# Patient Record
Sex: FEMALE | HISPANIC OR LATINO | Employment: PART TIME
[De-identification: names, ages, dates, MRNs, and addresses within clinical notes are randomized per-mention and may not be internally consistent; named-entity substitution may affect disease eponyms.]

---
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
## 2020-01-13 DIAGNOSIS — F41.0 PANIC DISORDER WITHOUT AGORAPHOBIA: ICD-10-CM

## 2020-01-15 RX ORDER — FLUOXETINE 10 MG/1
CAPSULE ORAL
Qty: 50 CAPSULE | Refills: 0 | Status: SHIPPED | OUTPATIENT
Start: 2020-01-15 | End: 2020-02-13

## 2020-01-15 NOTE — TELEPHONE ENCOUNTER
Medication is being filled for 1 time refill only due to:  Patient needs to be seen because due for f/u appt. See last OV notes from 12/12/2019.

## 2020-01-20 ENCOUNTER — OFFICE VISIT (OUTPATIENT)
Dept: BEHAVIORAL HEALTH | Facility: CLINIC | Age: 20
End: 2020-01-20
Payer: COMMERCIAL

## 2020-01-20 DIAGNOSIS — F32.1 MAJOR DEPRESSIVE DISORDER, SINGLE EPISODE, MODERATE (H): Primary | ICD-10-CM

## 2020-01-20 PROCEDURE — 90834 PSYTX W PT 45 MINUTES: CPT | Performed by: SOCIAL WORKER

## 2020-01-20 NOTE — PROGRESS NOTES
"                                              Progress Note    Patient Name: Wandy Ann  Date: 1/20/2020          Service Type: Individual  Video Visit: No     Session Start Time: 230  Session End Time: 315     Session Length: 38-52    Session #: 4    Attendees: Client attended alone     Treatment Plan Last Reviewed: 12/13/2019   PHQ-9 / YESSI-7 :     DATA  Interactive Complexity: No  Crisis: No       Progress Since Last Session (Related to Symptoms / Goals / Homework):   Symptoms: No change -    Homework: Partially completed      Episode of Care Goals: Satisfactory progress - PREPARATION (Decided to change - considering how); Intervened by negotiating a change plan and determining options / strategies for behavior change, identifying triggers, exploring social supports, and working towards setting a date to begin behavior change     Current / Ongoing Stressors and Concerns:     Client notes that things have been \"goodish.\"  Sleep has been good.  Spent a week in [place] with friends and had a nice time there.  Nice to get away.  Starts school tomorrow, feeling anxious about starting back but also excited.  Anxiety has been \"better.\"                  Treatment Objective(s) Addressed in This Session:       Goal- Anxiety: Client will decrease anxiety    I will know I've met my goal when I am less anxious.      Objective #A (Client Action)    Client will use cognitive strategies identified in therapy to challenge anxious thoughts.    Status: New - Date: 12/31/2019    Intervention(s)  Therapist will provide psychoeducation, behavioral activation, and cognitive restructuring.    Objective #B  Client will use at least 3 coping skills for anxiety management in the next 12 weeks.    Status: New - Date: 12/31/2019    Intervention(s)  Therapist will provide psychoeducation, behavioral activation, and cognitive restructuring.      Objective #C  Client will identify three distraction and diversion activities and use " "those activities to decrease level of anxiety.  Status: New - Date: 12/31/2019    Intervention(s)  Therapist will provide psychoeducation, behavioral activation, and cognitive restructuring.       Intervention:   CBT    Discussed adaptive information processing theory and reviewed rationale for using EMDR therapy to treat trauma, anxiety, phobias, pain, and somatic disorders.  Conducted state shifting exercise using bilateral stimulation and integrating target images, cognitions, emotions, and physiological sensations.      Emailed info on trauma and emdr to client in session.      Discussed adaptive information processing theory and reviewed rationale for using EMDR therapy to treat trauma, anxiety, phobias, pain, and somatic disorders.   Conducted calm, safe place exercise using bilateral stimulation and integrating target images, cognitions, emotions, and physiological sensations.  Client identified calm, safe phrase as \"innocent.\"       EMDR assessment at next session.          ASSESSMENT: Current Emotional / Mental Status (status of significant symptoms):   Risk status (Self / Other harm or suicidal ideation)   Patient denies current fears or concerns for personal safety.   Patient denies current or recent suicidal ideation or behaviors.   Patientdenies current or recent homicidal ideation or behaviors.   Patient denies current or recent self injurious behavior or ideation.   Patient denies other safety concerns.   Patient Patient reports there has been no change in risk factors since their last session.     PatientPatient reports there has been no change in protective factors since their last session.     Recommended that patient call 911 or go to the local ED should there be a change in any of these risk factors.     Appearance:   Appropriate    Eye Contact:   Good    Psychomotor Behavior: Normal    Attitude:   Cooperative    Orientation:   All   Speech    Rate / Production: Normal     Volume:  Normal "    Mood:    Anxious    Affect:    Appropriate    Thought Content:  Clear    Thought Form:  Coherent  Logical    Insight:    Good      Medication Review:   No changes to current psychiatric medication(s)     Medication Compliance:   Yes     Changes in Health Issues:   None reported     Chemical Use Review:   Substance Use: Chemical use reviewed, no active concerns identified      Tobacco Use: No current tobacco use.      Diagnosis:  1. Major depressive disorder, single episode, moderate (H)        Collateral Reports Completed:   Not Applicable    PLAN: (Patient Tasks / Therapist Tasks / Other)  1.  Client will use one boundary management skill (i.e., direct communication, saying no to a request) by next session.  2.  Client will practice diaphragmatic breathing once per day by next session.  Client will also rate mood and intensity of mood on a SUDS scale (1-10) before and after breathing exercise at least once by next session.   3.  Discuss trauma treatment at next session          MAICO Hollins                                                         ______________________________________________________________________    Treatment Plan    Patient's Name: Wandy Ann  YOB: 2000    Date: 12/13/2019     DSM5 Diagnoses: DSM5 Diagnoses: (Sustained by DSM5 Criteria Listed Above)  Diagnoses: 296.22 (F32.1)  Major Depressive Disorder, Single Episode, Moderate _  300.01 (F41.0) Panic Disorder  300.02 (F41.1) Generalized Anxiety Disorder  Psychosocial & Contextual Factors: some stress with family of origin, recent breakup  WHODAS 2.0 (12 item)            This questionnaire asks about difficulties due to health conditions. Health conditions  include  disease or illnesses, other health problems that may be short or long lasting,  injuries, mental health or emotional problems, and problems with alcohol or drugs.                     Think back over the past 30 days and answer these questions,  thinking about how much  difficulty you had doing the following activities. For each question, please [place] only  one response.    Declines WHODAS tojose      Referral / Collaboration:  Referral to another professional/service is not indicated at this time..    Anticipated number of session or this episode of care: 18-24      MeasurableTreatment Goal(s) related to diagnosis / functional impairment(s)      Goal- Anxiety: Client will decrease anxiety    I will know I've met my goal when I am less anxious.      Objective #A (Client Action)    Client will use cognitive strategies identified in therapy to challenge anxious thoughts.    Status: New - Date: 12/13/2019    Intervention(s)  Therapist will provide psychoeducation, behavioral activation, and cognitive restructuring.    Objective #B  Client will use at least 3 coping skills for anxiety management in the next 12 weeks.    Status: New - Date: 12/13/2019    Intervention(s)  Therapist will provide psychoeducation, behavioral activation, and cognitive restructuring.      Objective #C  Client will identify three distraction and diversion activities and use those activities to decrease level of anxiety.  Status: New - Date: 12/13/2019    Intervention(s)  Therapist will provide psychoeducation, behavioral activation, and cognitive restructuring.                  Patient has reviewed and agreed to the above plan.      Betty Barrientos, [facility]  December 13, 2019

## 2020-02-13 ENCOUNTER — OFFICE VISIT (OUTPATIENT)
Dept: FAMILY MEDICINE | Facility: CLINIC | Age: 20
End: 2020-02-13
Payer: COMMERCIAL

## 2020-02-13 VITALS
TEMPERATURE: 98.7 F | BODY MASS INDEX: 32.43 KG/M2 | DIASTOLIC BLOOD PRESSURE: 62 MMHG | HEART RATE: 68 BPM | SYSTOLIC BLOOD PRESSURE: 100 MMHG | WEIGHT: 163 LBS

## 2020-02-13 DIAGNOSIS — F32.1 CURRENT MODERATE EPISODE OF MAJOR DEPRESSIVE DISORDER, UNSPECIFIED WHETHER RECURRENT (H): ICD-10-CM

## 2020-02-13 DIAGNOSIS — F41.1 GAD (GENERALIZED ANXIETY DISORDER): Primary | ICD-10-CM

## 2020-02-13 DIAGNOSIS — F41.0 PANIC ATTACK: ICD-10-CM

## 2020-02-13 PROCEDURE — 99214 OFFICE O/P EST MOD 30 MIN: CPT | Performed by: FAMILY MEDICINE

## 2020-02-13 RX ORDER — PROPRANOLOL HYDROCHLORIDE 20 MG/1
20 TABLET ORAL 3 TIMES DAILY PRN
Qty: 90 TABLET | Refills: 0 | Status: SHIPPED | OUTPATIENT
Start: 2020-02-13 | End: 2020-03-18

## 2020-02-13 RX ORDER — FLUOXETINE 40 MG/1
40 CAPSULE ORAL DAILY
Qty: 90 CAPSULE | Refills: 0 | Status: SHIPPED | OUTPATIENT
Start: 2020-02-13 | End: 2020-05-05

## 2020-02-13 NOTE — PROGRESS NOTES
Subjective     Wandy Ann is a 19 year old female who presents to clinic today for the following health issues:    HPI   Medication Followup of Prozac    Taking Medication as prescribed: yes    Side Effects:  None    Medication Helping Symptoms:  NO     Wandy presents to discuss YESSI.  She has excessive worry, fear of social environments, physiological symptoms such as palpitations, sweaty palms, mild shortness of breath, rumination.  Symptoms have been affecting work/daily life.  Patient is currently taking prozac without reported side effects.  Associated panic attacks have improved however still present    Depression - improved control, taking prozac, no side effects reported, going to therapy which has been helping    Wt Readings from Last 4 Encounters:   02/13/20 73.9 kg (163 lb) (89 %)*   12/12/19 75.5 kg (166 lb 8 oz) (91 %)*   06/13/19 78.5 kg (173 lb) (93 %)*   12/13/18 74.9 kg (165 lb 3.2 oz) (91 %)*     * Growth percentiles are based on CDC (Girls, 2-20 Years) data.         BP Readings from Last 3 Encounters:   02/13/20 100/62   12/12/19 126/74   06/13/19 120/72    Wt Readings from Last 3 Encounters:   02/13/20 73.9 kg (163 lb) (89 %)*   12/12/19 75.5 kg (166 lb 8 oz) (91 %)*   06/13/19 78.5 kg (173 lb) (93 %)*     * Growth percentiles are based on CDC (Girls, 2-20 Years) data.                    Reviewed and updated as needed this visit by Provider  Tobacco  Allergies  Meds  Problems  Med Hx  Surg Hx  Fam Hx         Review of Systems   ROS COMP: Constitutional, HEENT, cardiovascular, pulmonary, gi and gu systems are negative, except as otherwise noted.      Objective    /62   Pulse 68   Temp 98.7  F (37.1  C) (Oral)   Wt 73.9 kg (163 lb)   BMI 32.43 kg/m     Physical Exam   GENERAL: healthy, alert and no distress  EYES: Eyes grossly normal to inspection, PERRL and conjunctivae and sclerae normal  NECK: no adenopathy, no asymmetry, masses, or scars and thyroid normal to  palpation  RESP: lungs clear to auscultation - no rales, rhonchi or wheezes  CV: regular rate and rhythm, normal S1 S2, no S3 or S4, no murmur, click or rub, no peripheral edema and peripheral pulses strong  SKIN: no suspicious lesions or rashes  PSYCH: mentation appears normal, affect normal/bright          Assessment & Plan       ICD-10-CM    1. YESSI (generalized anxiety disorder)  F41.1 FLUoxetine (PROZAC) 40 MG capsule     propranolol (INDERAL) 20 MG tablet   2. Current moderate episode of major depressive disorder, unspecified whether recurrent (H)  F32.1 FLUoxetine (PROZAC) 40 MG capsule   3. Panic attack  F41.0 propranolol (INDERAL) 20 MG tablet          Patient Instructions   Leticia Wandy    Always a pleasure seeing you in clinic.  Here's the plan:    Let's give prozac 40mg a try for about 6 weeks.  Let me know how this goes.  Try taking propranolol in the morning to prevent panic attacks.  I'm thinking that the dose will be low enough that you'll be able to work out in the afternoons, but we'll see.  Let me know if you need anything or have any questions.    Hudson Mobley MD       Return in about 6 weeks (around 3/26/2020) for Depression, YESSI.    Hudson Mobley MD  [facility]

## 2020-02-13 NOTE — PATIENT INSTRUCTIONS
Leticia Saba    Always a pleasure seeing you in clinic.  Here's the plan:    Let's give prozac 40mg a try for about 6 weeks.  Let me know how this goes.  Try taking propranolol in the morning to prevent panic attacks.  I'm thinking that the dose will be low enough that you'll be able to work out in the afternoons, but we'll see.  Let me know if you need anything or have any questions.    Hudson Mobley MD

## 2020-02-26 ENCOUNTER — OFFICE VISIT (OUTPATIENT)
Dept: BEHAVIORAL HEALTH | Facility: CLINIC | Age: 20
End: 2020-02-26
Payer: COMMERCIAL

## 2020-02-26 DIAGNOSIS — F41.1 GAD (GENERALIZED ANXIETY DISORDER): Primary | ICD-10-CM

## 2020-02-26 PROCEDURE — 90834 PSYTX W PT 45 MINUTES: CPT | Performed by: SOCIAL WORKER
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
## 2020-03-04 ENCOUNTER — OFFICE VISIT (OUTPATIENT)
Dept: BEHAVIORAL HEALTH | Facility: CLINIC | Age: 20
End: 2020-03-04
Payer: COMMERCIAL

## 2020-03-04 DIAGNOSIS — F41.1 GAD (GENERALIZED ANXIETY DISORDER): ICD-10-CM

## 2020-03-04 DIAGNOSIS — F43.10 PTSD (POST-TRAUMATIC STRESS DISORDER): Primary | ICD-10-CM

## 2020-03-04 PROCEDURE — 90785 PSYTX COMPLEX INTERACTIVE: CPT | Performed by: SOCIAL WORKER

## 2020-03-04 PROCEDURE — 90834 PSYTX W PT 45 MINUTES: CPT | Performed by: SOCIAL WORKER
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
## 2020-03-16 DIAGNOSIS — F41.1 GAD (GENERALIZED ANXIETY DISORDER): ICD-10-CM

## 2020-03-16 DIAGNOSIS — F41.0 PANIC ATTACK: ICD-10-CM

## 2020-03-18 ENCOUNTER — OFFICE VISIT (OUTPATIENT)
Dept: BEHAVIORAL HEALTH | Facility: CLINIC | Age: 20
End: 2020-03-18
Payer: COMMERCIAL

## 2020-03-18 DIAGNOSIS — F41.1 GAD (GENERALIZED ANXIETY DISORDER): Primary | ICD-10-CM

## 2020-03-18 PROCEDURE — 98968 PH1 ASSMT&MGMT NQHP 21-30: CPT | Performed by: SOCIAL WORKER

## 2020-03-18 RX ORDER — PROPRANOLOL HYDROCHLORIDE 20 MG/1
20 TABLET ORAL 3 TIMES DAILY PRN
Qty: 90 TABLET | Refills: 1 | Status: SHIPPED | OUTPATIENT
Start: 2020-03-18 | End: 2020-05-01

## 2020-03-18 NOTE — PROGRESS NOTES
"Wandy Ann is a 19 year old female who is being evaluated via a billable telephone visit.      The patient has been notified of following:     \"This telephone visit will be conducted via a call between you and your physician/provider. We have found that certain health care needs can be provided without the need for a physical exam.  This service lets us provide the care you need with a short phone conversation.  If a prescription is necessary we can send it directly to your pharmacy.  If lab work is needed we can place an order for that and you can then stop by our lab to have the test done at a later time.    If during the course of the call the physician/provider feels a telephone visit is not appropriate, you will not be charged for this service.\"       Wandy Ann complains of    Chief Complaint   Patient presents with     Outpatient Therapy     Phone session        I have reviewed and updated the patient's Past Medical History, Social History, Family History and Medication List.    ALLERGIES  Patient has no known allergies.        Additional provider notes: Client notes that \"things are jayashree scary, lots of unknowns and lots changing.\"  Feels like she has been watching the news too much nad we set a goal for her to change this.  Has been trying to stay focused on her coursework, taking 15 credits and they are all online.  Discussed automatic thoughts today in session.  Explored the role of automatic thoughts in increasing unhelpful thinking in depression, anxiety, and stress.  Explored techniques and strategies to increase awareness of unhelpful automatic thinking such as mindfulness.  Introduced concept of challenging negative thinking.                  Assessment/Plan:  (F41.1) YESSI (generalized anxiety disorder)  (primary encounter diagnosis)  Comment: stable  Plan: Client will practice recognizing automatic thoughts once per day by next session.  Client will document this and bring to " next session.  Phone session scheduled for next week.      I have reviewed the note as documented above.  This accurately captures the substance of my conversation with the patient.        Phone call contact time  Call Started at 830  Call Ended at 900    MAICO Hollins

## 2020-03-25 ENCOUNTER — VIRTUAL VISIT (OUTPATIENT)
Dept: BEHAVIORAL HEALTH | Facility: CLINIC | Age: 20
End: 2020-03-25
Payer: COMMERCIAL

## 2020-03-25 DIAGNOSIS — F43.10 PTSD (POST-TRAUMATIC STRESS DISORDER): Primary | ICD-10-CM

## 2020-03-25 PROCEDURE — 98968 PH1 ASSMT&MGMT NQHP 21-30: CPT | Performed by: SOCIAL WORKER
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
## 2020-04-01 ENCOUNTER — VIRTUAL VISIT (OUTPATIENT)
Dept: BEHAVIORAL HEALTH | Facility: CLINIC | Age: 20
End: 2020-04-01
Payer: COMMERCIAL

## 2020-04-01 DIAGNOSIS — F43.10 PTSD (POST-TRAUMATIC STRESS DISORDER): Primary | ICD-10-CM

## 2020-04-01 PROCEDURE — 98968 PH1 ASSMT&MGMT NQHP 21-30: CPT | Performed by: SOCIAL WORKER

## 2020-04-01 NOTE — PROGRESS NOTES
"                                              Progress Note    Patient Name: Wandy Ann  Date: 3/25/2020          Service Type: Individual  Video Visit: No     Session Start Time: 830  Session End Time: 915     Session Length: 38-52    Session #: 7    Attendees: Client attended alone     Treatment Plan Last Reviewed: 12/13/2019   PHQ-9 / YESSI-7 :     DATA  Interactive Complexity: No   Crisis: No        Today's meeting was a telephone session    The patient has been notified of the following:      \"We have found that certain health care needs can be provided without the need for a face to face visit.  This service lets us provide the care you need with a phone conversation.       I will have full access to your [place] medical record during this entire phone call.   I will be taking notes for your medical record.      Since this is like an office visit, we will bill your insurance company for this service.       There are potential benefits and risks of telephone visits (e.g. limits to patient confidentiality) that differ from in-person visits.?  Confidentiality still applies for telephone services, and nobody will record the visit.  It is important to be in a quiet, private space that is free of distractions (including cell phone or other devices) during the visit.??      If during the course of the call I believe a telephone visit is not appropriate, you will not be charged for this service\"     Consent has been obtained for this service by care team member: Yes          Progress Since Last Session (Related to Symptoms / Goals / Homework):   Symptoms: No change -    Homework: Partially completed      Episode of Care Goals: Satisfactory progress - PREPARATION (Decided to change - considering how); Intervened by negotiating a change plan and determining options / strategies for behavior change, identifying triggers, exploring social supports, and working towards setting a date to begin behavior " "change     Current / Ongoing Stressors and Concerns:     Client notes that things have been \"pretty good.\"  Has set a schedule up and is following it, getting her homework done in the morning and this has been good.  Taking 15 credits and does feel like she is staying on top of everything.  Client is staying with a friend now so does feel safe.  Some unhelpful thoughts lately.  Discussed mindfulness as being aware of what we are experiencing while we are experiencing it.  Contrasted this with avoidance and rumination.  Explored the role of mindfulness as an overall coping strategy for managing depression, anxiety, and strong emotions.  Explained concept of state of mind using SIFT (sensations, images, feelings, thoughts) pneumonic.  Led client in a guided mindfulness exercise focusing on sensations, images, feelings, and thoughts.  Discussed mindfulness as a tool to intentionally shift our awareness and focus as needed.                            Treatment Objective(s) Addressed in This Session:       Goal- Anxiety: Client will decrease anxiety    I will know I've met my goal when I am less anxious.      Objective #A (Client Action)    Client will use cognitive strategies identified in therapy to challenge anxious thoughts.        Intervention(s)  Therapist will provide psychoeducation, behavioral activation, and cognitive restructuring.    Objective #B  Client will use at least 3 coping skills for anxiety management in the next 12 weeks.        Intervention(s)  Therapist will provide psychoeducation, behavioral activation, and cognitive restructuring.      Objective #C  Client will identify three distraction and diversion activities and use those activities to decrease level of anxiety.      Intervention(s)  Therapist will provide psychoeducation, behavioral activation, and cognitive restructuring.       Intervention:   CBT    Discussed cognitive restructuring and behavioral activation.  Explored the connection " between thoughts, feelings, and actions by using examples relative to client's presenting concerns.  Explained major domains of symptoms (cognitive, emotional, somatic, behavioral, interpersonal) and importance of targeted and specific interventions to reduce symptoms of anxiety and depression.  Discussed role of symptom monitoring in cognitive behavioral treatment.                ASSESSMENT: Current Emotional / Mental Status (status of significant symptoms):   Risk status (Self / Other harm or suicidal ideation)   Patient denies current fears or concerns for personal safety.   Patient denies current or recent suicidal ideation or behaviors.   Patientdenies current or recent homicidal ideation or behaviors.   Patient denies current or recent self injurious behavior or ideation.   Patient denies other safety concerns.   Patient Patient reports there has been no change in risk factors since their last session.     PatientPatient reports there has been no change in protective factors since their last session.     Recommended that patient call 911 or go to the local ED should there be a change in any of these risk factors.     Appearance:   Appropriate    Eye Contact:   Good    Psychomotor Behavior: Normal    Attitude:   Cooperative    Orientation:   All   Speech    Rate / Production: Normal     Volume:  Normal    Mood:    Anxious    Affect:    Appropriate    Thought Content:  Clear    Thought Form:  Coherent  Logical    Insight:    Good      Medication Review:   No changes to current psychiatric medication(s)     Medication Compliance:   Yes     Changes in Health Issues:   None reported     Chemical Use Review:   Substance Use: Chemical use reviewed, no active concerns identified      Tobacco Use: No current tobacco use.      Diagnosis:  1. PTSD (post-traumatic stress disorder)        Collateral Reports Completed:   Not Applicable    PLAN: (Patient Tasks / Therapist Tasks / Other)  1.  Client will use one boundary  management skill (i.e., direct communication, saying no to a request) by next session.  2.  Client will practice diaphragmatic breathing once per day by next session.  Client will also rate mood and intensity of mood on a SUDS scale (1-10) before and after breathing exercise at least once by next session.   3.  Defer EMDR for now         Betty Barrientos, LICSW                                                         ______________________________________________________________________    Treatment Plan    Patient's Name: Wandy Ann  YOB: 2000    Date: 12/13/2019     DSM5 Diagnoses: DSM5 Diagnoses: (Sustained by DSM5 Criteria Listed Above)  Diagnoses: 296.22 (F32.1)  Major Depressive Disorder, Single Episode, Moderate _  300.01 (F41.0) Panic Disorder  300.02 (F41.1) Generalized Anxiety Disorder  Psychosocial & Contextual Factors: some stress with family of origin, recent breakup  WHODAS 2.0 (12 item)            This questionnaire asks about difficulties due to health conditions. Health conditions  include  disease or illnesses, other health problems that may be short or long lasting,  injuries, mental health or emotional problems, and problems with alcohol or drugs.                     Think back over the past 30 days and answer these questions, thinking about how much  difficulty you had doing the following activities. For each question, please [place] only  one response.    Declines WHODAS toduy      Referral / Collaboration:  Referral to another professional/service is not indicated at this time..    Anticipated number of session or this episode of care: 18-24      MeasurableTreatment Goal(s) related to diagnosis / functional impairment(s)      Goal- Anxiety: Client will decrease anxiety    I will know I've met my goal when I am less anxious.      Objective #A (Client Action)    Client will use cognitive strategies identified in therapy to challenge anxious thoughts.    Status: cont-  Date: 4/1/2020     Intervention(s)  Therapist will provide psychoeducation, behavioral activation, and cognitive restructuring.    Objective #B  Client will use at least 3 coping skills for anxiety management in the next 12 weeks.    Status: cont- Date: 4/1/2020     Intervention(s)  Therapist will provide psychoeducation, behavioral activation, and cognitive restructuring.      Objective #C  Client will identify three distraction and diversion activities and use those activities to decrease level of anxiety.  Status: cont- Date: 4/1/2020     Intervention(s)  Therapist will provide psychoeducation, behavioral activation, and cognitive restructuring.                  Patient has reviewed and agreed to the above plan.      Betty Barrientos, LICSW  December 13, 2019

## 2020-04-09 ENCOUNTER — MYC MEDICAL ADVICE (OUTPATIENT)
Dept: FAMILY MEDICINE | Facility: CLINIC | Age: 20
End: 2020-04-09

## 2020-04-09 NOTE — TELEPHONE ENCOUNTER
Patient taking Fluoxetine 40mg dialy and Propanolol 20mg TID as needed.  Reviewed 2/13/20 visit notes, unsure of what supplement provider recommended.   Gabby Oakes RN

## 2020-04-10 ENCOUNTER — TELEPHONE (OUTPATIENT)
Dept: BEHAVIORAL HEALTH | Facility: CLINIC | Age: 20
End: 2020-04-10

## 2020-04-15 ENCOUNTER — VIRTUAL VISIT (OUTPATIENT)
Dept: BEHAVIORAL HEALTH | Facility: CLINIC | Age: 20
End: 2020-04-15
Payer: COMMERCIAL

## 2020-04-15 DIAGNOSIS — F43.10 PTSD (POST-TRAUMATIC STRESS DISORDER): Primary | ICD-10-CM

## 2020-04-15 PROCEDURE — 98968 PH1 ASSMT&MGMT NQHP 21-30: CPT | Performed by: SOCIAL WORKER
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
## 2020-04-19 ENCOUNTER — E-VISIT (OUTPATIENT)
Dept: FAMILY MEDICINE | Facility: CLINIC | Age: 20
End: 2020-04-19
Payer: COMMERCIAL

## 2020-04-19 DIAGNOSIS — Z53.9 ERRONEOUS ENCOUNTER--DISREGARD: Primary | ICD-10-CM

## 2020-04-19 ASSESSMENT — PATIENT HEALTH QUESTIONNAIRE - PHQ9
10. IF YOU CHECKED OFF ANY PROBLEMS, HOW DIFFICULT HAVE THESE PROBLEMS MADE IT FOR YOU TO DO YOUR WORK, TAKE CARE OF THINGS AT HOME, OR GET ALONG WITH OTHER PEOPLE: VERY DIFFICULT
SUM OF ALL RESPONSES TO PHQ QUESTIONS 1-9: 19
SUM OF ALL RESPONSES TO PHQ QUESTIONS 1-9: 19

## 2020-04-19 ASSESSMENT — ANXIETY QUESTIONNAIRES
GAD7 TOTAL SCORE: 12
1. FEELING NERVOUS, ANXIOUS, OR ON EDGE: SEVERAL DAYS
GAD7 TOTAL SCORE: 12
7. FEELING AFRAID AS IF SOMETHING AWFUL MIGHT HAPPEN: SEVERAL DAYS
2. NOT BEING ABLE TO STOP OR CONTROL WORRYING: MORE THAN HALF THE DAYS
4. TROUBLE RELAXING: MORE THAN HALF THE DAYS
5. BEING SO RESTLESS THAT IT IS HARD TO SIT STILL: SEVERAL DAYS
7. FEELING AFRAID AS IF SOMETHING AWFUL MIGHT HAPPEN: SEVERAL DAYS
3. WORRYING TOO MUCH ABOUT DIFFERENT THINGS: MORE THAN HALF THE DAYS
6. BECOMING EASILY ANNOYED OR IRRITABLE: NEARLY EVERY DAY
GAD7 TOTAL SCORE: 12

## 2020-04-20 ASSESSMENT — ANXIETY QUESTIONNAIRES: GAD7 TOTAL SCORE: 12

## 2020-04-20 ASSESSMENT — PATIENT HEALTH QUESTIONNAIRE - PHQ9: SUM OF ALL RESPONSES TO PHQ QUESTIONS 1-9: 19

## 2020-04-27 ENCOUNTER — E-VISIT (OUTPATIENT)
Dept: FAMILY MEDICINE | Facility: CLINIC | Age: 20
End: 2020-04-27
Payer: COMMERCIAL

## 2020-04-27 DIAGNOSIS — F41.1 GAD (GENERALIZED ANXIETY DISORDER): Primary | ICD-10-CM

## 2020-04-27 DIAGNOSIS — F32.1 CURRENT MODERATE EPISODE OF MAJOR DEPRESSIVE DISORDER, UNSPECIFIED WHETHER RECURRENT (H): ICD-10-CM

## 2020-04-27 PROCEDURE — 99421 OL DIG E/M SVC 5-10 MIN: CPT | Performed by: FAMILY MEDICINE

## 2020-04-27 ASSESSMENT — PATIENT HEALTH QUESTIONNAIRE - PHQ9
SUM OF ALL RESPONSES TO PHQ QUESTIONS 1-9: 24
10. IF YOU CHECKED OFF ANY PROBLEMS, HOW DIFFICULT HAVE THESE PROBLEMS MADE IT FOR YOU TO DO YOUR WORK, TAKE CARE OF THINGS AT HOME, OR GET ALONG WITH OTHER PEOPLE: SOMEWHAT DIFFICULT
SUM OF ALL RESPONSES TO PHQ QUESTIONS 1-9: 24

## 2020-04-28 ASSESSMENT — PATIENT HEALTH QUESTIONNAIRE - PHQ9: SUM OF ALL RESPONSES TO PHQ QUESTIONS 1-9: 24

## 2020-04-29 ENCOUNTER — VIRTUAL VISIT (OUTPATIENT)
Dept: BEHAVIORAL HEALTH | Facility: CLINIC | Age: 20
End: 2020-04-29
Payer: COMMERCIAL

## 2020-04-29 DIAGNOSIS — F43.10 PTSD (POST-TRAUMATIC STRESS DISORDER): Primary | ICD-10-CM

## 2020-04-29 PROCEDURE — 90834 PSYTX W PT 45 MINUTES: CPT | Mod: 95 | Performed by: SOCIAL WORKER

## 2020-04-29 NOTE — PROGRESS NOTES
"                                              Progress Note    Patient Name: Wandy Ann  Date: 4/29/2020          Service Type: Individual  Video Visit: No     Session Start Time: 1230  Session End Time: 115     Session Length: 45    Session #: 7    Attendees: Client attended alone   The patient has been notified of the following:       Treatment Plan Last Reviewed: 4/1/20  PHQ-9 / YESSI-7 :     DATA  Interactive Complexity: No   Crisis: No        Today's meeting was a telephone session    The patient has been notified of the following:      \"We have found that certain health care needs can be provided without the need for a face to face visit.  This service lets us provide the care you need with a phone conversation.       I will have full access to your [place] medical record during this entire phone call.   I will be taking notes for your medical record.      Since this is like an office visit, we will bill your insurance company for this service.       There are potential benefits and risks of telephone visits (e.g. limits to patient confidentiality) that differ from in-person visits.?  Confidentiality still applies for telephone services, and nobody will record the visit.  It is important to be in a quiet, private space that is free of distractions (including cell phone or other devices) during the visit.??      If during the course of the call I believe a telephone visit is not appropriate, you will not be charged for this service\"     Consent has been obtained for this service by care team member: Yes          Progress Since Last Session (Related to Symptoms / Goals / Homework):   Symptoms: No change -    Homework: Partially completed      Episode of Care Goals: Satisfactory progress - PREPARATION (Decided to change - considering how); Intervened by negotiating a change plan and determining options / strategies for behavior change, identifying triggers, exploring social supports, and working " "towards setting a date to begin behavior change     Current / Ongoing Stressors and Concerns:     Client notes that things have been \"some highs and some lows.\"  Stressed with the uncertainty and covid 19.  Has been trying to get out more these days, [place] to the [place] and got some sun.  School semester is wrapping up, her last major assignment is due next week and she is on track for this.  Reports that things are going \"much better this semester.\"  She can concentrate and feels like her performance is back to back to what is was in the past.                                Treatment Objective(s) Addressed in This Session:       Goal- Anxiety: Client will decrease anxiety    I will know I've met my goal when I am less anxious.      Objective #A (Client Action)    Client will use cognitive strategies identified in therapy to challenge anxious thoughts.        Intervention(s)  Therapist will provide psychoeducation, behavioral activation, and cognitive restructuring.    Objective #B  Client will use at least 3 coping skills for anxiety management in the next 12 weeks.        Intervention(s)  Therapist will provide psychoeducation, behavioral activation, and cognitive restructuring.      Objective #C  Client will identify three distraction and diversion activities and use those activities to decrease level of anxiety.      Intervention(s)  Therapist will provide psychoeducation, behavioral activation, and cognitive restructuring.       Intervention:   CBT    Discussed cognitive restructuring and behavioral activation.  Explored the connection between thoughts, feelings, and actions by using examples relative to client's presenting concerns.  Explained major domains of symptoms (cognitive, emotional, somatic, behavioral, interpersonal) and importance of targeted and specific interventions to reduce symptoms of anxiety and depression.  Discussed role of symptom monitoring in cognitive behavioral treatment.        "         ASSESSMENT: Current Emotional / Mental Status (status of significant symptoms):   Risk status (Self / Other harm or suicidal ideation)   Patient denies current fears or concerns for personal safety.   Patient denies current or recent suicidal ideation or behaviors.   Patientdenies current or recent homicidal ideation or behaviors.   Patient denies current or recent self injurious behavior or ideation.   Patient denies other safety concerns.   Patient Patient reports there has been no change in risk factors since their last session.     PatientPatient reports there has been no change in protective factors since their last session.     Recommended that patient call 911 or go to the local ED should there be a change in any of these risk factors.     Appearance:   Appropriate    Eye Contact:   Good    Psychomotor Behavior: Normal    Attitude:   Cooperative    Orientation:   All   Speech    Rate / Production: Normal     Volume:  Normal    Mood:    Anxious    Affect:    Appropriate    Thought Content:  Clear    Thought Form:  Coherent  Logical    Insight:    Good      Medication Review:   No changes to current psychiatric medication(s)     Medication Compliance:   Yes     Changes in Health Issues:   None reported     Chemical Use Review:   Substance Use: Chemical use reviewed, no active concerns identified      Tobacco Use: No current tobacco use.      Diagnosis:  1. PTSD (post-traumatic stress disorder)        Collateral Reports Completed:   Not Applicable    PLAN: (Patient Tasks / Therapist Tasks / Other)  1.  Client will use one boundary management skill (i.e., direct communication, saying no to a request) by next session.  2.  Client will practice diaphragmatic breathing once per day by next session.  Client will also rate mood and intensity of mood on a SUDS scale (1-10) before and after breathing exercise at least once by next session.   3.  Defer EMDR for now         Betty Barrientos, LICSW 4/29/2020                                                           ______________________________________________________________________    Treatment Plan    Patient's Name: Wandy Ann  YOB: 2000    Date: 12/13/2019     DSM5 Diagnoses: DSM5 Diagnoses: (Sustained by DSM5 Criteria Listed Above)  Diagnoses: 296.22 (F32.1)  Major Depressive Disorder, Single Episode, Moderate _  300.01 (F41.0) Panic Disorder  300.02 (F41.1) Generalized Anxiety Disorder  Psychosocial & Contextual Factors: some stress with family of origin, recent breakup  WHODAS 2.0 (12 item)            This questionnaire asks about difficulties due to health conditions. Health conditions  include  disease or illnesses, other health problems that may be short or long lasting,  injuries, mental health or emotional problems, and problems with alcohol or drugs.                     Think back over the past 30 days and answer these questions, thinking about how much  difficulty you had doing the following activities. For each question, please [place] only  one response.    Declines WHODAS toduy      Referral / Collaboration:  Referral to another professional/service is not indicated at this time..    Anticipated number of session or this episode of care: 18-24      MeasurableTreatment Goal(s) related to diagnosis / functional impairment(s)      Goal- Anxiety: Client will decrease anxiety    I will know I've met my goal when I am less anxious.      Objective #A (Client Action)    Client will use cognitive strategies identified in therapy to challenge anxious thoughts.    Status: cont- Date: 4/1/2020     Intervention(s)  Therapist will provide psychoeducation, behavioral activation, and cognitive restructuring.    Objective #B  Client will use at least 3 coping skills for anxiety management in the next 12 weeks.    Status: cont- Date: 4/1/2020     Intervention(s)  Therapist will provide psychoeducation, behavioral activation, and cognitive  restructuring.      Objective #C  Client will identify three distraction and diversion activities and use those activities to decrease level of anxiety.  Status: cont- Date: 4/1/2020     Intervention(s)  Therapist will provide psychoeducation, behavioral activation, and cognitive restructuring.                  Patient has reviewed and agreed to the above plan.      Betty Barrientos, [facility]  December 13, 2019

## 2020-05-01 RX ORDER — PROPRANOLOL HYDROCHLORIDE 10 MG/1
10 TABLET ORAL
Qty: 30 TABLET | Refills: 1 | Status: SHIPPED | OUTPATIENT
Start: 2020-05-01 | End: 2020-07-13

## 2020-05-01 RX ORDER — BUPROPION HYDROCHLORIDE 150 MG/1
150 TABLET ORAL EVERY MORNING
Qty: 30 TABLET | Refills: 1 | Status: SHIPPED | OUTPATIENT
Start: 2020-05-01 | End: 2020-07-13

## 2020-05-01 NOTE — PATIENT INSTRUCTIONS
Using Antidepressants  Depression is a mood disorder that affects the way you think and feel. The most common symptom is a feeling of deep sadness. This feeling does not go away or get better on its own. But most types of depression can be helped with therapy and antidepressant medicines. (Note: This covers antidepressant use in adults only.)    What do antidepressants do?  Antidepressants restore the balance of certain chemicals in your brain to help ease your depression. You will likely feel better in 4 to 6 weeks. But you may continue taking antidepressants for a year or more to keep your symptoms from coming back. Some people with depression need to take antidepressants for life. There are several types of antidepressants. The main types are described below.  Selective serotonin reuptake inhibitors (SSRIs)  SSRIs are effective medicines for the treatment of depression. They tend to have fewer side effects than other antidepressants. Possible side effects include anxiety, trouble sleeping, nausea, diarrhea, sexual dysfunction, and headaches. In rare cases, they may make you more depressed. SSRIs shouldn t be mixed with certain other medicines. Talk with your healthcare provider about all the medicines, herbs, and supplements you are taking.  Tricyclic antidepressants  Tricyclics help severe or long-term depression. They have been used for many years with good results. Possible side effects include blurred vision, dry mouth, and constipation.  Monoamine oxidase inhibitors (MAOIs)  If you don t respond to tricyclics or SSRIs, your healthcare provider may prescribe MAOIs. These medicines can be very effective. But people taking MAOIs must stay away from certain foods and medicines. Your healthcare provider can tell you more.  Lithium  If you have bipolar disorder, you may take a medicine called lithium. This medicine helps even out your mood. Possible side effects are weight gain, trembling, loose stool, and  nausea. Lithium is also used:    For people who have unipolar depression and have not responded to other antidepressants    For people who have a sudden (acute) episode of unipolar depression    As a maintenance medicine to prevent unipolar depression from happening again  Things to avoid if you are taking MAOIs  If you are taking MAOIs, don t take any of the following:    Beans    Aged cheese    Chocolate    Red wine    Most cold medicines    Certain medicines (ask your healthcare provider)  To reduce the risk of lithium poisoning  You can reduce the risk of lithium poisoning by following this advice:    Take only the prescribed amount of lithium. If your depressive symptoms get worse, contact your healthcare provider. Never increase or decrease your medicine on your own.    Drink plenty of fluids other than coffee, tea, and soda.    Limit salt in your diet.    Before using other prescribed medicines or over-the-counter medicines, check with your pharmacist. This is to be sure the medicines won t interact with the lithium.    Never share your medicines or use another person's medicines, even if it is the same medicine and dose.    Keep follow-up appointments    Have your Lithium blood level checked as advised. Blood work will need to occur more often when symptoms are not under control  If you have side effects  The side effects of antidepressants are usually mild. But if you have troubling side effects, call your healthcare provider. Changing the dosage or type of medicine may help. Never stop taking medicines on your own.  Date Last Reviewed: 5/1/2017 2000-2019 [employer]. [address] [place] [address], [place] [address]. All rights reserved. This information is not intended as a substitute for professional medical care. Always follow your healthcare professional's instructions.          Depression: Tips to Help Yourself    As your healthcare providers help treat your depression, you can also help  yourself. Keep in mind that your illness affects you emotionally, physically, mentally, and socially. So full recovery will take time. Take care of your body and your soul, and be patient with yourself as you get better.  Self-care    Educate yourself. Read about treatment and medicine options. If you have the energy, attend local conferences or support groups. Keep a list of useful websites and helpful books and use them as needed. This illness is not your fault. Don t blame yourself for your depression.    Manage early symptoms. If you notice symptoms returning, experience triggers, or identify other factors that may lead to a depressive episode, get help as soon as possible. Ask trusted friends and family to monitor your behavior and let you know if they see anything of concern.    Work with your provider. Find a provider you can trust. Communicate honestly with that person and share information on your treatment for depression and your reaction to medicines.    Be prepared for a crisis. Know what to do if you experience a crisis. Keep the phone number of a crisis hotline and know the location of your community's urgent care centers and the closest emergency department.    Hold off on big decisions. Depression can cloud your judgment. So wait until you feel better before making major life decisions, such as changing jobs, moving, or getting  or .    Be patient. Recovering from depression is a process. Don t be discouraged if it takes some time to feel better.    Keep it simple. Depression saps your energy and concentration. So you won t be able to do all the things you used to do. Set small goals and do what you can.    Be with others. Don t isolate yourself--you ll only feel worse. Try to be with other people. And take part in fun activities when you can. Go to a movie, ballgame, [employer] service, or social event. Talk openly with people you can trust. And accept help when it s offered.  Take  care of your body  People with depression often lose the desire to take care of themselves. That only makes their problems worse. During treatment and afterward, make a point to:    Exercise. It s a great way to take care of your body. And studies have shown that exercise helps fight depression.    Avoid drugs and alcohol. These may ease the pain in the short term. But they ll only make your problems worse in the long run.    Get relief from stress. Ask your healthcare provider for relaxation exercises and techniques to help relieve stress.    Eat right. A balanced and healthy diet helps keep your body healthy.  Date Last Reviewed: 1/1/2017 2000-2019 [employer]. [address] [place] [address], [place] [address]. All rights reserved. This information is not intended as a substitute for professional medical care. Always follow your healthcare professional's instructions.

## 2020-05-05 ENCOUNTER — MYC REFILL (OUTPATIENT)
Dept: FAMILY MEDICINE | Facility: CLINIC | Age: 20
End: 2020-05-05

## 2020-05-05 DIAGNOSIS — F41.1 GAD (GENERALIZED ANXIETY DISORDER): ICD-10-CM

## 2020-05-05 DIAGNOSIS — F32.1 CURRENT MODERATE EPISODE OF MAJOR DEPRESSIVE DISORDER, UNSPECIFIED WHETHER RECURRENT (H): ICD-10-CM

## 2020-05-07 RX ORDER — FLUOXETINE 40 MG/1
40 CAPSULE ORAL DAILY
Qty: 90 CAPSULE | Refills: 0 | Status: SHIPPED | OUTPATIENT
Start: 2020-05-07 | End: 2020-07-19

## 2020-05-07 NOTE — TELEPHONE ENCOUNTER
"Routing refill request to provider for review/approval because:  PHQ9 > 5      Requested Prescriptions   Pending Prescriptions Disp Refills     FLUoxetine (PROZAC) 40 MG capsule 90 capsule 0     Sig: Take 1 capsule (40 mg) by mouth daily       SSRIs Protocol Failed - 5/5/2020  1:30 PM        Failed - PHQ-9 score less than 5 in past 6 months     Please review last PHQ-9 score.           Passed - Medication is active on med list        Passed - Patient is age 18 or older        Passed - No active pregnancy on record        Passed - No positive pregnancy test in last 12 months        Passed - Recent (6 mo) or future (30 days) visit within the authorizing provider's specialty     Patient had office visit in the last 6 months or has a visit in the next 30 days with authorizing provider or within the authorizing provider's specialty.  See \"Patient Info\" tab in inbasket, or \"Choose Columns\" in Meds & Orders section of the refill encounter.               Sugar Perez RN  [facility] [place]    "

## 2020-05-14 ENCOUNTER — VIRTUAL VISIT (OUTPATIENT)
Dept: BEHAVIORAL HEALTH | Facility: CLINIC | Age: 20
End: 2020-05-14
Payer: COMMERCIAL

## 2020-05-14 DIAGNOSIS — F43.10 PTSD (POST-TRAUMATIC STRESS DISORDER): Primary | ICD-10-CM

## 2020-05-14 PROCEDURE — 90834 PSYTX W PT 45 MINUTES: CPT | Mod: 95 | Performed by: SOCIAL WORKER

## 2020-05-14 NOTE — PROGRESS NOTES
"                                              Progress Note    Patient Name: Wandy Ann  Date: 5/14/2020          Service Type: Individual  Video Visit: No     Session Start Time: 1130  Session End Time: 1215     Session Length: 45    Session #: 8    Attendees: Client attended alone   The patient has been notified of the following:       Treatment Plan Last Reviewed: 4/1/20  PHQ-9 / YESSI-7 :     DATA  Interactive Complexity: No   Crisis: No        Today's meeting was a telephone session    The patient has been notified of the following:      \"We have found that certain health care needs can be provided without the need for a face to face visit.  This service lets us provide the care you need with a phone conversation.       I will have full access to your [place] medical record during this entire phone call.   I will be taking notes for your medical record.      Since this is like an office visit, we will bill your insurance company for this service.       There are potential benefits and risks of telephone visits (e.g. limits to patient confidentiality) that differ from in-person visits.?  Confidentiality still applies for telephone services, and nobody will record the visit.  It is important to be in a quiet, private space that is free of distractions (including cell phone or other devices) during the visit.??      If during the course of the call I believe a telephone visit is not appropriate, you will not be charged for this service\"     Consent has been obtained for this service by care team member: Yes          Progress Since Last Session (Related to Symptoms / Goals / Homework):   Symptoms: No change -    Homework: Partially completed      Episode of Care Goals: Satisfactory progress - PREPARATION (Decided to change - considering how); Intervened by negotiating a change plan and determining options / strategies for behavior change, identifying triggers, exploring social supports, and working " "towards setting a date to begin behavior change     Current / Ongoing Stressors and Concerns:     Client notes that things have been \"quarentine is getting to everyone.\"  Some stress in the home.  Client reports \"my sister and I got into it\" and mom \"just acted as a .\"  Started off with a small issue and then \"turned into a big thing.\"  Sounds like the issue was around moving.     Treatment Objective(s) Addressed in This Session:       Goal- Anxiety: Client will decrease anxiety    I will know I've met my goal when I am less anxious.      Objective #A (Client Action)    Client will use cognitive strategies identified in therapy to challenge anxious thoughts.        Intervention(s)  Therapist will provide psychoeducation, behavioral activation, and cognitive restructuring.    Objective #B  Client will use at least 3 coping skills for anxiety management in the next 12 weeks.        Intervention(s)  Therapist will provide psychoeducation, behavioral activation, and cognitive restructuring.      Objective #C  Client will identify three distraction and diversion activities and use those activities to decrease level of anxiety.      Intervention(s)  Therapist will provide psychoeducation, behavioral activation, and cognitive restructuring.       Intervention:   CBT    Discussed cognitive restructuring and behavioral activation.  Explored the connection between thoughts, feelings, and actions by using examples relative to client's presenting concerns.  Explained major domains of symptoms (cognitive, emotional, somatic, behavioral, interpersonal) and importance of targeted and specific interventions to reduce symptoms of anxiety and depression.  Discussed role of symptom monitoring in cognitive behavioral treatment.                ASSESSMENT: Current Emotional / Mental Status (status of significant symptoms):   Risk status (Self / Other harm or suicidal ideation)   Patient denies current fears or concerns for " personal safety.   Patient denies current or recent suicidal ideation or behaviors.   Patientdenies current or recent homicidal ideation or behaviors.   Patient denies current or recent self injurious behavior or ideation.   Patient denies other safety concerns.   Patient Patient reports there has been no change in risk factors since their last session.     PatientPatient reports there has been no change in protective factors since their last session.     Recommended that patient call 911 or go to the local ED should there be a change in any of these risk factors.     Appearance:   Appropriate    Eye Contact:   Good    Psychomotor Behavior: Normal    Attitude:   Cooperative    Orientation:   All   Speech    Rate / Production: Normal     Volume:  Normal    Mood:    Anxious    Affect:    Appropriate    Thought Content:  Clear    Thought Form:  Coherent  Logical    Insight:    Good      Medication Review:   No changes to current psychiatric medication(s)     Medication Compliance:   Yes     Changes in Health Issues:   None reported     Chemical Use Review:   Substance Use: Chemical use reviewed, no active concerns identified      Tobacco Use: No current tobacco use.      Diagnosis:  1. PTSD (post-traumatic stress disorder)        Collateral Reports Completed:   Not Applicable    PLAN: (Patient Tasks / Therapist Tasks / Other)  1.  Client will use one boundary management skill (i.e., direct communication, saying no to a request) by next session.  2.  Client will practice diaphragmatic breathing once per day by next session.  Client will also rate mood and intensity of mood on a SUDS scale (1-10) before and after breathing exercise at least once by next session.   3.  Defer EMDR for now         Betty Barrientos, LICSW 4/29/2020                                                          ______________________________________________________________________    Treatment Plan    Patient's Name: Wandy Jones Ann  Date Of  Birth: 2000    Date: 12/13/2019     DSM5 Diagnoses: DSM5 Diagnoses: (Sustained by DSM5 Criteria Listed Above)  Diagnoses: 296.22 (F32.1)  Major Depressive Disorder, Single Episode, Moderate _  300.01 (F41.0) Panic Disorder  300.02 (F41.1) Generalized Anxiety Disorder  Psychosocial & Contextual Factors: some stress with family of origin, recent breakup  WHODAS 2.0 (12 item)            This questionnaire asks about difficulties due to health conditions. Health conditions  include  disease or illnesses, other health problems that may be short or long lasting,  injuries, mental health or emotional problems, and problems with alcohol or drugs.                     Think back over the past 30 days and answer these questions, thinking about how much  difficulty you had doing the following activities. For each question, please [place] only  one response.    Declines WHODAS toduy      Referral / Collaboration:  Referral to another professional/service is not indicated at this time..    Anticipated number of session or this episode of care: 18-24      MeasurableTreatment Goal(s) related to diagnosis / functional impairment(s)      Goal- Anxiety: Client will decrease anxiety    I will know I've met my goal when I am less anxious.      Objective #A (Client Action)    Client will use cognitive strategies identified in therapy to challenge anxious thoughts.    Status: cont- Date: 4/1/2020     Intervention(s)  Therapist will provide psychoeducation, behavioral activation, and cognitive restructuring.    Objective #B  Client will use at least 3 coping skills for anxiety management in the next 12 weeks.    Status: cont- Date: 4/1/2020     Intervention(s)  Therapist will provide psychoeducation, behavioral activation, and cognitive restructuring.      Objective #C  Client will identify three distraction and diversion activities and use those activities to decrease level of anxiety.  Status: cont- Date: 4/1/2020      Intervention(s)  Therapist will provide psychoeducation, behavioral activation, and cognitive restructuring.                  Patient has reviewed and agreed to the above plan.      Betty Barrientos, LICSW  December 13, 2019

## 2020-05-28 ENCOUNTER — VIRTUAL VISIT (OUTPATIENT)
Dept: BEHAVIORAL HEALTH | Facility: CLINIC | Age: 20
End: 2020-05-28
Payer: COMMERCIAL

## 2020-05-28 DIAGNOSIS — F43.10 PTSD (POST-TRAUMATIC STRESS DISORDER): Primary | ICD-10-CM

## 2020-05-28 PROCEDURE — 90834 PSYTX W PT 45 MINUTES: CPT | Mod: 95 | Performed by: SOCIAL WORKER

## 2020-05-28 NOTE — PROGRESS NOTES
"                                              Progress Note    Patient Name: Wandy Ann  Date: 5/28/2020          Service Type: Individual  Video Visit: No     Session Start Time: 1130  Session End Time: 1215     Session Length: 45    Session #: 9    Attendees: Client attended alone   The patient has been notified of the following:       Treatment Plan Last Reviewed: 4/1/20  PHQ-9 / YESSI-7 :     DATA  Interactive Complexity: No   Crisis: No        Today's meeting was a telephone session    The patient has been notified of the following:      \"We have found that certain health care needs can be provided without the need for a face to face visit.  This service lets us provide the care you need with a phone conversation.       I will have full access to your [place] medical record during this entire phone call.   I will be taking notes for your medical record.      Since this is like an office visit, we will bill your insurance company for this service.       There are potential benefits and risks of telephone visits (e.g. limits to patient confidentiality) that differ from in-person visits.?  Confidentiality still applies for telephone services, and nobody will record the visit.  It is important to be in a quiet, private space that is free of distractions (including cell phone or other devices) during the visit.??      If during the course of the call I believe a telephone visit is not appropriate, you will not be charged for this service\"     Consent has been obtained for this service by care team member: Yes          Progress Since Last Session (Related to Symptoms / Goals / Homework):   Symptoms: No change -    Homework: Partially completed      Episode of Care Goals: Satisfactory progress - PREPARATION (Decided to change - considering how); Intervened by negotiating a change plan and determining options / strategies for behavior change, identifying triggers, exploring social supports, and working " "towards setting a date to begin behavior change     Current / Ongoing Stressors and Concerns:    Client notes that things have been \"doing ok.\"  Setting better boundaries with friends, one friend \"lashed out at me\" last week and client disengaged from the conversation, and set a limit on the other person's behavior.  Feels like she is making \"skillful progress\" in her relationships with other people.  Got a few books on getting into grad school/law school and feels good about doing this.             Treatment Objective(s) Addressed in This Session:       Goal- Anxiety: Client will decrease anxiety    I will know I've met my goal when I am less anxious.      Objective #A (Client Action)    Client will use cognitive strategies identified in therapy to challenge anxious thoughts.        Intervention(s)  Therapist will provide psychoeducation, behavioral activation, and cognitive restructuring.    Objective #B  Client will use at least 3 coping skills for anxiety management in the next 12 weeks.        Intervention(s)  Therapist will provide psychoeducation, behavioral activation, and cognitive restructuring.      Objective #C  Client will identify three distraction and diversion activities and use those activities to decrease level of anxiety.      Intervention(s)  Therapist will provide psychoeducation, behavioral activation, and cognitive restructuring.       Intervention:   CBT    Discussed physical, mental, and emotional boundaries and limit-setting with others. Explored management of boundaries through direct communication and limiting contact and communication with others.  Discussed barriers to using boundary management skills including strong emotions and physical proximity.  Client given handout on boundaries today in session.                ASSESSMENT: Current Emotional / Mental Status (status of significant symptoms):   Risk status (Self / Other harm or suicidal ideation)   Patient denies current fears or " concerns for personal safety.   Patient denies current or recent suicidal ideation or behaviors.   Patientdenies current or recent homicidal ideation or behaviors.   Patient denies current or recent self injurious behavior or ideation.   Patient denies other safety concerns.   Patient Patient reports there has been no change in risk factors since their last session.     PatientPatient reports there has been no change in protective factors since their last session.     Recommended that patient call 911 or go to the local ED should there be a change in any of these risk factors.     Appearance:   Appropriate    Eye Contact:   Good    Psychomotor Behavior: Normal    Attitude:   Cooperative    Orientation:   All   Speech    Rate / Production: Normal     Volume:  Normal    Mood:    Anxious    Affect:    Appropriate    Thought Content:  Clear    Thought Form:  Coherent  Logical    Insight:    Good      Medication Review:   No changes to current psychiatric medication(s)     Medication Compliance:   Yes     Changes in Health Issues:   None reported     Chemical Use Review:   Substance Use: Chemical use reviewed, no active concerns identified      Tobacco Use: No current tobacco use.      Diagnosis:  1. PTSD (post-traumatic stress disorder)        Collateral Reports Completed:   Not Applicable    PLAN: (Patient Tasks / Therapist Tasks / Other)  1.  Client will use one boundary management skill (i.e., direct communication, saying no to a request) by next session.  2.  Client will practice diaphragmatic breathing once per day by next session.  Client will also rate mood and intensity of mood on a SUDS scale (1-10) before and after breathing exercise at least once by next session.   3.  Defer EMDR for now         Betty Barrientos, LICSW 4/29/2020                                                          ______________________________________________________________________    Treatment Plan    Patient's Name: Wandy Jones  Seth  YOB: 2000    Date: 12/13/2019     DSM5 Diagnoses: DSM5 Diagnoses: (Sustained by DSM5 Criteria Listed Above)  Diagnoses: 296.22 (F32.1)  Major Depressive Disorder, Single Episode, Moderate _  300.01 (F41.0) Panic Disorder  300.02 (F41.1) Generalized Anxiety Disorder  Psychosocial & Contextual Factors: some stress with family of origin, recent breakup  WHODAS 2.0 (12 item)            This questionnaire asks about difficulties due to health conditions. Health conditions  include  disease or illnesses, other health problems that may be short or long lasting,  injuries, mental health or emotional problems, and problems with alcohol or drugs.                     Think back over the past 30 days and answer these questions, thinking about how much  difficulty you had doing the following activities. For each question, please [place] only  one response.    Declines WHODAS toduy      Referral / Collaboration:  Referral to another professional/service is not indicated at this time..    Anticipated number of session or this episode of care: 18-24      MeasurableTreatment Goal(s) related to diagnosis / functional impairment(s)      Goal- Anxiety: Client will decrease anxiety    I will know I've met my goal when I am less anxious.      Objective #A (Client Action)    Client will use cognitive strategies identified in therapy to challenge anxious thoughts.    Status: cont- Date: 4/1/2020     Intervention(s)  Therapist will provide psychoeducation, behavioral activation, and cognitive restructuring.    Objective #B  Client will use at least 3 coping skills for anxiety management in the next 12 weeks.    Status: cont- Date: 4/1/2020     Intervention(s)  Therapist will provide psychoeducation, behavioral activation, and cognitive restructuring.      Objective #C  Client will identify three distraction and diversion activities and use those activities to decrease level of anxiety.  Status: cont- Date: 4/1/2020      Intervention(s)  Therapist will provide psychoeducation, behavioral activation, and cognitive restructuring.                  Patient has reviewed and agreed to the above plan.      Betty Barrientos, LICSW  December 13, 2019

## 2020-06-12 ENCOUNTER — VIRTUAL VISIT (OUTPATIENT)
Dept: BEHAVIORAL HEALTH | Facility: CLINIC | Age: 20
End: 2020-06-12
Payer: COMMERCIAL

## 2020-06-12 DIAGNOSIS — F41.1 GAD (GENERALIZED ANXIETY DISORDER): ICD-10-CM

## 2020-06-12 DIAGNOSIS — F43.10 PTSD (POST-TRAUMATIC STRESS DISORDER): Primary | ICD-10-CM

## 2020-06-12 PROCEDURE — 90834 PSYTX W PT 45 MINUTES: CPT | Mod: 95 | Performed by: SOCIAL WORKER
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
## 2020-07-13 DIAGNOSIS — F41.1 GAD (GENERALIZED ANXIETY DISORDER): ICD-10-CM

## 2020-07-13 DIAGNOSIS — F32.1 CURRENT MODERATE EPISODE OF MAJOR DEPRESSIVE DISORDER, UNSPECIFIED WHETHER RECURRENT (H): ICD-10-CM

## 2020-07-13 RX ORDER — BUPROPION HYDROCHLORIDE 150 MG/1
150 TABLET ORAL EVERY MORNING
Qty: 30 TABLET | Refills: 1 | Status: SHIPPED | OUTPATIENT
Start: 2020-07-13 | End: 2020-09-09

## 2020-07-13 RX ORDER — PROPRANOLOL HYDROCHLORIDE 10 MG/1
10 TABLET ORAL
Qty: 30 TABLET | Refills: 1 | Status: SHIPPED | OUTPATIENT
Start: 2020-07-13 | End: 2020-11-02

## 2020-07-15 ENCOUNTER — E-VISIT (OUTPATIENT)
Dept: FAMILY MEDICINE | Facility: CLINIC | Age: 20
End: 2020-07-15
Payer: COMMERCIAL

## 2020-07-15 DIAGNOSIS — F32.A DEPRESSION, UNSPECIFIED DEPRESSION TYPE: Primary | ICD-10-CM

## 2020-07-15 DIAGNOSIS — F32.1 MODERATE MAJOR DEPRESSION (H): ICD-10-CM

## 2020-07-15 PROCEDURE — 99207 ZZC NON-BILLABLE SERV PER CHARTING: CPT | Performed by: FAMILY MEDICINE

## 2020-07-15 ASSESSMENT — ANXIETY QUESTIONNAIRES
3. WORRYING TOO MUCH ABOUT DIFFERENT THINGS: NEARLY EVERY DAY
7. FEELING AFRAID AS IF SOMETHING AWFUL MIGHT HAPPEN: SEVERAL DAYS
GAD7 TOTAL SCORE: 16
7. FEELING AFRAID AS IF SOMETHING AWFUL MIGHT HAPPEN: SEVERAL DAYS
5. BEING SO RESTLESS THAT IT IS HARD TO SIT STILL: NEARLY EVERY DAY
6. BECOMING EASILY ANNOYED OR IRRITABLE: NEARLY EVERY DAY
2. NOT BEING ABLE TO STOP OR CONTROL WORRYING: MORE THAN HALF THE DAYS
GAD7 TOTAL SCORE: 16
1. FEELING NERVOUS, ANXIOUS, OR ON EDGE: SEVERAL DAYS
GAD7 TOTAL SCORE: 16
4. TROUBLE RELAXING: NEARLY EVERY DAY

## 2020-07-15 ASSESSMENT — PATIENT HEALTH QUESTIONNAIRE - PHQ9
10. IF YOU CHECKED OFF ANY PROBLEMS, HOW DIFFICULT HAVE THESE PROBLEMS MADE IT FOR YOU TO DO YOUR WORK, TAKE CARE OF THINGS AT HOME, OR GET ALONG WITH OTHER PEOPLE: SOMEWHAT DIFFICULT
SUM OF ALL RESPONSES TO PHQ QUESTIONS 1-9: 22
SUM OF ALL RESPONSES TO PHQ QUESTIONS 1-9: 22

## 2020-07-16 ASSESSMENT — ANXIETY QUESTIONNAIRES: GAD7 TOTAL SCORE: 16

## 2020-07-16 ASSESSMENT — PATIENT HEALTH QUESTIONNAIRE - PHQ9: SUM OF ALL RESPONSES TO PHQ QUESTIONS 1-9: 22

## 2020-07-19 PROBLEM — F32.1 MODERATE MAJOR DEPRESSION (H): Status: ACTIVE | Noted: 2020-07-19

## 2020-07-24 ENCOUNTER — VIRTUAL VISIT (OUTPATIENT)
Dept: BEHAVIORAL HEALTH | Facility: CLINIC | Age: 20
End: 2020-07-24
Payer: COMMERCIAL

## 2020-07-24 DIAGNOSIS — F43.10 PTSD (POST-TRAUMATIC STRESS DISORDER): Primary | ICD-10-CM

## 2020-07-24 DIAGNOSIS — F41.1 GAD (GENERALIZED ANXIETY DISORDER): ICD-10-CM

## 2020-07-24 PROCEDURE — 90834 PSYTX W PT 45 MINUTES: CPT | Mod: 95 | Performed by: SOCIAL WORKER
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
## 2020-08-28 ENCOUNTER — E-VISIT (OUTPATIENT)
Dept: FAMILY MEDICINE | Facility: CLINIC | Age: 20
End: 2020-08-28
Payer: COMMERCIAL

## 2020-08-28 DIAGNOSIS — J30.81 CAT ALLERGIES: Primary | ICD-10-CM

## 2020-08-28 PROCEDURE — 99421 OL DIG E/M SVC 5-10 MIN: CPT | Performed by: FAMILY MEDICINE

## 2020-08-28 RX ORDER — CETIRIZINE HYDROCHLORIDE 10 MG/1
10 TABLET ORAL DAILY
Qty: 90 TABLET | Refills: 1 | Status: SHIPPED | OUTPATIENT
Start: 2020-08-28 | End: 2020-11-02

## 2020-10-27 ENCOUNTER — VIRTUAL VISIT (OUTPATIENT)
Dept: BEHAVIORAL HEALTH | Facility: CLINIC | Age: 20
End: 2020-10-27
Payer: COMMERCIAL

## 2020-10-27 DIAGNOSIS — Z53.9 ERRONEOUS ENCOUNTER--DISREGARD: Primary | ICD-10-CM

## 2020-11-02 ENCOUNTER — VIRTUAL VISIT (OUTPATIENT)
Dept: FAMILY MEDICINE | Facility: CLINIC | Age: 20
End: 2020-11-02
Payer: COMMERCIAL

## 2020-11-02 DIAGNOSIS — F41.1 GAD (GENERALIZED ANXIETY DISORDER): ICD-10-CM

## 2020-11-02 DIAGNOSIS — F41.0 PANIC DISORDER: ICD-10-CM

## 2020-11-02 DIAGNOSIS — J30.81 CAT ALLERGIES: ICD-10-CM

## 2020-11-02 DIAGNOSIS — F32.1 MODERATE MAJOR DEPRESSION (H): Primary | ICD-10-CM

## 2020-11-02 PROCEDURE — 99214 OFFICE O/P EST MOD 30 MIN: CPT | Mod: 95 | Performed by: FAMILY MEDICINE

## 2020-11-02 RX ORDER — BUPROPION HYDROCHLORIDE 150 MG/1
150 TABLET ORAL EVERY MORNING
Qty: 90 TABLET | Refills: 1 | Status: SHIPPED | OUTPATIENT
Start: 2020-11-02 | End: 2021-04-07

## 2020-11-02 RX ORDER — CETIRIZINE HYDROCHLORIDE 10 MG/1
10 TABLET ORAL DAILY
Qty: 90 TABLET | Refills: 1 | Status: SHIPPED | OUTPATIENT
Start: 2020-11-02 | End: 2022-04-29

## 2020-11-02 RX ORDER — SERTRALINE HYDROCHLORIDE 100 MG/1
100 TABLET, FILM COATED ORAL DAILY
Qty: 90 TABLET | Refills: 1 | Status: SHIPPED | OUTPATIENT
Start: 2020-11-02 | End: 2021-04-07

## 2020-11-02 NOTE — PROGRESS NOTES
"Wandy Ann is a 20 year old female who is being evaluated via a billable telephone visit.      The patient has been notified of following:     \"This telephone visit will be conducted via a call between you and your physician/provider. We have found that certain health care needs can be provided without the need for a physical exam.  This service lets us provide the care you need with a short phone conversation.  If a prescription is necessary we can send it directly to your pharmacy.  If lab work is needed we can place an order for that and you can then stop by our lab to have the test done at a later time.    Telephone visits are billed at different rates depending on your insurance coverage. During this emergency period, for some insurers they may be billed the same as an in-person visit.  Please reach out to your insurance provider with any questions.    If during the course of the call the physician/provider feels a telephone visit is not appropriate, you will not be charged for this service.\"    Patient has given verbal consent for Telephone visit?  Yes    What phone number would you like to be contacted at? [phone]    How would you like to obtain your AVS? Gabriela Landrum     Wandy Ann is a 20 year old female who presents via phone visit today for the following health issues:    HPI     Chief Complaint   Patient presents with     Recheck Medication     sertraline (ZOLOFT) 50 MG tablet, buPROPion (WELLBUTRIN XL) 150 MG 24 hr tablet     Patient presents for YESSI and depression follow-up visit.  Taking zoloft 50mg and wellbutrin 150mg  sometimes feels a little light headed upon standing quickly  She has noticed decrease in panic attack frequency and severity  Less excessive worry in general  Feels like the medication is working but could be better.   Therapy has been helpful        Review of Systems   Constitutional, HEENT, cardiovascular, pulmonary, gi and gu systems are " negative, except as otherwise noted.       Objective          Vitals:  No vitals were obtained today due to virtual visit.    healthy, alert and no distress  PSYCH: Alert and oriented times 3; coherent speech, normal   rate and volume, able to articulate logical thoughts, able   to abstract reason, no tangential thoughts, no hallucinations   or delusions  Her affect is normal  RESP: No cough, no audible wheezing, able to talk in full sentences  Remainder of exam unable to be completed due to telephone visits            Assessment/Plan:    Assessment & Plan       ICD-10-CM    1. Moderate major depression (H)  F32.1 sertraline (ZOLOFT) 100 MG tablet     buPROPion (WELLBUTRIN XL) 150 MG 24 hr tablet   2. Cat allergies  J30.81 cetirizine (ZYRTEC) 10 MG tablet   3. YESSI (generalized anxiety disorder)  F41.1    4. Panic disorder  F41.0      Increase zoloft  Refill wellbutrin  Continue therapy             Return in about 1 month (around 12/2/2020) for Anxiety, Depression.    Hudson Mobley MD  [facility]    Phone call duration:  8 minutes

## 2021-01-03 ENCOUNTER — HEALTH MAINTENANCE LETTER (OUTPATIENT)
Age: 21
End: 2021-01-03

## 2021-01-15 ENCOUNTER — HEALTH MAINTENANCE LETTER (OUTPATIENT)
Age: 21
End: 2021-01-15

## 2021-02-15 ENCOUNTER — E-VISIT (OUTPATIENT)
Dept: FAMILY MEDICINE | Facility: CLINIC | Age: 21
End: 2021-02-15
Payer: COMMERCIAL

## 2021-02-15 DIAGNOSIS — F41.1 GAD (GENERALIZED ANXIETY DISORDER): Primary | ICD-10-CM

## 2021-02-15 DIAGNOSIS — F32.1 MODERATE MAJOR DEPRESSION (H): ICD-10-CM

## 2021-02-15 DIAGNOSIS — F41.1 GENERALIZED ANXIETY DISORDER: ICD-10-CM

## 2021-02-15 DIAGNOSIS — F41.0 PANIC ATTACK: ICD-10-CM

## 2021-02-15 PROCEDURE — 99422 OL DIG E/M SVC 11-20 MIN: CPT | Performed by: FAMILY MEDICINE

## 2021-02-15 ASSESSMENT — ANXIETY QUESTIONNAIRES
1. FEELING NERVOUS, ANXIOUS, OR ON EDGE: SEVERAL DAYS
GAD7 TOTAL SCORE: 12
7. FEELING AFRAID AS IF SOMETHING AWFUL MIGHT HAPPEN: SEVERAL DAYS
GAD7 TOTAL SCORE: 12
7. FEELING AFRAID AS IF SOMETHING AWFUL MIGHT HAPPEN: SEVERAL DAYS
6. BECOMING EASILY ANNOYED OR IRRITABLE: MORE THAN HALF THE DAYS
4. TROUBLE RELAXING: MORE THAN HALF THE DAYS
5. BEING SO RESTLESS THAT IT IS HARD TO SIT STILL: MORE THAN HALF THE DAYS
3. WORRYING TOO MUCH ABOUT DIFFERENT THINGS: MORE THAN HALF THE DAYS
2. NOT BEING ABLE TO STOP OR CONTROL WORRYING: MORE THAN HALF THE DAYS
GAD7 TOTAL SCORE: 12

## 2021-02-16 ASSESSMENT — PATIENT HEALTH QUESTIONNAIRE - PHQ9: SUM OF ALL RESPONSES TO PHQ QUESTIONS 1-9: 24

## 2021-02-16 ASSESSMENT — ANXIETY QUESTIONNAIRES: GAD7 TOTAL SCORE: 12

## 2021-02-22 ENCOUNTER — VIRTUAL VISIT (OUTPATIENT)
Dept: PSYCHOLOGY | Facility: CLINIC | Age: 21
End: 2021-02-22
Payer: COMMERCIAL

## 2021-02-22 DIAGNOSIS — F41.1 GENERALIZED ANXIETY DISORDER: ICD-10-CM

## 2021-02-22 DIAGNOSIS — F33.1 MDD (MAJOR DEPRESSIVE DISORDER), RECURRENT EPISODE, MODERATE (H): Primary | ICD-10-CM

## 2021-02-22 PROCEDURE — 90791 PSYCH DIAGNOSTIC EVALUATION: CPT | Mod: 95 | Performed by: SOCIAL WORKER

## 2021-02-22 ASSESSMENT — COLUMBIA-SUICIDE SEVERITY RATING SCALE - C-SSRS
ATTEMPT LIFETIME: NO
5. HAVE YOU STARTED TO WORK OUT OR WORKED OUT THE DETAILS OF HOW TO KILL YOURSELF? DO YOU INTEND TO CARRY OUT THIS PLAN?: NO
1. IN THE PAST MONTH, HAVE YOU WISHED YOU WERE DEAD OR WISHED YOU COULD GO TO SLEEP AND NOT WAKE UP?: YES
TOTAL  NUMBER OF ABORTED OR SELF INTERRUPTED ATTEMPTS PAST 3 MONTHS: NO
2. HAVE YOU ACTUALLY HAD ANY THOUGHTS OF KILLING YOURSELF LIFETIME?: YES
TOTAL  NUMBER OF INTERRUPTED ATTEMPTS LIFETIME: NO
4. HAVE YOU HAD THESE THOUGHTS AND HAD SOME INTENTION OF ACTING ON THEM?: NO
3. HAVE YOU BEEN THINKING ABOUT HOW YOU MIGHT KILL YOURSELF?: NO
TOTAL  NUMBER OF INTERRUPTED ATTEMPTS PAST 3 MONTHS: NO
1. IN THE PAST MONTH, HAVE YOU WISHED YOU WERE DEAD OR WISHED YOU COULD GO TO SLEEP AND NOT WAKE UP?: YES
3. HAVE YOU BEEN THINKING ABOUT HOW YOU MIGHT KILL YOURSELF?: N
5. HAVE YOU STARTED TO WORK OUT OR WORKED OUT THE DETAILS OF HOW TO KILL YOURSELF? DO YOU INTEND TO CARRY OUT THIS PLAN?: NO
ATTEMPT PAST THREE MONTHS: NO
6. HAVE YOU EVER DONE ANYTHING, STARTED TO DO ANYTHING, OR PREPARED TO DO ANYTHING TO END YOUR LIFE?: NO
2. HAVE YOU ACTUALLY HAD ANY THOUGHTS OF KILLING YOURSELF?: YES
6. HAVE YOU EVER DONE ANYTHING, STARTED TO DO ANYTHING, OR PREPARED TO DO ANYTHING TO END YOUR LIFE?: NO
4. HAVE YOU HAD THESE THOUGHTS AND HAD SOME INTENTION OF ACTING ON THEM?: NO
TOTAL  NUMBER OF ABORTED OR SELF INTERRUPTED ATTEMPTS PAST LIFETIME: NO
5. HAVE YOU STARTED TO WORK OUT OR WORKED OUT THE DETAILS OF HOW TO KILL YOURSELF? DO YOU INTEND TO CARRY OUT THIS PLAN?: N

## 2021-02-22 ASSESSMENT — ANXIETY QUESTIONNAIRES
7. FEELING AFRAID AS IF SOMETHING AWFUL MIGHT HAPPEN: SEVERAL DAYS
6. BECOMING EASILY ANNOYED OR IRRITABLE: MORE THAN HALF THE DAYS
5. BEING SO RESTLESS THAT IT IS HARD TO SIT STILL: NEARLY EVERY DAY
2. NOT BEING ABLE TO STOP OR CONTROL WORRYING: NEARLY EVERY DAY
1. FEELING NERVOUS, ANXIOUS, OR ON EDGE: NEARLY EVERY DAY
GAD7 TOTAL SCORE: 18
3. WORRYING TOO MUCH ABOUT DIFFERENT THINGS: NEARLY EVERY DAY
4. TROUBLE RELAXING: NEARLY EVERY DAY

## 2021-02-22 ASSESSMENT — PATIENT HEALTH QUESTIONNAIRE - PHQ9: SUM OF ALL RESPONSES TO PHQ QUESTIONS 1-9: 24
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
## 2021-02-23 ASSESSMENT — ANXIETY QUESTIONNAIRES: GAD7 TOTAL SCORE: 18

## 2021-03-05 ENCOUNTER — VIRTUAL VISIT (OUTPATIENT)
Dept: PSYCHIATRY | Facility: CLINIC | Age: 21
End: 2021-03-05
Attending: FAMILY MEDICINE
Payer: COMMERCIAL

## 2021-03-05 DIAGNOSIS — F43.10 PTSD (POST-TRAUMATIC STRESS DISORDER): ICD-10-CM

## 2021-03-05 DIAGNOSIS — F41.1 GAD (GENERALIZED ANXIETY DISORDER): Primary | ICD-10-CM

## 2021-03-05 DIAGNOSIS — F33.1 MODERATE RECURRENT MAJOR DEPRESSION (H): ICD-10-CM

## 2021-03-05 PROCEDURE — 99204 OFFICE O/P NEW MOD 45 MIN: CPT | Mod: 95 | Performed by: NURSE PRACTITIONER

## 2021-03-05 RX ORDER — HYDROXYZINE HYDROCHLORIDE 10 MG/1
10 TABLET, FILM COATED ORAL 3 TIMES DAILY PRN
Qty: 90 TABLET | Refills: 0 | Status: SHIPPED | OUTPATIENT
Start: 2021-03-05 | End: 2022-05-17

## 2021-03-05 RX ORDER — VENLAFAXINE HYDROCHLORIDE 37.5 MG/1
CAPSULE, EXTENDED RELEASE ORAL
Qty: 53 CAPSULE | Refills: 1 | Status: SHIPPED | OUTPATIENT
Start: 2021-03-05 | End: 2021-04-07 | Stop reason: DRUGHIGH

## 2021-03-05 ASSESSMENT — ANXIETY QUESTIONNAIRES
IF YOU CHECKED OFF ANY PROBLEMS ON THIS QUESTIONNAIRE, HOW DIFFICULT HAVE THESE PROBLEMS MADE IT FOR YOU TO DO YOUR WORK, TAKE CARE OF THINGS AT HOME, OR GET ALONG WITH OTHER PEOPLE: VERY DIFFICULT
5. BEING SO RESTLESS THAT IT IS HARD TO SIT STILL: NEARLY EVERY DAY
7. FEELING AFRAID AS IF SOMETHING AWFUL MIGHT HAPPEN: NOT AT ALL
2. NOT BEING ABLE TO STOP OR CONTROL WORRYING: NEARLY EVERY DAY
1. FEELING NERVOUS, ANXIOUS, OR ON EDGE: NEARLY EVERY DAY
3. WORRYING TOO MUCH ABOUT DIFFERENT THINGS: NEARLY EVERY DAY
6. BECOMING EASILY ANNOYED OR IRRITABLE: NEARLY EVERY DAY
GAD7 TOTAL SCORE: 18

## 2021-03-05 ASSESSMENT — PATIENT HEALTH QUESTIONNAIRE - PHQ9
SUM OF ALL RESPONSES TO PHQ QUESTIONS 1-9: 19
5. POOR APPETITE OR OVEREATING: NEARLY EVERY DAY

## 2021-03-05 NOTE — PATIENT INSTRUCTIONS
1.  Add venlafaxine ER 37.5 mg daily for 1 week and 75 mg daily  2.  Add hydroxyzine 10 mg 3 times daily for anxiety  3.  Continue appointments with Katie for talk therapy       Continue all other medical directions per primary care provider.     Continue all other medications as reviewed per electronic medical record today.     Safety plan reviewed. To the Emergency Department as needed or call after hours crisis line at [phone] or [phone]. [place] Crisis Text Line: Text [place] to 186819  or  Suicide LifeLine Chat: suicidePractice Ignition.org/chat/    To schedule individual or family therapy, call [facility] at [phone].     Schedule an appointment with me in April or sooner as needed.  Call [facility] at [phone] to schedule.    Follow up with primary care provider as planned or for acute medical concerns.    Call the psychiatric nurse line with medication questions or concerns at [phone].    Access UKhart may be used to communicate with your provider, but this is not intended to be used for emergencies.    Crisis Resources:    National Suicide Prevention Lifeline: 699.922.2688 (TTY: 306.547.3654). Call anytime for help.  (www.suicidepreventionlifeline.org)  National [place] on [place]ness (www.pricilla.org): 827.332.7663 or 873-312-3289.   Mental Health Association (www.mentalhealth.org): 355.773.7950 or 980-085-5228.  [place] Crisis Text Line: Text [place] to 375928  Suicide LifeLine Chat: suicidePractice Ignition.org/chat
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
## 2021-03-06 ASSESSMENT — ANXIETY QUESTIONNAIRES: GAD7 TOTAL SCORE: 18

## 2021-04-07 ENCOUNTER — VIRTUAL VISIT (OUTPATIENT)
Dept: PSYCHIATRY | Facility: CLINIC | Age: 21
End: 2021-04-07
Payer: COMMERCIAL

## 2021-04-07 DIAGNOSIS — F43.10 PTSD (POST-TRAUMATIC STRESS DISORDER): ICD-10-CM

## 2021-04-07 DIAGNOSIS — F41.1 GAD (GENERALIZED ANXIETY DISORDER): Primary | ICD-10-CM

## 2021-04-07 DIAGNOSIS — F33.1 MODERATE RECURRENT MAJOR DEPRESSION (H): ICD-10-CM

## 2021-04-07 PROCEDURE — 99214 OFFICE O/P EST MOD 30 MIN: CPT | Mod: 95 | Performed by: NURSE PRACTITIONER

## 2021-04-07 RX ORDER — VENLAFAXINE HYDROCHLORIDE 150 MG/1
150 CAPSULE, EXTENDED RELEASE ORAL DAILY
Qty: 30 CAPSULE | Refills: 1 | Status: SHIPPED | OUTPATIENT
Start: 2021-04-07 | End: 2021-04-19 | Stop reason: DRUGHIGH

## 2021-04-07 ASSESSMENT — PATIENT HEALTH QUESTIONNAIRE - PHQ9
5. POOR APPETITE OR OVEREATING: MORE THAN HALF THE DAYS
SUM OF ALL RESPONSES TO PHQ QUESTIONS 1-9: 21

## 2021-04-07 ASSESSMENT — ANXIETY QUESTIONNAIRES
7. FEELING AFRAID AS IF SOMETHING AWFUL MIGHT HAPPEN: MORE THAN HALF THE DAYS
3. WORRYING TOO MUCH ABOUT DIFFERENT THINGS: MORE THAN HALF THE DAYS
5. BEING SO RESTLESS THAT IT IS HARD TO SIT STILL: MORE THAN HALF THE DAYS
6. BECOMING EASILY ANNOYED OR IRRITABLE: MORE THAN HALF THE DAYS
IF YOU CHECKED OFF ANY PROBLEMS ON THIS QUESTIONNAIRE, HOW DIFFICULT HAVE THESE PROBLEMS MADE IT FOR YOU TO DO YOUR WORK, TAKE CARE OF THINGS AT HOME, OR GET ALONG WITH OTHER PEOPLE: VERY DIFFICULT
GAD7 TOTAL SCORE: 15
1. FEELING NERVOUS, ANXIOUS, OR ON EDGE: NEARLY EVERY DAY
2. NOT BEING ABLE TO STOP OR CONTROL WORRYING: MORE THAN HALF THE DAYS
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
## 2021-04-08 ASSESSMENT — ANXIETY QUESTIONNAIRES: GAD7 TOTAL SCORE: 15

## 2021-04-16 ENCOUNTER — VIRTUAL VISIT (OUTPATIENT)
Dept: PSYCHOLOGY | Facility: CLINIC | Age: 21
End: 2021-04-16
Payer: COMMERCIAL

## 2021-04-16 DIAGNOSIS — F41.1 GENERALIZED ANXIETY DISORDER: ICD-10-CM

## 2021-04-16 DIAGNOSIS — F43.10 PTSD (POST-TRAUMATIC STRESS DISORDER): ICD-10-CM

## 2021-04-16 DIAGNOSIS — F33.1 MDD (MAJOR DEPRESSIVE DISORDER), RECURRENT EPISODE, MODERATE (H): Primary | ICD-10-CM

## 2021-04-16 PROCEDURE — 90834 PSYTX W PT 45 MINUTES: CPT | Mod: 95 | Performed by: SOCIAL WORKER

## 2021-04-16 ASSESSMENT — ANXIETY QUESTIONNAIRES
4. TROUBLE RELAXING: MORE THAN HALF THE DAYS
5. BEING SO RESTLESS THAT IT IS HARD TO SIT STILL: MORE THAN HALF THE DAYS
7. FEELING AFRAID AS IF SOMETHING AWFUL MIGHT HAPPEN: SEVERAL DAYS
2. NOT BEING ABLE TO STOP OR CONTROL WORRYING: NEARLY EVERY DAY
1. FEELING NERVOUS, ANXIOUS, OR ON EDGE: MORE THAN HALF THE DAYS
6. BECOMING EASILY ANNOYED OR IRRITABLE: NEARLY EVERY DAY
3. WORRYING TOO MUCH ABOUT DIFFERENT THINGS: NEARLY EVERY DAY
GAD7 TOTAL SCORE: 16

## 2021-04-16 ASSESSMENT — PATIENT HEALTH QUESTIONNAIRE - PHQ9: SUM OF ALL RESPONSES TO PHQ QUESTIONS 1-9: 22

## 2021-04-16 NOTE — PROGRESS NOTES
Progress Note    Patient Name: Wandy Ann  Date: 2021         Service Type: Individual      Session Start Time: 1003  Session End Time: 1051     Session Length: 38-52    Session #: 1    Attendees: Client    Service Modality:  Video Visit:      Provider verified identity through the following two step process.  Patient provided:  Patient     Telemedicine Visit: The patient's condition can be safely assessed and treated via synchronous audio and visual telemedicine encounter.      Reason for Telemedicine Visit: Services only offered telehealth    Originating Site (Patient Location): Patient's home    Distant Site (Provider Location): Provider Remote Setting    Consent:  The patient/guardian has verbally consented to: the potential risks and benefits of telemedicine (video visit) versus in person care; bill my insurance or make self-payment for services provided; and responsibility for payment of non-covered services.     Patient would like the video invitation sent by:  Send to e-mail at: [email or web address]    Mode of Communication:  Video Conference via [employer]    As the provider I attest to compliance with applicable laws and regulations related to telemedicine.     Treatment Plan Last Reviewed: 2021 due 2021  PHQ-9 / YESSI-7 :     DATA  Interactive Complexity: No  Crisis: No       Progress Since Last Session (Related to Symptoms / Goals / Homework):   Symptoms: Worsening PHQ-9 YESSI-7    Homework: Completed in session      Episode of Care Goals: No improvement - PREPARATION (Decided to change - considering how); Intervened by negotiating a change plan and determining options / strategies for behavior change, identifying triggers, exploring social supports, and working towards setting a date to begin behavior change     Current / Ongoing Stressors and Concerns:   past trauma, developmental hx and current stressors      Treatment  Objective(s) Addressed in This Session:   Patient will demonstrate control of impulses and ability to use positive coping tools to manage strong emotions that can be safely addressed at a lower level of care. Absence of persistent SI and report of reduced frequency and intensity of SI and absence of SI to acceptable levels, report subjective improved mood for a period of 90 days, within 6 months as clinically observed and by patient self-report.  Patient will demonstrate and report a level of depression that can be managed at a lower level of care.  Absence of persistent depression mood and report of reduced frequency and intensity of low mood and absence of persistent low energy and motivation to acceptable levels, report subjective improved motivation and increased energy for a period of 90 days, within 6 months as clinically observed and by patient self-report.  Patient will demonstrate and report a level of anxiety that can be managed at a lower level of care.  Absence of persistent anxious mood and report of reduced frequency and intensity of worry and absence of persistent anxious mood to acceptable levels, no panic attacks, report subjective comfort with rumination for a period of 90 days, within 6 months as clinically observed and by patient self-report.     Intervention:   Motivational Interviewing    MI Intervention: Co-Developed Goal: SI, depression and anxiety, Expressed Empathy/Understanding, Supported Autonomy, Collaboration, Evocation, Permission to raise concern or advise, Open-ended questions and Reflections: simple and complex     Change Talk Expressed by the Patient: Desire to change Ability to change Reasons to change Need to change Committment to change    Provider Response to Change Talk: E - Evoked more info from patient about behavior change, A - Affirmed patient's thoughts, decisions, or attempts at behavior change, R - Reflected patient's change talk and S - Summarized patient's change talk  statements          ASSESSMENT: Current Emotional / Mental Status (status of significant symptoms):   Risk status (Self / Other harm or suicidal ideation)   Patient denies current fears or concerns for personal safety.   Patient reports the following current or recent suicidal ideation or behaviors: 3/5, 5 being high, no plan, safety plan and contracted for safety.   Patient denies current or recent homicidal ideation or behaviors.   Patient denies current or recent self injurious behavior or ideation.   Patient denies other safety concerns.   Patient reports there has been no change in risk factors since their last session.     Patient reports there has been no change in protective factors since their last session.     A safety and risk management plan has been developed including: Patient consented to co-developed safety plan.  Safety and risk management plan was completed.  Patient agreed to use safety plan should any safety concerns arise.  A copy was given to the patient.     Appearance:   Appropriate    Eye Contact:   Fair    Psychomotor Behavior: Restless    Attitude:   Cooperative  Friendly   Orientation:   All   Speech    Rate / Production: Emotional Talkative Normal     Volume:  Normal    Mood:    Anxious  Depressed  Sad    Affect:    Worrisome    Thought Content:  Clear    Thought Form:  Coherent    Insight:    Fair      Medication Review:   Changes to psychiatric medications, see updated Medication List in EPIC.      Medication Compliance:   Yes     Changes in Health Issues:   None reported     Chemical Use Review:   Substance Use: Chemical use reviewed, no active concerns identified      Tobacco Use: No current tobacco use.      Diagnosis:  1. MDD (major depressive disorder), recurrent episode, moderate (H)    2. Generalized anxiety disorder    3. PTSD (post-traumatic stress disorder)        Collateral Reports Completed:   Not Applicable    PLAN: (Patient Tasks / Therapist Tasks / Other)  Call  psychiatry about possible medication side effects (SI) therapist provided education to patient   Follow safety plan  Go to ED if feeling unsafe        Katie Faulkner, LICSW  4/16/2021                                                         ______________________________________________________________________    Treatment Plan    Patient's Name: Wandy Ann  YOB: 2000    Date: 4/16/2021    DSM5 Diagnoses: 296.32 (F33.1) Major Depressive Disorder, Recurrent Episode, Moderate _ and With mixed features, 300.02 (F41.1) Generalized Anxiety Disorder or 309.81 (F43.10) Posttraumatic Stress Disorder (includes Posttraumatic Stress Disorder for Children 6 Years and Younger)  With dissociative symptoms  Psychosocial / Contextual Factors: past trauma, developmental hx and current stressors   WHODAS: 39    Referral / Collaboration:  Referral to another professional/service is not indicated at this time..    Anticipated number of session or this episode of care:       MeasurableTreatment Goal(s) related to diagnosis / functional impairment(s)  Goal 1: Patient will report absence of persistent SI and report of reduced frequency and intensity of SI and absence of SI to acceptable levels, report subjective improved mood for a period of 90 days, within 6 months as clinically observed and by patient self-report    I will know I've met my goal when I can see the difference between a bad moment and what I want in th future.      Objective #A (Patient Action)    Patient will demonstrate control of impulses and ability to use positive coping tools to manage strong emotions that can be safely addressed at a lower level of care. Absence of persistent SI and report of reduced frequency and intensity of SI and absence of SI to acceptable levels, report subjective improved mood for a period of 90 days, within 6 months as clinically observed and by patient self-report.  Status: New - Date: 4/16/2021      Intervention(s)  Therapist will provide individual therapy to identify triggers to SI urges, gain feedback on helpful coping strategies, and identify ways that family can offer support. Tx to discuss current stressors and interpersonal conflicts and how to cope with these, coaching, diagnostic testing, referral for medication as indicated, use prescribed medication, cognitive restructuring, interpersonal family therapy, supportive therapy services.        Goal 2: Patient will report absence of persistent depression mood and report of reduced frequency and intensity of low mood and absence of persistent low energy and motivation to acceptable levels, report subjective improved motivation and increased energy for a period of 90 days, within 6 months as clinically observed and by patient self-report    I will know I've met my goal when I no longer feel sad.      Objective #A (Patient Action)    Status: New - Date: 4/16/2021     Patient will demonstrate and report a level of depression that can be managed at a lower level of care.  Absence of persistent depression mood and report of reduced frequency and intensity of low mood and absence of persistent low energy and motivation to acceptable levels, report subjective improved motivation and increased energy for a period of 90 days, within 6 months as clinically observed and by patient self-report.    Intervention(s)  Therapist will provide individual therapy to identify triggers to depression, gain feedback on helpful coping tools and thought-reframing techniques, and identify preferred way of being.  Tx to include discussion of current stressors and interpersonal conflicts and how to cope with these, coaching, diagnostic testing, referral for medication as indicated, use prescribed medication, cognitive restructuring, interpersonal, family therapy, supportive therapy services.        Goal 3: Patiient will report absence of persistent anxiety mood and report of  reduced frequency and intensity of worry and absence of persistent anxious mood to acceptable levels, no panic attacks, report subjective comfort with rumination for a period of 90 days. Within 6 months as clinically observed and by patient self-report    I will know I've met my goal when I can go days without worrying about little things or shaking.      Objective #A (Patient Action)    Status: New - Date: 4/16/2021     Patient will demonstrate and report a level of anxiety that can be managed at a lower level of care.  Absence of persistent anxious mood and report of reduced frequency and intensity of worry and absence of persistent anxious mood to acceptable levels, no panic attacks, report subjective comfort with rumination for a period of 90 days, within 6 months as clinically observed and by patient self-report.    Intervention(s)  Therapist will provide individual therapy to identify triggers to anxiety, gain feedback on helpful coping tools and thought-reframing techniques, and identify preferred way of being. Tx to include discuss current stressors and interpersonal conflicts and how to cope with these, coaching, diagnostic testing , referral for medication as indicated, use prescribed medication, cognitive restructuring, interpersonal,   family therapy, supportive therapy services.        Patient has reviewed and agreed to the above plan.      Katie Faulkner, [facility]  April 16, 2021                                                   Wandy Ann            SAFETY PLAN:  Step 1: Warning signs / cues (Thoughts, images, mood, situation, behavior) that a crisis may be developing:  ? Thoughts: thoughts of not wanting to be here  ? Images: no images  ? Thinking Processes: intrusive thoughts (bothersome, unwanted thoughts that come out of nowhere): get irritated  ? Mood: intense anger and agitation  ? Behaviors: isolating/withdrawing   ? Situations: trauma    Step 2: Coping strategies - Things I can do  "to take my mind off of my problems without contacting another person (relaxation technique, physical activity):  ? Distress Tolerance Strategies:  arts and crafts: drawing and painting  ? Physical Activities: exercise: working out  ? Focus on helpful thoughts:  \"This is temporary\", \"It always passes\" and \"Ride the wave\"  Step 3: People and social settings that provide distraction:                 Name: Jennifer     Phone: [phone]                 Name: Antonina         Phone: [phone]                 Name: panchito       Phone: [phone]  ? friends house or car   Step 4: Remind myself of people and things that are important to me and worth living for:  Best friend and cat        Step 5: When I am in crisis, I can ask these people to help me use my safety plan:                 Name: Jennifer     Phone: [phone]                 Name: Antonina         Phone: [phone]                 Name: panchito       Phone: [phone]  Step 6: Making the environment safe:   ? be around others  Step 7: Professionals or agencies I can contact during a crisis:  ? [facility] Daytime Number: [phone]  ? Suicide Prevention Lifeline: 5-563-341-TALK (6103)  ? Crisis Text Line Service (available 24 hours a day, 7 days a week): Text [place] to 916683    Local Crisis Services: 988     Call 911 or go to my nearest emergency department.       I helped develop this safety plan and agree to use it when needed.  I have been given a copy of this plan.       Client signature _________________________________________________________________  Today s date:  2/22/2021  Adapted from Safety Plan Template 2008 Marisol Cazares and Mario Flores is reprinted with the express permission of the authors.  No portion of the Safety Plan Template may be reproduced without the express, written permission.  You can contact the authors at [email or web address] or [email or web address].       "

## 2021-04-17 ASSESSMENT — ANXIETY QUESTIONNAIRES: GAD7 TOTAL SCORE: 16

## 2021-05-04 ENCOUNTER — VIRTUAL VISIT (OUTPATIENT)
Dept: PSYCHOLOGY | Facility: CLINIC | Age: 21
End: 2021-05-04
Payer: COMMERCIAL

## 2021-05-04 DIAGNOSIS — F41.1 GENERALIZED ANXIETY DISORDER: ICD-10-CM

## 2021-05-04 DIAGNOSIS — F43.10 PTSD (POST-TRAUMATIC STRESS DISORDER): ICD-10-CM

## 2021-05-04 DIAGNOSIS — F33.1 MDD (MAJOR DEPRESSIVE DISORDER), RECURRENT EPISODE, MODERATE (H): Primary | ICD-10-CM

## 2021-05-04 PROCEDURE — 90834 PSYTX W PT 45 MINUTES: CPT | Mod: 95 | Performed by: SOCIAL WORKER

## 2021-05-04 ASSESSMENT — ANXIETY QUESTIONNAIRES
GAD7 TOTAL SCORE: 12
5. BEING SO RESTLESS THAT IT IS HARD TO SIT STILL: MORE THAN HALF THE DAYS
1. FEELING NERVOUS, ANXIOUS, OR ON EDGE: MORE THAN HALF THE DAYS
6. BECOMING EASILY ANNOYED OR IRRITABLE: SEVERAL DAYS
3. WORRYING TOO MUCH ABOUT DIFFERENT THINGS: MORE THAN HALF THE DAYS
2. NOT BEING ABLE TO STOP OR CONTROL WORRYING: MORE THAN HALF THE DAYS
4. TROUBLE RELAXING: MORE THAN HALF THE DAYS
7. FEELING AFRAID AS IF SOMETHING AWFUL MIGHT HAPPEN: SEVERAL DAYS

## 2021-05-04 ASSESSMENT — PATIENT HEALTH QUESTIONNAIRE - PHQ9: SUM OF ALL RESPONSES TO PHQ QUESTIONS 1-9: 12

## 2021-05-04 NOTE — PROGRESS NOTES
Progress Note    Patient Name: Wandy Ann  Date: 2021         Service Type: Individual      Session Start Time: 1333  Session End Time: 1421     Session Length: 38-52    Session #: 2    Attendees: Client    Service Modality:  Video Visit:      Provider verified identity through the following two step process.  Patient provided:  Patient     Telemedicine Visit: The patient's condition can be safely assessed and treated via synchronous audio and visual telemedicine encounter.      Reason for Telemedicine Visit: Services only offered telehealth    Originating Site (Patient Location): Patient's home    Distant Site (Provider Location): Provider Remote Setting    Consent:  The patient/guardian has verbally consented to: the potential risks and benefits of telemedicine (video visit) versus in person care; bill my insurance or make self-payment for services provided; and responsibility for payment of non-covered services.     Patient would like the video invitation sent by:  Send to e-mail at: [email or web address]    Mode of Communication:  Video Conference via [employer]    As the provider I attest to compliance with applicable laws and regulations related to telemedicine.     Treatment Plan Last Reviewed: 2021 due 2021  PHQ-9 / HENRIQUE-7 :     DATA  Interactive Complexity: No  Crisis: No       Progress Since Last Session (Related to Symptoms / Goals / Homework):   Symptoms: Improving phq-9 henrique-7    Homework: Achieved / completed to satisfaction      Episode of Care Goals: Minimal progress - PREPARATION (Decided to change - considering how); Intervened by negotiating a change plan and determining options / strategies for behavior change, identifying triggers, exploring social supports, and working towards setting a date to begin behavior change     Current / Ongoing Stressors and Concerns:   past trauma, developmental hx and current stressors       Treatment Objective(s) Addressed in This Session:   Patient will demonstrate control of impulses and ability to use positive coping tools to manage strong emotions that can be safely addressed at a lower level of care. Absence of persistent SI and report of reduced frequency and intensity of SI and absence of SI to acceptable levels, report subjective improved mood for a period of 90 days, within 6 months as clinically observed and by patient self-report.  Patient will demonstrate and report a level of depression that can be managed at a lower level of care.  Absence of persistent depression mood and report of reduced frequency and intensity of low mood and absence of persistent low energy and motivation to acceptable levels, report subjective improved motivation and increased energy for a period of 90 days, within 6 months as clinically observed and by patient self-report.  Patient will demonstrate and report a level of anxiety that can be managed at a lower level of care.  Absence of persistent anxious mood and report of reduced frequency and intensity of worry and absence of persistent anxious mood to acceptable levels, no panic attacks, report subjective comfort with rumination for a period of 90 days, within 6 months as clinically observed and by patient self-report.     Intervention:   Therapist met with patient to review goals and interventions. Therapist utilized reflected listening as patient gave brief reflection of week. Patient processed having a much better week and processed an incident at work that related to her past trauma. Therapist supported patient as she processed and validated patient. Patient processed her past trauma and emotions. Therapist encouraged patient to take her control back and follow her wants. Therapist walked and coached patient through making it her decision and step by step.   Patient presented with an anxious affect. Patient was engaged in session and open to feedback.  Patient reported no safety concerns.         ASSESSMENT: Current Emotional / Mental Status (status of significant symptoms):   Risk status (Self / Other harm or suicidal ideation)   Patient denies current fears or concerns for personal safety.   Patient reports the following current or recent suicidal ideation or behaviors: 1/5, 5 being high, no plan, safety plan and contracted for safety.   Patient denies current or recent homicidal ideation or behaviors.   Patient denies current or recent self injurious behavior or ideation.   Patient denies other safety concerns.   Patient reports there has been no change in risk factors since their last session.     Patient reports there has been no change in protective factors since their last session.     A safety and risk management plan has been developed including: Patient consented to co-developed safety plan.  Safety and risk management plan was completed.  Patient agreed to use safety plan should any safety concerns arise.  A copy was given to the patient.     Appearance:   Appropriate    Eye Contact:   Fair    Psychomotor Behavior: Restless    Attitude:   Cooperative  Friendly   Orientation:   All   Speech    Rate / Production: Emotional Talkative    Volume:  Normal    Mood:    Anxious  Depressed    Affect:    Bright  Worrisome    Thought Content:  Clear    Thought Form:  Coherent    Insight:    Fair      Medication Review:   Changes to psychiatric medications, see updated Medication List in EPIC.      Medication Compliance:   Yes     Changes in Health Issues:   None reported     Chemical Use Review:   Substance Use: Chemical use reviewed, no active concerns identified      Tobacco Use: No current tobacco use.      Diagnosis:  1. MDD (major depressive disorder), recurrent episode, moderate (H)    2. Generalized anxiety disorder    3. PTSD (post-traumatic stress disorder)        Collateral Reports Completed:   Not Applicable    PLAN: (Patient Tasks / Therapist Tasks /  Other)    Follow safety plan  Go to ED if feeling unsafe  patient through making it her decision and step by step.     Katie Faulkner, [facility] 5/4/2021                                                         ______________________________________________________________________    Treatment Plan    Patient's Name: Wandy Ann  YOB: 2000    Date: 4/16/2021    DSM5 Diagnoses: 296.32 (F33.1) Major Depressive Disorder, Recurrent Episode, Moderate _ and With mixed features, 300.02 (F41.1) Generalized Anxiety Disorder or 309.81 (F43.10) Posttraumatic Stress Disorder (includes Posttraumatic Stress Disorder for Children 6 Years and Younger)  With dissociative symptoms  Psychosocial / Contextual Factors: past trauma, developmental hx and current stressors   WHODAS: 39    Referral / Collaboration:  Referral to another professional/service is not indicated at this time..    Anticipated number of session or this episode of care:       MeasurableTreatment Goal(s) related to diagnosis / functional impairment(s)  Goal 1: Patient will report absence of persistent SI and report of reduced frequency and intensity of SI and absence of SI to acceptable levels, report subjective improved mood for a period of 90 days, within 6 months as clinically observed and by patient self-report    I will know I've met my goal when I can see the difference between a bad moment and what I want in th future.      Objective #A (Patient Action)    Patient will demonstrate control of impulses and ability to use positive coping tools to manage strong emotions that can be safely addressed at a lower level of care. Absence of persistent SI and report of reduced frequency and intensity of SI and absence of SI to acceptable levels, report subjective improved mood for a period of 90 days, within 6 months as clinically observed and by patient self-report.  Status: New - Date: 4/16/2021     Intervention(s)  Therapist will provide  individual therapy to identify triggers to SI urges, gain feedback on helpful coping strategies, and identify ways that family can offer support. Tx to discuss current stressors and interpersonal conflicts and how to cope with these, coaching, diagnostic testing, referral for medication as indicated, use prescribed medication, cognitive restructuring, interpersonal family therapy, supportive therapy services.        Goal 2: Patient will report absence of persistent depression mood and report of reduced frequency and intensity of low mood and absence of persistent low energy and motivation to acceptable levels, report subjective improved motivation and increased energy for a period of 90 days, within 6 months as clinically observed and by patient self-report    I will know I've met my goal when I no longer feel sad.      Objective #A (Patient Action)    Status: New - Date: 4/16/2021     Patient will demonstrate and report a level of depression that can be managed at a lower level of care.  Absence of persistent depression mood and report of reduced frequency and intensity of low mood and absence of persistent low energy and motivation to acceptable levels, report subjective improved motivation and increased energy for a period of 90 days, within 6 months as clinically observed and by patient self-report.    Intervention(s)  Therapist will provide individual therapy to identify triggers to depression, gain feedback on helpful coping tools and thought-reframing techniques, and identify preferred way of being.  Tx to include discussion of current stressors and interpersonal conflicts and how to cope with these, coaching, diagnostic testing, referral for medication as indicated, use prescribed medication, cognitive restructuring, interpersonal, family therapy, supportive therapy services.        Goal 3: Patiient will report absence of persistent anxiety mood and report of reduced frequency and intensity of worry and  absence of persistent anxious mood to acceptable levels, no panic attacks, report subjective comfort with rumination for a period of 90 days. Within 6 months as clinically observed and by patient self-report    I will know I've met my goal when I can go days without worrying about little things or shaking.      Objective #A (Patient Action)    Status: New - Date: 4/16/2021     Patient will demonstrate and report a level of anxiety that can be managed at a lower level of care.  Absence of persistent anxious mood and report of reduced frequency and intensity of worry and absence of persistent anxious mood to acceptable levels, no panic attacks, report subjective comfort with rumination for a period of 90 days, within 6 months as clinically observed and by patient self-report.    Intervention(s)  Therapist will provide individual therapy to identify triggers to anxiety, gain feedback on helpful coping tools and thought-reframing techniques, and identify preferred way of being. Tx to include discuss current stressors and interpersonal conflicts and how to cope with these, coaching, diagnostic testing , referral for medication as indicated, use prescribed medication, cognitive restructuring, interpersonal,   family therapy, supportive therapy services.        Patient has reviewed and agreed to the above plan.      Katie Faulkner, [facility]  April 16, 2021                                                   Wandy Ann            SAFETY PLAN:  Step 1: Warning signs / cues (Thoughts, images, mood, situation, behavior) that a crisis may be developing:  ? Thoughts: thoughts of not wanting to be here  ? Images: no images  ? Thinking Processes: intrusive thoughts (bothersome, unwanted thoughts that come out of nowhere): get irritated  ? Mood: intense anger and agitation  ? Behaviors: isolating/withdrawing   ? Situations: trauma    Step 2: Coping strategies - Things I can do to take my mind off of my problems without  "contacting another person (relaxation technique, physical activity):  ? Distress Tolerance Strategies:  arts and crafts: drawing and painting  ? Physical Activities: exercise: working out  ? Focus on helpful thoughts:  \"This is temporary\", \"It always passes\" and \"Ride the wave\"  Step 3: People and social settings that provide distraction:                 Name: Jennifer     Phone: [phone]                 Name: Antonina         Phone: [phone]                 Name: panchito       Phone: [phone]  ? friends house or car   Step 4: Remind myself of people and things that are important to me and worth living for:  Best friend and cat        Step 5: When I am in crisis, I can ask these people to help me use my safety plan:                 Name: Jennifer     Phone: [phone]                 Name: Antonina         Phone: [phone]                 Name: panchito       Phone: [phone]  Step 6: Making the environment safe:   ? be around others  Step 7: Professionals or agencies I can contact during a crisis:  ? [facility] Daytime Number: 864-281-8732  ? Suicide Prevention Lifeline: 5-206-553-TALK 8294)  ? Crisis Text Line Service (available 24 hours a day, 7 days a week): Text [place] to 709522    Local Crisis Services: 988     Call 911 or go to my nearest emergency department.       I helped develop this safety plan and agree to use it when needed.  I have been given a copy of this plan.       Client signature _________________________________________________________________  Today s date:  2/22/2021  Adapted from Safety Plan Template 2008 Marisol Cazares and Mario Flores is reprinted with the express permission of the authors.  No portion of the Safety Plan Template may be reproduced without the express, written permission.  You can contact the authors at [email or web address] or [email or web address].     "

## 2021-05-05 ASSESSMENT — ANXIETY QUESTIONNAIRES: GAD7 TOTAL SCORE: 12

## 2021-06-14 ENCOUNTER — VIRTUAL VISIT (OUTPATIENT)
Dept: PSYCHOLOGY | Facility: CLINIC | Age: 21
End: 2021-06-14
Payer: COMMERCIAL

## 2021-06-14 DIAGNOSIS — F41.1 GENERALIZED ANXIETY DISORDER: Primary | ICD-10-CM

## 2021-06-14 DIAGNOSIS — F43.10 PTSD (POST-TRAUMATIC STRESS DISORDER): ICD-10-CM

## 2021-06-14 DIAGNOSIS — F33.1 MDD (MAJOR DEPRESSIVE DISORDER), RECURRENT EPISODE, MODERATE (H): ICD-10-CM

## 2021-06-14 PROCEDURE — 90834 PSYTX W PT 45 MINUTES: CPT | Mod: 95 | Performed by: SOCIAL WORKER

## 2021-06-14 ASSESSMENT — ANXIETY QUESTIONNAIRES
5. BEING SO RESTLESS THAT IT IS HARD TO SIT STILL: MORE THAN HALF THE DAYS
4. TROUBLE RELAXING: NEARLY EVERY DAY
GAD7 TOTAL SCORE: 18
2. NOT BEING ABLE TO STOP OR CONTROL WORRYING: NEARLY EVERY DAY
3. WORRYING TOO MUCH ABOUT DIFFERENT THINGS: NEARLY EVERY DAY
6. BECOMING EASILY ANNOYED OR IRRITABLE: MORE THAN HALF THE DAYS
1. FEELING NERVOUS, ANXIOUS, OR ON EDGE: NEARLY EVERY DAY
7. FEELING AFRAID AS IF SOMETHING AWFUL MIGHT HAPPEN: MORE THAN HALF THE DAYS

## 2021-06-14 ASSESSMENT — PATIENT HEALTH QUESTIONNAIRE - PHQ9: SUM OF ALL RESPONSES TO PHQ QUESTIONS 1-9: 16

## 2021-06-14 NOTE — PROGRESS NOTES
Progress Note    Patient Name: Wandy Ann  Date: 2021         Service Type: Individual      Session Start Time:   Session End Time:      Session Length: 38-52    Session #: 3    Attendees: Client    Service Modality:  Video Visit:      Provider verified identity through the following two step process.  Patient provided:  Patient     Telemedicine Visit: The patient's condition can be safely assessed and treated via synchronous audio and visual telemedicine encounter.      Reason for Telemedicine Visit: Services only offered telehealth    Originating Site (Patient Location): Patient's home    Distant Site (Provider Location): Provider Remote Setting    Consent:  The patient/guardian has verbally consented to: the potential risks and benefits of telemedicine (video visit) versus in person care; bill my insurance or make self-payment for services provided; and responsibility for payment of non-covered services.     Patient would like the video invitation sent by:  Send to e-mail at: [email or web address]    Mode of Communication:  Video Conference via [employer]    As the provider I attest to compliance with applicable laws and regulations related to telemedicine.     Treatment Plan Last Reviewed: 2021 due 2021  PHQ-9 / HENRIQUE-7 :     DATA  Interactive Complexity: No  Crisis: No       Progress Since Last Session (Related to Symptoms / Goals / Homework):   Symptoms: Worsening phq-9 henrique-7    Homework: Achieved / completed to satisfaction      Episode of Care Goals: Minimal progress - PREPARATION (Decided to change - considering how); Intervened by negotiating a change plan and determining options / strategies for behavior change, identifying triggers, exploring social supports, and working towards setting a date to begin behavior change     Current / Ongoing Stressors and Concerns:   past trauma, developmental hx and current  stressors      Treatment Objective(s) Addressed in This Session:   Patient will demonstrate control of impulses and ability to use positive coping tools to manage strong emotions that can be safely addressed at a lower level of care. Absence of persistent SI and report of reduced frequency and intensity of SI and absence of SI to acceptable levels, report subjective improved mood for a period of 90 days, within 6 months as clinically observed and by patient self-report.  Patient will demonstrate and report a level of depression that can be managed at a lower level of care.  Absence of persistent depression mood and report of reduced frequency and intensity of low mood and absence of persistent low energy and motivation to acceptable levels, report subjective improved motivation and increased energy for a period of 90 days, within 6 months as clinically observed and by patient self-report.  Patient will demonstrate and report a level of anxiety that can be managed at a lower level of care.  Absence of persistent anxious mood and report of reduced frequency and intensity of worry and absence of persistent anxious mood to acceptable levels, no panic attacks, report subjective comfort with rumination for a period of 90 days, within 6 months as clinically observed and by patient self-report.     Intervention:   Therapist met with patient to review goals and interventions. Therapist utilized reflected listening as patient gave brief reflection of week. Patient processed some stress and decisions. Therapist educated patient on trauma and how it can effect decisions and behaviors. Therapist shared concerns with patients behavior and it re-traumatizing patient. Therapist reviewed healthy behaviors with patient and IRT.   Patient presented with an anxious affect. Patient was engaged in session and open to feedback. Patient contracted for safety and to follow safety plan.         ASSESSMENT: Current Emotional / Mental Status  (status of significant symptoms):   Risk status (Self / Other harm or suicidal ideation)   Patient denies current fears or concerns for personal safety.   Patient reports the following current or recent suicidal ideation or behaviors: 2/5, no plan and contracted for safety.   Patient denies current or recent homicidal ideation or behaviors.   Patient denies current or recent self injurious behavior or ideation.   Patient denies other safety concerns.   Patient reports there has been no change in risk factors since their last session.     Patient reports there has been no change in protective factors since their last session.     A safety and risk management plan has been developed including: Patient consented to co-developed safety plan.  Safety and risk management plan was completed.  Patient agreed to use safety plan should any safety concerns arise.  A copy was given to the patient.     Appearance:   Appropriate    Eye Contact:   Fair    Psychomotor Behavior: Restless    Attitude:   Cooperative  Friendly   Orientation:   All   Speech    Rate / Production: Emotional Talkative    Volume:  Normal    Mood:    Anxious  Depressed    Affect:    Bright  Worrisome    Thought Content:  Clear    Thought Form:  Coherent    Insight:    Fair      Medication Review:   No changes to current psychiatric medication(s)     Medication Compliance:   Yes     Changes in Health Issues:   None reported     Chemical Use Review:   Substance Use: Chemical use reviewed, no active concerns identified      Tobacco Use: No current tobacco use.      Diagnosis:  1. Generalized anxiety disorder    2. MDD (major depressive disorder), recurrent episode, moderate (H)    3. PTSD (post-traumatic stress disorder)        Collateral Reports Completed:   Not Applicable    PLAN: (Patient Tasks / Therapist Tasks / Other)    Follow safety plan  Go to ED if feeling unsafe  patient through making it her decision and step by step.   Therapist educated patient on  trauma and how it can effect decisions and behaviors. Therapist shared concerns with patients behavior and it re-traumatizing patient. Therapist reviewed healthy behaviors with patient and IRT.     Katie Faulkner, [facility] 6/14/2021                                                         ______________________________________________________________________    Treatment Plan    Patient's Name: Wandy Ann  YOB: 2000    Date: 4/16/2021    DSM5 Diagnoses: 296.32 (F33.1) Major Depressive Disorder, Recurrent Episode, Moderate _ and With mixed features, 300.02 (F41.1) Generalized Anxiety Disorder or 309.81 (F43.10) Posttraumatic Stress Disorder (includes Posttraumatic Stress Disorder for Children 6 Years and Younger)  With dissociative symptoms  Psychosocial / Contextual Factors: past trauma, developmental hx and current stressors   WHODAS: 39    Referral / Collaboration:  Referral to another professional/service is not indicated at this time..    Anticipated number of session or this episode of care:       MeasurableTreatment Goal(s) related to diagnosis / functional impairment(s)  Goal 1: Patient will report absence of persistent SI and report of reduced frequency and intensity of SI and absence of SI to acceptable levels, report subjective improved mood for a period of 90 days, within 6 months as clinically observed and by patient self-report    I will know I've met my goal when I can see the difference between a bad moment and what I want in th future.      Objective #A (Patient Action)    Patient will demonstrate control of impulses and ability to use positive coping tools to manage strong emotions that can be safely addressed at a lower level of care. Absence of persistent SI and report of reduced frequency and intensity of SI and absence of SI to acceptable levels, report subjective improved mood for a period of 90 days, within 6 months as clinically observed and by patient  self-report.  Status: New - Date: 4/16/2021     Intervention(s)  Therapist will provide individual therapy to identify triggers to SI urges, gain feedback on helpful coping strategies, and identify ways that family can offer support. Tx to discuss current stressors and interpersonal conflicts and how to cope with these, coaching, diagnostic testing, referral for medication as indicated, use prescribed medication, cognitive restructuring, interpersonal family therapy, supportive therapy services.        Goal 2: Patient will report absence of persistent depression mood and report of reduced frequency and intensity of low mood and absence of persistent low energy and motivation to acceptable levels, report subjective improved motivation and increased energy for a period of 90 days, within 6 months as clinically observed and by patient self-report    I will know I've met my goal when I no longer feel sad.      Objective #A (Patient Action)    Status: New - Date: 4/16/2021     Patient will demonstrate and report a level of depression that can be managed at a lower level of care.  Absence of persistent depression mood and report of reduced frequency and intensity of low mood and absence of persistent low energy and motivation to acceptable levels, report subjective improved motivation and increased energy for a period of 90 days, within 6 months as clinically observed and by patient self-report.    Intervention(s)  Therapist will provide individual therapy to identify triggers to depression, gain feedback on helpful coping tools and thought-reframing techniques, and identify preferred way of being.  Tx to include discussion of current stressors and interpersonal conflicts and how to cope with these, coaching, diagnostic testing, referral for medication as indicated, use prescribed medication, cognitive restructuring, interpersonal, family therapy, supportive therapy services.        Goal 3: Patiient will report absence of  persistent anxiety mood and report of reduced frequency and intensity of worry and absence of persistent anxious mood to acceptable levels, no panic attacks, report subjective comfort with rumination for a period of 90 days. Within 6 months as clinically observed and by patient self-report    I will know I've met my goal when I can go days without worrying about little things or shaking.      Objective #A (Patient Action)    Status: New - Date: 4/16/2021     Patient will demonstrate and report a level of anxiety that can be managed at a lower level of care.  Absence of persistent anxious mood and report of reduced frequency and intensity of worry and absence of persistent anxious mood to acceptable levels, no panic attacks, report subjective comfort with rumination for a period of 90 days, within 6 months as clinically observed and by patient self-report.    Intervention(s)  Therapist will provide individual therapy to identify triggers to anxiety, gain feedback on helpful coping tools and thought-reframing techniques, and identify preferred way of being. Tx to include discuss current stressors and interpersonal conflicts and how to cope with these, coaching, diagnostic testing , referral for medication as indicated, use prescribed medication, cognitive restructuring, interpersonal,   family therapy, supportive therapy services.        Patient has reviewed and agreed to the above plan.      Katie Faulkner, [facility]  April 16, 2021                                                   Wandy Ann            SAFETY PLAN:  Step 1: Warning signs / cues (Thoughts, images, mood, situation, behavior) that a crisis may be developing:  ? Thoughts: thoughts of not wanting to be here  ? Images: no images  ? Thinking Processes: intrusive thoughts (bothersome, unwanted thoughts that come out of nowhere): get irritated  ? Mood: intense anger and agitation  ? Behaviors: isolating/withdrawing   ? Situations: trauma    Step  "2: Coping strategies - Things I can do to take my mind off of my problems without contacting another person (relaxation technique, physical activity):  ? Distress Tolerance Strategies:  arts and crafts: drawing and painting  ? Physical Activities: exercise: working out  ? Focus on helpful thoughts:  \"This is temporary\", \"It always passes\" and \"Ride the wave\"  Step 3: People and social settings that provide distraction:                 Name: Jennifer     Phone: [phone]                 Name: Antonina         Phone: [phone]                 Name: panchito       Phone: [phone]  ? friends house or car   Step 4: Remind myself of people and things that are important to me and worth living for:  Best friend and cat        Step 5: When I am in crisis, I can ask these people to help me use my safety plan:                 Name: Jennifer     Phone: [phone]                 Name: Antonina         Phone: [phone]                 Name: panchito       Phone: [phone]  Step 6: Making the environment safe:   ? be around others  Step 7: Professionals or agencies I can contact during a crisis:  ? [facility] Daytime Number: [phone]  ? Suicide Prevention Lifeline: 5-453-920-OAZN (1912)  ? Crisis Text Line Service (available 24 hours a day, 7 days a week): Text [place] to 319365    Local Crisis Services: 988     Call 911 or go to my nearest emergency department.       I helped develop this safety plan and agree to use it when needed.  I have been given a copy of this plan.       Client signature _________________________________________________________________  Today s date:  2/22/2021  Adapted from Safety Plan Template 2008 Marisol Cazares and Mario Flores is reprinted with the express permission of the authors.  No portion of the Safety Plan Template may be reproduced without the express, written permission.  You can contact the authors at [email or web address] or [email or web address].     "

## 2021-06-15 ASSESSMENT — ANXIETY QUESTIONNAIRES: GAD7 TOTAL SCORE: 18

## 2021-06-22 ENCOUNTER — VIRTUAL VISIT (OUTPATIENT)
Dept: PSYCHOLOGY | Facility: CLINIC | Age: 21
End: 2021-06-22
Payer: COMMERCIAL

## 2021-06-22 DIAGNOSIS — F43.10 PTSD (POST-TRAUMATIC STRESS DISORDER): ICD-10-CM

## 2021-06-22 DIAGNOSIS — F33.1 MDD (MAJOR DEPRESSIVE DISORDER), RECURRENT EPISODE, MODERATE (H): ICD-10-CM

## 2021-06-22 DIAGNOSIS — F41.1 GENERALIZED ANXIETY DISORDER: Primary | ICD-10-CM

## 2021-06-22 PROCEDURE — 90834 PSYTX W PT 45 MINUTES: CPT | Mod: 95 | Performed by: SOCIAL WORKER

## 2021-06-22 NOTE — PROGRESS NOTES
Progress Note    Patient Name: Wandy Ann  Date: 2021         Service Type: Individual      Session Start Time: 910 tech issues  Session End Time: 954     Session Length: 38-52    Session #: 4    Attendees: Client    Service Modality:  Video Visit:      Provider verified identity through the following two step process.  Patient provided:  Patient     Telemedicine Visit: The patient's condition can be safely assessed and treated via synchronous audio and visual telemedicine encounter.      Reason for Telemedicine Visit: Services only offered telehealth    Originating Site (Patient Location): Patient's home    Distant Site (Provider Location): Provider Remote Setting    Consent:  The patient/guardian has verbally consented to: the potential risks and benefits of telemedicine (video visit) versus in person care; bill my insurance or make self-payment for services provided; and responsibility for payment of non-covered services.     Patient would like the video invitation sent by:  Send to e-mail at: [email or web address]    Mode of Communication:  Video Conference via [employer]    As the provider I attest to compliance with applicable laws and regulations related to telemedicine.     Treatment Plan Last Reviewed: 2021 due 2021  PHQ-9 / YESSI-7 :  last session    DATA  Interactive Complexity: No  Crisis: No       Progress Since Last Session (Related to Symptoms / Goals / Homework):   Symptoms: Improving per patient    Homework: Achieved / completed to satisfaction      Episode of Care Goals: Minimal progress - PREPARATION (Decided to change - considering how); Intervened by negotiating a change plan and determining options / strategies for behavior change, identifying triggers, exploring social supports, and working towards setting a date to begin behavior change     Current / Ongoing Stressors and Concerns:   past trauma,  developmental hx and current stressors      Treatment Objective(s) Addressed in This Session:   Patient will demonstrate control of impulses and ability to use positive coping tools to manage strong emotions that can be safely addressed at a lower level of care. Absence of persistent SI and report of reduced frequency and intensity of SI and absence of SI to acceptable levels, report subjective improved mood for a period of 90 days, within 6 months as clinically observed and by patient self-report.  Patient will demonstrate and report a level of depression that can be managed at a lower level of care.  Absence of persistent depression mood and report of reduced frequency and intensity of low mood and absence of persistent low energy and motivation to acceptable levels, report subjective improved motivation and increased energy for a period of 90 days, within 6 months as clinically observed and by patient self-report.  Patient will demonstrate and report a level of anxiety that can be managed at a lower level of care.  Absence of persistent anxious mood and report of reduced frequency and intensity of worry and absence of persistent anxious mood to acceptable levels, no panic attacks, report subjective comfort with rumination for a period of 90 days, within 6 months as clinically observed and by patient self-report.     Intervention:   Therapist met with patient to review goals and interventions. Therapist utilized reflected listening as patient gave brief reflection of week. Patient processed upcoming birthday and past trauma. Therapist supported, validated and educated patient. Therapist shared concerns with patient on difficulties saying no. Therapist facilitated discussion on healthy and firm boundaries and different ways for patient to start implementing them.   Patient presented with an anxious affect. Patient was engaged in session and open to feedback. Patient reported no safety issues         ASSESSMENT:  Current Emotional / Mental Status (status of significant symptoms):   Risk status (Self / Other harm or suicidal ideation)   Patient denies current fears or concerns for personal safety.   Patient denies current or recent suicidal ideation or behaviors.   Patient denies current or recent homicidal ideation or behaviors.   Patient denies current or recent self injurious behavior or ideation.   Patient denies other safety concerns.   Patient reports there has been no change in risk factors since their last session.     Patient reports there has been no change in protective factors since their last session.     A safety and risk management plan has been developed including: Patient consented to co-developed safety plan.  Safety and risk management plan was completed.  Patient agreed to use safety plan should any safety concerns arise.  A copy was given to the patient.     Appearance:   Appropriate    Eye Contact:   Fair    Psychomotor Behavior: Restless    Attitude:   Cooperative  Friendly   Orientation:   All   Speech    Rate / Production: Emotional Talkative    Volume:  Normal    Mood:    Anxious  Depressed    Affect:    Bright  Worrisome    Thought Content:  Clear    Thought Form:  Coherent    Insight:    Fair      Medication Review:   No changes to current psychiatric medication(s)     Medication Compliance:   Yes     Changes in Health Issues:   None reported     Chemical Use Review:   Substance Use: Chemical use reviewed, no active concerns identified      Tobacco Use: No current tobacco use.      Diagnosis:  1. Generalized anxiety disorder    2. MDD (major depressive disorder), recurrent episode, moderate (H)    3. PTSD (post-traumatic stress disorder)        Collateral Reports Completed:   Not Applicable    PLAN: (Patient Tasks / Therapist Tasks / Other)    Follow safety plan  Go to ED if feeling unsafe  patient through making it her decision and step by step.   Therapist educated patient on trauma and how it  can effect decisions and behaviors. Therapist shared concerns with patients behavior and it re-traumatizing patient. Therapist reviewed healthy behaviors with patient and IRT.   Therapist shared concerns with patient on difficulties saying no. Therapist facilitated discussion on healthy and firm boundaries and different ways for patient to start implementing them.       Katie Faulkner, [facility] 6/22/2021                                                         ______________________________________________________________________    Treatment Plan    Patient's Name: Wandy Ann  YOB: 2000    Date: 4/16/2021    DSM5 Diagnoses: 296.32 (F33.1) Major Depressive Disorder, Recurrent Episode, Moderate _ and With mixed features, 300.02 (F41.1) Generalized Anxiety Disorder or 309.81 (F43.10) Posttraumatic Stress Disorder (includes Posttraumatic Stress Disorder for Children 6 Years and Younger)  With dissociative symptoms  Psychosocial / Contextual Factors: past trauma, developmental hx and current stressors   WHODAS: 39    Referral / Collaboration:  Referral to another professional/service is not indicated at this time..    Anticipated number of session or this episode of care:       MeasurableTreatment Goal(s) related to diagnosis / functional impairment(s)  Goal 1: Patient will report absence of persistent SI and report of reduced frequency and intensity of SI and absence of SI to acceptable levels, report subjective improved mood for a period of 90 days, within 6 months as clinically observed and by patient self-report    I will know I've met my goal when I can see the difference between a bad moment and what I want in th future.      Objective #A (Patient Action)    Patient will demonstrate control of impulses and ability to use positive coping tools to manage strong emotions that can be safely addressed at a lower level of care. Absence of persistent SI and report of reduced frequency and intensity  of SI and absence of SI to acceptable levels, report subjective improved mood for a period of 90 days, within 6 months as clinically observed and by patient self-report.  Status: New - Date: 4/16/2021     Intervention(s)  Therapist will provide individual therapy to identify triggers to SI urges, gain feedback on helpful coping strategies, and identify ways that family can offer support. Tx to discuss current stressors and interpersonal conflicts and how to cope with these, coaching, diagnostic testing, referral for medication as indicated, use prescribed medication, cognitive restructuring, interpersonal family therapy, supportive therapy services.        Goal 2: Patient will report absence of persistent depression mood and report of reduced frequency and intensity of low mood and absence of persistent low energy and motivation to acceptable levels, report subjective improved motivation and increased energy for a period of 90 days, within 6 months as clinically observed and by patient self-report    I will know I've met my goal when I no longer feel sad.      Objective #A (Patient Action)    Status: New - Date: 4/16/2021     Patient will demonstrate and report a level of depression that can be managed at a lower level of care.  Absence of persistent depression mood and report of reduced frequency and intensity of low mood and absence of persistent low energy and motivation to acceptable levels, report subjective improved motivation and increased energy for a period of 90 days, within 6 months as clinically observed and by patient self-report.    Intervention(s)  Therapist will provide individual therapy to identify triggers to depression, gain feedback on helpful coping tools and thought-reframing techniques, and identify preferred way of being.  Tx to include discussion of current stressors and interpersonal conflicts and how to cope with these, coaching, diagnostic testing, referral for medication as indicated,  use prescribed medication, cognitive restructuring, interpersonal, family therapy, supportive therapy services.        Goal 3: Patiient will report absence of persistent anxiety mood and report of reduced frequency and intensity of worry and absence of persistent anxious mood to acceptable levels, no panic attacks, report subjective comfort with rumination for a period of 90 days. Within 6 months as clinically observed and by patient self-report    I will know I've met my goal when I can go days without worrying about little things or shaking.      Objective #A (Patient Action)    Status: New - Date: 4/16/2021     Patient will demonstrate and report a level of anxiety that can be managed at a lower level of care.  Absence of persistent anxious mood and report of reduced frequency and intensity of worry and absence of persistent anxious mood to acceptable levels, no panic attacks, report subjective comfort with rumination for a period of 90 days, within 6 months as clinically observed and by patient self-report.    Intervention(s)  Therapist will provide individual therapy to identify triggers to anxiety, gain feedback on helpful coping tools and thought-reframing techniques, and identify preferred way of being. Tx to include discuss current stressors and interpersonal conflicts and how to cope with these, coaching, diagnostic testing , referral for medication as indicated, use prescribed medication, cognitive restructuring, interpersonal,   family therapy, supportive therapy services.        Patient has reviewed and agreed to the above plan.      Katie Faulkner, [facility]  April 16, 2021                                                   Wandy Ann            SAFETY PLAN:  Step 1: Warning signs / cues (Thoughts, images, mood, situation, behavior) that a crisis may be developing:  ? Thoughts: thoughts of not wanting to be here  ? Images: no images  ? Thinking Processes: intrusive thoughts (bothersome,  "unwanted thoughts that come out of nowhere): get irritated  ? Mood: intense anger and agitation  ? Behaviors: isolating/withdrawing   ? Situations: trauma    Step 2: Coping strategies - Things I can do to take my mind off of my problems without contacting another person (relaxation technique, physical activity):  ? Distress Tolerance Strategies:  arts and crafts: drawing and painting  ? Physical Activities: exercise: working out  ? Focus on helpful thoughts:  \"This is temporary\", \"It always passes\" and \"Ride the wave\"  Step 3: People and social settings that provide distraction:                 Name: Jennifer     Phone: [phone]                 Name: Antonina         Phone: [phone]                 Name: mom       Phone: [phone]  ? friends house or car   Step 4: Remind myself of people and things that are important to me and worth living for:  Best friend and cat        Step 5: When I am in crisis, I can ask these people to help me use my safety plan:                 Name: Jennifer     Phone: [phone]                 Name: Antonina         Phone: [phone]                 Name: panchito       Phone: [phone]  Step 6: Making the environment safe:   ? be around others  Step 7: Professionals or agencies I can contact during a crisis:  ? [facility] Daytime Number: [phone]  ? Suicide Prevention Lifeline: 5-116-278-YMGF (4747)  ? Crisis Text Line Service (available 24 hours a day, 7 days a week): Text [place] to 881436    Local Crisis Services: 988     Call 911 or go to my nearest emergency department.       I helped develop this safety plan and agree to use it when needed.  I have been given a copy of this plan.       Client signature _________________________________________________________________  Today s date:  2/22/2021  Adapted from Safety Plan Template 2008 Marisol Cazares and Mario Flores is reprinted with the express permission of the authors.  No portion of the Safety Plan Template " may be reproduced without the express, written permission.  You can contact the authors at [email or web address] or [email or web address].

## 2021-06-24 DIAGNOSIS — F33.1 MODERATE RECURRENT MAJOR DEPRESSION (H): ICD-10-CM

## 2021-06-24 RX ORDER — VENLAFAXINE HYDROCHLORIDE 75 MG/1
75 CAPSULE, EXTENDED RELEASE ORAL DAILY
Qty: 30 CAPSULE | Refills: 0 | Status: SHIPPED | OUTPATIENT
Start: 2021-06-24 | End: 2021-07-22

## 2021-06-24 NOTE — TELEPHONE ENCOUNTER
Date of Last Office Visit: 4/7/21  Date of Next Office Visit: 7/20/21  No shows since last visit: 0  Cancellations since last visit: 5/19/21 (Provider initiated)    Medication requested: venlafaxine 75 mg  Date last ordered: 4/19/21 Qty: 30 Refills: 1     Review of [place] ?: N/A    Lapse in medication adherence greater than 5 days?: No  If yes, call patient and gather details: N/A  Medication refill request verified as identical to current order?: Yes  Result of Last DAM, VPA, Li+ Level, CBC, or Carbamazepine Level (at or since last visit): N/A    []Medication refilled per  Medication Refill in Ambulatory Care  policy.  [x]Medication unable to be refilled by RN due to criteria not met as indicated below:    []Eligibility - not seen in the last year   []Supervision - no future appointment   []Compliance - no shows, cancellations or lapse in therapy   []Verification - order discrepancy   []Controlled medication   []Medication not included in policy   []90-day supply request   [x]Other

## 2021-06-30 ENCOUNTER — VIRTUAL VISIT (OUTPATIENT)
Dept: PSYCHOLOGY | Facility: CLINIC | Age: 21
End: 2021-06-30
Payer: COMMERCIAL

## 2021-06-30 DIAGNOSIS — F33.1 MDD (MAJOR DEPRESSIVE DISORDER), RECURRENT EPISODE, MODERATE (H): ICD-10-CM

## 2021-06-30 DIAGNOSIS — F43.10 PTSD (POST-TRAUMATIC STRESS DISORDER): ICD-10-CM

## 2021-06-30 DIAGNOSIS — F41.1 GENERALIZED ANXIETY DISORDER: Primary | ICD-10-CM

## 2021-06-30 PROCEDURE — 90834 PSYTX W PT 45 MINUTES: CPT | Mod: 95 | Performed by: SOCIAL WORKER

## 2021-06-30 NOTE — PROGRESS NOTES
Progress Note    Patient Name: Wandy Ann  Date: 2021         Service Type: Individual      Session Start Time: 906  Session End Time: 956     Session Length: 38-52    Session #: 5    Attendees: Client    Service Modality:  Video Visit:      Provider verified identity through the following two step process.  Patient provided:  Patient     Telemedicine Visit: The patient's condition can be safely assessed and treated via synchronous audio and visual telemedicine encounter.      Reason for Telemedicine Visit: Services only offered telehealth    Originating Site (Patient Location): Patient's home    Distant Site (Provider Location): Provider Remote Setting    Consent:  The patient/guardian has verbally consented to: the potential risks and benefits of telemedicine (video visit) versus in person care; bill my insurance or make self-payment for services provided; and responsibility for payment of non-covered services.     Patient would like the video invitation sent by:  Send to e-mail at: [email or web address]    Mode of Communication:  Video Conference via [employer]    As the provider I attest to compliance with applicable laws and regulations related to telemedicine.     Treatment Plan Last Reviewed: 2021 due 2021  PHQ-9 / YESSI-7 :  last session    DATA  Interactive Complexity: No  Crisis: No       Progress Since Last Session (Related to Symptoms / Goals / Homework):   Symptoms: Improving per patient    Homework: Achieved / completed to satisfaction      Episode of Care Goals: Minimal progress - PREPARATION (Decided to change - considering how); Intervened by negotiating a change plan and determining options / strategies for behavior change, identifying triggers, exploring social supports, and working towards setting a date to begin behavior change     Current / Ongoing Stressors and Concerns:   past trauma, developmental hx and  current stressors      Treatment Objective(s) Addressed in This Session:   Patient will demonstrate control of impulses and ability to use positive coping tools to manage strong emotions that can be safely addressed at a lower level of care. Absence of persistent SI and report of reduced frequency and intensity of SI and absence of SI to acceptable levels, report subjective improved mood for a period of 90 days, within 6 months as clinically observed and by patient self-report.  Patient will demonstrate and report a level of depression that can be managed at a lower level of care.  Absence of persistent depression mood and report of reduced frequency and intensity of low mood and absence of persistent low energy and motivation to acceptable levels, report subjective improved motivation and increased energy for a period of 90 days, within 6 months as clinically observed and by patient self-report.  Patient will demonstrate and report a level of anxiety that can be managed at a lower level of care.  Absence of persistent anxious mood and report of reduced frequency and intensity of worry and absence of persistent anxious mood to acceptable levels, no panic attacks, report subjective comfort with rumination for a period of 90 days, within 6 months as clinically observed and by patient self-report.     Intervention:   Therapist met with patient to review goals and interventions. Therapist utilized reflected listening as patient gave brief reflection of week. Patient processed reverting back to behaviors. Therapist supported patient as she processed and challenged patient. Therapist educated patient on trauma, effects on the brain and ways to retrain the brain and work on managing her mental health and healing through her trauma.    Patient presented with an anxious affect. Patient was engaged in session and open to feedback. Patient reported no safety issues         ASSESSMENT: Current Emotional / Mental Status (status  of significant symptoms):   Risk status (Self / Other harm or suicidal ideation)   Patient denies current fears or concerns for personal safety.   Patient denies current or recent suicidal ideation or behaviors.   Patient denies current or recent homicidal ideation or behaviors.   Patient denies current or recent self injurious behavior or ideation.   Patient denies other safety concerns.   Patient reports there has been no change in risk factors since their last session.     Patient reports there has been no change in protective factors since their last session.     A safety and risk management plan has been developed including: Patient consented to co-developed safety plan.  Safety and risk management plan was completed.  Patient agreed to use safety plan should any safety concerns arise.  A copy was given to the patient.     Appearance:   Appropriate    Eye Contact:   Fair    Psychomotor Behavior: Restless    Attitude:   Cooperative  Friendly   Orientation:   All   Speech    Rate / Production: Emotional Talkative    Volume:  Normal    Mood:    Anxious  Depressed    Affect:    Bright  Worrisome    Thought Content:  Clear    Thought Form:  Coherent    Insight:    Fair      Medication Review:   No changes to current psychiatric medication(s)     Medication Compliance:   Yes     Changes in Health Issues:   None reported     Chemical Use Review:   Substance Use: Chemical use reviewed, no active concerns identified      Tobacco Use: No current tobacco use.      Diagnosis:  1. Generalized anxiety disorder    2. MDD (major depressive disorder), recurrent episode, moderate (H)    3. PTSD (post-traumatic stress disorder)        Collateral Reports Completed:   Not Applicable    PLAN: (Patient Tasks / Therapist Tasks / Other)    Follow safety plan  Go to ED if feeling unsafe  patient through making it her decision and step by step.   Therapist educated patient on trauma and how it can effect decisions and behaviors.  Therapist shared concerns with patients behavior and it re-traumatizing patient. Therapist reviewed healthy behaviors with patient and IRT.   Therapist shared concerns with patient on difficulties saying no. Therapist facilitated discussion on healthy and firm boundaries and different ways for patient to start implementing them.   Therapist educated patient on trauma, effects on the brain and ways to retrain the brain and work on managing her mental health and healing through her trauma.      Katie GRIMMAdri Tateayad, [facility] 6/30/2021                                                         ______________________________________________________________________    Treatment Plan    Patient's Name: Wandy Ann  YOB: 2000    Date: 4/16/2021    DSM5 Diagnoses: 296.32 (F33.1) Major Depressive Disorder, Recurrent Episode, Moderate _ and With mixed features, 300.02 (F41.1) Generalized Anxiety Disorder or 309.81 (F43.10) Posttraumatic Stress Disorder (includes Posttraumatic Stress Disorder for Children 6 Years and Younger)  With dissociative symptoms  Psychosocial / Contextual Factors: past trauma, developmental hx and current stressors   WHODAS: 39    Referral / Collaboration:  Referral to another professional/service is not indicated at this time..    Anticipated number of session or this episode of care:       MeasurableTreatment Goal(s) related to diagnosis / functional impairment(s)  Goal 1: Patient will report absence of persistent SI and report of reduced frequency and intensity of SI and absence of SI to acceptable levels, report subjective improved mood for a period of 90 days, within 6 months as clinically observed and by patient self-report    I will know I've met my goal when I can see the difference between a bad moment and what I want in th future.      Objective #A (Patient Action)    Patient will demonstrate control of impulses and ability to use positive coping tools to manage strong emotions  that can be safely addressed at a lower level of care. Absence of persistent SI and report of reduced frequency and intensity of SI and absence of SI to acceptable levels, report subjective improved mood for a period of 90 days, within 6 months as clinically observed and by patient self-report.  Status: New - Date: 4/16/2021     Intervention(s)  Therapist will provide individual therapy to identify triggers to SI urges, gain feedback on helpful coping strategies, and identify ways that family can offer support. Tx to discuss current stressors and interpersonal conflicts and how to cope with these, coaching, diagnostic testing, referral for medication as indicated, use prescribed medication, cognitive restructuring, interpersonal family therapy, supportive therapy services.        Goal 2: Patient will report absence of persistent depression mood and report of reduced frequency and intensity of low mood and absence of persistent low energy and motivation to acceptable levels, report subjective improved motivation and increased energy for a period of 90 days, within 6 months as clinically observed and by patient self-report    I will know I've met my goal when I no longer feel sad.      Objective #A (Patient Action)    Status: New - Date: 4/16/2021     Patient will demonstrate and report a level of depression that can be managed at a lower level of care.  Absence of persistent depression mood and report of reduced frequency and intensity of low mood and absence of persistent low energy and motivation to acceptable levels, report subjective improved motivation and increased energy for a period of 90 days, within 6 months as clinically observed and by patient self-report.    Intervention(s)  Therapist will provide individual therapy to identify triggers to depression, gain feedback on helpful coping tools and thought-reframing techniques, and identify preferred way of being.  Tx to include discussion of current stressors  and interpersonal conflicts and how to cope with these, coaching, diagnostic testing, referral for medication as indicated, use prescribed medication, cognitive restructuring, interpersonal, family therapy, supportive therapy services.        Goal 3: Patiient will report absence of persistent anxiety mood and report of reduced frequency and intensity of worry and absence of persistent anxious mood to acceptable levels, no panic attacks, report subjective comfort with rumination for a period of 90 days. Within 6 months as clinically observed and by patient self-report    I will know I've met my goal when I can go days without worrying about little things or shaking.      Objective #A (Patient Action)    Status: New - Date: 4/16/2021     Patient will demonstrate and report a level of anxiety that can be managed at a lower level of care.  Absence of persistent anxious mood and report of reduced frequency and intensity of worry and absence of persistent anxious mood to acceptable levels, no panic attacks, report subjective comfort with rumination for a period of 90 days, within 6 months as clinically observed and by patient self-report.    Intervention(s)  Therapist will provide individual therapy to identify triggers to anxiety, gain feedback on helpful coping tools and thought-reframing techniques, and identify preferred way of being. Tx to include discuss current stressors and interpersonal conflicts and how to cope with these, coaching, diagnostic testing , referral for medication as indicated, use prescribed medication, cognitive restructuring, interpersonal,   family therapy, supportive therapy services.        Patient has reviewed and agreed to the above plan.      Katie Faulkner, [facility]  April 16, 2021                                                   Wandy Ann            SAFETY PLAN:  Step 1: Warning signs / cues (Thoughts, images, mood, situation, behavior) that a crisis may be  "developing:  ? Thoughts: thoughts of not wanting to be here  ? Images: no images  ? Thinking Processes: intrusive thoughts (bothersome, unwanted thoughts that come out of nowhere): get irritated  ? Mood: intense anger and agitation  ? Behaviors: isolating/withdrawing   ? Situations: trauma    Step 2: Coping strategies - Things I can do to take my mind off of my problems without contacting another person (relaxation technique, physical activity):  ? Distress Tolerance Strategies:  arts and crafts: drawing and painting  ? Physical Activities: exercise: working out  ? Focus on helpful thoughts:  \"This is temporary\", \"It always passes\" and \"Ride the wave\"  Step 3: People and social settings that provide distraction:                 Name: Jennifer     Phone: [phone]                 Name: Antonina         Phone: [phone]                 Name: mom       Phone: [phone]  ? friends house or car   Step 4: Remind myself of people and things that are important to me and worth living for:  Best friend and cat        Step 5: When I am in crisis, I can ask these people to help me use my safety plan:                 Name: Jennifer     Phone: [phone]                 Name: Antonina         Phone: [phone]                 Name: mom       Phone: [phone]  Step 6: Making the environment safe:   ? be around others  Step 7: Professionals or agencies I can contact during a crisis:  ? [facility] Number: [phone]  ? Suicide Prevention Lifeline: 9-175-653-YOVB (0081)  ? Crisis Text Line Service (available 24 hours a day, 7 days a week): Text [place] to 725128    Local Crisis Services: 988     Call 911 or go to my nearest emergency department.       I helped develop this safety plan and agree to use it when needed.  I have been given a copy of this plan.       Client signature _________________________________________________________________  Today s date:  2/22/2021  Adapted from Safety Plan Template " 2008 Marisol Cazares and Mario Flores is reprinted with the express permission of the authors.  No portion of the Safety Plan Template may be reproduced without the express, written permission.  You can contact the authors at bhs@[place].[facility] or [email or web address].

## 2021-07-11 ENCOUNTER — E-VISIT (OUTPATIENT)
Dept: FAMILY MEDICINE | Facility: CLINIC | Age: 21
End: 2021-07-11
Payer: COMMERCIAL

## 2021-07-11 DIAGNOSIS — Z53.9 ERRONEOUS ENCOUNTER--DISREGARD: Primary | ICD-10-CM

## 2021-07-12 NOTE — PATIENT INSTRUCTIONS
Thank you for choosing us for your care. I think an in-clinic visit would be best next steps based on your symptoms. Please schedule a clinic appointment; you won t be charged for this eVisit.      You can schedule an appointment right here in [facility], or call [phone]

## 2021-07-13 ENCOUNTER — TELEPHONE (OUTPATIENT)
Dept: FAMILY MEDICINE | Facility: CLINIC | Age: 21
End: 2021-07-13
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
## 2021-07-15 ENCOUNTER — OFFICE VISIT (OUTPATIENT)
Dept: FAMILY MEDICINE | Facility: CLINIC | Age: 21
End: 2021-07-15
Payer: COMMERCIAL

## 2021-07-15 VITALS
OXYGEN SATURATION: 100 % | SYSTOLIC BLOOD PRESSURE: 122 MMHG | DIASTOLIC BLOOD PRESSURE: 80 MMHG | HEART RATE: 76 BPM | BODY MASS INDEX: 38.28 KG/M2 | HEIGHT: 60 IN | TEMPERATURE: 97.9 F | WEIGHT: 195 LBS

## 2021-07-15 DIAGNOSIS — S06.0X9D CONCUSSION WITH LOSS OF CONSCIOUSNESS, SUBSEQUENT ENCOUNTER: Primary | ICD-10-CM

## 2021-07-15 DIAGNOSIS — S16.1XXD STRAIN OF NECK MUSCLE, SUBSEQUENT ENCOUNTER: ICD-10-CM

## 2021-07-15 PROCEDURE — 99214 OFFICE O/P EST MOD 30 MIN: CPT | Performed by: FAMILY MEDICINE

## 2021-07-15 RX ORDER — CYCLOBENZAPRINE HCL 5 MG
5 TABLET ORAL 3 TIMES DAILY PRN
Qty: 30 TABLET | Refills: 2 | Status: SHIPPED | OUTPATIENT
Start: 2021-07-15 | End: 2022-04-29

## 2021-07-15 ASSESSMENT — MIFFLIN-ST. JEOR: SCORE: 1571.01

## 2021-07-15 NOTE — PATIENT INSTRUCTIONS
Patient Education     Concussion    A concussion is a type of brain injury. It can be caused by a direct hit or blow to the head, neck, face, or body. The force of the blow makes the head and brain shake quickly back and forth. In some cases you may lose consciousness. Depending on the severity of the blow, it will take from a few hours up to a few days to get better. Sometimes symptoms may last a few months or longer. This is called post-concussion syndrome.  At first, you may have a headache, nausea, vomiting, or dizziness. You may also have problems concentrating or remembering things. This is normal.  Symptoms should get better as the hours and days go by. Symptoms that get worse could be a sign of a more serious brain injury. This might be a bruise or bleeding in the brain. That s why it s important to watch for the warning signs listed below.  School-age children are more at risk for symptoms that don t go away after a concussion. They should be watched very closely.   Home care  If your injury is mild and there are no serious signs or symptoms, your healthcare provider may recommend that you be watched at home. If there is evidence that the injury is more serious, you will be watched in the hospital. Follow these tips to help care for yourself at home:    After a concussion, your healthcare provider may recommend that a family member or friend watched you for 12 to 24 hours. They may be told to wake you every few hours during sleep to check for the signs below.    If your face or scalp swells, apply an ice pack for 20 minutes every 1 to 2 hours. Do this until the swelling starts to go down. To make an ice pack, put ice cubes in a plastic bag that seals at the top. Wrap the bag in a clean, thin towel or cloth. Never put ice or an ice pack directly on the skin.    You may use acetaminophen to control pain, unless another pain medicine was prescribed. Don't use aspirin or ibuprofen after a head injury. If you  have long-lasting (chronic) liver or kidney disease, talk with your healthcare provider before using these medicines. Also talk with your provider if you ever had a stomach ulcer or gastrointestinal bleeding.    For the next 24 hours:  ? Don t drink alcohol or take sedatives or medicines that make you sleepy.  ? Don t drive or operate machinery.  ? Don't do anything strenuous. Don t lift or strain.    Don t return right away to sports or to any activity where you could hit your head. Wait until all symptoms are gone and you have been cleared by your healthcare provider. Having a second head injury before you fully recover from the first one can lead to serious brain injury.    After a few days, it s OK to go back to your normal daily activities. But don t do anything that could cause your head to be hit again.  Follow-up care  Follow up with your healthcare provider in 1 week, or as directed.  A radiologist will review any X-rays or CT scans that were taken. You will be told of any new findings that may affect your care.  When to seek medical advice  Call your healthcare provider right away if any of these occur:    Headache or dizziness that won t go away    Redness, warmth, or pus from the swollen area  Call 911  Call 911 or get medical care right away if any of these occur:    Repeated vomiting (it s common to vomit once after a head injury)    Headache or dizziness that is severe or gets worse    Loss of consciousness    Unusual drowsiness, or unable to wake up as usual    Weakness or decreased ability to walk or move any limb    Confusion, agitation, or change in behavior or speech, or memory loss    Blurred vision    Convulsion (seizure)    Swelling on the scalp or face that gets worse    Changes in pupil size (the black part of the eye)    Fluid draining from or bleeding from the nose or ears  [employer] last reviewed this educational content on 6/1/2018 2000-2021 [employer]. All rights  reserved. This information is not intended as a substitute for professional medical care. Always follow your healthcare professional's instructions.           Patient Education     Coping with Concussion  Concussion is also known as mild traumatic brain injury (MTBI). It is often caused by a blow to the head, or a fall. You may have been unconscious for a few seconds or minutes after the injury. Or maybe you were dazed, confused, or  saw stars.  After this, you thought you were OK. Now, weeks or months later, you re having symptoms that may be caused by a concussion. The good news is that, in most people,  these symptoms will likely go away on their own. Most people with a concussion recover fully, with no need for treatment.     A cold compress can help relieve a headache.    What is a concussion?  A concussion is a mild form of brain injury. In some cases, the effects of a concussion go away within days of the injury. In others, symptoms may continue for a few months. Fortunately, a concussion is temporary. Even when symptoms stay for months, they do go away over time. If they don't, or if your symptoms are worse, contact your healthcare provider.  Symptoms of a concussion  You may have noticed some of these symptoms:    Headaches    Irritability and other changes in behavior    Problems remembering or concentrating    Dizziness or lack of coordination    Fatigue    Problems sleeping    Sensitivity to light and sound    Vision changes  NOTE: If you have severe symptoms or trouble functioning, talk with your healthcare provider right away. If you had a more serious head injury than a concussion, you likely need treatment. Be sure to see your healthcare provider for an evaluation.  What you can do  Since the effects of a concussion go away over time, there isn t a lot you need to do. Be assured that this problem is temporary. You ll likely have a full recovery. In the meantime, talk with your healthcare provider  about ways to relieve any symptoms that are bothering you. These tips may help:    Don't return to sports or any activity that could cause you to hit your head until all symptoms are gone and you have been cleared by your doctor. A second head injury before fully recovering from the first one can lead to serious brain injury.    Return to normal activities of daily living and normal social interaction is encouraged to speed recovery.    Stress can make symptoms worse. Help calm yourself by resting in a quiet place and imagining a peaceful scene. Relax your muscles by soaking in a hot bath or taking a hot shower.    Take over-the-counter  acetaminophen to relieve headache pain. Take them as directed on the package. Don't take ibuprofen or aspirin after a head injury.    If you become dizzy, sit or lie down in a safe place until the sensation passes. Don t drive when you feel dizzy or disoriented.    If you re having trouble sleeping, try to keep a regular sleep schedule. Go to bed and get up at the same time each day. Avoid or limit caffeine and nicotine. Also don't drink alcohol. It may help you sleep at first, but your sleep will not be restful.    Give yourself time to heal. Your recovery will take some time. When you have symptoms, remember that you won t feel this way forever. In time the symptoms will go away and you ll be back to yourself.  If you re not feeling better  The effects of a concussion often go away in 7 to 10 days and the vast majority of people who have had a concussion have recovered after 3 months. If you re not feeling better as time passes, there may be something else going on. If your symptoms don t go away or you notice new ones, talk with your healthcare provider. He or she can help you get the treatment you need.  [employer] last reviewed this educational content on 1/1/2018 2000-2021 [employer]. All rights reserved. This information is not intended as a substitute for  professional medical care. Always follow your healthcare professional's instructions.
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
## 2021-07-19 ENCOUNTER — VIRTUAL VISIT (OUTPATIENT)
Dept: PSYCHOLOGY | Facility: CLINIC | Age: 21
End: 2021-07-19
Payer: COMMERCIAL

## 2021-07-19 DIAGNOSIS — F43.10 PTSD (POST-TRAUMATIC STRESS DISORDER): ICD-10-CM

## 2021-07-19 DIAGNOSIS — F33.1 MDD (MAJOR DEPRESSIVE DISORDER), RECURRENT EPISODE, MODERATE (H): ICD-10-CM

## 2021-07-19 DIAGNOSIS — F41.1 GENERALIZED ANXIETY DISORDER: Primary | ICD-10-CM

## 2021-07-19 PROCEDURE — 90834 PSYTX W PT 45 MINUTES: CPT | Mod: 95 | Performed by: SOCIAL WORKER

## 2021-07-19 ASSESSMENT — PATIENT HEALTH QUESTIONNAIRE - PHQ9: SUM OF ALL RESPONSES TO PHQ QUESTIONS 1-9: 14

## 2021-07-19 ASSESSMENT — ANXIETY QUESTIONNAIRES
4. TROUBLE RELAXING: NEARLY EVERY DAY
5. BEING SO RESTLESS THAT IT IS HARD TO SIT STILL: SEVERAL DAYS
7. FEELING AFRAID AS IF SOMETHING AWFUL MIGHT HAPPEN: MORE THAN HALF THE DAYS
2. NOT BEING ABLE TO STOP OR CONTROL WORRYING: MORE THAN HALF THE DAYS
6. BECOMING EASILY ANNOYED OR IRRITABLE: SEVERAL DAYS
1. FEELING NERVOUS, ANXIOUS, OR ON EDGE: NEARLY EVERY DAY
3. WORRYING TOO MUCH ABOUT DIFFERENT THINGS: NEARLY EVERY DAY
GAD7 TOTAL SCORE: 15
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
## 2021-07-20 ENCOUNTER — VIRTUAL VISIT (OUTPATIENT)
Dept: PSYCHIATRY | Facility: CLINIC | Age: 21
End: 2021-07-20
Payer: COMMERCIAL

## 2021-07-20 DIAGNOSIS — Z53.9 NO SHOW: Primary | ICD-10-CM

## 2021-07-20 ASSESSMENT — ANXIETY QUESTIONNAIRES: GAD7 TOTAL SCORE: 15

## 2021-07-22 DIAGNOSIS — F33.1 MODERATE RECURRENT MAJOR DEPRESSION (H): ICD-10-CM

## 2021-07-22 RX ORDER — VENLAFAXINE HYDROCHLORIDE 75 MG/1
75 CAPSULE, EXTENDED RELEASE ORAL DAILY
Qty: 30 CAPSULE | Refills: 0 | Status: SHIPPED | OUTPATIENT
Start: 2021-07-22 | End: 2021-08-30

## 2021-07-22 NOTE — TELEPHONE ENCOUNTER
Date of Last Office Visit: 4/7/2021  Date of Next Office Visit: none (scheduling please connect with patient and schedule follow up appointment with provider)  No shows since last visit: 1  Cancellations since last visit: 1    Medication requested: Venlafaxine 75 mg capsule Date last ordered: 6/24/2021 Qty: 30 Refills: 0     Review of [place] ?: n/a    Lapse in medication adherence greater than 5 days?: no  If yes, call patient and gather details: no  Medication refill request verified as identical to current order?: yes  Result of Last DAM, VPA, Li+ Level, CBC, or Carbamazepine Level (at or since last visit): N/A    []Medication refilled per  Medication Refill in Ambulatory Care  policy.  [x]Medication unable to be refilled by RN due to criteria not met as indicated below:    []Eligibility - not seen in the last year   []Supervision - no future appointment   []Compliance - no shows, cancellations or lapse in therapy   []Verification - order discrepancy   []Controlled medication   [x]Medication not included in policy   []90-day supply request   []Other

## 2021-08-30 DIAGNOSIS — F33.1 MODERATE RECURRENT MAJOR DEPRESSION (H): ICD-10-CM

## 2021-08-30 RX ORDER — VENLAFAXINE HYDROCHLORIDE 75 MG/1
75 CAPSULE, EXTENDED RELEASE ORAL DAILY
Qty: 30 CAPSULE | Refills: 0 | Status: SHIPPED | OUTPATIENT
Start: 2021-08-30 | End: 2021-10-20

## 2021-08-30 NOTE — TELEPHONE ENCOUNTER
Date of Last Office Visit: 07/20/2021  Date of Next Office Visit: 09/28/2021  No shows since last visit: 0  Cancellations since last visit: 0    Medication requested: Venlafaxine 75mg Date last ordered: 07/22/2021 Qty: 30 Refills: 0     Review of [place] ?: n/a    Lapse in medication adherence greater than 5 days?: yes, by dates  If yes, call patient and gather details: unable to reach pt  Medication refill request verified as identical to current order?: yes  Result of Last DAM, VPA, Li+ Level, CBC, or Carbamazepine Level (at or since last visit): N/A    []Medication refilled per  Medication Refill in Ambulatory Care  policy.  [x]Medication unable to be refilled by RN due to criteria not met as indicated below:    []Eligibility - not seen in the last year   []Supervision - no future appointment   [x]Compliance - no shows, cancellations or lapse in therapy   []Verification - order discrepancy   []Controlled medication   [x]Medication not included in policy   []90-day supply request   []Other

## 2021-09-08 ENCOUNTER — VIRTUAL VISIT (OUTPATIENT)
Dept: PSYCHOLOGY | Facility: CLINIC | Age: 21
End: 2021-09-08
Payer: COMMERCIAL

## 2021-09-08 DIAGNOSIS — F41.1 GENERALIZED ANXIETY DISORDER: Primary | ICD-10-CM

## 2021-09-08 DIAGNOSIS — F43.10 PTSD (POST-TRAUMATIC STRESS DISORDER): ICD-10-CM

## 2021-09-08 DIAGNOSIS — F33.1 MDD (MAJOR DEPRESSIVE DISORDER), RECURRENT EPISODE, MODERATE (H): ICD-10-CM

## 2021-09-08 PROCEDURE — 90834 PSYTX W PT 45 MINUTES: CPT | Mod: 95 | Performed by: SOCIAL WORKER

## 2021-09-08 ASSESSMENT — ANXIETY QUESTIONNAIRES
6. BECOMING EASILY ANNOYED OR IRRITABLE: SEVERAL DAYS
7. FEELING AFRAID AS IF SOMETHING AWFUL MIGHT HAPPEN: SEVERAL DAYS
GAD7 TOTAL SCORE: 12
4. TROUBLE RELAXING: MORE THAN HALF THE DAYS
5. BEING SO RESTLESS THAT IT IS HARD TO SIT STILL: SEVERAL DAYS
2. NOT BEING ABLE TO STOP OR CONTROL WORRYING: NEARLY EVERY DAY
1. FEELING NERVOUS, ANXIOUS, OR ON EDGE: MORE THAN HALF THE DAYS
3. WORRYING TOO MUCH ABOUT DIFFERENT THINGS: MORE THAN HALF THE DAYS

## 2021-09-08 ASSESSMENT — PATIENT HEALTH QUESTIONNAIRE - PHQ9: SUM OF ALL RESPONSES TO PHQ QUESTIONS 1-9: 9
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
## 2021-09-09 ASSESSMENT — ANXIETY QUESTIONNAIRES: GAD7 TOTAL SCORE: 12

## 2021-09-20 ENCOUNTER — VIRTUAL VISIT (OUTPATIENT)
Dept: PSYCHOLOGY | Facility: CLINIC | Age: 21
End: 2021-09-20
Payer: COMMERCIAL

## 2021-09-20 DIAGNOSIS — F41.1 GENERALIZED ANXIETY DISORDER: Primary | ICD-10-CM

## 2021-09-20 DIAGNOSIS — F43.10 PTSD (POST-TRAUMATIC STRESS DISORDER): ICD-10-CM

## 2021-09-20 DIAGNOSIS — F33.1 MDD (MAJOR DEPRESSIVE DISORDER), RECURRENT EPISODE, MODERATE (H): ICD-10-CM

## 2021-09-20 PROCEDURE — 90834 PSYTX W PT 45 MINUTES: CPT | Mod: 95 | Performed by: SOCIAL WORKER

## 2021-09-20 ASSESSMENT — ANXIETY QUESTIONNAIRES
GAD7 TOTAL SCORE: 10
5. BEING SO RESTLESS THAT IT IS HARD TO SIT STILL: SEVERAL DAYS
6. BECOMING EASILY ANNOYED OR IRRITABLE: NOT AT ALL
2. NOT BEING ABLE TO STOP OR CONTROL WORRYING: MORE THAN HALF THE DAYS
7. FEELING AFRAID AS IF SOMETHING AWFUL MIGHT HAPPEN: SEVERAL DAYS
4. TROUBLE RELAXING: MORE THAN HALF THE DAYS
1. FEELING NERVOUS, ANXIOUS, OR ON EDGE: MORE THAN HALF THE DAYS
3. WORRYING TOO MUCH ABOUT DIFFERENT THINGS: MORE THAN HALF THE DAYS

## 2021-09-20 ASSESSMENT — PATIENT HEALTH QUESTIONNAIRE - PHQ9: SUM OF ALL RESPONSES TO PHQ QUESTIONS 1-9: 15

## 2021-09-20 NOTE — PROGRESS NOTES
Progress Note    Patient Name: Wandy Ann  Date: 2021         Service Type: Individual      Session Start Time: 1409 Session End Time: 1450     Session Length: 38-52    Session #: 8    Attendees: Client    Service Modality:  Video Visit:      Provider verified identity through the following two step process.  Patient provided:  Patient  and Patient is known previously to provider    Telemedicine Visit: The patient's condition can be safely assessed and treated via synchronous audio and visual telemedicine encounter.      Reason for Telemedicine Visit: Services only offered telehealth    Originating Site (Patient Location): Patient's home    Distant Site (Provider Location): Provider Remote Setting    Consent:  The patient/guardian has verbally consented to: the potential risks and benefits of telemedicine (video visit) versus in person care; bill my insurance or make self-payment for services provided; and responsibility for payment of non-covered services.     Patient would like the video invitation sent by:  Send to e-mail at: [email or web address]    Mode of Communication:  Video Conference via [employer]    As the provider I attest to compliance with applicable laws and regulations related to telemedicine.     Treatment Plan Last Reviewed: 2021 due 10/19/2021  PHQ-9 / YESSI-7 : 15/10    DATA  Interactive Complexity: No  Crisis: No       Progress Since Last Session (Related to Symptoms / Goals / Homework):   Symptoms: Worsening PHQ-9    Homework: Partially completed      Episode of Care Goals: Minimal progress - PREPARATION (Decided to change - considering how); Intervened by negotiating a change plan and determining options / strategies for behavior change, identifying triggers, exploring social supports, and working towards setting a date to begin behavior change     Current / Ongoing Stressors and Concerns:   past trauma, developmental hx  and current stressors      Treatment Objective(s) Addressed in This Session:   Patient will demonstrate control of impulses and ability to use positive coping tools to manage strong emotions that can be safely addressed at a lower level of care. Absence of persistent SI and report of reduced frequency and intensity of SI and absence of SI to acceptable levels, report subjective improved mood for a period of 90 days, within 6 months as clinically observed and by patient self-report.  Patient will demonstrate and report a level of depression that can be managed at a lower level of care.  Absence of persistent depression mood and report of reduced frequency and intensity of low mood and absence of persistent low energy and motivation to acceptable levels, report subjective improved motivation and increased energy for a period of 90 days, within 6 months as clinically observed and by patient self-report.  Patient will demonstrate and report a level of anxiety that can be managed at a lower level of care.  Absence of persistent anxious mood and report of reduced frequency and intensity of worry and absence of persistent anxious mood to acceptable levels, no panic attacks, report subjective comfort with rumination for a period of 90 days, within 6 months as clinically observed and by patient self-report.     Intervention:   Therapist met with patient to review goals and interventions. Therapist utilized  reflected listening as patient gave brief reflection of week. Patient reported feeling increase in depression and processed. Therapist encouraged patient to speak to her mother about her concerns and for patient to set firm and healthy boundaries. Therapist reiterated patient's continue report of her negative relationship with her mother and educated patient on what patient can control.   Patient presented with an anxious affect. Patient was engaged in session and open to feedback. Patient contracted for safety          ASSESSMENT: Current Emotional / Mental Status (status of significant symptoms):   Risk status (Self / Other harm or suicidal ideation)   Patient denies current fears or concerns for personal safety.   Patient reports the following current or recent suicidal ideation or behaviors: no SI just thoughts of not wanting to wake up some mornings .   Patient denies current or recent homicidal ideation or behaviors.   Patient denies current or recent self injurious behavior or ideation.   Patient denies other safety concerns.   Patient reports there has been no change in risk factors since their last session.     Patient reports there has been no change in protective factors since their last session.     A safety and risk management plan has been developed including: Patient consented to co-developed safety plan.  Safety and risk management plan was completed.  Patient agreed to use safety plan should any safety concerns arise.  A copy was given to the patient.     Appearance:   Appropriate    Eye Contact:   Fair    Psychomotor Behavior: Restless    Attitude:   Cooperative  Friendly   Orientation:   All   Speech    Rate / Production: Emotional Talkative    Volume:  Normal    Mood:    Anxious  Depressed    Affect:    Worrisome    Thought Content:  Clear    Thought Form:  Coherent    Insight:    Fair      Medication Review:   No changes to current psychiatric medication(s)     Medication Compliance:   Yes     Changes in Health Issues:   None reported     Chemical Use Review:   Substance Use: Chemical use reviewed, no active concerns identified      Tobacco Use: No current tobacco use.      Diagnosis:  1. Generalized anxiety disorder    2. MDD (major depressive disorder), recurrent episode, moderate (H)    3. PTSD (post-traumatic stress disorder)        Collateral Reports Completed:   Not Applicable    PLAN: (Patient Tasks / Therapist Tasks / Other)    Follow safety plan  Go to ED if feeling unsafe  Set boundaries with  mom and look at what you can control      Katie Faulkner, LICSW 9/20/2021                                                         ______________________________________________________________________    Treatment Plan    Patient's Name: Wandy Ann  YOB: 2000    Date: 7/19/2021    DSM5 Diagnoses: 296.32 (F33.1) Major Depressive Disorder, Recurrent Episode, Moderate _ and With mixed features, 300.02 (F41.1) Generalized Anxiety Disorder or 309.81 (F43.10) Posttraumatic Stress Disorder (includes Posttraumatic Stress Disorder for Children 6 Years and Younger)  With dissociative symptoms  Psychosocial / Contextual Factors: past trauma, developmental hx and current stressors   WHODAS: 22    Referral / Collaboration:  Referral to another professional/service is not indicated at this time..    Anticipated number of session or this episode of care: 16      MeasurableTreatment Goal(s) related to diagnosis / functional impairment(s)  Goal 1: Patient will report absence of persistent SI and report of reduced frequency and intensity of SI and absence of SI to acceptable levels, report subjective improved mood for a period of 90 days, within 6 months as clinically observed and by patient self-report    I will know I've met my goal when I can see the difference between a bad moment and what I want in th future.      Objective #A (Patient Action)    Patient will demonstrate control of impulses and ability to use positive coping tools to manage strong emotions that can be safely addressed at a lower level of care. Absence of persistent SI and report of reduced frequency and intensity of SI and absence of SI to acceptable levels, report subjective improved mood for a period of 90 days, within 6 months as clinically observed and by patient self-report.  Status: Continued - Date(s): 7/19/2021     Intervention(s)  Therapist will provide individual therapy to identify triggers to SI urges, gain feedback on  helpful coping strategies, and identify ways that family can offer support. Tx to discuss current stressors and interpersonal conflicts and how to cope with these, coaching, diagnostic testing, referral for medication as indicated, use prescribed medication, cognitive restructuring, interpersonal family therapy, supportive therapy services.        Goal 2: Patient will report absence of persistent depression mood and report of reduced frequency and intensity of low mood and absence of persistent low energy and motivation to acceptable levels, report subjective improved motivation and increased energy for a period of 90 days, within 6 months as clinically observed and by patient self-report    I will know I've met my goal when I no longer feel sad.      Objective #A (Patient Action)    Status: Continued - Date(s): 7/19/2021     Patient will demonstrate and report a level of depression that can be managed at a lower level of care.  Absence of persistent depression mood and report of reduced frequency and intensity of low mood and absence of persistent low energy and motivation to acceptable levels, report subjective improved motivation and increased energy for a period of 90 days, within 6 months as clinically observed and by patient self-report.    Intervention(s)  Therapist will provide individual therapy to identify triggers to depression, gain feedback on helpful coping tools and thought-reframing techniques, and identify preferred way of being.  Tx to include discussion of current stressors and interpersonal conflicts and how to cope with these, coaching, diagnostic testing, referral for medication as indicated, use prescribed medication, cognitive restructuring, interpersonal, family therapy, supportive therapy services.        Goal 3: Patiient will report absence of persistent anxiety mood and report of reduced frequency and intensity of worry and absence of persistent anxious mood to acceptable levels, no panic  attacks, report subjective comfort with rumination for a period of 90 days. Within 6 months as clinically observed and by patient self-report    I will know I've met my goal when I can go days without worrying about little things or shaking.      Objective #A (Patient Action)    Status: Continued - Date(s): 7/19/2021     Patient will demonstrate and report a level of anxiety that can be managed at a lower level of care.  Absence of persistent anxious mood and report of reduced frequency and intensity of worry and absence of persistent anxious mood to acceptable levels, no panic attacks, report subjective comfort with rumination for a period of 90 days, within 6 months as clinically observed and by patient self-report.    Intervention(s)  Therapist will provide individual therapy to identify triggers to anxiety, gain feedback on helpful coping tools and thought-reframing techniques, and identify preferred way of being. Tx to include discuss current stressors and interpersonal conflicts and how to cope with these, coaching, diagnostic testing , referral for medication as indicated, use prescribed medication, cognitive restructuring, interpersonal,   family therapy, supportive therapy services.        Patient has reviewed and agreed to the above plan.      Katie Faulkner, [facility]  7/19/2021                                                   Wandy Ann            SAFETY PLAN:  Step 1: Warning signs / cues (Thoughts, images, mood, situation, behavior) that a crisis may be developing:  ? Thoughts: thoughts of not wanting to be here  ? Images: no images  ? Thinking Processes: intrusive thoughts (bothersome, unwanted thoughts that come out of nowhere): get irritated  ? Mood: intense anger and agitation  ? Behaviors: isolating/withdrawing   ? Situations: trauma    Step 2: Coping strategies - Things I can do to take my mind off of my problems without contacting another person (relaxation technique, physical  "activity):  ? Distress Tolerance Strategies:  arts and crafts: drawing and painting  ? Physical Activities: exercise: working out  ? Focus on helpful thoughts:  \"This is temporary\", \"It always passes\" and \"Ride the wave\"  Step 3: People and social settings that provide distraction:                 Name: Jennifer     Phone: [phone]                 Name: Antonina         Phone: [phone]                 Name: panchito       Phone: [phone]  ? friends house or car   Step 4: Remind myself of people and things that are important to me and worth living for:  Best friend and cat        Step 5: When I am in crisis, I can ask these people to help me use my safety plan:                 Name: Jennifer     Phone: [phone]                 Name: Antonina         Phone: [phone]                 Name: panchito       Phone: [phone]  Step 6: Making the environment safe:   ? be around others  Step 7: Professionals or agencies I can contact during a crisis:  ? [facility] Daytime Number: [phone]  ? Suicide Prevention Lifeline: 5-572-968-MUDC (2807)  ? Crisis Text Line Service (available 24 hours a day, 7 days a week): Text [place] to 344014    Local Crisis Services: 988     Call 911 or go to my nearest emergency department.       I helped develop this safety plan and agree to use it when needed.  I have been given a copy of this plan.       Client signature _________________________________________________________________  Today s date:  2/22/2021  Adapted from Safety Plan Template 2008 Marisol Cazares and Mario Flores is reprinted with the express permission of the authors.  No portion of the Safety Plan Template may be reproduced without the express, written permission.  You can contact the authors at [email or web address] or [email or web address].     "

## 2021-09-21 ASSESSMENT — ANXIETY QUESTIONNAIRES: GAD7 TOTAL SCORE: 10

## 2021-09-23 ENCOUNTER — TRANSFERRED RECORDS (OUTPATIENT)
Dept: HEALTH INFORMATION MANAGEMENT | Facility: CLINIC | Age: 21
End: 2021-09-23

## 2021-10-05 ENCOUNTER — VIRTUAL VISIT (OUTPATIENT)
Dept: PSYCHOLOGY | Facility: CLINIC | Age: 21
End: 2021-10-05
Payer: COMMERCIAL

## 2021-10-05 DIAGNOSIS — F41.1 GENERALIZED ANXIETY DISORDER: Primary | ICD-10-CM

## 2021-10-05 DIAGNOSIS — F43.10 PTSD (POST-TRAUMATIC STRESS DISORDER): ICD-10-CM

## 2021-10-05 DIAGNOSIS — F33.1 MDD (MAJOR DEPRESSIVE DISORDER), RECURRENT EPISODE, MODERATE (H): ICD-10-CM

## 2021-10-05 PROCEDURE — 90834 PSYTX W PT 45 MINUTES: CPT | Mod: 95 | Performed by: SOCIAL WORKER

## 2021-10-05 NOTE — PROGRESS NOTES
Progress Note    Patient Name: Wandy Ann  Date: 10/5/2021         Service Type: Individual      Session Start Time: 1002 Session End Time: 1050     Session Length: 38-52    Session #: 9    Attendees: Client    Service Modality:  Video Visit:      Provider verified identity through the following two step process.  Patient provided:  Patient  and Patient is known previously to provider    Telemedicine Visit: The patient's condition can be safely assessed and treated via synchronous audio and visual telemedicine encounter.      Reason for Telemedicine Visit: Services only offered telehealth    Originating Site (Patient Location): Patient's home    Distant Site (Provider Location): Provider Remote Setting    Consent:  The patient/guardian has verbally consented to: the potential risks and benefits of telemedicine (video visit) versus in person care; bill my insurance or make self-payment for services provided; and responsibility for payment of non-covered services.     Patient would like the video invitation sent by:  Send to e-mail at: [email or web address]    Mode of Communication:  Video Conference via [employer]    As the provider I attest to compliance with applicable laws and regulations related to telemedicine.     Treatment Plan Last Reviewed: 2021 due 10/19/2021  PHQ-9 / YESSI-7 : 15/10 last session    DATA  Interactive Complexity: No  Crisis: No       Progress Since Last Session (Related to Symptoms / Goals / Homework):   Symptoms: Worsening per patient'    Homework: Partially completed      Episode of Care Goals: Minimal progress - PREPARATION (Decided to change - considering how); Intervened by negotiating a change plan and determining options / strategies for behavior change, identifying triggers, exploring social supports, and working towards setting a date to begin behavior change     Current / Ongoing Stressors and Concerns:   past  trauma, developmental hx and current stressors      Treatment Objective(s) Addressed in This Session:   Patient will demonstrate control of impulses and ability to use positive coping tools to manage strong emotions that can be safely addressed at a lower level of care. Absence of persistent SI and report of reduced frequency and intensity of SI and absence of SI to acceptable levels, report subjective improved mood for a period of 90 days, within 6 months as clinically observed and by patient self-report.  Patient will demonstrate and report a level of depression that can be managed at a lower level of care.  Absence of persistent depression mood and report of reduced frequency and intensity of low mood and absence of persistent low energy and motivation to acceptable levels, report subjective improved motivation and increased energy for a period of 90 days, within 6 months as clinically observed and by patient self-report.  Patient will demonstrate and report a level of anxiety that can be managed at a lower level of care.  Absence of persistent anxious mood and report of reduced frequency and intensity of worry and absence of persistent anxious mood to acceptable levels, no panic attacks, report subjective comfort with rumination for a period of 90 days, within 6 months as clinically observed and by patient self-report.     Intervention:   Therapist met with patient to review goals and interventions. Therapist utilized  reflected listening as patient gave brief reflection of week. Patient processed increase in depression and anxiety and processed. Therapist supported patient as she processed her mother. Therapist recommended some books that can help patient understand her mother's behavior and educated patient on young mothers raising children.   Patient presented with an anxious affect. Patient was engaged in session and open to feedback. Patient contracted for safety         ASSESSMENT: Current Emotional /  Mental Status (status of significant symptoms):   Risk status (Self / Other harm or suicidal ideation)   Patient denies current fears or concerns for personal safety.   Patient reports the following current or recent suicidal ideation or behaviors: difficult time last week and thoughts of not wanting to be here. No plan or intent and utilized safety plan.   Patient denies current or recent homicidal ideation or behaviors.   Patient denies current or recent self injurious behavior or ideation.   Patient denies other safety concerns.   Patient reports there has been no change in risk factors since their last session.     Patient reports there has been no change in protective factors since their last session.     A safety and risk management plan has been developed including: Patient consented to co-developed safety plan.  Safety and risk management plan was completed.  Patient agreed to use safety plan should any safety concerns arise.  A copy was given to the patient.     Appearance:   Appropriate    Eye Contact:   Fair    Psychomotor Behavior: Restless    Attitude:   Friendly   Orientation:   All   Speech    Rate / Production: Emotional Talkative    Volume:  Normal    Mood:    Anxious  Depressed  Sad    Affect:    Worrisome    Thought Content:  Clear    Thought Form:  Coherent    Insight:    Fair      Medication Review:   No changes to current psychiatric medication(s)     Medication Compliance:   Yes     Changes in Health Issues:   None reported     Chemical Use Review:   Substance Use: Chemical use reviewed, no active concerns identified      Tobacco Use: No current tobacco use.      Diagnosis:  1. Generalized anxiety disorder    2. MDD (major depressive disorder), recurrent episode, moderate (H)    3. PTSD (post-traumatic stress disorder)        Collateral Reports Completed:   Not Applicable    PLAN: (Patient Tasks / Therapist Tasks / Other)    Follow safety plan  Go to ED if feeling unsafe  Set boundaries with  mom and look at what you can control  Look at books to help understand moms behavior      Katie Faulkner, LICSW 10/5/2021                                                         ______________________________________________________________________    Treatment Plan    Patient's Name: Wandy Ann  YOB: 2000    Date: 7/19/2021    DSM5 Diagnoses: 296.32 (F33.1) Major Depressive Disorder, Recurrent Episode, Moderate _ and With mixed features, 300.02 (F41.1) Generalized Anxiety Disorder or 309.81 (F43.10) Posttraumatic Stress Disorder (includes Posttraumatic Stress Disorder for Children 6 Years and Younger)  With dissociative symptoms  Psychosocial / Contextual Factors: past trauma, developmental hx and current stressors   WHODAS: 22    Referral / Collaboration:  Referral to another professional/service is not indicated at this time..    Anticipated number of session or this episode of care: 16      MeasurableTreatment Goal(s) related to diagnosis / functional impairment(s)  Goal 1: Patient will report absence of persistent SI and report of reduced frequency and intensity of SI and absence of SI to acceptable levels, report subjective improved mood for a period of 90 days, within 6 months as clinically observed and by patient self-report    I will know I've met my goal when I can see the difference between a bad moment and what I want in th future.      Objective #A (Patient Action)    Patient will demonstrate control of impulses and ability to use positive coping tools to manage strong emotions that can be safely addressed at a lower level of care. Absence of persistent SI and report of reduced frequency and intensity of SI and absence of SI to acceptable levels, report subjective improved mood for a period of 90 days, within 6 months as clinically observed and by patient self-report.  Status: Continued - Date(s): 7/19/2021     Intervention(s)  Therapist will provide individual therapy to  identify triggers to SI urges, gain feedback on helpful coping strategies, and identify ways that family can offer support. Tx to discuss current stressors and interpersonal conflicts and how to cope with these, coaching, diagnostic testing, referral for medication as indicated, use prescribed medication, cognitive restructuring, interpersonal family therapy, supportive therapy services.        Goal 2: Patient will report absence of persistent depression mood and report of reduced frequency and intensity of low mood and absence of persistent low energy and motivation to acceptable levels, report subjective improved motivation and increased energy for a period of 90 days, within 6 months as clinically observed and by patient self-report    I will know I've met my goal when I no longer feel sad.      Objective #A (Patient Action)    Status: Continued - Date(s): 7/19/2021     Patient will demonstrate and report a level of depression that can be managed at a lower level of care.  Absence of persistent depression mood and report of reduced frequency and intensity of low mood and absence of persistent low energy and motivation to acceptable levels, report subjective improved motivation and increased energy for a period of 90 days, within 6 months as clinically observed and by patient self-report.    Intervention(s)  Therapist will provide individual therapy to identify triggers to depression, gain feedback on helpful coping tools and thought-reframing techniques, and identify preferred way of being.  Tx to include discussion of current stressors and interpersonal conflicts and how to cope with these, coaching, diagnostic testing, referral for medication as indicated, use prescribed medication, cognitive restructuring, interpersonal, family therapy, supportive therapy services.        Goal 3: Patiient will report absence of persistent anxiety mood and report of reduced frequency and intensity of worry and absence of  persistent anxious mood to acceptable levels, no panic attacks, report subjective comfort with rumination for a period of 90 days. Within 6 months as clinically observed and by patient self-report    I will know I've met my goal when I can go days without worrying about little things or shaking.      Objective #A (Patient Action)    Status: Continued - Date(s): 7/19/2021     Patient will demonstrate and report a level of anxiety that can be managed at a lower level of care.  Absence of persistent anxious mood and report of reduced frequency and intensity of worry and absence of persistent anxious mood to acceptable levels, no panic attacks, report subjective comfort with rumination for a period of 90 days, within 6 months as clinically observed and by patient self-report.    Intervention(s)  Therapist will provide individual therapy to identify triggers to anxiety, gain feedback on helpful coping tools and thought-reframing techniques, and identify preferred way of being. Tx to include discuss current stressors and interpersonal conflicts and how to cope with these, coaching, diagnostic testing , referral for medication as indicated, use prescribed medication, cognitive restructuring, interpersonal,   family therapy, supportive therapy services.        Patient has reviewed and agreed to the above plan.      Katie Faulkner, [facility]  7/19/2021                                                   Wandy Ann            SAFETY PLAN:  Step 1: Warning signs / cues (Thoughts, images, mood, situation, behavior) that a crisis may be developing:  ? Thoughts: thoughts of not wanting to be here  ? Images: no images  ? Thinking Processes: intrusive thoughts (bothersome, unwanted thoughts that come out of nowhere): get irritated  ? Mood: intense anger and agitation  ? Behaviors: isolating/withdrawing   ? Situations: trauma    Step 2: Coping strategies - Things I can do to take my mind off of my problems without  "contacting another person (relaxation technique, physical activity):  ? Distress Tolerance Strategies:  arts and crafts: drawing and painting  ? Physical Activities: exercise: working out  ? Focus on helpful thoughts:  \"This is temporary\", \"It always passes\" and \"Ride the wave\"  Step 3: People and social settings that provide distraction:                 Name: Jennifer     Phone: [phone]                 Name: Antonina         Phone: [phone]                 Name: panchito       Phone: [phone]  ? friends house or car   Step 4: Remind myself of people and things that are important to me and worth living for:  Best friend and cat        Step 5: When I am in crisis, I can ask these people to help me use my safety plan:                 Name: Jennifer     Phone: [phone]                 Name: Antonina         Phone: [phone]                 Name: panchito       Phone: [phone]  Step 6: Making the environment safe:   ? be around others  Step 7: Professionals or agencies I can contact during a crisis:  ? [facility] Daytime Number: [phone]  ? Suicide Prevention Lifeline: 6-815-817-TALK 8210)  ? Crisis Text Line Service (available 24 hours a day, 7 days a week): Text [place] to 536969    Local Crisis Services: 988     Call 911 or go to my nearest emergency department.       I helped develop this safety plan and agree to use it when needed.  I have been given a copy of this plan.       Client signature _________________________________________________________________  Today s date:  2/22/2021  Adapted from Safety Plan Template 2008 Marisol Cazares and Mario Flores is reprinted with the express permission of the authors.  No portion of the Safety Plan Template may be reproduced without the express, written permission.  You can contact the authors at [email or web address] or [email or web address].     "

## 2021-10-19 PROBLEM — F32.9 MAJOR DEPRESSION: Status: ACTIVE | Noted: 2020-07-19

## 2021-10-20 ENCOUNTER — MYC REFILL (OUTPATIENT)
Dept: PSYCHIATRY | Facility: CLINIC | Age: 21
End: 2021-10-20

## 2021-10-20 DIAGNOSIS — F33.1 MODERATE RECURRENT MAJOR DEPRESSION (H): ICD-10-CM

## 2021-10-20 NOTE — TELEPHONE ENCOUNTER
Date of Last Office Visit: 9/28/2021  Date of Next Office Visit: 11/2/2021  No shows since last visit: none  Cancellations since last visit: none    Medication requested: Venlafaxine 75 mg capsule Date last ordered: 8/30/2021 Qty: 30 Refills: 0     Review of [place] ?: n/a      Lapse in medication adherence greater than 5 days?: yes  If yes, call patient and gather details: [employer] message sent to patient. Waiting for response  Medication refill request verified as identical to current order?: yes  Result of Last DAM, VPA, Li+ Level, CBC, or Carbamazepine Level (at or since last visit): N/A    Last visit treatment plan:   Treatment Plan:            Continue all other medications as reviewed per electronic medical record today.     Safety plan reviewed. To the Emergency Department as needed or call after hours crisis line at [phone] or [phone]. [place] Crisis Text Line. Text [place] to 869727 or Suicide LifeLine Chat: suicidepreventionlifeline.org/chat/    To schedule individual or family therapy, call [facility] at [phone].    Schedule an appointment with me in  or sooner as needed. Call [facility] at [phone] to schedule.    Follow up with primary care provider as planned or for acute medical concerns.    Call the psychiatric nurse line with medication questions or concerns at [phone].    [facility] may be used to communicate with your provider, but this is not intended to be used for emergencies      []Medication refilled per  Medication Refill in Ambulatory Care  policy.  [x]Medication unable to be refilled by RN due to criteria not met as indicated below:    []Eligibility - not seen in the last year   []Supervision - no future appointment   []Compliance - no shows, cancellations or lapse in therapy   []Verification - order discrepancy   []Controlled medication   [x]Medication not included in policy   []90-day supply request   []Other

## 2021-10-21 RX ORDER — VENLAFAXINE HYDROCHLORIDE 75 MG/1
75 CAPSULE, EXTENDED RELEASE ORAL DAILY
Qty: 30 CAPSULE | Refills: 0 | Status: SHIPPED | OUTPATIENT
Start: 2021-10-21 | End: 2021-11-02 | Stop reason: DRUGHIGH

## 2021-11-02 ENCOUNTER — VIRTUAL VISIT (OUTPATIENT)
Dept: PSYCHOLOGY | Facility: CLINIC | Age: 21
End: 2021-11-02
Payer: COMMERCIAL

## 2021-11-02 ENCOUNTER — VIRTUAL VISIT (OUTPATIENT)
Dept: PSYCHIATRY | Facility: CLINIC | Age: 21
End: 2021-11-02
Payer: COMMERCIAL

## 2021-11-02 DIAGNOSIS — F41.1 GENERALIZED ANXIETY DISORDER: ICD-10-CM

## 2021-11-02 DIAGNOSIS — F43.10 PTSD (POST-TRAUMATIC STRESS DISORDER): ICD-10-CM

## 2021-11-02 DIAGNOSIS — F33.1 MODERATE RECURRENT MAJOR DEPRESSION (H): Primary | ICD-10-CM

## 2021-11-02 DIAGNOSIS — F33.1 MDD (MAJOR DEPRESSIVE DISORDER), RECURRENT EPISODE, MODERATE (H): Primary | ICD-10-CM

## 2021-11-02 DIAGNOSIS — T88.7XXA MEDICATION SIDE EFFECTS: ICD-10-CM

## 2021-11-02 DIAGNOSIS — F41.1 GAD (GENERALIZED ANXIETY DISORDER): ICD-10-CM

## 2021-11-02 DIAGNOSIS — E55.9 VITAMIN D DEFICIENCY: ICD-10-CM

## 2021-11-02 PROCEDURE — 99214 OFFICE O/P EST MOD 30 MIN: CPT | Mod: 95 | Performed by: NURSE PRACTITIONER

## 2021-11-02 PROCEDURE — 90834 PSYTX W PT 45 MINUTES: CPT | Mod: 95 | Performed by: SOCIAL WORKER

## 2021-11-02 RX ORDER — VENLAFAXINE HYDROCHLORIDE 150 MG/1
150 CAPSULE, EXTENDED RELEASE ORAL DAILY
Qty: 30 CAPSULE | Refills: 3 | Status: SHIPPED | OUTPATIENT
Start: 2021-11-02 | End: 2022-03-19

## 2021-11-02 ASSESSMENT — ANXIETY QUESTIONNAIRES
2. NOT BEING ABLE TO STOP OR CONTROL WORRYING: NEARLY EVERY DAY
1. FEELING NERVOUS, ANXIOUS, OR ON EDGE: NEARLY EVERY DAY
3. WORRYING TOO MUCH ABOUT DIFFERENT THINGS: NEARLY EVERY DAY
7. FEELING AFRAID AS IF SOMETHING AWFUL MIGHT HAPPEN: NEARLY EVERY DAY
5. BEING SO RESTLESS THAT IT IS HARD TO SIT STILL: SEVERAL DAYS
6. BECOMING EASILY ANNOYED OR IRRITABLE: NEARLY EVERY DAY
4. TROUBLE RELAXING: MORE THAN HALF THE DAYS
GAD7 TOTAL SCORE: 18

## 2021-11-02 ASSESSMENT — PATIENT HEALTH QUESTIONNAIRE - PHQ9: SUM OF ALL RESPONSES TO PHQ QUESTIONS 1-9: 24

## 2021-11-02 NOTE — PROGRESS NOTES
"Wandy is a 21 year old who is being evaluated via a billable video visit.      How would you like to obtain your AVS? MyChart  If the video visit is dropped, the invitation should be resent by: Text to cell phone: [phone]   Will anyone else be joining your video visit? No      Video Start Time: 2:12 PM  Video-Visit Details    Type of service:  Video Visit    Video End Time:2:39 PM    Originating Location (pt. Location): Other Work    Distant Location (provider location):  [facility]     Platform used for Video Visit: [facility]         Outpatient Psychiatric Progress Note    Name: Wandy Ann   : 2000                    Primary Care Provider: Hudson Mobley MD   Therapist: Katie    PHQ-9 scores:  PHQ-9 SCORE 2021   PHQ-9 Total Score MyChart - - -   PHQ-9 Total Score 15 14 24   Some encounter information is confidential and restricted. Go to Review FlowsJudicata activity to see all data.       YESSI-7 scores:  YESSI-7 SCORE 2021   Total Score - - -   Total Score 10 9 18   Some encounter information is confidential and restricted. Go to Review Life is Tech activity to see all data.       Patient Identification:    Patient is a 21 year old year old, single  Choose not to Answer  /  female  who presents for return visit with me.  Patient is currently employed full time. Patient attended the session alone. Patient prefers to be called: \" Wandy\".    Current medications include: cetirizine (ZYRTEC) 10 MG tablet, Take 1 tablet (10 mg) by mouth daily (Patient not taking: Reported on 3/5/2021)  cyclobenzaprine (FLEXERIL) 5 MG tablet, Take 1 tablet (5 mg) by mouth 3 times daily as needed for muscle spasms  hydrOXYzine (ATARAX) 10 MG tablet, Take 1 tablet (10 mg) by mouth 3 times daily as needed for anxiety  venlafaxine (EFFEXOR-XR) 75 MG 24 hr capsule, Take 1 capsule (75 mg) by mouth daily    No " current facility-administered medications on file prior to visit.       The [place] Prescription Monitoring Program has been reviewed and there are no concerns about diversionary activity for controlled substances at this time.      I was able to review most recent Primary Care Provider, specialty provider, and therapy visit notes that I have access to.     Today, patient reports that when she met with her therapist she mentioned that she was having worsening depression.  It is hard to get out of bed and shower.  It is  Hard to  Clean.  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  She moved in July.  She likes where she lives now.  She has no fiancial stressors.  The holidays are making her sad as she is not close to her parents.  She distances herself - trauma history.    She lives with two room mates and her two cats.  She is having thoughts of not wanting to live.  She feels  Tired and does not want to do this any  Worse over the past week.  However she reports, that she would not act on any of those thoughts.     has no past medical history on file.    Social history updates:    Wandy lives with 2 roommates and her 2 cats.  She is able to work.    Substance use updates:    She reports occasional alcohol.  No cannabis use reported  Tobacco use: No    Vital Signs:   There were no vitals taken for this visit.    Labs:    Most recent laboratory results reviewed and no new labs.     Mental Status Examination:  Appearance:  awake, alert and casually dressed  Attitude:  cooperative   Eye Contact:  adequate  Gait and Station: No assistive Devices used and No dizziness or falls  Psychomotor Behavior:  intact station, gait and muscle tone  Oriented to:  time, person, and place  Attention Span and Concentration:  Normal  Speech:   clear, coherent and Speaks: English  Mood:  anxious and depressed  Affect:  mood congruent  Associations:  no loose associations  Thought Process:  goal  oriented  Thought Content:  passive suicidal ideation present, no auditory hallucinations present and no visual hallucinations present  Recent and Remote Memory:  intact Not formally assessed. No amnesia.  Fund of Knowledge: appropriate  Insight:  good  Judgment:  intact  Impulse Control:  intact    Suicide Risk Assessment:  Today Wandy Ann reports some thoughts of not wanting to live. In addition, there are notable risk factors for self-harm, including anxiety, suicidal ideation, hopelessness and mood change. However, risk is mitigated by history of seeking help when needed, future oriented, denies suicidal intent or plan and denies homicidal ideation, intent, or plan. Therefore, based on all available evidence including the factors cited above, Wandy Ann does not appear to be at imminent risk for self-harm, does not meet criteria for a 72-hr hold, and therefore remains appropriate for ongoing outpatient level of care.  A thorough assessment of risk factors related to suicide and self-harm have been reviewed and are noted above. The patient convincingly denies suicidality on several occasions. Local community safety resources printed and reviewed for patient to use if needed. There was no deceit detected, and the patient presented in a manner that was believable.     DSM5 Diagnosis:  296.32 (F33.1) Major Depressive Disorder, Recurrent Episode, Moderate _ and With mixed features  300.02 (F41.1) Generalized Anxiety Disorder  309.81 (F43.10) Posttraumatic Stress Disorder (includes Posttraumatic Stress Disorder for Children 6 Years and Younger)  Without dissociative symptoms    Medical comorbidities include:   Patient Active Problem List    Diagnosis Date Noted     Moderate major depression (H) 07/19/2020     Priority: Medium       Assessment:    Wandy Ann reports no new ongoing anxiety and depression that worsened over the holidays since she is estranged from her family.  This  triggers trauma memories within her.  I urged her to continue talk therapy with Katie.  With her depression she has low energy and lack of enjoyment.  It can be difficult to get out of bed and go to work.  I asked her to consider adding Abilify at her next visit if depression symptoms continue.  Metabolic panel and vitamin D level are ordered at her earliest convenience to rule out physical reasons for her symptoms.  I increased her venlafaxine ER to 150 mg daily.  She will continue hydroxyzine 10 mg 3 times daily as needed for anxiety and no refills were needed today for this medication.  She has started a new job but still has difficulties getting there..    Medication side effects and alternatives were reviewed. Health promotion activities recommended and reviewed today. All questions addressed. Education and counseling completed regarding risks and benefits of medications and psychotherapy options.    Treatment Plan:        1.  Continue talk therapy with Katie    2.  Continue hydroxyzine 10 mg 3 times daily as needed for anxiety-no refills today    3.  Increase venlafaxine ER to 150 mg daily    4.  CMP and vitamin D level at earliest convenience    5.  Consider adding Abilify at your next visit if depression symptoms continue        Continue all other medications as reviewed per electronic medical record today.     Safety plan reviewed. To the Emergency Department as needed or call after hours crisis line at [phone] or [phone]. [place] Crisis Text [place]. Text [place] to 208586 or Suicide LifeLine Chat: suicidepreventionlifeline.org/chat/    To schedule individual or family therapy, call [facility] at [phone].    Schedule an appointment with me in December or sooner as needed. Call [facility] at [phone] to schedule.    Follow up with primary care provider as planned or for acute medical concerns.    Call the psychiatric nurse line with medication questions or  concerns at [phone].    MyChart may be used to communicate with your provider, but this is not intended to be used for emergencies.    Crisis Resources:    National Suicide Prevention Lifeline: 930.324.3219 (TTY: 633.145.9685). Call anytime for help.  (www.suicidepreventionlifeline.org)  National [place] on Mental Illness (www.pricilla.org): 588.445.4014 or 445-743-8918.   Mental Health Association (www.mentalhealth.org): 101.368.7341 or 171-708-1686.  [place] Crisis Text Line: Text [place] to 031194  Suicide LifeLine Chat: suicidepre"[employer]line.org/chat    Administrative Billing:   Time spent with patient was 30 minutes and greater than 50% of time or 20 minutes was spent in counseling and coordination of care regarding above diagnoses and treatment plan.    Patient Status:  Patient will continue to be seen for ongoing consultation and stabilization.    Signed:   HARJINDER Powell-BC   Psychiatry

## 2021-11-02 NOTE — PATIENT INSTRUCTIONS
1.  Continue talk therapy with Katie    2.  Continue hydroxyzine 10 mg 3 times daily as needed for anxiety-no refills today    3.  Increase venlafaxine ER to 150 mg daily    4.  CMP and vitamin D level at earliest convenience    5.  Consider adding Abilify at your next visit if depression symptoms continue        Continue all other medications as reviewed per electronic medical record today.     Safety plan reviewed. To the Emergency Department as needed or call after hours crisis line at [phone] or [phone]. [place] Crisis Text [place]. Text [place] to 659016 or Suicide LifeLine Chat: Hightail.org/chat/    To schedule individual or family therapy, call [facility] at [phone].    Schedule an appointment with me in December or sooner as needed. Call [facility] at [phone] to schedule.    Follow up with primary care provider as planned or for acute medical concerns.    Call the psychiatric nurse line with medication questions or concerns at [phone].    JackBe may be used to communicate with your provider, but this is not intended to be used for emergencies.    Crisis Resources:    National Suicide Prevention Lifeline: 940.472.4564 (TTY: 193.176.2608). Call anytime for help.  (www.suicidepreventionlifeline.org)  National [place] on Mental Illness (www.pricilla.org): 380.782.1367 or 736-829-2942.   Mental Health Association (www.mentalhealth.org): 523.438.9137 or 292-806-9484.  [place] Crisis Text [place]: Text [place] to 853739  Suicide LifeLine Chat: suicideTalkMarkets.org/chat

## 2021-11-03 ASSESSMENT — ANXIETY QUESTIONNAIRES: GAD7 TOTAL SCORE: 18

## 2021-11-30 ENCOUNTER — VIRTUAL VISIT (OUTPATIENT)
Dept: PSYCHOLOGY | Facility: CLINIC | Age: 21
End: 2021-11-30
Payer: COMMERCIAL

## 2021-11-30 DIAGNOSIS — F43.10 PTSD (POST-TRAUMATIC STRESS DISORDER): ICD-10-CM

## 2021-11-30 DIAGNOSIS — F33.1 MDD (MAJOR DEPRESSIVE DISORDER), RECURRENT EPISODE, MODERATE (H): ICD-10-CM

## 2021-11-30 DIAGNOSIS — F41.1 GENERALIZED ANXIETY DISORDER: Primary | ICD-10-CM

## 2021-11-30 PROCEDURE — 90834 PSYTX W PT 45 MINUTES: CPT | Mod: 95 | Performed by: SOCIAL WORKER

## 2021-11-30 NOTE — PROGRESS NOTES
Progress Note    Patient Name: Wandy Ann  Date: 2021         Service Type: Individual      Session Start Time: 903 Session End Time: 953     Session Length: 38-52    Session #: 11    Attendees: Client    Service Modality:  Video Visit:      Provider verified identity through the following two step process.  Patient provided:  Patient  and Patient is known previously to provider    Telemedicine Visit: The patient's condition can be safely assessed and treated via synchronous audio and visual telemedicine encounter.      Reason for Telemedicine Visit: Services only offered telehealth    Originating Site (Patient Location): Patient's home    Distant Site (Provider Location): Provider Remote Setting    Consent:  The patient/guardian has verbally consented to: the potential risks and benefits of telemedicine (video visit) versus in person care; bill my insurance or make self-payment for services provided; and responsibility for payment of non-covered services.     Patient would like the video invitation sent by:  Send to e-mail at: [email or web address]    Mode of Communication:  Video Conference via Amwell    As the provider I attest to compliance with applicable laws and regulations related to telemedicine.     Treatment Plan Last Reviewed: 2021 due 2022  PHQ-9 / YESSI-7 :   last session     DATA  Interactive Complexity: No  Crisis: No       Progress Since Last Session (Related to Symptoms / Goals / Homework):   Symptoms: No change per patient    Homework: Partially completed      Episode of Care Goals: Minimal progress - PREPARATION (Decided to change - considering how); Intervened by negotiating a change plan and determining options / strategies for behavior change, identifying triggers, exploring social supports, and working towards setting a date to begin behavior change     Current / Ongoing Stressors and Concerns:   past  trauma, developmental hx and current stressors      Treatment Objective(s) Addressed in This Session:   Patient will demonstrate control of impulses and ability to use positive coping tools to manage strong emotions that can be safely addressed at a lower level of care. Absence of persistent SI and report of reduced frequency and intensity of SI and absence of SI to acceptable levels, report subjective improved mood for a period of 90 days, within 6 months as clinically observed and by patient self-report.  Patient will demonstrate and report a level of depression that can be managed at a lower level of care.  Absence of persistent depression mood and report of reduced frequency and intensity of low mood and absence of persistent low energy and motivation to acceptable levels, report subjective improved motivation and increased energy for a period of 90 days, within 6 months as clinically observed and by patient self-report.  Patient will demonstrate and report a level of anxiety that can be managed at a lower level of care.  Absence of persistent anxious mood and report of reduced frequency and intensity of worry and absence of persistent anxious mood to acceptable levels, no panic attacks, report subjective comfort with rumination for a period of 90 days, within 6 months as clinically observed and by patient self-report.     Intervention:   Therapist met with patient to review goals and interventions. Therapist utilized  reflected listening as patient gave brief reflection of week. Patient processed her relationship with her mother and her continued disappointment. Therapist supported patient as she processed and initiated discussion with patient on trauma. Therapist educated patient on trauma, brain development, expectations and decisions she can be flexible with.   Patient presented with an anxious affect. Patient was engaged in session and open to feedback. Patient contracted for safety         ASSESSMENT:  Current Emotional / Mental Status (status of significant symptoms):   Risk status (Self / Other harm or suicidal ideation)   Patient denies current fears or concerns for personal safety.   Patient reports the following current or recent suicidal ideation or behaviors: passive SI with no plan or intent.   Patient denies current or recent homicidal ideation or behaviors.   Patient denies current or recent self injurious behavior or ideation.   Patient denies other safety concerns.   Patient reports there has been no change in risk factors since their last session.     Patient reports there has been no change in protective factors since their last session.     A safety and risk management plan has been developed including: Patient consented to co-developed safety plan.  Safety and risk management plan was completed.  Patient agreed to use safety plan should any safety concerns arise.  A copy was given to the patient.     Appearance:   Appropriate    Eye Contact:   Fair    Psychomotor Behavior: Restless    Attitude:   Friendly   Orientation:   All   Speech    Rate / Production: Emotional Talkative    Volume:  Normal    Mood:    Anxious  Depressed  Sad    Affect:    Worrisome    Thought Content:  Clear    Thought Form:  Coherent    Insight:    Fair      Medication Review:   No changes to current psychiatric medication(s)     Medication Compliance:   Yes     Changes in Health Issues:   None reported     Chemical Use Review:   Substance Use: Chemical use reviewed, no active concerns identified      Tobacco Use: No current tobacco use.      Diagnosis:  1. Generalized anxiety disorder    2. MDD (major depressive disorder), recurrent episode, moderate (H)    3. PTSD (post-traumatic stress disorder)        Collateral Reports Completed:   Not Applicable    PLAN: (Patient Tasks / Therapist Tasks / Other)    Follow safety plan  Go to ED if feeling unsafe  Set boundaries with mom and look at what you can control  Look at books to  help understand moms behavior  Meet with psychiatry today  Implement coping skills  Follow safety plan  educated patient on trauma, brain development, expectations and decisions she can be flexible with.       Katie Faulkner, [facility] 11/30/2021                                                         ______________________________________________________________________    Treatment Plan    Patient's Name: Wandy Ann  YOB: 2000    Date:  11/2/2021    DSM5 Diagnoses: 296.32 (F33.1) Major Depressive Disorder, Recurrent Episode, Moderate _ and With mixed features, 300.02 (F41.1) Generalized Anxiety Disorder or 309.81 (F43.10) Posttraumatic Stress Disorder (includes Posttraumatic Stress Disorder for Children 6 Years and Younger)  With dissociative symptoms  Psychosocial / Contextual Factors: past trauma, developmental hx and current stressors   WHODAS: 42    Referral / Collaboration:  Referral to another professional/service is not indicated at this time..    Anticipated number of session or this episode of care: 16      MeasurableTreatment Goal(s) related to diagnosis / functional impairment(s)  Goal 1: Patient will report absence of persistent SI and report of reduced frequency and intensity of SI and absence of SI to acceptable levels, report subjective improved mood for a period of 90 days, within 6 months as clinically observed and by patient self-report    I will know I've met my goal when I can see the difference between a bad moment and what I want in th future.      Objective #A (Patient Action)    Patient will demonstrate control of impulses and ability to use positive coping tools to manage strong emotions that can be safely addressed at a lower level of care. Absence of persistent SI and report of reduced frequency and intensity of SI and absence of SI to acceptable levels, report subjective improved mood for a period of 90 days, within 6 months as clinically observed and by patient  self-report.  Status: Continued - Date(s): 11/2/2021    Intervention(s)  Therapist will provide individual therapy to identify triggers to SI urges, gain feedback on helpful coping strategies, and identify ways that family can offer support. Tx to discuss current stressors and interpersonal conflicts and how to cope with these, coaching, diagnostic testing, referral for medication as indicated, use prescribed medication, cognitive restructuring, interpersonal family therapy, supportive therapy services.        Goal 2: Patient will report absence of persistent depression mood and report of reduced frequency and intensity of low mood and absence of persistent low energy and motivation to acceptable levels, report subjective improved motivation and increased energy for a period of 90 days, within 6 months as clinically observed and by patient self-report    I will know I've met my goal when I no longer feel sad.      Objective #A (Patient Action)    Status: Continued - Date(s): 11/2/2021    Patient will demonstrate and report a level of depression that can be managed at a lower level of care.  Absence of persistent depression mood and report of reduced frequency and intensity of low mood and absence of persistent low energy and motivation to acceptable levels, report subjective improved motivation and increased energy for a period of 90 days, within 6 months as clinically observed and by patient self-report.    Intervention(s)  Therapist will provide individual therapy to identify triggers to depression, gain feedback on helpful coping tools and thought-reframing techniques, and identify preferred way of being.  Tx to include discussion of current stressors and interpersonal conflicts and how to cope with these, coaching, diagnostic testing, referral for medication as indicated, use prescribed medication, cognitive restructuring, interpersonal, family therapy, supportive therapy services.        Goal 3: Patiient will  report absence of persistent anxiety mood and report of reduced frequency and intensity of worry and absence of persistent anxious mood to acceptable levels, no panic attacks, report subjective comfort with rumination for a period of 90 days. Within 6 months as clinically observed and by patient self-report    I will know I've met my goal when I can go days without worrying about little things or shaking.      Objective #A (Patient Action)    Status: Continued - Date(s): 11/2/2021    Patient will demonstrate and report a level of anxiety that can be managed at a lower level of care.  Absence of persistent anxious mood and report of reduced frequency and intensity of worry and absence of persistent anxious mood to acceptable levels, no panic attacks, report subjective comfort with rumination for a period of 90 days, within 6 months as clinically observed and by patient self-report.    Intervention(s)  Therapist will provide individual therapy to identify triggers to anxiety, gain feedback on helpful coping tools and thought-reframing techniques, and identify preferred way of being. Tx to include discuss current stressors and interpersonal conflicts and how to cope with these, coaching, diagnostic testing , referral for medication as indicated, use prescribed medication, cognitive restructuring, interpersonal,   family therapy, supportive therapy services.        Patient has reviewed and agreed to the above plan.      Katie Faulkner, [facility]  11/2/2021                                                   Wandy Ann            SAFETY PLAN:  Step 1: Warning signs / cues (Thoughts, images, mood, situation, behavior) that a crisis may be developing:  ? Thoughts: thoughts of not wanting to be here  ? Images: no images  ? Thinking Processes: intrusive thoughts (bothersome, unwanted thoughts that come out of nowhere): get irritated  ? Mood: intense anger and agitation  ? Behaviors: isolating/withdrawing  "  ? Situations: trauma    Step 2: Coping strategies - Things I can do to take my mind off of my problems without contacting another person (relaxation technique, physical activity):  ? Distress Tolerance Strategies:  arts and crafts: drawing and painting  ? Physical Activities: exercise: working out  ? Focus on helpful thoughts:  \"This is temporary\", \"It always passes\" and \"Ride the wave\"  Step 3: People and social settings that provide distraction:                 Name: Jennifer     Phone: [phone]                 Name: Antonina         Phone: [phone]                 Name: panchito       Phone: [phone]  ? friends house or car   Step 4: Remind myself of people and things that are important to me and worth living for:  Best friend and cat        Step 5: When I am in crisis, I can ask these people to help me use my safety plan:                 Name: Jennifer     Phone: [phone]                 Name: Antonina         Phone: [phone]                 Name: panchito       Phone: [phone]  Step 6: Making the environment safe:   ? be around others  Step 7: Professionals or agencies I can contact during a crisis:  ? [facility] Daytime Number: [phone]  ? Suicide Prevention Lifeline: 4-718-582-CZBZ (3494)  ? Crisis Text Line Service (available 24 hours a day, 7 days a week): Text [place] to 805001    Local Crisis Services: 988     Call 911 or go to my nearest emergency department.       I helped develop this safety plan and agree to use it when needed.  I have been given a copy of this plan.       Client signature _________________________________________________________________  Today s date:  2/22/2021  Adapted from Safety Plan Template 2008 Marisol Cazares and Mario Flores is reprinted with the express permission of the authors.  No portion of the Safety Plan Template may be reproduced without the express, written permission.  You can contact the authors at [email or web address] or " [email or web address].

## 2021-12-06 ENCOUNTER — VIRTUAL VISIT (OUTPATIENT)
Dept: PSYCHOLOGY | Facility: CLINIC | Age: 21
End: 2021-12-06
Payer: COMMERCIAL

## 2021-12-06 DIAGNOSIS — F43.10 PTSD (POST-TRAUMATIC STRESS DISORDER): ICD-10-CM

## 2021-12-06 DIAGNOSIS — F33.1 MDD (MAJOR DEPRESSIVE DISORDER), RECURRENT EPISODE, MODERATE (H): Primary | ICD-10-CM

## 2021-12-06 DIAGNOSIS — F41.1 GENERALIZED ANXIETY DISORDER: ICD-10-CM

## 2021-12-06 PROCEDURE — 90834 PSYTX W PT 45 MINUTES: CPT | Mod: 95 | Performed by: SOCIAL WORKER

## 2021-12-06 ASSESSMENT — ANXIETY QUESTIONNAIRES
3. WORRYING TOO MUCH ABOUT DIFFERENT THINGS: MORE THAN HALF THE DAYS
4. TROUBLE RELAXING: NEARLY EVERY DAY
1. FEELING NERVOUS, ANXIOUS, OR ON EDGE: NEARLY EVERY DAY
6. BECOMING EASILY ANNOYED OR IRRITABLE: MORE THAN HALF THE DAYS
5. BEING SO RESTLESS THAT IT IS HARD TO SIT STILL: MORE THAN HALF THE DAYS
7. FEELING AFRAID AS IF SOMETHING AWFUL MIGHT HAPPEN: SEVERAL DAYS
GAD7 TOTAL SCORE: 15
2. NOT BEING ABLE TO STOP OR CONTROL WORRYING: MORE THAN HALF THE DAYS

## 2021-12-06 NOTE — PROGRESS NOTES
Progress Note    Patient Name: Wandy Ann  Date: 2021         Service Type: Individual      Session Start Time: 1006 Session End Time: 1056     Session Length: 38-52    Session #: 12    Attendees: Client    Service Modality:  Video Visit:      Provider verified identity through the following two step process.  Patient provided:  Patient  and Patient is known previously to provider    Telemedicine Visit: The patient's condition can be safely assessed and treated via synchronous audio and visual telemedicine encounter.      Reason for Telemedicine Visit: Services only offered telehealth    Originating Site (Patient Location): Patient's home    Distant Site (Provider Location): Provider Remote Setting    Consent:  The patient/guardian has verbally consented to: the potential risks and benefits of telemedicine (video visit) versus in person care; bill my insurance or make self-payment for services provided; and responsibility for payment of non-covered services.     Patient would like the video invitation sent by:  Send to e-mail at: [email or web address]    Mode of Communication:  Video Conference via [employer]    As the provider I attest to compliance with applicable laws and regulations related to telemedicine.     Treatment Plan Last Reviewed: 2021 due 2022  PHQ-9 / YESSI-7 :  16/15    DATA  Interactive Complexity: No  Crisis: No       Progress Since Last Session (Related to Symptoms / Goals / Homework):   Symptoms: Improving phq-9 yessi-7    Homework: Partially completed      Episode of Care Goals: Minimal progress - PREPARATION (Decided to change - considering how); Intervened by negotiating a change plan and determining options / strategies for behavior change, identifying triggers, exploring social supports, and working towards setting a date to begin behavior change     Current / Ongoing Stressors and Concerns:   past trauma,  developmental hx and current stressors      Treatment Objective(s) Addressed in This Session:   Patient will demonstrate control of impulses and ability to use positive coping tools to manage strong emotions that can be safely addressed at a lower level of care. Absence of persistent SI and report of reduced frequency and intensity of SI and absence of SI to acceptable levels, report subjective improved mood for a period of 90 days, within 6 months as clinically observed and by patient self-report.  Patient will demonstrate and report a level of depression that can be managed at a lower level of care.  Absence of persistent depression mood and report of reduced frequency and intensity of low mood and absence of persistent low energy and motivation to acceptable levels, report subjective improved motivation and increased energy for a period of 90 days, within 6 months as clinically observed and by patient self-report.  Patient will demonstrate and report a level of anxiety that can be managed at a lower level of care.  Absence of persistent anxious mood and report of reduced frequency and intensity of worry and absence of persistent anxious mood to acceptable levels, no panic attacks, report subjective comfort with rumination for a period of 90 days, within 6 months as clinically observed and by patient self-report.     Intervention:   Therapist met with patient to review goals and interventions. Therapist utilized  reflected listening as patient gave brief reflection of week. Patient processed her continue battel with behaviors such as phone and staying in bed. Therapist challenged patient and educated patient on how these behaviors can feed depression and the importance of patient investing in behavior change. Therapist coached patient through small changes.    Patient presented with an anxious affect. Patient was engaged in session and open to feedback. Patient contracted for safety         ASSESSMENT: Current  Emotional / Mental Status (status of significant symptoms):   Risk status (Self / Other harm or suicidal ideation)   Patient denies current fears or concerns for personal safety.   Patient reports the following current or recent suicidal ideation or behaviors: passive SI with no plan or intent.   Patient denies current or recent homicidal ideation or behaviors.   Patient denies current or recent self injurious behavior or ideation.   Patient denies other safety concerns.   Patient reports there has been no change in risk factors since their last session.     Patient reports there has been no change in protective factors since their last session.     A safety and risk management plan has been developed including: Patient consented to co-developed safety plan.  Safety and risk management plan was completed.  Patient agreed to use safety plan should any safety concerns arise.  A copy was given to the patient.     Appearance:   Appropriate    Eye Contact:   Fair    Psychomotor Behavior: Restless    Attitude:   Friendly   Orientation:   All   Speech    Rate / Production: Emotional Talkative    Volume:  Normal    Mood:    Anxious  Depressed  Sad    Affect:    Worrisome    Thought Content:  Clear    Thought Form:  Coherent    Insight:    Fair      Medication Review:   Changes to psychiatric medications, see updated Medication List in EPIC.      Medication Compliance:   Yes     Changes in Health Issues:   None reported     Chemical Use Review:   Substance Use: Chemical use reviewed, no active concerns identified      Tobacco Use: No current tobacco use.      Diagnosis:  1. MDD (major depressive disorder), recurrent episode, moderate (H)    2. Generalized anxiety disorder    3. PTSD (post-traumatic stress disorder)        Collateral Reports Completed:   Not Applicable    PLAN: (Patient Tasks / Therapist Tasks / Other)    Follow safety plan  Go to ED if feeling unsafe  Set boundaries with mom and look at what you can  control  Look at books to help understand moms behavior  Meet with psychiatry today  Implement coping skills  Follow safety plan  Put phone downstairs, get up out of bed, make bed and get dressed    Katie Faulkner, [facility] 12/6/2021                                                         ______________________________________________________________________    Treatment Plan    Patient's Name: Wandy Ann  YOB: 2000    Date:  11/2/2021    DSM5 Diagnoses: 296.32 (F33.1) Major Depressive Disorder, Recurrent Episode, Moderate _ and With mixed features, 300.02 (F41.1) Generalized Anxiety Disorder or 309.81 (F43.10) Posttraumatic Stress Disorder (includes Posttraumatic Stress Disorder for Children 6 Years and Younger)  With dissociative symptoms  Psychosocial / Contextual Factors: past trauma, developmental hx and current stressors   WHODAS: 42    Referral / Collaboration:  Referral to another professional/service is not indicated at this time..    Anticipated number of session or this episode of care: 16      MeasurableTreatment Goal(s) related to diagnosis / functional impairment(s)  Goal 1: Patient will report absence of persistent SI and report of reduced frequency and intensity of SI and absence of SI to acceptable levels, report subjective improved mood for a period of 90 days, within 6 months as clinically observed and by patient self-report    I will know I've met my goal when I can see the difference between a bad moment and what I want in th future.      Objective #A (Patient Action)    Patient will demonstrate control of impulses and ability to use positive coping tools to manage strong emotions that can be safely addressed at a lower level of care. Absence of persistent SI and report of reduced frequency and intensity of SI and absence of SI to acceptable levels, report subjective improved mood for a period of 90 days, within 6 months as clinically observed and by patient  self-report.  Status: Continued - Date(s): 11/2/2021    Intervention(s)  Therapist will provide individual therapy to identify triggers to SI urges, gain feedback on helpful coping strategies, and identify ways that family can offer support. Tx to discuss current stressors and interpersonal conflicts and how to cope with these, coaching, diagnostic testing, referral for medication as indicated, use prescribed medication, cognitive restructuring, interpersonal family therapy, supportive therapy services.        Goal 2: Patient will report absence of persistent depression mood and report of reduced frequency and intensity of low mood and absence of persistent low energy and motivation to acceptable levels, report subjective improved motivation and increased energy for a period of 90 days, within 6 months as clinically observed and by patient self-report    I will know I've met my goal when I no longer feel sad.      Objective #A (Patient Action)    Status: Continued - Date(s): 11/2/2021    Patient will demonstrate and report a level of depression that can be managed at a lower level of care.  Absence of persistent depression mood and report of reduced frequency and intensity of low mood and absence of persistent low energy and motivation to acceptable levels, report subjective improved motivation and increased energy for a period of 90 days, within 6 months as clinically observed and by patient self-report.    Intervention(s)  Therapist will provide individual therapy to identify triggers to depression, gain feedback on helpful coping tools and thought-reframing techniques, and identify preferred way of being.  Tx to include discussion of current stressors and interpersonal conflicts and how to cope with these, coaching, diagnostic testing, referral for medication as indicated, use prescribed medication, cognitive restructuring, interpersonal, family therapy, supportive therapy services.        Goal 3: Patiient will  report absence of persistent anxiety mood and report of reduced frequency and intensity of worry and absence of persistent anxious mood to acceptable levels, no panic attacks, report subjective comfort with rumination for a period of 90 days. Within 6 months as clinically observed and by patient self-report    I will know I've met my goal when I can go days without worrying about little things or shaking.      Objective #A (Patient Action)    Status: Continued - Date(s): 11/2/2021    Patient will demonstrate and report a level of anxiety that can be managed at a lower level of care.  Absence of persistent anxious mood and report of reduced frequency and intensity of worry and absence of persistent anxious mood to acceptable levels, no panic attacks, report subjective comfort with rumination for a period of 90 days, within 6 months as clinically observed and by patient self-report.    Intervention(s)  Therapist will provide individual therapy to identify triggers to anxiety, gain feedback on helpful coping tools and thought-reframing techniques, and identify preferred way of being. Tx to include discuss current stressors and interpersonal conflicts and how to cope with these, coaching, diagnostic testing , referral for medication as indicated, use prescribed medication, cognitive restructuring, interpersonal,   family therapy, supportive therapy services.        Patient has reviewed and agreed to the above plan.      Katie Faulkner, [facility]  11/2/2021                                                   Wandy Ann            SAFETY PLAN:  Step 1: Warning signs / cues (Thoughts, images, mood, situation, behavior) that a crisis may be developing:  ? Thoughts: thoughts of not wanting to be here  ? Images: no images  ? Thinking Processes: intrusive thoughts (bothersome, unwanted thoughts that come out of nowhere): get irritated  ? Mood: intense anger and agitation  ? Behaviors: isolating/withdrawing  "  ? Situations: trauma    Step 2: Coping strategies - Things I can do to take my mind off of my problems without contacting another person (relaxation technique, physical activity):  ? Distress Tolerance Strategies:  arts and crafts: drawing and painting  ? Physical Activities: exercise: working out  ? Focus on helpful thoughts:  \"This is temporary\", \"It always passes\" and \"Ride the wave\"  Step 3: People and social settings that provide distraction:                 Name: Jennifer     Phone: [phone]                 Name: Antonina         Phone: [phone]                 Name: panchito       Phone: [phone]  ? friends house or car   Step 4: Remind myself of people and things that are important to me and worth living for:  Best friend and cat        Step 5: When I am in crisis, I can ask these people to help me use my safety plan:                 Name: Jennifer     Phone: [phone]                 Name: Antonina         Phone: [phone]                 Name: panchito       Phone: [phone]  Step 6: Making the environment safe:   ? be around others  Step 7: Professionals or agencies I can contact during a crisis:  ? [facility] Daytime Number: 414-589-7697  ? Suicide Prevention Lifeline: 2-891-270-TLQV (2975)  ? Crisis Text Line Service (available 24 hours a day, 7 days a week): Text [place] to 842375    Local Crisis Services: 988     Call 911 or go to my nearest emergency department.       I helped develop this safety plan and agree to use it when needed.  I have been given a copy of this plan.       Client signature _________________________________________________________________  Today s date:  2/22/2021  Adapted from Safety Plan Template 2008 Marisol Cazares and Mario Flores is reprinted with the express permission of the authors.  No portion of the Safety Plan Template may be reproduced without the express, written permission.  You can contact the authors at [email or web address] or " [email or web address].

## 2021-12-07 ASSESSMENT — PATIENT HEALTH QUESTIONNAIRE - PHQ9: SUM OF ALL RESPONSES TO PHQ QUESTIONS 1-9: 16

## 2021-12-07 ASSESSMENT — ANXIETY QUESTIONNAIRES: GAD7 TOTAL SCORE: 15

## 2021-12-27 ASSESSMENT — ANXIETY QUESTIONNAIRES
3. WORRYING TOO MUCH ABOUT DIFFERENT THINGS: NEARLY EVERY DAY
GAD7 TOTAL SCORE: 19
5. BEING SO RESTLESS THAT IT IS HARD TO SIT STILL: NEARLY EVERY DAY
7. FEELING AFRAID AS IF SOMETHING AWFUL MIGHT HAPPEN: MORE THAN HALF THE DAYS
7. FEELING AFRAID AS IF SOMETHING AWFUL MIGHT HAPPEN: MORE THAN HALF THE DAYS
4. TROUBLE RELAXING: NEARLY EVERY DAY
1. FEELING NERVOUS, ANXIOUS, OR ON EDGE: NEARLY EVERY DAY
6. BECOMING EASILY ANNOYED OR IRRITABLE: NEARLY EVERY DAY
2. NOT BEING ABLE TO STOP OR CONTROL WORRYING: MORE THAN HALF THE DAYS

## 2021-12-28 ASSESSMENT — ANXIETY QUESTIONNAIRES: GAD7 TOTAL SCORE: 19

## 2021-12-29 ENCOUNTER — VIRTUAL VISIT (OUTPATIENT)
Dept: PSYCHOLOGY | Facility: CLINIC | Age: 21
End: 2021-12-29
Payer: COMMERCIAL

## 2021-12-29 DIAGNOSIS — F43.10 PTSD (POST-TRAUMATIC STRESS DISORDER): ICD-10-CM

## 2021-12-29 DIAGNOSIS — F41.1 GENERALIZED ANXIETY DISORDER: ICD-10-CM

## 2021-12-29 DIAGNOSIS — F33.1 MDD (MAJOR DEPRESSIVE DISORDER), RECURRENT EPISODE, MODERATE (H): Primary | ICD-10-CM

## 2021-12-29 PROCEDURE — 90834 PSYTX W PT 45 MINUTES: CPT | Mod: 95 | Performed by: SOCIAL WORKER

## 2021-12-29 ASSESSMENT — PATIENT HEALTH QUESTIONNAIRE - PHQ9: SUM OF ALL RESPONSES TO PHQ QUESTIONS 1-9: 11

## 2021-12-29 NOTE — PROGRESS NOTES
Progress Note    Patient Name: Wandy Ann  Date: 2021         Service Type: Individual      Session Start Time: 809 Session End Time: 855     Session Length: 38-52    Session #: 13    Attendees: Client    Service Modality:  Video Visit:      Provider verified identity through the following two step process.  Patient provided:  Patient  and Patient is known previously to provider    Telemedicine Visit: The patient's condition can be safely assessed and treated via synchronous audio and visual telemedicine encounter.      Reason for Telemedicine Visit: Services only offered telehealth    Originating Site (Patient Location): Patient's home    Distant Site (Provider Location): Provider Remote Setting    Consent:  The patient/guardian has verbally consented to: the potential risks and benefits of telemedicine (video visit) versus in person care; bill my insurance or make self-payment for services provided; and responsibility for payment of non-covered services.     Patient would like the video invitation sent by:  Send to e-mail at: [email or web address]    Mode of Communication:  Video Conference via [employer]    As the provider I attest to compliance with applicable laws and regulations related to telemedicine.     Treatment Plan Last Reviewed: 2021 due 2022  PHQ-9 / YESSI-7 :      DATA  Interactive Complexity: No  Crisis: No       Progress Since Last Session (Related to Symptoms / Goals / Homework):   Symptoms: Improving phq-9   worsening yessi-7    Homework: Partially completed      Episode of Care Goals: Minimal progress - PREPARATION (Decided to change - considering how); Intervened by negotiating a change plan and determining options / strategies for behavior change, identifying triggers, exploring social supports, and working towards setting a date to begin behavior change     Current / Ongoing Stressors and Concerns:   past  trauma, developmental hx and current stressors      Treatment Objective(s) Addressed in This Session:   Patient will demonstrate control of impulses and ability to use positive coping tools to manage strong emotions that can be safely addressed at a lower level of care. Absence of persistent SI and report of reduced frequency and intensity of SI and absence of SI to acceptable levels, report subjective improved mood for a period of 90 days, within 6 months as clinically observed and by patient self-report.  Patient will demonstrate and report a level of depression that can be managed at a lower level of care.  Absence of persistent depression mood and report of reduced frequency and intensity of low mood and absence of persistent low energy and motivation to acceptable levels, report subjective improved motivation and increased energy for a period of 90 days, within 6 months as clinically observed and by patient self-report.  Patient will demonstrate and report a level of anxiety that can be managed at a lower level of care.  Absence of persistent anxious mood and report of reduced frequency and intensity of worry and absence of persistent anxious mood to acceptable levels, no panic attacks, report subjective comfort with rumination for a period of 90 days, within 6 months as clinically observed and by patient self-report.     Intervention:   Therapist met with patient to review goals and interventions. Therapist utilized  reflected listening as patient gave brief reflection of week. Patient reported a difficult week with work and some family. Therapist supported patient as she processed and validated patient. Therapist reiterated patient continuing to internalize others behaviors and educated patient on how that can effect her own mental health. Therapist provided education on attachment theory and encouraged patient to read the book therapist recommended.   Patient presented with an anxious affect. Patient was  engaged in session and open to feedback. Patient contracted for safety         ASSESSMENT: Current Emotional / Mental Status (status of significant symptoms):   Risk status (Self / Other harm or suicidal ideation)   Patient denies current fears or concerns for personal safety.   Patient denies current or recent suicidal ideation or behaviors.   Patient denies current or recent homicidal ideation or behaviors.   Patient denies current or recent self injurious behavior or ideation.   Patient denies other safety concerns.   Patient reports there has been no change in risk factors since their last session.     Patient reports there has been no change in protective factors since their last session.     A safety and risk management plan has been developed including: Patient consented to co-developed safety plan.  Safety and risk management plan was completed.  Patient agreed to use safety plan should any safety concerns arise.  A copy was given to the patient.     Appearance:   Appropriate    Eye Contact:   Fair    Psychomotor Behavior: Restless    Attitude:   Friendly   Orientation:   All   Speech    Rate / Production: Emotional Talkative    Volume:  Normal    Mood:    Anxious  Depressed    Affect:    Worrisome    Thought Content:  Clear    Thought Form:  Coherent    Insight:    Fair      Medication Review:   No changes to current psychiatric medication(s)     Medication Compliance:   Yes     Changes in Health Issues:   None reported     Chemical Use Review:   Substance Use: Chemical use reviewed, no active concerns identified      Tobacco Use: No current tobacco use.      Diagnosis:  1. MDD (major depressive disorder), recurrent episode, moderate (H)    2. Generalized anxiety disorder    3. PTSD (post-traumatic stress disorder)        Collateral Reports Completed:   Not Applicable    PLAN: (Patient Tasks / Therapist Tasks / Other)    Follow safety plan  Go to ED if feeling unsafe  Set boundaries with mom and look at  what you can control  Look at books to help understand moms behavior  Implement coping skills  Put phone downstairs, get up out of bed, make bed and get dressed  Follow through      Katie Faulkner, [facility] 12/29/2021                                                         ______________________________________________________________________    Treatment Plan    Patient's Name: Wandy Ann  YOB: 2000    Date:  11/2/2021    DSM5 Diagnoses: 296.32 (F33.1) Major Depressive Disorder, Recurrent Episode, Moderate _ and With mixed features, 300.02 (F41.1) Generalized Anxiety Disorder or 309.81 (F43.10) Posttraumatic Stress Disorder (includes Posttraumatic Stress Disorder for Children 6 Years and Younger)  With dissociative symptoms  Psychosocial / Contextual Factors: past trauma, developmental hx and current stressors   WHODAS: 42    Referral / Collaboration:  Referral to another professional/service is not indicated at this time..    Anticipated number of session or this episode of care: 16      MeasurableTreatment Goal(s) related to diagnosis / functional impairment(s)  Goal 1: Patient will report absence of persistent SI and report of reduced frequency and intensity of SI and absence of SI to acceptable levels, report subjective improved mood for a period of 90 days, within 6 months as clinically observed and by patient self-report    I will know I've met my goal when I can see the difference between a bad moment and what I want in th future.      Objective #A (Patient Action)    Patient will demonstrate control of impulses and ability to use positive coping tools to manage strong emotions that can be safely addressed at a lower level of care. Absence of persistent SI and report of reduced frequency and intensity of SI and absence of SI to acceptable levels, report subjective improved mood for a period of 90 days, within 6 months as clinically observed and by patient self-report.  Status:  Continued - Date(s): 11/2/2021    Intervention(s)  Therapist will provide individual therapy to identify triggers to SI urges, gain feedback on helpful coping strategies, and identify ways that family can offer support. Tx to discuss current stressors and interpersonal conflicts and how to cope with these, coaching, diagnostic testing, referral for medication as indicated, use prescribed medication, cognitive restructuring, interpersonal family therapy, supportive therapy services.        Goal 2: Patient will report absence of persistent depression mood and report of reduced frequency and intensity of low mood and absence of persistent low energy and motivation to acceptable levels, report subjective improved motivation and increased energy for a period of 90 days, within 6 months as clinically observed and by patient self-report    I will know I've met my goal when I no longer feel sad.      Objective #A (Patient Action)    Status: Continued - Date(s): 11/2/2021    Patient will demonstrate and report a level of depression that can be managed at a lower level of care.  Absence of persistent depression mood and report of reduced frequency and intensity of low mood and absence of persistent low energy and motivation to acceptable levels, report subjective improved motivation and increased energy for a period of 90 days, within 6 months as clinically observed and by patient self-report.    Intervention(s)  Therapist will provide individual therapy to identify triggers to depression, gain feedback on helpful coping tools and thought-reframing techniques, and identify preferred way of being.  Tx to include discussion of current stressors and interpersonal conflicts and how to cope with these, coaching, diagnostic testing, referral for medication as indicated, use prescribed medication, cognitive restructuring, interpersonal, family therapy, supportive therapy services.        Goal 3: Patiient will report absence of  persistent anxiety mood and report of reduced frequency and intensity of worry and absence of persistent anxious mood to acceptable levels, no panic attacks, report subjective comfort with rumination for a period of 90 days. Within 6 months as clinically observed and by patient self-report    I will know I've met my goal when I can go days without worrying about little things or shaking.      Objective #A (Patient Action)    Status: Continued - Date(s): 11/2/2021    Patient will demonstrate and report a level of anxiety that can be managed at a lower level of care.  Absence of persistent anxious mood and report of reduced frequency and intensity of worry and absence of persistent anxious mood to acceptable levels, no panic attacks, report subjective comfort with rumination for a period of 90 days, within 6 months as clinically observed and by patient self-report.    Intervention(s)  Therapist will provide individual therapy to identify triggers to anxiety, gain feedback on helpful coping tools and thought-reframing techniques, and identify preferred way of being. Tx to include discuss current stressors and interpersonal conflicts and how to cope with these, coaching, diagnostic testing , referral for medication as indicated, use prescribed medication, cognitive restructuring, interpersonal,   family therapy, supportive therapy services.        Patient has reviewed and agreed to the above plan.      Katie Faulkner, [facility]  11/2/2021                                                   Wandy Ann            SAFETY PLAN:  Step 1: Warning signs / cues (Thoughts, images, mood, situation, behavior) that a crisis may be developing:  ? Thoughts: thoughts of not wanting to be here  ? Images: no images  ? Thinking Processes: intrusive thoughts (bothersome, unwanted thoughts that come out of nowhere): get irritated  ? Mood: intense anger and agitation  ? Behaviors: isolating/withdrawing   ? Situations: trauma   "  Step 2: Coping strategies - Things I can do to take my mind off of my problems without contacting another person (relaxation technique, physical activity):  ? Distress Tolerance Strategies:  arts and crafts: drawing and painting  ? Physical Activities: exercise: working out  ? Focus on helpful thoughts:  \"This is temporary\", \"It always passes\" and \"Ride the wave\"  Step 3: People and social settings that provide distraction:                 Name: Jennifer     Phone: [phone]                 Name: Antonina         Phone: [phone]                 Name: panchito       Phone: [phone]  ? friends house or car   Step 4: Remind myself of people and things that are important to me and worth living for:  Best friend and cat        Step 5: When I am in crisis, I can ask these people to help me use my safety plan:                 Name: Jennifer     Phone: [phone]                 Name: Antonina         Phone: [phone]                 Name: panchito       Phone: [phone]  Step 6: Making the environment safe:   ? be around others  Step 7: Professionals or agencies I can contact during a crisis:  ? [facility] Daytime Number: [phone]  ? Suicide Prevention Lifeline: 3-449-432-WQGQ (0210)  ? Crisis Text Line Service (available 24 hours a day, 7 days a week): Text [place] to 735216    Local Crisis Services: 988     Call 911 or go to my nearest emergency department.       I helped develop this safety plan and agree to use it when needed.  I have been given a copy of this plan.       Client signature _________________________________________________________________  Today s date:  2/22/2021  Adapted from Safety Plan Template 2008 Marisol Cazares and Mario Flores is reprinted with the express permission of the authors.  No portion of the Safety Plan Template may be reproduced without the express, written permission.  You can contact the authors at [email or web address] or [email or web address].     Answers for " HPI/ROS submitted by the patient on 12/27/2021  YESSI 7 TOTAL SCORE: 19

## 2022-01-29 ENCOUNTER — HEALTH MAINTENANCE LETTER (OUTPATIENT)
Age: 22
End: 2022-01-29

## 2022-02-08 ENCOUNTER — VIRTUAL VISIT (OUTPATIENT)
Dept: PSYCHOLOGY | Facility: CLINIC | Age: 22
End: 2022-02-08
Payer: COMMERCIAL

## 2022-02-08 DIAGNOSIS — F41.1 GENERALIZED ANXIETY DISORDER: ICD-10-CM

## 2022-02-08 DIAGNOSIS — F33.1 MDD (MAJOR DEPRESSIVE DISORDER), RECURRENT EPISODE, MODERATE (H): Primary | ICD-10-CM

## 2022-02-08 DIAGNOSIS — F43.10 PTSD (POST-TRAUMATIC STRESS DISORDER): ICD-10-CM

## 2022-02-08 PROCEDURE — 90834 PSYTX W PT 45 MINUTES: CPT | Mod: 95 | Performed by: SOCIAL WORKER

## 2022-02-08 ASSESSMENT — ANXIETY QUESTIONNAIRES
1. FEELING NERVOUS, ANXIOUS, OR ON EDGE: MORE THAN HALF THE DAYS
3. WORRYING TOO MUCH ABOUT DIFFERENT THINGS: NEARLY EVERY DAY
5. BEING SO RESTLESS THAT IT IS HARD TO SIT STILL: NOT AT ALL
6. BECOMING EASILY ANNOYED OR IRRITABLE: NOT AT ALL
GAD7 TOTAL SCORE: 10
2. NOT BEING ABLE TO STOP OR CONTROL WORRYING: NEARLY EVERY DAY
7. FEELING AFRAID AS IF SOMETHING AWFUL MIGHT HAPPEN: SEVERAL DAYS
4. TROUBLE RELAXING: SEVERAL DAYS

## 2022-02-08 ASSESSMENT — PATIENT HEALTH QUESTIONNAIRE - PHQ9: SUM OF ALL RESPONSES TO PHQ QUESTIONS 1-9: 11

## 2022-02-08 NOTE — PROGRESS NOTES
Progress Note    Patient Name: Wandy Ann  Date: 2022         Service Type: Individual      Session Start Time: 160 Session End Time:      Session Length: 38-52    Session #: 14    Attendees: Client    Service Modality:  Video Visit:      Provider verified identity through the following two step process.  Patient provided:  Patient  and Patient is known previously to provider    Telemedicine Visit: The patient's condition can be safely assessed and treated via synchronous audio and visual telemedicine encounter.      Reason for Telemedicine Visit: Services only offered telehealth    Originating Site (Patient Location): Patient's home    Distant Site (Provider Location): Provider Remote Setting    Consent:  The patient/guardian has verbally consented to: the potential risks and benefits of telemedicine (video visit) versus in person care; bill my insurance or make self-payment for services provided; and responsibility for payment of non-covered services.     Patient would like the video invitation sent by:  Send to e-mail at: [email or web address]    Mode of Communication:  Video Conference via [employer]well    As the provider I attest to compliance with applicable laws and regulations related to telemedicine.     Treatment Plan Last Reviewed:  2022 due 2022  PHQ-9 / YESSI-7 :  11/10    DATA  Interactive Complexity: No  Crisis: No       Progress Since Last Session (Related to Symptoms / Goals / Homework):   Symptoms: Improving YESSI-7 no change PHQ-9    Homework: Partially completed      Episode of Care Goals: Minimal progress - PREPARATION (Decided to change - considering how); Intervened by negotiating a change plan and determining options / strategies for behavior change, identifying triggers, exploring social supports, and working towards setting a date to begin behavior change     Current / Ongoing Stressors and Concerns:   past trauma,  developmental hx and current stressors      Treatment Objective(s) Addressed in This Session:   Patient will demonstrate control of impulses and ability to use positive coping tools to manage strong emotions that can be safely addressed at a lower level of care. Absence of persistent SI and report of reduced frequency and intensity of SI and absence of SI to acceptable levels, report subjective improved mood for a period of 90 days, within 6 months as clinically observed and by patient self-report.  Patient will demonstrate and report a level of depression that can be managed at a lower level of care.  Absence of persistent depression mood and report of reduced frequency and intensity of low mood and absence of persistent low energy and motivation to acceptable levels, report subjective improved motivation and increased energy for a period of 90 days, within 6 months as clinically observed and by patient self-report.  Patient will demonstrate and report a level of anxiety that can be managed at a lower level of care.  Absence of persistent anxious mood and report of reduced frequency and intensity of worry and absence of persistent anxious mood to acceptable levels, no panic attacks, report subjective comfort with rumination for a period of 90 days, within 6 months as clinically observed and by patient self-report.     Intervention:   Therapist met with patient to review goals and interventions. Therapist utilized  reflected listening as patient gave brief reflection of week. Patient reported getting a second interview for a job and approved for a new apartment. Therapist supported patient as she processed and validated patient. Patient reported her SI to be intrusive and not wanting to hurt herself but not feel depressed. Therapist shared concern with patient about living alone and patient needing to challenged patient to make a list of triggers and cognitive distortions and coached patient through.    Patient  presented with an anxious affect. Patient was engaged in session and open to feedback. Patient contracted for safety with future focused         ASSESSMENT: Current Emotional / Mental Status (status of significant symptoms):   Risk status (Self / Other harm or suicidal ideation)   Patient denies current fears or concerns for personal safety.   Patient reports the following current or recent suicidal ideation or behaviors: 2/5 5 being high contracted for safety .   Patient denies current or recent homicidal ideation or behaviors.   Patient denies current or recent self injurious behavior or ideation.   Patient denies other safety concerns.   Patient reports there has been no change in risk factors since their last session.     Patient reports there has been no change in protective factors since their last session.     A safety and risk management plan has been developed including: codeveloped.  Patient consented to co-developed safety plan.  Safety and risk management plan was completed.  Patient agreed to use safety plan should any safety concerns arise.  A copy was given to the patient.     Appearance:   Appropriate    Eye Contact:   Fair    Psychomotor Behavior: Restless    Attitude:   Friendly   Orientation:   All   Speech    Rate / Production: Emotional Talkative    Volume:  Normal    Mood:    Anxious  Depressed    Affect:    Worrisome    Thought Content:  Clear    Thought Form:  Coherent    Insight:    Fair      Medication Review:   No changes to current psychiatric medication(s)     Medication Compliance:   Yes     Changes in Health Issues:   None reported     Chemical Use Review:   Substance Use: Chemical use reviewed, no active concerns identified      Tobacco Use: No current tobacco use.      Diagnosis:  1. MDD (major depressive disorder), recurrent episode, moderate (H)    2. Generalized anxiety disorder    3. PTSD (post-traumatic stress disorder)        Collateral Reports Completed:   Not  Applicable    PLAN: (Patient Tasks / Therapist Tasks / Other)    Follow safety plan  Go to ED if feeling unsafe  Set boundaries with mom and look at what you can control  Look at books to help understand moms behavior  Implement coping skills  Put phone downstairs, get up out of bed, make bed and get dressed  Follow through  Challenge cognitive distortions    Katie Faulkner, [facility]  2/8/2022                                                         ______________________________________________________________________    Treatment Plan    Patient's Name: Wandy Ann  YOB: 2000    Date:   2/8/2022    DSM5 Diagnoses: 296.32 (F33.1) Major Depressive Disorder, Recurrent Episode, Moderate _ and With mixed features, 300.02 (F41.1) Generalized Anxiety Disorder or 309.81 (F43.10) Posttraumatic Stress Disorder (includes Posttraumatic Stress Disorder for Children 6 Years and Younger)  With dissociative symptoms  Psychosocial / Contextual Factors: past trauma, developmental hx and current stressors   WHODAS: 42    Referral / Collaboration:  Referral to another professional/service is not indicated at this time..    Anticipated number of session or this episode of care: 16      MeasurableTreatment Goal(s) related to diagnosis / functional impairment(s)  Goal 1: Patient will report absence of persistent SI and report of reduced frequency and intensity of SI and absence of SI to acceptable levels, report subjective improved mood for a period of 90 days, within 6 months as clinically observed and by patient self-report    I will know I've met my goal when I can see the difference between a bad moment and what I want in th future.      Objective #A (Patient Action)    Patient will demonstrate control of impulses and ability to use positive coping tools to manage strong emotions that can be safely addressed at a lower level of care. Absence of persistent SI and report of reduced frequency and intensity of SI  and absence of SI to acceptable levels, report subjective improved mood for a period of 90 days, within 6 months as clinically observed and by patient self-report.  Status: Continued - Date(s):  2/8/2022    Intervention(s)  Therapist will provide individual therapy to identify triggers to SI urges, gain feedback on helpful coping strategies, and identify ways that family can offer support. Tx to discuss current stressors and interpersonal conflicts and how to cope with these, coaching, diagnostic testing, referral for medication as indicated, use prescribed medication, cognitive restructuring, interpersonal family therapy, supportive therapy services.        Goal 2: Patient will report absence of persistent depression mood and report of reduced frequency and intensity of low mood and absence of persistent low energy and motivation to acceptable levels, report subjective improved motivation and increased energy for a period of 90 days, within 6 months as clinically observed and by patient self-report    I will know I've met my goal when I no longer feel sad.      Objective #A (Patient Action)    Status: Continued - Date(s):  2/8/2022    Patient will demonstrate and report a level of depression that can be managed at a lower level of care.  Absence of persistent depression mood and report of reduced frequency and intensity of low mood and absence of persistent low energy and motivation to acceptable levels, report subjective improved motivation and increased energy for a period of 90 days, within 6 months as clinically observed and by patient self-report.    Intervention(s)  Therapist will provide individual therapy to identify triggers to depression, gain feedback on helpful coping tools and thought-reframing techniques, and identify preferred way of being.  Tx to include discussion of current stressors and interpersonal conflicts and how to cope with these, coaching, diagnostic testing, referral for medication as  indicated, use prescribed medication, cognitive restructuring, interpersonal, family therapy, supportive therapy services.        Goal 3: Patiient will report absence of persistent anxiety mood and report of reduced frequency and intensity of worry and absence of persistent anxious mood to acceptable levels, no panic attacks, report subjective comfort with rumination for a period of 90 days. Within 6 months as clinically observed and by patient self-report    I will know I've met my goal when I can go days without worrying about little things or shaking.      Objective #A (Patient Action)    Status: Continued - Date(s):  2/8/2022    Patient will demonstrate and report a level of anxiety that can be managed at a lower level of care.  Absence of persistent anxious mood and report of reduced frequency and intensity of worry and absence of persistent anxious mood to acceptable levels, no panic attacks, report subjective comfort with rumination for a period of 90 days, within 6 months as clinically observed and by patient self-report.    Intervention(s)  Therapist will provide individual therapy to identify triggers to anxiety, gain feedback on helpful coping tools and thought-reframing techniques, and identify preferred way of being. Tx to include discuss current stressors and interpersonal conflicts and how to cope with these, coaching, diagnostic testing , referral for medication as indicated, use prescribed medication, cognitive restructuring, interpersonal,   family therapy, supportive therapy services.        Patient has reviewed and agreed to the above plan.      Katie Faulkner, [facility]   2/8/2022                                                   Wandy Ann            SAFETY PLAN:  Step 1: Warning signs / cues (Thoughts, images, mood, situation, behavior) that a crisis may be developing:  ? Thoughts: thoughts of not wanting to be here  ? Images: no images  ? Thinking Processes: intrusive thoughts  "(bothersome, unwanted thoughts that come out of nowhere): get irritated  ? Mood: intense anger and agitation  ? Behaviors: isolating/withdrawing   ? Situations: trauma    Step 2: Coping strategies - Things I can do to take my mind off of my problems without contacting another person (relaxation technique, physical activity):  ? Distress Tolerance Strategies:  arts and crafts: drawing and painting  ? Physical Activities: exercise: working out  ? Focus on helpful thoughts:  \"This is temporary\", \"It always passes\" and \"Ride the wave\"  Step 3: People and social settings that provide distraction:                 Name: Jennifer     Phone: [phone]                 Name: Antonina         Phone: [phone]                 Name: mom       Phone: [phone]  ? friends house or car   Step 4: Remind myself of people and things that are important to me and worth living for:  Best friend and cat        Step 5: When I am in crisis, I can ask these people to help me use my safety plan:                 Name: Jennifer     Phone: [phone]                 Name: Antonina         Phone: [phone]                 Name: panchito       Phone: [phone]  Step 6: Making the environment safe:   ? be around others  Step 7: Professionals or agencies I can contact during a crisis:  ? [facility] Daytime Number: [phone]  ? Suicide Prevention Lifeline: 0-744-546-YEZI (5302)  ? Crisis Text Line Service (available 24 hours a day, 7 days a week): Text [place] to 977185    Local Crisis Services: 988     Call 911 or go to my nearest emergency department.       I helped develop this safety plan and agree to use it when needed.  I have been given a copy of this plan.       Client signature _________________________________________________________________  Today s date:  2/22/2021  Adapted from Safety Plan Template 2008 Marisol Cazares and Mario Flores is reprinted with the express permission of the authors.  No portion of the Safety " Plan Template may be reproduced without the express, written permission.  You can contact the authors at [email or web address] or [email or web address].     Answers for HPI/ROS submitted by the patient on 12/27/2021  YESSI 7 TOTAL SCORE: 19

## 2022-02-09 ASSESSMENT — ANXIETY QUESTIONNAIRES: GAD7 TOTAL SCORE: 10

## 2022-03-18 DIAGNOSIS — F33.1 MODERATE RECURRENT MAJOR DEPRESSION (H): ICD-10-CM

## 2022-03-18 NOTE — TELEPHONE ENCOUNTER
Date of Last Office Visit: 11/2/21  Date of Next Office Visit: None -Routing to scheduling   No shows since last visit: 0  Cancellations since last visit: 0    Medication requested: venlafaxine (EFFEXOR-XR) 150 MG 24 hr capsule Date last ordered: 11/2/21 Qty: 30 Refills: 3     Review of [place] ?: NA    Lapse in medication adherence greater than 5 days?: yes  If yes, call patient and gather details: attempted - unsuccessful  Medication refill request verified as identical to current order?: yes  Result of Last DAM, VPA, Li+ Level, CBC, or Carbamazepine Level (at or since last visit): N/A    Last visit treatment plan:     1.  Continue talk therapy with Katie    2.  Continue hydroxyzine 10 mg 3 times daily as needed for anxiety-no refills today    3.  Increase venlafaxine ER to 150 mg daily    4.  CMP and vitamin D level at earliest convenience    5.  Consider adding Abilify at your next visit if depression symptoms continue         Continue all other medications as reviewed per electronic medical record today.     Safety plan reviewed. To the Emergency Department as needed or call after hours crisis line at [phone] or [phone]. [place] Crisis Text Line. Text [place] to 210052 or Suicide LifeLine Chat: suicidepreventionlifeline.org/chat/    To schedule individual or family therapy, call [facility] at [phone].    Schedule an appointment with me in December or sooner as needed. Call [facility] at [phone] to schedule.      []Medication refilled per  Medication Refill in Ambulatory Care  policy.  [x]Medication unable to be refilled by RN due to criteria not met as indicated below:    []Eligibility - not seen in the last year   []Supervision - no future appointment   [x]Compliance - no shows, cancellations or lapse in therapy   []Verification - order discrepancy   []Controlled medication   [x]Medication not included in policy   []90-day supply request   []Other

## 2022-03-19 RX ORDER — VENLAFAXINE HYDROCHLORIDE 150 MG/1
150 CAPSULE, EXTENDED RELEASE ORAL DAILY
Qty: 30 CAPSULE | Refills: 1 | Status: SHIPPED | OUTPATIENT
Start: 2022-03-19 | End: 2022-04-29

## 2022-04-01 ENCOUNTER — VIRTUAL VISIT (OUTPATIENT)
Dept: PSYCHOLOGY | Facility: CLINIC | Age: 22
End: 2022-04-01
Payer: COMMERCIAL

## 2022-04-01 DIAGNOSIS — F41.1 GENERALIZED ANXIETY DISORDER: ICD-10-CM

## 2022-04-01 DIAGNOSIS — F33.1 MDD (MAJOR DEPRESSIVE DISORDER), RECURRENT EPISODE, MODERATE (H): Primary | ICD-10-CM

## 2022-04-01 DIAGNOSIS — F43.10 PTSD (POST-TRAUMATIC STRESS DISORDER): ICD-10-CM

## 2022-04-01 PROCEDURE — 90834 PSYTX W PT 45 MINUTES: CPT | Mod: 95 | Performed by: SOCIAL WORKER

## 2022-04-01 ASSESSMENT — ANXIETY QUESTIONNAIRES
7. FEELING AFRAID AS IF SOMETHING AWFUL MIGHT HAPPEN: MORE THAN HALF THE DAYS
2. NOT BEING ABLE TO STOP OR CONTROL WORRYING: MORE THAN HALF THE DAYS
5. BEING SO RESTLESS THAT IT IS HARD TO SIT STILL: SEVERAL DAYS
1. FEELING NERVOUS, ANXIOUS, OR ON EDGE: MORE THAN HALF THE DAYS
3. WORRYING TOO MUCH ABOUT DIFFERENT THINGS: NEARLY EVERY DAY
4. TROUBLE RELAXING: SEVERAL DAYS
6. BECOMING EASILY ANNOYED OR IRRITABLE: SEVERAL DAYS
GAD7 TOTAL SCORE: 12

## 2022-04-01 ASSESSMENT — PATIENT HEALTH QUESTIONNAIRE - PHQ9: SUM OF ALL RESPONSES TO PHQ QUESTIONS 1-9: 11

## 2022-04-01 NOTE — PROGRESS NOTES
[facility]                                     Progress Note    Patient Name: Wandy Ann  Date: 4.1.2022         Service Type: Individual      Session Start Time: 1208  Session End Time: 1248     Session Length: 38-52    Session #: 15    Attendees: Client    Service Modality:  Video Visit:      Provider verified identity through the following two step process.  Patient provided:  Patient is known previously to provider    Telemedicine Visit: The patient's condition can be safely assessed and treated via synchronous audio and visual telemedicine encounter.      Reason for Telemedicine Visit: Services only offered telehealth    Originating Site (Patient Location): Patient's home    Distant Site (Provider Location): Provider Remote Setting- Home Office    Consent:  The patient/guardian has verbally consented to: the potential risks and benefits of telemedicine (video visit) versus in person care; bill my insurance or make self-payment for services provided; and responsibility for payment of non-covered services.     Patient would like the video invitation sent by:  Send to e-mail at: [email or web address]    Mode of Communication:  Video Conference via [employer]    As the provider I attest to compliance with applicable laws and regulations related to telemedicine.    DATA  Interactive Complexity: No  Crisis: No        Progress Since Last Session (Related to Symptoms / Goals / Homework):   Symptoms: Improving per patient the last two weeks have been better    Homework: Partially completed      Episode of Care Goals: Minimal progress - PREPARATION (Decided to change - considering how); Intervened by negotiating a change plan and determining options / strategies for behavior change, identifying triggers, exploring social supports, and working towards setting a date to begin behavior change     Current / Ongoing Stressors and Concerns:   past trauma, developmental hx and current  stressors      Treatment Objective(s) Addressed in This Session:   Patient will demonstrate control of impulses and ability to use positive coping tools to manage strong emotions that can be safely addressed at a lower level of care. Absence of persistent SI and report of reduced frequency and intensity of SI and absence of SI to acceptable levels, report subjective improved mood for a period of 90 days, within 6 months as clinically observed and by patient self-report.  Patient will demonstrate and report a level of depression that can be managed at a lower level of care.  Absence of persistent depression mood and report of reduced frequency and intensity of low mood and absence of persistent low energy and motivation to acceptable levels, report subjective improved motivation and increased energy for a period of 90 days, within 6 months as clinically observed and by patient self-report.  Patient will demonstrate and report a level of anxiety that can be managed at a lower level of care.  Absence of persistent anxious mood and report of reduced frequency and intensity of worry and absence of persistent anxious mood to acceptable levels, no panic attacks, report subjective comfort with rumination for a period of 90 days, within 6 months as clinically observed and by patient self-report.       Intervention:   Motivational Interviewing    MI Intervention: Co-Developed Goal: SI-managing it and following safety plan and coping skills when depression increases, Expressed Empathy/Understanding, Supported Autonomy, Collaboration, Evocation, Permission to raise concern or advise, Open-ended questions and Reflections: simple and complex     Change Talk Expressed by the Patient: Desire to change Ability to change Reasons to change Need to change Committment to change    Provider Response to Change Talk: E - Evoked more info from patient about behavior change, A - Affirmed patient's thoughts, decisions, or attempts at behavior  change, R - Reflected patient's change talk and S - Summarized patient's change talk statements       There has been demonstrated improvement in functioning while patient has been engaged in psychotherapy/psychological service- if withdrawn the patient would deteriorate and/or relapse.       Assessments completed prior to visit:  The following assessments were completed by patient for this visit:  PHQ9:   PHQ-9 SCORE 9/20/2021 9/28/2021 11/2/2021 12/6/2021 12/29/2021 2/8/2022 4/1/2022   PHQ-9 Total Score MyChart - - - - - - -   PHQ-9 Total Score 15 14 24 16 11 11 11   Some encounter information is confidential and restricted. Go to Review [facility] activity to see all data.     GAD7:   YESSI-7 SCORE 9/20/2021 9/28/2021 11/2/2021 12/6/2021 12/27/2021 2/8/2022 4/1/2022   Total Score - - - - 19 (severe anxiety) - -   Total Score 10 9 18 15 19 10 12   Some encounter information is confidential and restricted. Go to Review [facility] activity to see all data.     PROMIS 10-Global Health (all questions and answers displayed):   PROMIS 10 12/27/2021 4/1/2022   In general, would you say your health is: Fair -   In general, would you say your quality of life is: Poor -   In general, how would you rate your physical health? Fair -   In general, how would you rate your mental health, including your mood and your ability to think? Poor -   In general, how would you rate your satisfaction with your social activities and relationships? Fair -   In general, please rate how well you carry out your usual social activities and roles Fair -   To what extent are you able to carry out your everyday physical activities such as walking, climbing stairs, carrying groceries, or moving a chair? Mostly -   How often have you been bothered by emotional problems such as feeling anxious, depressed or irritable? Always -   How would you rate your fatigue on average? Moderate -   How would you rate your pain on average?   0 = No Pain  to  10 =  Worst Imaginable Pain 7 -   In general, would you say your health is: 2 3   In general, would you say your quality of life is: 1 4   In general, how would you rate your physical health? 2 3   In general, how would you rate your mental health, including your mood and your ability to think? 1 3   In general, how would you rate your satisfaction with your social activities and relationships? 2 4   In general, please rate how well you carry out your usual social activities and roles. (This includes activities at home, at work and in your community, and responsibilities as a parent, child, spouse, employee, friend, etc.) 2 4   To what extent are you able to carry out your everyday physical activities such as walking, climbing stairs, carrying groceries, or moving a chair? 4 4   In the past 7 days, how often have you been bothered by emotional problems such as feeling anxious, depressed, or irritable? 5 3   In the past 7 days, how would you rate your fatigue on average? 3 3   In the past 7 days, how would you rate your pain on average, where 0 means no pain, and 10 means worst imaginable pain? 7 8   Global Mental Health Score 5 14   Global Physical Health Score 11 12   PROMIS TOTAL - SUBSCORES 16 26         ASSESSMENT: Current Emotional / Mental Status (status of significant symptoms):   Risk status (Self / Other harm or suicidal ideation)   Patient denies current fears or concerns for personal safety.   Patient reports the following current or recent suicidal ideation or behaviors: pt reported SI three weeks ago when she wasn't taking medications.   Patient denies current or recent homicidal ideation or behaviors.   Patient denies current or recent self injurious behavior or ideation.   Patient denies other safety concerns.   Patient reports there has been no change in risk factors since their last session.     Patient reports there has been no change in protective factors since their last session.     A safety and risk  management plan has been developed including: Patient consented to co-developed safety plan on 2.21.2022.  Safety and risk management plan was reviewed.   Patient agreed to use safety plan should any safety concerns arise.  A copy was made available to the patient.     Appearance:   Appropriate    Eye Contact:   Fair    Psychomotor Behavior: Normal    Attitude:   Cooperative    Orientation:   All   Speech    Rate / Production: Emotional Talkative    Volume:  Normal    Mood:    Anxious    Affect:    Worrisome    Thought Content:  Clear    Thought Form:  Coherent    Insight:    Fair      Medication Review:   No changes to current psychiatric medication(s)     Medication Compliance:   Yes     Changes in Health Issues:   Yes: Pain, Associated Psychological Distress     Chemical Use Review:   Substance Use: Chemical use reviewed, no active concerns identified      Tobacco Use: No current tobacco use.      Diagnosis:  1. MDD (major depressive disorder), recurrent episode, moderate (H)    2. Generalized anxiety disorder    3. PTSD (post-traumatic stress disorder)        Collateral Reports Completed:   Not Applicable    PLAN: (Patient Tasks / Therapist Tasks / Other)  Make visual safety plan   Manage depressive symptoms with coping skills  When feeling SI follow safety plan        Katie Faulkner, [facility] 4.1.2022                                                         ______________________________________________________________________    Individual Treatment Plan    Patient's Name: Wandy Ann  YOB: 2000    Date of Creation: 2.22.2021  Date Treatment Plan Last Reviewed/Revised: 2/8/2022 due 5/8/2022    DSM5 Diagnoses: 296.32 (F33.1) Major Depressive Disorder, Recurrent Episode, Moderate With mixed features, 300.02 (F41.1) Generalized Anxiety Disorder or 309.81 (F43.10) Posttraumatic Stress Disorder (includes Posttraumatic Stress Disorder for Children 6 Years and Younger)  Without dissociative  symptoms  Psychosocial / Contextual Factors: past trauma, developmental hx and current stressors   PROMIS (reviewed every 90 days):     Referral / Collaboration:  Referral to another professional/service is not indicated at this time..    Anticipated number of session for this episode of care: 12  Anticipation frequency of session: Biweekly  Anticipated Duration of each session: 38-52 minutes  Treatment plan will be reviewed in 90 days or when goals have been changed.       MeasurableTreatment Goal(s) related to diagnosis / functional impairment(s)  Goal 1: Patient will report absence of persistent SI and report of reduced frequency and intensity of SI and absence of SI to acceptable levels, report subjective improved mood for a period of 90 days, within 6 months as clinically observed and by patient self-report    I will know I've met my goal when I can see the difference between a bad moment and what I want in th future.      Objective #A (Patient Action)    Patient will demonstrate control of impulses and ability to use positive coping tools to manage strong emotions that can be safely addressed at a lower level of care. Absence of persistent SI and report of reduced frequency and intensity of SI and absence of SI to acceptable levels, report subjective improved mood for a period of 90 days, within 6 months as clinically observed and by patient self-report.  Status: Continued - Date(s):  2/8/2022    Intervention(s)  Therapist will provide individual therapy to identify triggers to SI urges, gain feedback on helpful coping strategies, and identify ways that family can offer support. Tx to discuss current stressors and interpersonal conflicts and how to cope with these, coaching, diagnostic testing, referral for medication as indicated, use prescribed medication, cognitive restructuring, interpersonal family therapy, supportive therapy services.        Goal 2: Patient will report absence of persistent depression  mood and report of reduced frequency and intensity of low mood and absence of persistent low energy and motivation to acceptable levels, report subjective improved motivation and increased energy for a period of 90 days, within 6 months as clinically observed and by patient self-report    I will know I've met my goal when I no longer feel sad.      Objective #A (Patient Action)    Status: Continued - Date(s):  2/8/2022    Patient will demonstrate and report a level of depression that can be managed at a lower level of care.  Absence of persistent depression mood and report of reduced frequency and intensity of low mood and absence of persistent low energy and motivation to acceptable levels, report subjective improved motivation and increased energy for a period of 90 days, within 6 months as clinically observed and by patient self-report.    Intervention(s)  Therapist will provide individual therapy to identify triggers to depression, gain feedback on helpful coping tools and thought-reframing techniques, and identify preferred way of being.  Tx to include discussion of current stressors and interpersonal conflicts and how to cope with these, coaching, diagnostic testing, referral for medication as indicated, use prescribed medication, cognitive restructuring, interpersonal, family therapy, supportive therapy services.        Goal 3: Patiient will report absence of persistent anxiety mood and report of reduced frequency and intensity of worry and absence of persistent anxious mood to acceptable levels, no panic attacks, report subjective comfort with rumination for a period of 90 days. Within 6 months as clinically observed and by patient self-report    I will know I've met my goal when I can go days without worrying about little things or shaking.      Objective #A (Patient Action)    Status: Continued - Date(s):  2/8/2022    Patient will demonstrate and report a level of anxiety that can be managed at a lower  "level of care.  Absence of persistent anxious mood and report of reduced frequency and intensity of worry and absence of persistent anxious mood to acceptable levels, no panic attacks, report subjective comfort with rumination for a period of 90 days, within 6 months as clinically observed and by patient self-report.    Intervention(s)  Therapist will provide individual therapy to identify triggers to anxiety, gain feedback on helpful coping tools and thought-reframing techniques, and identify preferred way of being. Tx to include discuss current stressors and interpersonal conflicts and how to cope with these, coaching, diagnostic testing , referral for medication as indicated, use prescribed medication, cognitive restructuring, interpersonal,   family therapy, supportive therapy services.        Patient has reviewed and agreed to the above plan.      Katie Faulkner, [facility]   2/8/2022                                                   Wandy Ann            SAFETY PLAN:  Step 1: Warning signs / cues (Thoughts, images, mood, situation, behavior) that a crisis may be developing:  ? Thoughts: thoughts of not wanting to be here  ? Images: no images  ? Thinking Processes: intrusive thoughts (bothersome, unwanted thoughts that come out of nowhere): get irritated  ? Mood: intense anger and agitation  ? Behaviors: isolating/withdrawing   ? Situations: trauma    Step 2: Coping strategies - Things I can do to take my mind off of my problems without contacting another person (relaxation technique, physical activity):  ? Distress Tolerance Strategies:  arts and crafts: drawing and painting  ? Physical Activities: exercise: working out  ? Focus on helpful thoughts:  \"This is temporary\", \"It always passes\" and \"Ride the wave\"  Step 3: People and social settings that provide distraction:                 Name: Jennifer     Phone: [phone]                 Name: Antonina         Phone: [phone]                 Name: " mom       Phone: [phone]  ? friends house or car   Step 4: Remind myself of people and things that are important to me and worth living for:  Best friend and cat        Step 5: When I am in crisis, I can ask these people to help me use my safety plan:                 Name: Jennifer     Phone: [phone]                 Name: Antonina         Phone: [phone]                 Name: panchito       Phone: [phone]  Step 6: Making the environment safe:   ? be around others  Step 7: Professionals or agencies I can contact during a crisis:  ? [facility] Number: [phone]  ? Suicide Prevention Lifeline: 6-688-320-IJDO (3972)  ? Crisis Text Line Service (available 24 hours a day, 7 days a week): Text [place] to 431079    Local Crisis Services: 988     Call 911 or go to my nearest emergency department.       I helped develop this safety plan and agree to use it when needed.  I have been given a copy of this plan.       Client signature _________________________________________________________________  Today s date:  2/22/2021  Adapted from Safety Plan Template 2008 Marisol Cazares and Mario Flores is reprinted with the express permission of the authors.  No portion of the Safety Plan Template may be reproduced without the express, written permission.  You can contact the authors at [email or web address] or [email or web address].

## 2022-04-02 ASSESSMENT — ANXIETY QUESTIONNAIRES: GAD7 TOTAL SCORE: 12

## 2022-04-27 ENCOUNTER — E-VISIT (OUTPATIENT)
Dept: PSYCHIATRY | Facility: CLINIC | Age: 22
End: 2022-04-27
Payer: COMMERCIAL

## 2022-04-27 DIAGNOSIS — F48.9 DIAGNOSIS DEFERRED ON AXIS I: Primary | ICD-10-CM

## 2022-04-27 ASSESSMENT — PATIENT HEALTH QUESTIONNAIRE - PHQ9
SUM OF ALL RESPONSES TO PHQ QUESTIONS 1-9: 16
SUM OF ALL RESPONSES TO PHQ QUESTIONS 1-9: 16
10. IF YOU CHECKED OFF ANY PROBLEMS, HOW DIFFICULT HAVE THESE PROBLEMS MADE IT FOR YOU TO DO YOUR WORK, TAKE CARE OF THINGS AT HOME, OR GET ALONG WITH OTHER PEOPLE: SOMEWHAT DIFFICULT

## 2022-04-27 ASSESSMENT — ANXIETY QUESTIONNAIRES
1. FEELING NERVOUS, ANXIOUS, OR ON EDGE: SEVERAL DAYS
GAD7 TOTAL SCORE: 7
7. FEELING AFRAID AS IF SOMETHING AWFUL MIGHT HAPPEN: SEVERAL DAYS
4. TROUBLE RELAXING: SEVERAL DAYS
2. NOT BEING ABLE TO STOP OR CONTROL WORRYING: SEVERAL DAYS
GAD7 TOTAL SCORE: 7
GAD7 TOTAL SCORE: 7
8. IF YOU CHECKED OFF ANY PROBLEMS, HOW DIFFICULT HAVE THESE MADE IT FOR YOU TO DO YOUR WORK, TAKE CARE OF THINGS AT HOME, OR GET ALONG WITH OTHER PEOPLE?: NOT DIFFICULT AT ALL
5. BEING SO RESTLESS THAT IT IS HARD TO SIT STILL: NOT AT ALL
6. BECOMING EASILY ANNOYED OR IRRITABLE: SEVERAL DAYS
7. FEELING AFRAID AS IF SOMETHING AWFUL MIGHT HAPPEN: SEVERAL DAYS
3. WORRYING TOO MUCH ABOUT DIFFERENT THINGS: MORE THAN HALF THE DAYS

## 2022-04-28 ENCOUNTER — HOSPITAL ENCOUNTER (EMERGENCY)
Facility: CLINIC | Age: 22
Discharge: HOME | End: 2022-04-29
Attending: EMERGENCY MEDICINE | Admitting: EMERGENCY MEDICINE
Payer: COMMERCIAL

## 2022-04-28 ENCOUNTER — TELEPHONE (OUTPATIENT)
Dept: PSYCHIATRY | Facility: CLINIC | Age: 22
End: 2022-04-28
Payer: COMMERCIAL

## 2022-04-28 ENCOUNTER — VIRTUAL VISIT (OUTPATIENT)
Dept: PSYCHOLOGY | Facility: CLINIC | Age: 22
End: 2022-04-28
Payer: COMMERCIAL

## 2022-04-28 DIAGNOSIS — F41.1 GENERALIZED ANXIETY DISORDER: ICD-10-CM

## 2022-04-28 DIAGNOSIS — F33.1 MDD (MAJOR DEPRESSIVE DISORDER), RECURRENT EPISODE, MODERATE (H): Primary | ICD-10-CM

## 2022-04-28 DIAGNOSIS — F43.10 PTSD (POST-TRAUMATIC STRESS DISORDER): ICD-10-CM

## 2022-04-28 DIAGNOSIS — F33.1 MODERATE RECURRENT MAJOR DEPRESSION (H): ICD-10-CM

## 2022-04-28 DIAGNOSIS — F33.1 MAJOR DEPRESSIVE DISORDER, RECURRENT EPISODE, MODERATE (H): ICD-10-CM

## 2022-04-28 DIAGNOSIS — F41.1 GAD (GENERALIZED ANXIETY DISORDER): ICD-10-CM

## 2022-04-28 LAB — SARS-COV-2 RNA RESP QL NAA+PROBE: NEGATIVE

## 2022-04-28 PROCEDURE — C9803 HOPD COVID-19 SPEC COLLECT: HCPCS

## 2022-04-28 PROCEDURE — 90791 PSYCH DIAGNOSTIC EVALUATION: CPT

## 2022-04-28 PROCEDURE — U0005 INFEC AGEN DETEC AMPLI PROBE: HCPCS | Performed by: EMERGENCY MEDICINE

## 2022-04-28 PROCEDURE — 99285 EMERGENCY DEPT VISIT HI MDM: CPT | Mod: 25

## 2022-04-28 PROCEDURE — 90834 PSYTX W PT 45 MINUTES: CPT | Mod: 95 | Performed by: SOCIAL WORKER

## 2022-04-28 PROCEDURE — U0003 INFECTIOUS AGENT DETECTION BY NUCLEIC ACID (DNA OR RNA); SEVERE ACUTE RESPIRATORY SYNDROME CORONAVIRUS 2 (SARS-COV-2) (CORONAVIRUS DISEASE [COVID-19]), AMPLIFIED PROBE TECHNIQUE, MAKING USE OF HIGH THROUGHPUT TECHNOLOGIES AS DESCRIBED BY CMS-2020-01-R: HCPCS | Performed by: EMERGENCY MEDICINE

## 2022-04-28 ASSESSMENT — ANXIETY QUESTIONNAIRES: GAD7 TOTAL SCORE: 7

## 2022-04-28 ASSESSMENT — PATIENT HEALTH QUESTIONNAIRE - PHQ9: SUM OF ALL RESPONSES TO PHQ QUESTIONS 1-9: 16

## 2022-04-28 NOTE — TELEPHONE ENCOUNTER
Called patient and she reported the following:  I just wanted to see if we can add a differnet medication or schedule an appointment to discuss. RN reminded patient that she can utilize her mychart and send detail message to provider and that she has the option of getting care due to urgent need by going into the [facility] unit at [facility].     Informed patient that RN can send details for scheduling and [facility] to her mychart since she is driving and cannot write down information. Patient agreed and stated that she is safe.

## 2022-04-28 NOTE — PROGRESS NOTES
[facility]                                     Progress Note    Patient Name: Wandy Ann  Date: 4.28.2022         Service Type: Individual      Session Start Time: 1506  Session End Time: 1552     Session Length: 38-52    Session #: 16    Attendees: Client    Service Modality:  Video Visit:      Provider verified identity through the following two step process.  Patient provided:  Patient is known previously to provider    Telemedicine Visit: The patient's condition can be safely assessed and treated via synchronous audio and visual telemedicine encounter.      Reason for Telemedicine Visit: Services only offered telehealth    Originating Site (Patient Location): Patient's home    Distant Site (Provider Location): Provider Remote Setting- Home Office    Consent:  The patient/guardian has verbally consented to: the potential risks and benefits of telemedicine (video visit) versus in person care; bill my insurance or make self-payment for services provided; and responsibility for payment of non-covered services.     Patient would like the video invitation sent by:  Send to e-mail at: [email or web address]    Mode of Communication:  Video Conference via [employer]    As the provider I attest to compliance with applicable laws and regulations related to telemedicine.    DATA  Interactive Complexity: No  Crisis: No        Progress Since Last Session (Related to Symptoms / Goals / Homework):   Symptoms: Improving per patient the last two weeks have been better    Homework: Partially completed      Episode of Care Goals: Minimal progress - PREPARATION (Decided to change - considering how); Intervened by negotiating a change plan and determining options / strategies for behavior change, identifying triggers, exploring social supports, and working towards setting a date to begin behavior change     Current / Ongoing Stressors and Concerns:   past trauma, developmental hx and current  stressors      Treatment Objective(s) Addressed in This Session:   Patient will demonstrate control of impulses and ability to use positive coping tools to manage strong emotions that can be safely addressed at a lower level of care. Absence of persistent SI and report of reduced frequency and intensity of SI and absence of SI to acceptable levels, report subjective improved mood for a period of 90 days, within 6 months as clinically observed and by patient self-report.  Patient will demonstrate and report a level of depression that can be managed at a lower level of care.  Absence of persistent depression mood and report of reduced frequency and intensity of low mood and absence of persistent low energy and motivation to acceptable levels, report subjective improved motivation and increased energy for a period of 90 days, within 6 months as clinically observed and by patient self-report.  Patient will demonstrate and report a level of anxiety that can be managed at a lower level of care.  Absence of persistent anxious mood and report of reduced frequency and intensity of worry and absence of persistent anxious mood to acceptable levels, no panic attacks, report subjective comfort with rumination for a period of 90 days, within 6 months as clinically observed and by patient self-report.       Intervention:   Therapist met with patient to review goals and interventions. Therapist utilized reflected listening as patient gave brief reflection of week. Patient reported having a difficult week and processed SI. Therapist supported patient as she processed and validated patient's emotions. Therapist offered patient hope and education with cognitive distortions vs her saying I do not want to die. Therapist reviewed different treatments such as ECT, TMS, medication changes and therapy. Patient contracted for safety and therapist encouraged patient to go to the [facility] if feeling unsafe. Patient agreed to go and to talk to  someone at [facility] today or tomorrow about medication change.   Patient presented with an anxious affect. Patient was engaged in session and open to feedback.        There has been demonstrated improvement in functioning while patient has been engaged in psychotherapy/psychological service- if withdrawn the patient would deteriorate and/or relapse.       Assessments completed prior to visit:  The following assessments were completed by patient for this visit:  PHQ9:   PHQ-9 SCORE 9/28/2021 11/2/2021 12/6/2021 12/29/2021 2/8/2022 4/1/2022 4/27/2022   PHQ-9 Total Score MyChart - - - - - - 16 (Moderately severe depression)   PHQ-9 Total Score 14 24 16 11 11 11 16   Some encounter information is confidential and restricted. Go to Review [employer] activity to see all data.     GAD7:   YESSI-7 SCORE 9/28/2021 11/2/2021 12/6/2021 12/27/2021 2/8/2022 4/1/2022 4/27/2022   Total Score - - - 19 (severe anxiety) - - 7 (mild anxiety)   Total Score 9 18 15 19 10 12 7   Some encounter information is confidential and restricted. Go to Review [employer] activity to see all data.     PROMIS 10-Global Health (all questions and answers displayed):   PROMIS 10 12/27/2021 4/1/2022   In general, would you say your health is: Fair -   In general, would you say your quality of life is: Poor -   In general, how would you rate your physical health? Fair -   In general, how would you rate your mental health, including your mood and your ability to think? Poor -   In general, how would you rate your satisfaction with your social activities and relationships? Fair -   In general, please rate how well you carry out your usual social activities and roles Fair -   To what extent are you able to carry out your everyday physical activities such as walking, climbing stairs, carrying groceries, or moving a chair? Mostly -   How often have you been bothered by emotional problems such as feeling anxious, depressed or irritable? Always -   How would you  rate your fatigue on average? Moderate -   How would you rate your pain on average?   0 = No Pain  to  10 = Worst Imaginable Pain 7 -   In general, would you say your health is: 2 3   In general, would you say your quality of life is: 1 4   In general, how would you rate your physical health? 2 3   In general, how would you rate your mental health, including your mood and your ability to think? 1 3   In general, how would you rate your satisfaction with your social activities and relationships? 2 4   In general, please rate how well you carry out your usual social activities and roles. (This includes activities at home, at work and in your community, and responsibilities as a parent, child, spouse, employee, friend, etc.) 2 4   To what extent are you able to carry out your everyday physical activities such as walking, climbing stairs, carrying groceries, or moving a chair? 4 4   In the past 7 days, how often have you been bothered by emotional problems such as feeling anxious, depressed, or irritable? 5 3   In the past 7 days, how would you rate your fatigue on average? 3 3   In the past 7 days, how would you rate your pain on average, where 0 means no pain, and 10 means worst imaginable pain? 7 8   Global Mental Health Score 5 14   Global Physical Health Score 11 12   PROMIS TOTAL - SUBSCORES 16 26         ASSESSMENT: Current Emotional / Mental Status (status of significant symptoms):   Risk status (Self / Other harm or suicidal ideation)   Patient denies current fears or concerns for personal safety.   Patient reports the following current or recent suicidal ideation or behaviors: SI pt processed and contracted for safety if SI increases pt will go to the [facility].   Patient denies current or recent homicidal ideation or behaviors.   Patient denies current or recent self injurious behavior or ideation.   Patient denies other safety concerns.   Patient reports there has been no change in risk factors since their last  session.     Patient reports there has been no change in protective factors since their last session.     A safety and risk management plan has been developed including: Patient consented to co-developed safety plan on 2.21.2022.  Safety and risk management plan was reviewed.   Patient agreed to use safety plan should any safety concerns arise.  A copy was made available to the patient.     Appearance:   Appropriate    Eye Contact:   Poor   Psychomotor Behavior: Restless    Attitude:   Cooperative    Orientation:   All   Speech    Rate / Production: Emotional Talkative    Volume:  Normal    Mood:    Anxious  Depressed  Sad    Affect:    Tearful Worrisome    Thought Content:  Clear    Thought Form:  Coherent    Insight:    Fair      Medication Review:   No changes to current psychiatric medication(s)     Medication Compliance:   Yes     Changes in Health Issues:   Yes: Pain, Associated Psychological Distress     Chemical Use Review:   Substance Use: Chemical use reviewed, no active concerns identified      Tobacco Use: No current tobacco use.      Diagnosis:  1. MDD (major depressive disorder), recurrent episode, moderate (H)    2. Generalized anxiety disorder    3. PTSD (post-traumatic stress disorder)        Collateral Reports Completed:   Not Applicable    PLAN: (Patient Tasks / Therapist Tasks / Other)  Make visual safety plan   Manage depressive symptoms with coping skills  When feeling SI follow safety plan  Therapist offered patient hope and education with cognitive distortions vs her saying I do not want to die. Therapist reviewed different treatments such as ECT, TMS, medication changes and therapy. Patient contracted for safety and therapist encouraged patient to go to the [facility] if feeling unsafe. Patient agreed to go and to talk to someone at [facility] today or tomorrow about medication change.       Katie Faulkner, [facility] 4.28.2022                                                          ______________________________________________________________________    Individual Treatment Plan    Patient's Name: Wandy Ann  YOB: 2000    Date of Creation: 2.22.2021  Date Treatment Plan Last Reviewed/Revised: 2/8/2022 due 5/8/2022    DSM5 Diagnoses: 296.32 (F33.1) Major Depressive Disorder, Recurrent Episode, Moderate With mixed features, 300.02 (F41.1) Generalized Anxiety Disorder or 309.81 (F43.10) Posttraumatic Stress Disorder (includes Posttraumatic Stress Disorder for Children 6 Years and Younger)  Without dissociative symptoms  Psychosocial / Contextual Factors: past trauma, developmental hx and current stressors   PROMIS (reviewed every 90 days):     Referral / Collaboration:  Referral to another professional/service is not indicated at this time..    Anticipated number of session for this episode of care: 12  Anticipation frequency of session: Biweekly  Anticipated Duration of each session: 38-52 minutes  Treatment plan will be reviewed in 90 days or when goals have been changed.       MeasurableTreatment Goal(s) related to diagnosis / functional impairment(s)  Goal 1: Patient will report absence of persistent SI and report of reduced frequency and intensity of SI and absence of SI to acceptable levels, report subjective improved mood for a period of 90 days, within 6 months as clinically observed and by patient self-report    I will know I've met my goal when I can see the difference between a bad moment and what I want in th future.      Objective #A (Patient Action)    Patient will demonstrate control of impulses and ability to use positive coping tools to manage strong emotions that can be safely addressed at a lower level of care. Absence of persistent SI and report of reduced frequency and intensity of SI and absence of SI to acceptable levels, report subjective improved mood for a period of 90 days, within 6 months as clinically observed and by patient  self-report.  Status: Continued - Date(s):  2/8/2022    Intervention(s)  Therapist will provide individual therapy to identify triggers to SI urges, gain feedback on helpful coping strategies, and identify ways that family can offer support. Tx to discuss current stressors and interpersonal conflicts and how to cope with these, coaching, diagnostic testing, referral for medication as indicated, use prescribed medication, cognitive restructuring, interpersonal family therapy, supportive therapy services.        Goal 2: Patient will report absence of persistent depression mood and report of reduced frequency and intensity of low mood and absence of persistent low energy and motivation to acceptable levels, report subjective improved motivation and increased energy for a period of 90 days, within 6 months as clinically observed and by patient self-report    I will know I've met my goal when I no longer feel sad.      Objective #A (Patient Action)    Status: Continued - Date(s):  2/8/2022    Patient will demonstrate and report a level of depression that can be managed at a lower level of care.  Absence of persistent depression mood and report of reduced frequency and intensity of low mood and absence of persistent low energy and motivation to acceptable levels, report subjective improved motivation and increased energy for a period of 90 days, within 6 months as clinically observed and by patient self-report.    Intervention(s)  Therapist will provide individual therapy to identify triggers to depression, gain feedback on helpful coping tools and thought-reframing techniques, and identify preferred way of being.  Tx to include discussion of current stressors and interpersonal conflicts and how to cope with these, coaching, diagnostic testing, referral for medication as indicated, use prescribed medication, cognitive restructuring, interpersonal, family therapy, supportive therapy services.        Goal 3: Patiient will  report absence of persistent anxiety mood and report of reduced frequency and intensity of worry and absence of persistent anxious mood to acceptable levels, no panic attacks, report subjective comfort with rumination for a period of 90 days. Within 6 months as clinically observed and by patient self-report    I will know I've met my goal when I can go days without worrying about little things or shaking.      Objective #A (Patient Action)    Status: Continued - Date(s):  2/8/2022    Patient will demonstrate and report a level of anxiety that can be managed at a lower level of care.  Absence of persistent anxious mood and report of reduced frequency and intensity of worry and absence of persistent anxious mood to acceptable levels, no panic attacks, report subjective comfort with rumination for a period of 90 days, within 6 months as clinically observed and by patient self-report.    Intervention(s)  Therapist will provide individual therapy to identify triggers to anxiety, gain feedback on helpful coping tools and thought-reframing techniques, and identify preferred way of being. Tx to include discuss current stressors and interpersonal conflicts and how to cope with these, coaching, diagnostic testing , referral for medication as indicated, use prescribed medication, cognitive restructuring, interpersonal,   family therapy, supportive therapy services.        Patient has reviewed and agreed to the above plan.      Katie Faulkner, [facility]   2/8/2022                                                   Wandy Ann            SAFETY PLAN:  Step 1: Warning signs / cues (Thoughts, images, mood, situation, behavior) that a crisis may be developing:  ? Thoughts: thoughts of not wanting to be here  ? Images: no images  ? Thinking Processes: intrusive thoughts (bothersome, unwanted thoughts that come out of nowhere): get irritated  ? Mood: intense anger and agitation  ? Behaviors: isolating/withdrawing  "  ? Situations: trauma    Step 2: Coping strategies - Things I can do to take my mind off of my problems without contacting another person (relaxation technique, physical activity):  ? Distress Tolerance Strategies:  arts and crafts: drawing and painting  ? Physical Activities: exercise: working out  ? Focus on helpful thoughts:  \"This is temporary\", \"It always passes\" and \"Ride the wave\"  Step 3: People and social settings that provide distraction:                 Name: Jennifer     Phone: [phone]                 Name: Antonina         Phone: [phone]                 Name: panchito       Phone: [phone]  ? friends house or car   Step 4: Remind myself of people and things that are important to me and worth living for:  Best friend and cat        Step 5: When I am in crisis, I can ask these people to help me use my safety plan:                 Name: Jennifer     Phone: [phone]                 Name: Antonina         Phone: [phone]                 Name: panchito       Phone: [phone]  Step 6: Making the environment safe:   ? be around others  Step 7: Professionals or agencies I can contact during a crisis:  ? [facility] Daytime Number: [phone]  ? Suicide Prevention Lifeline: 9-367-119-WYPC (8080)  ? Crisis Text Line Service (available 24 hours a day, 7 days a week): Text [place] to 893940    Local Crisis Services: 988     Call 911 or go to my nearest emergency department.       I helped develop this safety plan and agree to use it when needed.  I have been given a copy of this plan.       Client signature _________________________________________________________________  Today s date:  2/22/2021  Adapted from Safety Plan Template 2008 Marisol Cazares and Mario Flores is reprinted with the express permission of the authors.  No portion of the Safety Plan Template may be reproduced without the express, written permission.  You can contact the authors at bhs@[email or web address].[email or web address] or " [email or web address].

## 2022-04-29 VITALS
WEIGHT: 203 LBS | SYSTOLIC BLOOD PRESSURE: 122 MMHG | HEIGHT: 60 IN | DIASTOLIC BLOOD PRESSURE: 82 MMHG | BODY MASS INDEX: 39.85 KG/M2 | OXYGEN SATURATION: 98 % | RESPIRATION RATE: 16 BRPM | TEMPERATURE: 97.8 F | HEART RATE: 109 BPM

## 2022-04-29 PROBLEM — F33.1 MAJOR DEPRESSIVE DISORDER, RECURRENT EPISODE, MODERATE (H): Status: ACTIVE | Noted: 2022-04-29

## 2022-04-29 PROBLEM — F41.1 GAD (GENERALIZED ANXIETY DISORDER): Status: ACTIVE | Noted: 2022-04-29

## 2022-04-29 PROCEDURE — 99236 HOSP IP/OBS SAME DATE HI 85: CPT | Performed by: PSYCHIATRY & NEUROLOGY

## 2022-04-29 PROCEDURE — 250N000013 HC RX MED GY IP 250 OP 250 PS 637: Performed by: PSYCHIATRY & NEUROLOGY

## 2022-04-29 RX ORDER — ARIPIPRAZOLE 2 MG/1
2 TABLET ORAL DAILY
Qty: 30 TABLET | Refills: 0 | Status: SHIPPED | OUTPATIENT
Start: 2022-04-29 | End: 2022-06-01

## 2022-04-29 RX ORDER — HYDROXYZINE HYDROCHLORIDE 10 MG/1
10 TABLET, FILM COATED ORAL 3 TIMES DAILY PRN
Status: DISCONTINUED | OUTPATIENT
Start: 2022-04-29 | End: 2022-04-29 | Stop reason: HOSPADM

## 2022-04-29 RX ORDER — ARIPIPRAZOLE 2 MG/1
2 TABLET ORAL DAILY
Status: DISCONTINUED | OUTPATIENT
Start: 2022-04-29 | End: 2022-04-29 | Stop reason: HOSPADM

## 2022-04-29 RX ORDER — VENLAFAXINE HYDROCHLORIDE 150 MG/1
150 CAPSULE, EXTENDED RELEASE ORAL DAILY
Status: DISCONTINUED | OUTPATIENT
Start: 2022-04-29 | End: 2022-04-29 | Stop reason: HOSPADM

## 2022-04-29 RX ORDER — VENLAFAXINE HYDROCHLORIDE 225 MG/1
225 TABLET, EXTENDED RELEASE ORAL DAILY
Qty: 30 TABLET | Refills: 0 | Status: SHIPPED | OUTPATIENT
Start: 2022-04-29 | End: 2022-06-01

## 2022-04-29 RX ADMIN — ARIPIPRAZOLE 2 MG: 2 TABLET ORAL at 10:42

## 2022-04-29 RX ADMIN — VENLAFAXINE HYDROCHLORIDE 150 MG: 150 CAPSULE, EXTENDED RELEASE ORAL at 09:06

## 2022-04-29 NOTE — ED NOTES
Patient has passive chronic suicidal thoughts for the past year with no plan.  Patient contracts for safety Reviewed safety plan, follow up care plan, and out patient rescources given to the patient. Reviewed medication regime including any changes to existing medications and reviewed new medications. Patient verbalizes understanding of these things along with all discharge instructions.  Patient has a copy of the AVS and verbalizes understanding of them. All belongings brought into the hospital were given to the patient at discharge.  Patient escorted off the unit. Patient discharged to home in her own car at 1150.
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
## 2022-04-29 NOTE — ED TRIAGE NOTES
Worsening depression, suicidal thoughts with no plan. Able to contract for safety. Patient spoke with her therapist today who felt she needed to have her medications adjusted. She was unable to get an appointment with her psychiatrist.      Triage Assessment     Row Name 04/28/22 2112       Triage Assessment (Adult)    Airway WDL WDL       Respiratory WDL    Respiratory WDL WDL       Skin Circulation/Temperature WDL    Skin Circulation/Temperature WDL WDL       Cardiac WDL    Cardiac WDL WDL       Peripheral/Neurovascular WDL    Peripheral Neurovascular WDL WDL       Cognitive/Neuro/Behavioral WDL    Cognitive/Neuro/Behavioral WDL WDL

## 2022-04-29 NOTE — ED NOTES
21 year old female with history of MDDreceived from ED due to the therapist recommending she come to [facility] for med changes as she cannot see her care provider until June or July.  Patient has chronic suicidal ideation with no plan for the last year. She contracts for safety. Nursing and risk assessments completed. Assessments reviewed with [facility] and physician. Video monitoring in progress, patient informed.  Admission information reviewed with patient. Patient given a tour of [facility] and instructions on using the facility. Questions regarding [facility] addressed. Pt search completed and belongings inventoried.

## 2022-04-29 NOTE — DISCHARGE INSTRUCTIONS
Safety/Aftercare Plan:      If I am feeling unsafe or I am in a crisis, I will:   Contact my established care providers   Call the National Suicide Prevention Lifeline: 665.485.9724   Go to the nearest emergency room   Call 911          Warning signs that I or other people might notice when a crisis is developing for me: I begin isolating, having anxious thoughts, trouble concentrating.    Things I am able to do on my own to cope or help me feel better: Play with my cats, meditate, exercise, play piano.     Things that I am able to do with others to cope or help me better: Call friends, spend time with them, get dinner with them.     Things I can use or do for distraction: Use my phone, play TapFit, read a book.     Changes I can make to support my mental health and wellness: Surround myself with others that make me feel good, exercise, go for walks outside.     People in my life that I can ask for help: Best friend Jennifer and her mom, therapist.     Your [facility] has a mental health crisis team you can call 24/7: [facility] Crisis Line Number: [phone]     Other things that are important when I m in crisis: Offer me to stay at their place, offer to hang out, sit and listen and offer advice.     Additional resources and appointment information: Continue to follow up with established individual therapist and psychiatrist to address mental health concerns.     Crisis Lines  Crisis Text Line  Text 832240  You will be connected with a trained live crisis counselor to provide support.    Gambling Hotline  1.800.437.hope [4673]    National Hope Line  1.800.SUICIDE [6236543]    National Suicide Prevention Lifeline  Free and confidential support  1.592.575.TALK [4782]  http://suicidepreventionlifeline.org    The Kennedy Project (LGBTQ Youth Crisis Line)  5.999.788.6118  text START to 695-780    's Crisis Line  6.352.216.2524 (Press 1)  or text 545894    [employer] Tracker   "Linking people to mental health and substance use disorder resources  LuvocracyckKONUXn.Groupspeak     [place] Mental Health Warm Line  Peer to peer support  Monday thru Saturday, 12 pm to 10 pm  251.817.1241 or 2.823.732.6180  Text \"Support\" to 12594    National [place] on Mental Illness (MELECIO)  807.287.4042 or 1.888.MELECIO.HELPS    Walk-in Counseling Center  Free mental health counseling  [address] [facility]  953.274.2220    Mental Health Apps  My3  https://[employer].Groupspeak/    VirtualHopeBox  https://Naroomi/apps/virtual-hope-box/    Suicide Safety Plan ([employer])    Calm Harm      Additional information:  Today you were seen by a licensed mental health professional through Triage and Transition services, [facility] ([facility])  for a crisis assessment in the Emergency Department at [facility].  It is recommended that you follow up with your established providers (psychiatrist, mental health therapist, and/or primary care doctor - as relevant) as soon as possible. Coordinators from [facility] will be calling you in the next 24-48 hours to ensure that you have the resources you need.  You can also contact [facility] coordinators directly at [phone]. You may have been scheduled for or offered an appointment with a mental health provider. [facility] maintains an extensive network of licensed behavioral health providers to connect patients with the services they need.  We do not charge providers a fee to participate in our referral network.  We match patients with providers based on a patient's specific needs, insurance coverage, and location.  Our first effort will be to refer you to a provider within your care system, and will utilize providers outside your care system as needed.      "

## 2022-04-29 NOTE — ED PROVIDER NOTES
EmPATH Unit - Psychiatry  Combined Observation Note and Discharge Summary  [facility] Emergency Department  Observation Initiation Date: Apr 28, 2022    Wandy Ann MRN: [id number]   Age: 21 year old YOB: 2000     History     Chief Complaint   Patient presents with     Mental Health Problem     HPI  Wandy Ann is a 21 year old female with a past history notable for major depressive disorder and anxiety who presents to the emergency department at the request of her therapist seeking additional help with ongoing depressive symptoms.  She was transferred to the EmPATH unit for psychiatric assessment.  On examination, she reports progressively worsening depressive symptoms over the past several weeks despite maintaining adherence with her psychotropic medications.  Energy has been low, appetite has been fair, concentration has been less than optimal.  She has been sleeping 6 to 8 hours a night.  She endorsed passive thoughts of death during moments of high symptom intensity however denied any active intent or desire for suicide.  There was no indication of psychosis or homicidal ideation.  She interprets a partial response to Effexor however the benefits seem to have diminished over the past few weeks.  She has tried Zoloft and Prozac in the past without benefit however no side effects.  Wellbutrin offered her slight benefits when used as an augmenting agent with an SSRI however has been discontinued since.      Past Medical History  No past medical history on file.  No past surgical history on file.  ARIPiprazole (ABILIFY) 2 MG tablet  hydrOXYzine (ATARAX) 10 MG tablet  venlafaxine (EFFEXOR-ER) 225 MG 24 hr tablet      Allergies   Allergen Reactions     Cats Itching     Family History  No family history on file.  Social History   Social History     Tobacco Use     Smoking status: Never Smoker     Smokeless tobacco: Never Used   Substance Use Topics     Alcohol use: Yes     Comment: Occ      Drug use: No      Past medical history, past surgical history, medications, allergies, family history, and social history were reviewed with the patient. No additional pertinent items.       Review of Systems  A complete review of systems was performed with pertinent positives and negatives noted in the HPI, and all other systems negative.    Physical Examination   BP: (!) 147/88  Pulse: 112  Temp: 97.8  F (36.6  C)  Resp: 18  Height: 152.4 cm (5')  Weight: 88.5 kg (195 lb)  SpO2: 100 %    Physical Exam  General: Appears stated age.   Neuro: Alert and fully oriented. Extremities appear to demonstrate normal strength on visual inspection.   Integumentary/Skin: no rash visualized, normal color    Psychiatric Examination   Appearance: awake, alert  Attitude:  cooperative  Eye Contact:  fair  Mood:  depressed  Affect:  mood congruent  Speech:  clear, coherent  Psychomotor Behavior:  no evidence of tardive dyskinesia, dystonia, or tics  Thought Process:  logical and linear  Associations:  no loose associations  Thought Content:  no evidence of suicidal ideation or homicidal ideation and no evidence of psychotic thought  Insight:  fair  Judgement:  fair  Oriented to:  time, person, and place  Attention Span and Concentration:  fair  Recent and Remote Memory:  fair  Language: able to name/identify objects without impairment  Fund of Knowledge: intact with awareness of current and past events    ED Course        Labs Ordered and Resulted from Time of ED Arrival to Time of ED Departure   COVID-19 VIRUS (CORONAVIRUS) BY PCR - Normal       Result Value    SARS CoV2 PCR Negative         Assessments & Plan (with Medical Decision Making)   Patient presenting with worsening depressed mood and anxiety despite engagement in outpatient treatment. Nursing notes reviewed noting no acute issues.     I have reviewed the assessment completed by the [facility].     During the observation period, the patient did not require medications for  agitation, and did not require restraints/seclusion for patient and/or provider safety.    The patient was found to have a psychiatric condition that would benefit from an observation stay in the emergency department for further psychiatric stabilization and/or coordination of a safe disposition. The observation plan includes serial assessments of psychiatric condition, potential administration of medications if indicated, further disposition pending the patient's psychiatric course during the monitoring period.     Preliminary diagnosis:    ICD-10-CM    1. Moderate recurrent major depression (H)  F33.1    2. Major depressive disorder, recurrent episode, moderate (H)  F33.1    3. YESSI (generalized anxiety disorder)  F41.1         Treatment Plan:  -Optimize antidepressant treatment despite increasing Effexor from 150mg to 225 mg daily.  -Begin augmentation with Abilify at 2 mg daily in hopes of gaining remission of her depressive symptoms.  Risks and benefits of the medication were reviewed.  -Resume outpatient medication management and psychotherapy  -Discharge home today.    After the period in observation care, the patient's circumstances and mental state were safe for outpatient management. After counseling on the diagnosis, work-up, and treatment plan, the patient was discharged. Close follow-up with a psychiatrist and/or therapist was recommended and community psychiatric resources were provided. Patient is to return to the ED if any urgent or potentially life-threatening concerns.      At the time of discharge, the patient's acute suicide risk was determined to be low due to the following factors: Reduction in the intensity of mood/anxiety symptoms that preceded the admission, denial of suicidal thoughts, denies feeling helpless or helpless, not currently under the influence of alcohol or illicit substances, denies experiencing command hallucinations, no immediate access to firearms. The patient's acute risk  could be higher if noncompliant with their treatment plan, medications, follow-up appointments or using illicit substances or alcohol. Protective factors include: social supports, stable housing, employment    --  Emanuel Winter MD   [facility] EMERGENCY DEPT  EmPATH Unit  4/28/2022         Emanuel Winter MD  04/29/22 1046

## 2022-04-29 NOTE — TREATMENT PLAN
EmPATH Treatment Plan    Client's Name: Wandy Ann  YOB: 2000    DSM-5 Diagnoses: F33.1; F41.1; F43.10    Psychosocial / Contextual Factors: Patient presented to the emPATH unit with the following concerns: passive suicidal ideation and medication management.    Anticipated number of sessions or this episode of care: 1-4    MeasurableTreatment Goal(s) related to diagnosis / functional impairment(s)    Goal 1: Reduce SI intensity and establish sense of hope for self and the future?      Objective: identify positives in their life?      Patient will: make a list of positives: relationships, achievements, support, etc.?      LMHP will: assist pt in exploring/identifying positives?      Objective: Identify and replace negative thinking patterns?       Patient will: Discuss underlying assumptions and self talk that may be contributing to hopelessness?      LMHP will: assist client in developing an awareness of and identifying cognitive messages that?reinforce hopelessness?       Objective: Explore coping strategies?      Patient will: discuss things that have or may help to distract them or things that help them to feel better?      ?LMHP will: assist patient in developing coping strategies, ie: physical exercise, less internal focus, increased social activity, more expression of feeling, reaching out to others?        Goal 2: Patient will participate in developing an after care plan.     Objective A:  Patient will participate in developing an after care plan.     Intervention     LMHP will meet with the patient and assist in creating an aftercare plan with resources.     Objective B:  Patient will identify three healthy coping skills.     Intervention:     LMHP will meet with patient to assist in identifying three healthy coping skills.              Mental Status Exam    Affect: Appropriate    Appearance: Appropriate     Attention Span/Concentration: Attentive?     Eye Contact: Variable     Fund of Knowledge: Appropriate     Language /Speech Content: Fluent    Language /Speech Volume: Normal     Language /Speech Rate/Productions: Normal     Recent Memory: Intact    Remote Memory: Intact    Mood: Anxious and Sad     Orientation to Person: Yes     Orientation to Place: Yes    Orientation to Time of Day: Yes     Orientation to Date: Yes     Situation (Do they understand why they are here?): Yes     Psychomotor Behavior: Normal     Thought Content: Clear and Suicidal    Thought Form: Intact       PLAN:   Patient will board in emPATH until patient and treatment deem it appropriate to either discharge or admit to a higher level of care.       Dwayne Castellano, [facility]                                                           ________

## 2022-04-29 NOTE — ED PROVIDER NOTES
History   Chief Complaint:  Mental Health Problem     HPI   Wandy Ann is a 21 year old female with history of depression who presents with passive suicidal ideation without a plan. States she saw her therapist this morning and was contracted for safety. She has a safety contract and both she and her therapist believe she can be safe. She states that her Effexor isn't working. She started it 6-7 months ago and the dosage was increased in Nov/ Dec. When she contacted her psychiatrists office they were not able to get her in and advised coming here and getting seen by [facility] for medication change. Reports that things have been worsening for the past month. She denies history of suicide attempt or self harm. States she lives alone and feels safe.     Review of Systems   Psychiatric/Behavioral: Positive for suicidal ideas. Negative for self-injury.   All other systems reviewed and are negative.    Allergies:  Cats    Medications:  Effexor-XR    Past Medical History:     Moderate major depression    Past Surgical History:    Repair, complex, eyelids, nose, ears and/or lips    Social History:  The patient presents to the ED alone.   The patient has a therapist.   PCP: Hudson Sauer MD.     Physical Exam     Patient Vitals for the past 24 hrs:   BP Temp Pulse Resp SpO2 Height Weight   04/28/22 2112 (!) 147/88 97.8  F (36.6  C) 112 18 100 % 1.524 m (5') 88.5 kg (195 lb)       Physical Exam  General: alert, lying comfortably on gurney  HENT: mucous membranes moist  CV: regular rate, regular rhythm  Resp: normal effort, clear throughout, no crackles or wheezing  GI: abdomen soft and nontender, no guarding  MSK: no bony tenderness  Skin: appropriately warm and dry  Extremities: no edema, calves non-tender  Neuro: alert, clear speech, oriented  Psych: normal mood and affect    Emergency Department Course     Laboratory:  Labs Ordered and Resulted from Time of ED Arrival to Time of ED Departure    COVID-19 VIRUS (CORONAVIRUS) BY PCR - Normal       Result Value    SARS CoV2 PCR Negative          Emergency Department Course:    Suicide assessment completed by mental health (D.E.C., LCSW, etc.)    Reviewed:  I reviewed nursing notes, vitals, past medical history and Care Everywhere    Assessments:  2347 I obtained history and examined the patient as noted above.     Disposition:  The patient was transferred to [facility].     Impression & Plan     Medical Decision Making:  Wandy Ann is a 21 year old female who presents for evaluation of depression and suicidal ideation.  There are no concerns for or signs at this point of suicide attempt or ingestion, no concerning findings on the remainder of physical exam. The patient is medically cleared and deemed a good candidate for [facility] for further psychiatric evaluation and care. They were in agreement and are transferred to the EmPATH unit in stable condition.    Diagnosis:    ICD-10-CM    1. Moderate recurrent major depression (H)  F33.1    2. Major depressive disorder, recurrent episode, moderate (H)  F33.1    3. YESSI (generalized anxiety disorder)  F41.1          Scribe Disclosure:  I, Caryl Forman, am serving as a scribe at 11:36 PM on 4/28/2022 to document services personally performed by Mily Perez MD based on my observations and the provider's statements to me.      Mily Perez MD  05/09/22 1737

## 2022-05-05 ENCOUNTER — VIRTUAL VISIT (OUTPATIENT)
Dept: PSYCHOLOGY | Facility: CLINIC | Age: 22
End: 2022-05-05
Payer: COMMERCIAL

## 2022-05-05 DIAGNOSIS — F43.10 PTSD (POST-TRAUMATIC STRESS DISORDER): ICD-10-CM

## 2022-05-05 DIAGNOSIS — F33.1 MDD (MAJOR DEPRESSIVE DISORDER), RECURRENT EPISODE, MODERATE (H): Primary | ICD-10-CM

## 2022-05-05 DIAGNOSIS — F41.1 GENERALIZED ANXIETY DISORDER: ICD-10-CM

## 2022-05-05 PROCEDURE — 90834 PSYTX W PT 45 MINUTES: CPT | Mod: 95 | Performed by: SOCIAL WORKER

## 2022-05-05 NOTE — PROGRESS NOTES
[facility]                                     Progress Note    Patient Name: Wandy Ann  Date: 5/5/2022         Service Type: Individual      Session Start Time: 1306  Session End Time: 1345     Session Length: 38-52    Session #: 17    Attendees: Client    Service Modality:  Video Visit:      Provider verified identity through the following two step process.  Patient provided:  Patient is known previously to provider    Telemedicine Visit: The patient's condition can be safely assessed and treated via synchronous audio and visual telemedicine encounter.      Reason for Telemedicine Visit: Services only offered telehealth    Originating Site (Patient Location): Patient's home    Distant Site (Provider Location): Provider Remote Setting- Home Office    Consent:  The patient/guardian has verbally consented to: the potential risks and benefits of telemedicine (video visit) versus in person care; bill my insurance or make self-payment for services provided; and responsibility for payment of non-covered services.     Patient would like the video invitation sent by:  Send to e-mail at: [email or web address]    Mode of Communication:  Video Conference via [employer]well    As the provider I attest to compliance with applicable laws and regulations related to telemedicine.    DATA  Interactive Complexity: No  Crisis: No        Progress Since Last Session (Related to Symptoms / Goals / Homework):   Symptoms: Improving per pt with medication change    Homework: Achieved / completed to satisfaction      Episode of Care Goals: Minimal progress - PREPARATION (Decided to change - considering how); Intervened by negotiating a change plan and determining options / strategies for behavior change, identifying triggers, exploring social supports, and working towards setting a date to begin behavior change     Current / Ongoing Stressors and Concerns:   past trauma, developmental hx and current  stressors      Treatment Objective(s) Addressed in This Session:   Patient will demonstrate control of impulses and ability to use positive coping tools to manage strong emotions that can be safely addressed at a lower level of care. Absence of persistent SI and report of reduced frequency and intensity of SI and absence of SI to acceptable levels, report subjective improved mood for a period of 90 days, within 6 months as clinically observed and by patient self-report.  Patient will demonstrate and report a level of depression that can be managed at a lower level of care.  Absence of persistent depression mood and report of reduced frequency and intensity of low mood and absence of persistent low energy and motivation to acceptable levels, report subjective improved motivation and increased energy for a period of 90 days, within 6 months as clinically observed and by patient self-report.  Patient will demonstrate and report a level of anxiety that can be managed at a lower level of care.  Absence of persistent anxious mood and report of reduced frequency and intensity of worry and absence of persistent anxious mood to acceptable levels, no panic attacks, report subjective comfort with rumination for a period of 90 days, within 6 months as clinically observed and by patient self-report.       Intervention:   Therapist met with patient to review goals and interventions. Therapist utilized reflected listening as patient gave brief reflection of week. Patient reported she followed through with going to the empath unit and did not like the process but was able to get a medication change and has been feeling better. Therapist supported patient, validated patient following through and utilized strength based modality. 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  Patient presented with an anxious affect. Patient was engaged in session and open to feedback.        There has been demonstrated  improvement in functioning while patient has been engaged in psychotherapy/psychological service- if withdrawn the patient would deteriorate and/or relapse.       Assessments completed prior to visit:  The following assessments were completed by patient for this visit:  PHQ9:   PHQ-9 SCORE 9/28/2021 11/2/2021 12/6/2021 12/29/2021 2/8/2022 4/1/2022 4/27/2022   PHQ-9 Total Score MyChart - - - - - - 16 (Moderately severe depression)   PHQ-9 Total Score 14 24 16 11 11 11 16   Some encounter information is confidential and restricted. Go to Review Flowsheets activity to see all data.     GAD7:   YESSI-7 SCORE 9/28/2021 11/2/2021 12/6/2021 12/27/2021 2/8/2022 4/1/2022 4/27/2022   Total Score - - - 19 (severe anxiety) - - 7 (mild anxiety)   Total Score 9 18 15 19 10 12 7   Some encounter information is confidential and restricted. Go to Review Flowsheets activity to see all data.     PROMIS 10-Global Health (all questions and answers displayed):   PROMIS 10 12/27/2021 4/1/2022   In general, would you say your health is: Fair -   In general, would you say your quality of life is: Poor -   In general, how would you rate your physical health? Fair -   In general, how would you rate your mental health, including your mood and your ability to think? Poor -   In general, how would you rate your satisfaction with your social activities and relationships? Fair -   In general, please rate how well you carry out your usual social activities and roles Fair -   To what extent are you able to carry out your everyday physical activities such as walking, climbing stairs, carrying groceries, or moving a chair? Mostly -   How often have you been bothered by emotional problems such as feeling anxious, depressed or irritable? Always -   How would you rate your fatigue on average? Moderate -   How would you rate your pain on average?   0 = No Pain  to  10 = Worst Imaginable Pain 7 -   In general, would you say your health is: 2 3   In general,  would you say your quality of life is: 1 4   In general, how would you rate your physical health? 2 3   In general, how would you rate your mental health, including your mood and your ability to think? 1 3   In general, how would you rate your satisfaction with your social activities and relationships? 2 4   In general, please rate how well you carry out your usual social activities and roles. (This includes activities at home, at work and in your community, and responsibilities as a parent, child, spouse, employee, friend, etc.) 2 4   To what extent are you able to carry out your everyday physical activities such as walking, climbing stairs, carrying groceries, or moving a chair? 4 4   In the past 7 days, how often have you been bothered by emotional problems such as feeling anxious, depressed, or irritable? 5 3   In the past 7 days, how would you rate your fatigue on average? 3 3   In the past 7 days, how would you rate your pain on average, where 0 means no pain, and 10 means worst imaginable pain? 7 8   Global Mental Health Score 5 14   Global Physical Health Score 11 12   PROMIS TOTAL - SUBSCORES 16 26         ASSESSMENT: Current Emotional / Mental Status (status of significant symptoms):   Risk status (Self / Other harm or suicidal ideation)   Patient denies current fears or concerns for personal safety.   Patient reports the following current or recent suicidal ideation or behaviors: passive SI with no plan or intent and pt reported feeling better.   Patient denies current or recent homicidal ideation or behaviors.   Patient denies current or recent self injurious behavior or ideation.   Patient denies other safety concerns.   Patient reports there has been no change in risk factors since their last session.     Patient reports there has been no change in protective factors since their last session.     A safety and risk management plan has been developed including: Patient consented to co-developed safety  plan on 2.21.2022.  Safety and risk management plan was reviewed.   Patient agreed to use safety plan should any safety concerns arise.  A copy was made available to the patient.     Appearance:   Appropriate    Eye Contact:   Poor   Psychomotor Behavior: Restless    Attitude:   Cooperative    Orientation:   All   Speech    Rate / Production: Emotional Talkative    Volume:  Normal    Mood:    Anxious  Depressed    Affect:    Worrisome    Thought Content:  Clear    Thought Form:  Coherent    Insight:    Fair      Medication Review:   Changes to psychiatric medications, see updated Medication List in EPIC.      Medication Compliance:   Yes     Changes in Health Issues:   Yes: Pain, Associated Psychological Distress     Chemical Use Review:   Substance Use: Chemical use reviewed, no active concerns identified      Tobacco Use: No current tobacco use.      Diagnosis:  1. MDD (major depressive disorder), recurrent episode, moderate (H)    2. Generalized anxiety disorder    3. PTSD (post-traumatic stress disorder)        Collateral Reports Completed:   Not Applicable    PLAN: (Patient Tasks / Therapist Tasks / Other)  Make visual safety plan   Manage depressive symptoms with coping skills  When feeling SI follow safety plan  Ask medication provider about genesite testing  Look at other employment      Katie Faulkner, [facility]   5/5/2022                                                         ______________________________________________________________________    Individual Treatment Plan    Patient's Name: Wandy Ann  YOB: 2000    Date of Creation: 2.22.2021  Date Treatment Plan Last Reviewed/Revised: 2/8/2022 due 5/8/2022    DSM5 Diagnoses: 296.32 (F33.1) Major Depressive Disorder, Recurrent Episode, Moderate With mixed features, 300.02 (F41.1) Generalized Anxiety Disorder or 309.81 (F43.10) Posttraumatic Stress Disorder (includes Posttraumatic Stress Disorder for Children 6 Years and Younger)   Without dissociative symptoms  Psychosocial / Contextual Factors: past trauma, developmental hx and current stressors   PROMIS (reviewed every 90 days):     Referral / Collaboration:  Referral to another professional/service is not indicated at this time..    Anticipated number of session for this episode of care: 12  Anticipation frequency of session: Biweekly  Anticipated Duration of each session: 38-52 minutes  Treatment plan will be reviewed in 90 days or when goals have been changed.       MeasurableTreatment Goal(s) related to diagnosis / functional impairment(s)  Goal 1: Patient will report absence of persistent SI and report of reduced frequency and intensity of SI and absence of SI to acceptable levels, report subjective improved mood for a period of 90 days, within 6 months as clinically observed and by patient self-report    I will know I've met my goal when I can see the difference between a bad moment and what I want in th future.      Objective #A (Patient Action)    Patient will demonstrate control of impulses and ability to use positive coping tools to manage strong emotions that can be safely addressed at a lower level of care. Absence of persistent SI and report of reduced frequency and intensity of SI and absence of SI to acceptable levels, report subjective improved mood for a period of 90 days, within 6 months as clinically observed and by patient self-report.  Status: Continued - Date(s):  2/8/2022    Intervention(s)  Therapist will provide individual therapy to identify triggers to SI urges, gain feedback on helpful coping strategies, and identify ways that family can offer support. Tx to discuss current stressors and interpersonal conflicts and how to cope with these, coaching, diagnostic testing, referral for medication as indicated, use prescribed medication, cognitive restructuring, interpersonal family therapy, supportive therapy services.        Goal 2: Patient will report absence of  persistent depression mood and report of reduced frequency and intensity of low mood and absence of persistent low energy and motivation to acceptable levels, report subjective improved motivation and increased energy for a period of 90 days, within 6 months as clinically observed and by patient self-report    I will know I've met my goal when I no longer feel sad.      Objective #A (Patient Action)    Status: Continued - Date(s):  2/8/2022    Patient will demonstrate and report a level of depression that can be managed at a lower level of care.  Absence of persistent depression mood and report of reduced frequency and intensity of low mood and absence of persistent low energy and motivation to acceptable levels, report subjective improved motivation and increased energy for a period of 90 days, within 6 months as clinically observed and by patient self-report.    Intervention(s)  Therapist will provide individual therapy to identify triggers to depression, gain feedback on helpful coping tools and thought-reframing techniques, and identify preferred way of being.  Tx to include discussion of current stressors and interpersonal conflicts and how to cope with these, coaching, diagnostic testing, referral for medication as indicated, use prescribed medication, cognitive restructuring, interpersonal, family therapy, supportive therapy services.        Goal 3: Patiient will report absence of persistent anxiety mood and report of reduced frequency and intensity of worry and absence of persistent anxious mood to acceptable levels, no panic attacks, report subjective comfort with rumination for a period of 90 days. Within 6 months as clinically observed and by patient self-report    I will know I've met my goal when I can go days without worrying about little things or shaking.      Objective #A (Patient Action)    Status: Continued - Date(s):  2/8/2022    Patient will demonstrate and report a level of anxiety that can be  "managed at a lower level of care.  Absence of persistent anxious mood and report of reduced frequency and intensity of worry and absence of persistent anxious mood to acceptable levels, no panic attacks, report subjective comfort with rumination for a period of 90 days, within 6 months as clinically observed and by patient self-report.    Intervention(s)  Therapist will provide individual therapy to identify triggers to anxiety, gain feedback on helpful coping tools and thought-reframing techniques, and identify preferred way of being. Tx to include discuss current stressors and interpersonal conflicts and how to cope with these, coaching, diagnostic testing , referral for medication as indicated, use prescribed medication, cognitive restructuring, interpersonal,   family therapy, supportive therapy services.        Patient has reviewed and agreed to the above plan.      Katie Faulkner, [facility]   2/8/2022                                                   Wandy Ann            SAFETY PLAN:  Step 1: Warning signs / cues (Thoughts, images, mood, situation, behavior) that a crisis may be developing:  ? Thoughts: thoughts of not wanting to be here  ? Images: no images  ? Thinking Processes: intrusive thoughts (bothersome, unwanted thoughts that come out of nowhere): get irritated  ? Mood: intense anger and agitation  ? Behaviors: isolating/withdrawing   ? Situations: trauma    Step 2: Coping strategies - Things I can do to take my mind off of my problems without contacting another person (relaxation technique, physical activity):  ? Distress Tolerance Strategies:  arts and crafts: drawing and painting  ? Physical Activities: exercise: working out  ? Focus on helpful thoughts:  \"This is temporary\", \"It always passes\" and \"Ride the wave\"  Step 3: People and social settings that provide distraction:                 Name: Jennifer     Phone: [phone]                 Name: Antonina         Phone: [phone]      "            Name: panchito       Phone: [phone]  ? friends house or car   Step 4: Remind myself of people and things that are important to me and worth living for:  Best friend and cat        Step 5: When I am in crisis, I can ask these people to help me use my safety plan:                 Name: Jennifer     Phone: [phone]                 Name: Antonina         Phone: [phone]                 Name: panchito       Phone: [phone]  Step 6: Making the environment safe:   ? be around others  Step 7: Professionals or agencies I can contact during a crisis:  ? [facility] Number: [phone]  ? Suicide Prevention Lifeline: 7-755-708-CTRE (6730)  ? Crisis Text Line Service (available 24 hours a day, 7 days a week): Text [place] to 293992    Local Crisis Services: 988     Call 911 or go to my nearest emergency department.       I helped develop this safety plan and agree to use it when needed.  I have been given a copy of this plan.       Client signature _________________________________________________________________  Today s date:  2/22/2021  Adapted from Safety Plan Template 2008 Marisol Cazares and Mario Flores is reprinted with the express permission of the authors.  No portion of the Safety Plan Template may be reproduced without the express, written permission.  You can contact the authors at [email or web address] or [email or web address].

## 2022-05-05 NOTE — TELEPHONE ENCOUNTER
Yoly Cox NP  Psych Rn - [facility] 16 hours ago (4:20 PM)     [place]    Please have her schedule an appointment to talk about this.  She also is seeing her therapist tomorrow and can voice her concerns with Katie who can also get back to me.  In the meantime please contact with Wandy to see if she would be interested in increasing her venlafaxine to 150 mg daily if her concerns are for increased depression or anxiety symptoms.  Thank you.  Yoly    Message text       Called patient and she noted the following: I already got it / venlafaxine increased at [facility], I think that everything is on tract now and they also put me on Abilify.     KAMLESH Frederick please review 4/28/2022 ED notes.

## 2022-05-11 ASSESSMENT — ANXIETY QUESTIONNAIRES
5. BEING SO RESTLESS THAT IT IS HARD TO SIT STILL: NOT AT ALL
GAD7 TOTAL SCORE: 9
7. FEELING AFRAID AS IF SOMETHING AWFUL MIGHT HAPPEN: SEVERAL DAYS
7. FEELING AFRAID AS IF SOMETHING AWFUL MIGHT HAPPEN: SEVERAL DAYS
2. NOT BEING ABLE TO STOP OR CONTROL WORRYING: MORE THAN HALF THE DAYS
4. TROUBLE RELAXING: MORE THAN HALF THE DAYS
3. WORRYING TOO MUCH ABOUT DIFFERENT THINGS: MORE THAN HALF THE DAYS
GAD7 TOTAL SCORE: 9
6. BECOMING EASILY ANNOYED OR IRRITABLE: NOT AT ALL
GAD7 TOTAL SCORE: 9
8. IF YOU CHECKED OFF ANY PROBLEMS, HOW DIFFICULT HAVE THESE MADE IT FOR YOU TO DO YOUR WORK, TAKE CARE OF THINGS AT HOME, OR GET ALONG WITH OTHER PEOPLE?: SOMEWHAT DIFFICULT
1. FEELING NERVOUS, ANXIOUS, OR ON EDGE: MORE THAN HALF THE DAYS

## 2022-05-12 ENCOUNTER — VIRTUAL VISIT (OUTPATIENT)
Dept: PSYCHOLOGY | Facility: CLINIC | Age: 22
End: 2022-05-12
Payer: COMMERCIAL

## 2022-05-12 DIAGNOSIS — F43.10 PTSD (POST-TRAUMATIC STRESS DISORDER): ICD-10-CM

## 2022-05-12 DIAGNOSIS — F41.1 GENERALIZED ANXIETY DISORDER: ICD-10-CM

## 2022-05-12 DIAGNOSIS — F33.1 MDD (MAJOR DEPRESSIVE DISORDER), RECURRENT EPISODE, MODERATE (H): Primary | ICD-10-CM

## 2022-05-12 PROCEDURE — 90834 PSYTX W PT 45 MINUTES: CPT | Mod: 95 | Performed by: SOCIAL WORKER

## 2022-05-12 ASSESSMENT — PATIENT HEALTH QUESTIONNAIRE - PHQ9: SUM OF ALL RESPONSES TO PHQ QUESTIONS 1-9: 15

## 2022-05-12 ASSESSMENT — ANXIETY QUESTIONNAIRES: GAD7 TOTAL SCORE: 9

## 2022-05-12 NOTE — PROGRESS NOTES
[facility]                                     Progress Note    Patient Name: Wandy Ann  Date: 5/12/2022         Service Type: Individual      Session Start Time: 1305  Session End Time: 1345     Session Length: 38-52    Session #: 18    Attendees: Client    Service Modality:  Video Visit:      Provider verified identity through the following two step process.  Patient provided:  Patient is known previously to provider    Telemedicine Visit: The patient's condition can be safely assessed and treated via synchronous audio and visual telemedicine encounter.      Reason for Telemedicine Visit: Services only offered telehealth    Originating Site (Patient Location): Patient's home    Distant Site (Provider Location): Provider Remote Setting- Home Office    Consent:  The patient/guardian has verbally consented to: the potential risks and benefits of telemedicine (video visit) versus in person care; bill my insurance or make self-payment for services provided; and responsibility for payment of non-covered services.     Patient would like the video invitation sent by:  Send to e-mail at: [email or web address]    Mode of Communication:  Video Conference via Amwell    As the provider I attest to compliance with applicable laws and regulations related to telemedicine.    DATA  Interactive Complexity: No  Crisis: No        Progress Since Last Session (Related to Symptoms / Goals / Homework):   Symptoms: Improving phq-9 worsening YESSI-7    Homework: Achieved / completed to satisfaction      Episode of Care Goals: Minimal progress - PREPARATION (Decided to change - considering how); Intervened by negotiating a change plan and determining options / strategies for behavior change, identifying triggers, exploring social supports, and working towards setting a date to begin behavior change     Current / Ongoing Stressors and Concerns:   past trauma, developmental hx and current stressors       Treatment Objective(s) Addressed in This Session:   Patient will demonstrate control of impulses and ability to use positive coping tools to manage strong emotions that can be safely addressed at a lower level of care. Absence of persistent SI and report of reduced frequency and intensity of SI and absence of SI to acceptable levels, report subjective improved mood for a period of 90 days, within 6 months as clinically observed and by patient self-report.  Patient will demonstrate and report a level of depression that can be managed at a lower level of care.  Absence of persistent depression mood and report of reduced frequency and intensity of low mood and absence of persistent low energy and motivation to acceptable levels, report subjective improved motivation and increased energy for a period of 90 days, within 6 months as clinically observed and by patient self-report.  Patient will demonstrate and report a level of anxiety that can be managed at a lower level of care.  Absence of persistent anxious mood and report of reduced frequency and intensity of worry and absence of persistent anxious mood to acceptable levels, no panic attacks, report subjective comfort with rumination for a period of 90 days, within 6 months as clinically observed and by patient self-report.       Intervention:   Therapist met with patient to review goals and interventions. Therapist utilized reflected listening as patient gave brief reflection of week. Patient reported passive SI and managing her SI with coping skills and realization that she does not want to die. Patient processed study to take the LSTAT and registering for that today for October and being excited, along with job interview today. Therapist supported patient utilized strength based modality along with CBT and DBT.   Patient presented with an anxious affect. Patient was engaged in session and open to feedback.        There has been demonstrated improvement in  functioning while patient has been engaged in psychotherapy/psychological service- if withdrawn the patient would deteriorate and/or relapse.       Assessments completed prior to visit:  The following assessments were completed by patient for this visit:  PHQ9:   PHQ-9 SCORE 11/2/2021 12/6/2021 12/29/2021 2/8/2022 4/1/2022 4/27/2022 5/12/2022   PHQ-9 Total Score MyChart - - - - - 16 (Moderately severe depression) -   PHQ-9 Total Score 24 16 11 11 11 16 15   Some encounter information is confidential and restricted. Go to Review Flowsheets activity to see all data.     GAD7:   YESSI-7 SCORE 11/2/2021 12/6/2021 12/27/2021 2/8/2022 4/1/2022 4/27/2022 5/11/2022   Total Score - - 19 (severe anxiety) - - 7 (mild anxiety) 9 (mild anxiety)   Total Score 18 15 19 10 12 7 9   Some encounter information is confidential and restricted. Go to Review Flowsheets activity to see all data.           ASSESSMENT: Current Emotional / Mental Status (status of significant symptoms):   Risk status (Self / Other harm or suicidal ideation)   Patient denies current fears or concerns for personal safety.   Patient reports the following current or recent suicidal ideation or behaviors: passive SI with no plan or intent and pt reported being able to manage them with safety plan.   Patient denies current or recent homicidal ideation or behaviors.   Patient denies current or recent self injurious behavior or ideation.   Patient denies other safety concerns.   Patient reports there has been no change in risk factors since their last session.     Patient reports there has been no change in protective factors since their last session.     A safety and risk management plan has been developed including: Patient consented to co-developed safety plan on 2.21.2022.  Safety and risk management plan was reviewed.   Patient agreed to use safety plan should any safety concerns arise.  A copy was made available to the patient.     Appearance:   Appropriate     Eye Contact:   Poor   Psychomotor Behavior: Restless    Attitude:   Cooperative    Orientation:   All   Speech    Rate / Production: Emotional Talkative    Volume:  Normal    Mood:    Anxious  Depressed    Affect:    Worrisome    Thought Content:  Clear    Thought Form:  Coherent    Insight:    Fair      Medication Review:   No changes to current psychiatric medication(s)     Medication Compliance:   Yes     Changes in Health Issues:   Yes: Pain, Associated Psychological Distress     Chemical Use Review:   Substance Use: Chemical use reviewed, no active concerns identified      Tobacco Use: No current tobacco use.      Diagnosis:  1. MDD (major depressive disorder), recurrent episode, moderate (H)    2. Generalized anxiety disorder    3. 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 Katie Faulkner, [facility]   5/12/2022                                                         ______________________________________________________________________    Individual Treatment Plan    Patient's Name: Wandy Ann  YOB: 2000    Date of Creation: 2.22.2021  Date Treatment Plan Last Reviewed/Revised: 5/12/2022 due 8/12/2022    DSM5 Diagnoses: 296.32 (F33.1) Major Depressive Disorder, Recurrent Episode, Moderate With mixed features, 300.02 (F41.1) Generalized Anxiety Disorder or 309.81 (F43.10) Posttraumatic Stress Disorder (includes Posttraumatic Stress Disorder for Children 6 Years and Younger)  Without dissociative symptoms  Psychosocial / Contextual Factors: past trauma, developmental hx and current stressors   PROMIS (reviewed every 90 days):     Referral /  Collaboration:  Referral to another professional/service is not indicated at this time..    Anticipated number of session for this episode of care: 12  Anticipation frequency of session: Biweekly  Anticipated Duration of each session: 38-52 minutes  Treatment plan will be reviewed in 90 days or when goals have been changed.       MeasurableTreatment Goal(s) related to diagnosis / functional impairment(s)  Goal 1: Patient will report absence of persistent SI and report of reduced frequency and intensity of SI and absence of SI to acceptable levels, report subjective improved mood for a period of 90 days, within 6 months as clinically observed and by patient self-report    I will know I've met my goal when I can see the difference between a bad moment and what I want in th future.      Objective #A (Patient Action)    Patient will demonstrate control of impulses and ability to use positive coping tools to manage strong emotions that can be safely addressed at a lower level of care. Absence of persistent SI and report of reduced frequency and intensity of SI and absence of SI to acceptable levels, report subjective improved mood for a period of 90 days, within 6 months as clinically observed and by patient self-report.  Status: Continued - Date(s): 5/12/2022    Intervention(s)  Therapist will provide individual therapy to identify triggers to SI urges, gain feedback on helpful coping strategies, and identify ways that family can offer support. Tx to discuss current stressors and interpersonal conflicts and how to cope with these, coaching, diagnostic testing, referral for medication as indicated, use prescribed medication, cognitive restructuring, interpersonal family therapy, supportive therapy services.        Goal 2: Patient will report absence of persistent depression mood and report of reduced frequency and intensity of low mood and absence of persistent low energy and motivation to acceptable levels, report  subjective improved motivation and increased energy for a period of 90 days, within 6 months as clinically observed and by patient self-report    I will know I've met my goal when I no longer feel sad.      Objective #A (Patient Action)    Status: Continued - Date(s):  5/12/2022    Patient will demonstrate and report a level of depression that can be managed at a lower level of care.  Absence of persistent depression mood and report of reduced frequency and intensity of low mood and absence of persistent low energy and motivation to acceptable levels, report subjective improved motivation and increased energy for a period of 90 days, within 6 months as clinically observed and by patient self-report.    Intervention(s)  Therapist will provide individual therapy to identify triggers to depression, gain feedback on helpful coping tools and thought-reframing techniques, and identify preferred way of being.  Tx to include discussion of current stressors and interpersonal conflicts and how to cope with these, coaching, diagnostic testing, referral for medication as indicated, use prescribed medication, cognitive restructuring, interpersonal, family therapy, supportive therapy services.        Goal 3: Patiient will report absence of persistent anxiety mood and report of reduced frequency and intensity of worry and absence of persistent anxious mood to acceptable levels, no panic attacks, report subjective comfort with rumination for a period of 90 days. Within 6 months as clinically observed and by patient self-report    I will know I've met my goal when I can go days without worrying about little things or shaking.      Objective #A (Patient Action)    Status: Continued - Date(s):  5/12/2022    Patient will demonstrate and report a level of anxiety that can be managed at a lower level of care.  Absence of persistent anxious mood and report of reduced frequency and intensity of worry and absence of persistent anxious mood  "to acceptable levels, no panic attacks, report subjective comfort with rumination for a period of 90 days, within 6 months as clinically observed and by patient self-report.    Intervention(s)  Therapist will provide individual therapy to identify triggers to anxiety, gain feedback on helpful coping tools and thought-reframing techniques, and identify preferred way of being. Tx to include discuss current stressors and interpersonal conflicts and how to cope with these, coaching, diagnostic testing , referral for medication as indicated, use prescribed medication, cognitive restructuring, interpersonal,   family therapy, supportive therapy services.        Patient has reviewed and agreed to the above plan.      Katie Faulkner, [facility]   5/12/2022                                                   Wandy Ann            SAFETY PLAN:  Step 1: Warning signs / cues (Thoughts, images, mood, situation, behavior) that a crisis may be developing:  ? Thoughts: thoughts of not wanting to be here  ? Images: no images  ? Thinking Processes: intrusive thoughts (bothersome, unwanted thoughts that come out of nowhere): get irritated  ? Mood: intense anger and agitation  ? Behaviors: isolating/withdrawing   ? Situations: trauma    Step 2: Coping strategies - Things I can do to take my mind off of my problems without contacting another person (relaxation technique, physical activity):  ? Distress Tolerance Strategies:  arts and crafts: drawing and painting  ? Physical Activities: exercise: working out  ? Focus on helpful thoughts:  \"This is temporary\", \"It always passes\" and \"Ride the wave\"  Step 3: People and social settings that provide distraction:                 Name: Jennifer     Phone: [phone]                 Name: Antonina         Phone: [phone]                 Name: panchito       Phone: [phone]  ? friends house or car   Step 4: Remind myself of people and things that are important to me and worth living for: "  Best friend and cat        Step 5: When I am in crisis, I can ask these people to help me use my safety plan:                 Name: Jennifer     Phone: [phone]                 Name: Antonina         Phone: [phone]                 Name: panchito       Phone: [phone]  Step 6: Making the environment safe:   ? be around others  Step 7: Professionals or agencies I can contact during a crisis:  ? [facility] Number: [phone]  ? Suicide Prevention Lifeline: 6-570-441-TWIN (2018)  ? Crisis Text Line Service (available 24 hours a day, 7 days a week): Text [place] to 313633    Local Crisis Services: 988     Call 911 or go to my nearest emergency department.       I helped develop this safety plan and agree to use it when needed.  I have been given a copy of this plan.       Client signature _________________________________________________________________  Today s date:  2/22/2021  Adapted from Safety Plan Template 2008 Marisol Cazares and Mario Flores is reprinted with the express permission of the authors.  No portion of the Safety Plan Template may be reproduced without the express, written permission.  You can contact the authors at [email or web address] or [email or web address].

## 2022-05-17 ENCOUNTER — MYC REFILL (OUTPATIENT)
Dept: PSYCHIATRY | Facility: CLINIC | Age: 22
End: 2022-05-17
Payer: COMMERCIAL

## 2022-05-17 DIAGNOSIS — F41.1 GAD (GENERALIZED ANXIETY DISORDER): ICD-10-CM

## 2022-05-17 RX ORDER — HYDROXYZINE HYDROCHLORIDE 10 MG/1
10 TABLET, FILM COATED ORAL 3 TIMES DAILY PRN
Qty: 90 TABLET | Refills: 0 | Status: SHIPPED | OUTPATIENT
Start: 2022-05-17 | End: 2022-07-18

## 2022-05-17 NOTE — TELEPHONE ENCOUNTER
"Eduar Frederick I see that on 4/27/22 there was an \"Evisit\" that was scheduled for this patient, when you do not do \"evisits\"   Today we received this request. She is scheduled to see you 6/21/22    Date of Last Office Visit: 11/2/21  Date of Next Office Visit:6/21/22  No shows since last visit: 0  Cancellations since last visit: 0           Review of [place] ?: NA    Lapse in medication adherence greater than 5 days?: PRN  Medication refill request verified as identical to current order?: Yes  Result of Last DAM, VPA, Li+ Level, CBC, or Carbamazepine Level (at or since last visit): NA    Last visit treatment plan:       1.  Continue talk therapy with Katie    2.  Continue hydroxyzine 10 mg 3 times daily as needed for anxiety-no refills today    3.  Increase venlafaxine ER to 150 mg daily    4.  CMP and vitamin D level at earliest convenience    5.  Consider adding Abilify at your next visit if depression symptoms continue         Continue all other medications as reviewed per electronic medical record today.     Safety plan reviewed. To the Emergency Department as needed or call after hours crisis line at [phone] or [phone]. [place] Crisis Text Line. Text [place] to 054743 or Suicide LifeLine Chat: suicidepreventionlifeline.org/chat/    To schedule individual or family therapy, call [facility] at [phone].    Schedule an appointment with me in December or sooner as needed. Call [facility] at [phone] to schedule.      []Medication refilled per  Medication Refill in Ambulatory Care  policy.  [x]Medication unable to be refilled by RN due to criteria not met as indicated below:    []Eligibility - not seen in the last year   []Supervision - no future appointment   []Compliance - no shows, cancellations or lapse in therapy   []Verification - order discrepancy   []Controlled medication   [x]Medication not included in policy   []90-day supply request   []Other    "

## 2022-05-25 ENCOUNTER — VIRTUAL VISIT (OUTPATIENT)
Dept: PSYCHOLOGY | Facility: CLINIC | Age: 22
End: 2022-05-25
Payer: COMMERCIAL

## 2022-05-25 DIAGNOSIS — F41.1 GENERALIZED ANXIETY DISORDER: ICD-10-CM

## 2022-05-25 DIAGNOSIS — F43.10 PTSD (POST-TRAUMATIC STRESS DISORDER): ICD-10-CM

## 2022-05-25 DIAGNOSIS — F33.1 MDD (MAJOR DEPRESSIVE DISORDER), RECURRENT EPISODE, MODERATE (H): Primary | ICD-10-CM

## 2022-05-25 PROCEDURE — 90832 PSYTX W PT 30 MINUTES: CPT | Mod: 95 | Performed by: SOCIAL WORKER

## 2022-05-25 NOTE — PROGRESS NOTES
[facility]                                     Progress Note    Patient Name: Wandy Ann  Date: 5/25/2022         Service Type: Individual      Session Start Time: 1411  Session End Time: 1434     Session Length: 38-52    Session #: 19    Attendees: Client    Service Modality:  Video Visit:      Provider verified identity through the following two step process.  Patient provided:  Patient is known previously to provider    Telemedicine Visit: The patient's condition can be safely assessed and treated via synchronous audio and visual telemedicine encounter.      Reason for Telemedicine Visit: Services only offered telehealth    Originating Site (Patient Location): Patient's home    Distant Site (Provider Location): Provider Remote Setting- Home Office    Consent:  The patient/guardian has verbally consented to: the potential risks and benefits of telemedicine (video visit) versus in person care; bill my insurance or make self-payment for services provided; and responsibility for payment of non-covered services.     Patient would like the video invitation sent by:  Send to e-mail at: [email or web address]    Mode of Communication:  Video Conference via [employer]    As the provider I attest to compliance with applicable laws and regulations related to telemedicine.    DATA  Interactive Complexity: No  Crisis: No        Progress Since Last Session (Related to Symptoms / Goals / Homework):   Symptoms: Worsening  per patient    Homework: Achieved / completed to satisfaction      Episode of Care Goals: Minimal progress - PREPARATION (Decided to change - considering how); Intervened by negotiating a change plan and determining options / strategies for behavior change, identifying triggers, exploring social supports, and working towards setting a date to begin behavior change     Current / Ongoing Stressors and Concerns:   past trauma, developmental hx and current stressors       Treatment Objective(s) Addressed in This Session:   Patient will demonstrate control of impulses and ability to use positive coping tools to manage strong emotions that can be safely addressed at a lower level of care. Absence of persistent SI and report of reduced frequency and intensity of SI and absence of SI to acceptable levels, report subjective improved mood for a period of 90 days, within 6 months as clinically observed and by patient self-report.  Patient will demonstrate and report a level of depression that can be managed at a lower level of care.  Absence of persistent depression mood and report of reduced frequency and intensity of low mood and absence of persistent low energy and motivation to acceptable levels, report subjective improved motivation and increased energy for a period of 90 days, within 6 months as clinically observed and by patient self-report.  Patient will demonstrate and report a level of anxiety that can be managed at a lower level of care.  Absence of persistent anxious mood and report of reduced frequency and intensity of worry and absence of persistent anxious mood to acceptable levels, no panic attacks, report subjective comfort with rumination for a period of 90 days, within 6 months as clinically observed and by patient self-report.       Intervention:   Therapist met with patient to review goals and interventions. Therapist utilized reflected listening as patient gave brief reflection of week. Patient reported not having a great week after she bought a new car and her family shamed her. Therapist supported patient as she processed and patient was able to state she wanted support from her family. Therapist had patient name reasoning for buying car and all was logical. Patient reported this made her feeling passive SI but was able to move past. Patient processed getting a knew job and is happy about leaving current. Therapist encouraged patient to be aware of endings.    Patient presented with an anxious affect. Patient was engaged in session and open to feedback.        There has been demonstrated improvement in functioning while patient has been engaged in psychotherapy/psychological service- if withdrawn the patient would deteriorate and/or relapse.       Assessments completed prior to visit:  The following assessments were completed by patient for this visit:  PHQ9:   PHQ-9 SCORE 11/2/2021 12/6/2021 12/29/2021 2/8/2022 4/1/2022 4/27/2022 5/12/2022   PHQ-9 Total Score MyChart - - - - - 16 (Moderately severe depression) -   PHQ-9 Total Score 24 16 11 11 11 16 15   Some encounter information is confidential and restricted. Go to Review Flowsheets activity to see all data.     GAD7:   YESSI-7 SCORE 11/2/2021 12/6/2021 12/27/2021 2/8/2022 4/1/2022 4/27/2022 5/11/2022   Total Score - - 19 (severe anxiety) - - 7 (mild anxiety) 9 (mild anxiety)   Total Score 18 15 19 10 12 7 9   Some encounter information is confidential and restricted. Go to Review Flowsheets activity to see all data.           ASSESSMENT: Current Emotional / Mental Status (status of significant symptoms):   Risk status (Self / Other harm or suicidal ideation)   Patient denies current fears or concerns for personal safety.   Patient reports the following current or recent suicidal ideation or behaviors: lessoning SI with no plan or intent.   Patient denies current or recent homicidal ideation or behaviors.   Patient denies current or recent self injurious behavior or ideation.   Patient denies other safety concerns.   Patient reports there has been no change in risk factors since their last session.     Patient reports there has been no change in protective factors since their last session.     A safety and risk management plan has been developed including: Patient consented to co-developed safety plan on 2.21.2022.  Safety and risk management plan was reviewed.   Patient agreed to use safety plan should any safety  concerns arise.  A copy was made available to the patient.     Appearance:   phone session    Eye Contact:   phone session    Psychomotor Behavior: phone session    Attitude:   Cooperative    Orientation:   All   Speech    Rate / Production: Emotional Talkative    Volume:  Normal    Mood:    Anxious  Depressed    Affect:    Worrisome    Thought Content:  Clear    Thought Form:  Coherent    Insight:    Fair      Medication Review:   No changes to current psychiatric medication(s)     Medication Compliance:   Yes     Changes in Health Issues:   Yes: Pain, Associated Psychological Distress     Chemical Use Review:   Substance Use: Chemical use reviewed, no active concerns identified      Tobacco Use: No current tobacco use.      Diagnosis:  1. MDD (major depressive disorder), recurrent episode, moderate (H)    2. Generalized anxiety disorder    3. PTSD (post-traumatic stress disorder)        Collateral Reports Completed:   Not Applicable    PLAN: (Patient Tasks / Therapist Tasks / Other)  Make visual safety plan   Manage depressive symptoms with coping skills  When feeling SI follow safety plan  Continue to manage SI and go to ER if feeling unsafe  Be present with endings  Be happy for self and watch our own expectations.       Katie Faulkner, [facility]   5/25/2022                                                         ______________________________________________________________________    Individual Treatment Plan    Patient's Name: Wandy Ann  YOB: 2000    Date of Creation: 2.22.2021  Date Treatment Plan Last Reviewed/Revised: 5/12/2022 due 8/12/2022    DSM5 Diagnoses: 296.32 (F33.1) Major Depressive Disorder, Recurrent Episode, Moderate With mixed features, 300.02 (F41.1) Generalized Anxiety Disorder or 309.81 (F43.10) Posttraumatic Stress Disorder (includes Posttraumatic Stress Disorder for Children 6 Years and Younger)  Without dissociative symptoms  Psychosocial / Contextual Factors:  past trauma, developmental hx and current stressors   PROMIS (reviewed every 90 days):     Referral / Collaboration:  Referral to another professional/service is not indicated at this time..    Anticipated number of session for this episode of care: 12  Anticipation frequency of session: Biweekly  Anticipated Duration of each session: 38-52 minutes  Treatment plan will be reviewed in 90 days or when goals have been changed.       MeasurableTreatment Goal(s) related to diagnosis / functional impairment(s)  Goal 1: Patient will report absence of persistent SI and report of reduced frequency and intensity of SI and absence of SI to acceptable levels, report subjective improved mood for a period of 90 days, within 6 months as clinically observed and by patient self-report    I will know I've met my goal when I can see the difference between a bad moment and what I want in th future.      Objective #A (Patient Action)    Patient will demonstrate control of impulses and ability to use positive coping tools to manage strong emotions that can be safely addressed at a lower level of care. Absence of persistent SI and report of reduced frequency and intensity of SI and absence of SI to acceptable levels, report subjective improved mood for a period of 90 days, within 6 months as clinically observed and by patient self-report.  Status: Continued - Date(s): 5/12/2022    Intervention(s)  Therapist will provide individual therapy to identify triggers to SI urges, gain feedback on helpful coping strategies, and identify ways that family can offer support. Tx to discuss current stressors and interpersonal conflicts and how to cope with these, coaching, diagnostic testing, referral for medication as indicated, use prescribed medication, cognitive restructuring, interpersonal family therapy, supportive therapy services.        Goal 2: Patient will report absence of persistent depression mood and report of reduced frequency and  intensity of low mood and absence of persistent low energy and motivation to acceptable levels, report subjective improved motivation and increased energy for a period of 90 days, within 6 months as clinically observed and by patient self-report    I will know I've met my goal when I no longer feel sad.      Objective #A (Patient Action)    Status: Continued - Date(s):  5/12/2022    Patient will demonstrate and report a level of depression that can be managed at a lower level of care.  Absence of persistent depression mood and report of reduced frequency and intensity of low mood and absence of persistent low energy and motivation to acceptable levels, report subjective improved motivation and increased energy for a period of 90 days, within 6 months as clinically observed and by patient self-report.    Intervention(s)  Therapist will provide individual therapy to identify triggers to depression, gain feedback on helpful coping tools and thought-reframing techniques, and identify preferred way of being.  Tx to include discussion of current stressors and interpersonal conflicts and how to cope with these, coaching, diagnostic testing, referral for medication as indicated, use prescribed medication, cognitive restructuring, interpersonal, family therapy, supportive therapy services.        Goal 3: Patiient will report absence of persistent anxiety mood and report of reduced frequency and intensity of worry and absence of persistent anxious mood to acceptable levels, no panic attacks, report subjective comfort with rumination for a period of 90 days. Within 6 months as clinically observed and by patient self-report    I will know I've met my goal when I can go days without worrying about little things or shaking.      Objective #A (Patient Action)    Status: Continued - Date(s):  5/12/2022    Patient will demonstrate and report a level of anxiety that can be managed at a lower level of care.  Absence of persistent  "anxious mood and report of reduced frequency and intensity of worry and absence of persistent anxious mood to acceptable levels, no panic attacks, report subjective comfort with rumination for a period of 90 days, within 6 months as clinically observed and by patient self-report.    Intervention(s)  Therapist will provide individual therapy to identify triggers to anxiety, gain feedback on helpful coping tools and thought-reframing techniques, and identify preferred way of being. Tx to include discuss current stressors and interpersonal conflicts and how to cope with these, coaching, diagnostic testing , referral for medication as indicated, use prescribed medication, cognitive restructuring, interpersonal,   family therapy, supportive therapy services.        Patient has reviewed and agreed to the above plan.      Katie Faulkner, [facility]   5/12/2022                                                   Wandy Ann            SAFETY PLAN:  Step 1: Warning signs / cues (Thoughts, images, mood, situation, behavior) that a crisis may be developing:  ? Thoughts: thoughts of not wanting to be here  ? Images: no images  ? Thinking Processes: intrusive thoughts (bothersome, unwanted thoughts that come out of nowhere): get irritated  ? Mood: intense anger and agitation  ? Behaviors: isolating/withdrawing   ? Situations: trauma    Step 2: Coping strategies - Things I can do to take my mind off of my problems without contacting another person (relaxation technique, physical activity):  ? Distress Tolerance Strategies:  arts and crafts: drawing and painting  ? Physical Activities: exercise: working out  ? Focus on helpful thoughts:  \"This is temporary\", \"It always passes\" and \"Ride the wave\"  Step 3: People and social settings that provide distraction:                 Name: Jennifer     Phone: [phone]                 Name: Antonina         Phone: [phone]                 Name: mom       Phone: " [phone]  ? friends house or car   Step 4: Remind myself of people and things that are important to me and worth living for:  Best friend and cat        Step 5: When I am in crisis, I can ask these people to help me use my safety plan:                 Name: Jennifer     Phone: [phone]                 Name: Antonina         Phone: [phone]                 Name: panchito       Phone: [phone]  Step 6: Making the environment safe:   ? be around others  Step 7: Professionals or agencies I can contact during a crisis:  ? [facility] Number: [phone]  ? Suicide Prevention Lifeline: 3-551-200-PPMA (7346)  ? Crisis Text Line Service (available 24 hours a day, 7 days a week): Text [place] to 395865    Local Crisis Services: 988     Call 911 or go to my nearest emergency department.       I helped develop this safety plan and agree to use it when needed.  I have been given a copy of this plan.       Client signature _________________________________________________________________  Today s date:  2/22/2021  Adapted from Safety Plan Template 2008 Marisol Cazares and Mario Flores is reprinted with the express permission of the authors.  No portion of the Safety Plan Template may be reproduced without the express, written permission.  You can contact the authors at [email or web address] or [email or web address].

## 2022-06-01 ENCOUNTER — MYC REFILL (OUTPATIENT)
Dept: FAMILY MEDICINE | Facility: CLINIC | Age: 22
End: 2022-06-01
Payer: COMMERCIAL

## 2022-06-01 DIAGNOSIS — F32.1 CURRENT MODERATE EPISODE OF MAJOR DEPRESSIVE DISORDER, UNSPECIFIED WHETHER RECURRENT (H): ICD-10-CM

## 2022-06-01 DIAGNOSIS — F41.1 GAD (GENERALIZED ANXIETY DISORDER): Primary | ICD-10-CM

## 2022-06-02 RX ORDER — VENLAFAXINE HYDROCHLORIDE 225 MG/1
225 TABLET, EXTENDED RELEASE ORAL DAILY
Qty: 30 TABLET | Refills: 0 | Status: SHIPPED | OUTPATIENT
Start: 2022-06-02 | End: 2022-06-14 | Stop reason: ALTCHOICE

## 2022-06-02 RX ORDER — ARIPIPRAZOLE 2 MG/1
2 TABLET ORAL DAILY
Qty: 30 TABLET | Refills: 0 | Status: SHIPPED | OUTPATIENT
Start: 2022-06-02 | End: 2022-06-21

## 2022-06-03 ENCOUNTER — TELEPHONE (OUTPATIENT)
Dept: PSYCHIATRY | Facility: CLINIC | Age: 22
End: 2022-06-03
Payer: COMMERCIAL

## 2022-06-03 NOTE — TELEPHONE ENCOUNTER
Pharmacy sent fax saying that the venlafaxine 225mg tablet is not covered and is asking for an alternative.   Spoke to pharmacist who said that the patient's plan might cover capsules of 150mg and 75mg but capsules are not CR but are XR.

## 2022-06-06 ENCOUNTER — VIRTUAL VISIT (OUTPATIENT)
Dept: PSYCHOLOGY | Facility: CLINIC | Age: 22
End: 2022-06-06
Payer: COMMERCIAL

## 2022-06-06 DIAGNOSIS — F41.1 GENERALIZED ANXIETY DISORDER: ICD-10-CM

## 2022-06-06 DIAGNOSIS — F33.1 MDD (MAJOR DEPRESSIVE DISORDER), RECURRENT EPISODE, MODERATE (H): Primary | ICD-10-CM

## 2022-06-06 DIAGNOSIS — F43.10 PTSD (POST-TRAUMATIC STRESS DISORDER): ICD-10-CM

## 2022-06-06 PROCEDURE — 90834 PSYTX W PT 45 MINUTES: CPT | Mod: 95 | Performed by: SOCIAL WORKER

## 2022-06-06 ASSESSMENT — ANXIETY QUESTIONNAIRES
GAD7 TOTAL SCORE: 10
4. TROUBLE RELAXING: MORE THAN HALF THE DAYS
1. FEELING NERVOUS, ANXIOUS, OR ON EDGE: MORE THAN HALF THE DAYS
6. BECOMING EASILY ANNOYED OR IRRITABLE: SEVERAL DAYS
7. FEELING AFRAID AS IF SOMETHING AWFUL MIGHT HAPPEN: SEVERAL DAYS
3. WORRYING TOO MUCH ABOUT DIFFERENT THINGS: MORE THAN HALF THE DAYS
2. NOT BEING ABLE TO STOP OR CONTROL WORRYING: MORE THAN HALF THE DAYS
GAD7 TOTAL SCORE: 10
5. BEING SO RESTLESS THAT IT IS HARD TO SIT STILL: NOT AT ALL

## 2022-06-06 ASSESSMENT — PATIENT HEALTH QUESTIONNAIRE - PHQ9: SUM OF ALL RESPONSES TO PHQ QUESTIONS 1-9: 7

## 2022-06-06 NOTE — PROGRESS NOTES
[facility]                                     Progress Note    Patient Name: Wandy Ann  Date: 6/6/2022         Service Type: Individual      Session Start Time: 1505  Session End Time: 1538     Session Length: 16-37    Session #: 20    Attendees: Client    Service Modality:  Video Visit:      Provider verified identity through the following two step process.  Patient provided:  Patient is known previously to provider    Telemedicine Visit: The patient's condition can be safely assessed and treated via synchronous audio and visual telemedicine encounter.      Reason for Telemedicine Visit: Services only offered telehealth    Originating Site (Patient Location): Patient's home    Distant Site (Provider Location): Provider Remote Setting- Home Office    Consent:  The patient/guardian has verbally consented to: the potential risks and benefits of telemedicine (video visit) versus in person care; bill my insurance or make self-payment for services provided; and responsibility for payment of non-covered services.     Patient would like the video invitation sent by:  Send to e-mail at: [email or web address]    Mode of Communication:  Video Conference via [employer]    As the provider I attest to compliance with applicable laws and regulations related to telemedicine.    DATA  Interactive Complexity: No  Crisis: No        Progress Since Last Session (Related to Symptoms / Goals / Homework):   Symptoms: difficult week when medication did not get filled    Homework: Achieved / completed to satisfaction      Episode of Care Goals: Minimal progress - PREPARATION (Decided to change - considering how); Intervened by negotiating a change plan and determining options / strategies for behavior change, identifying triggers, exploring social supports, and working towards setting a date to begin behavior change     Current / Ongoing Stressors and Concerns:   past trauma, developmental hx and  current stressors      Treatment Objective(s) Addressed in This Session:   Patient will demonstrate control of impulses and ability to use positive coping tools to manage strong emotions that can be safely addressed at a lower level of care. Absence of persistent SI and report of reduced frequency and intensity of SI and absence of SI to acceptable levels, report subjective improved mood for a period of 90 days, within 6 months as clinically observed and by patient self-report.  Patient will demonstrate and report a level of depression that can be managed at a lower level of care.  Absence of persistent depression mood and report of reduced frequency and intensity of low mood and absence of persistent low energy and motivation to acceptable levels, report subjective improved motivation and increased energy for a period of 90 days, within 6 months as clinically observed and by patient self-report.  Patient will demonstrate and report a level of anxiety that can be managed at a lower level of care.  Absence of persistent anxious mood and report of reduced frequency and intensity of worry and absence of persistent anxious mood to acceptable levels, no panic attacks, report subjective comfort with rumination for a period of 90 days, within 6 months as clinically observed and by patient self-report.       Intervention:   Therapist met with patient to review goals and interventions. Therapist utilized reflected listening as patient gave brief reflection of week. Patient reported difficult week when medication did not get filled and SI returned but patient was able to remain safe and manage her emotions. Therapist supported patient as she processed and reviewed PHQ-9 going down by half and anxiety remaining the same. Therapist educated patient on anxiety and encouraged patient to name, ground, challenge anxiety in the moment.   Patient presented with an anxious affect. Patient was engaged in session and open to feedback.         There has been demonstrated improvement in functioning while patient has been engaged in psychotherapy/psychological service- if withdrawn the patient would deteriorate and/or relapse.       Assessments completed prior to visit:  The following assessments were completed by patient for this visit:  PHQ9:   PHQ-9 SCORE 11/2/2021 12/6/2021 12/29/2021 2/8/2022 4/1/2022 4/27/2022 5/12/2022   PHQ-9 Total Score MyChart - - - - - 16 (Moderately severe depression) -   PHQ-9 Total Score 24 16 11 11 11 16 15   Some encounter information is confidential and restricted. Go to Review Flowsheets activity to see all data.     GAD7:   YESSI-7 SCORE 11/2/2021 12/6/2021 12/27/2021 2/8/2022 4/1/2022 4/27/2022 5/11/2022   Total Score - - 19 (severe anxiety) - - 7 (mild anxiety) 9 (mild anxiety)   Total Score 18 15 19 10 12 7 9   Some encounter information is confidential and restricted. Go to Review Flowsheets activity to see all data.           ASSESSMENT: Current Emotional / Mental Status (status of significant symptoms):   Risk status (Self / Other harm or suicidal ideation)   Patient denies current fears or concerns for personal safety.   Patient reports the following current or recent suicidal ideation or behaviors: lessoning SI with no plan or intent.   Patient denies current or recent homicidal ideation or behaviors.   Patient denies current or recent self injurious behavior or ideation.   Patient denies other safety concerns.   Patient reports there has been no change in risk factors since their last session.     Patient reports there has been no change in protective factors since their last session.     A safety and risk management plan has been developed including: Patient consented to co-developed safety plan on 2.21.2022.  Safety and risk management plan was reviewed.   Patient agreed to use safety plan should any safety concerns arise.  A copy was made available to the patient.     Appearance:   Appropriate    Eye  Contact:   Fair    Psychomotor Behavior: Restless    Attitude:   Cooperative    Orientation:   All   Speech    Rate / Production: Emotional Talkative    Volume:  Normal    Mood:    Anxious  Depressed    Affect:    Worrisome    Thought Content:  Clear    Thought Form:  Coherent    Insight:    Fair      Medication Review:   No changes to current psychiatric medication(s)     Medication Compliance:   Yes     Changes in Health Issues:   Yes: Pain, Associated Psychological Distress     Chemical Use Review:   Substance Use: Chemical use reviewed, no active concerns identified      Tobacco Use: No current tobacco use.      Diagnosis:  1. MDD (major depressive disorder), recurrent episode, moderate (H)    2. Generalized anxiety disorder    3. PTSD (post-traumatic stress disorder)        Collateral Reports Completed:   Not Applicable    PLAN: (Patient Tasks / Therapist Tasks / Other)  Make visual safety plan   Manage depressive symptoms with coping skills  When feeling SI follow safety plan  Continue to manage SI and go to ER if feeling unsafe  Therapist educated patient on anxiety and encouraged patient to name, ground, challenge anxiety in the moment.     Katie Faulkner, [facility]   6/6/2022                                                         ______________________________________________________________________    Individual Treatment Plan    Patient's Name: Wandy Ann  YOB: 2000    Date of Creation: 2.22.2021  Date Treatment Plan Last Reviewed/Revised: 5/12/2022 due 8/12/2022    DSM5 Diagnoses: 296.32 (F33.1) Major Depressive Disorder, Recurrent Episode, Moderate With mixed features, 300.02 (F41.1) Generalized Anxiety Disorder or 309.81 (F43.10) Posttraumatic Stress Disorder (includes Posttraumatic Stress Disorder for Children 6 Years and Younger)  Without dissociative symptoms  Psychosocial / Contextual Factors: past trauma, developmental hx and current stressors   PROMIS (reviewed every 90  days):     Referral / Collaboration:  Referral to another professional/service is not indicated at this time..    Anticipated number of session for this episode of care: 12  Anticipation frequency of session: Biweekly  Anticipated Duration of each session: 38-52 minutes  Treatment plan will be reviewed in 90 days or when goals have been changed.       MeasurableTreatment Goal(s) related to diagnosis / functional impairment(s)  Goal 1: Patient will report absence of persistent SI and report of reduced frequency and intensity of SI and absence of SI to acceptable levels, report subjective improved mood for a period of 90 days, within 6 months as clinically observed and by patient self-report    I will know I've met my goal when I can see the difference between a bad moment and what I want in th future.      Objective #A (Patient Action)    Patient will demonstrate control of impulses and ability to use positive coping tools to manage strong emotions that can be safely addressed at a lower level of care. Absence of persistent SI and report of reduced frequency and intensity of SI and absence of SI to acceptable levels, report subjective improved mood for a period of 90 days, within 6 months as clinically observed and by patient self-report.  Status: Continued - Date(s): 5/12/2022    Intervention(s)  Therapist will provide individual therapy to identify triggers to SI urges, gain feedback on helpful coping strategies, and identify ways that family can offer support. Tx to discuss current stressors and interpersonal conflicts and how to cope with these, coaching, diagnostic testing, referral for medication as indicated, use prescribed medication, cognitive restructuring, interpersonal family therapy, supportive therapy services.        Goal 2: Patient will report absence of persistent depression mood and report of reduced frequency and intensity of low mood and absence of persistent low energy and motivation to  acceptable levels, report subjective improved motivation and increased energy for a period of 90 days, within 6 months as clinically observed and by patient self-report    I will know I've met my goal when I no longer feel sad.      Objective #A (Patient Action)    Status: Continued - Date(s):  5/12/2022    Patient will demonstrate and report a level of depression that can be managed at a lower level of care.  Absence of persistent depression mood and report of reduced frequency and intensity of low mood and absence of persistent low energy and motivation to acceptable levels, report subjective improved motivation and increased energy for a period of 90 days, within 6 months as clinically observed and by patient self-report.    Intervention(s)  Therapist will provide individual therapy to identify triggers to depression, gain feedback on helpful coping tools and thought-reframing techniques, and identify preferred way of being.  Tx to include discussion of current stressors and interpersonal conflicts and how to cope with these, coaching, diagnostic testing, referral for medication as indicated, use prescribed medication, cognitive restructuring, interpersonal, family therapy, supportive therapy services.        Goal 3: Patiient will report absence of persistent anxiety mood and report of reduced frequency and intensity of worry and absence of persistent anxious mood to acceptable levels, no panic attacks, report subjective comfort with rumination for a period of 90 days. Within 6 months as clinically observed and by patient self-report    I will know I've met my goal when I can go days without worrying about little things or shaking.      Objective #A (Patient Action)    Status: Continued - Date(s):  5/12/2022    Patient will demonstrate and report a level of anxiety that can be managed at a lower level of care.  Absence of persistent anxious mood and report of reduced frequency and intensity of worry and absence  "of persistent anxious mood to acceptable levels, no panic attacks, report subjective comfort with rumination for a period of 90 days, within 6 months as clinically observed and by patient self-report.    Intervention(s)  Therapist will provide individual therapy to identify triggers to anxiety, gain feedback on helpful coping tools and thought-reframing techniques, and identify preferred way of being. Tx to include discuss current stressors and interpersonal conflicts and how to cope with these, coaching, diagnostic testing , referral for medication as indicated, use prescribed medication, cognitive restructuring, interpersonal,   family therapy, supportive therapy services.        Patient has reviewed and agreed to the above plan.      Katie Faulkner, [facility]   5/12/2022                                                   Wandy Ann            SAFETY PLAN:  Step 1: Warning signs / cues (Thoughts, images, mood, situation, behavior) that a crisis may be developing:  ? Thoughts: thoughts of not wanting to be here  ? Images: no images  ? Thinking Processes: intrusive thoughts (bothersome, unwanted thoughts that come out of nowhere): get irritated  ? Mood: intense anger and agitation  ? Behaviors: isolating/withdrawing   ? Situations: trauma    Step 2: Coping strategies - Things I can do to take my mind off of my problems without contacting another person (relaxation technique, physical activity):  ? Distress Tolerance Strategies:  arts and crafts: drawing and painting  ? Physical Activities: exercise: working out  ? Focus on helpful thoughts:  \"This is temporary\", \"It always passes\" and \"Ride the wave\"  Step 3: People and social settings that provide distraction:                 Name: Jennifer     Phone: [phone]                 Name: Antonina         Phone: [phone]                 Name: panchito       Phone: [phone]  ? friends house or car   Step 4: Remind myself of people and things that are important " to me and worth living for:  Best friend and cat        Step 5: When I am in crisis, I can ask these people to help me use my safety plan:                 Name: Jennifer     Phone: [phone]                 Name: Antonina         Phone: [phone]                 Name: panchito       Phone: [phone]  Step 6: Making the environment safe:   ? be around others  Step 7: Professionals or agencies I can contact during a crisis:  ? [facility] Number: [phone]  ? Suicide Prevention Lifeline: 4-001-337-QIQO (7672)  ? Crisis Text Line Service (available 24 hours a day, 7 days a week): Text [place] to 965211    Local Crisis Services: 988     Call 911 or go to my nearest emergency department.       I helped develop this safety plan and agree to use it when needed.  I have been given a copy of this plan.       Client signature _________________________________________________________________  Today s date:  2/22/2021  Adapted from Safety Plan Template 2008 Marisol Cazares and Mario Flores is reprinted with the express permission of the authors.  No portion of the Safety Plan Template may be reproduced without the express, written permission.  You can contact the authors at bhs@[email or web address] or [email or web address].

## 2022-06-07 PROCEDURE — 99284 EMERGENCY DEPT VISIT MOD MDM: CPT

## 2022-06-08 ENCOUNTER — HOSPITAL ENCOUNTER (EMERGENCY)
Facility: CLINIC | Age: 22
Discharge: HOME | End: 2022-06-08
Attending: EMERGENCY MEDICINE | Admitting: EMERGENCY MEDICINE
Payer: COMMERCIAL

## 2022-06-08 VITALS
OXYGEN SATURATION: 99 % | WEIGHT: 195 LBS | HEART RATE: 113 BPM | BODY MASS INDEX: 38.28 KG/M2 | SYSTOLIC BLOOD PRESSURE: 141 MMHG | TEMPERATURE: 97.4 F | RESPIRATION RATE: 18 BRPM | HEIGHT: 60 IN | DIASTOLIC BLOOD PRESSURE: 74 MMHG

## 2022-06-08 DIAGNOSIS — T74.21XA SEXUAL ASSAULT OF ADULT, INITIAL ENCOUNTER: ICD-10-CM

## 2022-06-08 PROCEDURE — 250N000011 HC RX IP 250 OP 636: Performed by: EMERGENCY MEDICINE

## 2022-06-08 PROCEDURE — 250N000009 HC RX 250: Performed by: EMERGENCY MEDICINE

## 2022-06-08 PROCEDURE — 250N000013 HC RX MED GY IP 250 OP 250 PS 637: Performed by: EMERGENCY MEDICINE

## 2022-06-08 PROCEDURE — 96372 THER/PROPH/DIAG INJ SC/IM: CPT | Mod: 59 | Performed by: EMERGENCY MEDICINE

## 2022-06-08 RX ORDER — ONDANSETRON 4 MG/1
4 TABLET, ORALLY DISINTEGRATING ORAL ONCE
Status: COMPLETED | OUTPATIENT
Start: 2022-06-08 | End: 2022-06-08

## 2022-06-08 RX ORDER — METRONIDAZOLE 500 MG/1
2000 TABLET ORAL ONCE
Status: COMPLETED | OUTPATIENT
Start: 2022-06-08 | End: 2022-06-08

## 2022-06-08 RX ORDER — AZITHROMYCIN 250 MG/1
1000 TABLET, FILM COATED ORAL ONCE
Status: COMPLETED | OUTPATIENT
Start: 2022-06-08 | End: 2022-06-08

## 2022-06-08 RX ADMIN — LIDOCAINE HYDROCHLORIDE 500 MG: 10 INJECTION, SOLUTION EPIDURAL; INFILTRATION; INTRACAUDAL; PERINEURAL at 03:30

## 2022-06-08 RX ADMIN — AZITHROMYCIN MONOHYDRATE 1000 MG: 250 TABLET ORAL at 03:23

## 2022-06-08 RX ADMIN — METRONIDAZOLE 2000 MG: 500 TABLET, FILM COATED ORAL at 03:24

## 2022-06-08 RX ADMIN — ONDANSETRON 4 MG: 4 TABLET, ORALLY DISINTEGRATING ORAL at 03:16

## 2022-06-08 ASSESSMENT — ENCOUNTER SYMPTOMS
ARTHRALGIAS: 0
MYALGIAS: 0
ABDOMINAL PAIN: 1
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
## 2022-06-09 ENCOUNTER — PATIENT OUTREACH (OUTPATIENT)
Dept: CARE COORDINATION | Facility: CLINIC | Age: 22
End: 2022-06-09
Payer: COMMERCIAL

## 2022-06-09 DIAGNOSIS — Z71.89 OTHER SPECIFIED COUNSELING: ICD-10-CM

## 2022-06-09 NOTE — PROGRESS NOTES
Clinic Care Coordination Contact  [facility]/Voicemail       Clinical Data: Care Coordinator Outreach  Outreach attempted x 1.  Left message on patient's voicemail with call back information and requested return call.  Plan: Care Coordinator will try to reach patient again in 1-2 business days.    VICTOR MANUEL Jeffries  Connected Care Resource Center  [facility]     *Connected Care Resource Team does NOT follow patient ongoing. Referrals are identified based on internal discharge reports and the outreach is to ensure patient has an understanding of their discharge instructions.

## 2022-06-10 ENCOUNTER — MYC MEDICAL ADVICE (OUTPATIENT)
Dept: PSYCHOLOGY | Facility: CLINIC | Age: 22
End: 2022-06-10

## 2022-06-10 NOTE — PROGRESS NOTES
Clinic Care Coordination Contact  [facility]/Voicemail       Clinical Data: Care Coordinator Outreach  Outreach attempted x 2.  Unable to leave message on patient's voicemail with call back information.    Plan: Care Coordinator will do no further outreaches at this time.    VICTOR MANUEL Jeffries  Connected Care Resource Center  [facility]     *Connected Care Resource Team does NOT follow patient ongoing. Referrals are identified based on internal discharge reports and the outreach is to ensure patient has an understanding of their discharge instructions.

## 2022-06-14 ENCOUNTER — VIRTUAL VISIT (OUTPATIENT)
Dept: PSYCHOLOGY | Facility: CLINIC | Age: 22
End: 2022-06-14
Payer: COMMERCIAL

## 2022-06-14 DIAGNOSIS — F43.10 PTSD (POST-TRAUMATIC STRESS DISORDER): ICD-10-CM

## 2022-06-14 DIAGNOSIS — F33.1 MDD (MAJOR DEPRESSIVE DISORDER), RECURRENT EPISODE, MODERATE (H): Primary | ICD-10-CM

## 2022-06-14 DIAGNOSIS — F41.1 GENERALIZED ANXIETY DISORDER: ICD-10-CM

## 2022-06-14 PROCEDURE — 90834 PSYTX W PT 45 MINUTES: CPT | Mod: 95 | Performed by: SOCIAL WORKER

## 2022-06-17 ENCOUNTER — VIRTUAL VISIT (OUTPATIENT)
Dept: PSYCHOLOGY | Facility: CLINIC | Age: 22
End: 2022-06-17
Payer: COMMERCIAL

## 2022-06-17 DIAGNOSIS — F33.1 MDD (MAJOR DEPRESSIVE DISORDER), RECURRENT EPISODE, MODERATE (H): Primary | ICD-10-CM

## 2022-06-17 DIAGNOSIS — F41.1 GENERALIZED ANXIETY DISORDER: ICD-10-CM

## 2022-06-17 DIAGNOSIS — F43.10 PTSD (POST-TRAUMATIC STRESS DISORDER): ICD-10-CM

## 2022-06-17 PROCEDURE — 90834 PSYTX W PT 45 MINUTES: CPT | Mod: 95 | Performed by: SOCIAL WORKER
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
## 2022-06-21 ENCOUNTER — E-VISIT (OUTPATIENT)
Dept: FAMILY MEDICINE | Facility: CLINIC | Age: 22
End: 2022-06-21
Payer: COMMERCIAL

## 2022-06-21 ENCOUNTER — VIRTUAL VISIT (OUTPATIENT)
Dept: BEHAVIORAL HEALTH | Facility: CLINIC | Age: 22
End: 2022-06-21
Payer: COMMERCIAL

## 2022-06-21 ENCOUNTER — VIRTUAL VISIT (OUTPATIENT)
Dept: PSYCHIATRY | Facility: CLINIC | Age: 22
End: 2022-06-21
Payer: COMMERCIAL

## 2022-06-21 DIAGNOSIS — E55.9 VITAMIN D DEFICIENCY: ICD-10-CM

## 2022-06-21 DIAGNOSIS — F33.1 MODERATE RECURRENT MAJOR DEPRESSION (H): Primary | ICD-10-CM

## 2022-06-21 DIAGNOSIS — Z53.9 ERRONEOUS ENCOUNTER--DISREGARD: Primary | ICD-10-CM

## 2022-06-21 DIAGNOSIS — F33.1 MAJOR DEPRESSIVE DISORDER, RECURRENT EPISODE, MODERATE (H): Primary | ICD-10-CM

## 2022-06-21 DIAGNOSIS — F43.10 PTSD (POST-TRAUMATIC STRESS DISORDER): ICD-10-CM

## 2022-06-21 DIAGNOSIS — F41.1 GAD (GENERALIZED ANXIETY DISORDER): ICD-10-CM

## 2022-06-21 PROCEDURE — 90832 PSYTX W PT 30 MINUTES: CPT | Mod: 95 | Performed by: COUNSELOR

## 2022-06-21 PROCEDURE — 99214 OFFICE O/P EST MOD 30 MIN: CPT | Mod: 95 | Performed by: NURSE PRACTITIONER

## 2022-06-21 RX ORDER — VENLAFAXINE HYDROCHLORIDE 75 MG/1
225 CAPSULE, EXTENDED RELEASE ORAL DAILY
Qty: 90 CAPSULE | Refills: 1 | Status: SHIPPED | OUTPATIENT
Start: 2022-06-21 | End: 2022-09-29 | Stop reason: DRUGHIGH

## 2022-06-21 RX ORDER — ARIPIPRAZOLE 2 MG/1
2 TABLET ORAL DAILY
Qty: 30 TABLET | Refills: 0 | Status: SHIPPED | OUTPATIENT
Start: 2022-06-21 | End: 2022-07-18

## 2022-06-21 ASSESSMENT — ANXIETY QUESTIONNAIRES
5. BEING SO RESTLESS THAT IT IS HARD TO SIT STILL: SEVERAL DAYS
GAD7 TOTAL SCORE: 9
3. WORRYING TOO MUCH ABOUT DIFFERENT THINGS: SEVERAL DAYS
8. IF YOU CHECKED OFF ANY PROBLEMS, HOW DIFFICULT HAVE THESE MADE IT FOR YOU TO DO YOUR WORK, TAKE CARE OF THINGS AT HOME, OR GET ALONG WITH OTHER PEOPLE?: SOMEWHAT DIFFICULT
GAD7 TOTAL SCORE: 9
2. NOT BEING ABLE TO STOP OR CONTROL WORRYING: MORE THAN HALF THE DAYS
4. TROUBLE RELAXING: SEVERAL DAYS
1. FEELING NERVOUS, ANXIOUS, OR ON EDGE: MORE THAN HALF THE DAYS
7. FEELING AFRAID AS IF SOMETHING AWFUL MIGHT HAPPEN: MORE THAN HALF THE DAYS
6. BECOMING EASILY ANNOYED OR IRRITABLE: NOT AT ALL
GAD7 TOTAL SCORE: 9
7. FEELING AFRAID AS IF SOMETHING AWFUL MIGHT HAPPEN: MORE THAN HALF THE DAYS

## 2022-06-21 ASSESSMENT — PATIENT HEALTH QUESTIONNAIRE - PHQ9
SUM OF ALL RESPONSES TO PHQ QUESTIONS 1-9: 10
10. IF YOU CHECKED OFF ANY PROBLEMS, HOW DIFFICULT HAVE THESE PROBLEMS MADE IT FOR YOU TO DO YOUR WORK, TAKE CARE OF THINGS AT HOME, OR GET ALONG WITH OTHER PEOPLE: VERY DIFFICULT
SUM OF ALL RESPONSES TO PHQ QUESTIONS 1-9: 10
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
## 2022-06-21 NOTE — PROGRESS NOTES
"Wandy is a 21 year old who is being evaluated via a billable video visit.      How would you like to obtain your AVS? MyChart  If the video visit is dropped, the invitation should be resent by: Text to cell phone: [phone]  Will anyone else be joining your video visit? No    Johana Anne VF    Video-Visit Details    Video Start Time: 2:58 PM    Type of service:  Video Visit    Video End Time:3:25 PM    Originating Location (pt. Location): Home    Distant Location (provider location):  [facility] MENTAL [facility] & ADDICTION [facility]     Platform used for Video Visit: [facility]              Outpatient Psychiatric Progress Note    Name: Wandy Ann   : 2000                    Primary Care Provider: Hudson Mobley MD   Therapist: Yes    PHQ-9 scores:  PHQ-9 SCORE 2022   PHQ-9 Total Score MyChart - - 10 (Moderate depression)   PHQ-9 Total Score 15 7 10   Some encounter information is confidential and restricted. Go to Review Flowsheets activity to see all data.       YESSI-7 scores:  YESSI-7 SCORE 2022   Total Score 9 (mild anxiety) - 9 (mild anxiety)   Total Score 9 10 9   Some encounter information is confidential and restricted. Go to Review Flowsheets activity to see all data.       Patient Identification:    Patient is a 21 year old year old, single  Choose not to Answer  or  female  who presents for return visit with me.  Patient is currently employed part time. Patient attended the session alone. Patient prefers to be called: \" Wandy\".    Current medications include: ARIPiprazole (ABILIFY) 2 MG tablet, Take 1 tablet (2 mg) by mouth daily  hydrOXYzine (ATARAX) 10 MG tablet, Take 1 tablet (10 mg) by mouth 3 times daily as needed for anxiety  venlafaxine (EFFEXOR XR) 75 MG 24 hr capsule, Take 3 capsules (225 mg) by mouth daily    No current facility-administered medications on file prior to visit.       The [place] " Prescription Monitoring Program has been reviewed and there are no concerns about diversionary activity for controlled substances at this time.      I was able to review most recent Primary Care Provider, specialty provider, and therapy visit notes that I have access to.     Today, patient reports That when she starts medications they work then they stop working. She feels better able to go to  Work , she is better able to get out of bed.  .  She has a consistent schedule.  She is a .  [facility] [employer].  She llikes it.  She has periods of anxiety  That go down when she is depressed and vice versus.  She worries when things do not happen then she feels like shutting down .  She likes having routine.  Sertraline prozac wellbutrin.  When she starts medications she has a  Hard time falling asleep.  Then she takes hydroxyzinen to help  With insomnia.   Suicide thoughts happen when she has thoughts of not having life in control.   Sometimes hard  To concentrate .   She tries to spend time with friends.  Goes to [employer], reads.        has no past medical history on file.    Social history updates:    She lives alone and identified no financial stressors    Substance use updates:    Occasional alcohol use  Tobacco use: No    Vital Signs:   There were no vitals taken for this visit.    Labs:    Most recent laboratory results reviewed and no new labs.     Mental Status Examination:  Appearance: awake, alert and casual dress  Attitude: cooperative  Eye Contact:  adequate  Gait and Station: No assistive Devices used and No dizziness or falls  Psychomotor Behavior:  intact station, gait and muscle tone  Oriented to:  time, person, and place  Attention Span and Concentration:  Normal  Speech:   clear, coherent and Speaks: English  Mood:  anxious and depressed  Affect:  mood congruent  Associations:  no loose associations  Thought Process:  goal oriented  Thought Content:  no evidence of suicidal ideation or homicidal  ideation, no auditory hallucinations present and no visual hallucinations present  Recent and Remote Memory:  intact Not formally assessed. No amnesia.  Fund of Knowledge: appropriate  Insight:  good  Judgment:  intact  Impulse Control:  intact    Suicide Risk Assessment:  Today Wandy Ann reports no thoughts to harm themself or others. In addition, there are notable risk factors for self-harm, including single status and anxiety. However, risk is mitigated by history of seeking help when needed, future oriented, denies suicidal intent or plan and denies homicidal ideation, intent, or plan. Therefore, based on all available evidence including the factors cited above, Wandy Ann does not appear to be at imminent risk for self-harm, does not meet criteria for a 72-hr hold, and therefore remains appropriate for ongoing outpatient level of care.  A thorough assessment of risk factors related to suicide and self-harm have been reviewed and are noted above. The patient convincingly denies suicidality on several occasions. Local community safety resources printed and reviewed for patient to use if needed. There was no deceit detected, and the patient presented in a manner that was believable.     DSM5 Diagnosis:  296.32 (F33.1) Major Depressive Disorder, Recurrent Episode, Moderate _ and With mixed features  300.02 (F41.1) Generalized Anxiety Disorder    Medical comorbidities include:   Patient Active Problem List    Diagnosis Date Noted     Major depressive disorder, recurrent episode, moderate (H) 04/29/2022     Priority: Medium     YESSI (generalized anxiety disorder) 04/29/2022     Priority: Medium     Moderate major depression (H) 07/19/2020     Priority: Medium       Assessment:    Wandy Ann is feeling more stable since adjusting medications.  She will keep the venlafaxine hydroxyzine and Abilify as prescribed for this moment.  Talk therapy is recommended to help her learn ways to cope  with life stressors.  Work life is going well for her also.  Sometimes she has difficulties concentrating but, overall, is able to get things done..    Medication side effects and alternatives were reviewed. Health promotion activities recommended and reviewed today. All questions addressed. Education and counseling completed regarding risks and benefits of medications and psychotherapy options.    Treatment Plan:        1.  Venlafaxine  mg daily    2.  Hydroxyzine 10 mg up to 3 times daily as needed for anxiety    3.  Abilify 2 mg daily    4.  Talk therapy, when able to, to learn ways to process life stressors        Continue all other medications as reviewed per electronic medical record today.     Safety plan reviewed. To the Emergency Department as needed or call after hours crisis line at [phone] or [phone]. [place] Crisis Text Line. Text [place] to 259936 or Suicide LifeLine Chat: Crack.org/chat/    To schedule individual or family therapy, call [facility] at [phone]    Schedule an appointment with me in 2 months or sooner as needed. Call [facility] at [phone] to schedule.    Follow up with primary care provider as planned or for acute medical concerns.    Call the psychiatric nurse line with medication questions or concerns at [phone]    MyChart may be used to communicate with your provider, but this is not intended to be used for emergencies.    Crisis Resources:    National Suicide Prevention Lifeline: 946.683.3296 (TTY: 712.329.2620). Call anytime for help.  (www.suicidepreventionlifeline.org)  National [place] on Mental Illness (www.pricilla.org): 697.808.2341 or 751-883-7098.   Mental Health Association (www.mentalhealth.org): 595.705.5592 or 773-457-9341.  [place] Crisis Text Line: Text [place] to 366471  Suicide LifeLine Chat: suicideuVore.org/chat    Administrative Billing:   Time spent with patient includes  counseling and coordination of care regarding above diagnoses and treatment plan.    Patient Status:  Patient will continue to be seen for ongoing consultation and stabilization.    Signed:   HARJINDER Powell-BC   Psychiatry

## 2022-06-22 NOTE — PATIENT INSTRUCTIONS
Thank you for choosing us for your care. I think an in-clinic visit would be best next steps based on your symptoms. Please schedule a clinic appointment; you won t be charged for this eVisit.      You can schedule an appointment right here in [facility], or call [phone]

## 2022-06-28 DIAGNOSIS — Z15.89 HETEROZYGOUS FOR MTHFR GENE MUTATION: Primary | ICD-10-CM

## 2022-06-28 RX ORDER — VITAMIN K2 90 MCG
400 CAPSULE ORAL DAILY
Qty: 30 CAPSULE | Refills: 1 | Status: SHIPPED | OUTPATIENT
Start: 2022-06-28 | End: 2022-09-29

## 2022-06-28 NOTE — TELEPHONE ENCOUNTER
----- Message from Yoly Cox NP sent at 6/28/2022 11:51 AM CDT -----  Please let Wandy know I ordered her a supplement because her genetics make it difficult to breakdown folic acid in her body.

## 2022-06-28 NOTE — TELEPHONE ENCOUNTER
Called and informed patient of med provider noted in results notes. Patient verbalized understanding.

## 2022-06-29 ENCOUNTER — VIRTUAL VISIT (OUTPATIENT)
Dept: PSYCHOLOGY | Facility: CLINIC | Age: 22
End: 2022-06-29
Payer: COMMERCIAL

## 2022-06-29 DIAGNOSIS — F33.1 MDD (MAJOR DEPRESSIVE DISORDER), RECURRENT EPISODE, MODERATE (H): Primary | ICD-10-CM

## 2022-06-29 DIAGNOSIS — F41.1 GENERALIZED ANXIETY DISORDER: ICD-10-CM

## 2022-06-29 DIAGNOSIS — F43.10 PTSD (POST-TRAUMATIC STRESS DISORDER): ICD-10-CM

## 2022-06-29 PROCEDURE — 90832 PSYTX W PT 30 MINUTES: CPT | Mod: 95 | Performed by: SOCIAL WORKER

## 2022-06-29 NOTE — PROGRESS NOTES
[facility]                                     Progress Note    Patient Name: Wandy Ann  Date: 6/29/2022         Service Type: Individual      Session Start Time: 1500  Session End Time: 1534     Session Length: 16-32    Session #: 23    Attendees: Client    Service Modality:   Video Visit:      Provider verified identity through the following two step process.  Patient provided:  Patient is known previously to provider    Telemedicine Visit: The patient's condition can be safely assessed and treated via synchronous audio and visual telemedicine encounter.      Reason for Telemedicine Visit: Services only offered telehealth    Originating Site (Patient Location): Patient's home    Distant Site (Provider Location): Provider Remote Setting- Home Office    Consent:  The patient/guardian has verbally consented to: the potential risks and benefits of telemedicine (video visit) versus in person care; bill my insurance or make self-payment for services provided; and responsibility for payment of non-covered services.     Patient would like the video invitation sent by:  Send to e-mail at: [email or web address]    Mode of Communication:  Video Conference via [employer]    As the provider I attest to compliance with applicable laws and regulations related to telemedicine.    DATA  Interactive Complexity: No  Crisis: No        Progress Since Last Session (Related to Symptoms / Goals / Homework):   Symptoms: Improving per patient    Homework: Completed in session      Episode of Care Goals: Minimal progress - PREPARATION (Decided to change - considering how); Intervened by negotiating a change plan and determining options / strategies for behavior change, identifying triggers, exploring social supports, and working towards setting a date to begin behavior change     Current / Ongoing Stressors and Concerns:   past trauma, developmental hx and current stressors      Treatment Objective(s)  Addressed in This Session:   Patient will demonstrate control of impulses and ability to use positive coping tools to manage strong emotions that can be safely addressed at a lower level of care. Absence of persistent SI and report of reduced frequency and intensity of SI and absence of SI to acceptable levels, report subjective improved mood for a period of 90 days, within 6 months as clinically observed and by patient self-report.  Patient will demonstrate and report a level of depression that can be managed at a lower level of care.  Absence of persistent depression mood and report of reduced frequency and intensity of low mood and absence of persistent low energy and motivation to acceptable levels, report subjective improved motivation and increased energy for a period of 90 days, within 6 months as clinically observed and by patient self-report.  Patient will demonstrate and report a level of anxiety that can be managed at a lower level of care.  Absence of persistent anxious mood and report of reduced frequency and intensity of worry and absence of persistent anxious mood to acceptable levels, no panic attacks, report subjective comfort with rumination for a period of 90 days, within 6 months as clinically observed and by patient self-report.       Intervention:   Therapist met with patient to review goals and interventions. Therapist utilized reflected listening as patient gave brief reflection of week. Patient processed her psychiatry appointment and feeling not connected. Therapist offered referrals for patient. Patient reported over the last two weeks THC use on occasion to help her sleep. Therapist reviewed the negative side effects and encouraged patient speak to her med provider to understand how this can effect her medications and find safe ways to manage sleep. Patient processed some family hx with her father. Therapist supported patient as she processed and made connection with patient on reasons  she wants control.   Patient presented with an anxious affect. Patient was engaged in session and open to feedback. Patient contracts for safety       There has been demonstrated improvement in functioning while patient has been engaged in psychotherapy/psychological service- if withdrawn the patient would deteriorate and/or relapse.       Assessments completed prior to visit:  The following assessments were completed by patient for this visit:  PHQ9:   PHQ-9 SCORE 12/29/2021 2/8/2022 4/1/2022 4/27/2022 5/12/2022 6/6/2022 6/21/2022   PHQ-9 Total Score MyChart - - - 16 (Moderately severe depression) - - 10 (Moderate depression)   PHQ-9 Total Score 11 11 11 16 15 7 10   Some encounter information is confidential and restricted. Go to Review Flowsheets activity to see all data.     GAD7:   YESSI-7 SCORE 12/27/2021 2/8/2022 4/1/2022 4/27/2022 5/11/2022 6/6/2022 6/21/2022   Total Score 19 (severe anxiety) - - 7 (mild anxiety) 9 (mild anxiety) - 9 (mild anxiety)   Total Score 19 10 12 7 9 10 9   Some encounter information is confidential and restricted. Go to Review Flowsheets activity to see all data.           ASSESSMENT: Current Emotional / Mental Status (status of significant symptoms):   Risk status (Self / Other harm or suicidal ideation)   Patient denies current fears or concerns for personal safety.   Patient reports the following current or recent suicidal ideation or behaviors: some SI but pt admits decreasing.   Patient denies current or recent homicidal ideation or behaviors.   Patient denies current or recent self injurious behavior or ideation.   Patient denies other safety concerns.   Patient reports there has been no change in risk factors since their last session.     Patient reports there has been no change in protective factors since their last session.     A safety and risk management plan has been developed including: Patient consented to co-developed safety plan on 2.21.2022.  Safety and risk  management plan was reviewed.   Patient agreed to use safety plan should any safety concerns arise.  A copy was made available to the patient.     Appearance:   Appropriate    Eye Contact:   Fair    Psychomotor Behavior: Restless    Attitude:   Cooperative    Orientation:   All   Speech    Rate / Production: Emotional Talkative    Volume:  Normal    Mood:    Anxious    Affect:    Worrisome    Thought Content:  Clear    Thought Form:  Coherent    Insight:    Fair      Medication Review:   No changes to current psychiatric medication(s)     Medication Compliance:   Yes     Changes in Health Issues:   Yes: Pain, Associated Psychological Distress     Chemical Use Review:   Substance Use: Chemical use reviewed, no active concerns identified      Tobacco Use: No current tobacco use.      Diagnosis:  1. MDD (major depressive disorder), recurrent episode, moderate (H)    2. Generalized anxiety disorder    3. PTSD (post-traumatic stress disorder)        Collateral Reports Completed:   Not Applicable    PLAN: (Patient Tasks / Therapist Tasks / Other)  Make visual safety plan   Manage depressive symptoms with coping skills  When feeling SI follow safety plan  Continue to manage SI and go to ER if feeling unsafe  Therapist reviewed the negative side effects and encouraged patient speak to her med provider to understand how this can effect her medications and find safe ways to manage sleep. Patient processed some family hx with her father. Therapist supported patient as she processed and made connection with patient on reasons she wants control.     Katie Faulkner, [facility]   6/29/2022                                                         ______________________________________________________________________    Individual Treatment Plan    Patient's Name: Wandy Ann  YOB: 2000    Date of Creation: 2.22.2021  Date Treatment Plan Last Reviewed/Revised: 5/12/2022 due 8/12/2022    DSM5 Diagnoses: 296.32  (F33.1) Major Depressive Disorder, Recurrent Episode, Moderate With mixed features, 300.02 (F41.1) Generalized Anxiety Disorder or 309.81 (F43.10) Posttraumatic Stress Disorder (includes Posttraumatic Stress Disorder for Children 6 Years and Younger)  Without dissociative symptoms  Psychosocial / Contextual Factors: past trauma, developmental hx and current stressors   PROMIS (reviewed every 90 days):     Referral / Collaboration:  Referral to another professional/service is not indicated at this time..    Anticipated number of session for this episode of care: 12  Anticipation frequency of session: Biweekly  Anticipated Duration of each session: 38-52 minutes  Treatment plan will be reviewed in 90 days or when goals have been changed.       MeasurableTreatment Goal(s) related to diagnosis / functional impairment(s)  Goal 1: Patient will report absence of persistent SI and report of reduced frequency and intensity of SI and absence of SI to acceptable levels, report subjective improved mood for a period of 90 days, within 6 months as clinically observed and by patient self-report    I will know I've met my goal when I can see the difference between a bad moment and what I want in th future.      Objective #A (Patient Action)    Patient will demonstrate control of impulses and ability to use positive coping tools to manage strong emotions that can be safely addressed at a lower level of care. Absence of persistent SI and report of reduced frequency and intensity of SI and absence of SI to acceptable levels, report subjective improved mood for a period of 90 days, within 6 months as clinically observed and by patient self-report.  Status: Continued - Date(s): 5/12/2022    Intervention(s)  Therapist will provide individual therapy to identify triggers to SI urges, gain feedback on helpful coping strategies, and identify ways that family can offer support. Tx to discuss current stressors and interpersonal conflicts and  how to cope with these, coaching, diagnostic testing, referral for medication as indicated, use prescribed medication, cognitive restructuring, interpersonal family therapy, supportive therapy services.        Goal 2: Patient will report absence of persistent depression mood and report of reduced frequency and intensity of low mood and absence of persistent low energy and motivation to acceptable levels, report subjective improved motivation and increased energy for a period of 90 days, within 6 months as clinically observed and by patient self-report    I will know I've met my goal when I no longer feel sad.      Objective #A (Patient Action)    Status: Continued - Date(s):  5/12/2022    Patient will demonstrate and report a level of depression that can be managed at a lower level of care.  Absence of persistent depression mood and report of reduced frequency and intensity of low mood and absence of persistent low energy and motivation to acceptable levels, report subjective improved motivation and increased energy for a period of 90 days, within 6 months as clinically observed and by patient self-report.    Intervention(s)  Therapist will provide individual therapy to identify triggers to depression, gain feedback on helpful coping tools and thought-reframing techniques, and identify preferred way of being.  Tx to include discussion of current stressors and interpersonal conflicts and how to cope with these, coaching, diagnostic testing, referral for medication as indicated, use prescribed medication, cognitive restructuring, interpersonal, family therapy, supportive therapy services.        Goal 3: Patiient will report absence of persistent anxiety mood and report of reduced frequency and intensity of worry and absence of persistent anxious mood to acceptable levels, no panic attacks, report subjective comfort with rumination for a period of 90 days. Within 6 months as clinically observed and by patient  self-report    I will know I've met my goal when I can go days without worrying about little things or shaking.      Objective #A (Patient Action)    Status: Continued - Date(s):  5/12/2022    Patient will demonstrate and report a level of anxiety that can be managed at a lower level of care.  Absence of persistent anxious mood and report of reduced frequency and intensity of worry and absence of persistent anxious mood to acceptable levels, no panic attacks, report subjective comfort with rumination for a period of 90 days, within 6 months as clinically observed and by patient self-report.    Intervention(s)  Therapist will provide individual therapy to identify triggers to anxiety, gain feedback on helpful coping tools and thought-reframing techniques, and identify preferred way of being. Tx to include discuss current stressors and interpersonal conflicts and how to cope with these, coaching, diagnostic testing , referral for medication as indicated, use prescribed medication, cognitive restructuring, interpersonal,   family therapy, supportive therapy services.        Patient has reviewed and agreed to the above plan.      Katie Faulkner, [facility]   5/12/2022                                                   Wandy Ann            SAFETY PLAN:  Step 1: Warning signs / cues (Thoughts, images, mood, situation, behavior) that a crisis may be developing:  ? Thoughts: thoughts of not wanting to be here  ? Images: no images  ? Thinking Processes: intrusive thoughts (bothersome, unwanted thoughts that come out of nowhere): get irritated  ? Mood: intense anger and agitation  ? Behaviors: isolating/withdrawing   ? Situations: trauma    Step 2: Coping strategies - Things I can do to take my mind off of my problems without contacting another person (relaxation technique, physical activity):  ? Distress Tolerance Strategies:  arts and crafts: drawing and painting  ? Physical Activities: exercise: working  "out  ? Focus on helpful thoughts:  \"This is temporary\", \"It always passes\" and \"Ride the wave\"  Step 3: People and social settings that provide distraction:                 Name: Jennifer     Phone: [phone]                 Name: Antonina         Phone: [phone]                 Name: panchito       Phone: [phone]  ? friends house or car   Step 4: Remind myself of people and things that are important to me and worth living for:  Best friend and cat        Step 5: When I am in crisis, I can ask these people to help me use my safety plan:                 Name: Jennifer     Phone: [phone]                 Name: Antonina         Phone: [phone]                 Name: panchito       Phone: [phone]  Step 6: Making the environment safe:   ? be around others  Step 7: Professionals or agencies I can contact during a crisis:  ? [facility] Daytime Number: [phone]  ? Suicide Prevention Lifeline: 7-345-697-DOHQ (3738)  ? Crisis Text Line Service (available 24 hours a day, 7 days a week): Text [place] to 428317    Local Crisis Services: 988     Call 911 or go to my nearest emergency department.       I helped develop this safety plan and agree to use it when needed.  I have been given a copy of this plan.       Client signature _________________________________________________________________  Today s date:  2/22/2021  Adapted from Safety Plan Template 2008 Marisol Cazares and Mario Flores is reprinted with the express permission of the authors.  No portion of the Safety Plan Template may be reproduced without the express, written permission.  You can contact the authors at [email or web address] or [email or web address].  "

## 2022-07-05 ENCOUNTER — VIRTUAL VISIT (OUTPATIENT)
Dept: PSYCHOLOGY | Facility: CLINIC | Age: 22
End: 2022-07-05
Payer: COMMERCIAL

## 2022-07-05 DIAGNOSIS — F41.1 GENERALIZED ANXIETY DISORDER: Primary | ICD-10-CM

## 2022-07-05 DIAGNOSIS — F33.1 MDD (MAJOR DEPRESSIVE DISORDER), RECURRENT EPISODE, MODERATE (H): ICD-10-CM

## 2022-07-05 DIAGNOSIS — F43.10 PTSD (POST-TRAUMATIC STRESS DISORDER): ICD-10-CM

## 2022-07-05 PROCEDURE — 90834 PSYTX W PT 45 MINUTES: CPT | Mod: 95 | Performed by: SOCIAL WORKER

## 2022-07-05 NOTE — PROGRESS NOTES
"    [facility]                                     Progress Note    Patient Name: Wandy Ann  Date: 2022         Service Type: Individual      Session Start Time: 1303  Session End Time: 1346     Session Length: 38-52    Session #: 24    Attendees: Client    Service Modality: Phone Visit:      Provider verified identity through the following two step process.  Patient provided:  Patient  and Patient is known previously to provider    The patient has been notified of the following:      \"We have found that certain health care needs can be provided without the need for a face to face visit.  This service lets us provide the care you need with a phone conversation.       I will have full access to your [facility] medical record during this entire phone call.   I will be taking notes for your medical record.      Since this is like an office visit, we will bill your insurance company for this service.       There are potential benefits and risks of telephone visits (e.g. limits to patient confidentiality) that differ from in-person visits.?Confidentiality still applies for telephone services, and nobody will record the visit.  It is important to be in a quiet, private space that is free of distractions (including cell phone or other devices) during the visit.??      If during the course of the call I believe a telephone visit is not appropriate, you will not be charged for this service\"     Consent has been obtained for this service by care team member: Yes    DATA  Interactive Complexity: No  Crisis: No        Progress Since Last Session (Related to Symptoms / Goals / Homework):   Symptoms: Improving with some anxiety    Homework: Partially completed      Episode of Care Goals: Minimal progress - PREPARATION (Decided to change - considering how); Intervened by negotiating a change plan and determining options / strategies for behavior change, identifying triggers, exploring " social supports, and working towards setting a date to begin behavior change     Current / Ongoing Stressors and Concerns:   past trauma, developmental hx and current stressors      Treatment Objective(s) Addressed in This Session:   Patient will demonstrate control of impulses and ability to use positive coping tools to manage strong emotions that can be safely addressed at a lower level of care. Absence of persistent SI and report of reduced frequency and intensity of SI and absence of SI to acceptable levels, report subjective improved mood for a period of 90 days, within 6 months as clinically observed and by patient self-report.  Patient will demonstrate and report a level of depression that can be managed at a lower level of care.  Absence of persistent depression mood and report of reduced frequency and intensity of low mood and absence of persistent low energy and motivation to acceptable levels, report subjective improved motivation and increased energy for a period of 90 days, within 6 months as clinically observed and by patient self-report.  Patient will demonstrate and report a level of anxiety that can be managed at a lower level of care.  Absence of persistent anxious mood and report of reduced frequency and intensity of worry and absence of persistent anxious mood to acceptable levels, no panic attacks, report subjective comfort with rumination for a period of 90 days, within 6 months as clinically observed and by patient self-report.       Intervention:   Therapist met with patient to review goals and interventions. Therapist utilized reflected listening as patient gave brief reflection of week. Patient reported her plans did not go as predicted and her anxiety went up but that she followed through with grounding self and redirecting herself and was able to manage her anxiety. Patient processed her current relationship and conflict. Therapist educated patient on control, effective communication  within conflict and repair and encouraged patient to speak to her partner about this.   Patient presented with an anxious affect. Patient was engaged in session and open to feedback. Patient contracts for safety       There has been demonstrated improvement in functioning while patient has been engaged in psychotherapy/psychological service- if withdrawn the patient would deteriorate and/or relapse.       Assessments completed prior to visit:  The following assessments were completed by patient for this visit:  PHQ9:   PHQ-9 SCORE 12/29/2021 2/8/2022 4/1/2022 4/27/2022 5/12/2022 6/6/2022 6/21/2022   PHQ-9 Total Score MyChart - - - 16 (Moderately severe depression) - - 10 (Moderate depression)   PHQ-9 Total Score 11 11 11 16 15 7 10   Some encounter information is confidential and restricted. Go to Review Flowsheets activity to see all data.     GAD7:   YESSI-7 SCORE 12/27/2021 2/8/2022 4/1/2022 4/27/2022 5/11/2022 6/6/2022 6/21/2022   Total Score 19 (severe anxiety) - - 7 (mild anxiety) 9 (mild anxiety) - 9 (mild anxiety)   Total Score 19 10 12 7 9 10 9   Some encounter information is confidential and restricted. Go to Review Flowsheets activity to see all data.           ASSESSMENT: Current Emotional / Mental Status (status of significant symptoms):   Risk status (Self / Other harm or suicidal ideation)   Patient denies current fears or concerns for personal safety.   Patient reports the following current or recent suicidal ideation or behaviors: some SI but has continued to reports of decreasing.   Patient denies current or recent homicidal ideation or behaviors.   Patient denies current or recent self injurious behavior or ideation.   Patient denies other safety concerns.   Patient reports there has been no change in risk factors since their last session.     Patient reports there has been no change in protective factors since their last session.     A safety and risk management plan has been developed including:  Patient consented to co-developed safety plan on 2.21.2022.  Safety and risk management plan was reviewed.   Patient agreed to use safety plan should any safety concerns arise.  A copy was made available to the patient.     Appearance:   Appropriate  Phone Visit:   Eye Contact:   Fair  Phone Visit:   Psychomotor Behavior: Restless  Phone Visit:   Attitude:   Cooperative    Orientation:   All   Speech    Rate / Production: Emotional Talkative    Volume:  Normal    Mood:    Anxious    Affect:    Worrisome    Thought Content:  Clear    Thought Form:  Coherent    Insight:    Fair      Medication Review:   No changes to current psychiatric medication(s)     Medication Compliance:   Yes     Changes in Health Issues:   Yes: Pain, Associated Psychological Distress     Chemical Use Review:   Substance Use: Chemical use reviewed, no active concerns identified      Tobacco Use: No current tobacco use.      Diagnosis:  1. Generalized anxiety disorder    2. MDD (major depressive disorder), recurrent episode, moderate (H)    3. PTSD (post-traumatic stress disorder)        Collateral Reports Completed:   Not Applicable    PLAN: (Patient Tasks / Therapist Tasks / Other)  Make visual safety plan   Manage depressive symptoms with coping skills  When feeling SI follow safety plan  Continue to manage SI and go to ER if feeling unsafe   Patient processed her current relationship and conflict. Therapist educated patient on control, effective communication within conflict and repair and encouraged patient to speak to her partner about this.     Katie Faulkner, [facility] 7/5/2022                                                         ______________________________________________________________________    Individual Treatment Plan    Patient's Name: Wandy Ann  YOB: 2000    Date of Creation: 2.22.2021  Date Treatment Plan Last Reviewed/Revised: 5/12/2022 due 8/12/2022    DSM5 Diagnoses: 296.32 (F33.1) Major  Depressive Disorder, Recurrent Episode, Moderate With mixed features, 300.02 (F41.1) Generalized Anxiety Disorder or 309.81 (F43.10) Posttraumatic Stress Disorder (includes Posttraumatic Stress Disorder for Children 6 Years and Younger)  Without dissociative symptoms  Psychosocial / Contextual Factors: past trauma, developmental hx and current stressors   PROMIS (reviewed every 90 days):     Referral / Collaboration:  Referral to another professional/service is not indicated at this time..    Anticipated number of session for this episode of care: 12  Anticipation frequency of session: Biweekly  Anticipated Duration of each session: 38-52 minutes  Treatment plan will be reviewed in 90 days or when goals have been changed.       MeasurableTreatment Goal(s) related to diagnosis / functional impairment(s)  Goal 1: Patient will report absence of persistent SI and report of reduced frequency and intensity of SI and absence of SI to acceptable levels, report subjective improved mood for a period of 90 days, within 6 months as clinically observed and by patient self-report    I will know I've met my goal when I can see the difference between a bad moment and what I want in th future.      Objective #A (Patient Action)    Patient will demonstrate control of impulses and ability to use positive coping tools to manage strong emotions that can be safely addressed at a lower level of care. Absence of persistent SI and report of reduced frequency and intensity of SI and absence of SI to acceptable levels, report subjective improved mood for a period of 90 days, within 6 months as clinically observed and by patient self-report.  Status: Continued - Date(s): 5/12/2022    Intervention(s)  Therapist will provide individual therapy to identify triggers to SI urges, gain feedback on helpful coping strategies, and identify ways that family can offer support. Tx to discuss current stressors and interpersonal conflicts and how to cope  with these, coaching, diagnostic testing, referral for medication as indicated, use prescribed medication, cognitive restructuring, interpersonal family therapy, supportive therapy services.        Goal 2: Patient will report absence of persistent depression mood and report of reduced frequency and intensity of low mood and absence of persistent low energy and motivation to acceptable levels, report subjective improved motivation and increased energy for a period of 90 days, within 6 months as clinically observed and by patient self-report    I will know I've met my goal when I no longer feel sad.      Objective #A (Patient Action)    Status: Continued - Date(s):  5/12/2022    Patient will demonstrate and report a level of depression that can be managed at a lower level of care.  Absence of persistent depression mood and report of reduced frequency and intensity of low mood and absence of persistent low energy and motivation to acceptable levels, report subjective improved motivation and increased energy for a period of 90 days, within 6 months as clinically observed and by patient self-report.    Intervention(s)  Therapist will provide individual therapy to identify triggers to depression, gain feedback on helpful coping tools and thought-reframing techniques, and identify preferred way of being.  Tx to include discussion of current stressors and interpersonal conflicts and how to cope with these, coaching, diagnostic testing, referral for medication as indicated, use prescribed medication, cognitive restructuring, interpersonal, family therapy, supportive therapy services.        Goal 3: Patiient will report absence of persistent anxiety mood and report of reduced frequency and intensity of worry and absence of persistent anxious mood to acceptable levels, no panic attacks, report subjective comfort with rumination for a period of 90 days. Within 6 months as clinically observed and by patient self-report    I will  know I've met my goal when I can go days without worrying about little things or shaking.      Objective #A (Patient Action)    Status: Continued - Date(s):  5/12/2022    Patient will demonstrate and report a level of anxiety that can be managed at a lower level of care.  Absence of persistent anxious mood and report of reduced frequency and intensity of worry and absence of persistent anxious mood to acceptable levels, no panic attacks, report subjective comfort with rumination for a period of 90 days, within 6 months as clinically observed and by patient self-report.    Intervention(s)  Therapist will provide individual therapy to identify triggers to anxiety, gain feedback on helpful coping tools and thought-reframing techniques, and identify preferred way of being. Tx to include discuss current stressors and interpersonal conflicts and how to cope with these, coaching, diagnostic testing , referral for medication as indicated, use prescribed medication, cognitive restructuring, interpersonal,   family therapy, supportive therapy services.        Patient has reviewed and agreed to the above plan.      Katie Faulkner, [facility]   5/12/2022                                                   Wandy Ann            SAFETY PLAN:  Step 1: Warning signs / cues (Thoughts, images, mood, situation, behavior) that a crisis may be developing:  ? Thoughts: thoughts of not wanting to be here  ? Images: no images  ? Thinking Processes: intrusive thoughts (bothersome, unwanted thoughts that come out of nowhere): get irritated  ? Mood: intense anger and agitation  ? Behaviors: isolating/withdrawing   ? Situations: trauma    Step 2: Coping strategies - Things I can do to take my mind off of my problems without contacting another person (relaxation technique, physical activity):  ? Distress Tolerance Strategies:  arts and crafts: drawing and painting  ? Physical Activities: exercise: working out  ? Focus on helpful  "thoughts:  \"This is temporary\", \"It always passes\" and \"Ride the wave\"  Step 3: People and social settings that provide distraction:                 Name: Jennifer     Phone: [phone]                 Name: Antonina         Phone: [phone]                 Name: panchito       Phone: [phone]  ? friends house or car   Step 4: Remind myself of people and things that are important to me and worth living for:  Best friend and cat        Step 5: When I am in crisis, I can ask these people to help me use my safety plan:                 Name: Jennifer     Phone: [phone]                 Name: Antonina         Phone: [phone]                 Name: panchito       Phone: [phone]  Step 6: Making the environment safe:   ? be around others  Step 7: Professionals or agencies I can contact during a crisis:  ? [facility] Daytime Number: [phone]  ? Suicide Prevention Lifeline: 0-753-470-UUEQ (2937)  ? Crisis Text Line Service (available 24 hours a day, 7 days a week): Text [place] to 430316    Local Crisis Services: 988     Call 911 or go to my nearest emergency department.       I helped develop this safety plan and agree to use it when needed.  I have been given a copy of this plan.       Client signature _________________________________________________________________  Today s date:  2/22/2021  Adapted from Safety Plan Template 2008 Marisol Cazares and Mario Flores is reprinted with the express permission of the authors.  No portion of the Safety Plan Template may be reproduced without the express, written permission.  You can contact the authors at bhs@[employer] or [email or web address].  "

## 2022-07-14 ENCOUNTER — VIRTUAL VISIT (OUTPATIENT)
Dept: PSYCHOLOGY | Facility: CLINIC | Age: 22
End: 2022-07-14
Payer: COMMERCIAL

## 2022-07-14 DIAGNOSIS — F33.1 MDD (MAJOR DEPRESSIVE DISORDER), RECURRENT EPISODE, MODERATE (H): Primary | ICD-10-CM

## 2022-07-14 DIAGNOSIS — F41.1 GENERALIZED ANXIETY DISORDER: ICD-10-CM

## 2022-07-14 DIAGNOSIS — F43.10 PTSD (POST-TRAUMATIC STRESS DISORDER): ICD-10-CM

## 2022-07-14 PROCEDURE — 90785 PSYTX COMPLEX INTERACTIVE: CPT | Mod: 95 | Performed by: SOCIAL WORKER

## 2022-07-14 PROCEDURE — 90834 PSYTX W PT 45 MINUTES: CPT | Mod: 95 | Performed by: SOCIAL WORKER

## 2022-07-14 NOTE — PROGRESS NOTES
[facility]                                     Progress Note    Patient Name: Wandy Ann  Date: 7/14/2022         Service Type: Individual      Session Start Time: 1304  Session End Time: 1353     Session Length: 38-52    Session #: 25    Attendees: Client    Service Modality: Video Visit:      Provider verified identity through the following two step process.  Patient provided:  Patient is known previously to provider    Telemedicine Visit: The patient's condition can be safely assessed and treated via synchronous audio and visual telemedicine encounter.      Reason for Telemedicine Visit: Services only offered telehealth    Originating Site (Patient Location): Patient's home    Distant Site (Provider Location): Provider Remote Setting- Home Office    Consent:  The patient/guardian has verbally consented to: the potential risks and benefits of telemedicine (video visit) versus in person care; bill my insurance or make self-payment for services provided; and responsibility for payment of non-covered services.     Patient would like the video invitation sent by:  Send to e-mail at: [email or web address]    Mode of Communication:  Video Conference via [employer]    As the provider I attest to compliance with applicable laws and regulations related to telemedicine.    DATA  Interactive Complexity: Yes, visit entailed Interactive Complexity evidenced by:  -The need to manage maladaptive communication (related to, e.g., high anxiety, high reactivity, repeated questions, or disagreement) among participants that complicates delivery of care  Crisis: No        Progress Since Last Session (Related to Symptoms / Goals / Homework):   Symptoms: Worsening depression    Homework: Partially completed      Episode of Care Goals: Minimal progress - PREPARATION (Decided to change - considering how); Intervened by negotiating a change plan and determining options / strategies for behavior change,  identifying triggers, exploring social supports, and working towards setting a date to begin behavior change     Current / Ongoing Stressors and Concerns:   past trauma, developmental hx and current stressors      Treatment Objective(s) Addressed in This Session:   Patient will demonstrate control of impulses and ability to use positive coping tools to manage strong emotions that can be safely addressed at a lower level of care. Absence of persistent SI and report of reduced frequency and intensity of SI and absence of SI to acceptable levels, report subjective improved mood for a period of 90 days, within 6 months as clinically observed and by patient self-report.  Patient will demonstrate and report a level of depression that can be managed at a lower level of care.  Absence of persistent depression mood and report of reduced frequency and intensity of low mood and absence of persistent low energy and motivation to acceptable levels, report subjective improved motivation and increased energy for a period of 90 days, within 6 months as clinically observed and by patient self-report.  Patient will demonstrate and report a level of anxiety that can be managed at a lower level of care.  Absence of persistent anxious mood and report of reduced frequency and intensity of worry and absence of persistent anxious mood to acceptable levels, no panic attacks, report subjective comfort with rumination for a period of 90 days, within 6 months as clinically observed and by patient self-report.       Intervention:   Therapist met with patient to review goals and interventions. Therapist utilized reflected listening as patient gave brief reflection of week. Patient reported a very difficult week with depression and sleeping 12-14 hours. Therapist supported patient as she processed and validated patient's emotions. Patient reported SI with no plan, contracted for safety and was future focused and naming what is worth living for.  Therapist educated patient on TMS and offered patient hope. Therapist sent referral form and GEORGE to patient along to psychiatrist too. Patient was tearful and naming feeling tired of feeling like this. Therapist offered patient hope and utilized strength based modality with patient.   Patient presented with an anxious/depressed affect. Patient was engaged in session and open to feedback. Patient contracts for safety       There has been demonstrated improvement in functioning while patient has been engaged in psychotherapy/psychological service- if withdrawn the patient would deteriorate and/or relapse.       Assessments completed prior to visit:  The following assessments were completed by patient for this visit:  PHQ9:   PHQ-9 SCORE 12/29/2021 2/8/2022 4/1/2022 4/27/2022 5/12/2022 6/6/2022 6/21/2022   PHQ-9 Total Score MyChart - - - 16 (Moderately severe depression) - - 10 (Moderate depression)   PHQ-9 Total Score 11 11 11 16 15 7 10   Some encounter information is confidential and restricted. Go to Review Flowsheets activity to see all data.     GAD7:   YESSI-7 SCORE 12/27/2021 2/8/2022 4/1/2022 4/27/2022 5/11/2022 6/6/2022 6/21/2022   Total Score 19 (severe anxiety) - - 7 (mild anxiety) 9 (mild anxiety) - 9 (mild anxiety)   Total Score 19 10 12 7 9 10 9   Some encounter information is confidential and restricted. Go to Review Flowsheets activity to see all data.           ASSESSMENT: Current Emotional / Mental Status (status of significant symptoms):   Risk status (Self / Other harm or suicidal ideation)   Patient denies current fears or concerns for personal safety.   Patient reports the following current or recent suicidal ideation or behaviors: si no plan and contracted for safety.   Patient denies current or recent homicidal ideation or behaviors.   Patient denies current or recent self injurious behavior or ideation.   Patient denies other safety concerns.   Patient reports there has been no change in risk  factors since their last session.     Patient reports there has been no change in protective factors since their last session.     A safety and risk management plan has been developed including: Patient consented to co-developed safety plan on 2.21.2022.  Safety and risk management plan was reviewed.   Patient agreed to use safety plan should any safety concerns arise.  A copy was made available to the patient.     Appearance:   Appropriate     Eye Contact:   Fair     Psychomotor Behavior: Restless     Attitude:   Cooperative    Orientation:   All   Speech    Rate / Production: Emotional Talkative    Volume:  Normal    Mood:    Anxious  Sad    Affect:    Tearful Worrisome    Thought Content:  Clear    Thought Form:  Coherent    Insight:    Fair      Medication Review:   No changes to current psychiatric medication(s)     Medication Compliance:   Yes     Changes in Health Issues:   Yes: Pain, Associated Psychological Distress     Chemical Use Review:   Substance Use: Chemical use reviewed, no active concerns identified      Tobacco Use: No current tobacco use.      Diagnosis:  1. MDD (major depressive disorder), recurrent episode, moderate (H)    2. Generalized anxiety disorder    3. PTSD (post-traumatic stress disorder)        Collateral Reports Completed:   Not Applicable    PLAN: (Patient Tasks / Therapist Tasks / Other)  Make visual safety plan   Manage depressive symptoms with coping skills  When feeling SI follow safety plan  Continue to manage SI and go to ER if feeling unsafe  Patient reported a very difficult week with depression and sleeping 12-14 hours. Therapist supported patient as she processed and validated patient's emotions. Patient reported SI with no plan, contracted for safety and was future focused and naming what is worth living for. Therapist educated patient on TMS and offered patient hope. Therapist sent referral form and GEORGE to patient along to psychiatrist too. Patient was tearful and  naming feeling tired of feeling like this. Therapist offered patient hope and utilized strength based modality with patient.     Katie Faulkner, [facility] 7/14/2022                                                         ______________________________________________________________________    Individual Treatment Plan    Patient's Name: Wandy Ann  YOB: 2000    Date of Creation: 2.22.2021  Date Treatment Plan Last Reviewed/Revised: 5/12/2022 due 8/12/2022    DSM5 Diagnoses: 296.32 (F33.1) Major Depressive Disorder, Recurrent Episode, Moderate With mixed features, 300.02 (F41.1) Generalized Anxiety Disorder or 309.81 (F43.10) Posttraumatic Stress Disorder (includes Posttraumatic Stress Disorder for Children 6 Years and Younger)  Without dissociative symptoms  Psychosocial / Contextual Factors: past trauma, developmental hx and current stressors   PROMIS (reviewed every 90 days):     Referral / Collaboration:  Referral to another professional/service is not indicated at this time..    Anticipated number of session for this episode of care: 12  Anticipation frequency of session: Biweekly  Anticipated Duration of each session: 38-52 minutes  Treatment plan will be reviewed in 90 days or when goals have been changed.       MeasurableTreatment Goal(s) related to diagnosis / functional impairment(s)  Goal 1: Patient will report absence of persistent SI and report of reduced frequency and intensity of SI and absence of SI to acceptable levels, report subjective improved mood for a period of 90 days, within 6 months as clinically observed and by patient self-report    I will know I've met my goal when I can see the difference between a bad moment and what I want in th future.      Objective #A (Patient Action)    Patient will demonstrate control of impulses and ability to use positive coping tools to manage strong emotions that can be safely addressed at a lower level of care. Absence of persistent SI  and report of reduced frequency and intensity of SI and absence of SI to acceptable levels, report subjective improved mood for a period of 90 days, within 6 months as clinically observed and by patient self-report.  Status: Continued - Date(s): 5/12/2022    Intervention(s)  Therapist will provide individual therapy to identify triggers to SI urges, gain feedback on helpful coping strategies, and identify ways that family can offer support. Tx to discuss current stressors and interpersonal conflicts and how to cope with these, coaching, diagnostic testing, referral for medication as indicated, use prescribed medication, cognitive restructuring, interpersonal family therapy, supportive therapy services.        Goal 2: Patient will report absence of persistent depression mood and report of reduced frequency and intensity of low mood and absence of persistent low energy and motivation to acceptable levels, report subjective improved motivation and increased energy for a period of 90 days, within 6 months as clinically observed and by patient self-report    I will know I've met my goal when I no longer feel sad.      Objective #A (Patient Action)    Status: Continued - Date(s):  5/12/2022    Patient will demonstrate and report a level of depression that can be managed at a lower level of care.  Absence of persistent depression mood and report of reduced frequency and intensity of low mood and absence of persistent low energy and motivation to acceptable levels, report subjective improved motivation and increased energy for a period of 90 days, within 6 months as clinically observed and by patient self-report.    Intervention(s)  Therapist will provide individual therapy to identify triggers to depression, gain feedback on helpful coping tools and thought-reframing techniques, and identify preferred way of being.  Tx to include discussion of current stressors and interpersonal conflicts and how to cope with these,  coaching, diagnostic testing, referral for medication as indicated, use prescribed medication, cognitive restructuring, interpersonal, family therapy, supportive therapy services.        Goal 3: Patiient will report absence of persistent anxiety mood and report of reduced frequency and intensity of worry and absence of persistent anxious mood to acceptable levels, no panic attacks, report subjective comfort with rumination for a period of 90 days. Within 6 months as clinically observed and by patient self-report    I will know I've met my goal when I can go days without worrying about little things or shaking.      Objective #A (Patient Action)    Status: Continued - Date(s):  5/12/2022    Patient will demonstrate and report a level of anxiety that can be managed at a lower level of care.  Absence of persistent anxious mood and report of reduced frequency and intensity of worry and absence of persistent anxious mood to acceptable levels, no panic attacks, report subjective comfort with rumination for a period of 90 days, within 6 months as clinically observed and by patient self-report.    Intervention(s)  Therapist will provide individual therapy to identify triggers to anxiety, gain feedback on helpful coping tools and thought-reframing techniques, and identify preferred way of being. Tx to include discuss current stressors and interpersonal conflicts and how to cope with these, coaching, diagnostic testing , referral for medication as indicated, use prescribed medication, cognitive restructuring, interpersonal,   family therapy, supportive therapy services.        Patient has reviewed and agreed to the above plan.      Katie Faulkner, [facility]   5/12/2022                                                   Wandy Ann            SAFETY PLAN:  Step 1: Warning signs / cues (Thoughts, images, mood, situation, behavior) that a crisis may be developing:  ? Thoughts: thoughts of not wanting to be  "here  ? Images: no images  ? Thinking Processes: intrusive thoughts (bothersome, unwanted thoughts that come out of nowhere): get irritated  ? Mood: intense anger and agitation  ? Behaviors: isolating/withdrawing   ? Situations: trauma    Step 2: Coping strategies - Things I can do to take my mind off of my problems without contacting another person (relaxation technique, physical activity):  ? Distress Tolerance Strategies:  arts and crafts: drawing and painting  ? Physical Activities: exercise: working out  ? Focus on helpful thoughts:  \"This is temporary\", \"It always passes\" and \"Ride the wave\"  Step 3: People and social settings that provide distraction:                 Name: Jennifer     Phone: [phone]                 Name: Antonina         Phone: [phone]                 Name: panchito       Phone: [phone]  ? friends house or car   Step 4: Remind myself of people and things that are important to me and worth living for:  Best friend and cat        Step 5: When I am in crisis, I can ask these people to help me use my safety plan:                 Name: Jennifer     Phone: [phone]                 Name: Antonina         Phone: [phone]                 Name: panchito       Phone: [phone]  Step 6: Making the environment safe:   ? be around others  Step 7: Professionals or agencies I can contact during a crisis:  ? [facility] Daytime Number: [phone]  ? Suicide Prevention Lifeline: 8-211-152-TALK (9315)  ? Crisis Text Line Service (available 24 hours a day, 7 days a week): Text [place] to 911674    Local Crisis Services: 988     Call 911 or go to my nearest emergency department.       I helped develop this safety plan and agree to use it when needed.  I have been given a copy of this plan.       Client signature _________________________________________________________________  Today s date:  2/22/2021  Adapted from Safety Plan Template 2008 Marisol Cazares and Mario Flores is reprinted " with the express permission of the authors.  No portion of the Safety Plan Template may be reproduced without the express, written permission.  You can contact the authors at [email or web address] or [email or web address].

## 2022-07-15 ENCOUNTER — MYC REFILL (OUTPATIENT)
Dept: PSYCHIATRY | Facility: CLINIC | Age: 22
End: 2022-07-15

## 2022-07-15 DIAGNOSIS — F41.1 GAD (GENERALIZED ANXIETY DISORDER): ICD-10-CM

## 2022-07-15 DIAGNOSIS — F32.1 CURRENT MODERATE EPISODE OF MAJOR DEPRESSIVE DISORDER, UNSPECIFIED WHETHER RECURRENT (H): ICD-10-CM

## 2022-07-15 NOTE — TELEPHONE ENCOUNTER
Date of Last Office Visit: 6/21/22?, no visit notes, 11/21/21  Date of Next Office Visit: none - schedulers will attempt to contact, MyC message sent  No shows since last visit: none  Cancellations since last visit: none    Medication requested: aripiprazole 2mg Date last ordered: 6/21/22 Qty: 30 Refills: 0  Medication requested: hydroxyzine 10mg Date last ordered: 5/17/22 Qty: 90 Refills: 0       Lapse in medication adherence greater than 5 days?: no  If yes, call patient and gather details:    Medication refill request verified as identical to current order?: yes  Result of Last DAM, VPA, Li+ Level, CBC, or Carbamazepine Level (at or since last visit): N/A    Last visit treatment plan:   No visit notes    []Medication refilled per  Medication Refill in Ambulatory Care  policy.  [x]Medication unable to be refilled by RN due to criteria not met as indicated below:    []Eligibility - not seen in the last year   [x]Supervision - no future appointment   []Compliance - no shows, cancellations or lapse in therapy   []Verification - order discrepancy   []Controlled medication   []Medication not included in policy   []90-day supply request   []Other

## 2022-07-18 RX ORDER — HYDROXYZINE HYDROCHLORIDE 10 MG/1
10 TABLET, FILM COATED ORAL 3 TIMES DAILY PRN
Qty: 90 TABLET | Refills: 0 | Status: SHIPPED | OUTPATIENT
Start: 2022-07-18 | End: 2023-04-27

## 2022-07-18 RX ORDER — ARIPIPRAZOLE 2 MG/1
2 TABLET ORAL DAILY
Qty: 30 TABLET | Refills: 0 | Status: SHIPPED | OUTPATIENT
Start: 2022-07-18 | End: 2022-09-29

## 2022-07-20 ENCOUNTER — VIRTUAL VISIT (OUTPATIENT)
Dept: PSYCHOLOGY | Facility: CLINIC | Age: 22
End: 2022-07-20
Payer: COMMERCIAL

## 2022-07-20 DIAGNOSIS — F43.10 PTSD (POST-TRAUMATIC STRESS DISORDER): ICD-10-CM

## 2022-07-20 DIAGNOSIS — F33.1 MDD (MAJOR DEPRESSIVE DISORDER), RECURRENT EPISODE, MODERATE (H): Primary | ICD-10-CM

## 2022-07-20 DIAGNOSIS — F41.1 GENERALIZED ANXIETY DISORDER: ICD-10-CM

## 2022-07-20 PROCEDURE — 90834 PSYTX W PT 45 MINUTES: CPT | Mod: 95 | Performed by: SOCIAL WORKER

## 2022-07-20 NOTE — PROGRESS NOTES
[facility]                                     Progress Note    Patient Name: Wandy Ann  Date: 7/20/2022         Service Type: Individual      Session Start Time: 1405  Session End Time: 1452     Session Length: 38-52    Session #: 26    Attendees: Client    Service Modality: Video Visit:      Provider verified identity through the following two step process.  Patient provided:  Patient is known previously to provider    Telemedicine Visit: The patient's condition can be safely assessed and treated via synchronous audio and visual telemedicine encounter.      Reason for Telemedicine Visit: Services only offered telehealth    Originating Site (Patient Location): Patient's home    Distant Site (Provider Location): Provider Remote Setting- Home Office    Consent:  The patient/guardian has verbally consented to: the potential risks and benefits of telemedicine (video visit) versus in person care; bill my insurance or make self-payment for services provided; and responsibility for payment of non-covered services.     Patient would like the video invitation sent by:  Send to e-mail at: [email or web address]    Mode of Communication:  Video Conference via [employer]    As the provider I attest to compliance with applicable laws and regulations related to telemedicine.    DATA  Interactive Complexity: No  Crisis: No        Progress Since Last Session (Related to Symptoms / Goals / Homework):   Symptoms: Improving ups and downs    Homework: Partially completed      Episode of Care Goals: Minimal progress - PREPARATION (Decided to change - considering how); Intervened by negotiating a change plan and determining options / strategies for behavior change, identifying triggers, exploring social supports, and working towards setting a date to begin behavior change     Current / Ongoing Stressors and Concerns:   past trauma, developmental hx and current stressors      Treatment Objective(s)  Addressed in This Session:   Patient will demonstrate control of impulses and ability to use positive coping tools to manage strong emotions that can be safely addressed at a lower level of care. Absence of persistent SI and report of reduced frequency and intensity of SI and absence of SI to acceptable levels, report subjective improved mood for a period of 90 days, within 6 months as clinically observed and by patient self-report.  Patient will demonstrate and report a level of depression that can be managed at a lower level of care.  Absence of persistent depression mood and report of reduced frequency and intensity of low mood and absence of persistent low energy and motivation to acceptable levels, report subjective improved motivation and increased energy for a period of 90 days, within 6 months as clinically observed and by patient self-report.  Patient will demonstrate and report a level of anxiety that can be managed at a lower level of care.  Absence of persistent anxious mood and report of reduced frequency and intensity of worry and absence of persistent anxious mood to acceptable levels, no panic attacks, report subjective comfort with rumination for a period of 90 days, within 6 months as clinically observed and by patient self-report.       Intervention:   Therapist met with patient to review goals and interventions. Therapist utilized reflected listening as patient gave brief reflection of week. Patient reported ups and downs with lessening SI but still not sure what she wants to do with her apartment. Therapist supported, validated and suggested patient speak to her landlord about how to break a lease due to her home evasion. Therapist assisted patient in looking at making a list of options so she can feel power out of a situation where she had little to no power over.  Therapist informed psychiatry is agreeable with TMS referral and asked patient to fill out GEORGE to move forward.   Patient presented  with an anxious/depressed affect. Patient was engaged in session and open to feedback. Patient contracts for safety       There has been demonstrated improvement in functioning while patient has been engaged in psychotherapy/psychological service- if withdrawn the patient would deteriorate and/or relapse.       Assessments completed prior to visit:  The following assessments were completed by patient for this visit:  PHQ9:   PHQ-9 SCORE 12/29/2021 2/8/2022 4/1/2022 4/27/2022 5/12/2022 6/6/2022 6/21/2022   PHQ-9 Total Score MyChart - - - 16 (Moderately severe depression) - - 10 (Moderate depression)   PHQ-9 Total Score 11 11 11 16 15 7 10   Some encounter information is confidential and restricted. Go to Review Flowsheets activity to see all data.     GAD7:   YESSI-7 SCORE 12/27/2021 2/8/2022 4/1/2022 4/27/2022 5/11/2022 6/6/2022 6/21/2022   Total Score 19 (severe anxiety) - - 7 (mild anxiety) 9 (mild anxiety) - 9 (mild anxiety)   Total Score 19 10 12 7 9 10 9   Some encounter information is confidential and restricted. Go to Review Flowsheets activity to see all data.           ASSESSMENT: Current Emotional / Mental Status (status of significant symptoms):   Risk status (Self / Other harm or suicidal ideation)   Patient denies current fears or concerns for personal safety.   Patient reports the following current or recent suicidal ideation or behaviors: lessening and contracted for safety.   Patient denies current or recent homicidal ideation or behaviors.   Patient denies current or recent self injurious behavior or ideation.   Patient denies other safety concerns.   Patient reports there has been no change in risk factors since their last session.     Patient reports there has been no change in protective factors since their last session.     A safety and risk management plan has been developed including: Patient consented to co-developed safety plan on 2.21.2022.  Safety and risk management plan was reviewed.    Patient agreed to use safety plan should any safety concerns arise.  A copy was made available to the patient.     Appearance:   Appropriate  camera was on patient only for some of the session   Eye Contact:   Fair     Psychomotor Behavior: Restless     Attitude:   Cooperative    Orientation:   All   Speech    Rate / Production: Emotional Talkative    Volume:  Normal    Mood:    Anxious  Sad    Affect:    Worrisome    Thought Content:  Clear    Thought Form:  Coherent    Insight:    Fair      Medication Review:   No changes to current psychiatric medication(s)     Medication Compliance:   Yes     Changes in Health Issues:   Yes: Pain, Associated Psychological Distress     Chemical Use Review:   Substance Use: Chemical use reviewed, no active concerns identified      Tobacco Use: No current tobacco use.      Diagnosis:  1. MDD (major depressive disorder), recurrent episode, moderate (H)    2. Generalized anxiety disorder    3. PTSD (post-traumatic stress disorder)        Collateral Reports Completed:   Not Applicable    PLAN: (Patient Tasks / Therapist Tasks / Other)  Make visual safety plan   Manage depressive symptoms with coping skills  When feeling SI follow safety plan  Continue to manage SI and go to ER if feeling unsafe  Therapist assisted patient in looking at making a list of options so she can feel power out of a situation where she had little to no power over.  Therapist informed psychiatry is agreeable with TMS referral and asked patient to fill out GEORGE to move forward.     Katie Faulkner, [facility] 7/20/2022                                                         ______________________________________________________________________    Individual Treatment Plan    Patient's Name: Wandy Ann  YOB: 2000    Date of Creation: 2.22.2021  Date Treatment Plan Last Reviewed/Revised: 5/12/2022 due 8/12/2022    DSM5 Diagnoses: 296.32 (F33.1) Major Depressive Disorder, Recurrent Episode,  Moderate With mixed features, 300.02 (F41.1) Generalized Anxiety Disorder or 309.81 (F43.10) Posttraumatic Stress Disorder (includes Posttraumatic Stress Disorder for Children 6 Years and Younger)  Without dissociative symptoms  Psychosocial / Contextual Factors: past trauma, developmental hx and current stressors   PROMIS (reviewed every 90 days):     Referral / Collaboration:  Referral to another professional/service is not indicated at this time..    Anticipated number of session for this episode of care: 12  Anticipation frequency of session: Biweekly  Anticipated Duration of each session: 38-52 minutes  Treatment plan will be reviewed in 90 days or when goals have been changed.       MeasurableTreatment Goal(s) related to diagnosis / functional impairment(s)  Goal 1: Patient will report absence of persistent SI and report of reduced frequency and intensity of SI and absence of SI to acceptable levels, report subjective improved mood for a period of 90 days, within 6 months as clinically observed and by patient self-report    I will know I've met my goal when I can see the difference between a bad moment and what I want in th future.      Objective #A (Patient Action)    Patient will demonstrate control of impulses and ability to use positive coping tools to manage strong emotions that can be safely addressed at a lower level of care. Absence of persistent SI and report of reduced frequency and intensity of SI and absence of SI to acceptable levels, report subjective improved mood for a period of 90 days, within 6 months as clinically observed and by patient self-report.  Status: Continued - Date(s): 5/12/2022    Intervention(s)  Therapist will provide individual therapy to identify triggers to SI urges, gain feedback on helpful coping strategies, and identify ways that family can offer support. Tx to discuss current stressors and interpersonal conflicts and how to cope with these, coaching, diagnostic testing,  referral for medication as indicated, use prescribed medication, cognitive restructuring, interpersonal family therapy, supportive therapy services.        Goal 2: Patient will report absence of persistent depression mood and report of reduced frequency and intensity of low mood and absence of persistent low energy and motivation to acceptable levels, report subjective improved motivation and increased energy for a period of 90 days, within 6 months as clinically observed and by patient self-report    I will know I've met my goal when I no longer feel sad.      Objective #A (Patient Action)    Status: Continued - Date(s):  5/12/2022    Patient will demonstrate and report a level of depression that can be managed at a lower level of care.  Absence of persistent depression mood and report of reduced frequency and intensity of low mood and absence of persistent low energy and motivation to acceptable levels, report subjective improved motivation and increased energy for a period of 90 days, within 6 months as clinically observed and by patient self-report.    Intervention(s)  Therapist will provide individual therapy to identify triggers to depression, gain feedback on helpful coping tools and thought-reframing techniques, and identify preferred way of being.  Tx to include discussion of current stressors and interpersonal conflicts and how to cope with these, coaching, diagnostic testing, referral for medication as indicated, use prescribed medication, cognitive restructuring, interpersonal, family therapy, supportive therapy services.        Goal 3: Patiient will report absence of persistent anxiety mood and report of reduced frequency and intensity of worry and absence of persistent anxious mood to acceptable levels, no panic attacks, report subjective comfort with rumination for a period of 90 days. Within 6 months as clinically observed and by patient self-report    I will know I've met my goal when I can go days  "without worrying about little things or shaking.      Objective #A (Patient Action)    Status: Continued - Date(s):  5/12/2022    Patient will demonstrate and report a level of anxiety that can be managed at a lower level of care.  Absence of persistent anxious mood and report of reduced frequency and intensity of worry and absence of persistent anxious mood to acceptable levels, no panic attacks, report subjective comfort with rumination for a period of 90 days, within 6 months as clinically observed and by patient self-report.    Intervention(s)  Therapist will provide individual therapy to identify triggers to anxiety, gain feedback on helpful coping tools and thought-reframing techniques, and identify preferred way of being. Tx to include discuss current stressors and interpersonal conflicts and how to cope with these, coaching, diagnostic testing , referral for medication as indicated, use prescribed medication, cognitive restructuring, interpersonal,   family therapy, supportive therapy services.        Patient has reviewed and agreed to the above plan.      Katie Faulkner, [facility]   5/12/2022                                                   Wandy Ann            SAFETY PLAN:  Step 1: Warning signs / cues (Thoughts, images, mood, situation, behavior) that a crisis may be developing:  ? Thoughts: thoughts of not wanting to be here  ? Images: no images  ? Thinking Processes: intrusive thoughts (bothersome, unwanted thoughts that come out of nowhere): get irritated  ? Mood: intense anger and agitation  ? Behaviors: isolating/withdrawing   ? Situations: trauma    Step 2: Coping strategies - Things I can do to take my mind off of my problems without contacting another person (relaxation technique, physical activity):  ? Distress Tolerance Strategies:  arts and crafts: drawing and painting  ? Physical Activities: exercise: working out  ? Focus on helpful thoughts:  \"This is temporary\", \"It always " "passes\" and \"Ride the wave\"  Step 3: People and social settings that provide distraction:                 Name: Jennifer     Phone: [phone]                 Name: Antonina         Phone: [phone]                 Name: panchito       Phone: [phone]  ? friends house or car   Step 4: Remind myself of people and things that are important to me and worth living for:  Best friend and cat        Step 5: When I am in crisis, I can ask these people to help me use my safety plan:                 Name: Jennifer     Phone: [phone]                 Name: Antonina         Phone: [phone]                 Name: panchito       Phone: [phone]  Step 6: Making the environment safe:   ? be around others  Step 7: Professionals or agencies I can contact during a crisis:  ? [facility] Daytime Number: 907-017-2785  ? Suicide Prevention Lifeline: 0-672-583-TALK (2511)  ? Crisis Text Line Service (available 24 hours a day, 7 days a week): Text [place] to 735594    Local Crisis Services: 988     Call 911 or go to my nearest emergency department.       I helped develop this safety plan and agree to use it when needed.  I have been given a copy of this plan.       Client signature _________________________________________________________________  Today s date:  2/22/2021  Adapted from Safety Plan Template 2008 Marisol Cazares and Mario Flores is reprinted with the express permission of the authors.  No portion of the Safety Plan Template may be reproduced without the express, written permission.  You can contact the authors at [email or web address] or [email or web address].  "

## 2022-07-25 ENCOUNTER — VIRTUAL VISIT (OUTPATIENT)
Dept: PSYCHOLOGY | Facility: CLINIC | Age: 22
End: 2022-07-25
Payer: COMMERCIAL

## 2022-07-25 DIAGNOSIS — F41.1 GENERALIZED ANXIETY DISORDER: ICD-10-CM

## 2022-07-25 DIAGNOSIS — F33.1 MDD (MAJOR DEPRESSIVE DISORDER), RECURRENT EPISODE, MODERATE (H): Primary | ICD-10-CM

## 2022-07-25 DIAGNOSIS — F43.10 PTSD (POST-TRAUMATIC STRESS DISORDER): ICD-10-CM

## 2022-07-25 PROCEDURE — 90834 PSYTX W PT 45 MINUTES: CPT | Mod: 95 | Performed by: SOCIAL WORKER

## 2022-07-25 NOTE — PROGRESS NOTES
[facility]                                      Progress Note    Patient Name: Wandy Ann  Date: 7/25/2022         Service Type: Individual      Session Start Time: 1403  Session End Time: 1452     Session Length: 38-52    Session #: 27    Attendees: Client    Service Modality: Video Visit:      Provider verified identity through the following two step process.  Patient provided:  Patient is known previously to provider    Telemedicine Visit: The patient's condition can be safely assessed and treated via synchronous audio and visual telemedicine encounter.      Reason for Telemedicine Visit: Services only offered telehealth    Originating Site (Patient Location): Patient's home    Distant Site (Provider Location): Provider Remote Setting- Home Office    Consent:  The patient/guardian has verbally consented to: the potential risks and benefits of telemedicine (video visit) versus in person care; bill my insurance or make self-payment for services provided; and responsibility for payment of non-covered services.     Patient would like the video invitation sent by:  Send to e-mail at: [email or web address]    Mode of Communication:  Video Conference via [employer]    As the provider I attest to compliance with applicable laws and regulations related to telemedicine.    DATA  Interactive Complexity: No  Crisis: No        Progress Since Last Session (Related to Symptoms / Goals / Homework):   Symptoms: Worsening break up and back together    Homework: Partially completed      Episode of Care Goals: Minimal progress - PREPARATION (Decided to change - considering how); Intervened by negotiating a change plan and determining options / strategies for behavior change, identifying triggers, exploring social supports, and working towards setting a date to begin behavior change     Current / Ongoing Stressors and Concerns:   past trauma, developmental hx and current stressors      Treatment  Objective(s) Addressed in This Session:   Patient will demonstrate control of impulses and ability to use positive coping tools to manage strong emotions that can be safely addressed at a lower level of care. Absence of persistent SI and report of reduced frequency and intensity of SI and absence of SI to acceptable levels, report subjective improved mood for a period of 90 days, within 6 months as clinically observed and by patient self-report.  Patient will demonstrate and report a level of depression that can be managed at a lower level of care.  Absence of persistent depression mood and report of reduced frequency and intensity of low mood and absence of persistent low energy and motivation to acceptable levels, report subjective improved motivation and increased energy for a period of 90 days, within 6 months as clinically observed and by patient self-report.  Patient will demonstrate and report a level of anxiety that can be managed at a lower level of care.  Absence of persistent anxious mood and report of reduced frequency and intensity of worry and absence of persistent anxious mood to acceptable levels, no panic attacks, report subjective comfort with rumination for a period of 90 days, within 6 months as clinically observed and by patient self-report.       Intervention:   Therapist met with patient to review goals and interventions. Therapist utilized reflected listening as patient gave brief reflection of week. Patient processed her break up, back together and nightmares. Therapist educated patient on IRT therapy and coached patient through the record and rewriting process. Therapist validated patient's emotions with the break up and back together and suggested patient and her partner have a lot to talk about and looking at their futures sooner than later.   Patient presented with an anxious/depressed affect. Patient was engaged in session and open to feedback. Patient contracts for safety       There  has been demonstrated improvement in functioning while patient has been engaged in psychotherapy/psychological service- if withdrawn the patient would deteriorate and/or relapse.       Assessments completed prior to visit:  The following assessments were completed by patient for this visit:  PHQ9:   PHQ-9 SCORE 12/29/2021 2/8/2022 4/1/2022 4/27/2022 5/12/2022 6/6/2022 6/21/2022   PHQ-9 Total Score MyChart - - - 16 (Moderately severe depression) - - 10 (Moderate depression)   PHQ-9 Total Score 11 11 11 16 15 7 10   Some encounter information is confidential and restricted. Go to Review Flowsheets activity to see all data.     GAD7:   YESSI-7 SCORE 12/27/2021 2/8/2022 4/1/2022 4/27/2022 5/11/2022 6/6/2022 6/21/2022   Total Score 19 (severe anxiety) - - 7 (mild anxiety) 9 (mild anxiety) - 9 (mild anxiety)   Total Score 19 10 12 7 9 10 9   Some encounter information is confidential and restricted. Go to Review Flowsheets activity to see all data.           ASSESSMENT: Current Emotional / Mental Status (status of significant symptoms):   Risk status (Self / Other harm or suicidal ideation)   Patient denies current fears or concerns for personal safety.   Patient denies current or recent suicidal ideation or behaviors.   Patient denies current or recent homicidal ideation or behaviors.   Patient denies current or recent self injurious behavior or ideation.   Patient denies other safety concerns.   Patient reports there has been no change in risk factors since their last session.     Patient reports there has been no change in protective factors since their last session.     A safety and risk management plan has been developed including: Patient consented to co-developed safety plan on 2.21.2022.  Safety and risk management plan was reviewed.   Patient agreed to use safety plan should any safety concerns arise.  A copy was made available to the patient.     Appearance:   Appropriate  camera was on patient only for some of  the session   Eye Contact:   Fair     Psychomotor Behavior: Restless     Attitude:   Cooperative    Orientation:   All   Speech    Rate / Production: Emotional Talkative    Volume:  Normal    Mood:    Anxious  Sad    Affect:    Worrisome    Thought Content:  Clear    Thought Form:  Coherent    Insight:    Fair      Medication Review:   No changes to current psychiatric medication(s)     Medication Compliance:   Yes     Changes in Health Issues:   Yes: Pain, Associated Psychological Distress     Chemical Use Review:   Substance Use: Chemical use reviewed, no active concerns identified      Tobacco Use: No current tobacco use.      Diagnosis:  1. MDD (major depressive disorder), recurrent episode, moderate (H)    2. Generalized anxiety disorder    3. PTSD (post-traumatic stress disorder)        Collateral Reports Completed:   Not Applicable    PLAN: (Patient Tasks / Therapist Tasks / Other)  Make visual safety plan   Manage depressive symptoms with coping skills  When feeling SI follow safety plan  Continue to manage SI and go to ER if feeling unsafe  Therapist assisted patient in looking at making a list of options so she can feel power out of a situation where she had little to no power over.  Therapist informed psychiatry is agreeable with TMS referral and asked patient to fill out GEORGE to move forward.    IRT therapy and coached patient through the record and rewriting process. Therapist validated patient's emotions with the break up and back together and suggested patient and her partner have a lot to talk about and looking at their futures sooner than later.   Mock trial with mom      Katie Faulkner, [facility] 7/25/2022                                                         ______________________________________________________________________    Individual Treatment Plan    Patient's Name: Wandy Ann  YOB: 2000    Date of Creation: 2.22.2021  Date Treatment Plan Last Reviewed/Revised:  5/12/2022 due 8/12/2022    DSM5 Diagnoses: 296.32 (F33.1) Major Depressive Disorder, Recurrent Episode, Moderate With mixed features, 300.02 (F41.1) Generalized Anxiety Disorder or 309.81 (F43.10) Posttraumatic Stress Disorder (includes Posttraumatic Stress Disorder for Children 6 Years and Younger)  Without dissociative symptoms  Psychosocial / Contextual Factors: past trauma, developmental hx and current stressors   PROMIS (reviewed every 90 days):     Referral / Collaboration:  Referral to another professional/service is not indicated at this time..    Anticipated number of session for this episode of care: 12  Anticipation frequency of session: Biweekly  Anticipated Duration of each session: 38-52 minutes  Treatment plan will be reviewed in 90 days or when goals have been changed.       MeasurableTreatment Goal(s) related to diagnosis / functional impairment(s)  Goal 1: Patient will report absence of persistent SI and report of reduced frequency and intensity of SI and absence of SI to acceptable levels, report subjective improved mood for a period of 90 days, within 6 months as clinically observed and by patient self-report    I will know I've met my goal when I can see the difference between a bad moment and what I want in th future.      Objective #A (Patient Action)    Patient will demonstrate control of impulses and ability to use positive coping tools to manage strong emotions that can be safely addressed at a lower level of care. Absence of persistent SI and report of reduced frequency and intensity of SI and absence of SI to acceptable levels, report subjective improved mood for a period of 90 days, within 6 months as clinically observed and by patient self-report.  Status: Continued - Date(s): 5/12/2022    Intervention(s)  Therapist will provide individual therapy to identify triggers to SI urges, gain feedback on helpful coping strategies, and identify ways that family can offer support. Tx to discuss  current stressors and interpersonal conflicts and how to cope with these, coaching, diagnostic testing, referral for medication as indicated, use prescribed medication, cognitive restructuring, interpersonal family therapy, supportive therapy services.        Goal 2: Patient will report absence of persistent depression mood and report of reduced frequency and intensity of low mood and absence of persistent low energy and motivation to acceptable levels, report subjective improved motivation and increased energy for a period of 90 days, within 6 months as clinically observed and by patient self-report    I will know I've met my goal when I no longer feel sad.      Objective #A (Patient Action)    Status: Continued - Date(s):  5/12/2022    Patient will demonstrate and report a level of depression that can be managed at a lower level of care.  Absence of persistent depression mood and report of reduced frequency and intensity of low mood and absence of persistent low energy and motivation to acceptable levels, report subjective improved motivation and increased energy for a period of 90 days, within 6 months as clinically observed and by patient self-report.    Intervention(s)  Therapist will provide individual therapy to identify triggers to depression, gain feedback on helpful coping tools and thought-reframing techniques, and identify preferred way of being.  Tx to include discussion of current stressors and interpersonal conflicts and how to cope with these, coaching, diagnostic testing, referral for medication as indicated, use prescribed medication, cognitive restructuring, interpersonal, family therapy, supportive therapy services.        Goal 3: Patiient will report absence of persistent anxiety mood and report of reduced frequency and intensity of worry and absence of persistent anxious mood to acceptable levels, no panic attacks, report subjective comfort with rumination for a period of 90 days. Within 6  months as clinically observed and by patient self-report    I will know I've met my goal when I can go days without worrying about little things or shaking.      Objective #A (Patient Action)    Status: Continued - Date(s):  5/12/2022    Patient will demonstrate and report a level of anxiety that can be managed at a lower level of care.  Absence of persistent anxious mood and report of reduced frequency and intensity of worry and absence of persistent anxious mood to acceptable levels, no panic attacks, report subjective comfort with rumination for a period of 90 days, within 6 months as clinically observed and by patient self-report.    Intervention(s)  Therapist will provide individual therapy to identify triggers to anxiety, gain feedback on helpful coping tools and thought-reframing techniques, and identify preferred way of being. Tx to include discuss current stressors and interpersonal conflicts and how to cope with these, coaching, diagnostic testing , referral for medication as indicated, use prescribed medication, cognitive restructuring, interpersonal,   family therapy, supportive therapy services.        Patient has reviewed and agreed to the above plan.      Katie Faulkner, [facility]   5/12/2022                                                   Wandy Ann            SAFETY PLAN:  Step 1: Warning signs / cues (Thoughts, images, mood, situation, behavior) that a crisis may be developing:  ? Thoughts: thoughts of not wanting to be here  ? Images: no images  ? Thinking Processes: intrusive thoughts (bothersome, unwanted thoughts that come out of nowhere): get irritated  ? Mood: intense anger and agitation  ? Behaviors: isolating/withdrawing   ? Situations: trauma    Step 2: Coping strategies - Things I can do to take my mind off of my problems without contacting another person (relaxation technique, physical activity):  ? Distress Tolerance Strategies:  arts and crafts: drawing and  "painting  ? Physical Activities: exercise: working out  ? Focus on helpful thoughts:  \"This is temporary\", \"It always passes\" and \"Ride the wave\"  Step 3: People and social settings that provide distraction:                 Name: Jennifer     Phone: [phone]                 Name: Antonina         Phone: [phone]                 Name: panchito       Phone: [phone]  ? friends house or car   Step 4: Remind myself of people and things that are important to me and worth living for:  Best friend and cat        Step 5: When I am in crisis, I can ask these people to help me use my safety plan:                 Name: Jennifer     Phone: [phone]                 Name: Antonina         Phone: [phone]                 Name: panchito       Phone: [phone]  Step 6: Making the environment safe:   ? be around others  Step 7: Professionals or agencies I can contact during a crisis:  ? [facility] Daytime Number: [phone]  ? Suicide Prevention Lifeline: 3-619-075-OGXI (1364)  ? Crisis Text Line Service (available 24 hours a day, 7 days a week): Text [place] to 804039    Local Crisis Services: 988     Call 911 or go to my nearest emergency department.       I helped develop this safety plan and agree to use it when needed.  I have been given a copy of this plan.       Client signature _________________________________________________________________  Today s date:  2/22/2021  Adapted from Safety Plan Template 2008 Marisol Cazares and Mario Flores is reprinted with the express permission of the authors.  No portion of the Safety Plan Template may be reproduced without the express, written permission.  You can contact the authors at bhs@[email or web address] or yoel@[email or web address].  "

## 2022-08-11 ENCOUNTER — MEDICAL CORRESPONDENCE (OUTPATIENT)
Dept: PSYCHIATRY | Facility: CLINIC | Age: 22
End: 2022-08-11

## 2022-08-11 ENCOUNTER — TELEPHONE (OUTPATIENT)
Dept: PSYCHIATRY | Facility: CLINIC | Age: 22
End: 2022-08-11

## 2022-08-11 NOTE — TELEPHONE ENCOUNTER
----- Message from Yoly Cox NP sent at 8/11/2022 12:44 PM CDT -----  I am trying to locate a TMS referral form for this patient.  Apparently this was sent to me.  If you could help me find it that would be great.  Thank you.  Yoly

## 2022-08-12 NOTE — TELEPHONE ENCOUNTER
Form faxed to [facility], [phone]. Copy made and placed in file cabinet, original sent to [employer]. Di Roblero on 8/12/2022 at 7:03 AM.

## 2022-08-16 ENCOUNTER — VIRTUAL VISIT (OUTPATIENT)
Dept: PSYCHOLOGY | Facility: CLINIC | Age: 22
End: 2022-08-16
Payer: COMMERCIAL

## 2022-08-16 DIAGNOSIS — F31.32 BIPOLAR AFFECTIVE DISORDER, CURRENTLY DEPRESSED, MODERATE (H): Primary | ICD-10-CM

## 2022-08-16 DIAGNOSIS — F41.1 GENERALIZED ANXIETY DISORDER: ICD-10-CM

## 2022-08-16 DIAGNOSIS — F43.10 PTSD (POST-TRAUMATIC STRESS DISORDER): ICD-10-CM

## 2022-08-16 PROCEDURE — 90834 PSYTX W PT 45 MINUTES: CPT | Mod: 95 | Performed by: SOCIAL WORKER

## 2022-08-16 PROCEDURE — 90785 PSYTX COMPLEX INTERACTIVE: CPT | Mod: 95 | Performed by: SOCIAL WORKER

## 2022-08-16 NOTE — PROGRESS NOTES
[facility]                                      Progress Note    Patient Name: Wandy Ann  Date: 8/16/2022         Service Type: Individual      Session Start Time: 1307  Session End Time: 1353     Session Length: 38-52    Session #: 29    Attendees: Client    Service Modality: Video Visit:      Provider verified identity through the following two step process.  Patient provided:  Patient is known previously to provider    Telemedicine Visit: The patient's condition can be safely assessed and treated via synchronous audio and visual telemedicine encounter.      Reason for Telemedicine Visit: Services only offered telehealth    Originating Site (Patient Location): Patient's home    Distant Site (Provider Location): Provider Remote Setting- Home Office    Consent:  The patient/guardian has verbally consented to: the potential risks and benefits of telemedicine (video visit) versus in person care; bill my insurance or make self-payment for services provided; and responsibility for payment of non-covered services.     Patient would like the video invitation sent by:  Send to e-mail at: [email or web address]    Mode of Communication:  Video Conference via [employer]well    As the provider I attest to compliance with applicable laws and regulations related to telemedicine.    DATA  Interactive Complexity: Yes, visit entailed Interactive Complexity evidenced by:  -The need to manage maladaptive communication (related to, e.g., high anxiety, high reactivity, repeated questions, or disagreement) among participants that complicates delivery of care  Crisis: No        Progress Since Last Session (Related to Symptoms / Goals / Homework):   Symptoms: Worsening increased depression withSI     Homework: Partially completed      Episode of Care Goals: Minimal progress - PREPARATION (Decided to change - considering how); Intervened by negotiating a change plan and determining options / strategies  for behavior change, identifying triggers, exploring social supports, and working towards setting a date to begin behavior change     Current / Ongoing Stressors and Concerns:   past trauma, developmental hx and current stressors      Treatment Objective(s) Addressed in This Session:   Patient will demonstrate control of impulses and ability to use positive coping tools to manage strong emotions that can be safely addressed at a lower level of care. Absence of persistent SI and report of reduced frequency and intensity of SI and absence of SI to acceptable levels, report subjective improved mood for a period of 90 days, within 6 months as clinically observed and by patient self-report.  Patient will demonstrate and report a level of depression that can be managed at a lower level of care.  Absence of persistent depression mood and report of reduced frequency and intensity of low mood and absence of persistent low energy and motivation to acceptable levels, report subjective improved motivation and increased energy for a period of 90 days, within 6 months as clinically observed and by patient self-report.  Patient will demonstrate and report a level of anxiety that can be managed at a lower level of care.  Absence of persistent anxious mood and report of reduced frequency and intensity of worry and absence of persistent anxious mood to acceptable levels, no panic attacks, report subjective comfort with rumination for a period of 90 days, within 6 months as clinically observed and by patient self-report.       Intervention:   Therapist met with patient to review goals and interventions. Therapist utilized reflected listening as patient gave brief reflection of week. Patient reported a difficult week with sleeping and not wanting to get out of bed. Therapist supported patient as she processed and had patient look at her weeks previous. Therapist provided patien with psycho education on bipolar dx and reached out to  patient's psychiatrist. Therapist suggested with patient's current mood and SI patient to go to the empath unit and or the ED.  Patient agreed she would go if there was a safety concern and would consider it for her current mood. Therapist reviewed safety plan with patient along with making appointment next week with therapist.  Patient presented with an anxious/depressed affect. Patient was engaged in session and open to feedback. Patient contracts for safety       There has been demonstrated improvement in functioning while patient has been engaged in psychotherapy/psychological service- if withdrawn the patient would deteriorate and/or relapse.       Assessments completed prior to visit:  The following assessments were completed by patient for this visit:  PHQ9:   PHQ-9 SCORE 12/29/2021 2/8/2022 4/1/2022 4/27/2022 5/12/2022 6/6/2022 6/21/2022   PHQ-9 Total Score MyChart - - - 16 (Moderately severe depression) - - 10 (Moderate depression)   PHQ-9 Total Score 11 11 11 16 15 7 10   Some encounter information is confidential and restricted. Go to Review Flowsheets activity to see all data.     GAD7:   YESSI-7 SCORE 12/27/2021 2/8/2022 4/1/2022 4/27/2022 5/11/2022 6/6/2022 6/21/2022   Total Score 19 (severe anxiety) - - 7 (mild anxiety) 9 (mild anxiety) - 9 (mild anxiety)   Total Score 19 10 12 7 9 10 9   Some encounter information is confidential and restricted. Go to Review Flowsheets activity to see all data.           ASSESSMENT: Current Emotional / Mental Status (status of significant symptoms):   Risk status (Self / Other harm or suicidal ideation)   Patient denies current fears or concerns for personal safety.   Patient reports the following current or recent suicidal ideation or behaviors: SI passive .   Patient denies current or recent homicidal ideation or behaviors.   Patient denies current or recent self injurious behavior or ideation.   Patient denies other safety concerns.   Patient reports there has been  no change in risk factors since their last session.     Patient reports there has been no change in protective factors since their last session.     A safety and risk management plan has been developed including: Patient consented to co-developed safety plan on 2.21.2022.  Safety and risk management plan was reviewed.   Patient agreed to use safety plan should any safety concerns arise.  A copy was made available to the patient.     Appearance:   Disheveled     Eye Contact:   Poor    Psychomotor Behavior: Restless     Attitude:   Cooperative    Orientation:   All   Speech    Rate / Production: Emotional Talkative    Volume:  Normal    Mood:    Anxious  Sad  Fearful   Affect:    Tearful Worrisome    Thought Content:  Clear    Thought Form:  Coherent    Insight:    Fair      Medication Review:   No changes to current psychiatric medication(s)     Medication Compliance:   Yes     Changes in Health Issues:   Yes: Pain, Associated Psychological Distress     Chemical Use Review:   Substance Use: Chemical use reviewed, no active concerns identified      Tobacco Use: No current tobacco use.      Diagnosis:  1. Bipolar affective disorder, currently depressed, moderate (H)    2. Generalized anxiety disorder    3. PTSD (post-traumatic stress disorder)        Collateral Reports Completed:   Not Applicable    PLAN: (Patient Tasks / Therapist Tasks / Other)  Make visual safety plan   Manage depressive symptoms with coping skills  When feeling SI follow safety plan  Continue to manage SI and go to ER if feeling unsafe  Therapist assisted patient in looking at making a list of options so she can feel power out of a situation where she had little to no power over.  Therapist informed psychiatry is agreeable with TMS referral and asked patient to fill out GEORGE to move forward.    IRT therapy and coached patient through the record and rewriting process. Therapist validated patient's emotions with the break up and back together and  suggested patient and her partner have a lot to talk about and looking at their futures sooner than later.   Therapist provided patien with psycho education on bipolar dx and reached out to patient's psychiatrist. Therapist suggested with patient's current mood and SI patient to go to the empath unit and or the ED.  Patient agreed she would go if there was a safety concern and would consider it for her current mood. Therapist reviewed safety plan with patient along with making appointment next week with therapist.      Katie Faulkner, [facility]   8/16/2022                                                         ______________________________________________________________________    Individual Treatment Plan    Patient's Name: Wandy Ann  YOB: 2000    Date of Creation: 2.22.2021  Date Treatment Plan Last Reviewed/Revised: 8/16/2022 due 11/16/2022    DSM5 Diagnoses: 296.32 (F33.1) Major Depressive Disorder, Recurrent Episode, Moderate With mixed features, 300.02 (F41.1) Generalized Anxiety Disorder or 309.81 (F43.10) Posttraumatic Stress Disorder (includes Posttraumatic Stress Disorder for Children 6 Years and Younger)  Without dissociative symptoms  Psychosocial / Contextual Factors: past trauma, developmental hx and current stressors   PROMIS (reviewed every 90 days):     Referral / Collaboration:  Referral to another professional/service is not indicated at this time..    Anticipated number of session for this episode of care: 12  Anticipation frequency of session: Biweekly  Anticipated Duration of each session: 38-52 minutes  Treatment plan will be reviewed in 90 days or when goals have been changed.       MeasurableTreatment Goal(s) related to diagnosis / functional impairment(s)  Goal 1: Patient will report absence of persistent SI and report of reduced frequency and intensity of SI and absence of SI to acceptable levels, report subjective improved mood for a period of 90 days, within 6  months as clinically observed and by patient self-report    I will know I've met my goal when I can see the difference between a bad moment and what I want in th future.      Objective #A (Patient Action)    Patient will demonstrate control of impulses and ability to use positive coping tools to manage strong emotions that can be safely addressed at a lower level of care. Absence of persistent SI and report of reduced frequency and intensity of SI and absence of SI to acceptable levels, report subjective improved mood for a period of 90 days, within 6 months as clinically observed and by patient self-report.  Status: Continued - Date(s): 8/16/2022    Intervention(s)  Therapist will provide individual therapy to identify triggers to SI urges, gain feedback on helpful coping strategies, and identify ways that family can offer support. Tx to discuss current stressors and interpersonal conflicts and how to cope with these, coaching, diagnostic testing, referral for medication as indicated, use prescribed medication, cognitive restructuring, interpersonal family therapy, supportive therapy services.        Goal 2: Patient will report absence of persistent depression mood and report of reduced frequency and intensity of low mood and absence of persistent low energy and motivation to acceptable levels, report subjective improved motivation and increased energy for a period of 90 days, within 6 months as clinically observed and by patient self-report    I will know I've met my goal when I no longer feel sad.      Objective #A (Patient Action)    Status: Continued - Date(s):  8/16/2022    Patient will demonstrate and report a level of depression that can be managed at a lower level of care.  Absence of persistent depression mood and report of reduced frequency and intensity of low mood and absence of persistent low energy and motivation to acceptable levels, report subjective improved motivation and increased energy for a  period of 90 days, within 6 months as clinically observed and by patient self-report.    Intervention(s)  Therapist will provide individual therapy to identify triggers to depression, gain feedback on helpful coping tools and thought-reframing techniques, and identify preferred way of being.  Tx to include discussion of current stressors and interpersonal conflicts and how to cope with these, coaching, diagnostic testing, referral for medication as indicated, use prescribed medication, cognitive restructuring, interpersonal, family therapy, supportive therapy services.        Goal 3: Patiient will report absence of persistent anxiety mood and report of reduced frequency and intensity of worry and absence of persistent anxious mood to acceptable levels, no panic attacks, report subjective comfort with rumination for a period of 90 days. Within 6 months as clinically observed and by patient self-report    I will know I've met my goal when I can go days without worrying about little things or shaking.      Objective #A (Patient Action)    Status: Continued - Date(s):  8/16/2022    Patient will demonstrate and report a level of anxiety that can be managed at a lower level of care.  Absence of persistent anxious mood and report of reduced frequency and intensity of worry and absence of persistent anxious mood to acceptable levels, no panic attacks, report subjective comfort with rumination for a period of 90 days, within 6 months as clinically observed and by patient self-report.    Intervention(s)  Therapist will provide individual therapy to identify triggers to anxiety, gain feedback on helpful coping tools and thought-reframing techniques, and identify preferred way of being. Tx to include discuss current stressors and interpersonal conflicts and how to cope with these, coaching, diagnostic testing , referral for medication as indicated, use prescribed medication, cognitive restructuring, interpersonal,   family  "therapy, supportive therapy services.        Patient has reviewed and agreed to the above plan.      Katie Faulkner, [facility]   8/16/2022                                                   Wandy Ann            SAFETY PLAN:  Step 1: Warning signs / cues (Thoughts, images, mood, situation, behavior) that a crisis may be developing:  ? Thoughts: thoughts of not wanting to be here  ? Images: no images  ? Thinking Processes: intrusive thoughts (bothersome, unwanted thoughts that come out of nowhere): get irritated  ? Mood: intense anger and agitation  ? Behaviors: isolating/withdrawing   ? Situations: trauma    Step 2: Coping strategies - Things I can do to take my mind off of my problems without contacting another person (relaxation technique, physical activity):  ? Distress Tolerance Strategies:  arts and crafts: drawing and painting  ? Physical Activities: exercise: working out  ? Focus on helpful thoughts:  \"This is temporary\", \"It always passes\" and \"Ride the wave\"  Step 3: People and social settings that provide distraction:                 Name: Jennifer     Phone: [phone]                 Name: Antonina         Phone: [phone]                 Name: mom       Phone: [phone]  ? friends house or car   Step 4: Remind myself of people and things that are important to me and worth living for:  Best friend and cat        Step 5: When I am in crisis, I can ask these people to help me use my safety plan:                 Name: Jennifer     Phone: [phone]                 Name: Antonina         Phone: [phone]                 Name: mom       Phone: [phone]  Step 6: Making the environment safe:   ? be around others  Step 7: Professionals or agencies I can contact during a crisis:  ? [facility] Daytime Number: [phone]  ? Suicide Prevention Lifeline: 8-045-293-DHWY (1940)  ? Crisis Text Line Service (available 24 hours a day, 7 days a week): Text [place] to 207253    Local Crisis " Services: 988     Call 911 or go to my nearest emergency department.       I helped develop this safety plan and agree to use it when needed.  I have been given a copy of this plan.       Client signature _________________________________________________________________  Today s date:  2/22/2021  Adapted from Safety Plan Template 2008 Marisol Cazares and Maroi Flores is reprinted with the express permission of the authors.  No portion of the Safety Plan Template may be reproduced without the express, written permission.  You can contact the authors at bhs@[place].[facility] or [email or web address].

## 2022-08-24 ENCOUNTER — VIRTUAL VISIT (OUTPATIENT)
Dept: PSYCHOLOGY | Facility: CLINIC | Age: 22
End: 2022-08-24
Payer: COMMERCIAL

## 2022-08-24 DIAGNOSIS — F41.1 GENERALIZED ANXIETY DISORDER: ICD-10-CM

## 2022-08-24 DIAGNOSIS — F31.32 BIPOLAR AFFECTIVE DISORDER, CURRENTLY DEPRESSED, MODERATE (H): Primary | ICD-10-CM

## 2022-08-24 DIAGNOSIS — F43.10 PTSD (POST-TRAUMATIC STRESS DISORDER): ICD-10-CM

## 2022-08-24 PROCEDURE — 90834 PSYTX W PT 45 MINUTES: CPT | Mod: 95 | Performed by: SOCIAL WORKER

## 2022-08-24 ASSESSMENT — ANXIETY QUESTIONNAIRES
1. FEELING NERVOUS, ANXIOUS, OR ON EDGE: SEVERAL DAYS
7. FEELING AFRAID AS IF SOMETHING AWFUL MIGHT HAPPEN: SEVERAL DAYS
2. NOT BEING ABLE TO STOP OR CONTROL WORRYING: MORE THAN HALF THE DAYS
GAD7 TOTAL SCORE: 13
5. BEING SO RESTLESS THAT IT IS HARD TO SIT STILL: MORE THAN HALF THE DAYS
3. WORRYING TOO MUCH ABOUT DIFFERENT THINGS: NEARLY EVERY DAY
4. TROUBLE RELAXING: NEARLY EVERY DAY
6. BECOMING EASILY ANNOYED OR IRRITABLE: SEVERAL DAYS
GAD7 TOTAL SCORE: 13

## 2022-08-24 ASSESSMENT — PATIENT HEALTH QUESTIONNAIRE - PHQ9: SUM OF ALL RESPONSES TO PHQ QUESTIONS 1-9: 19

## 2022-08-24 NOTE — PROGRESS NOTES
[facility]                                      Progress Note    Patient Name: Wandy Ann  Date: 8/24/2022         Service Type: Individual      Session Start Time: 1402  Session End Time: 1442     Session Length: 38-52    Session #: 30    Attendees: Client    Service Modality: Video Visit:      Provider verified identity through the following two step process.  Patient provided:  Patient is known previously to provider    Telemedicine Visit: The patient's condition can be safely assessed and treated via synchronous audio and visual telemedicine encounter.      Reason for Telemedicine Visit: Services only offered telehealth    Originating Site (Patient Location): Patient's home    Distant Site (Provider Location): Provider Remote Setting- Home Office    Consent:  The patient/guardian has verbally consented to: the potential risks and benefits of telemedicine (video visit) versus in person care; bill my insurance or make self-payment for services provided; and responsibility for payment of non-covered services.     Patient would like the video invitation sent by:  Send to e-mail at: [email or web address]    Mode of Communication:  Video Conference via [employer]    As the provider I attest to compliance with applicable laws and regulations related to telemedicine.    DATA  Interactive Complexity: No  Crisis: No        Progress Since Last Session (Related to Symptoms / Goals / Homework):   Symptoms: No change per patient      Homework: Partially completed      Episode of Care Goals: Minimal progress - PREPARATION (Decided to change - considering how); Intervened by negotiating a change plan and determining options / strategies for behavior change, identifying triggers, exploring social supports, and working towards setting a date to begin behavior change     Current / Ongoing Stressors and Concerns:   past trauma, developmental hx and current stressors      Treatment Objective(s)  Addressed in This Session:   Patient will demonstrate control of impulses and ability to use positive coping tools to manage strong emotions that can be safely addressed at a lower level of care. Absence of persistent SI and report of reduced frequency and intensity of SI and absence of SI to acceptable levels, report subjective improved mood for a period of 90 days, within 6 months as clinically observed and by patient self-report.  Patient will demonstrate and report a level of depression that can be managed at a lower level of care.  Absence of persistent depression mood and report of reduced frequency and intensity of low mood and absence of persistent low energy and motivation to acceptable levels, report subjective improved motivation and increased energy for a period of 90 days, within 6 months as clinically observed and by patient self-report.  Patient will demonstrate and report a level of anxiety that can be managed at a lower level of care.  Absence of persistent anxious mood and report of reduced frequency and intensity of worry and absence of persistent anxious mood to acceptable levels, no panic attacks, report subjective comfort with rumination for a period of 90 days, within 6 months as clinically observed and by patient self-report.       Intervention:   Therapist met with patient to review goals and interventions. Therapist utilized reflected listening as patient gave brief reflection of week. Patient reported difficult week with depression and sleeping a lot with missing work. Therapist supported patient as she processed and validated patient. Patient reported she was able to remain safe with the plan to go to the [facility] or ED if feeling unsafe and will continue to do so. Patient reported passive SI with no plan and was future focused with plans tonight with a friend. Patient reported she will moving Saturday and processed. Therapist reviewed dx of bipolar with patient and patient agreed  with dx after looking into it herself. Therapist reviewed tx along with medication change and encouraged patient to reach out to psychiatry to make appointment. Therapist reached out to psychiatrist with updated dx after last session.   Patient presented with an anxious/depressed affect. Patient was engaged in session and open to feedback. Patient contracts for safety       There has been demonstrated improvement in functioning while patient has been engaged in psychotherapy/psychological service- if withdrawn the patient would deteriorate and/or relapse.       Assessments completed prior to visit:  The following assessments were completed by patient for this visit:  PHQ9:   PHQ-9 SCORE 2/8/2022 4/1/2022 4/27/2022 5/12/2022 6/6/2022 6/21/2022 8/24/2022   PHQ-9 Total Score MyChart - - 16 (Moderately severe depression) - - 10 (Moderate depression) -   PHQ-9 Total Score 11 11 16 15 7 10 19   Some encounter information is confidential and restricted. Go to Review Flowsheets activity to see all data.     GAD7:   YESSI-7 SCORE 2/8/2022 4/1/2022 4/27/2022 5/11/2022 6/6/2022 6/21/2022 8/24/2022   Total Score - - 7 (mild anxiety) 9 (mild anxiety) - 9 (mild anxiety) -   Total Score 10 12 7 9 10 9 13   Some encounter information is confidential and restricted. Go to Review Flowsheets activity to see all data.           ASSESSMENT: Current Emotional / Mental Status (status of significant symptoms):   Risk status (Self / Other harm or suicidal ideation)   Patient denies current fears or concerns for personal safety.   Patient reports the following current or recent suicidal ideation or behaviors: SI passive .   Patient denies current or recent homicidal ideation or behaviors.   Patient denies current or recent self injurious behavior or ideation.   Patient denies other safety concerns.   Patient reports there has been no change in risk factors since their last session.     Patient reports there has been no change in protective  factors since their last session.     A safety and risk management plan has been developed including: Patient consented to co-developed safety plan on 2.21.2022.  Safety and risk management plan was reviewed.   Patient agreed to use safety plan should any safety concerns arise.  A copy was made available to the patient.     Appearance:   Disheveled     Eye Contact:   Fair     Psychomotor Behavior: Restless     Attitude:   Cooperative    Orientation:   All   Speech    Rate / Production: Emotional Talkative    Volume:  Normal    Mood:    Anxious  Sad    Affect:    Tearful Worrisome    Thought Content:  Clear    Thought Form:  Coherent    Insight:    Fair      Medication Review:   No changes to current psychiatric medication(s)     Medication Compliance:   Yes     Changes in Health Issues:   Yes: Pain, Associated Psychological Distress     Chemical Use Review:   Substance Use: Chemical use reviewed, no active concerns identified      Tobacco Use: No current tobacco use.      Diagnosis:  1. Bipolar affective disorder, currently depressed, moderate (H)    2. Generalized anxiety disorder    3. PTSD (post-traumatic stress disorder)        Collateral Reports Completed:   Not Applicable    PLAN: (Patient Tasks / Therapist Tasks / Other)  Make visual safety plan   Manage depressive symptoms with coping skills  When feeling SI follow safety plan  Continue to manage SI and go to ER if feeling unsafe  Therapist assisted patient in looking at making a list of options so she can feel power out of a situation where she had little to no power over.  Therapist informed psychiatry is agreeable with TMS referral and asked patient to fill out GEORGE to move forward.    IRT therapy and coached patient through the record and rewriting process. Therapist validated patient's emotions with the break up and back together and suggested patient and her partner have a lot to talk about and looking at their futures sooner than later.   Therapist  provided patien with psycho education on bipolar dx and reached out to patient's psychiatrist. Therapist suggested with patient's current mood and SI patient to go to the empath unit and or the ED.    Patient reported she was able to remain safe with the plan to go to the [facility] or ED if feeling unsafe and will continue to do so. Patient reported passive SI with no plan and was future focused with plans tonight with a friend. Patient reported she will moving Saturday and processed. Therapist reviewed dx of bipolar with patient and patient agreed with dx after looking into it herself. Therapist reviewed tx along with medication change and encouraged patient to reach out to psychiatry to make appointment. Therapist reached out to psychiatrist with updated dx after last session.      [facility] has a new mental health emergency area at [facility] called [facility] that is very calming and helpful if you need additional support. ([address] [place] [address]  [phone]).     Katie Faulkner, [facility]   8/24/2022                                                         ______________________________________________________________________  Individual Treatment Plan    Patient's Name: Wandy Ann  YOB: 2000    Date of Creation: 2.22.2021  Date Treatment Plan Last Reviewed/Revised: 8/16/2022 due 11/16/2022    DSM5 Diagnoses: 296.32 (F33.1) Major Depressive Disorder, Recurrent Episode, Moderate With mixed features, 300.02 (F41.1) Generalized Anxiety Disorder or 309.81 (F43.10) Posttraumatic Stress Disorder (includes Posttraumatic Stress Disorder for Children 6 Years and Younger)  Without dissociative symptoms  Psychosocial / Contextual Factors: past trauma, developmental hx and current stressors   PROMIS (reviewed every 90 days):     Referral / Collaboration:  Referral to another professional/service is not indicated at this time..    Anticipated number of session  for this episode of care: 12  Anticipation frequency of session: Biweekly  Anticipated Duration of each session: 38-52 minutes  Treatment plan will be reviewed in 90 days or when goals have been changed.       MeasurableTreatment Goal(s) related to diagnosis / functional impairment(s)  Goal 1: Patient will report absence of persistent SI and report of reduced frequency and intensity of SI and absence of SI to acceptable levels, report subjective improved mood for a period of 90 days, within 6 months as clinically observed and by patient self-report    I will know I've met my goal when I can see the difference between a bad moment and what I want in th future.      Objective #A (Patient Action)    Patient will demonstrate control of impulses and ability to use positive coping tools to manage strong emotions that can be safely addressed at a lower level of care. Absence of persistent SI and report of reduced frequency and intensity of SI and absence of SI to acceptable levels, report subjective improved mood for a period of 90 days, within 6 months as clinically observed and by patient self-report.  Status: Continued - Date(s): 8/16/2022    Intervention(s)  Therapist will provide individual therapy to identify triggers to SI urges, gain feedback on helpful coping strategies, and identify ways that family can offer support. Tx to discuss current stressors and interpersonal conflicts and how to cope with these, coaching, diagnostic testing, referral for medication as indicated, use prescribed medication, cognitive restructuring, interpersonal family therapy, supportive therapy services.        Goal 2: Patient will report absence of persistent depression mood and report of reduced frequency and intensity of low mood and absence of persistent low energy and motivation to acceptable levels, report subjective improved motivation and increased energy for a period of 90 days, within 6 months as clinically observed and by  patient self-report    I will know I've met my goal when I no longer feel sad.      Objective #A (Patient Action)    Status: Continued - Date(s): 8/16/2022    Patient will demonstrate and report a level of depression that can be managed at a lower level of care.  Absence of persistent depression mood and report of reduced frequency and intensity of low mood and absence of persistent low energy and motivation to acceptable levels, report subjective improved motivation and increased energy for a period of 90 days, within 6 months as clinically observed and by patient self-report.    Intervention(s)  Therapist will provide individual therapy to identify triggers to depression, gain feedback on helpful coping tools and thought-reframing techniques, and identify preferred way of being.  Tx to include discussion of current stressors and interpersonal conflicts and how to cope with these, coaching, diagnostic testing, referral for medication as indicated, use prescribed medication, cognitive restructuring, interpersonal, family therapy, supportive therapy services.        Goal 3: Patiient will report absence of persistent anxiety mood and report of reduced frequency and intensity of worry and absence of persistent anxious mood to acceptable levels, no panic attacks, report subjective comfort with rumination for a period of 90 days. Within 6 months as clinically observed and by patient self-report    I will know I've met my goal when I can go days without worrying about little things or shaking.      Objective #A (Patient Action)    Status: Continued - Date(s): 8/16/2022    Patient will demonstrate and report a level of anxiety that can be managed at a lower level of care.  Absence of persistent anxious mood and report of reduced frequency and intensity of worry and absence of persistent anxious mood to acceptable levels, no panic attacks, report subjective comfort with rumination for a period of 90 days, within 6 months as  "clinically observed and by patient self-report.    Intervention(s)  Therapist will provide individual therapy to identify triggers to anxiety, gain feedback on helpful coping tools and thought-reframing techniques, and identify preferred way of being. Tx to include discuss current stressors and interpersonal conflicts and how to cope with these, coaching, diagnostic testing , referral for medication as indicated, use prescribed medication, cognitive restructuring, interpersonal,   family therapy, supportive therapy services.        Patient has reviewed and agreed to the above plan.      Katie Faulkner, [facility]   8/16/2022                                                   Wandy Ann            SAFETY PLAN:  Step 1: Warning signs / cues (Thoughts, images, mood, situation, behavior) that a crisis may be developing:  ? Thoughts: thoughts of not wanting to be here  ? Images: no images  ? Thinking Processes: intrusive thoughts (bothersome, unwanted thoughts that come out of nowhere): get irritated  ? Mood: intense anger and agitation  ? Behaviors: isolating/withdrawing   ? Situations: trauma    Step 2: Coping strategies - Things I can do to take my mind off of my problems without contacting another person (relaxation technique, physical activity):  ? Distress Tolerance Strategies:  arts and crafts: drawing and painting  ? Physical Activities: exercise: working out  ? Focus on helpful thoughts:  \"This is temporary\", \"It always passes\" and \"Ride the wave\"  Step 3: People and social settings that provide distraction:                 Name: Jennifer     Phone: [phone]                 Name: Antonina         Phone: [phone]                 Name: panchito       Phone: [phone]  ? friends house or car   Step 4: Remind myself of people and things that are important to me and worth living for:  Best friend and cat        Step 5: When I am in crisis, I can ask these people to help me use my safety plan:                "  Name: Jennifer     Phone: [phone]                 Name: Antonina         Phone: [phone]                 Name: panchito       Phone: [phone]  Step 6: Making the environment safe:   ? be around others  Step 7: Professionals or agencies I can contact during a crisis:  ? [facility] Number: [phone]  ? Suicide Prevention Lifeline: 4-510-255-TLQQ (0158)  ? Crisis Text Line Service (available 24 hours a day, 7 days a week): Text [place] to 067023    Local Crisis Services: 988     Call 911 or go to my nearest emergency department.       I helped develop this safety plan and agree to use it when needed.  I have been given a copy of this plan.       Client signature _________________________________________________________________  Today s date:  2/22/2021  Adapted from Safety Plan Template 2008 Marisol Cazares and Mario Flores is reprinted with the express permission of the authors.  No portion of the Safety Plan Template may be reproduced without the express, written permission.  You can contact the authors at [email or web address] or [email or web address].

## 2022-08-29 ENCOUNTER — VIRTUAL VISIT (OUTPATIENT)
Dept: PSYCHOLOGY | Facility: CLINIC | Age: 22
End: 2022-08-29
Payer: COMMERCIAL

## 2022-08-29 DIAGNOSIS — F43.10 PTSD (POST-TRAUMATIC STRESS DISORDER): ICD-10-CM

## 2022-08-29 DIAGNOSIS — F31.32 BIPOLAR AFFECTIVE DISORDER, CURRENTLY DEPRESSED, MODERATE (H): Primary | ICD-10-CM

## 2022-08-29 DIAGNOSIS — F41.1 GENERALIZED ANXIETY DISORDER: ICD-10-CM

## 2022-08-29 PROCEDURE — 90834 PSYTX W PT 45 MINUTES: CPT | Mod: 95 | Performed by: SOCIAL WORKER

## 2022-08-29 NOTE — PROGRESS NOTES
[facility]                                      Progress Note    Patient Name: Wandy Ann  Date: 8/29/2022         Service Type: Individual      Session Start Time: 1504  Session End Time: 1546     Session Length: 38-52    Session #: 31    Attendees: Client    Service Modality: Video Visit:      Provider verified identity through the following two step process.  Patient provided:  Patient is known previously to provider    Telemedicine Visit: The patient's condition can be safely assessed and treated via synchronous audio and visual telemedicine encounter.      Reason for Telemedicine Visit: Services only offered telehealth    Originating Site (Patient Location): Patient's home    Distant Site (Provider Location): Provider Remote Setting- Home Office    Consent:  The patient/guardian has verbally consented to: the potential risks and benefits of telemedicine (video visit) versus in person care; bill my insurance or make self-payment for services provided; and responsibility for payment of non-covered services.     Patient would like the video invitation sent by:  Send to e-mail at: [email or web address]    Mode of Communication:  Video Conference via [employer]well    As the provider I attest to compliance with applicable laws and regulations related to telemedicine.    DATA  Interactive Complexity: No  Crisis: No        Progress Since Last Session (Related to Symptoms / Goals / Homework):   Symptoms: Improving per patient     Homework: Partially completed      Episode of Care Goals: Minimal progress - PREPARATION (Decided to change - considering how); Intervened by negotiating a change plan and determining options / strategies for behavior change, identifying triggers, exploring social supports, and working towards setting a date to begin behavior change     Current / Ongoing Stressors and Concerns:   past trauma, developmental hx and current stressors      Treatment Objective(s)  Addressed in This Session:   Patient will demonstrate control of impulses and ability to use positive coping tools to manage strong emotions that can be safely addressed at a lower level of care. Absence of persistent SI and report of reduced frequency and intensity of SI and absence of SI to acceptable levels, report subjective improved mood for a period of 90 days, within 6 months as clinically observed and by patient self-report.  Patient will demonstrate and report a level of depression that can be managed at a lower level of care.  Absence of persistent depression mood and report of reduced frequency and intensity of low mood and absence of persistent low energy and motivation to acceptable levels, report subjective improved motivation and increased energy for a period of 90 days, within 6 months as clinically observed and by patient self-report.  Patient will demonstrate and report a level of anxiety that can be managed at a lower level of care.  Absence of persistent anxious mood and report of reduced frequency and intensity of worry and absence of persistent anxious mood to acceptable levels, no panic attacks, report subjective comfort with rumination for a period of 90 days, within 6 months as clinically observed and by patient self-report.       Intervention:   Therapist met with patient to review goals and interventions. Therapist utilized reflected listening as patient gave brief reflection of week. Patient reported a much better week with energy and getting up, getting ready and getting to work. Therapist reviewed patient's past month and patient reported it was her lowest she has been. Therapist suggested calling psychiatry about the medication she is currently on with her moving and reviewing her month as her appointment isn't until the end of September. Patient agreed. Therapist provided psycho education along with CBT.   Patient presented with an anxious/brighter affect. Patient was engaged in  session and open to feedback. Patient contracts for safety       There has been demonstrated improvement in functioning while patient has been engaged in psychotherapy/psychological service- if withdrawn the patient would deteriorate and/or relapse.       Assessments completed prior to visit:  The following assessments were completed by patient for this visit:  PHQ9:   PHQ-9 SCORE 2/8/2022 4/1/2022 4/27/2022 5/12/2022 6/6/2022 6/21/2022 8/24/2022   PHQ-9 Total Score MyChart - - 16 (Moderately severe depression) - - 10 (Moderate depression) -   PHQ-9 Total Score 11 11 16 15 7 10 19   Some encounter information is confidential and restricted. Go to Review Flowsheets activity to see all data.     GAD7:   YESSI-7 SCORE 2/8/2022 4/1/2022 4/27/2022 5/11/2022 6/6/2022 6/21/2022 8/24/2022   Total Score - - 7 (mild anxiety) 9 (mild anxiety) - 9 (mild anxiety) -   Total Score 10 12 7 9 10 9 13   Some encounter information is confidential and restricted. Go to Review Flowsheets activity to see all data.           ASSESSMENT: Current Emotional / Mental Status (status of significant symptoms):   Risk status (Self / Other harm or suicidal ideation)   Patient denies current fears or concerns for personal safety.   Patient denies current or recent suicidal ideation or behaviors.   Patient denies current or recent homicidal ideation or behaviors.   Patient denies current or recent self injurious behavior or ideation.   Patient denies other safety concerns.   Patient reports there has been no change in risk factors since their last session.     Patient reports there has been no change in protective factors since their last session.     A safety and risk management plan has been developed including: Patient consented to co-developed safety plan on 2.21.2022.  Safety and risk management plan was reviewed.   Patient agreed to use safety plan should any safety concerns arise.  A copy was made available to the  patient.     Appearance:   Appropriate     Eye Contact:   Fair     Psychomotor Behavior: Restless     Attitude:   Cooperative    Orientation:   All   Speech    Rate / Production: Emotional Talkative    Volume:  Normal    Mood:    Anxious    Affect:    Bright  Worrisome    Thought Content:  Clear    Thought Form:  Coherent    Insight:    Fair      Medication Review:   No changes to current psychiatric medication(s)     Medication Compliance:   Yes     Changes in Health Issues:   Yes: Pain, Associated Psychological Distress     Chemical Use Review:   Substance Use: Chemical use reviewed, no active concerns identified      Tobacco Use: No current tobacco use.      Diagnosis:  1. Bipolar affective disorder, currently depressed, moderate (H)    2. PTSD (post-traumatic stress disorder)    3. Generalized anxiety disorder        Collateral Reports Completed:   Not Applicable    PLAN: (Patient Tasks / Therapist Tasks / Other)  Make visual safety plan   Manage depressive symptoms with coping skills  When feeling SI follow safety plan  Continue to manage SI and go to ER if feeling unsafe  Therapist assisted patient in looking at making a list of options so she can feel power out of a situation where she had little to no power over.  Therapist informed psychiatry is agreeable with TMS referral and asked patient to fill out GEORGE to move forward.    IRT therapy and coached patient through the record and rewriting process. Therapist validated patient's emotions with the break up and back together and suggested patient and her partner have a lot to talk about and looking at their futures sooner than later.   Therapist provided patien with psycho education on bipolar dx and reached out to patient's psychiatrist. Therapist suggested with patient's current mood and SI patient to go to the empath unit and or the ED.        [facility] has a new mental health emergency area at [facility] called [facility] that is very  calming and helpful if you need additional support. ([address] [place] [address]  [phone]).     Call psychiatry   Name and challenge cognitive distortions    Katie Faulkner, LICSW   8/29/2022                                                         ______________________________________________________________________    Individual Treatment Plan    Patient's Name: Wandy Ann  YOB: 2000    Date of Creation: 2.22.2021  Date Treatment Plan Last Reviewed/Revised: 5/12/2022 due 8/12/2022    DSM5 Diagnoses: 296.32 (F33.1) Major Depressive Disorder, Recurrent Episode, Moderate With mixed features, 300.02 (F41.1) Generalized Anxiety Disorder or 309.81 (F43.10) Posttraumatic Stress Disorder (includes Posttraumatic Stress Disorder for Children 6 Years and Younger)  Without dissociative symptoms  Psychosocial / Contextual Factors: past trauma, developmental hx and current stressors   PROMIS (reviewed every 90 days):     Referral / Collaboration:  Referral to another professional/service is not indicated at this time..    Anticipated number of session for this episode of care: 12  Anticipation frequency of session: Biweekly  Anticipated Duration of each session: 38-52 minutes  Treatment plan will be reviewed in 90 days or when goals have been changed.       MeasurableTreatment Goal(s) related to diagnosis / functional impairment(s)  Goal 1: Patient will report absence of persistent SI and report of reduced frequency and intensity of SI and absence of SI to acceptable levels, report subjective improved mood for a period of 90 days, within 6 months as clinically observed and by patient self-report    I will know I've met my goal when I can see the difference between a bad moment and what I want in th future.      Objective #A (Patient Action)    Patient will demonstrate control of impulses and ability to use positive coping tools to manage strong emotions that can be safely addressed at a  lower level of care. Absence of persistent SI and report of reduced frequency and intensity of SI and absence of SI to acceptable levels, report subjective improved mood for a period of 90 days, within 6 months as clinically observed and by patient self-report.  Status: Continued - Date(s): 5/12/2022    Intervention(s)  Therapist will provide individual therapy to identify triggers to SI urges, gain feedback on helpful coping strategies, and identify ways that family can offer support. Tx to discuss current stressors and interpersonal conflicts and how to cope with these, coaching, diagnostic testing, referral for medication as indicated, use prescribed medication, cognitive restructuring, interpersonal family therapy, supportive therapy services.        Goal 2: Patient will report absence of persistent depression mood and report of reduced frequency and intensity of low mood and absence of persistent low energy and motivation to acceptable levels, report subjective improved motivation and increased energy for a period of 90 days, within 6 months as clinically observed and by patient self-report    I will know I've met my goal when I no longer feel sad.      Objective #A (Patient Action)    Status: Continued - Date(s):  5/12/2022    Patient will demonstrate and report a level of depression that can be managed at a lower level of care.  Absence of persistent depression mood and report of reduced frequency and intensity of low mood and absence of persistent low energy and motivation to acceptable levels, report subjective improved motivation and increased energy for a period of 90 days, within 6 months as clinically observed and by patient self-report.    Intervention(s)  Therapist will provide individual therapy to identify triggers to depression, gain feedback on helpful coping tools and thought-reframing techniques, and identify preferred way of being.  Tx to include discussion of current stressors and  interpersonal conflicts and how to cope with these, coaching, diagnostic testing, referral for medication as indicated, use prescribed medication, cognitive restructuring, interpersonal, family therapy, supportive therapy services.        Goal 3: Patiient will report absence of persistent anxiety mood and report of reduced frequency and intensity of worry and absence of persistent anxious mood to acceptable levels, no panic attacks, report subjective comfort with rumination for a period of 90 days. Within 6 months as clinically observed and by patient self-report    I will know I've met my goal when I can go days without worrying about little things or shaking.      Objective #A (Patient Action)    Status: Continued - Date(s):  5/12/2022    Patient will demonstrate and report a level of anxiety that can be managed at a lower level of care.  Absence of persistent anxious mood and report of reduced frequency and intensity of worry and absence of persistent anxious mood to acceptable levels, no panic attacks, report subjective comfort with rumination for a period of 90 days, within 6 months as clinically observed and by patient self-report.    Intervention(s)  Therapist will provide individual therapy to identify triggers to anxiety, gain feedback on helpful coping tools and thought-reframing techniques, and identify preferred way of being. Tx to include discuss current stressors and interpersonal conflicts and how to cope with these, coaching, diagnostic testing , referral for medication as indicated, use prescribed medication, cognitive restructuring, interpersonal,   family therapy, supportive therapy services.        Patient has reviewed and agreed to the above plan.      Katie Faulkner, [facility]   5/12/2022                                                   Wandy Ann            SAFETY PLAN:  Step 1: Warning signs / cues (Thoughts, images, mood, situation, behavior) that a crisis may be  "developing:  ? Thoughts: thoughts of not wanting to be here  ? Images: no images  ? Thinking Processes: intrusive thoughts (bothersome, unwanted thoughts that come out of nowhere): get irritated  ? Mood: intense anger and agitation  ? Behaviors: isolating/withdrawing   ? Situations: trauma    Step 2: Coping strategies - Things I can do to take my mind off of my problems without contacting another person (relaxation technique, physical activity):  ? Distress Tolerance Strategies:  arts and crafts: drawing and painting  ? Physical Activities: exercise: working out  ? Focus on helpful thoughts:  \"This is temporary\", \"It always passes\" and \"Ride the wave\"  Step 3: People and social settings that provide distraction:                 Name: Jennifer     Phone: [phone]                 Name: Antonina         Phone: [phone]                 Name: mom       Phone: [phone]  ? friends house or car   Step 4: Remind myself of people and things that are important to me and worth living for:  Best friend and cat        Step 5: When I am in crisis, I can ask these people to help me use my safety plan:                 Name: Jennifer     Phone: [phone]                 Name: Antonina         Phone: [phone]                 Name: mom       Phone: [phone]  Step 6: Making the environment safe:   ? be around others  Step 7: Professionals or agencies I can contact during a crisis:  ? [facility] Number: 115-620-4852  ? Suicide Prevention Lifeline: 4-322-694-FHVI (2880)  ? Crisis Text Line Service (available 24 hours a day, 7 days a week): Text [place] to 714235    Local Crisis Services: 988     Call 911 or go to my nearest emergency department.       I helped develop this safety plan and agree to use it when needed.  I have been given a copy of this plan.       Client signature _________________________________________________________________  Today s date:  2/22/2021  Adapted from Safety Plan Template " 2008 Marisol Cazares and Mario Flores is reprinted with the express permission of the authors.  No portion of the Safety Plan Template may be reproduced without the express, written permission.  You can contact the authors at [email or web address].[email or web address] or [email or web address].

## 2022-09-15 ENCOUNTER — VIRTUAL VISIT (OUTPATIENT)
Dept: PSYCHOLOGY | Facility: CLINIC | Age: 22
End: 2022-09-15
Payer: COMMERCIAL

## 2022-09-15 DIAGNOSIS — F43.10 PTSD (POST-TRAUMATIC STRESS DISORDER): ICD-10-CM

## 2022-09-15 DIAGNOSIS — F31.32 BIPOLAR AFFECTIVE DISORDER, CURRENTLY DEPRESSED, MODERATE (H): Primary | ICD-10-CM

## 2022-09-15 DIAGNOSIS — F41.1 GENERALIZED ANXIETY DISORDER: ICD-10-CM

## 2022-09-15 PROCEDURE — 90832 PSYTX W PT 30 MINUTES: CPT | Mod: 95 | Performed by: SOCIAL WORKER

## 2022-09-15 NOTE — PROGRESS NOTES
"    [facility]                                      Progress Note    Patient Name: Wandy Ann  Date: 9/15/2022         Service Type: Individual      Session Start Time:   Session End Time:      Session Length: 16-37    Session #: 32    Attendees: Client    Service Modality: Phone Visit:      Provider verified identity through the following two step process.  Patient provided:  Patient  and Patient is known previously to provider    The patient has been notified of the following:      \"We have found that certain health care needs can be provided without the need for a face to face visit.  This service lets us provide the care you need with a phone conversation.       I will have full access to your [facility] medical record during this entire phone call.   I will be taking notes for your medical record.      Since this is like an office visit, we will bill your insurance company for this service.       There are potential benefits and risks of telephone visits (e.g. limits to patient confidentiality) that differ from in-person visits.?Confidentiality still applies for telephone services, and nobody will record the visit.  It is important to be in a quiet, private space that is free of distractions (including cell phone or other devices) during the visit.??      If during the course of the call I believe a telephone visit is not appropriate, you will not be charged for this service\"     Consent has been obtained for this service by care team member: Yes      DATA  Interactive Complexity: No  Crisis: No        Progress Since Last Session (Related to Symptoms / Goals / Homework):   Symptoms: Improving per patient     Homework: Partially completed      Episode of Care Goals: Minimal progress - PREPARATION (Decided to change - considering how); Intervened by negotiating a change plan and determining options / strategies for behavior change, identifying triggers, exploring " social supports, and working towards setting a date to begin behavior change     Current / Ongoing Stressors and Concerns:   past trauma, developmental hx and current stressors      Treatment Objective(s) Addressed in This Session:   Patient will demonstrate control of impulses and ability to use positive coping tools to manage strong emotions that can be safely addressed at a lower level of care. Absence of persistent SI and report of reduced frequency and intensity of SI and absence of SI to acceptable levels, report subjective improved mood for a period of 90 days, within 6 months as clinically observed and by patient self-report.  Patient will demonstrate and report a level of depression that can be managed at a lower level of care.  Absence of persistent depression mood and report of reduced frequency and intensity of low mood and absence of persistent low energy and motivation to acceptable levels, report subjective improved motivation and increased energy for a period of 90 days, within 6 months as clinically observed and by patient self-report.  Patient will demonstrate and report a level of anxiety that can be managed at a lower level of care.  Absence of persistent anxious mood and report of reduced frequency and intensity of worry and absence of persistent anxious mood to acceptable levels, no panic attacks, report subjective comfort with rumination for a period of 90 days, within 6 months as clinically observed and by patient self-report.       Intervention:   Therapist met with patient to review goals and interventions. Therapist utilized reflected listening as patient gave brief reflection of week. Patient reported since going off medication improvement in mood and no SI. Patient processed not being happy with her move to moms and her job and processed conflict with partner. Therapist supported and validated patient and assisted patient in decision making exercise. Therapist encouraged patient to speak  to her partner about future concerns and set expectations up to avoid conflict utilizing effective communication.    Patient presented with an anxious affect. Patient was engaged in session and open to feedback. Patient contracts for safety       There has been demonstrated improvement in functioning while patient has been engaged in psychotherapy/psychological service- if withdrawn the patient would deteriorate and/or relapse.       Assessments completed prior to visit:  The following assessments were completed by patient for this visit:  PHQ9:   PHQ-9 SCORE 2/8/2022 4/1/2022 4/27/2022 5/12/2022 6/6/2022 6/21/2022 8/24/2022   PHQ-9 Total Score MyChart - - 16 (Moderately severe depression) - - 10 (Moderate depression) -   PHQ-9 Total Score 11 11 16 15 7 10 19   Some encounter information is confidential and restricted. Go to Review Flowsheets activity to see all data.     GAD7:   YESSI-7 SCORE 2/8/2022 4/1/2022 4/27/2022 5/11/2022 6/6/2022 6/21/2022 8/24/2022   Total Score - - 7 (mild anxiety) 9 (mild anxiety) - 9 (mild anxiety) -   Total Score 10 12 7 9 10 9 13   Some encounter information is confidential and restricted. Go to Review Flowsheets activity to see all data.           ASSESSMENT: Current Emotional / Mental Status (status of significant symptoms):   Risk status (Self / Other harm or suicidal ideation)   Patient denies current fears or concerns for personal safety.   Patient denies current or recent suicidal ideation or behaviors.   Patient denies current or recent homicidal ideation or behaviors.   Patient denies current or recent self injurious behavior or ideation.   Patient denies other safety concerns.   Patient reports there has been no change in risk factors since their last session.     Patient reports there has been no change in protective factors since their last session.     A safety and risk management plan has been developed including: Patient consented to co-developed safety plan on 2.21.2022.   Safety and risk management plan was reviewed.   Patient agreed to use safety plan should any safety concerns arise.  A copy was made available to the patient.     Appearance:   phone session     Eye Contact:   phone session     Psychomotor Behavior: phone session     Attitude:   Cooperative    Orientation:   All   Speech    Rate / Production: Emotional Talkative    Volume:  Normal    Mood:    Anxious    Affect:    Worrisome    Thought Content:  Clear    Thought Form:  Coherent    Insight:    Fair      Medication Review:   Changes to psychiatric medications, see updated Medication List in EPIC.      Medication Compliance:   Yes     Changes in Health Issues:   Yes: Pain, Associated Psychological Distress     Chemical Use Review:   Substance Use: Chemical use reviewed, no active concerns identified      Tobacco Use: No current tobacco use.      Diagnosis:  1. Bipolar affective disorder, currently depressed, moderate (H)    2. PTSD (post-traumatic stress disorder)    3. Generalized anxiety disorder        Collateral Reports Completed:   Not Applicable    PLAN: (Patient Tasks / Therapist Tasks / Other)  Make visual safety plan   Manage depressive symptoms with coping skills  When feeling SI follow safety plan  Continue to manage SI and go to ER if feeling unsafe  TMS referral   IRT therapy and coached patient through the record and rewriting process. Therapist validated patient's emotions with the break up and back together and suggested patient and her partner have a lot to talk about and looking at their futures sooner than later.     [facility] has a new mental health emergency area at [facility] called [facility] that is very calming and helpful if you need additional support. ([address] [place] [address]  [phone]).     Name and challenge cognitive distortions  patient to speak to her partner about future concerns and set expectations up to avoid conflict utilizing effective  communication.      Katie Faulkner, LICSW   9/15/2022                                                         ______________________________________________________________________    Individual Treatment Plan    Patient's Name: Wandy Ann  YOB: 2000    Date of Creation: 2.22.2021  Date Treatment Plan Last Reviewed/Revised: 8/16/2022 due 11/16/2022    DSM5 Diagnoses: 296.32 (F33.1) Major Depressive Disorder, Recurrent Episode, Moderate With mixed features, 300.02 (F41.1) Generalized Anxiety Disorder or 309.81 (F43.10) Posttraumatic Stress Disorder (includes Posttraumatic Stress Disorder for Children 6 Years and Younger)  Without dissociative symptoms  Psychosocial / Contextual Factors: past trauma, developmental hx and current stressors   PROMIS (reviewed every 90 days):     Referral / Collaboration:  Referral to another professional/service is not indicated at this time..    Anticipated number of session for this episode of care: 12  Anticipation frequency of session: Biweekly  Anticipated Duration of each session: 38-52 minutes  Treatment plan will be reviewed in 90 days or when goals have been changed.       MeasurableTreatment Goal(s) related to diagnosis / functional impairment(s)  Goal 1: Patient will report absence of persistent SI and report of reduced frequency and intensity of SI and absence of SI to acceptable levels, report subjective improved mood for a period of 90 days, within 6 months as clinically observed and by patient self-report    I will know I've met my goal when I can see the difference between a bad moment and what I want in th future.      Objective #A (Patient Action)    Patient will demonstrate control of impulses and ability to use positive coping tools to manage strong emotions that can be safely addressed at a lower level of care. Absence of persistent SI and report of reduced frequency and intensity of SI and absence of SI to acceptable levels, report  subjective improved mood for a period of 90 days, within 6 months as clinically observed and by patient self-report.  Status: Continued - Date(s): 8/16/2022    Intervention(s)  Therapist will provide individual therapy to identify triggers to SI urges, gain feedback on helpful coping strategies, and identify ways that family can offer support. Tx to discuss current stressors and interpersonal conflicts and how to cope with these, coaching, diagnostic testing, referral for medication as indicated, use prescribed medication, cognitive restructuring, interpersonal family therapy, supportive therapy services.        Goal 2: Patient will report absence of persistent depression mood and report of reduced frequency and intensity of low mood and absence of persistent low energy and motivation to acceptable levels, report subjective improved motivation and increased energy for a period of 90 days, within 6 months as clinically observed and by patient self-report    I will know I've met my goal when I no longer feel sad.      Objective #A (Patient Action)    Status: Continued - Date(s): 8/16/2022    Patient will demonstrate and report a level of depression that can be managed at a lower level of care.  Absence of persistent depression mood and report of reduced frequency and intensity of low mood and absence of persistent low energy and motivation to acceptable levels, report subjective improved motivation and increased energy for a period of 90 days, within 6 months as clinically observed and by patient self-report.    Intervention(s)  Therapist will provide individual therapy to identify triggers to depression, gain feedback on helpful coping tools and thought-reframing techniques, and identify preferred way of being.  Tx to include discussion of current stressors and interpersonal conflicts and how to cope with these, coaching, diagnostic testing, referral for medication as indicated, use prescribed medication, cognitive  restructuring, interpersonal, family therapy, supportive therapy services.        Goal 3: Patiient will report absence of persistent anxiety mood and report of reduced frequency and intensity of worry and absence of persistent anxious mood to acceptable levels, no panic attacks, report subjective comfort with rumination for a period of 90 days. Within 6 months as clinically observed and by patient self-report    I will know I've met my goal when I can go days without worrying about little things or shaking.      Objective #A (Patient Action)    Status: Continued - Date(s): 8/16/2022    Patient will demonstrate and report a level of anxiety that can be managed at a lower level of care.  Absence of persistent anxious mood and report of reduced frequency and intensity of worry and absence of persistent anxious mood to acceptable levels, no panic attacks, report subjective comfort with rumination for a period of 90 days, within 6 months as clinically observed and by patient self-report.    Intervention(s)  Therapist will provide individual therapy to identify triggers to anxiety, gain feedback on helpful coping tools and thought-reframing techniques, and identify preferred way of being. Tx to include discuss current stressors and interpersonal conflicts and how to cope with these, coaching, diagnostic testing , referral for medication as indicated, use prescribed medication, cognitive restructuring, interpersonal,   family therapy, supportive therapy services.        Patient has reviewed and agreed to the above plan.      Katie Faulkner, [facility] [place]   8/16/2022                                                   Wandy Ann            SAFETY PLAN:  Step 1: Warning signs / cues (Thoughts, images, mood, situation, behavior) that a crisis may be developing:  ? Thoughts: thoughts of not wanting to be here  ? Images: no images  ? Thinking Processes: intrusive thoughts (bothersome, unwanted thoughts that come out of  "nowhere): get irritated  ? Mood: intense anger and agitation  ? Behaviors: isolating/withdrawing   ? Situations: trauma    Step 2: Coping strategies - Things I can do to take my mind off of my problems without contacting another person (relaxation technique, physical activity):  ? Distress Tolerance Strategies:  arts and crafts: drawing and painting  ? Physical Activities: exercise: working out  ? Focus on helpful thoughts:  \"This is temporary\", \"It always passes\" and \"Ride the wave\"  Step 3: People and social settings that provide distraction:                 Name: Jennifer     Phone: [phone]                 Name: Antonina         Phone: [phone]                 Name: panchito       Phone: [phone]  ? friends house or car   Step 4: Remind myself of people and things that are important to me and worth living for:  Best friend and cat        Step 5: When I am in crisis, I can ask these people to help me use my safety plan:                 Name: Jennifer     Phone: [phone]                 Name: Antonina         Phone: [phone]                 Name: panchito       Phone: [phone]  Step 6: Making the environment safe:   ? be around others  Step 7: Professionals or agencies I can contact during a crisis:  ? [facility] Daytime Number: 323-475-2500  ? Suicide Prevention Lifeline: 3-355-874-VZFO (6931)  ? Crisis Text Line Service (available 24 hours a day, 7 days a week): Text [place] to 391378    Local Crisis Services: 988     Call 911 or go to my nearest emergency department.       I helped develop this safety plan and agree to use it when needed.  I have been given a copy of this plan.       Client signature _________________________________________________________________  Today s date:  2/22/2021  Adapted from Safety Plan Template 2008 Marisol Cazares and Mario Flores is reprinted with the express permission of the authors.  No portion of the Safety Plan Template may be reproduced without the " express, written permission.  You can contact the authors at [email or web address] or [email or web address].

## 2022-09-18 ENCOUNTER — HEALTH MAINTENANCE LETTER (OUTPATIENT)
Age: 22
End: 2022-09-18

## 2022-09-20 NOTE — PATIENT INSTRUCTIONS
1.  Venlafaxine  mg daily  2.  Hydroxyzine 10 mg up to 3 times daily as needed for anxiety  3.  Abilify 2 mg daily  4.  Talk therapy, when able to, to learn ways to process life stressors    Continue all other medications as reviewed per electronic medical record today.   Safety plan reviewed. To the Emergency Department as needed or call after hours crisis line at [phone] or [phone]. [place] Crisis Text Line. Text [place] to 201422 or Suicide LifeLine Chat: suicideLogue Transport.org/chat/  To schedule individual or family therapy, call [facility] at [phone]  Schedule an appointment with me in 2 months or sooner as needed. Call [facility] at [phone] to schedule.  Follow up with primary care provider as planned or for acute medical concerns.  Call the psychiatric nurse line with medication questions or concerns at [phone]  MyChart may be used to communicate with your provider, but this is not intended to be used for emergencies.    Crisis Resources:    National Suicide Prevention Lifeline: 421.517.6261 (TTY: 407.889.5544). Call anytime for help.  (www.suicidepreventionlifeline.org)  [place] [employer] [place]ness (www.pricilla.org): 643.881.2246 or 575-043-4426.   Mental Health Association (www.mentalhealth.org): 265.786.1683 or 807-298-7606.  [place] Crisis Text Line: Text [place] to 749937  Suicide LifeLine Chat: suicide[employer]ue [employer].org/chat

## 2022-09-27 ENCOUNTER — VIRTUAL VISIT (OUTPATIENT)
Dept: PSYCHOLOGY | Facility: CLINIC | Age: 22
End: 2022-09-27
Payer: COMMERCIAL

## 2022-09-27 DIAGNOSIS — F41.1 GENERALIZED ANXIETY DISORDER: ICD-10-CM

## 2022-09-27 DIAGNOSIS — F43.10 PTSD (POST-TRAUMATIC STRESS DISORDER): ICD-10-CM

## 2022-09-27 DIAGNOSIS — F31.32 BIPOLAR AFFECTIVE DISORDER, CURRENTLY DEPRESSED, MODERATE (H): Primary | ICD-10-CM

## 2022-09-27 PROCEDURE — 90834 PSYTX W PT 45 MINUTES: CPT | Mod: 95 | Performed by: SOCIAL WORKER

## 2022-09-27 NOTE — PROGRESS NOTES
[facility]                                      Progress Note    Patient Name: Wandy Ann  Date: 2022         Service Type: Individual      Session Start Time:   Session End Time:      Session Length: 38-52    Session #: 33    Attendees: Client    Service Modality: Video session  Yes, the patient's condition can be safely assessed and treated via synchronous audio and visual telemedicine encounter.      Reason for Video Visit: Services only offered telehealth    Location of Originating Site: Patient's home    Distant Site: Provider Remote Setting home office    Provider verified identity through the following two step process.  Patient provided:  Patient  and Patient address    Consent:  The patient/guardian has verbally consented to: the potential risks and benefits of telemedicine (video visit) versus in person care; bill my insurance or make self-payment for services provided; and responsibility for payment of non-covered services.      Mode of transmission-[employer]Fence  Interactive Complexity: No  Crisis: No        Progress Since Last Session (Related to Symptoms / Goals / Homework):   Symptoms: Worsening depression and SI     Homework: Partially completed      Episode of Care Goals: Minimal progress - PREPARATION (Decided to change - considering how); Intervened by negotiating a change plan and determining options / strategies for behavior change, identifying triggers, exploring social supports, and working towards setting a date to begin behavior change     Current / Ongoing Stressors and Concerns:   past trauma, developmental hx and current stressors      Treatment Objective(s) Addressed in This Session:   Patient will demonstrate control of impulses and ability to use positive coping tools to manage strong emotions that can be safely addressed at a lower level of care. Absence of persistent SI and report of reduced frequency and intensity of SI and  absence of SI to acceptable levels, report subjective improved mood for a period of 90 days, within 6 months as clinically observed and by patient self-report.  Patient will demonstrate and report a level of depression that can be managed at a lower level of care.  Absence of persistent depression mood and report of reduced frequency and intensity of low mood and absence of persistent low energy and motivation to acceptable levels, report subjective improved motivation and increased energy for a period of 90 days, within 6 months as clinically observed and by patient self-report.  Patient will demonstrate and report a level of anxiety that can be managed at a lower level of care.  Absence of persistent anxious mood and report of reduced frequency and intensity of worry and absence of persistent anxious mood to acceptable levels, no panic attacks, report subjective comfort with rumination for a period of 90 days, within 6 months as clinically observed and by patient self-report.       Intervention:   Therapist met with patient to review goals and interventions. Therapist utilized reflected listening as patient gave brief reflection of week. Patient reported having a manic episode followed by depression. Therapist supported patient as she processed her moods and SI. Therapist provided patient with support, understanding, education and reviewed safety plan along with crisis numbers. Patient has psychiatry on Thurs and therapy this Friday. Patient has agreed to go to the ED if necessary and was future focused.   Patient presented with an anxious affect. Patient was engaged in session and open to feedback. Patient contracts for safety       There has been demonstrated improvement in functioning while patient has been engaged in psychotherapy/psychological service- if withdrawn the patient would deteriorate and/or relapse.       Assessments completed prior to visit:  The following assessments were completed by patient for  this visit:  PHQ9:   PHQ-9 SCORE 2/8/2022 4/1/2022 4/27/2022 5/12/2022 6/6/2022 6/21/2022 8/24/2022   PHQ-9 Total Score MyChart - - 16 (Moderately severe depression) - - 10 (Moderate depression) -   PHQ-9 Total Score 11 11 16 15 7 10 19   Some encounter information is confidential and restricted. Go to Review Flowsheets activity to see all data.     GAD7:   YESSI-7 SCORE 2/8/2022 4/1/2022 4/27/2022 5/11/2022 6/6/2022 6/21/2022 8/24/2022   Total Score - - 7 (mild anxiety) 9 (mild anxiety) - 9 (mild anxiety) -   Total Score 10 12 7 9 10 9 13   Some encounter information is confidential and restricted. Go to Review Flowsheets activity to see all data.           ASSESSMENT: Current Emotional / Mental Status (status of significant symptoms):   Risk status (Self / Other harm or suicidal ideation)   Patient denies current fears or concerns for personal safety.   Patient reports the following current or recent suicidal ideation or behaviors: SI with depression  pt has contracted for safety.   Patient denies current or recent homicidal ideation or behaviors.   Patient denies current or recent self injurious behavior or ideation.   Patient denies other safety concerns.   Patient reports there has been no change in risk factors since their last session.     Patient reports there has been no change in protective factors since their last session.     A safety and risk management plan has been developed including: Patient consented to co-developed safety plan on 2.21.2022.  Safety and risk management plan was reviewed.   Patient agreed to use safety plan should any safety concerns arise.  A copy was made available to the patient.     Appearance:   Disheveled     Eye Contact:   Fair     Psychomotor Behavior: Restless     Attitude:   Cooperative    Orientation:   All   Speech    Rate / Production: Emotional Talkative    Volume:  Normal    Mood:    Anxious  Depressed    Affect:    Worrisome    Thought Content:  Suicidal   Thought  Form:  Coherent    Insight:    Fair      Medication Review:   No changes to current psychiatric medication(s)     Medication Compliance:   Yes     Changes in Health Issues:   Yes: Pain, Associated Psychological Distress     Chemical Use Review:   Substance Use: Chemical use reviewed, no active concerns identified      Tobacco Use: No current tobacco use.      Diagnosis:  1. Bipolar affective disorder, currently depressed, moderate (H)    2. PTSD (post-traumatic stress disorder)    3. Generalized anxiety disorder        Collateral Reports Completed:   Not Applicable    PLAN: (Patient Tasks / Therapist Tasks / Other)  Make visual safety plan   Manage depressive symptoms with coping skills  When feeling SI follow safety plan  Continue to manage SI and go to ER if feeling unsafe  TMS referral   IRT therapy and coached patient through the record and rewriting process. Therapist validated patient's emotions with the break up and back together and suggested patient and her partner have a lot to talk about and looking at their futures sooner than later.     [facility] has a new mental health emergency area at [facility] called [facility] that is very calming and helpful if you need additional support. ([address] [place], [place] [address] [phone]).     Name and challenge cognitive distortions  Patient reported having a manic episode followed by depression. Therapist supported patient as she processed her moods and SI. Therapist provided patient with support, understanding, education and reviewed safety plan along with crisis numbers. Patient has psychiatry on Thurs and therapy this Friday. Patient has agreed to go to the ED if necessary and was future focused.    Katie Faulkner, LICSW   9/27/2022                                                         ______________________________________________________________________    Individual Treatment Plan    Patient's Name: Wandy Ann  Date  Of Birth: 2000    Date of Creation: 2.22.2021  Date Treatment Plan Last Reviewed/Revised: 8/16/2022 due 11/16/2022    DSM5 Diagnoses: 296.32 (F33.1) Major Depressive Disorder, Recurrent Episode, Moderate With mixed features, 300.02 (F41.1) Generalized Anxiety Disorder or 309.81 (F43.10) Posttraumatic Stress Disorder (includes Posttraumatic Stress Disorder for Children 6 Years and Younger)  Without dissociative symptoms  Psychosocial / Contextual Factors: past trauma, developmental hx and current stressors   PROMIS (reviewed every 90 days):     Referral / Collaboration:  Referral to another professional/service is not indicated at this time..    Anticipated number of session for this episode of care: 12  Anticipation frequency of session: Biweekly  Anticipated Duration of each session: 38-52 minutes  Treatment plan will be reviewed in 90 days or when goals have been changed.       MeasurableTreatment Goal(s) related to diagnosis / functional impairment(s)  Goal 1: Patient will report absence of persistent SI and report of reduced frequency and intensity of SI and absence of SI to acceptable levels, report subjective improved mood for a period of 90 days, within 6 months as clinically observed and by patient self-report    I will know I've met my goal when I can see the difference between a bad moment and what I want in th future.      Objective #A (Patient Action)    Patient will demonstrate control of impulses and ability to use positive coping tools to manage strong emotions that can be safely addressed at a lower level of care. Absence of persistent SI and report of reduced frequency and intensity of SI and absence of SI to acceptable levels, report subjective improved mood for a period of 90 days, within 6 months as clinically observed and by patient self-report.  Status: Continued - Date(s): 8/16/2022    Intervention(s)  Therapist will provide individual therapy to identify triggers to SI urges, gain  feedback on helpful coping strategies, and identify ways that family can offer support. Tx to discuss current stressors and interpersonal conflicts and how to cope with these, coaching, diagnostic testing, referral for medication as indicated, use prescribed medication, cognitive restructuring, interpersonal family therapy, supportive therapy services.        Goal 2: Patient will report absence of persistent depression mood and report of reduced frequency and intensity of low mood and absence of persistent low energy and motivation to acceptable levels, report subjective improved motivation and increased energy for a period of 90 days, within 6 months as clinically observed and by patient self-report    I will know I've met my goal when I no longer feel sad.      Objective #A (Patient Action)    Status: Continued - Date(s): 8/16/2022    Patient will demonstrate and report a level of depression that can be managed at a lower level of care.  Absence of persistent depression mood and report of reduced frequency and intensity of low mood and absence of persistent low energy and motivation to acceptable levels, report subjective improved motivation and increased energy for a period of 90 days, within 6 months as clinically observed and by patient self-report.    Intervention(s)  Therapist will provide individual therapy to identify triggers to depression, gain feedback on helpful coping tools and thought-reframing techniques, and identify preferred way of being.  Tx to include discussion of current stressors and interpersonal conflicts and how to cope with these, coaching, diagnostic testing, referral for medication as indicated, use prescribed medication, cognitive restructuring, interpersonal, family therapy, supportive therapy services.        Goal 3: Patiient will report absence of persistent anxiety mood and report of reduced frequency and intensity of worry and absence of persistent anxious mood to acceptable  levels, no panic attacks, report subjective comfort with rumination for a period of 90 days. Within 6 months as clinically observed and by patient self-report    I will know I've met my goal when I can go days without worrying about little things or shaking.      Objective #A (Patient Action)    Status: Continued - Date(s): 8/16/2022    Patient will demonstrate and report a level of anxiety that can be managed at a lower level of care.  Absence of persistent anxious mood and report of reduced frequency and intensity of worry and absence of persistent anxious mood to acceptable levels, no panic attacks, report subjective comfort with rumination for a period of 90 days, within 6 months as clinically observed and by patient self-report.    Intervention(s)  Therapist will provide individual therapy to identify triggers to anxiety, gain feedback on helpful coping tools and thought-reframing techniques, and identify preferred way of being. Tx to include discuss current stressors and interpersonal conflicts and how to cope with these, coaching, diagnostic testing , referral for medication as indicated, use prescribed medication, cognitive restructuring, interpersonal,   family therapy, supportive therapy services.        Patient has reviewed and agreed to the above plan.      Katie Faulkner, [facility]   8/16/2022                                                   Wandy Ann            SAFETY PLAN:  Step 1: Warning signs / cues (Thoughts, images, mood, situation, behavior) that a crisis may be developing:  ? Thoughts: thoughts of not wanting to be here  ? Images: no images  ? Thinking Processes: intrusive thoughts (bothersome, unwanted thoughts that come out of nowhere): get irritated  ? Mood: intense anger and agitation  ? Behaviors: isolating/withdrawing   ? Situations: trauma    Step 2: Coping strategies - Things I can do to take my mind off of my problems without contacting another person (relaxation  "technique, physical activity):  ? Distress Tolerance Strategies:  arts and crafts: drawing and painting  ? Physical Activities: exercise: working out  ? Focus on helpful thoughts:  \"This is temporary\", \"It always passes\" and \"Ride the wave\"  Step 3: People and social settings that provide distraction:                 Name: Jennifer     Phone: [phone]                 Name: Antonina         Phone: [phone]                 Name: panchito       Phone: [phone]  ? friends house or car   Step 4: Remind myself of people and things that are important to me and worth living for:  Best friend and cat        Step 5: When I am in crisis, I can ask these people to help me use my safety plan:                 Name: Jennifer     Phone: [phone]                 Name: Antonina         Phone: [phone]                 Name: panchito       Phone: [phone]  Step 6: Making the environment safe:   ? be around others  Step 7: Professionals or agencies I can contact during a crisis:  ? [facility] Daytime Number: [phone]  ? Suicide Prevention Lifeline: 4-194-546-ZKXO (0529)  ? Crisis Text Line Service (available 24 hours a day, 7 days a week): Text [place] to 558495    Local Crisis Services: 988     Call 911 or go to my nearest emergency department.       I helped develop this safety plan and agree to use it when needed.  I have been given a copy of this plan.       Client signature _________________________________________________________________  Today s date:  2/22/2021  Adapted from Safety Plan Template 2008 Marisol Cazares and Mario Flores is reprinted with the express permission of the authors.  No portion of the Safety Plan Template may be reproduced without the express, written permission.  You can contact the authors at [email or web address] or [email or web address].  "

## 2022-09-29 ENCOUNTER — VIRTUAL VISIT (OUTPATIENT)
Dept: BEHAVIORAL HEALTH | Facility: CLINIC | Age: 22
End: 2022-09-29
Payer: COMMERCIAL

## 2022-09-29 ENCOUNTER — VIRTUAL VISIT (OUTPATIENT)
Dept: PSYCHIATRY | Facility: CLINIC | Age: 22
End: 2022-09-29
Payer: COMMERCIAL

## 2022-09-29 DIAGNOSIS — F41.1 GAD (GENERALIZED ANXIETY DISORDER): ICD-10-CM

## 2022-09-29 DIAGNOSIS — F33.1 MAJOR DEPRESSIVE DISORDER, RECURRENT EPISODE, MODERATE (H): Primary | ICD-10-CM

## 2022-09-29 DIAGNOSIS — Z15.89 HETEROZYGOUS FOR MTHFR GENE MUTATION: ICD-10-CM

## 2022-09-29 DIAGNOSIS — F31.32 BIPOLAR AFFECTIVE DISORDER, CURRENTLY DEPRESSED, MODERATE (H): Primary | ICD-10-CM

## 2022-09-29 DIAGNOSIS — F43.10 PTSD (POST-TRAUMATIC STRESS DISORDER): ICD-10-CM

## 2022-09-29 PROCEDURE — 99214 OFFICE O/P EST MOD 30 MIN: CPT | Mod: 95 | Performed by: NURSE PRACTITIONER

## 2022-09-29 PROCEDURE — 90832 PSYTX W PT 30 MINUTES: CPT | Mod: 95 | Performed by: COUNSELOR

## 2022-09-29 RX ORDER — VENLAFAXINE HYDROCHLORIDE 150 MG/1
150 CAPSULE, EXTENDED RELEASE ORAL DAILY
Qty: 30 CAPSULE | Refills: 1 | Status: SHIPPED | OUTPATIENT
Start: 2022-09-29 | End: 2022-10-31

## 2022-09-29 RX ORDER — VENLAFAXINE HYDROCHLORIDE 37.5 MG/1
37.5 CAPSULE, EXTENDED RELEASE ORAL DAILY
Qty: 30 CAPSULE | Refills: 1 | Status: SHIPPED | OUTPATIENT
Start: 2022-09-29 | End: 2022-10-31

## 2022-09-29 RX ORDER — LAMOTRIGINE 25 MG/1
25 TABLET ORAL DAILY
Qty: 60 TABLET | Refills: 1 | Status: SHIPPED | OUTPATIENT
Start: 2022-09-29 | End: 2022-10-31

## 2022-09-29 RX ORDER — VITAMIN K2 90 MCG
400 CAPSULE ORAL DAILY
Qty: 30 CAPSULE | Refills: 1 | Status: SHIPPED | OUTPATIENT
Start: 2022-09-29 | End: 2022-10-31

## 2022-09-29 ASSESSMENT — PATIENT HEALTH QUESTIONNAIRE - PHQ9
10. IF YOU CHECKED OFF ANY PROBLEMS, HOW DIFFICULT HAVE THESE PROBLEMS MADE IT FOR YOU TO DO YOUR WORK, TAKE CARE OF THINGS AT HOME, OR GET ALONG WITH OTHER PEOPLE: VERY DIFFICULT
SUM OF ALL RESPONSES TO PHQ QUESTIONS 1-9: 10
SUM OF ALL RESPONSES TO PHQ QUESTIONS 1-9: 10

## 2022-09-29 NOTE — PROGRESS NOTES
"Wandy is a 22 year old who is being evaluated via a billable video visit.      How would you like to obtain your AVS? MyChart  If the video visit is dropped, the invitation should be resent by: Text to cell phone: [phone]  Will anyone else be joining your video visit? No    Johana Anne VF    Video-Visit Details    Video Start Time: 8:22 AM    Type of service:  Video Visit    Video End Time:8:50 AM    Originating Location (pt. Location): Other car    Distant Location (provider location):  [facility] MENTAL [facility] ADDICTION [facility]     Platform used for Video Visit: [facility]              Outpatient Psychiatric Progress Note    Name: Wandy Ann   : 2000                    Primary Care Provider: Hudson Mobley MD   Therapist:       PHQ-9 scores:  PHQ-9 SCORE 2022   PHQ-9 Total Score MyChart 10 (Moderate depression) - 10 (Moderate depression)   PHQ-9 Total Score 10 19 10   Some encounter information is confidential and restricted. Go to Review Flowsheets activity to see all data.       YESSI-7 scores:  YESSI-7 SCORE 2022   Total Score - 9 (mild anxiety) -   Total Score 10 9 13   Some encounter information is confidential and restricted. Go to Review Flowsheets activity to see all data.       Patient Identification:    Patient is a 22 year old year old, single  Choose not to Answer  or  female  who presents for return visit with me.  Patient is currently employed full time. Patient attended the session alone. Patient prefers to be called: \" Wandy\".    Current medications include: hydrOXYzine (ATARAX) 10 MG tablet, Take 1 tablet (10 mg) by mouth 3 times daily as needed for anxiety  venlafaxine (EFFEXOR XR) 75 MG 24 hr capsule, Take 3 capsules (225 mg) by mouth daily  ARIPiprazole (ABILIFY) 2 MG tablet, Take 1 tablet (2 mg) by mouth daily (Patient not taking: Reported on 2022)  Levomefolate Glucosamine (METHYLFOLATE) " 400 MCG CAPS, Take 400 mcg by mouth daily (Patient not taking: Reported on 9/29/2022)    No current facility-administered medications on file prior to visit.       The [place] Prescription Monitoring Program has been reviewed and there are no concerns about diversionary activity for controlled substances at this time.      I was able to review most recent Primary Care Provider, specialty provider, and therapy visit notes that I have access to.     Today, patient reports that she sees a therapist regularly.  There is a pattern when she has energy and then other times feels suicidal, fatigued, and hard to get out of bed.  Unable to see triggers for this.  Has been going on for months.  The depression eppisodes are getting worse.  She has thoughts of wanting to end her life.  Suicide feels like a better option than living.  She recently moved in with mom to be more safe.  No weight changes.  She has good friends for support too.  Abilify made things more amplified - happy times felt better and sad times felt worse,   Anxiety occurs not really any panic attacks.       has no past medical history on file.    Social history updates:    Wandy recently moved into live with her mother    Substance use updates:    Occasional alcohol use.  2 months ago started cannabis to help her calm down.    Tobacco use: No    Vital Signs:   There were no vitals taken for this visit.    Labs:    Most recent laboratory results reviewed and no new labs.     Mental Status Examination:  Appearance: awake, alert, casual dress and mild distress  Attitude: cooperative  Eye Contact:  adequate  Gait and Station: No assistive Devices used and No dizziness or falls  Psychomotor Behavior:  intact station, gait and muscle tone  Oriented to:  time, person, and place  Attention Span and Concentration:  Normal  Speech:   clear, coherent and Speaks: English  Mood:  anxious and depressed  Affect:  mood congruent  Associations:  no loose  associations  Thought Process:  goal oriented  Thought Content:  no evidence of suicidal ideation or homicidal ideation, no auditory hallucinations present and no visual hallucinations present  Recent and Remote Memory:  intact Not formally assessed. No amnesia.  Fund of Knowledge: appropriate  Insight:  good  Judgment:  intact  Impulse Control:  intact    Suicide Risk Assessment:  Today Wandy Ann reports no thoughts to harm themself or others. In addition, there are notable risk factors for self-harm, including age, single status, anxiety and withdrawing. However, risk is mitigated by commitment to family, history of seeking help when needed, future oriented, denies suicidal intent or plan and denies homicidal ideation, intent, or plan. Therefore, based on all available evidence including the factors cited above, Wandy Ann does not appear to be at imminent risk for self-harm, does not meet criteria for a 72-hr hold, and therefore remains appropriate for ongoing outpatient level of care.  A thorough assessment of risk factors related to suicide and self-harm have been reviewed and are noted above. The patient convincingly denies suicidality on several occasions. Local community safety resources printed and reviewed for patient to use if needed. There was no deceit detected, and the patient presented in a manner that was believable.     DSM5 Diagnosis:  296.89 Bipolar II Disorder Depressed and moderate  300.02 (F41.1) Generalized Anxiety Disorder    Medical comorbidities include:   Patient Active Problem List    Diagnosis Date Noted     Bipolar affective disorder, currently depressed, moderate (H) 08/16/2022     Priority: Medium     Major depressive disorder, recurrent episode, moderate (H) 04/29/2022     Priority: Medium     YESSI (generalized anxiety disorder) 04/29/2022     Priority: Medium     Moderate major depression (H) 07/19/2020     Priority: Medium       Assessment:    Wandy Jones  Seth reported worsening depression with anxiety and feelings of suicide.  She ended up moving in with her mother for safety.  She is unable to identify antecedents to why she is feeling this way.  I decreased her venlafaxine to 187.5 mg daily and am adding lamotrigine for beginning to regulate these ups and downs she is experiencing.  Hydroxyzine is available as needed for breakthrough anxiety.  I am asking her to continue talk therapy to help her learn coping skills to manage life stressors and adjustments..    Medication side effects and alternatives were reviewed. Health promotion activities recommended and reviewed today. All questions addressed. Education and counseling completed regarding risks and benefits of medications and psychotherapy options.    Treatment Plan:        1.  Decrease venlafaxine ER to 187.5 mg daily    2.  Add lamotrigine 25 mg daily for 14 days then 50 mg daily    3.  Hydroxyzine available as needed for breakthrough anxiety    4.  Continue talk therapy with therapist to learn coping skills for managing life stressors        Continue all other medications as reviewed per electronic medical record today.     Safety plan reviewed. To the Emergency Department as needed or call after hours crisis line at [phone] or [phone]. [place] Crisis Text Line. Text [place] to 308990 or Suicide LifeLine Chat: suicidepreventionlifeline.org/chat/    To schedule individual or family therapy, call [facility] at [phone]    Schedule an appointment with me in 6 weeks or sooner as needed. Call [facility] at [phone] to schedule.    Follow up with primary care provider as planned or for acute medical concerns.    Call the psychiatric nurse line with medication questions or concerns at [phone]    MyChart may be used to communicate with your provider, but this is not intended to be used for emergencies.    Crisis Resources:    National Suicide Prevention  Lifeline: 108.838.1269 (TTY: 868.620.8351). Call anytime for help.  (www.suicidepreventionlifeline.org)  National [place] on Mental Illness (www.pricilla.org): 980.370.4425 or 759-231-8006.   Mental Health Association (www.mentalhealth.org): 164.457.7340 or 073-255-3903.  [place] Crisis Text Line: Text [place] to 601756  Suicide LifeLine Chat: suicideCheckiO.org/chat    Administrative Billing:   Time spent with patient includes counseling and coordination of care regarding above diagnoses and treatment plan.    Patient Status:  Patient will continue to be seen for ongoing consultation and stabilization.    Signed:   HARJINDER Powell-BC   Psychiatry

## 2022-09-29 NOTE — PROGRESS NOTES
[facility]   September 29, 2022      Behavioral Health Clinician Progress Note    Patient Name: Wandy Ann           Service Type:  Individual      Service Location:   MyChart / Email (patient reached)     Session Start Time: 745am  Session End Time: 802am      Session Length: 16 - 37      Attendees: Patient     Service Modality:  Video Visit:      Provider verified identity through the following two step process.  Patient provided:  Patient is known previously to provider and Patient was verified at admission/transfer    Telemedicine Visit: The patient's condition can be safely assessed and treated via synchronous audio and visual telemedicine encounter.      Reason for Telemedicine Visit: Services only offered telehealth    Originating Site (Patient Location): Patient's home    Distant Site (Provider Location): Provider Remote Setting- Home Office    Consent:  The patient/guardian has verbally consented to: the potential risks and benefits of telemedicine (video visit) versus in person care; bill my insurance or make self-payment for services provided; and responsibility for payment of non-covered services.     Patient would like the video invitation sent by:  My Chart    Mode of Communication:  Video Conference via [facility]    As the provider I attest to compliance with applicable laws and regulations related to telemedicine.    Visit Activities (Refresh list every visit): [facility] Only    Diagnostic Assessment Date: 03/05/2021   Yoly Cox      Treatment Plan Review Date: 12/29/2022  See Flowsheets for today's PHQ-9 and YESSI-7 results  Previous PHQ-9:   PHQ-9 SCORE 6/21/2022 8/24/2022 9/29/2022   PHQ-9 Total Score MyChart 10 (Moderate depression) - 10 (Moderate depression)   PHQ-9 Total Score 10 19 10   Some encounter information is confidential and restricted. Go to Review Flowsheets activity to see all data.     Previous YESSI-7:   YESSI-7 SCORE  6/6/2022 6/21/2022 8/24/2022   Total Score - 9 (mild anxiety) -   Total Score 10 9 13   Some encounter information is confidential and restricted. Go to Review Flowsheets activity to see all data.       SLIAS LEVEL:  No flowsheet data found.    DATA  Extended Session (60+ minutes): No  Interactive Complexity: No  Crisis: No  [facility] Patient: No    Treatment Objective(s) Addressed in This Session:  Target Behavior(s): worry, mood swings- related to bipolar symptoms, sleep    Depressed Mood: Decrease frequency and intensity of feeling down, depressed, hopeless  Improve quantity and quality of night time sleep / decrease daytime naps  Improve diet, appetite, mindful eating, and / or meal planning  Decrease thoughts that you'd be better off dead or of suicide / self-harm  Anxiety: will experience a reduction in anxiety and will increase ability to function adaptively  Mood Instability: will develop better understanding of triggers and coping strategies to stabilize mood    Current Stressors / Issues:  MH update: Pt reports that they meet with their therapist weekly or once every 2 weeks. Pt noticed it last Thursday they went to bed and slept late and they were thinking of things they needed to do and they were in the best mood and saying I love life and on Monday pt is like I know what comes next after this, the very deep sadness, crying for hours. On Tuesday pt cried for 12 hours. Pt can sleep for many hours and still feel tired. Pt is still feeling like when their depression goes down, their anxiety goes up and visa versa. Pt says that they feel like their medication is slightly helping. Abilify made them more depressed and elevated those depressive episodes.   Stressors: none  Side Effects: Abilify- pt discontinued since it made them feel worse   Mood: up and down  Appetite: normal for pt but will fluctuate   Sleep: can sleep a ton and others sleeps 4-5 hours    Impulsive spending/spending sprees: yes  Suicidality:  spuratically having them when pt is at a low point of their depression due to mary grace symptoms, has safety plan developed with therapist and can reach out to others as needed, [facility] [place] reminded pt of their safety plan      Self-harm: Pt denies   Substance Use: alcohol occassionally   Caffeine: daily couple of beverages  Therapist: meet with therapist weekly or every 2 weeks, therapist added bipolar as dx  Interventions: [facility] [place] explored symptoms with pt and will inform Yoly of these concerns  Medication Questions/Requests: therapist expressed to pt that they may have bipolar disorder- pt will feel fine and not need sleep, then will go very low and depressed and low and can't move and feel suicidal, been happening for months     Symptoms pt is experiencing:  Can't sleep and then will think of everything they are going to do for the day, 4-5 hours of sleep  If someone wants to talk with me, I will isolate and not want to talk anyone and will be snippy  Times where pt will want to have sex more than usual- happen during these times      Mary Grace:  Elevated mood, Irritability, Racing thoughts, Increased activity, Decreased need for sleep, Restlessness, Distractibility and Impulsiveness- impulsively spend hundreds of dollars sometimes       Progress on Treatment Objective(s) / Homework:  Satisfactory progress - ACTION (Actively working towards change); Intervened by reinforcing change plan / affirming steps taken    Motivational Interviewing    MI Intervention: Co-Developed Goal: reduce anxiety and depression, Expressed Empathy/Understanding, Supported Autonomy, Collaboration, Evocation, Permission to raise concern or advise, Open-ended questions, Reflections: simple and complex, Change talk (evoked) and Reframe     Change Talk Expressed by the Patient: Need to change Committment to change Taking steps    Provider Response to Change Talk: E - Evoked more info from patient about behavior change, A - Affirmed patient's thoughts,  decisions, or attempts at behavior change, R - Reflected patient's change talk and S - Summarized patient's change talk statements    Also provided psychoeducation about behavioral health condition, symptoms, and treatment options    Care Plan review completed: Yes         Medication Review:  No changes to current psychiatric medication(s)    Medication Compliance:  Yes    Changes in Health Issues:   None reported    Chemical Use Review:   Substance Use: Chemical use reviewed, no active concerns identified      Tobacco Use: No current tobacco use.      Assessment: Current Emotional / Mental Status (status of significant symptoms):  Risk status (Self / Other harm or suicidal ideation)  Patient has had a history of suicidal ideation: passive ideation, denies acting on them, or plan or intent  Patient denies current fears or concerns for personal safety.  Patient reports the following current or recent suicidal ideation or behaviors: reports fleeting thoughts usually when they are in a lower depressive state, has safety plan, denies intent to act or plan.  Patient denies current or recent homicidal ideation or behaviors.  Patient denies current or recent self injurious behavior or ideation.  Patient denies other safety concerns.  A safety and risk management plan has been developed including: pt has a safety plan, see below. Pt receives ongoing monitoring and assessment by a variety of medical providers.     Appearance:   Appropriate   Eye Contact:   Good   Psychomotor Behavior: Normal   Attitude:   Cooperative   Orientation:   All  Speech   Rate / Production: Normal    Volume:  Normal   Mood:    Anxious  Depressed   Affect:    Subdued  Worrisome   Thought Content:  Clear   Thought Form:  Coherent  Logical   Insight:    Good     Diagnoses:  1. Major depressive disorder, recurrent episode, moderate (H)    2. YESSI (generalized anxiety disorder)    3. PTSD (post-traumatic stress disorder)        Collateral Reports  Completed:  Communicated with:   Communicated with JULIETTE Powell [facility]    Plan: (Homework, other):  Patient was given information about behavioral services and encouraged to schedule a follow up appointment with the clinic [facility] in conjunction with [facility] appointment.  She was also given information about mental health symptoms and treatment options . CD Recommendations: No indications of CD issues.     Rose Chisholm, MSW, [facility] 09/29/2022  ______________________________________________________________________    Integrated Primary Care Behavioral Health Treatment Plan    Patient's Name: Wandy Ann  YOB: 2000    Date of Creation: 06/21/2022  Date Treatment Plan Last Reviewed/Revised: 09/29/2022    DSM5 Diagnoses:   1. Major depressive disorder, recurrent episode, moderate (H)    2. YESSI (generalized anxiety disorder)    3. PTSD (post-traumatic stress disorder)        Psychosocial / Contextual Factors: works, has pets   PROMIS (reviewed every 90 days):   PROMIS 10 Global Mental Health Score Global Physical Health Score   12/27/2021 5 11   4/1/2022 14 12   9/29/2022 7 14     PROMIS 10 PROMIS TOTAL - SUBSCORES   12/27/2021 16   4/1/2022 26   9/29/2022 21       Referral / Collaboration:  Referral to another professional/service is not indicated at this time.    Anticipated number of session for this episode of care: 4-6  Anticipation frequency of session: as determined by Yoly Diaz  Anticipated Duration of each session: 16-37 minutes  Treatment plan will be reviewed in 90 days or when goals have been changed.       MeasurableTreatment Goal(s) related to diagnosis / functional impairment(s)  Goal 1: Patient will reduce depression and anxiety symptoms    I will know I've met my goal when I feel like my medications are somewhat helping.      Objective #A (Patient Action)    Patient will attend medical and mental health appointments and take medications as prescribed  ".    Status: Continued - Date(s):09/29/2022       Intervention(s)  Therapist will inquire each meeting of patient's progress towards this goal and any barriers they are experiencing. Therapist will provide support through CBT and MI.     Patient has reviewed and agreed to the above plan.      Rose Chisholm [facility]  September 29, 2022          Developed  02/02/2021    with  Katie Faulkner [facility]             SAFETY PLAN:  Step 1: Warning signs / cues (Thoughts, images, mood, situation, behavior) that a crisis may be developing:  ? Thoughts: thoughts of not wanting to be here  ? Images: no images  ? Thinking Processes: intrusive thoughts (bothersome, unwanted thoughts that come out of nowhere): get irritated  ? Mood: intense anger and agitation  ? Behaviors: isolating/withdrawing   ? Situations: trauma    Step 2: Coping strategies - Things I can do to take my mind off of my problems without contacting another person (relaxation technique, physical activity):  ? Distress Tolerance Strategies:  arts and crafts: drawing and painting  ? Physical Activities: exercise: working out  ? Focus on helpful thoughts:  \"This is temporary\", \"It always passes\" and \"Ride the wave\"  Step 3: People and social settings that provide distraction:                 Name: Jennifer     Phone: [phone]                 Name: Antonina         Phone: [phone]                 Name: mom       Phone: [phone]  ? friends house or car   Step 4: Remind myself of people and things that are important to me and worth living for:  Best friend and cat        Step 5: When I am in crisis, I can ask these people to help me use my safety plan:                 Name: Jennifer     Phone: [phone]                 Name: Antonina         Phone: [phone]                 Name: mom       Phone: [phone]  Step 6: Making the environment safe:   ? be around others  Step 7: Professionals or agencies I can contact during a crisis:  ? [facility] " Daytime Number: 400-946-1536 or 899  ? Suicide Prevention Lifeline: 1-995-260-TALK (1239)  ? Crisis Text Line Service (available 24 hours a day, 7 days a week): Text [place] to 704582    Local Crisis Services: 988     Call 911 or go to my nearest emergency department.       I helped develop this safety plan and agree to use it when needed.  I have been given a copy of this plan.       Client signature _________________________________________________________________  Today s date:  2/22/2021  Adapted from Safety Plan Template 2008 Marisol Cazares and Mario Flores is reprinted with the express permission of the authors.  No portion of the Safety Plan Template may be reproduced without the express, written permission.  You can contact the authors at [email or web address] or [email or web address].    Answers for HPI/ROS submitted by the patient on 9/29/2022  If you checked off any problems, how difficult have these problems made it for you to do your work, take care of things at home, or get along with other people?: Very difficult  PHQ9 TOTAL SCORE: 10

## 2022-09-29 NOTE — PATIENT INSTRUCTIONS
1.  Decrease venlafaxine ER to 187.5 mg daily  2.  Add lamotrigine 25 mg daily for 14 days then 50 mg daily  3.  Hydroxyzine available as needed for breakthrough anxiety  4.  Continue talk therapy with therapist to learn coping skills for managing life stressors    Continue all other medications as reviewed per electronic medical record today.   Safety plan reviewed. To the Emergency Department as needed or call after hours crisis line at [phone] or [phone]. [place] Crisis Text Line. Text [place] to 506458 or Suicide LifeLine Chat: suicideBio-Tree Systems.org/chat/  To schedule individual or family therapy, call [facility] at [phone]  Schedule an appointment with me in 6 weeks or sooner as needed. Call [facility] at [phone] to schedule.  Follow up with primary care provider as planned or for acute medical concerns.  Call the psychiatric nurse line with medication questions or concerns at [phone]  MyChart may be used to communicate with your provider, but this is not intended to be used for emergencies.    Crisis Resources:    National Suicide Prevention Lifeline: 927.850.1567 (TTY: 105.668.9635). Call anytime for help.  (www.suicidepreventionlifeline.org)  National [place] on Mental Illness (www.pricilla.org): 193.931.3513 or 578-796-4580.   Mental Health Association (www.mentalhealth.org): 206.579.7169 or 923-176-4995.  [place] Crisis Text Line: Text [place] to 580810  Suicide LifeLine Chat: suicideBio-Tree Systems.org/chat

## 2022-09-30 ENCOUNTER — VIRTUAL VISIT (OUTPATIENT)
Dept: PSYCHOLOGY | Facility: CLINIC | Age: 22
End: 2022-09-30
Payer: COMMERCIAL

## 2022-09-30 DIAGNOSIS — F43.10 PTSD (POST-TRAUMATIC STRESS DISORDER): ICD-10-CM

## 2022-09-30 DIAGNOSIS — F31.32 BIPOLAR AFFECTIVE DISORDER, CURRENTLY DEPRESSED, MODERATE (H): Primary | ICD-10-CM

## 2022-09-30 DIAGNOSIS — F41.1 GENERALIZED ANXIETY DISORDER: ICD-10-CM

## 2022-09-30 PROCEDURE — 90832 PSYTX W PT 30 MINUTES: CPT | Mod: 95 | Performed by: SOCIAL WORKER

## 2022-09-30 NOTE — PROGRESS NOTES
[facility]                                      Progress Note    Patient Name: Wandy Ann  Date: 2022         Service Type: Individual      Session Start Time: 1308  Session End Time: 133     Session Length: 38-52    Session #: 34    Attendees: Client    Service Modality: Video session  Yes, the patient's condition can be safely assessed and treated via synchronous audio and visual telemedicine encounter.      Reason for Video Visit: Services only offered telehealth    Location of Originating Site: Patient's home    Distant Site: Provider Remote Setting home office    Provider verified identity through the following two step process.  Patient provided:  Patient  and Patient address    Consent:  The patient/guardian has verbally consented to: the potential risks and benefits of telemedicine (video visit) versus in person care; bill my insurance or make self-payment for services provided; and responsibility for payment of non-covered services.      Mode of transmission-[place]  Interactive Complexity: No  Crisis: No        Progress Since Last Session (Related to Symptoms / Goals / Homework):   Symptoms: Worsening depression and SI     Homework: Partially completed      Episode of Care Goals: Minimal progress - PREPARATION (Decided to change - considering how); Intervened by negotiating a change plan and determining options / strategies for behavior change, identifying triggers, exploring social supports, and working towards setting a date to begin behavior change     Current / Ongoing Stressors and Concerns:   past trauma, developmental hx and current stressors      Treatment Objective(s) Addressed in This Session:   Patient will demonstrate control of impulses and ability to use positive coping tools to manage strong emotions that can be safely addressed at a lower level of care. Absence of persistent SI and report of reduced frequency and intensity of SI and  "absence of SI to acceptable levels, report subjective improved mood for a period of 90 days, within 6 months as clinically observed and by patient self-report.  Patient will demonstrate and report a level of depression that can be managed at a lower level of care.  Absence of persistent depression mood and report of reduced frequency and intensity of low mood and absence of persistent low energy and motivation to acceptable levels, report subjective improved motivation and increased energy for a period of 90 days, within 6 months as clinically observed and by patient self-report.  Patient will demonstrate and report a level of anxiety that can be managed at a lower level of care.  Absence of persistent anxious mood and report of reduced frequency and intensity of worry and absence of persistent anxious mood to acceptable levels, no panic attacks, report subjective comfort with rumination for a period of 90 days, within 6 months as clinically observed and by patient self-report.       Intervention:   Therapist met with patient to review goals and interventions. Therapist utilized reflected listening as patient gave brief reflection of week. Patient reported feeling better after her psychiatry appointment. Patient reported passive SI with no plan or intent and SI lessening. Patient processed bipolar and therapist provided patient with education on diagnosis and medication/treatment and offered patient hope. Patient processed her relationship with her mother and therapist suggested patient read \"stop walking on eggshells.  Patient presented with an anxious affect. Patient was engaged in session and open to feedback. Patient contracts for safety       There has been demonstrated improvement in functioning while patient has been engaged in psychotherapy/psychological service- if withdrawn the patient would deteriorate and/or relapse.       Assessments completed prior to visit:  The following assessments were completed by " patient for this visit:  PHQ9:   PHQ-9 SCORE 4/1/2022 4/27/2022 5/12/2022 6/6/2022 6/21/2022 8/24/2022 9/29/2022   PHQ-9 Total Score MyChart - 16 (Moderately severe depression) - - 10 (Moderate depression) - 10 (Moderate depression)   PHQ-9 Total Score 11 16 15 7 10 19 10   Some encounter information is confidential and restricted. Go to Review Flowsheets activity to see all data.     GAD7:   YESSI-7 SCORE 2/8/2022 4/1/2022 4/27/2022 5/11/2022 6/6/2022 6/21/2022 8/24/2022   Total Score - - 7 (mild anxiety) 9 (mild anxiety) - 9 (mild anxiety) -   Total Score 10 12 7 9 10 9 13   Some encounter information is confidential and restricted. Go to Review Flowsheets activity to see all data.           ASSESSMENT: Current Emotional / Mental Status (status of significant symptoms):   Risk status (Self / Other harm or suicidal ideation)   Patient denies current fears or concerns for personal safety.   Patient reports the following current or recent suicidal ideation or behaviors: passive SI with no plan or intent  pt reports SI lessening.   Patient denies current or recent homicidal ideation or behaviors.   Patient denies current or recent self injurious behavior or ideation.   Patient denies other safety concerns.   Patient reports there has been no change in risk factors since their last session.     Patient reports there has been no change in protective factors since their last session.     A safety and risk management plan has been developed including: Patient consented to co-developed safety plan on 2.21.2022.  Safety and risk management plan was reviewed.   Patient agreed to use safety plan should any safety concerns arise.  A copy was made available to the patient.     Appearance:   Disheveled     Eye Contact:   Fair     Psychomotor Behavior: Restless     Attitude:   Cooperative    Orientation:   All   Speech    Rate / Production: Emotional Talkative    Volume:  Normal    Mood:    Anxious  Depressed  "   Affect:    Worrisome    Thought Content:  Suicidal   Thought Form:  Coherent    Insight:    Fair      Medication Review:   Changes to psychiatric medications, see updated Medication List in EPIC.      Medication Compliance:   Yes     Changes in Health Issues:   Yes: Pain, Associated Psychological Distress     Chemical Use Review:   Substance Use: Chemical use reviewed, no active concerns identified      Tobacco Use: No current tobacco use.      Diagnosis:  1. Bipolar affective disorder, currently depressed, moderate (H)    2. PTSD (post-traumatic stress disorder)    3. Generalized anxiety disorder        Collateral Reports Completed:   Not Applicable    PLAN: (Patient Tasks / Therapist Tasks / Other)  Make visual safety plan   Manage depressive symptoms with coping skills  When feeling SI follow safety plan  Continue to manage SI and go to ER if feeling unsafe  TMS referral   IRT therapy and coached patient through the record and rewriting process. Therapist validated patient's emotions with the break up and back together and suggested patient and her partner have a lot to talk about and looking at their futures sooner than later.     [facility] has a new mental health emergency area at [facility] called [facility] that is very calming and helpful if you need additional support. ([address] [place] [address]  [phone]).     Name and challenge cognitive distortions  read \"stop walking on eggshells.    Katie Faulkner, LICSW   9/30/2022                                                         ______________________________________________________________________    Individual Treatment Plan    Patient's Name: Wandy Ann  YOB: 2000    Date of Creation: 2.22.2021  Date Treatment Plan Last Reviewed/Revised: 8/16/2022 due 11/16/2022    DSM5 Diagnoses: 296.32 (F33.1) Major Depressive Disorder, Recurrent Episode, Moderate With mixed features, 300.02 (F41.1) " Generalized Anxiety Disorder or 309.81 (F43.10) Posttraumatic Stress Disorder (includes Posttraumatic Stress Disorder for Children 6 Years and Younger)  Without dissociative symptoms  Psychosocial / Contextual Factors: past trauma, developmental hx and current stressors   PROMIS (reviewed every 90 days):     Referral / Collaboration:  Referral to another professional/service is not indicated at this time..    Anticipated number of session for this episode of care: 12  Anticipation frequency of session: Biweekly  Anticipated Duration of each session: 38-52 minutes  Treatment plan will be reviewed in 90 days or when goals have been changed.       MeasurableTreatment Goal(s) related to diagnosis / functional impairment(s)  Goal 1: Patient will report absence of persistent SI and report of reduced frequency and intensity of SI and absence of SI to acceptable levels, report subjective improved mood for a period of 90 days, within 6 months as clinically observed and by patient self-report    I will know I've met my goal when I can see the difference between a bad moment and what I want in th future.      Objective #A (Patient Action)    Patient will demonstrate control of impulses and ability to use positive coping tools to manage strong emotions that can be safely addressed at a lower level of care. Absence of persistent SI and report of reduced frequency and intensity of SI and absence of SI to acceptable levels, report subjective improved mood for a period of 90 days, within 6 months as clinically observed and by patient self-report.  Status: Continued - Date(s): 8/16/2022    Intervention(s)  Therapist will provide individual therapy to identify triggers to SI urges, gain feedback on helpful coping strategies, and identify ways that family can offer support. Tx to discuss current stressors and interpersonal conflicts and how to cope with these, coaching, diagnostic testing, referral for medication as indicated, use  prescribed medication, cognitive restructuring, interpersonal family therapy, supportive therapy services.        Goal 2: Patient will report absence of persistent depression mood and report of reduced frequency and intensity of low mood and absence of persistent low energy and motivation to acceptable levels, report subjective improved motivation and increased energy for a period of 90 days, within 6 months as clinically observed and by patient self-report    I will know I've met my goal when I no longer feel sad.      Objective #A (Patient Action)    Status: Continued - Date(s): 8/16/2022    Patient will demonstrate and report a level of depression that can be managed at a lower level of care.  Absence of persistent depression mood and report of reduced frequency and intensity of low mood and absence of persistent low energy and motivation to acceptable levels, report subjective improved motivation and increased energy for a period of 90 days, within 6 months as clinically observed and by patient self-report.    Intervention(s)  Therapist will provide individual therapy to identify triggers to depression, gain feedback on helpful coping tools and thought-reframing techniques, and identify preferred way of being.  Tx to include discussion of current stressors and interpersonal conflicts and how to cope with these, coaching, diagnostic testing, referral for medication as indicated, use prescribed medication, cognitive restructuring, interpersonal, family therapy, supportive therapy services.        Goal 3: Patiient will report absence of persistent anxiety mood and report of reduced frequency and intensity of worry and absence of persistent anxious mood to acceptable levels, no panic attacks, report subjective comfort with rumination for a period of 90 days. Within 6 months as clinically observed and by patient self-report    I will know I've met my goal when I can go days without worrying about little things or  "shaking.      Objective #A (Patient Action)    Status: Continued - Date(s): 8/16/2022    Patient will demonstrate and report a level of anxiety that can be managed at a lower level of care.  Absence of persistent anxious mood and report of reduced frequency and intensity of worry and absence of persistent anxious mood to acceptable levels, no panic attacks, report subjective comfort with rumination for a period of 90 days, within 6 months as clinically observed and by patient self-report.    Intervention(s)  Therapist will provide individual therapy to identify triggers to anxiety, gain feedback on helpful coping tools and thought-reframing techniques, and identify preferred way of being. Tx to include discuss current stressors and interpersonal conflicts and how to cope with these, coaching, diagnostic testing , referral for medication as indicated, use prescribed medication, cognitive restructuring, interpersonal,   family therapy, supportive therapy services.        Patient has reviewed and agreed to the above plan.      Katie Faulkner, [facility]   8/16/2022                                                   Wandy Ann            SAFETY PLAN:  Step 1: Warning signs / cues (Thoughts, images, mood, situation, behavior) that a crisis may be developing:  ? Thoughts: thoughts of not wanting to be here  ? Images: no images  ? Thinking Processes: intrusive thoughts (bothersome, unwanted thoughts that come out of nowhere): get irritated  ? Mood: intense anger and agitation  ? Behaviors: isolating/withdrawing   ? Situations: trauma    Step 2: Coping strategies - Things I can do to take my mind off of my problems without contacting another person (relaxation technique, physical activity):  ? Distress Tolerance Strategies:  arts and crafts: drawing and painting  ? Physical Activities: exercise: working out  ? Focus on helpful thoughts:  \"This is temporary\", \"It always passes\" and \"Ride the wave\"  Step 3: People " and social settings that provide distraction:                 Name: Jennifer     Phone: [phone]                 Name: Antonina         Phone: [phone]                 Name: mom       Phone: [phone]  ? friends house or car   Step 4: Remind myself of people and things that are important to me and worth living for:  Best friend and cat        Step 5: When I am in crisis, I can ask these people to help me use my safety plan:                 Name: Jennifer     Phone: [phone]                 Name: Antonina         Phone: [phone]                 Name: panchito       Phone: [phone]  Step 6: Making the environment safe:   ? be around others  Step 7: Professionals or agencies I can contact during a crisis:  ? [facility] Daytime Number: 721-495-3376  ? Suicide Prevention Lifeline: 6-041-855-XPXL (9320)  ? Crisis Text Line Service (available 24 hours a day, 7 days a week): Text [place] to 362049    Local Crisis Services: 988     Call 911 or go to my nearest emergency department.       I helped develop this safety plan and agree to use it when needed.  I have been given a copy of this plan.       Client signature _________________________________________________________________  Today s date:  2/22/2021  Adapted from Safety Plan Template 2008 Marisol Cazares and Mario Flores is reprinted with the express permission of the authors.  No portion of the Safety Plan Template may be reproduced without the express, written permission.  You can contact the authors at bhs@[email or web address] or [email or web address].

## 2022-10-11 ENCOUNTER — VIRTUAL VISIT (OUTPATIENT)
Dept: PSYCHOLOGY | Facility: CLINIC | Age: 22
End: 2022-10-11
Payer: COMMERCIAL

## 2022-10-11 DIAGNOSIS — F31.32 BIPOLAR AFFECTIVE DISORDER, CURRENTLY DEPRESSED, MODERATE (H): Primary | ICD-10-CM

## 2022-10-11 DIAGNOSIS — F41.1 GENERALIZED ANXIETY DISORDER: ICD-10-CM

## 2022-10-11 DIAGNOSIS — F43.10 PTSD (POST-TRAUMATIC STRESS DISORDER): ICD-10-CM

## 2022-10-11 PROCEDURE — 90834 PSYTX W PT 45 MINUTES: CPT | Mod: 95 | Performed by: SOCIAL WORKER

## 2022-10-11 NOTE — PROGRESS NOTES
[facility]                                      Progress Note    Patient Name: Wandy Ann  Date: 10/11/2022         Service Type: Individual      Session Start Time: 1201  Session End Time: 1244     Session Length: 38-52    Session #: 35    Attendees: Client    Service Modality: Video session  Yes, the patient's condition can be safely assessed and treated via synchronous audio and visual telemedicine encounter.      Reason for Video Visit: Services only offered telehealth    Location of Originating Site: Patient's home    Distant Site: Provider Remote Setting home office    Provider verified identity through the following two step process.  Patient provided:  Patient  and Patient address    Consent:  The patient/guardian has verbally consented to: the potential risks and benefits of telemedicine (video visit) versus in person care; bill my insurance or make self-payment for services provided; and responsibility for payment of non-covered services.      Mode of transmission-Cellectis  Interactive Complexity: No  Crisis: No        Progress Since Last Session (Related to Symptoms / Goals / Homework):   Symptoms: Improving significant decrease in depression with medication change     Homework: Partially completed      Episode of Care Goals: Minimal progress - PREPARATION (Decided to change - considering how); Intervened by negotiating a change plan and determining options / strategies for behavior change, identifying triggers, exploring social supports, and working towards setting a date to begin behavior change     Current / Ongoing Stressors and Concerns:   past trauma, developmental hx and current stressors      Treatment Objective(s) Addressed in This Session:   Patient will demonstrate control of impulses and ability to use positive coping tools to manage strong emotions that can be safely addressed at a lower level of care. Absence of persistent SI and report of  reduced frequency and intensity of SI and absence of SI to acceptable levels, report subjective improved mood for a period of 90 days, within 6 months as clinically observed and by patient self-report.  Patient will demonstrate and report a level of depression that can be managed at a lower level of care.  Absence of persistent depression mood and report of reduced frequency and intensity of low mood and absence of persistent low energy and motivation to acceptable levels, report subjective improved motivation and increased energy for a period of 90 days, within 6 months as clinically observed and by patient self-report.  Patient will demonstrate and report a level of anxiety that can be managed at a lower level of care.  Absence of persistent anxious mood and report of reduced frequency and intensity of worry and absence of persistent anxious mood to acceptable levels, no panic attacks, report subjective comfort with rumination for a period of 90 days, within 6 months as clinically observed and by patient self-report.       Intervention:   Therapist met with patient to review goals and interventions. Therapist utilized reflected listening as patient gave brief reflection of week. Patient reported a significant decrease in depression with the medication change. Therapist supported patient as she processed. Patient processed the different feeling and worry about it being different vs manic. Therapist provided patient with education along with red flags. Patient processed conflict with her relationship and toxic traits. Therapist coached patient in effective communication patterns and styles and encouraged patient to communicate with her partner. Patient presented with an anxious affect. Patient was engaged in session and open to feedback. Patient contracts for safety       There has been demonstrated improvement in functioning while patient has been engaged in psychotherapy/psychological service- if withdrawn the  patient would deteriorate and/or relapse.       Assessments completed prior to visit:  The following assessments were completed by patient for this visit:  PHQ9:   PHQ-9 SCORE 4/1/2022 4/27/2022 5/12/2022 6/6/2022 6/21/2022 8/24/2022 9/29/2022   PHQ-9 Total Score MyChart - 16 (Moderately severe depression) - - 10 (Moderate depression) - 10 (Moderate depression)   PHQ-9 Total Score 11 16 15 7 10 19 10   Some encounter information is confidential and restricted. Go to Review Flowsheets activity to see all data.     GAD7:   YESSI-7 SCORE 2/8/2022 4/1/2022 4/27/2022 5/11/2022 6/6/2022 6/21/2022 8/24/2022   Total Score - - 7 (mild anxiety) 9 (mild anxiety) - 9 (mild anxiety) -   Total Score 10 12 7 9 10 9 13   Some encounter information is confidential and restricted. Go to Review Flowsheets activity to see all data.           ASSESSMENT: Current Emotional / Mental Status (status of significant symptoms):   Risk status (Self / Other harm or suicidal ideation)   Patient denies current fears or concerns for personal safety.   Patient reports the following current or recent suicidal ideation or behaviors: continuing to improve with one passive SI last week with no plan or intent.   Patient denies current or recent homicidal ideation or behaviors.   Patient denies current or recent self injurious behavior or ideation.   Patient denies other safety concerns.   Patient reports there has been no change in risk factors since their last session.     Patient reports there has been no change in protective factors since their last session.     A safety and risk management plan has been developed including: Patient consented to co-developed safety plan on 2.21.2022.  Safety and risk management plan was reviewed.   Patient agreed to use safety plan should any safety concerns arise.  A copy was made available to the patient.     Appearance:   Appropriate     Eye Contact:   Fair     Psychomotor Behavior: Restless   "   Attitude:   Cooperative    Orientation:   All   Speech    Rate / Production: Emotional Talkative    Volume:  Normal    Mood:    Anxious    Affect:    Bright  Worrisome    Thought Content:  Clear    Thought Form:  Coherent    Insight:    Fair      Medication Review:   No changes to current psychiatric medication(s)     Medication Compliance:   Yes     Changes in Health Issues:   None reported     Chemical Use Review:   Substance Use: Chemical use reviewed, no active concerns identified      Tobacco Use: No current tobacco use.      Diagnosis:  1. Bipolar affective disorder, currently depressed, moderate (H)    2. PTSD (post-traumatic stress disorder)    3. Generalized anxiety disorder        Collateral Reports Completed:   Not Applicable    PLAN: (Patient Tasks / Therapist Tasks / Other)  Make visual safety plan   Manage depressive symptoms with coping skills  When feeling SI follow safety plan  Continue to manage SI and go to ER if feeling unsafe  TMS referral   IRT therapy and coached patient through the record and rewriting process. Therapist validated patient's emotions with the break up and back together and suggested patient and her partner have a lot to talk about and looking at their futures sooner than later.     [facility] has a new mental health emergency area at [facility] called [facility] that is very calming and helpful if you need additional support. ([address] [place] [address]  [phone]).     Name and challenge cognitive distortions  read \"stop walking on eggshells.  Therapist coached patient in effective communication patterns and styles and encouraged patient to communicate with her partner    Katie Faulkner, LICSW   10/11/2022                                                         ______________________________________________________________________    Individual Treatment Plan    Patient's Name: Wandy Jones Seth  YOB: 2000    Date of " Creation: 2.22.2021  Date Treatment Plan Last Reviewed/Revised: 8/16/2022 due 11/16/2022    DSM5 Diagnoses: 296.32 (F33.1) Major Depressive Disorder, Recurrent Episode, Moderate With mixed features, 300.02 (F41.1) Generalized Anxiety Disorder or 309.81 (F43.10) Posttraumatic Stress Disorder (includes Posttraumatic Stress Disorder for Children 6 Years and Younger)  Without dissociative symptoms  Psychosocial / Contextual Factors: past trauma, developmental hx and current stressors   PROMIS (reviewed every 90 days):     Referral / Collaboration:  Referral to another professional/service is not indicated at this time..    Anticipated number of session for this episode of care: 12  Anticipation frequency of session: Biweekly  Anticipated Duration of each session: 38-52 minutes  Treatment plan will be reviewed in 90 days or when goals have been changed.       MeasurableTreatment Goal(s) related to diagnosis / functional impairment(s)  Goal 1: Patient will report absence of persistent SI and report of reduced frequency and intensity of SI and absence of SI to acceptable levels, report subjective improved mood for a period of 90 days, within 6 months as clinically observed and by patient self-report    I will know I've met my goal when I can see the difference between a bad moment and what I want in th future.      Objective #A (Patient Action)    Patient will demonstrate control of impulses and ability to use positive coping tools to manage strong emotions that can be safely addressed at a lower level of care. Absence of persistent SI and report of reduced frequency and intensity of SI and absence of SI to acceptable levels, report subjective improved mood for a period of 90 days, within 6 months as clinically observed and by patient self-report.  Status: Continued - Date(s): 8/16/2022    Intervention(s)  Therapist will provide individual therapy to identify triggers to SI urges, gain feedback on helpful coping  strategies, and identify ways that family can offer support. Tx to discuss current stressors and interpersonal conflicts and how to cope with these, coaching, diagnostic testing, referral for medication as indicated, use prescribed medication, cognitive restructuring, interpersonal family therapy, supportive therapy services.        Goal 2: Patient will report absence of persistent depression mood and report of reduced frequency and intensity of low mood and absence of persistent low energy and motivation to acceptable levels, report subjective improved motivation and increased energy for a period of 90 days, within 6 months as clinically observed and by patient self-report    I will know I've met my goal when I no longer feel sad.      Objective #A (Patient Action)    Status: Continued - Date(s): 8/16/2022    Patient will demonstrate and report a level of depression that can be managed at a lower level of care.  Absence of persistent depression mood and report of reduced frequency and intensity of low mood and absence of persistent low energy and motivation to acceptable levels, report subjective improved motivation and increased energy for a period of 90 days, within 6 months as clinically observed and by patient self-report.    Intervention(s)  Therapist will provide individual therapy to identify triggers to depression, gain feedback on helpful coping tools and thought-reframing techniques, and identify preferred way of being.  Tx to include discussion of current stressors and interpersonal conflicts and how to cope with these, coaching, diagnostic testing, referral for medication as indicated, use prescribed medication, cognitive restructuring, interpersonal, family therapy, supportive therapy services.        Goal 3: Patiient will report absence of persistent anxiety mood and report of reduced frequency and intensity of worry and absence of persistent anxious mood to acceptable levels, no panic attacks, report  subjective comfort with rumination for a period of 90 days. Within 6 months as clinically observed and by patient self-report    I will know I've met my goal when I can go days without worrying about little things or shaking.      Objective #A (Patient Action)    Status: Continued - Date(s): 8/16/2022    Patient will demonstrate and report a level of anxiety that can be managed at a lower level of care.  Absence of persistent anxious mood and report of reduced frequency and intensity of worry and absence of persistent anxious mood to acceptable levels, no panic attacks, report subjective comfort with rumination for a period of 90 days, within 6 months as clinically observed and by patient self-report.    Intervention(s)  Therapist will provide individual therapy to identify triggers to anxiety, gain feedback on helpful coping tools and thought-reframing techniques, and identify preferred way of being. Tx to include discuss current stressors and interpersonal conflicts and how to cope with these, coaching, diagnostic testing , referral for medication as indicated, use prescribed medication, cognitive restructuring, interpersonal,   family therapy, supportive therapy services.        Patient has reviewed and agreed to the above plan.      Katie Faulkner, [facility]   8/16/2022                                                   Wandy Ann            SAFETY PLAN:  Step 1: Warning signs / cues (Thoughts, images, mood, situation, behavior) that a crisis may be developing:  ? Thoughts: thoughts of not wanting to be here  ? Images: no images  ? Thinking Processes: intrusive thoughts (bothersome, unwanted thoughts that come out of nowhere): get irritated  ? Mood: intense anger and agitation  ? Behaviors: isolating/withdrawing   ? Situations: trauma    Step 2: Coping strategies - Things I can do to take my mind off of my problems without contacting another person (relaxation technique, physical activity):  ? Distress  "Tolerance Strategies:  arts and crafts: drawing and painting  ? Physical Activities: exercise: working out  ? Focus on helpful thoughts:  \"This is temporary\", \"It always passes\" and \"Ride the wave\"  Step 3: People and social settings that provide distraction:                 Name: Jennifer     Phone: [phone]                 Name: Antonina         Phone: [phone]                 Name: panchito       Phone: [phone]  ? friends house or car   Step 4: Remind myself of people and things that are important to me and worth living for:  Best friend and cat        Step 5: When I am in crisis, I can ask these people to help me use my safety plan:                 Name: Jennifer     Phone: [phone]                 Name: Antonina         Phone: [phone]                 Name: panchito       Phone: [phone]  Step 6: Making the environment safe:   ? be around others  Step 7: Professionals or agencies I can contact during a crisis:  ? [facility] Daytime Number: [phone]  ? Suicide Prevention Lifeline: 6-475-893-FPGK (6610)  ? Crisis Text Line Service (available 24 hours a day, 7 days a week): Text [place] to 924753    Local Crisis Services: 988     Call 911 or go to my nearest emergency department.       I helped develop this safety plan and agree to use it when needed.  I have been given a copy of this plan.       Client signature _________________________________________________________________  Today s date:  2/22/2021  Adapted from Safety Plan Template 2008 Marisol Cazares and Mario Flores is reprinted with the express permission of the authors.  No portion of the Safety Plan Template may be reproduced without the express, written permission.  You can contact the authors at bhs@[place].[facility] or [email or web address].  " Referred To Plastics For Closure Text (Leave Blank If You Do Not Want): After obtaining clear surgical margins the patient was sent to plastics for surgical repair.  The patient understands they will receive post-surgical care and follow-up from the referring physician's office.

## 2022-10-28 ENCOUNTER — VIRTUAL VISIT (OUTPATIENT)
Dept: PSYCHOLOGY | Facility: CLINIC | Age: 22
End: 2022-10-28
Payer: COMMERCIAL

## 2022-10-28 DIAGNOSIS — F43.10 PTSD (POST-TRAUMATIC STRESS DISORDER): ICD-10-CM

## 2022-10-28 DIAGNOSIS — F41.1 GENERALIZED ANXIETY DISORDER: ICD-10-CM

## 2022-10-28 DIAGNOSIS — F33.1 MDD (MAJOR DEPRESSIVE DISORDER), RECURRENT EPISODE, MODERATE (H): ICD-10-CM

## 2022-10-28 DIAGNOSIS — F31.32 BIPOLAR AFFECTIVE DISORDER, CURRENTLY DEPRESSED, MODERATE (H): Primary | ICD-10-CM

## 2022-10-28 PROCEDURE — 90834 PSYTX W PT 45 MINUTES: CPT | Mod: 95 | Performed by: SOCIAL WORKER

## 2022-10-28 NOTE — PROGRESS NOTES
[facility]                                      Progress Note    Patient Name: Wandy Ann  Date: 10/28/2022         Service Type: Individual      Session Start Time: 1308  Session End Time: 1355     Session Length: 38-52    Session #: 36    Attendees: Client    Service Modality: Video session  Yes, the patient's condition can be safely assessed and treated via synchronous audio and visual telemedicine encounter.      Reason for Video Visit: Services only offered telehealth    Location of Originating Site: Patient's home    Distant Site: Provider Remote Setting home office    Provider verified identity through the following two step process.  Patient provided:  Patient  and Patient address    Consent:  The patient/guardian has verbally consented to: the potential risks and benefits of telemedicine (video visit) versus in person care; bill my insurance or make self-payment for services provided; and responsibility for payment of non-covered services.      Mode of transmission-Propers  Interactive Complexity: No  Crisis: No        Progress Since Last Session (Related to Symptoms / Goals / Homework):   Symptoms: Improving continued decrease in depression     Homework: Partially completed      Episode of Care Goals: Minimal progress - PREPARATION (Decided to change - considering how); Intervened by negotiating a change plan and determining options / strategies for behavior change, identifying triggers, exploring social supports, and working towards setting a date to begin behavior change     Current / Ongoing Stressors and Concerns:   past trauma, developmental hx and current stressors      Treatment Objective(s) Addressed in This Session:   Patient will demonstrate control of impulses and ability to use positive coping tools to manage strong emotions that can be safely addressed at a lower level of care. Absence of persistent SI and report of reduced frequency and  intensity of SI and absence of SI to acceptable levels, report subjective improved mood for a period of 90 days, within 6 months as clinically observed and by patient self-report.  Patient will demonstrate and report a level of depression that can be managed at a lower level of care.  Absence of persistent depression mood and report of reduced frequency and intensity of low mood and absence of persistent low energy and motivation to acceptable levels, report subjective improved motivation and increased energy for a period of 90 days, within 6 months as clinically observed and by patient self-report.  Patient will demonstrate and report a level of anxiety that can be managed at a lower level of care.  Absence of persistent anxious mood and report of reduced frequency and intensity of worry and absence of persistent anxious mood to acceptable levels, no panic attacks, report subjective comfort with rumination for a period of 90 days, within 6 months as clinically observed and by patient self-report.       Intervention:   Therapist met with patient to review goals and interventions. Therapist utilized reflected listening as patient gave brief reflection of week. Patient reported continued decrease in depression and frustrations with partner and mother. Therapist supported patient as she processed and validated patient. Therapist provided patient education and utilized strength based modality with patient along with solution focused. Patient to try changing her reaction to her partner.  Patient presented with an anxious affect. Patient was engaged in session and open to feedback. Patient contracts for safety       There has been demonstrated improvement in functioning while patient has been engaged in psychotherapy/psychological service- if withdrawn the patient would deteriorate and/or relapse.       Assessments completed prior to visit:  The following assessments were completed by patient for this visit:  PHQ9:    PHQ-9 SCORE 4/1/2022 4/27/2022 5/12/2022 6/6/2022 6/21/2022 8/24/2022 9/29/2022   PHQ-9 Total Score MyChart - 16 (Moderately severe depression) - - 10 (Moderate depression) - 10 (Moderate depression)   PHQ-9 Total Score 11 16 15 7 10 19 10   Some encounter information is confidential and restricted. Go to Review Flowsheets activity to see all data.     GAD7:   YESSI-7 SCORE 2/8/2022 4/1/2022 4/27/2022 5/11/2022 6/6/2022 6/21/2022 8/24/2022   Total Score - - 7 (mild anxiety) 9 (mild anxiety) - 9 (mild anxiety) -   Total Score 10 12 7 9 10 9 13   Some encounter information is confidential and restricted. Go to Review Flowsheets activity to see all data.           ASSESSMENT: Current Emotional / Mental Status (status of significant symptoms):   Risk status (Self / Other harm or suicidal ideation)   Patient denies current fears or concerns for personal safety.   Patient reports the following current or recent suicidal ideation or behaviors: continuing to improve with maybe one since last visit.   Patient denies current or recent homicidal ideation or behaviors.   Patient denies current or recent self injurious behavior or ideation.   Patient denies other safety concerns.   Patient reports there has been no change in risk factors since their last session.     Patient reports there has been no change in protective factors since their last session.     A safety and risk management plan has been developed including: Patient consented to co-developed safety plan on 2.21.2022.  Safety and risk management plan was reviewed.   Patient agreed to use safety plan should any safety concerns arise.  A copy was made available to the patient.     Appearance:   Appropriate     Eye Contact:   Fair     Psychomotor Behavior: Restless     Attitude:   Cooperative    Orientation:   All   Speech    Rate / Production: Emotional Talkative    Volume:  Normal    Mood:    Anxious    Affect:    Bright  Worrisome    Thought Content:  Clear  "   Thought Form:  Coherent    Insight:    Fair      Medication Review:   No changes to current psychiatric medication(s)     Medication Compliance:   Yes     Changes in Health Issues:   None reported     Chemical Use Review:   Substance Use: Chemical use reviewed, no active concerns identified      Tobacco Use: No current tobacco use.      Diagnosis:  1. Bipolar affective disorder, currently depressed, moderate (H)    2. PTSD (post-traumatic stress disorder)    3. Generalized anxiety disorder    4. MDD (major depressive disorder), recurrent episode, moderate (H)        Collateral Reports Completed:   Not Applicable    PLAN: (Patient Tasks / Therapist Tasks / Other)  Make visual safety plan   Manage depressive symptoms with coping skills  When feeling SI follow safety plan  Continue to manage SI and go to ER if feeling unsafe  TMS referral   IRT therapy and coached patient through the record and rewriting process. Therapist validated patient's emotions with the break up and back together and suggested patient and her partner have a lot to talk about and looking at their futures sooner than later.     [facility] has a new mental health emergency area at [facility] called [facility] that is very calming and helpful if you need additional support. ([address] [place], [place] [address] [phone]).     Name and challenge cognitive distortions  read \"stop walking on eggshells.  Patient to try changing her reaction to her partner    Katie Faulkner, [facility]   10/28/2022                                                         ______________________________________________________________________    Individual Treatment Plan    Patient's Name: Wandy Ann  YOB: 2000    Date of Creation: 2.22.2021  Date Treatment Plan Last Reviewed/Revised: 8/16/2022 due 11/16/2022    DSM5 Diagnoses: 296.32 (F33.1) Major Depressive Disorder, Recurrent Episode, Moderate With mixed features, 300.02 " (F41.1) Generalized Anxiety Disorder or 309.81 (F43.10) Posttraumatic Stress Disorder (includes Posttraumatic Stress Disorder for Children 6 Years and Younger)  Without dissociative symptoms  Psychosocial / Contextual Factors: past trauma, developmental hx and current stressors   PROMIS (reviewed every 90 days):     Referral / Collaboration:  Referral to another professional/service is not indicated at this time..    Anticipated number of session for this episode of care: 12  Anticipation frequency of session: Biweekly  Anticipated Duration of each session: 38-52 minutes  Treatment plan will be reviewed in 90 days or when goals have been changed.       MeasurableTreatment Goal(s) related to diagnosis / functional impairment(s)  Goal 1: Patient will report absence of persistent SI and report of reduced frequency and intensity of SI and absence of SI to acceptable levels, report subjective improved mood for a period of 90 days, within 6 months as clinically observed and by patient self-report    I will know I've met my goal when I can see the difference between a bad moment and what I want in th future.      Objective #A (Patient Action)    Patient will demonstrate control of impulses and ability to use positive coping tools to manage strong emotions that can be safely addressed at a lower level of care. Absence of persistent SI and report of reduced frequency and intensity of SI and absence of SI to acceptable levels, report subjective improved mood for a period of 90 days, within 6 months as clinically observed and by patient self-report.  Status: Continued - Date(s): 8/16/2022    Intervention(s)  Therapist will provide individual therapy to identify triggers to SI urges, gain feedback on helpful coping strategies, and identify ways that family can offer support. Tx to discuss current stressors and interpersonal conflicts and how to cope with these, coaching, diagnostic testing, referral for medication as indicated,  use prescribed medication, cognitive restructuring, interpersonal family therapy, supportive therapy services.        Goal 2: Patient will report absence of persistent depression mood and report of reduced frequency and intensity of low mood and absence of persistent low energy and motivation to acceptable levels, report subjective improved motivation and increased energy for a period of 90 days, within 6 months as clinically observed and by patient self-report    I will know I've met my goal when I no longer feel sad.      Objective #A (Patient Action)    Status: Continued - Date(s): 8/16/2022    Patient will demonstrate and report a level of depression that can be managed at a lower level of care.  Absence of persistent depression mood and report of reduced frequency and intensity of low mood and absence of persistent low energy and motivation to acceptable levels, report subjective improved motivation and increased energy for a period of 90 days, within 6 months as clinically observed and by patient self-report.    Intervention(s)  Therapist will provide individual therapy to identify triggers to depression, gain feedback on helpful coping tools and thought-reframing techniques, and identify preferred way of being.  Tx to include discussion of current stressors and interpersonal conflicts and how to cope with these, coaching, diagnostic testing, referral for medication as indicated, use prescribed medication, cognitive restructuring, interpersonal, family therapy, supportive therapy services.        Goal 3: Patiient will report absence of persistent anxiety mood and report of reduced frequency and intensity of worry and absence of persistent anxious mood to acceptable levels, no panic attacks, report subjective comfort with rumination for a period of 90 days. Within 6 months as clinically observed and by patient self-report    I will know I've met my goal when I can go days without worrying about little things or  "shaking.      Objective #A (Patient Action)    Status: Continued - Date(s): 8/16/2022    Patient will demonstrate and report a level of anxiety that can be managed at a lower level of care.  Absence of persistent anxious mood and report of reduced frequency and intensity of worry and absence of persistent anxious mood to acceptable levels, no panic attacks, report subjective comfort with rumination for a period of 90 days, within 6 months as clinically observed and by patient self-report.    Intervention(s)  Therapist will provide individual therapy to identify triggers to anxiety, gain feedback on helpful coping tools and thought-reframing techniques, and identify preferred way of being. Tx to include discuss current stressors and interpersonal conflicts and how to cope with these, coaching, diagnostic testing , referral for medication as indicated, use prescribed medication, cognitive restructuring, interpersonal,   family therapy, supportive therapy services.        Patient has reviewed and agreed to the above plan.      Katie Faulkner, [facility]   8/16/2022                                                   Wandy Ann            SAFETY PLAN:  Step 1: Warning signs / cues (Thoughts, images, mood, situation, behavior) that a crisis may be developing:  ? Thoughts: thoughts of not wanting to be here  ? Images: no images  ? Thinking Processes: intrusive thoughts (bothersome, unwanted thoughts that come out of nowhere): get irritated  ? Mood: intense anger and agitation  ? Behaviors: isolating/withdrawing   ? Situations: trauma    Step 2: Coping strategies - Things I can do to take my mind off of my problems without contacting another person (relaxation technique, physical activity):  ? Distress Tolerance Strategies:  arts and crafts: drawing and painting  ? Physical Activities: exercise: working out  ? Focus on helpful thoughts:  \"This is temporary\", \"It always passes\" and \"Ride the wave\"  Step 3: People " and social settings that provide distraction:                 Name: Jennifer     Phone: [phone]                 Name: Antonina         Phone: [phone]                 Name: mom       Phone: [phone]  ? friends house or car   Step 4: Remind myself of people and things that are important to me and worth living for:  Best friend and cat        Step 5: When I am in crisis, I can ask these people to help me use my safety plan:                 Name: Jennifer     Phone: [phone]                 Name: Antonina         Phone: [phone]                 Name: panchito       Phone: [phone]  Step 6: Making the environment safe:   ? be around others  Step 7: Professionals or agencies I can contact during a crisis:  ? [facility] Daytime Number: [phone]  ? Suicide Prevention Lifeline: 2-049-806-WFSS (3042)  ? Crisis Text Line Service (available 24 hours a day, 7 days a week): Text [place] to 513513    Local Crisis Services: 988     Call 911 or go to my nearest emergency department.       I helped develop this safety plan and agree to use it when needed.  I have been given a copy of this plan.       Client signature _________________________________________________________________  Today s date:  2/22/2021  Adapted from Safety Plan Template 2008 Marisol Cazares and Mario Flores is reprinted with the express permission of the authors.  No portion of the Safety Plan Template may be reproduced without the express, written permission.  You can contact the authors at [email or web address] or [email or web address].

## 2022-10-31 ENCOUNTER — VIRTUAL VISIT (OUTPATIENT)
Dept: PSYCHIATRY | Facility: CLINIC | Age: 22
End: 2022-10-31
Payer: COMMERCIAL

## 2022-10-31 DIAGNOSIS — F41.1 GAD (GENERALIZED ANXIETY DISORDER): ICD-10-CM

## 2022-10-31 DIAGNOSIS — F31.31 BIPOLAR AFFECTIVE DISORDER, CURRENTLY DEPRESSED, MILD (H): Primary | ICD-10-CM

## 2022-10-31 PROCEDURE — 99213 OFFICE O/P EST LOW 20 MIN: CPT | Mod: 95 | Performed by: NURSE PRACTITIONER

## 2022-10-31 RX ORDER — VENLAFAXINE HYDROCHLORIDE 150 MG/1
150 CAPSULE, EXTENDED RELEASE ORAL DAILY
Qty: 30 CAPSULE | Refills: 3 | Status: SHIPPED | OUTPATIENT
Start: 2022-10-31 | End: 2023-04-27

## 2022-10-31 RX ORDER — LAMOTRIGINE 25 MG/1
50 TABLET ORAL DAILY
Qty: 60 TABLET | Refills: 3 | Status: SHIPPED | OUTPATIENT
Start: 2022-10-31 | End: 2022-12-20 | Stop reason: DRUGHIGH

## 2022-10-31 RX ORDER — VENLAFAXINE HYDROCHLORIDE 37.5 MG/1
37.5 CAPSULE, EXTENDED RELEASE ORAL DAILY
Qty: 30 CAPSULE | Refills: 3 | Status: SHIPPED | OUTPATIENT
Start: 2022-10-31 | End: 2023-04-27

## 2022-10-31 ASSESSMENT — ANXIETY QUESTIONNAIRES
8. IF YOU CHECKED OFF ANY PROBLEMS, HOW DIFFICULT HAVE THESE MADE IT FOR YOU TO DO YOUR WORK, TAKE CARE OF THINGS AT HOME, OR GET ALONG WITH OTHER PEOPLE?: NOT DIFFICULT AT ALL
1. FEELING NERVOUS, ANXIOUS, OR ON EDGE: NOT AT ALL
5. BEING SO RESTLESS THAT IT IS HARD TO SIT STILL: NOT AT ALL
6. BECOMING EASILY ANNOYED OR IRRITABLE: NOT AT ALL
IF YOU CHECKED OFF ANY PROBLEMS ON THIS QUESTIONNAIRE, HOW DIFFICULT HAVE THESE PROBLEMS MADE IT FOR YOU TO DO YOUR WORK, TAKE CARE OF THINGS AT HOME, OR GET ALONG WITH OTHER PEOPLE: NOT DIFFICULT AT ALL
GAD7 TOTAL SCORE: 2
4. TROUBLE RELAXING: NOT AT ALL
3. WORRYING TOO MUCH ABOUT DIFFERENT THINGS: SEVERAL DAYS
GAD7 TOTAL SCORE: 2
2. NOT BEING ABLE TO STOP OR CONTROL WORRYING: SEVERAL DAYS
GAD7 TOTAL SCORE: 2
7. FEELING AFRAID AS IF SOMETHING AWFUL MIGHT HAPPEN: NOT AT ALL
7. FEELING AFRAID AS IF SOMETHING AWFUL MIGHT HAPPEN: NOT AT ALL

## 2022-10-31 ASSESSMENT — PATIENT HEALTH QUESTIONNAIRE - PHQ9
10. IF YOU CHECKED OFF ANY PROBLEMS, HOW DIFFICULT HAVE THESE PROBLEMS MADE IT FOR YOU TO DO YOUR WORK, TAKE CARE OF THINGS AT HOME, OR GET ALONG WITH OTHER PEOPLE: NOT DIFFICULT AT ALL
SUM OF ALL RESPONSES TO PHQ QUESTIONS 1-9: 3
SUM OF ALL RESPONSES TO PHQ QUESTIONS 1-9: 3

## 2022-10-31 NOTE — PROGRESS NOTES
"Wandy is a 22 year old who is being evaluated via a billable video visit.      How would you like to obtain your AVS? MyChart  If the video visit is dropped, the invitation should be resent by: Text to cell phone: [phone]  Will anyone else be joining your video visit? No    Kim GAYTAN    Video-Visit Details    Video Start Time: 8:21 AM    Type of service:  Video Visit    Video End Time:8:36 AM    Originating Location (pt. Location): Other Her car        Distant Location (provider location):  Off-site    Platform used for Video Visit: [facility]              Outpatient Psychiatric Progress Note    Name: Wandy Ann   : 2000                    Primary Care Provider: Hudson Mobley MD   Therapist: Katie    PHQ-9 scores:  PHQ-9 SCORE 2022 2022 10/31/2022   PHQ-9 Total Score MyChart - 10 (Moderate depression) 3 (Minimal depression)   PHQ-9 Total Score 19 10 3   Some encounter information is confidential and restricted. Go to Review Flowsheets activity to see all data.       YESSI-7 scores:  YESSI-7 SCORE 2022 2022 10/31/2022   Total Score 9 (mild anxiety) - 2 (minimal anxiety)   Total Score 9 13 2   Some encounter information is confidential and restricted. Go to Review Flowsheets activity to see all data.       Patient Identification:    Patient is a 22 year old year old, single  Choose not to Answer  or  female  who presents for return visit with me.  Patient is currently employed full time. Patient attended the session alone. Patient prefers to be called: \" Wandy\".    Current medications include: hydrOXYzine (ATARAX) 10 MG tablet, Take 1 tablet (10 mg) by mouth 3 times daily as needed for anxiety  lamoTRIgine (LAMICTAL) 25 MG tablet, Take 1 tablet (25 mg) by mouth daily For 14 days then 2 tablets daily  venlafaxine (EFFEXOR XR) 150 MG 24 hr capsule, Take 1 capsule (150 mg) by mouth daily  venlafaxine (EFFEXOR XR) 37.5 MG 24 hr capsule, Take 1 capsule (37.5 mg) " by mouth daily  Levomefolate Glucosamine (METHYLFOLATE) 400 MCG CAPS, Take 400 mcg by mouth daily (Patient not taking: Reported on 10/31/2022)    No current facility-administered medications on file prior to visit.       The [place] Prescription Monitoring Program has been reviewed and there are no concerns about diversionary activity for controlled substances at this time.      I was able to review most recent Primary Care Provider, specialty provider, and therapy visit notes that I have access to.     Today, patient reports that she is on her way to work.  She denies depression and anxiety since starting the new medications.  She goes out and does things with friends.  She is able to get ready in the morning.  Her friends notice that she is not isolating.  She has been exercising and engages in more walking.  No weight changes.  She feels like she has less of an appetite.       has no past medical history on file.    Social history updates:    Wandy identifies friends as support.  She works in the case management system.    Substance use updates:    She reports occasional alcohol.    Tobacco use: No    Vital Signs:   There were no vitals taken for this visit.    Labs:    Most recent laboratory results reviewed and no new labs.     Mental Status Examination:  Appearance: awake, alert and casual dress  Attitude: cooperative  Eye Contact:  adequate  Gait and Station: No assistive Devices used and No dizziness or falls  Psychomotor Behavior:  intact station, gait and muscle tone  Oriented to:  time, person, and place  Attention Span and Concentration:  Normal  Speech:   clear, coherent  Mood:  better  Affect:  appropriate and in normal range  Associations:  no loose associations  Thought Process:  goal oriented  Thought Content:  no evidence of suicidal ideation or homicidal ideation, no auditory hallucinations present and no visual hallucinations present  Recent and Remote Memory:  intact Not formally assessed. No  amnesia.  Fund of Knowledge: appropriate  Insight:  good  Judgment:  intact  Impulse Control:  intact    Suicide Risk Assessment:  Today Wandy Ann reports no thoughts to harm themself or others. In addition, there are notable risk factors for self-harm, including anxiety. However, risk is mitigated by history of seeking help when needed, future oriented, no family history of suicide and denies homicidal ideation, intent, or plan. Therefore, based on all available evidence including the factors cited above, Wandy Ann does not appear to be at imminent risk for self-harm, does not meet criteria for a 72-hr hold, and therefore remains appropriate for ongoing outpatient level of care.  A thorough assessment of risk factors related to suicide and self-harm have been reviewed and are noted above. The patient convincingly denies suicidality on several occasions. Local community safety resources printed and reviewed for patient to use if needed. There was no deceit detected, and the patient presented in a manner that was believable.     DSM5 Diagnosis:  296.89 Bipolar II Disorder Depressed and mild  300.02 (F41.1) Generalized Anxiety Disorder    Medical comorbidities include:   Patient Active Problem List    Diagnosis Date Noted     Bipolar affective disorder, currently depressed, moderate (H) 08/16/2022     Priority: Medium     Major depressive disorder, recurrent episode, moderate (H) 04/29/2022     Priority: Medium     YESSI (generalized anxiety disorder) 04/29/2022     Priority: Medium     Moderate major depression (H) 07/19/2020     Priority: Medium       Assessment:    Wandy Ann met with me today to review medication effects.  Since starting the lamotrigine she reports better mood symptoms with less depression and anxiety.  She tells me she is more motivated to do things and friends have noticed that she seems happier.  At this point no medication changes will be made and we will meet  again in January to determine if it is time for her to return to her primary care provider for future refills.  Talk therapy is recommended when she feels the need to have this, for learning ways to cope with life stressors    Medication side effects and alternatives were reviewed. Health promotion activities recommended and reviewed today. All questions addressed. Education and counseling completed regarding risks and benefits of medications and psychotherapy options.    Treatment Plan:        1.  Continue lamotrigine 50 mg daily    2.  Continue venlafaxine 187.5 mg daily    3.  Continue hydroxyzine as needed-no refills required today    4.  Talk therapy, when needed, for learning ways to cope with life stressors        Continue all other medications as reviewed per electronic medical record today.     Safety plan reviewed. To the Emergency Department as needed or call after hours crisis line at [phone] or [phone]. [place] Crisis Text Line. Text [place] to 235256 or Suicide LifeLine Chat: suicideEmergent Properties.org/chat/    To schedule individual or family therapy, call [facility] at [phone]    Schedule an appointment with me in January or sooner as needed. Call [facility] at [phone] to schedule.    Follow up with primary care provider as planned or for acute medical concerns.    Call the psychiatric nurse line with medication questions or concerns at [phone]    MyChart may be used to communicate with your provider, but this is not intended to be used for emergencies.    Crisis Resources:    National Suicide Prevention Lifeline: 719.650.6689 (TTY: 273.729.7438). Call anytime for help.  (www.suicidepreventionlifeline.org)  National [place] on Mental Illness (www.[place].org): 970.925.8291 or 239-513-4859.   Mental Health Association (www.mentalhealth.org): 575.737.3361 or 987-380-2389.  [place] Crisis Text Line: Text [place] to 657571  Suicide LifeLine Chat:  suicidepreventionlifeline.org/chat    Administrative Billing:   Time spent with patient includes counseling and coordination of care regarding above diagnoses and treatment plan.    Patient Status:  Patient will continue to be seen for ongoing consultation and stabilization.    Signed:   HARJINDER Powell-BC   Psychiatry

## 2022-11-11 ENCOUNTER — VIRTUAL VISIT (OUTPATIENT)
Dept: PSYCHOLOGY | Facility: CLINIC | Age: 22
End: 2022-11-11
Payer: COMMERCIAL

## 2022-11-11 DIAGNOSIS — F41.1 GENERALIZED ANXIETY DISORDER: ICD-10-CM

## 2022-11-11 DIAGNOSIS — F31.32 BIPOLAR AFFECTIVE DISORDER, CURRENTLY DEPRESSED, MODERATE (H): Primary | ICD-10-CM

## 2022-11-11 DIAGNOSIS — F43.10 PTSD (POST-TRAUMATIC STRESS DISORDER): ICD-10-CM

## 2022-11-11 PROCEDURE — 90834 PSYTX W PT 45 MINUTES: CPT | Mod: 95 | Performed by: SOCIAL WORKER

## 2022-11-11 NOTE — PROGRESS NOTES
[facility]                                      Progress Note    Patient Name: Wandy Ann  Date: 2022         Service Type: Individual      Session Start Time: 1106 Session End Time: 1156     Session Length: 38-52    Session #: 37    Attendees: Client    Service Modality: Video session  Yes, the patient's condition can be safely assessed and treated via synchronous audio and visual telemedicine encounter.      Reason for Video Visit: Services only offered telehealth    Location of Originating Site: Patient's home    Distant Site: Provider Remote Setting home office    Provider verified identity through the following two step process.  Patient provided:  Patient  and Patient address    Consent:  The patient/guardian has verbally consented to: the potential risks and benefits of telemedicine (video visit) versus in person care; bill my insurance or make self-payment for services provided; and responsibility for payment of non-covered services.      Mode of transmission-[employer]stler  Interactive Complexity: No  Crisis: No        Progress Since Last Session (Related to Symptoms / Goals / Homework):   Symptoms: Worsening not feeling well and increase depression     Homework: Partially completed      Episode of Care Goals: Minimal progress - PREPARATION (Decided to change - considering how); Intervened by negotiating a change plan and determining options / strategies for behavior change, identifying triggers, exploring social supports, and working towards setting a date to begin behavior change     Current / Ongoing Stressors and Concerns:   past trauma, developmental hx and current stressors      Treatment Objective(s) Addressed in This Session:   Patient will demonstrate control of impulses and ability to use positive coping tools to manage strong emotions that can be safely addressed at a lower level of care. Absence of persistent SI and report of reduced frequency and  intensity of SI and absence of SI to acceptable levels, report subjective improved mood for a period of 90 days, within 6 months as clinically observed and by patient self-report.  Patient will demonstrate and report a level of depression that can be managed at a lower level of care.  Absence of persistent depression mood and report of reduced frequency and intensity of low mood and absence of persistent low energy and motivation to acceptable levels, report subjective improved motivation and increased energy for a period of 90 days, within 6 months as clinically observed and by patient self-report.  Patient will demonstrate and report a level of anxiety that can be managed at a lower level of care.  Absence of persistent anxious mood and report of reduced frequency and intensity of worry and absence of persistent anxious mood to acceptable levels, no panic attacks, report subjective comfort with rumination for a period of 90 days, within 6 months as clinically observed and by patient self-report.       Intervention:   Therapist met with patient to review goals and interventions. Therapist utilized reflected listening as patient gave brief reflection of week. Patient reported not feeling well and thinking increase depression but maybe situational due to ending realtionship with partner. Therapist supported patient as she processed and validated patient. Therapist utilized strength based modality with patient and coached patient in managing emotions. Therapist encouraged patient to slow down and take steps towards what she wants but not need to commit to the end plan.   Patient presented with an anxious affect. Patient was engaged in session and open to feedback. Patient contracts for safety       There has been demonstrated improvement in functioning while patient has been engaged in psychotherapy/psychological service- if withdrawn the patient would deteriorate and/or relapse.       Assessments completed prior to  visit:  The following assessments were completed by patient for this visit:  PHQ9:   PHQ-9 SCORE 4/27/2022 5/12/2022 6/6/2022 6/21/2022 8/24/2022 9/29/2022 10/31/2022   PHQ-9 Total Score MyChart 16 (Moderately severe depression) - - 10 (Moderate depression) - 10 (Moderate depression) 3 (Minimal depression)   PHQ-9 Total Score 16 15 7 10 19 10 3   Some encounter information is confidential and restricted. Go to Review Flowsheets activity to see all data.     GAD7:   YESSI-7 SCORE 4/1/2022 4/27/2022 5/11/2022 6/6/2022 6/21/2022 8/24/2022 10/31/2022   Total Score - 7 (mild anxiety) 9 (mild anxiety) - 9 (mild anxiety) - 2 (minimal anxiety)   Total Score 12 7 9 10 9 13 2   Some encounter information is confidential and restricted. Go to Review Flowsheets activity to see all data.           ASSESSMENT: Current Emotional / Mental Status (status of significant symptoms):   Risk status (Self / Other harm or suicidal ideation)   Patient denies current fears or concerns for personal safety.   Patient reports the following current or recent suicidal ideation or behaviors: continuing to improve with passive SI  contracts for safety.   Patient denies current or recent homicidal ideation or behaviors.   Patient denies current or recent self injurious behavior or ideation.   Patient denies other safety concerns.   Patient reports there has been no change in risk factors since their last session.     Patient reports there has been no change in protective factors since their last session.     A safety and risk management plan has been developed including: Patient consented to co-developed safety plan on 2.21.2022.  Safety and risk management plan was reviewed.   Patient agreed to use safety plan should any safety concerns arise.  A copy was made available to the patient.     Appearance:   Appropriate     Eye Contact:   Fair     Psychomotor Behavior: Restless     Attitude:   Cooperative    Orientation:   All   Speech    Rate /  "Production: Emotional Talkative    Volume:  Normal    Mood:    Anxious  Depressed    Affect:    Worrisome    Thought Content:  Clear    Thought Form:  Coherent    Insight:    Fair      Medication Review:   No changes to current psychiatric medication(s)     Medication Compliance:   Yes     Changes in Health Issues:   None reported     Chemical Use Review:   Substance Use: Chemical use reviewed, no active concerns identified      Tobacco Use: No current tobacco use.      Diagnosis:  1. Bipolar affective disorder, currently depressed, moderate (H)    2. PTSD (post-traumatic stress disorder)    3. Generalized anxiety disorder        Collateral Reports Completed:   Not Applicable    PLAN: (Patient Tasks / Therapist Tasks / Other)  Make visual safety plan   Manage depressive symptoms with coping skills  When feeling SI follow safety plan  Continue to manage SI and go to ER if feeling unsafe  TMS referral   IRT therapy and coached patient through the record and rewriting process. Therapist validated patient's emotions with the break up and back together and suggested patient and her partner have a lot to talk about and looking at their futures sooner than later.     [facility] has a new mental health emergency area at [facility] called [facility] that is very calming and helpful if you need additional support. ([address] [place] [address]  [phone]).     Name and challenge cognitive distortions  read \"stop walking on eggshells.  encouraged patient to slow down and take steps towards what she wants but not need to commit to the end plan.    Katie Faulkner, LICSW   11/11/2022                                                         ______________________________________________________________________    Individual Treatment Plan    Patient's Name: Wandy Ann  YOB: 2000    Date of Creation: 2.22.2021  Date Treatment Plan Last Reviewed/Revised: 8/16/2022 due " 11/16/2022    DSM5 Diagnoses: 296.32 (F33.1) Major Depressive Disorder, Recurrent Episode, Moderate With mixed features, 300.02 (F41.1) Generalized Anxiety Disorder or 309.81 (F43.10) Posttraumatic Stress Disorder (includes Posttraumatic Stress Disorder for Children 6 Years and Younger)  Without dissociative symptoms  Psychosocial / Contextual Factors: past trauma, developmental hx and current stressors   PROMIS (reviewed every 90 days):     Referral / Collaboration:  Referral to another professional/service is not indicated at this time..    Anticipated number of session for this episode of care: 12  Anticipation frequency of session: Biweekly  Anticipated Duration of each session: 38-52 minutes  Treatment plan will be reviewed in 90 days or when goals have been changed.       MeasurableTreatment Goal(s) related to diagnosis / functional impairment(s)  Goal 1: Patient will report absence of persistent SI and report of reduced frequency and intensity of SI and absence of SI to acceptable levels, report subjective improved mood for a period of 90 days, within 6 months as clinically observed and by patient self-report    I will know I've met my goal when I can see the difference between a bad moment and what I want in th future.      Objective #A (Patient Action)    Patient will demonstrate control of impulses and ability to use positive coping tools to manage strong emotions that can be safely addressed at a lower level of care. Absence of persistent SI and report of reduced frequency and intensity of SI and absence of SI to acceptable levels, report subjective improved mood for a period of 90 days, within 6 months as clinically observed and by patient self-report.  Status: Continued - Date(s): 8/16/2022    Intervention(s)  Therapist will provide individual therapy to identify triggers to SI urges, gain feedback on helpful coping strategies, and identify ways that family can offer support. Tx to discuss current  stressors and interpersonal conflicts and how to cope with these, coaching, diagnostic testing, referral for medication as indicated, use prescribed medication, cognitive restructuring, interpersonal family therapy, supportive therapy services.        Goal 2: Patient will report absence of persistent depression mood and report of reduced frequency and intensity of low mood and absence of persistent low energy and motivation to acceptable levels, report subjective improved motivation and increased energy for a period of 90 days, within 6 months as clinically observed and by patient self-report    I will know I've met my goal when I no longer feel sad.      Objective #A (Patient Action)    Status: Continued - Date(s): 8/16/2022    Patient will demonstrate and report a level of depression that can be managed at a lower level of care.  Absence of persistent depression mood and report of reduced frequency and intensity of low mood and absence of persistent low energy and motivation to acceptable levels, report subjective improved motivation and increased energy for a period of 90 days, within 6 months as clinically observed and by patient self-report.    Intervention(s)  Therapist will provide individual therapy to identify triggers to depression, gain feedback on helpful coping tools and thought-reframing techniques, and identify preferred way of being.  Tx to include discussion of current stressors and interpersonal conflicts and how to cope with these, coaching, diagnostic testing, referral for medication as indicated, use prescribed medication, cognitive restructuring, interpersonal, family therapy, supportive therapy services.        Goal 3: Patiient will report absence of persistent anxiety mood and report of reduced frequency and intensity of worry and absence of persistent anxious mood to acceptable levels, no panic attacks, report subjective comfort with rumination for a period of 90 days. Within 6 months as  clinically observed and by patient self-report    I will know I've met my goal when I can go days without worrying about little things or shaking.      Objective #A (Patient Action)    Status: Continued - Date(s): 8/16/2022    Patient will demonstrate and report a level of anxiety that can be managed at a lower level of care.  Absence of persistent anxious mood and report of reduced frequency and intensity of worry and absence of persistent anxious mood to acceptable levels, no panic attacks, report subjective comfort with rumination for a period of 90 days, within 6 months as clinically observed and by patient self-report.    Intervention(s)  Therapist will provide individual therapy to identify triggers to anxiety, gain feedback on helpful coping tools and thought-reframing techniques, and identify preferred way of being. Tx to include discuss current stressors and interpersonal conflicts and how to cope with these, coaching, diagnostic testing , referral for medication as indicated, use prescribed medication, cognitive restructuring, interpersonal,   family therapy, supportive therapy services.        Patient has reviewed and agreed to the above plan.      Katie Faulkner, [facility]   8/16/2022                                                   Wandy Ann            SAFETY PLAN:  Step 1: Warning signs / cues (Thoughts, images, mood, situation, behavior) that a crisis may be developing:  ? Thoughts: thoughts of not wanting to be here  ? Images: no images  ? Thinking Processes: intrusive thoughts (bothersome, unwanted thoughts that come out of nowhere): get irritated  ? Mood: intense anger and agitation  ? Behaviors: isolating/withdrawing   ? Situations: trauma    Step 2: Coping strategies - Things I can do to take my mind off of my problems without contacting another person (relaxation technique, physical activity):  ? Distress Tolerance Strategies:  arts and crafts: drawing and painting  ? Physical  "Activities: exercise: working out  ? Focus on helpful thoughts:  \"This is temporary\", \"It always passes\" and \"Ride the wave\"  Step 3: People and social settings that provide distraction:                 Name: Jennifer     Phone: [phone]                 Name: Antonina         Phone: [phone]                 Name: panchito       Phone: [phone]  ? friends house or car   Step 4: Remind myself of people and things that are important to me and worth living for:  Best friend and cat        Step 5: When I am in crisis, I can ask these people to help me use my safety plan:                 Name: Jennifer     Phone: [phone]                 Name: Antonina         Phone: [phone]                 Name: panchito       Phone: [phone]  Step 6: Making the environment safe:   ? be around others  Step 7: Professionals or agencies I can contact during a crisis:  ? [facility] Number: [phone]  ? Suicide Prevention Lifeline: 0-832-712-TXRU (5559)  ? Crisis Text Line Service (available 24 hours a day, 7 days a week): Text [place] to 422278    Local Crisis Services: 988     Call 911 or go to my nearest emergency department.       I helped develop this safety plan and agree to use it when needed.  I have been given a copy of this plan.       Client signature _________________________________________________________________  Today s date:  2/22/2021  Adapted from Safety Plan Template 2008 Marisol Cazares and Mario Flores is reprinted with the express permission of the authors.  No portion of the Safety Plan Template may be reproduced without the express, written permission.  You can contact the authors at [email or web address] or [email or web address].  "

## 2022-11-29 ASSESSMENT — ANXIETY QUESTIONNAIRES
IF YOU CHECKED OFF ANY PROBLEMS ON THIS QUESTIONNAIRE, HOW DIFFICULT HAVE THESE PROBLEMS MADE IT FOR YOU TO DO YOUR WORK, TAKE CARE OF THINGS AT HOME, OR GET ALONG WITH OTHER PEOPLE: SOMEWHAT DIFFICULT
GAD7 TOTAL SCORE: 11
7. FEELING AFRAID AS IF SOMETHING AWFUL MIGHT HAPPEN: MORE THAN HALF THE DAYS
6. BECOMING EASILY ANNOYED OR IRRITABLE: NOT AT ALL
8. IF YOU CHECKED OFF ANY PROBLEMS, HOW DIFFICULT HAVE THESE MADE IT FOR YOU TO DO YOUR WORK, TAKE CARE OF THINGS AT HOME, OR GET ALONG WITH OTHER PEOPLE?: SOMEWHAT DIFFICULT
7. FEELING AFRAID AS IF SOMETHING AWFUL MIGHT HAPPEN: MORE THAN HALF THE DAYS
3. WORRYING TOO MUCH ABOUT DIFFERENT THINGS: MORE THAN HALF THE DAYS
1. FEELING NERVOUS, ANXIOUS, OR ON EDGE: MORE THAN HALF THE DAYS
4. TROUBLE RELAXING: SEVERAL DAYS
GAD7 TOTAL SCORE: 11
2. NOT BEING ABLE TO STOP OR CONTROL WORRYING: MORE THAN HALF THE DAYS
5. BEING SO RESTLESS THAT IT IS HARD TO SIT STILL: MORE THAN HALF THE DAYS
GAD7 TOTAL SCORE: 11

## 2022-12-02 ENCOUNTER — VIRTUAL VISIT (OUTPATIENT)
Dept: PSYCHOLOGY | Facility: CLINIC | Age: 22
End: 2022-12-02
Payer: COMMERCIAL

## 2022-12-02 ENCOUNTER — MYC MEDICAL ADVICE (OUTPATIENT)
Dept: PSYCHIATRY | Facility: CLINIC | Age: 22
End: 2022-12-02

## 2022-12-02 DIAGNOSIS — F43.10 PTSD (POST-TRAUMATIC STRESS DISORDER): ICD-10-CM

## 2022-12-02 DIAGNOSIS — F41.1 GENERALIZED ANXIETY DISORDER: Primary | ICD-10-CM

## 2022-12-02 DIAGNOSIS — F31.32 BIPOLAR AFFECTIVE DISORDER, CURRENTLY DEPRESSED, MODERATE (H): ICD-10-CM

## 2022-12-02 PROCEDURE — 90834 PSYTX W PT 45 MINUTES: CPT | Mod: 95 | Performed by: SOCIAL WORKER

## 2022-12-02 ASSESSMENT — PATIENT HEALTH QUESTIONNAIRE - PHQ9: SUM OF ALL RESPONSES TO PHQ QUESTIONS 1-9: 14

## 2022-12-02 NOTE — PROGRESS NOTES
[facility]                                      Progress Note    Patient Name: Wandy Ann  Date: 2022         Service Type: Individual      Session Start Time: 1206 Session End Time: 1249     Session Length: 38-52    Session #: 38    Attendees: Client    Service Modality: Video session  Yes, the patient's condition can be safely assessed and treated via synchronous audio and visual telemedicine encounter.      Reason for Video Visit: Services only offered telehealth    Location of Originating Site: Patient's home    Distant Site: Provider Remote Setting home office    Provider verified identity through the following two step process.  Patient provided:  Patient  and Patient address    Consent:  The patient/guardian has verbally consented to: the potential risks and benefits of telemedicine (video visit) versus in person care; bill my insurance or make self-payment for services provided; and responsibility for payment of non-covered services.      Mode of transmission-Belly  Interactive Complexity: No  Crisis: No        Progress Since Last Session (Related to Symptoms / Goals / Homework):   Symptoms: Worsening phq-9 [id number]     Homework: Partially completed      Episode of Care Goals: Minimal progress - PREPARATION (Decided to change - considering how); Intervened by negotiating a change plan and determining options / strategies for behavior change, identifying triggers, exploring social supports, and working towards setting a date to begin behavior change     Current / Ongoing Stressors and Concerns:   past trauma, developmental hx and current stressors      Treatment Objective(s) Addressed in This Session:   Patient will demonstrate control of impulses and ability to use positive coping tools to manage strong emotions that can be safely addressed at a lower level of care. Absence of persistent SI and report of reduced frequency and intensity of SI and absence of  SI to acceptable levels, report subjective improved mood for a period of 90 days, within 6 months as clinically observed and by patient self-report.  Patient will demonstrate and report a level of depression that can be managed at a lower level of care.  Absence of persistent depression mood and report of reduced frequency and intensity of low mood and absence of persistent low energy and motivation to acceptable levels, report subjective improved motivation and increased energy for a period of 90 days, within 6 months as clinically observed and by patient self-report.  Patient will demonstrate and report a level of anxiety that can be managed at a lower level of care.  Absence of persistent anxious mood and report of reduced frequency and intensity of worry and absence of persistent anxious mood to acceptable levels, no panic attacks, report subjective comfort with rumination for a period of 90 days, within 6 months as clinically observed and by patient self-report.       Intervention:   Therapist met with patient to review goals and interventions. Therapist utilized reflected listening as patient gave brief reflection of week. Patient reported  Increase in depression and SI. Therapist supported patient as she processed. Therapist provided education with patient and encouraged patient to speak to her psychiatrist about a medication increase.  Therapist and patient reviewed safety planning and called [phone] together to ask clarifying questions. Therapist provided patient psycho education on suicide and preventatives. Patient contracted for safety and was future focused.   Patient presented with an anxious affect. Patient was engaged in session and open to feedback. Patient contracts for safety       There has been demonstrated improvement in functioning while patient has been engaged in psychotherapy/psychological service- if withdrawn the patient would deteriorate and/or relapse.       Assessments completed prior to  visit:  The following assessments were completed by patient for this visit:  PHQ9:   PHQ-9 SCORE 5/12/2022 6/6/2022 6/21/2022 8/24/2022 9/29/2022 10/31/2022 12/2/2022   PHQ-9 Total Score MyChart - - 10 (Moderate depression) - 10 (Moderate depression) 3 (Minimal depression) -   PHQ-9 Total Score 15 7 10 19 10 3 14   Some encounter information is confidential and restricted. Go to Review Flowsheets activity to see all data.     GAD7:   YESSI-7 SCORE 4/27/2022 5/11/2022 6/6/2022 6/21/2022 8/24/2022 10/31/2022 11/29/2022   Total Score 7 (mild anxiety) 9 (mild anxiety) - 9 (mild anxiety) - 2 (minimal anxiety) 11 (moderate anxiety)   Total Score 7 9 10 9 13 2 11   Some encounter information is confidential and restricted. Go to Review Flowsheets activity to see all data.           ASSESSMENT: Current Emotional / Mental Status (status of significant symptoms):   Risk status (Self / Other harm or suicidal ideation)   Patient denies current fears or concerns for personal safety.   Patient reports the following current or recent suicidal ideation or behaviors: passive SI contracted for safety.   Patient denies current or recent homicidal ideation or behaviors.   Patient denies current or recent self injurious behavior or ideation.   Patient denies other safety concerns.   Patient reports there has been no change in risk factors since their last session.     Patient reports there has been no change in protective factors since their last session.     A safety and risk management plan has been developed including: Patient consented to co-developed safety plan on 2.21.2022.  Safety and risk management plan was reviewed.   Patient agreed to use safety plan should any safety concerns arise.  A copy was made available to the patient.     Appearance:   Appropriate     Eye Contact:   Fair     Psychomotor Behavior: Restless     Attitude:   Cooperative    Orientation:   All   Speech    Rate / Production: Emotional  "Talkative    Volume:  Normal    Mood:    Anxious  Depressed  Sad    Affect:    Worrisome    Thought Content:  Clear    Thought Form:  Coherent    Insight:    Fair      Medication Review:   No changes to current psychiatric medication(s)     Medication Compliance:   Yes     Changes in Health Issues:   None reported     Chemical Use Review:   Substance Use: Chemical use reviewed, no active concerns identified      Tobacco Use: No current tobacco use.      Diagnosis:  1. Bipolar affective disorder, currently depressed, moderate (H)    2. PTSD (post-traumatic stress disorder)    3. Generalized anxiety disorder        Collateral Reports Completed:   Not Applicable    PLAN: (Patient Tasks / Therapist Tasks / Other)  Make visual safety plan   Manage depressive symptoms with coping skills  When feeling SI follow safety plan  Continue to manage SI and go to ER if feeling unsafe  TMS referral   IRT therapy and coached patient through the record and rewriting process. Therapist validated patient's emotions with the break up and back together and suggested patient and her partner have a lot to talk about and looking at their futures sooner than later.     [facility] has a new mental health emergency area at [facility] called [facility] that is very calming and helpful if you need additional support. ([address] [place] [address]  [phone]).     Name and challenge cognitive distortions  read \"stop walking on eggshells.    Call [phone] if feeling SI has increase  Utilize coping skills when feeling impulsive.       Katie Faulkner, LICSW  12/2/2022                                                         ______________________________________________________________________    Individual Treatment Plan    Patient's Name: Wandy Ann  YOB: 2000    Date of Creation: 2.22.2021  Date Treatment Plan Last Reviewed/Revised: 12/2/2022 due 3/2/2023    DSM5 Diagnoses: 296.52 Bipolar I " Disorder Current or Most Recent Episode Depressed, Moderate, 300.02 (F41.1) Generalized Anxiety Disorder or 309.81 (F43.10) Posttraumatic Stress Disorder (includes Posttraumatic Stress Disorder for Children 6 Years and Younger)  Without dissociative symptoms  Psychosocial / Contextual Factors: past trauma, developmental hx and current stressors   PROMIS (reviewed every 90 days): 12/2/2022    Referral / Collaboration:  Referral to another professional/service is not indicated at this time..    Anticipated number of session for this episode of care: 12  Anticipation frequency of session: Biweekly  Anticipated Duration of each session: 38-52 minutes  Treatment plan will be reviewed in 90 days or when goals have been changed.       MeasurableTreatment Goal(s) related to diagnosis / functional impairment(s)  Goal 1: Patient will report absence of persistent SI and report of reduced frequency and intensity of SI and absence of SI to acceptable levels, report subjective improved mood for a period of 90 days, within 6 months as clinically observed and by patient self-report    I will know I've met my goal when I can see the difference between a bad moment and what I want in th future.      Objective #A (Patient Action)    Patient will demonstrate control of impulses and ability to use positive coping tools to manage strong emotions that can be safely addressed at a lower level of care. Absence of persistent SI and report of reduced frequency and intensity of SI and absence of SI to acceptable levels, report subjective improved mood for a period of 90 days, within 6 months as clinically observed and by patient self-report.  Status: Continued - Date(s): 12/2/2022    Intervention(s)  Therapist will provide individual therapy to identify triggers to SI urges, gain feedback on helpful coping strategies, and identify ways that family can offer support. Tx to discuss current stressors and interpersonal conflicts and how to cope  with these, coaching, diagnostic testing, referral for medication as indicated, use prescribed medication, cognitive restructuring, interpersonal family therapy, supportive therapy services.        Goal 2: Patient will report absence of persistent depression mood and report of reduced frequency and intensity of low mood and absence of persistent low energy and motivation to acceptable levels, report subjective improved motivation and increased energy for a period of 90 days, within 6 months as clinically observed and by patient self-report    I will know I've met my goal when I no longer feel sad.      Objective #A (Patient Action)    Status: Continued - Date(s): 12/2/2022    Patient will demonstrate and report a level of depression that can be managed at a lower level of care.  Absence of persistent depression mood and report of reduced frequency and intensity of low mood and absence of persistent low energy and motivation to acceptable levels, report subjective improved motivation and increased energy for a period of 90 days, within 6 months as clinically observed and by patient self-report.    Intervention(s)  Therapist will provide individual therapy to identify triggers to depression, gain feedback on helpful coping tools and thought-reframing techniques, and identify preferred way of being.  Tx to include discussion of current stressors and interpersonal conflicts and how to cope with these, coaching, diagnostic testing, referral for medication as indicated, use prescribed medication, cognitive restructuring, interpersonal, family therapy, supportive therapy services.        Goal 3: Patiient will report absence of persistent anxiety mood and report of reduced frequency and intensity of worry and absence of persistent anxious mood to acceptable levels, no panic attacks, report subjective comfort with rumination for a period of 90 days. Within 6 months as clinically observed and by patient self-report    I will  know I've met my goal when I can go days without worrying about little things or shaking.      Objective #A (Patient Action)    Status: Continued - Date(s): 12/2/2022    Patient will demonstrate and report a level of anxiety that can be managed at a lower level of care.  Absence of persistent anxious mood and report of reduced frequency and intensity of worry and absence of persistent anxious mood to acceptable levels, no panic attacks, report subjective comfort with rumination for a period of 90 days, within 6 months as clinically observed and by patient self-report.    Intervention(s)  Therapist will provide individual therapy to identify triggers to anxiety, gain feedback on helpful coping tools and thought-reframing techniques, and identify preferred way of being. Tx to include discuss current stressors and interpersonal conflicts and how to cope with these, coaching, diagnostic testing , referral for medication as indicated, use prescribed medication, cognitive restructuring, interpersonal,   family therapy, supportive therapy services.        Patient has reviewed and agreed to the above plan.      Katie Faulkner, [employer]   12/2/2022                                                   Wandy Ann            SAFETY PLAN:  Step 1: Warning signs / cues (Thoughts, images, mood, situation, behavior) that a crisis may be developing:  ? Thoughts: thoughts of not wanting to be here  ? Images: no images  ? Thinking Processes: intrusive thoughts (bothersome, unwanted thoughts that come out of nowhere): get irritated  ? Mood: intense anger and agitation  ? Behaviors: isolating/withdrawing   ? Situations: trauma    Step 2: Coping strategies - Things I can do to take my mind off of my problems without contacting another person (relaxation technique, physical activity):  ? Distress Tolerance Strategies:  arts and crafts: drawing and painting  ? Physical Activities: exercise: working out  ? Focus on helpful  "thoughts:  \"This is temporary\", \"It always passes\" and \"Ride the wave\"  Step 3: People and social settings that provide distraction:                 Name: Jennifer     Phone: [phone]                 Name: Antonina         Phone: [phone]                 Name: panchito       Phone: [phone]  ? friends house or car   Step 4: Remind myself of people and things that are important to me and worth living for:  Best friend and cat        Step 5: When I am in crisis, I can ask these people to help me use my safety plan:                 Name: Jennifer     Phone: [phone]                 Name: Antonina         Phone: [phone]                 Name: panchito       Phone: [phone]  Step 6: Making the environment safe:   ? be around others  Step 7: Professionals or agencies I can contact during a crisis:  ? [facility] Daytime Number: [phone]  ? Suicide Prevention Lifeline: 0-828-145-RRMR (2677)  ? Crisis Text Line Service (available 24 hours a day, 7 days a week): Text [place] to 569681    Local Crisis Services: 988     Call 911 or go to my nearest emergency department.       I helped develop this safety plan and agree to use it when needed.  I have been given a copy of this plan.       Client signature _________________________________________________________________  Today s date:  2/22/2021  Adapted from Safety Plan Template 2008 Marisol Cazares and Mario Flores is reprinted with the express permission of the authors.  No portion of the Safety Plan Template may be reproduced without the express, written permission.  You can contact the authors at [email or web address] or [email or web address].  "

## 2022-12-15 ENCOUNTER — VIRTUAL VISIT (OUTPATIENT)
Dept: PSYCHOLOGY | Facility: CLINIC | Age: 22
End: 2022-12-15
Payer: COMMERCIAL

## 2022-12-15 DIAGNOSIS — F41.1 GENERALIZED ANXIETY DISORDER: ICD-10-CM

## 2022-12-15 DIAGNOSIS — F31.32 BIPOLAR AFFECTIVE DISORDER, CURRENTLY DEPRESSED, MODERATE (H): Primary | ICD-10-CM

## 2022-12-15 DIAGNOSIS — F43.10 PTSD (POST-TRAUMATIC STRESS DISORDER): ICD-10-CM

## 2022-12-15 PROCEDURE — 90834 PSYTX W PT 45 MINUTES: CPT | Mod: 95 | Performed by: SOCIAL WORKER

## 2022-12-15 NOTE — PROGRESS NOTES
[facility]                                      Progress Note    Patient Name: Wandy Ann  Date: 12/15/2022         Service Type: Individual      Session Start Time:  Session End Time:      Session Length: 38-52    Session #: 39    Attendees: Client    Service Modality: Video session  Yes, the patient's condition can be safely assessed and treated via synchronous audio and visual telemedicine encounter.      Reason for Video Visit: Services only offered telehealth    Location of Originating Site: Patient's home    Distant Site: Provider Remote Setting home office    Provider verified identity through the following two step process.  Patient provided:  Patient  and Patient address    Consent:  The patient/guardian has verbally consented to: the potential risks and benefits of telemedicine (video visit) versus in person care; bill my insurance or make self-payment for services provided; and responsibility for payment of non-covered services.      Mode of transmission-[address]  Interactive Complexity: No  Crisis: No        Progress Since Last Session (Related to Symptoms / Goals / Homework):   Symptoms: No change per patient     Homework: Partially completed      Episode of Care Goals: Minimal progress - PREPARATION (Decided to change - considering how); Intervened by negotiating a change plan and determining options / strategies for behavior change, identifying triggers, exploring social supports, and working towards setting a date to begin behavior change     Current / Ongoing Stressors and Concerns:   past trauma, developmental hx and current stressors      Treatment Objective(s) Addressed in This Session:   Patient will demonstrate control of impulses and ability to use positive coping tools to manage strong emotions that can be safely addressed at a lower level of care. Absence of persistent SI and report of reduced frequency and intensity of SI and absence  of SI to acceptable levels, report subjective improved mood for a period of 90 days, within 6 months as clinically observed and by patient self-report.  Patient will demonstrate and report a level of depression that can be managed at a lower level of care.  Absence of persistent depression mood and report of reduced frequency and intensity of low mood and absence of persistent low energy and motivation to acceptable levels, report subjective improved motivation and increased energy for a period of 90 days, within 6 months as clinically observed and by patient self-report.  Patient will demonstrate and report a level of anxiety that can be managed at a lower level of care.  Absence of persistent anxious mood and report of reduced frequency and intensity of worry and absence of persistent anxious mood to acceptable levels, no panic attacks, report subjective comfort with rumination for a period of 90 days, within 6 months as clinically observed and by patient self-report.       Intervention:   Therapist met with patient to review goals and interventions. Therapist utilized reflected listening as patient gave brief reflection of week. Patient reported depression and SI the same as last session and she has not heard back from psychiatry since she sent them a msg on 12/2. Patient reported not wanted to reach out again even though she knows she should. Therapist asked if therapist could reach out and patient agreed. Therapist sent msg to psychiatrist and psychiatrist therapist. Therapist encouraged patient to call psychiatry at 1530 if she has not heard from them and patient agreed. Therapist reviewed safety plan with patient and patient contracted for safety. Therapist offered patient hope and patient was future forward.  Patient presented with an anxious affect. Patient was engaged in session and open to feedback. Patient contracts for safety       There has been demonstrated improvement in functioning while patient has  been engaged in psychotherapy/psychological service- if withdrawn the patient would deteriorate and/or relapse.       Assessments completed prior to visit:  The following assessments were completed by patient for this visit:  PHQ9:   PHQ-9 SCORE 5/12/2022 6/6/2022 6/21/2022 8/24/2022 9/29/2022 10/31/2022 12/2/2022   PHQ-9 Total Score MyChart - - 10 (Moderate depression) - 10 (Moderate depression) 3 (Minimal depression) -   PHQ-9 Total Score 15 7 10 19 10 3 14   Some encounter information is confidential and restricted. Go to Review Flowsheets activity to see all data.     GAD7:   YESSI-7 SCORE 4/27/2022 5/11/2022 6/6/2022 6/21/2022 8/24/2022 10/31/2022 11/29/2022   Total Score 7 (mild anxiety) 9 (mild anxiety) - 9 (mild anxiety) - 2 (minimal anxiety) 11 (moderate anxiety)   Total Score 7 9 10 9 13 2 11   Some encounter information is confidential and restricted. Go to Review Flowsheets activity to see all data.           ASSESSMENT: Current Emotional / Mental Status (status of significant symptoms):   Risk status (Self / Other harm or suicidal ideation)   Patient denies current fears or concerns for personal safety.   Patient reports the following current or recent suicidal ideation or behaviors: passive SI contracted for safety.   Patient denies current or recent homicidal ideation or behaviors.   Patient denies current or recent self injurious behavior or ideation.   Patient denies other safety concerns.   Patient reports there has been no change in risk factors since their last session.     Patient reports there has been no change in protective factors since their last session.     A safety and risk management plan has been developed including: Patient consented to co-developed safety plan on 2.21.2022.  Safety and risk management plan was reviewed.   Patient agreed to use safety plan should any safety concerns arise.  A copy was made available to the patient.     Appearance:   Appropriate     Eye Contact:   Fair   "   Psychomotor Behavior: Restless     Attitude:   Cooperative    Orientation:   All   Speech    Rate / Production: Emotional Talkative    Volume:  Normal    Mood:    Anxious  Depressed  Sad    Affect:    Worrisome    Thought Content:  Clear    Thought Form:  Coherent    Insight:    Fair      Medication Review:   No changes to current psychiatric medication(s)     Medication Compliance:   Yes     Changes in Health Issues:   None reported     Chemical Use Review:   Substance Use: Chemical use reviewed, no active concerns identified      Tobacco Use: No current tobacco use.      Diagnosis:  1. Bipolar affective disorder, currently depressed, moderate (H)    2. PTSD (post-traumatic stress disorder)    3. Generalized anxiety disorder        Collateral Reports Completed:   Not Applicable    PLAN: (Patient Tasks / Therapist Tasks / Other)  Make visual safety plan   Manage depressive symptoms with coping skills  When feeling SI follow safety plan  Continue to manage SI and go to ER if feeling unsafe  TMS referral   IRT therapy and coached patient through the record and rewriting process. Therapist validated patient's emotions with the break up and back together and suggested patient and her partner have a lot to talk about and looking at their futures sooner than later.     [facility] has a new mental health emergency area at [facility] called [facility] that is very calming and helpful if you need additional support. ([address] [place] [address]  [phone]).     Name and challenge cognitive distortions  read \"stop walking on eggshells.    Call [phone] if feeling SI has increase  Utilize coping skills when feeling impulsive.   Therapist sent msg to psychiatrist and psychiatrist therapist. Therapist encouraged patient to call psychiatry at 1530 if she has not heard from them and patient agreed.    Katie Faulkner, [employer]  12/15/2022                                                     "     ______________________________________________________________________    Individual Treatment Plan    Patient's Name: Wandy Ann  YOB: 2000    Date of Creation: 2.22.2021  Date Treatment Plan Last Reviewed/Revised: 12/2/2022 due 3/2/2023    DSM5 Diagnoses: 296.52 Bipolar I Disorder Current or Most Recent Episode Depressed, Moderate, 300.02 (F41.1) Generalized Anxiety Disorder or 309.81 (F43.10) Posttraumatic Stress Disorder (includes Posttraumatic Stress Disorder for Children 6 Years and Younger)  Without dissociative symptoms  Psychosocial / Contextual Factors: past trauma, developmental hx and current stressors   PROMIS (reviewed every 90 days): 12/2/2022    Referral / Collaboration:  Referral to another professional/service is not indicated at this time..    Anticipated number of session for this episode of care: 12  Anticipation frequency of session: Biweekly  Anticipated Duration of each session: 38-52 minutes  Treatment plan will be reviewed in 90 days or when goals have been changed.       MeasurableTreatment Goal(s) related to diagnosis / functional impairment(s)  Goal 1: Patient will report absence of persistent SI and report of reduced frequency and intensity of SI and absence of SI to acceptable levels, report subjective improved mood for a period of 90 days, within 6 months as clinically observed and by patient self-report    I will know I've met my goal when I can see the difference between a bad moment and what I want in th future.      Objective #A (Patient Action)    Patient will demonstrate control of impulses and ability to use positive coping tools to manage strong emotions that can be safely addressed at a lower level of care. Absence of persistent SI and report of reduced frequency and intensity of SI and absence of SI to acceptable levels, report subjective improved mood for a period of 90 days, within 6 months as clinically observed and by patient  self-report.  Status: Continued - Date(s): 12/2/2022    Intervention(s)  Therapist will provide individual therapy to identify triggers to SI urges, gain feedback on helpful coping strategies, and identify ways that family can offer support. Tx to discuss current stressors and interpersonal conflicts and how to cope with these, coaching, diagnostic testing, referral for medication as indicated, use prescribed medication, cognitive restructuring, interpersonal family therapy, supportive therapy services.        Goal 2: Patient will report absence of persistent depression mood and report of reduced frequency and intensity of low mood and absence of persistent low energy and motivation to acceptable levels, report subjective improved motivation and increased energy for a period of 90 days, within 6 months as clinically observed and by patient self-report    I will know I've met my goal when I no longer feel sad.      Objective #A (Patient Action)    Status: Continued - Date(s): 12/2/2022    Patient will demonstrate and report a level of depression that can be managed at a lower level of care.  Absence of persistent depression mood and report of reduced frequency and intensity of low mood and absence of persistent low energy and motivation to acceptable levels, report subjective improved motivation and increased energy for a period of 90 days, within 6 months as clinically observed and by patient self-report.    Intervention(s)  Therapist will provide individual therapy to identify triggers to depression, gain feedback on helpful coping tools and thought-reframing techniques, and identify preferred way of being.  Tx to include discussion of current stressors and interpersonal conflicts and how to cope with these, coaching, diagnostic testing, referral for medication as indicated, use prescribed medication, cognitive restructuring, interpersonal, family therapy, supportive therapy services.        Goal 3: Patiient will  report absence of persistent anxiety mood and report of reduced frequency and intensity of worry and absence of persistent anxious mood to acceptable levels, no panic attacks, report subjective comfort with rumination for a period of 90 days. Within 6 months as clinically observed and by patient self-report    I will know I've met my goal when I can go days without worrying about little things or shaking.      Objective #A (Patient Action)    Status: Continued - Date(s): 12/2/2022    Patient will demonstrate and report a level of anxiety that can be managed at a lower level of care.  Absence of persistent anxious mood and report of reduced frequency and intensity of worry and absence of persistent anxious mood to acceptable levels, no panic attacks, report subjective comfort with rumination for a period of 90 days, within 6 months as clinically observed and by patient self-report.    Intervention(s)  Therapist will provide individual therapy to identify triggers to anxiety, gain feedback on helpful coping tools and thought-reframing techniques, and identify preferred way of being. Tx to include discuss current stressors and interpersonal conflicts and how to cope with these, coaching, diagnostic testing , referral for medication as indicated, use prescribed medication, cognitive restructuring, interpersonal,   family therapy, supportive therapy services.        Patient has reviewed and agreed to the above plan.      Katie Faulkner, [facility]   12/2/2022                                                   Wandy Ann            SAFETY PLAN:  Step 1: Warning signs / cues (Thoughts, images, mood, situation, behavior) that a crisis may be developing:  ? Thoughts: thoughts of not wanting to be here  ? Images: no images  ? Thinking Processes: intrusive thoughts (bothersome, unwanted thoughts that come out of nowhere): get irritated  ? Mood: intense anger and agitation  ? Behaviors: isolating/withdrawing  "  ? Situations: trauma    Step 2: Coping strategies - Things I can do to take my mind off of my problems without contacting another person (relaxation technique, physical activity):  ? Distress Tolerance Strategies:  arts and crafts: drawing and painting  ? Physical Activities: exercise: working out  ? Focus on helpful thoughts:  \"This is temporary\", \"It always passes\" and \"Ride the wave\"  Step 3: People and social settings that provide distraction:                 Name: Jennifer     Phone: [phone]                 Name: Antonina         Phone: [phone]                 Name: panchito       Phone: [phone]  ? friends house or car   Step 4: Remind myself of people and things that are important to me and worth living for:  Best friend and cat        Step 5: When I am in crisis, I can ask these people to help me use my safety plan:                 Name: Jennifer     Phone: [phone]                 Name: Antonina         Phone: [phone]                 Name: panchito       Phone: [phone]  Step 6: Making the environment safe:   ? be around others  Step 7: Professionals or agencies I can contact during a crisis:  ? [facility] Daytime Number: [phone]  ? Suicide Prevention Lifeline: 8-358-434-QKHP (7236)  ? Crisis Text Line Service (available 24 hours a day, 7 days a week): Text [place] to 102259    Local Crisis Services: 988     Call 911 or go to my nearest emergency department.       I helped develop this safety plan and agree to use it when needed.  I have been given a copy of this plan.       Client signature _________________________________________________________________  Today s date:  2/22/2021  Adapted from Safety Plan Template 2008 Marisol Cazares and Mario Flores is reprinted with the express permission of the authors.  No portion of the Safety Plan Template may be reproduced without the express, written permission.  You can contact the authors at [email or web address] or " [email or web address].

## 2022-12-19 ENCOUNTER — TELEPHONE (OUTPATIENT)
Dept: PSYCHIATRY | Facility: CLINIC | Age: 22
End: 2022-12-19

## 2022-12-19 ENCOUNTER — TRANSFERRED RECORDS (OUTPATIENT)
Dept: HEALTH INFORMATION MANAGEMENT | Facility: CLINIC | Age: 22
End: 2022-12-19

## 2022-12-19 DIAGNOSIS — F31.31 BIPOLAR AFFECTIVE DISORDER, CURRENTLY DEPRESSED, MILD (H): Primary | ICD-10-CM

## 2022-12-19 NOTE — TELEPHONE ENCOUNTER
12/19/22: Reason for call:  Medication   If this is a refill request, has the caller requested the refill from the pharmacy already? N/A  Will the patient be using a [facility]? N/A  Name of the pharmacy and phone number for the current request: [facility] [phone]    Name of the medication requested: Lamictal    Other request: Pt would like to discuss an increase in her Lamictal. Reported worsening depression and increase in SI. Pt was scheduled for a follow up by writer, but as a NP in error, pushing out appt further. LVM for pt inviting her to call back to be scheduled as a return sooner.     Phone number to reach patient:  Cell number on file:    Telephone Information:   Mobile [phone]       Best Time:  ASAP    Can we leave a detailed message on this number?  YES    Travel screening: Not Applicable

## 2022-12-19 NOTE — TELEPHONE ENCOUNTER
"    1. RN received message that pt called the clinic, requesting to increase her lamotrigine.     2. Chart review shows that pt sent a MyC message regarding this as well on 12/2/22 that reads: \"Hello, I'm currently on 50 mgs of Lamotrigine, i've been on this medication for 9 weeks now, unfortunately on week 6 of this medication I noticed it was not as effective as I was starting to feel more depressed by week 8 I was more suicidal and i'm feeling down since week 6, I wanted to see if we could increase this dose as our next visit isn't for a while. Thank you.\"    3. Pt was last prescribed lamoTRIgine (LAMICTAL) 25 MG tablet on 10/31/22 for 60 tablets with 3 refills.     4. Pt had a follow-up scheduled for 5/18/2023, which was cancelled in error. [facility] LVM for pt asking her to call back to re-schedule (see previous note).      5. RN replied to pt via [employer] to let her know that her concerns are being sent to provider, high priority.      4. RN is routing this to provider for review and consultation.     Jo-Ann Cooper RN on 12/19/2022 at 4:04 PM      "

## 2022-12-20 RX ORDER — LAMOTRIGINE 100 MG/1
100 TABLET ORAL DAILY
Qty: 30 TABLET | Refills: 1 | Status: SHIPPED | OUTPATIENT
Start: 2022-12-20 | End: 2023-03-14

## 2022-12-20 NOTE — TELEPHONE ENCOUNTER
"    1. RN received the following message from provider on 12/20/22: \"Lamotrigine 100 mg daily filled at [facility] in [place].\"    2. RN sent pt a MyC message informing her of the above information, as well reminding her of her resources if she is having trouble with this increased dose or not feeling safe at home.     Jo-Ann Cooper RN on 12/20/2022 at 10:22 AM    "

## 2022-12-27 ENCOUNTER — VIRTUAL VISIT (OUTPATIENT)
Dept: PSYCHOLOGY | Facility: CLINIC | Age: 22
End: 2022-12-27
Payer: COMMERCIAL

## 2022-12-27 DIAGNOSIS — F31.32 BIPOLAR AFFECTIVE DISORDER, CURRENTLY DEPRESSED, MODERATE (H): Primary | ICD-10-CM

## 2022-12-27 DIAGNOSIS — F41.1 GENERALIZED ANXIETY DISORDER: ICD-10-CM

## 2022-12-27 DIAGNOSIS — F43.10 PTSD (POST-TRAUMATIC STRESS DISORDER): ICD-10-CM

## 2022-12-27 PROCEDURE — 90834 PSYTX W PT 45 MINUTES: CPT | Mod: 95 | Performed by: SOCIAL WORKER

## 2022-12-27 NOTE — PROGRESS NOTES
[facility]                                      Progress Note    Patient Name: Wandy Ann  Date: 2022         Service Type: Individual      Session Start Time: 1202 Session End Time: 1251     Session Length: 38-52    Session #: 40    Attendees: Client    Service Modality: Video session  Yes, the patient's condition can be safely assessed and treated via synchronous audio and visual telemedicine encounter.      Reason for Video Visit: Services only offered telehealth    Location of Originating Site: Patient's home    Distant Site: Provider Remote Setting home office    Provider verified identity through the following two step process.  Patient provided:  Patient  and Patient address    Consent:  The patient/guardian has verbally consented to: the potential risks and benefits of telemedicine (video visit) versus in person care; bill my insurance or make self-payment for services provided; and responsibility for payment of non-covered services.      Mode of transmission-[employer]  Interactive Complexity: No  Crisis: No        Progress Since Last Session (Related to Symptoms / Goals / Homework):   Symptoms: Improving per pt with medication change     Homework: Achieved / completed to satisfaction      Episode of Care Goals: Minimal progress - PREPARATION (Decided to change - considering how); Intervened by negotiating a change plan and determining options / strategies for behavior change, identifying triggers, exploring social supports, and working towards setting a date to begin behavior change     Current / Ongoing Stressors and Concerns:   past trauma, developmental hx and current stressors      Treatment Objective(s) Addressed in This Session:   Patient will demonstrate control of impulses and ability to use positive coping tools to manage strong emotions that can be safely addressed at a lower level of care. Absence of persistent SI and report of reduced  frequency and intensity of SI and absence of SI to acceptable levels, report subjective improved mood for a period of 90 days, within 6 months as clinically observed and by patient self-report.  Patient will demonstrate and report a level of depression that can be managed at a lower level of care.  Absence of persistent depression mood and report of reduced frequency and intensity of low mood and absence of persistent low energy and motivation to acceptable levels, report subjective improved motivation and increased energy for a period of 90 days, within 6 months as clinically observed and by patient self-report.  Patient will demonstrate and report a level of anxiety that can be managed at a lower level of care.  Absence of persistent anxious mood and report of reduced frequency and intensity of worry and absence of persistent anxious mood to acceptable levels, no panic attacks, report subjective comfort with rumination for a period of 90 days, within 6 months as clinically observed and by patient self-report.       Intervention:   Therapist met with patient to review goals and interventions. Therapist utilized reflected listening as patient gave brief reflection of week. Patient reported decrease in depression and SI with medication increase. Patient processed friend situation where she is feeling conflict. Therapist supported patient while she processed and validated patient. Therapist coached patient in effective communication skills in setting boundaries with friend.  Patient presented with an anxious affect. Patient was engaged in session and open to feedback. Patient contracts for safety       There has been demonstrated improvement in functioning while patient has been engaged in psychotherapy/psychological service- if withdrawn the patient would deteriorate and/or relapse.       Assessments completed prior to visit:  The following assessments were completed by patient for this visit:  PHQ9:   PHQ-9 SCORE  5/12/2022 6/6/2022 6/21/2022 8/24/2022 9/29/2022 10/31/2022 12/2/2022   PHQ-9 Total Score MyChart - - 10 (Moderate depression) - 10 (Moderate depression) 3 (Minimal depression) -   PHQ-9 Total Score 15 7 10 19 10 3 14   Some encounter information is confidential and restricted. Go to Review Flowsheets activity to see all data.     GAD7:   YESSI-7 SCORE 4/27/2022 5/11/2022 6/6/2022 6/21/2022 8/24/2022 10/31/2022 11/29/2022   Total Score 7 (mild anxiety) 9 (mild anxiety) - 9 (mild anxiety) - 2 (minimal anxiety) 11 (moderate anxiety)   Total Score 7 9 10 9 13 2 11   Some encounter information is confidential and restricted. Go to Review Flowsheets activity to see all data.           ASSESSMENT: Current Emotional / Mental Status (status of significant symptoms):   Risk status (Self / Other harm or suicidal ideation)   Patient denies current fears or concerns for personal safety.   Patient denies current or recent suicidal ideation or behaviors.   Patient denies current or recent homicidal ideation or behaviors.   Patient denies current or recent self injurious behavior or ideation.   Patient denies other safety concerns.   Patient reports there has been no change in risk factors since their last session.     Patient reports there has been no change in protective factors since their last session.     A safety and risk management plan has been developed including: Patient consented to co-developed safety plan on 2.21.2022.  Safety and risk management plan was reviewed.   Patient agreed to use safety plan should any safety concerns arise.  A copy was made available to the patient.     Appearance:   Appropriate     Eye Contact:   Fair     Psychomotor Behavior: Restless     Attitude:   Cooperative    Orientation:   All   Speech    Rate / Production: Emotional Talkative    Volume:  Normal    Mood:    Anxious  Depressed    Affect:    Worrisome    Thought Content:  Clear    Thought Form:  Coherent    Insight:    Fair  "     Medication Review:   Changes to psychiatric medications, see updated Medication List in EPIC.      Medication Compliance:   Yes     Changes in Health Issues:   None reported     Chemical Use Review:   Substance Use: Chemical use reviewed, no active concerns identified      Tobacco Use: No current tobacco use.      Diagnosis:  1. Bipolar affective disorder, currently depressed, moderate (H)    2. PTSD (post-traumatic stress disorder)    3. Generalized anxiety disorder        Collateral Reports Completed:   Not Applicable    PLAN: (Patient Tasks / Therapist Tasks / Other)  Make visual safety plan   Manage depressive symptoms with coping skills  When feeling SI follow safety plan  Continue to manage SI and go to ER if feeling unsafe  TMS referral   IRT therapy and coached patient through the record and rewriting process. Therapist validated patient's emotions with the break up and back together and suggested patient and her partner have a lot to talk about and looking at their futures sooner than later.     [facility] has a new mental health emergency area at [facility] called [facility] that is very calming and helpful if you need additional support. ([address] [place] [address]  [phone]).     Name and challenge cognitive distortions  read \"stop walking on eggshells.    Call [phone] if feeling SI has increase  Utilize coping skills when feeling impulsive.   Therapist coached patient in effective communication skills in setting boundaries with friend.     Katie Faulkner, LICSW  12/27/2022                                                         ______________________________________________________________________    Individual Treatment Plan    Patient's Name: Wandy Ann  YOB: 2000    Date of Creation: 2.22.2021  Date Treatment Plan Last Reviewed/Revised: 12/2/2022 due 3/2/2023    DSM5 Diagnoses: 296.52 Bipolar I Disorder Current or Most Recent Episode " Depressed, Moderate, 300.02 (F41.1) Generalized Anxiety Disorder or 309.81 (F43.10) Posttraumatic Stress Disorder (includes Posttraumatic Stress Disorder for Children 6 Years and Younger)  Without dissociative symptoms  Psychosocial / Contextual Factors: past trauma, developmental hx and current stressors   PROMIS (reviewed every 90 days): 12/2/2022    Referral / Collaboration:  Referral to another professional/service is not indicated at this time..    Anticipated number of session for this episode of care: 12  Anticipation frequency of session: Biweekly  Anticipated Duration of each session: 38-52 minutes  Treatment plan will be reviewed in 90 days or when goals have been changed.       MeasurableTreatment Goal(s) related to diagnosis / functional impairment(s)  Goal 1: Patient will report absence of persistent SI and report of reduced frequency and intensity of SI and absence of SI to acceptable levels, report subjective improved mood for a period of 90 days, within 6 months as clinically observed and by patient self-report    I will know I've met my goal when I can see the difference between a bad moment and what I want in th future.      Objective #A (Patient Action)    Patient will demonstrate control of impulses and ability to use positive coping tools to manage strong emotions that can be safely addressed at a lower level of care. Absence of persistent SI and report of reduced frequency and intensity of SI and absence of SI to acceptable levels, report subjective improved mood for a period of 90 days, within 6 months as clinically observed and by patient self-report.  Status: Continued - Date(s): 12/2/2022    Intervention(s)  Therapist will provide individual therapy to identify triggers to SI urges, gain feedback on helpful coping strategies, and identify ways that family can offer support. Tx to discuss current stressors and interpersonal conflicts and how to cope with these, coaching, diagnostic testing,  referral for medication as indicated, use prescribed medication, cognitive restructuring, interpersonal family therapy, supportive therapy services.        Goal 2: Patient will report absence of persistent depression mood and report of reduced frequency and intensity of low mood and absence of persistent low energy and motivation to acceptable levels, report subjective improved motivation and increased energy for a period of 90 days, within 6 months as clinically observed and by patient self-report    I will know I've met my goal when I no longer feel sad.      Objective #A (Patient Action)    Status: Continued - Date(s): 12/2/2022    Patient will demonstrate and report a level of depression that can be managed at a lower level of care.  Absence of persistent depression mood and report of reduced frequency and intensity of low mood and absence of persistent low energy and motivation to acceptable levels, report subjective improved motivation and increased energy for a period of 90 days, within 6 months as clinically observed and by patient self-report.    Intervention(s)  Therapist will provide individual therapy to identify triggers to depression, gain feedback on helpful coping tools and thought-reframing techniques, and identify preferred way of being.  Tx to include discussion of current stressors and interpersonal conflicts and how to cope with these, coaching, diagnostic testing, referral for medication as indicated, use prescribed medication, cognitive restructuring, interpersonal, family therapy, supportive therapy services.        Goal 3: Patiient will report absence of persistent anxiety mood and report of reduced frequency and intensity of worry and absence of persistent anxious mood to acceptable levels, no panic attacks, report subjective comfort with rumination for a period of 90 days. Within 6 months as clinically observed and by patient self-report    I will know I've met my goal when I can go days  "without worrying about little things or shaking.      Objective #A (Patient Action)    Status: Continued - Date(s): 12/2/2022    Patient will demonstrate and report a level of anxiety that can be managed at a lower level of care.  Absence of persistent anxious mood and report of reduced frequency and intensity of worry and absence of persistent anxious mood to acceptable levels, no panic attacks, report subjective comfort with rumination for a period of 90 days, within 6 months as clinically observed and by patient self-report.    Intervention(s)  Therapist will provide individual therapy to identify triggers to anxiety, gain feedback on helpful coping tools and thought-reframing techniques, and identify preferred way of being. Tx to include discuss current stressors and interpersonal conflicts and how to cope with these, coaching, diagnostic testing , referral for medication as indicated, use prescribed medication, cognitive restructuring, interpersonal,   family therapy, supportive therapy services.        Patient has reviewed and agreed to the above plan.      Katie Faulkner, [facility]   12/2/2022                                                   Wandy Ann            SAFETY PLAN:  Step 1: Warning signs / cues (Thoughts, images, mood, situation, behavior) that a crisis may be developing:  ? Thoughts: thoughts of not wanting to be here  ? Images: no images  ? Thinking Processes: intrusive thoughts (bothersome, unwanted thoughts that come out of nowhere): get irritated  ? Mood: intense anger and agitation  ? Behaviors: isolating/withdrawing   ? Situations: trauma    Step 2: Coping strategies - Things I can do to take my mind off of my problems without contacting another person (relaxation technique, physical activity):  ? Distress Tolerance Strategies:  arts and crafts: drawing and painting  ? Physical Activities: exercise: working out  ? Focus on helpful thoughts:  \"This is temporary\", \"It always " "passes\" and \"Ride the wave\"  Step 3: People and social settings that provide distraction:                 Name: Jennifer     Phone: [phone]                 Name: Antonina         Phone: [phone]                 Name: panchito       Phone: [phone]  ? friends house or car   Step 4: Remind myself of people and things that are important to me and worth living for:  Best friend and cat        Step 5: When I am in crisis, I can ask these people to help me use my safety plan:                 Name: Jennifer     Phone: [phone]                 Name: Antonina         Phone: [phone]                 Name: panchito       Phone: [phone]  Step 6: Making the environment safe:   ? be around others  Step 7: Professionals or agencies I can contact during a crisis:  ? [facility] Daytime Number: [phone]  ? Suicide Prevention Lifeline: 7-255-111-TALK (4259)  ? Crisis Text Line Service (available 24 hours a day, 7 days a week): Text [place] to 358224    Local Crisis Services: 988     Call 911 or go to my nearest emergency department.       I helped develop this safety plan and agree to use it when needed.  I have been given a copy of this plan.       Client signature _________________________________________________________________  Today s date:  2/22/2021  Adapted from Safety Plan Template 2008 Marisol Cazares and Mario Flores is reprinted with the express permission of the authors.  No portion of the Safety Plan Template may be reproduced without the express, written permission.  You can contact the authors at [email or web address] or [email or web address].  "

## 2023-01-12 ENCOUNTER — VIRTUAL VISIT (OUTPATIENT)
Dept: PSYCHOLOGY | Facility: CLINIC | Age: 23
End: 2023-01-12
Payer: COMMERCIAL

## 2023-01-12 DIAGNOSIS — F41.1 GENERALIZED ANXIETY DISORDER: ICD-10-CM

## 2023-01-12 DIAGNOSIS — F43.10 PTSD (POST-TRAUMATIC STRESS DISORDER): ICD-10-CM

## 2023-01-12 DIAGNOSIS — F31.32 BIPOLAR AFFECTIVE DISORDER, CURRENTLY DEPRESSED, MODERATE (H): Primary | ICD-10-CM

## 2023-01-12 PROCEDURE — 90834 PSYTX W PT 45 MINUTES: CPT | Mod: 93 | Performed by: SOCIAL WORKER

## 2023-01-12 NOTE — PROGRESS NOTES
"    [facility]                                      Progress Note    Patient Name: Wandy Ann  Date: 2023         Service Type: Individual      Session Start Time:  Session End Time:      Session Length: 38-52    Session #: 41    Attendees: Client    Service Modality:Phone Visit:      Provider verified identity through the following two step process.  Patient provided:  Patient  and Patient is known previously to provider    The patient has been notified of the following:      \"We have found that certain health care needs can be provided without the need for a face to face visit.  This service lets us provide the care you need with a phone conversation.       I will have full access to your [facility] medical record during this entire phone call.   I will be taking notes for your medical record.      Since this is like an office visit, we will bill your insurance company for this service.       There are potential benefits and risks of telephone visits (e.g. limits to patient confidentiality) that differ from in-person visits.?Confidentiality still applies for telephone services, and nobody will record the visit.  It is important to be in a quiet, private space that is free of distractions (including cell phone or other devices) during the visit.??      If during the course of the call I believe a telephone visit is not appropriate, you will not be charged for this service\"     Consent has been obtained for this service by care team member: Yes    DATA  Interactive Complexity: No  Crisis: No        Progress Since Last Session (Related to Symptoms / Goals / Homework):   Symptoms: Worsening per patient     Homework: Partially completed      Episode of Care Goals: Minimal progress - PREPARATION (Decided to change - considering how); Intervened by negotiating a change plan and determining options / strategies for behavior change, identifying triggers, exploring " social supports, and working towards setting a date to begin behavior change     Current / Ongoing Stressors and Concerns:   past trauma, developmental hx and current stressors      Treatment Objective(s) Addressed in This Session:   Patient will demonstrate control of impulses and ability to use positive coping tools to manage strong emotions that can be safely addressed at a lower level of care. Absence of persistent SI and report of reduced frequency and intensity of SI and absence of SI to acceptable levels, report subjective improved mood for a period of 90 days, within 6 months as clinically observed and by patient self-report.  Patient will demonstrate and report a level of depression that can be managed at a lower level of care.  Absence of persistent depression mood and report of reduced frequency and intensity of low mood and absence of persistent low energy and motivation to acceptable levels, report subjective improved motivation and increased energy for a period of 90 days, within 6 months as clinically observed and by patient self-report.  Patient will demonstrate and report a level of anxiety that can be managed at a lower level of care.  Absence of persistent anxious mood and report of reduced frequency and intensity of worry and absence of persistent anxious mood to acceptable levels, no panic attacks, report subjective comfort with rumination for a period of 90 days, within 6 months as clinically observed and by patient self-report.       Intervention:   Therapist met with patient to review goals and interventions. Therapist utilized reflected listening as patient gave brief reflection of week. Patient reported being in crisis yesterday and having panic. Therapist supported patient as she processed. Patient reported spiraling about finances and needing to go outside to ground self. Patient reported this was helpful and she was able to balance logic and emotion but had difficulty identifying trigger  to her reaction. Therapist provided education on triggers and different ways to identify them after grounding self. Patient spent time processing relationship with friend. Therapist validated patient's emotions and reviewed own expectations and establishing boundaries. Patient presented with an anxious affect. Patient was engaged in session and open to feedback. Patient contracts for safety       There has been demonstrated improvement in functioning while patient has been engaged in psychotherapy/psychological service- if withdrawn the patient would deteriorate and/or relapse.       Assessments completed prior to visit:  The following assessments were completed by patient for this visit:  PHQ9:   PHQ-9 SCORE 5/12/2022 6/6/2022 6/21/2022 8/24/2022 9/29/2022 10/31/2022 12/2/2022   PHQ-9 Total Score MyChart - - 10 (Moderate depression) - 10 (Moderate depression) 3 (Minimal depression) -   PHQ-9 Total Score 15 7 10 19 10 3 14   Some encounter information is confidential and restricted. Go to Review Flowsheets activity to see all data.     GAD7:   YESSI-7 SCORE 4/27/2022 5/11/2022 6/6/2022 6/21/2022 8/24/2022 10/31/2022 11/29/2022   Total Score 7 (mild anxiety) 9 (mild anxiety) - 9 (mild anxiety) - 2 (minimal anxiety) 11 (moderate anxiety)   Total Score 7 9 10 9 13 2 11   Some encounter information is confidential and restricted. Go to Review Flowsheets activity to see all data.           ASSESSMENT: Current Emotional / Mental Status (status of significant symptoms):   Risk status (Self / Other harm or suicidal ideation)   Patient denies current fears or concerns for personal safety.   Patient reports the following current or recent suicidal ideation or behaviors: pt reported SI with no plan and contracted for safety.   Patient denies current or recent homicidal ideation or behaviors.   Patient denies current or recent self injurious behavior or ideation.   Patient denies other safety concerns.   Patient reports there has  been no change in risk factors since their last session.     Patient reports there has been no change in protective factors since their last session.     A safety and risk management plan has been developed including: Patient consented to co-developed safety plan on 2.21.2022.  Safety and risk management plan was reviewed.   Patient agreed to use safety plan should any safety concerns arise.  A copy was made available to the patient.     Appearance:   phone session     Eye Contact:   phone session     Psychomotor Behavior: phone session     Attitude:   Cooperative    Orientation:   All   Speech    Rate / Production: Emotional Talkative    Volume:  Normal    Mood:    Anxious  Depressed    Affect:    Worrisome    Thought Content:  Clear    Thought Form:  Coherent    Insight:    Fair      Medication Review:   Changes to psychiatric medications, see updated Medication List in EPIC.      Medication Compliance:   Yes     Changes in Health Issues:   None reported     Chemical Use Review:   Substance Use: Chemical use reviewed, no active concerns identified      Tobacco Use: No current tobacco use.      Diagnosis:  1. Bipolar affective disorder, currently depressed, moderate (H)    2. PTSD (post-traumatic stress disorder)    3. Generalized anxiety disorder        Collateral Reports Completed:   Not Applicable    PLAN: (Patient Tasks / Therapist Tasks / Other)  Make visual safety plan   Manage depressive symptoms with coping skills  When feeling SI follow safety plan  Continue to manage SI and go to ER if feeling unsafe  TMS referral   IRT therapy and coached patient through the record and rewriting process. Therapist validated patient's emotions with the break up and back together and suggested patient and her partner have a lot to talk about and looking at their futures sooner than later.     [facility] has a new mental health emergency area at [facility] called [facility] that is very calming and  "helpful if you need additional support. ([address] [address]  [phone]).     Name and challenge cognitive distortions  read \"stop walking on eggshells.    Call [phone] if feeling SI has increase or go to ED or empath   Utilize coping skills when feeling impulsive.   Therapist validated patient's emotions and reviewed own expectations and establishing boundaries.    Katie Faulkner, LICSW  1/12/2023                                                         ______________________________________________________________________    Individual Treatment Plan    Patient's Name: Wandy Ann  YOB: 2000    Date of Creation: 2.22.2021  Date Treatment Plan Last Reviewed/Revised: 12/2/2022 due 3/2/2023    DSM5 Diagnoses: 296.52 Bipolar I Disorder Current or Most Recent Episode Depressed, Moderate, 300.02 (F41.1) Generalized Anxiety Disorder or 309.81 (F43.10) Posttraumatic Stress Disorder (includes Posttraumatic Stress Disorder for Children 6 Years and Younger)  Without dissociative symptoms  Psychosocial / Contextual Factors: past trauma, developmental hx and current stressors   PROMIS (reviewed every 90 days): 12/2/2022    Referral / Collaboration:  Referral to another professional/service is not indicated at this time..    Anticipated number of session for this episode of care: 12  Anticipation frequency of session: Biweekly  Anticipated Duration of each session: 38-52 minutes  Treatment plan will be reviewed in 90 days or when goals have been changed.       MeasurableTreatment Goal(s) related to diagnosis / functional impairment(s)  Goal 1: Patient will report absence of persistent SI and report of reduced frequency and intensity of SI and absence of SI to acceptable levels, report subjective improved mood for a period of 90 days, within 6 months as clinically observed and by patient self-report    I will know I've met my goal when I can see the difference between a bad moment and " what I want in th future.      Objective #A (Patient Action)    Patient will demonstrate control of impulses and ability to use positive coping tools to manage strong emotions that can be safely addressed at a lower level of care. Absence of persistent SI and report of reduced frequency and intensity of SI and absence of SI to acceptable levels, report subjective improved mood for a period of 90 days, within 6 months as clinically observed and by patient self-report.  Status: Continued - Date(s): 12/2/2022    Intervention(s)  Therapist will provide individual therapy to identify triggers to SI urges, gain feedback on helpful coping strategies, and identify ways that family can offer support. Tx to discuss current stressors and interpersonal conflicts and how to cope with these, coaching, diagnostic testing, referral for medication as indicated, use prescribed medication, cognitive restructuring, interpersonal family therapy, supportive therapy services.        Goal 2: Patient will report absence of persistent depression mood and report of reduced frequency and intensity of low mood and absence of persistent low energy and motivation to acceptable levels, report subjective improved motivation and increased energy for a period of 90 days, within 6 months as clinically observed and by patient self-report    I will know I've met my goal when I no longer feel sad.      Objective #A (Patient Action)    Status: Continued - Date(s): 12/2/2022    Patient will demonstrate and report a level of depression that can be managed at a lower level of care.  Absence of persistent depression mood and report of reduced frequency and intensity of low mood and absence of persistent low energy and motivation to acceptable levels, report subjective improved motivation and increased energy for a period of 90 days, within 6 months as clinically observed and by patient self-report.    Intervention(s)  Therapist will provide individual therapy  to identify triggers to depression, gain feedback on helpful coping tools and thought-reframing techniques, and identify preferred way of being.  Tx to include discussion of current stressors and interpersonal conflicts and how to cope with these, coaching, diagnostic testing, referral for medication as indicated, use prescribed medication, cognitive restructuring, interpersonal, family therapy, supportive therapy services.        Goal 3: Patiient will report absence of persistent anxiety mood and report of reduced frequency and intensity of worry and absence of persistent anxious mood to acceptable levels, no panic attacks, report subjective comfort with rumination for a period of 90 days. Within 6 months as clinically observed and by patient self-report    I will know I've met my goal when I can go days without worrying about little things or shaking.      Objective #A (Patient Action)    Status: Continued - Date(s): 12/2/2022    Patient will demonstrate and report a level of anxiety that can be managed at a lower level of care.  Absence of persistent anxious mood and report of reduced frequency and intensity of worry and absence of persistent anxious mood to acceptable levels, no panic attacks, report subjective comfort with rumination for a period of 90 days, within 6 months as clinically observed and by patient self-report.    Intervention(s)  Therapist will provide individual therapy to identify triggers to anxiety, gain feedback on helpful coping tools and thought-reframing techniques, and identify preferred way of being. Tx to include discuss current stressors and interpersonal conflicts and how to cope with these, coaching, diagnostic testing , referral for medication as indicated, use prescribed medication, cognitive restructuring, interpersonal,   family therapy, supportive therapy services.        Patient has reviewed and agreed to the above plan.      Katie Faulkner, [facility]   12/2/2022                   "                                 Wandy Ann            SAFETY PLAN:  Step 1: Warning signs / cues (Thoughts, images, mood, situation, behavior) that a crisis may be developing:  ? Thoughts: thoughts of not wanting to be here  ? Images: no images  ? Thinking Processes: intrusive thoughts (bothersome, unwanted thoughts that come out of nowhere): get irritated  ? Mood: intense anger and agitation  ? Behaviors: isolating/withdrawing   ? Situations: trauma    Step 2: Coping strategies - Things I can do to take my mind off of my problems without contacting another person (relaxation technique, physical activity):  ? Distress Tolerance Strategies:  arts and crafts: drawing and painting  ? Physical Activities: exercise: working out  ? Focus on helpful thoughts:  \"This is temporary\", \"It always passes\" and \"Ride the wave\"  Step 3: People and social settings that provide distraction:                 Name: Jennifer     Phone: [phone]                 Name: Antonina         Phone: [phone]                 Name: panchito       Phone: [phone]  ? friends house or car   Step 4: Remind myself of people and things that are important to me and worth living for:  Best friend and cat        Step 5: When I am in crisis, I can ask these people to help me use my safety plan:                 Name: Jennifer     Phone: [phone]                 Name: Antonina         Phone: [phone]                 Name: panchito       Phone: [phone]  Step 6: Making the environment safe:   ? be around others  Step 7: Professionals or agencies I can contact during a crisis:  ? [facility] Daytime Number: [phone]  ? Suicide Prevention Lifeline: 0-268-123-DKVO (6435)  ? Crisis Text Line Service (available 24 hours a day, 7 days a week): Text [place] to 639203    Local Crisis Services: 988     Call 911 or go to my nearest emergency department.       I helped develop this safety plan and agree to use it when needed.  I have been " given a copy of this plan.       Client signature _________________________________________________________________  Today s date:  2/22/2021  Adapted from Safety Plan Template 2008 Marisol Cazares and Mario Flores is reprinted with the express permission of the authors.  No portion of the Safety Plan Template may be reproduced without the express, written permission.  You can contact the authors at [email or web address] or [email or web address].

## 2023-01-20 ENCOUNTER — VIRTUAL VISIT (OUTPATIENT)
Dept: PSYCHOLOGY | Facility: CLINIC | Age: 23
End: 2023-01-20
Payer: COMMERCIAL

## 2023-01-20 DIAGNOSIS — F31.32 BIPOLAR AFFECTIVE DISORDER, CURRENTLY DEPRESSED, MODERATE (H): Primary | ICD-10-CM

## 2023-01-20 DIAGNOSIS — F43.10 PTSD (POST-TRAUMATIC STRESS DISORDER): ICD-10-CM

## 2023-01-20 DIAGNOSIS — F41.1 GENERALIZED ANXIETY DISORDER: ICD-10-CM

## 2023-01-20 PROCEDURE — 90832 PSYTX W PT 30 MINUTES: CPT | Mod: 93 | Performed by: SOCIAL WORKER

## 2023-01-20 NOTE — PROGRESS NOTES
"    [facility]                                      Progress Note    Patient Name: Wandy Ann  Date: 2023         Service Type: Individual      Session Start Time: 905 Session End Time: 932     Session Length: 16-    Session #: 42    Attendees: Client    Service Modality:Phone Visit:  Offsite provider home office     Pt home      Provider verified identity through the following two step process.  Patient provided:  Patient  and Patient is known previously to provider    The patient has been notified of the following:      \"We have found that certain health care needs can be provided without the need for a face to face visit.  This service lets us provide the care you need with a phone conversation.       I will have full access to your [facility] medical record during this entire phone call.   I will be taking notes for your medical record.      Since this is like an office visit, we will bill your insurance company for this service.       There are potential benefits and risks of telephone visits (e.g. limits to patient confidentiality) that differ from in-person visits.?Confidentiality still applies for telephone services, and nobody will record the visit.  It is important to be in a quiet, private space that is free of distractions (including cell phone or other devices) during the visit.??      If during the course of the call I believe a telephone visit is not appropriate, you will not be charged for this service\"     Consent has been obtained for this service by care team member: Yes    DATA  Interactive Complexity: No  Crisis: No        Progress Since Last Session (Related to Symptoms / Goals / Homework):   Symptoms: Worsening per patient     Homework: Partially completed      Episode of Care Goals: Minimal progress - PREPARATION (Decided to change - considering how); Intervened by negotiating a change plan and determining options / strategies for behavior " change, identifying triggers, exploring social supports, and working towards setting a date to begin behavior change     Current / Ongoing Stressors and Concerns:   past trauma, developmental hx and current stressors      Treatment Objective(s) Addressed in This Session:   Patient will demonstrate control of impulses and ability to use positive coping tools to manage strong emotions that can be safely addressed at a lower level of care. Absence of persistent SI and report of reduced frequency and intensity of SI and absence of SI to acceptable levels, report subjective improved mood for a period of 90 days, within 6 months as clinically observed and by patient self-report.  Patient will demonstrate and report a level of depression that can be managed at a lower level of care.  Absence of persistent depression mood and report of reduced frequency and intensity of low mood and absence of persistent low energy and motivation to acceptable levels, report subjective improved motivation and increased energy for a period of 90 days, within 6 months as clinically observed and by patient self-report.  Patient will demonstrate and report a level of anxiety that can be managed at a lower level of care.  Absence of persistent anxious mood and report of reduced frequency and intensity of worry and absence of persistent anxious mood to acceptable levels, no panic attacks, report subjective comfort with rumination for a period of 90 days, within 6 months as clinically observed and by patient self-report.       Intervention:   Therapist met with patient to review goals and interventions. Therapist utilized reflected listening as patient gave brief reflection of week. Patient processed her emotions and feelings this past week. Therapist supported patient as she processed feeling frustrated with medication change working and now feeling the lows again. Therapist provided validation, education and support. Therapist reviewed higher  level of cares and patient agreed to safety plan and recommendation for an assessment for a higher level of care and to call Dr Clement to set up assessment for TMS.  Patient presented with an anxious affect. Patient was engaged in session and open to feedback. Patient contracts for safety       There has been demonstrated improvement in functioning while patient has been engaged in psychotherapy/psychological service- if withdrawn the patient would deteriorate and/or relapse.       Assessments completed prior to visit:  The following assessments were completed by patient for this visit:  PHQ9:   PHQ-9 SCORE 5/12/2022 6/6/2022 6/21/2022 8/24/2022 9/29/2022 10/31/2022 12/2/2022   PHQ-9 Total Score MyChart - - 10 (Moderate depression) - 10 (Moderate depression) 3 (Minimal depression) -   PHQ-9 Total Score 15 7 10 19 10 3 14   Some encounter information is confidential and restricted. Go to Review Flowsheets activity to see all data.     GAD7:   YESSI-7 SCORE 4/27/2022 5/11/2022 6/6/2022 6/21/2022 8/24/2022 10/31/2022 11/29/2022   Total Score 7 (mild anxiety) 9 (mild anxiety) - 9 (mild anxiety) - 2 (minimal anxiety) 11 (moderate anxiety)   Total Score 7 9 10 9 13 2 11   Some encounter information is confidential and restricted. Go to Review Flowsheets activity to see all data.           ASSESSMENT: Current Emotional / Mental Status (status of significant symptoms):   Risk status (Self / Other harm or suicidal ideation)   Patient denies current fears or concerns for personal safety.   Patient reports the following current or recent suicidal ideation or behaviors: pt reported SI with no plan and contracted for safety.   Patient denies current or recent homicidal ideation or behaviors.   Patient denies current or recent self injurious behavior or ideation.   Patient denies other safety concerns.   Patient reports there has been no change in risk factors since their last session.     Patient reports there has been no change in  protective factors since their last session.     A safety and risk management plan has been developed including: Patient consented to co-developed safety plan on 2.21.2022.  Safety and risk management plan was reviewed.   Patient agreed to use safety plan should any safety concerns arise.  A copy was made available to the patient.     Appearance:   phone session     Eye Contact:   phone session     Psychomotor Behavior: phone session     Attitude:   Cooperative    Orientation:   All   Speech    Rate / Production: Emotional Talkative    Volume:  Normal    Mood:    Anxious  Depressed    Affect:    Worrisome    Thought Content:  Clear    Thought Form:  Coherent    Insight:    Fair      Medication Review:   Changes to psychiatric medications, see updated Medication List in EPIC.      Medication Compliance:   Yes     Changes in Health Issues:   None reported     Chemical Use Review:   Substance Use: Chemical use reviewed, no active concerns identified      Tobacco Use: No current tobacco use.      Diagnosis:  1. Bipolar affective disorder, currently depressed, moderate (H)    2. PTSD (post-traumatic stress disorder)    3. Generalized anxiety disorder        Collateral Reports Completed:   Not Applicable    PLAN: (Patient Tasks / Therapist Tasks / Other)  Make visual safety plan   Manage depressive symptoms with coping skills  When feeling SI follow safety plan  Continue to manage SI and go to ER if feeling unsafe  TMS referral   IRT therapy and coached patient through the record and rewriting process. Therapist validated patient's emotions with the break up and back together and suggested patient and her partner have a lot to talk about and looking at their futures sooner than later.     [facility] has a new mental health emergency area at [facility] called [facility] that is very calming and helpful if you need additional support. ([phone] [place] [address]  [phone]).     Name and  "challenge cognitive distortions  read \"stop walking on eggshells.    Call [phone] if feeling SI has increase or go to ED or empath   Utilize coping skills when feeling impulsive.   patient agreed to safety plan and recommendation for an assessment for a higher level of care and to call Dr Clement to set up assessment for TMS.     Katie GRIMMAdri Faulkner, LICSW  1/20/2023                                                         ______________________________________________________________________    Individual Treatment Plan    Patient's Name: Wandy Ann  YOB: 2000    Date of Creation: 2.22.2021  Date Treatment Plan Last Reviewed/Revised: 12/2/2022 due 3/2/2023    DSM5 Diagnoses: 296.52 Bipolar I Disorder Current or Most Recent Episode Depressed, Moderate, 300.02 (F41.1) Generalized Anxiety Disorder or 309.81 (F43.10) Posttraumatic Stress Disorder (includes Posttraumatic Stress Disorder for Children 6 Years and Younger)  Without dissociative symptoms  Psychosocial / Contextual Factors: past trauma, developmental hx and current stressors   PROMIS (reviewed every 90 days): 12/2/2022    Referral / Collaboration:  Referral to another professional/service is not indicated at this time..    Anticipated number of session for this episode of care: 12  Anticipation frequency of session: Biweekly  Anticipated Duration of each session: 38-52 minutes  Treatment plan will be reviewed in 90 days or when goals have been changed.       MeasurableTreatment Goal(s) related to diagnosis / functional impairment(s)  Goal 1: Patient will report absence of persistent SI and report of reduced frequency and intensity of SI and absence of SI to acceptable levels, report subjective improved mood for a period of 90 days, within 6 months as clinically observed and by patient self-report    I will know I've met my goal when I can see the difference between a bad moment and what I want in th future.      Objective #A (Patient " Action)    Patient will demonstrate control of impulses and ability to use positive coping tools to manage strong emotions that can be safely addressed at a lower level of care. Absence of persistent SI and report of reduced frequency and intensity of SI and absence of SI to acceptable levels, report subjective improved mood for a period of 90 days, within 6 months as clinically observed and by patient self-report.  Status: Continued - Date(s): 12/2/2022    Intervention(s)  Therapist will provide individual therapy to identify triggers to SI urges, gain feedback on helpful coping strategies, and identify ways that family can offer support. Tx to discuss current stressors and interpersonal conflicts and how to cope with these, coaching, diagnostic testing, referral for medication as indicated, use prescribed medication, cognitive restructuring, interpersonal family therapy, supportive therapy services.        Goal 2: Patient will report absence of persistent depression mood and report of reduced frequency and intensity of low mood and absence of persistent low energy and motivation to acceptable levels, report subjective improved motivation and increased energy for a period of 90 days, within 6 months as clinically observed and by patient self-report    I will know I've met my goal when I no longer feel sad.      Objective #A (Patient Action)    Status: Continued - Date(s): 12/2/2022    Patient will demonstrate and report a level of depression that can be managed at a lower level of care.  Absence of persistent depression mood and report of reduced frequency and intensity of low mood and absence of persistent low energy and motivation to acceptable levels, report subjective improved motivation and increased energy for a period of 90 days, within 6 months as clinically observed and by patient self-report.    Intervention(s)  Therapist will provide individual therapy to identify triggers to depression, gain feedback on  helpful coping tools and thought-reframing techniques, and identify preferred way of being.  Tx to include discussion of current stressors and interpersonal conflicts and how to cope with these, coaching, diagnostic testing, referral for medication as indicated, use prescribed medication, cognitive restructuring, interpersonal, family therapy, supportive therapy services.        Goal 3: Patiient will report absence of persistent anxiety mood and report of reduced frequency and intensity of worry and absence of persistent anxious mood to acceptable levels, no panic attacks, report subjective comfort with rumination for a period of 90 days. Within 6 months as clinically observed and by patient self-report    I will know I've met my goal when I can go days without worrying about little things or shaking.      Objective #A (Patient Action)    Status: Continued - Date(s): 12/2/2022    Patient will demonstrate and report a level of anxiety that can be managed at a lower level of care.  Absence of persistent anxious mood and report of reduced frequency and intensity of worry and absence of persistent anxious mood to acceptable levels, no panic attacks, report subjective comfort with rumination for a period of 90 days, within 6 months as clinically observed and by patient self-report.    Intervention(s)  Therapist will provide individual therapy to identify triggers to anxiety, gain feedback on helpful coping tools and thought-reframing techniques, and identify preferred way of being. Tx to include discuss current stressors and interpersonal conflicts and how to cope with these, coaching, diagnostic testing , referral for medication as indicated, use prescribed medication, cognitive restructuring, interpersonal,   family therapy, supportive therapy services.        Patient has reviewed and agreed to the above plan.      Katie Faulkner, Health system   12/2/2022                                                   Wandy Jones  "Seth            SAFETY PLAN:  Step 1: Warning signs / cues (Thoughts, images, mood, situation, behavior) that a crisis may be developing:  ? Thoughts: thoughts of not wanting to be here  ? Images: no images  ? Thinking Processes: intrusive thoughts (bothersome, unwanted thoughts that come out of nowhere): get irritated  ? Mood: intense anger and agitation  ? Behaviors: isolating/withdrawing   ? Situations: trauma    Step 2: Coping strategies - Things I can do to take my mind off of my problems without contacting another person (relaxation technique, physical activity):  ? Distress Tolerance Strategies:  arts and crafts: drawing and painting  ? Physical Activities: exercise: working out  ? Focus on helpful thoughts:  \"This is temporary\", \"It always passes\" and \"Ride the wave\"  Step 3: People and social settings that provide distraction:                 Name: Jennifer     Phone: [phone]                 Name: Antonina         Phone: [phone]                 Name: panchito       Phone: [phone]  ? friends house or car   Step 4: Remind myself of people and things that are important to me and worth living for:  Best friend and cat        Step 5: When I am in crisis, I can ask these people to help me use my safety plan:                 Name: Jennifer     Phone: [phone]                 Name: Antonina         Phone: [phone]                 Name: panchito       Phone: [phone]  Step 6: Making the environment safe:   ? be around others  Step 7: Professionals or agencies I can contact during a crisis:  ? [facility] Daytime Number: [phone]  ? Suicide Prevention Lifeline: 2-193-943-TALK (3023)  ? Crisis Text Line Service (available 24 hours a day, 7 days a week): Text [place] to 036228    Local Crisis Services: 988     Call 911 or go to my nearest emergency department.       I helped develop this safety plan and agree to use it when needed.  I have been given a copy of this plan.       Client signature " _________________________________________________________________  Today s date:  2/22/2021  Adapted from Safety Plan Template 2008 Marisol Cazares and Mario Flores is reprinted with the express permission of the authors.  No portion of the Safety Plan Template may be reproduced without the express, written permission.  You can contact the authors at [email or web address] or [email or web address].

## 2023-01-27 ENCOUNTER — VIRTUAL VISIT (OUTPATIENT)
Dept: PSYCHOLOGY | Facility: CLINIC | Age: 23
End: 2023-01-27
Payer: COMMERCIAL

## 2023-01-27 DIAGNOSIS — F43.10 PTSD (POST-TRAUMATIC STRESS DISORDER): ICD-10-CM

## 2023-01-27 DIAGNOSIS — F31.32 BIPOLAR AFFECTIVE DISORDER, CURRENTLY DEPRESSED, MODERATE (H): Primary | ICD-10-CM

## 2023-01-27 DIAGNOSIS — F41.1 GENERALIZED ANXIETY DISORDER: ICD-10-CM

## 2023-01-27 PROCEDURE — 90834 PSYTX W PT 45 MINUTES: CPT | Mod: 95 | Performed by: SOCIAL WORKER

## 2023-01-27 NOTE — PROGRESS NOTES
[facility]                                      Progress Note    Patient Name: Wandy Ann  Date: 2023         Service Type: Individual      Session Start Time: 110 Session End Time: 1152     Session Length: 38-52    Session #: 43    Attendees: Client    Service Modality:Video Visit:      Provider verified identity through the following two step process.  Patient provided: Patient  and Patient previous known to provider     Telemedicine Visit: The patient's condition can be safely assessed and treated via synchronous audio and visual telemedicine encounter.       Reason for Telemedicine Visit: Patient has requested telehealth visit     Originating Site (Patient Location): Patient's home     Distant Site (Provider Location): Provider Remote Setting- Home Office     Consent:  The patient/guardian has verbally consented to: the potential risks and benefits of telemedicine (video visit) versus in person care; bill my insurance or make self-payment for services provided; and responsibility for payment of non-covered services.      Patient would like the video invitation sent by: email/text     Mode of Communication: Video Conference via [employer]     Distant Location (Provider): Off-site     As the provider I attest to compliance with applicable laws and regulations related to telemedicine.    DATA  Interactive Complexity: No  Crisis: No        Progress Since Last Session (Related to Symptoms / Goals / Homework):   Symptoms: No change per patient     Homework: Partially completed      Episode of Care Goals: Minimal progress - PREPARATION (Decided to change - considering how); Intervened by negotiating a change plan and determining options / strategies for behavior change, identifying triggers, exploring social supports, and working towards setting a date to begin behavior change     Current / Ongoing Stressors and Concerns:   past trauma, developmental hx and current stressors       Treatment Objective(s) Addressed in This Session:   Patient will demonstrate control of impulses and ability to use positive coping tools to manage strong emotions that can be safely addressed at a lower level of care. Absence of persistent SI and report of reduced frequency and intensity of SI and absence of SI to acceptable levels, report subjective improved mood for a period of 90 days, within 6 months as clinically observed and by patient self-report.  Patient will demonstrate and report a level of depression that can be managed at a lower level of care.  Absence of persistent depression mood and report of reduced frequency and intensity of low mood and absence of persistent low energy and motivation to acceptable levels, report subjective improved motivation and increased energy for a period of 90 days, within 6 months as clinically observed and by patient self-report.  Patient will demonstrate and report a level of anxiety that can be managed at a lower level of care.  Absence of persistent anxious mood and report of reduced frequency and intensity of worry and absence of persistent anxious mood to acceptable levels, no panic attacks, report subjective comfort with rumination for a period of 90 days, within 6 months as clinically observed and by patient self-report.       Intervention:   Therapist met with patient to review goals and interventions. Therapist utilized reflected listening as patient gave brief reflection of week. Patient reported feeling better today but has had a bad month. Patient followed through with TMS referral and agreed to call assessment center back for higher level of care due to continued SI. Therapist provided patient with continued education on safety planning, TMS, HLOC and offered patient hope.  Patient contracted for safety and agreed to tell her friend and boyfriend to hold her accountable to go to the ED if feeling unsafe. Patient presented with an anxious affect. Patient  was engaged in session and open to feedback. Patient contracts for safety       There has been demonstrated improvement in functioning while patient has been engaged in psychotherapy/psychological service- if withdrawn the patient would deteriorate and/or relapse.       Assessments completed prior to visit:  The following assessments were completed by patient for this visit:  PHQ9:   PHQ-9 SCORE 5/12/2022 6/6/2022 6/21/2022 8/24/2022 9/29/2022 10/31/2022 12/2/2022   PHQ-9 Total Score MyChart - - 10 (Moderate depression) - 10 (Moderate depression) 3 (Minimal depression) -   PHQ-9 Total Score 15 7 10 19 10 3 14   Some encounter information is confidential and restricted. Go to Review Flowsheets activity to see all data.     GAD7:   YESSI-7 SCORE 4/27/2022 5/11/2022 6/6/2022 6/21/2022 8/24/2022 10/31/2022 11/29/2022   Total Score 7 (mild anxiety) 9 (mild anxiety) - 9 (mild anxiety) - 2 (minimal anxiety) 11 (moderate anxiety)   Total Score 7 9 10 9 13 2 11   Some encounter information is confidential and restricted. Go to Review Flowsheets activity to see all data.           ASSESSMENT: Current Emotional / Mental Status (status of significant symptoms):   Risk status (Self / Other harm or suicidal ideation)   Patient denies current fears or concerns for personal safety.   Patient reports the following current or recent suicidal ideation or behaviors: pt reported SI with no plan and contracted for safety.   Patient denies current or recent homicidal ideation or behaviors.   Patient denies current or recent self injurious behavior or ideation.   Patient denies other safety concerns.   Patient reports there has been no change in risk factors since their last session.     Patient reports there has been no change in protective factors since their last session.     A safety and risk management plan has been developed including: Patient consented to co-developed safety plan on 2.21.2022.  Safety and risk management plan was  "reviewed.   Patient agreed to use safety plan should any safety concerns arise.  A copy was made available to the patient.     Appearance:   pt asked to not be on video     Eye Contact:   pt asked to not be on video     Psychomotor Behavior: pt asked to not be on video     Attitude:   Cooperative    Orientation:   All   Speech    Rate / Production: Emotional Talkative    Volume:  Normal    Mood:    Anxious  Depressed    Affect:    Worrisome    Thought Content:  Clear    Thought Form:  Coherent    Insight:    Fair      Medication Review:   No changes to current psychiatric medication(s)     Medication Compliance:   Yes     Changes in Health Issues:   None reported     Chemical Use Review:   Substance Use: Chemical use reviewed, no active concerns identified      Tobacco Use: No current tobacco use.      Diagnosis:  1. Bipolar affective disorder, currently depressed, moderate (H)    2. PTSD (post-traumatic stress disorder)    3. Generalized anxiety disorder        Collateral Reports Completed:   Not Applicable    PLAN: (Patient Tasks / Therapist Tasks / Other)  Make visual safety plan   Manage depressive symptoms with coping skills  When feeling SI follow safety plan  Continue to manage SI and go to ER if feeling unsafe  TMS referral   IRT therapy and coached patient through the record and rewriting process. Therapist validated patient's emotions with the break up and back together and suggested patient and her partner have a lot to talk about and looking at their futures sooner than later.     [facility] has a new mental health emergency area at [facility] called [facility] that is very calming and helpful if you need additional support. ([address] [place] [address]  [phone]).     Name and challenge cognitive distortions  read \"stop walking on eggshells.    Call [phone] if feeling SI has increase or go to ED or empath   Utilize coping skills when feeling impulsive.   Patient followed " through with TMS referral and agreed to call assessment center back for higher level of care due to continued SI. Therapist provided patient with continued education on safety planning, TMS, HLOC and offered patient hope.  Patient contracted for safety and agreed to tell her friend and boyfriend to hold her accountable to go to the ED if feeling unsafe.        Katie Faulkner, LICSW  1/27/2023                                                         ______________________________________________________________________    Individual Treatment Plan    Patient's Name: Wandy Ann  YOB: 2000    Date of Creation: 2.22.2021  Date Treatment Plan Last Reviewed/Revised: 12/2/2022 due 3/2/2023    DSM5 Diagnoses: 296.52 Bipolar I Disorder Current or Most Recent Episode Depressed, Moderate, 300.02 (F41.1) Generalized Anxiety Disorder or 309.81 (F43.10) Posttraumatic Stress Disorder (includes Posttraumatic Stress Disorder for Children 6 Years and Younger)  Without dissociative symptoms  Psychosocial / Contextual Factors: past trauma, developmental hx and current stressors   PROMIS (reviewed every 90 days): 12/2/2022    Referral / Collaboration:  Referral to another professional/service is not indicated at this time..    Anticipated number of session for this episode of care: 12  Anticipation frequency of session: Biweekly  Anticipated Duration of each session: 38-52 minutes  Treatment plan will be reviewed in 90 days or when goals have been changed.       MeasurableTreatment Goal(s) related to diagnosis / functional impairment(s)  Goal 1: Patient will report absence of persistent SI and report of reduced frequency and intensity of SI and absence of SI to acceptable levels, report subjective improved mood for a period of 90 days, within 6 months as clinically observed and by patient self-report    I will know I've met my goal when I can see the difference between a bad moment and what I want in th  future.      Objective #A (Patient Action)    Patient will demonstrate control of impulses and ability to use positive coping tools to manage strong emotions that can be safely addressed at a lower level of care. Absence of persistent SI and report of reduced frequency and intensity of SI and absence of SI to acceptable levels, report subjective improved mood for a period of 90 days, within 6 months as clinically observed and by patient self-report.  Status: Continued - Date(s): 12/2/2022    Intervention(s)  Therapist will provide individual therapy to identify triggers to SI urges, gain feedback on helpful coping strategies, and identify ways that family can offer support. Tx to discuss current stressors and interpersonal conflicts and how to cope with these, coaching, diagnostic testing, referral for medication as indicated, use prescribed medication, cognitive restructuring, interpersonal family therapy, supportive therapy services.        Goal 2: Patient will report absence of persistent depression mood and report of reduced frequency and intensity of low mood and absence of persistent low energy and motivation to acceptable levels, report subjective improved motivation and increased energy for a period of 90 days, within 6 months as clinically observed and by patient self-report    I will know I've met my goal when I no longer feel sad.      Objective #A (Patient Action)    Status: Continued - Date(s): 12/2/2022    Patient will demonstrate and report a level of depression that can be managed at a lower level of care.  Absence of persistent depression mood and report of reduced frequency and intensity of low mood and absence of persistent low energy and motivation to acceptable levels, report subjective improved motivation and increased energy for a period of 90 days, within 6 months as clinically observed and by patient self-report.    Intervention(s)  Therapist will provide individual therapy to identify  triggers to depression, gain feedback on helpful coping tools and thought-reframing techniques, and identify preferred way of being.  Tx to include discussion of current stressors and interpersonal conflicts and how to cope with these, coaching, diagnostic testing, referral for medication as indicated, use prescribed medication, cognitive restructuring, interpersonal, family therapy, supportive therapy services.        Goal 3: Patiient will report absence of persistent anxiety mood and report of reduced frequency and intensity of worry and absence of persistent anxious mood to acceptable levels, no panic attacks, report subjective comfort with rumination for a period of 90 days. Within 6 months as clinically observed and by patient self-report    I will know I've met my goal when I can go days without worrying about little things or shaking.      Objective #A (Patient Action)    Status: Continued - Date(s): 12/2/2022    Patient will demonstrate and report a level of anxiety that can be managed at a lower level of care.  Absence of persistent anxious mood and report of reduced frequency and intensity of worry and absence of persistent anxious mood to acceptable levels, no panic attacks, report subjective comfort with rumination for a period of 90 days, within 6 months as clinically observed and by patient self-report.    Intervention(s)  Therapist will provide individual therapy to identify triggers to anxiety, gain feedback on helpful coping tools and thought-reframing techniques, and identify preferred way of being. Tx to include discuss current stressors and interpersonal conflicts and how to cope with these, coaching, diagnostic testing , referral for medication as indicated, use prescribed medication, cognitive restructuring, interpersonal,   family therapy, supportive therapy services.        Patient has reviewed and agreed to the above plan.      Katie Faulkner, [facility]   12/2/2022                               "                     Wandy Ann            SAFETY PLAN:  Step 1: Warning signs / cues (Thoughts, images, mood, situation, behavior) that a crisis may be developing:  ? Thoughts: thoughts of not wanting to be here  ? Images: no images  ? Thinking Processes: intrusive thoughts (bothersome, unwanted thoughts that come out of nowhere): get irritated  ? Mood: intense anger and agitation  ? Behaviors: isolating/withdrawing   ? Situations: trauma    Step 2: Coping strategies - Things I can do to take my mind off of my problems without contacting another person (relaxation technique, physical activity):  ? Distress Tolerance Strategies:  arts and crafts: drawing and painting  ? Physical Activities: exercise: working out  ? Focus on helpful thoughts:  \"This is temporary\", \"It always passes\" and \"Ride the wave\"  Step 3: People and social settings that provide distraction:                 Name: Jennifer     Phone: [phone]                 Name: Antonina         Phone: [phone]                 Name: panchito       Phone: [phone]  ? friends house or car   Step 4: Remind myself of people and things that are important to me and worth living for:  Best friend and cat        Step 5: When I am in crisis, I can ask these people to help me use my safety plan:                 Name: Jennifer     Phone: [phone]                 Name: Antonina         Phone: [phone]                 Name: panchito       Phone: [phone]  Step 6: Making the environment safe:   ? be around others  Step 7: Professionals or agencies I can contact during a crisis:  ? [facility] Daytime Number: [phone]  ? Suicide Prevention Lifeline: 4-298-669-UZSP (2444)  ? Crisis Text Line Service (available 24 hours a day, 7 days a week): Text [place] to 812680    Local Crisis Services: 988     Call 911 or go to my nearest emergency department.       I helped develop this safety plan and agree to use it when needed.  I have been given a copy " of this plan.       Client signature _________________________________________________________________  Today s date:  2/22/2021  Adapted from Safety Plan Template 2008 Marisol Cazares and Mario Flores is reprinted with the express permission of the authors.  No portion of the Safety Plan Template may be reproduced without the express, written permission.  You can contact the authors at bhs@[place].[facility] or yoel@mail.med.[facility] [place].

## 2023-02-01 ASSESSMENT — PATIENT HEALTH QUESTIONNAIRE - PHQ9
10. IF YOU CHECKED OFF ANY PROBLEMS, HOW DIFFICULT HAVE THESE PROBLEMS MADE IT FOR YOU TO DO YOUR WORK, TAKE CARE OF THINGS AT HOME, OR GET ALONG WITH OTHER PEOPLE: VERY DIFFICULT
SUM OF ALL RESPONSES TO PHQ QUESTIONS 1-9: 16
SUM OF ALL RESPONSES TO PHQ QUESTIONS 1-9: 16

## 2023-02-02 ENCOUNTER — E-VISIT (OUTPATIENT)
Dept: PSYCHIATRY | Facility: CLINIC | Age: 23
End: 2023-02-02
Payer: COMMERCIAL

## 2023-02-02 ENCOUNTER — HOSPITAL ENCOUNTER (OUTPATIENT)
Dept: BEHAVIORAL HEALTH | Facility: CLINIC | Age: 23
Discharge: HOME | End: 2023-02-02
Attending: FAMILY MEDICINE | Admitting: FAMILY MEDICINE
Payer: COMMERCIAL

## 2023-02-02 ENCOUNTER — VIRTUAL VISIT (OUTPATIENT)
Dept: PSYCHOLOGY | Facility: CLINIC | Age: 23
End: 2023-02-02
Payer: COMMERCIAL

## 2023-02-02 DIAGNOSIS — F43.10 PTSD (POST-TRAUMATIC STRESS DISORDER): ICD-10-CM

## 2023-02-02 DIAGNOSIS — F31.32 BIPOLAR AFFECTIVE DISORDER, CURRENTLY DEPRESSED, MODERATE (H): ICD-10-CM

## 2023-02-02 DIAGNOSIS — F41.1 GENERALIZED ANXIETY DISORDER: ICD-10-CM

## 2023-02-02 DIAGNOSIS — F31.32 BIPOLAR AFFECTIVE DISORDER, CURRENTLY DEPRESSED, MODERATE (H): Primary | ICD-10-CM

## 2023-02-02 DIAGNOSIS — F48.9 DIAGNOSIS DEFERRED ON AXIS I: Primary | ICD-10-CM

## 2023-02-02 PROCEDURE — 90791 PSYCH DIAGNOSTIC EVALUATION: CPT | Mod: GT,95 | Performed by: COUNSELOR

## 2023-02-02 PROCEDURE — 90834 PSYTX W PT 45 MINUTES: CPT | Mod: 95 | Performed by: SOCIAL WORKER

## 2023-02-02 PROCEDURE — 99207 PR NO BILLABLE SERVICE THIS VISIT: CPT | Performed by: NURSE PRACTITIONER

## 2023-02-02 ASSESSMENT — ANXIETY QUESTIONNAIRES
7. FEELING AFRAID AS IF SOMETHING AWFUL MIGHT HAPPEN: NEARLY EVERY DAY
6. BECOMING EASILY ANNOYED OR IRRITABLE: NEARLY EVERY DAY
5. BEING SO RESTLESS THAT IT IS HARD TO SIT STILL: MORE THAN HALF THE DAYS
GAD7 TOTAL SCORE: 16
7. FEELING AFRAID AS IF SOMETHING AWFUL MIGHT HAPPEN: NEARLY EVERY DAY
3. WORRYING TOO MUCH ABOUT DIFFERENT THINGS: MORE THAN HALF THE DAYS
8. IF YOU CHECKED OFF ANY PROBLEMS, HOW DIFFICULT HAVE THESE MADE IT FOR YOU TO DO YOUR WORK, TAKE CARE OF THINGS AT HOME, OR GET ALONG WITH OTHER PEOPLE?: SOMEWHAT DIFFICULT
1. FEELING NERVOUS, ANXIOUS, OR ON EDGE: MORE THAN HALF THE DAYS
4. TROUBLE RELAXING: MORE THAN HALF THE DAYS
2. NOT BEING ABLE TO STOP OR CONTROL WORRYING: MORE THAN HALF THE DAYS

## 2023-02-02 ASSESSMENT — COLUMBIA-SUICIDE SEVERITY RATING SCALE - C-SSRS
1. IN THE PAST MONTH, HAVE YOU WISHED YOU WERE DEAD OR WISHED YOU COULD GO TO SLEEP AND NOT WAKE UP?: YES
3. HAVE YOU BEEN THINKING ABOUT HOW YOU MIGHT KILL YOURSELF?: NO
2. HAVE YOU ACTUALLY HAD ANY THOUGHTS OF KILLING YOURSELF IN THE PAST MONTH?: YES
4. HAVE YOU HAD THESE THOUGHTS AND HAD SOME INTENTION OF ACTING ON THEM?: NO
6. HAVE YOU EVER DONE ANYTHING, STARTED TO DO ANYTHING, OR PREPARED TO DO ANYTHING TO END YOUR LIFE?: NO
5. HAVE YOU STARTED TO WORK OUT OR WORKED OUT THE DETAILS OF HOW TO KILL YOURSELF? DO YOU INTEND TO CARRY OUT THIS PLAN?: NO

## 2023-02-02 ASSESSMENT — PAIN SCALES - GENERAL: PAINLEVEL: NO PAIN (0)

## 2023-02-02 ASSESSMENT — PATIENT HEALTH QUESTIONNAIRE - PHQ9
10. IF YOU CHECKED OFF ANY PROBLEMS, HOW DIFFICULT HAVE THESE PROBLEMS MADE IT FOR YOU TO DO YOUR WORK, TAKE CARE OF THINGS AT HOME, OR GET ALONG WITH OTHER PEOPLE: VERY DIFFICULT
SUM OF ALL RESPONSES TO PHQ QUESTIONS 1-9: 20
SUM OF ALL RESPONSES TO PHQ QUESTIONS 1-9: 20

## 2023-02-02 NOTE — ADDENDUM NOTE
Encounter addended by: Marky Hickman, [facility], [facility] [place] on: 2/2/2023 2:16 PM   Actions taken: Clinical Note Signed
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
## 2023-02-02 NOTE — PROGRESS NOTES
[facility]                                      Progress Note    Patient Name: Wandy Ann  Date: 2023         Service Type: Individual      Session Start Time: 903 Session End Time: 949     Session Length: 38-52    Session #: 44    Attendees: Client    Service Modality:Video Visit:      Provider verified identity through the following two step process.  Patient provided: Patient  and Patient previous known to provider     Telemedicine Visit: The patient's condition can be safely assessed and treated via synchronous audio and visual telemedicine encounter.       Reason for Telemedicine Visit: Patient has requested telehealth visit     Originating Site (Patient Location): Patient's home     Distant Site (Provider Location): Provider Remote Setting- Home Office     Consent:  The patient/guardian has verbally consented to: the potential risks and benefits of telemedicine (video visit) versus in person care; bill my insurance or make self-payment for services provided; and responsibility for payment of non-covered services.      Patient would like the video invitation sent by: email/text     Mode of Communication: Video Conference via [employer]     Distant Location (Provider): Off-site     As the provider I attest to compliance with applicable laws and regulations related to telemedicine.    DATA  Interactive Complexity: No  Crisis: No        Progress Since Last Session (Related to Symptoms / Goals / Homework):   Symptoms: Improving per patient     Homework: Partially completed      Episode of Care Goals: Minimal progress - PREPARATION (Decided to change - considering how); Intervened by negotiating a change plan and determining options / strategies for behavior change, identifying triggers, exploring social supports, and working towards setting a date to begin behavior change     Current / Ongoing Stressors and Concerns:   past trauma, developmental hx and current stressors       Treatment Objective(s) Addressed in This Session:   Patient will demonstrate control of impulses and ability to use positive coping tools to manage strong emotions that can be safely addressed at a lower level of care. Absence of persistent SI and report of reduced frequency and intensity of SI and absence of SI to acceptable levels, report subjective improved mood for a period of 90 days, within 6 months as clinically observed and by patient self-report.  Patient will demonstrate and report a level of depression that can be managed at a lower level of care.  Absence of persistent depression mood and report of reduced frequency and intensity of low mood and absence of persistent low energy and motivation to acceptable levels, report subjective improved motivation and increased energy for a period of 90 days, within 6 months as clinically observed and by patient self-report.  Patient will demonstrate and report a level of anxiety that can be managed at a lower level of care.  Absence of persistent anxious mood and report of reduced frequency and intensity of worry and absence of persistent anxious mood to acceptable levels, no panic attacks, report subjective comfort with rumination for a period of 90 days, within 6 months as clinically observed and by patient self-report.       Intervention:   Motivational Interviewing  Target Behavior: negative self talk    Stage of Change: PREPARATION (Decided to change - considering how)    MI Intervention: Co-Developed Goal: replace with positive attributes, Expressed Empathy/Understanding, Supported Autonomy, Collaboration, Evocation, Permission to raise concern or advise and Open-ended questions     Change Talk Expressed by the Patient: Desire to change Ability to change Reasons to change Need to change Committment to change    Provider Response to Change Talk: E - Evoked more info from patient about behavior change, A - Affirmed patient's thoughts, decisions, or attempts  at behavior change, R - Reflected patient's change talk and S - Summarized patient's change talk statements         There has been demonstrated improvement in functioning while patient has been engaged in psychotherapy/psychological service- if withdrawn the patient would deteriorate and/or relapse.       Assessments completed prior to visit:  The following assessments were completed by patient for this visit:  PHQ9:   PHQ-9 SCORE 6/6/2022 6/21/2022 8/24/2022 9/29/2022 10/31/2022 12/2/2022 2/1/2023   PHQ-9 Total Score MyChart - 10 (Moderate depression) - 10 (Moderate depression) 3 (Minimal depression) - 16 (Moderately severe depression)   PHQ-9 Total Score 7 10 19 10 3 14 -   Some encounter information is confidential and restricted. Go to Review Flowsheets activity to see all data.     GAD7:   YESSI-7 SCORE 4/27/2022 5/11/2022 6/6/2022 6/21/2022 8/24/2022 10/31/2022 11/29/2022   Total Score 7 (mild anxiety) 9 (mild anxiety) - 9 (mild anxiety) - 2 (minimal anxiety) 11 (moderate anxiety)   Total Score 7 9 10 9 13 2 11   Some encounter information is confidential and restricted. Go to Review Flowsheets activity to see all data.           ASSESSMENT: Current Emotional / Mental Status (status of significant symptoms):   Risk status (Self / Other harm or suicidal ideation)   Patient denies current fears or concerns for personal safety.   Patient reports the following current or recent suicidal ideation or behaviors: pt reported SI with no plan and contracted for safety.   Patient denies current or recent homicidal ideation or behaviors.   Patient denies current or recent self injurious behavior or ideation.   Patient denies other safety concerns.   Patient reports there has been no change in risk factors since their last session.     Patient reports there has been no change in protective factors since their last session.     A safety and risk management plan has been developed including: Patient consented to co-developed  "safety plan on 2.21.2022.  Safety and risk management plan was reviewed.   Patient agreed to use safety plan should any safety concerns arise.  A copy was made available to the patient.     Appearance:   pt asked to not be on video     Eye Contact:   pt asked to not be on video     Psychomotor Behavior: pt asked to not be on video     Attitude:   Cooperative    Orientation:   All   Speech    Rate / Production: Emotional Talkative    Volume:  Normal    Mood:    Anxious  Depressed    Affect:    Worrisome    Thought Content:  Clear    Thought Form:  Coherent    Insight:    Fair      Medication Review:   No changes to current psychiatric medication(s)     Medication Compliance:   Yes     Changes in Health Issues:   None reported     Chemical Use Review:   Substance Use: Chemical use reviewed, no active concerns identified      Tobacco Use: No current tobacco use.      Diagnosis:  1. Bipolar affective disorder, currently depressed, moderate (H)    2. PTSD (post-traumatic stress disorder)    3. Generalized anxiety disorder        Collateral Reports Completed:   Not Applicable    PLAN: (Patient Tasks / Therapist Tasks / Other)  Make visual safety plan   Manage depressive symptoms with coping skills  When feeling SI follow safety plan  Continue to manage SI and go to ER if feeling unsafe  TMS referral   IRT therapy and coached patient through the record and rewriting process. Therapist validated patient's emotions with the break up and back together and suggested patient and her partner have a lot to talk about and looking at their futures sooner than later.     [facility] has a new mental health emergency area at [facility] called [facility] that is very calming and helpful if you need additional support. ([phone] [place] [address]  [phone]).     Name and challenge cognitive distortions  read \"stop walking on eggshells.    Call [phone] if feeling SI has increase or go to ED or empath "   Utilize coping skills when feeling impulsive.   Call Dr. Clement set up TMS  Attend assessment today for HLOC  Replace distortions with positive attributes      Katie Faulkner, LICSW   2/2/2023                                                         ______________________________________________________________________    Individual Treatment Plan    Patient's Name: Wandy Ann  YOB: 2000    Date of Creation: 2.22.2021  Date Treatment Plan Last Reviewed/Revised: 12/2/2022 due 3/2/2023    DSM5 Diagnoses: 296.52 Bipolar I Disorder Current or Most Recent Episode Depressed, Moderate, 300.02 (F41.1) Generalized Anxiety Disorder or 309.81 (F43.10) Posttraumatic Stress Disorder (includes Posttraumatic Stress Disorder for Children 6 Years and Younger)  Without dissociative symptoms  Psychosocial / Contextual Factors: past trauma, developmental hx and current stressors   PROMIS (reviewed every 90 days): 12/2/2022    Referral / Collaboration:  Referral to another professional/service is not indicated at this time..    Anticipated number of session for this episode of care: 12  Anticipation frequency of session: Biweekly  Anticipated Duration of each session: 38-52 minutes  Treatment plan will be reviewed in 90 days or when goals have been changed.       MeasurableTreatment Goal(s) related to diagnosis / functional impairment(s)  Goal 1: Patient will report absence of persistent SI and report of reduced frequency and intensity of SI and absence of SI to acceptable levels, report subjective improved mood for a period of 90 days, within 6 months as clinically observed and by patient self-report    I will know I've met my goal when I can see the difference between a bad moment and what I want in th future.      Objective #A (Patient Action)    Patient will demonstrate control of impulses and ability to use positive coping tools to manage strong emotions that can be safely addressed at a lower level of  care. Absence of persistent SI and report of reduced frequency and intensity of SI and absence of SI to acceptable levels, report subjective improved mood for a period of 90 days, within 6 months as clinically observed and by patient self-report.  Status: Continued - Date(s): 12/2/2022    Intervention(s)  Therapist will provide individual therapy to identify triggers to SI urges, gain feedback on helpful coping strategies, and identify ways that family can offer support. Tx to discuss current stressors and interpersonal conflicts and how to cope with these, coaching, diagnostic testing, referral for medication as indicated, use prescribed medication, cognitive restructuring, interpersonal family therapy, supportive therapy services.        Goal 2: Patient will report absence of persistent depression mood and report of reduced frequency and intensity of low mood and absence of persistent low energy and motivation to acceptable levels, report subjective improved motivation and increased energy for a period of 90 days, within 6 months as clinically observed and by patient self-report    I will know I've met my goal when I no longer feel sad.      Objective #A (Patient Action)    Status: Continued - Date(s): 12/2/2022    Patient will demonstrate and report a level of depression that can be managed at a lower level of care.  Absence of persistent depression mood and report of reduced frequency and intensity of low mood and absence of persistent low energy and motivation to acceptable levels, report subjective improved motivation and increased energy for a period of 90 days, within 6 months as clinically observed and by patient self-report.    Intervention(s)  Therapist will provide individual therapy to identify triggers to depression, gain feedback on helpful coping tools and thought-reframing techniques, and identify preferred way of being.  Tx to include discussion of current stressors and interpersonal conflicts and  how to cope with these, coaching, diagnostic testing, referral for medication as indicated, use prescribed medication, cognitive restructuring, interpersonal, family therapy, supportive therapy services.        Goal 3: Patiient will report absence of persistent anxiety mood and report of reduced frequency and intensity of worry and absence of persistent anxious mood to acceptable levels, no panic attacks, report subjective comfort with rumination for a period of 90 days. Within 6 months as clinically observed and by patient self-report    I will know I've met my goal when I can go days without worrying about little things or shaking.      Objective #A (Patient Action)    Status: Continued - Date(s): 12/2/2022    Patient will demonstrate and report a level of anxiety that can be managed at a lower level of care.  Absence of persistent anxious mood and report of reduced frequency and intensity of worry and absence of persistent anxious mood to acceptable levels, no panic attacks, report subjective comfort with rumination for a period of 90 days, within 6 months as clinically observed and by patient self-report.    Intervention(s)  Therapist will provide individual therapy to identify triggers to anxiety, gain feedback on helpful coping tools and thought-reframing techniques, and identify preferred way of being. Tx to include discuss current stressors and interpersonal conflicts and how to cope with these, coaching, diagnostic testing , referral for medication as indicated, use prescribed medication, cognitive restructuring, interpersonal,   family therapy, supportive therapy services.        Patient has reviewed and agreed to the above plan.      Katie Faulkner, [facility]   12/2/2022                                                   Wandy Ann            SAFETY PLAN:  Step 1: Warning signs / cues (Thoughts, images, mood, situation, behavior) that a crisis may be developing:  ? Thoughts: thoughts of not  "wanting to be here  ? Images: no images  ? Thinking Processes: intrusive thoughts (bothersome, unwanted thoughts that come out of nowhere): get irritated  ? Mood: intense anger and agitation  ? Behaviors: isolating/withdrawing   ? Situations: trauma    Step 2: Coping strategies - Things I can do to take my mind off of my problems without contacting another person (relaxation technique, physical activity):  ? Distress Tolerance Strategies:  arts and crafts: drawing and painting  ? Physical Activities: exercise: working out  ? Focus on helpful thoughts:  \"This is temporary\", \"It always passes\" and \"Ride the wave\"  Step 3: People and social settings that provide distraction:                 Name: Jennifer     Phone: [phone]                 Name: Antonina         Phone: [phone]                 Name: panchito       Phone: [phone]  ? friends house or car   Step 4: Remind myself of people and things that are important to me and worth living for:  Best friend and cat        Step 5: When I am in crisis, I can ask these people to help me use my safety plan:                 Name: Jennifer     Phone: [phone]                 Name: Antonina         Phone: [phone]                 Name: panchito       Phone: [phone]  Step 6: Making the environment safe:   ? be around others  Step 7: Professionals or agencies I can contact during a crisis:  ? [facility] Number: [phone]  ? Suicide Prevention Lifeline: 9-852-209-TALK (1566)  ? Crisis Text Line Service (available 24 hours a day, 7 days a week): Text [place] to 719930    Local Crisis Services: 988     Call 911 or go to my nearest emergency department.       I helped develop this safety plan and agree to use it when needed.  I have been given a copy of this plan.       Client signature _________________________________________________________________  Today s date:  2/22/2021  Adapted from Safety Plan Template 2008 Marisol Cazares and Mario Flores " is reprinted with the express permission of the authors.  No portion of the Safety Plan Template may be reproduced without the express, written permission.  You can contact the authors at [email or web address] or [email or web address].

## 2023-02-03 ASSESSMENT — ANXIETY QUESTIONNAIRES: GAD7 TOTAL SCORE: 16

## 2023-02-03 ASSESSMENT — PATIENT HEALTH QUESTIONNAIRE - PHQ9: SUM OF ALL RESPONSES TO PHQ QUESTIONS 1-9: 20

## 2023-02-09 ENCOUNTER — VIRTUAL VISIT (OUTPATIENT)
Dept: PSYCHOLOGY | Facility: CLINIC | Age: 23
End: 2023-02-09
Payer: COMMERCIAL

## 2023-02-09 DIAGNOSIS — F31.32 BIPOLAR AFFECTIVE DISORDER, CURRENTLY DEPRESSED, MODERATE (H): Primary | ICD-10-CM

## 2023-02-09 DIAGNOSIS — F43.10 PTSD (POST-TRAUMATIC STRESS DISORDER): ICD-10-CM

## 2023-02-09 DIAGNOSIS — F41.1 GENERALIZED ANXIETY DISORDER: ICD-10-CM

## 2023-02-09 PROCEDURE — 90834 PSYTX W PT 45 MINUTES: CPT | Mod: VID | Performed by: SOCIAL WORKER

## 2023-02-09 NOTE — PROGRESS NOTES
[facility]                                      Progress Note    Patient Name: Wandy Ann  Date: 2023         Service Type: Individual      Session Start Time: 900 Session End Time: 945     Session Length: 38-52    Session #: 45    Attendees: Client    Service Modality:Video Visit:      Provider verified identity through the following two step process.  Patient provided: Patient  and Patient previous known to provider     Telemedicine Visit: The patient's condition can be safely assessed and treated via synchronous audio and visual telemedicine encounter.       Reason for Telemedicine Visit: Patient has requested telehealth visit     Originating Site (Patient Location): Patient's home     Distant Site (Provider Location): Provider Remote Setting- Home Office     Consent:  The patient/guardian has verbally consented to: the potential risks and benefits of telemedicine (video visit) versus in person care; bill my insurance or make self-payment for services provided; and responsibility for payment of non-covered services.      Patient would like the video invitation sent by: email/text     Mode of Communication: Video Conference via [employer]well     Distant Location (Provider): Off-site     As the provider I attest to compliance with applicable laws and regulations related to telemedicine.    DATA  Interactive Complexity: No  Crisis: No        Progress Since Last Session (Related to Symptoms / Goals / Homework):   Symptoms: Improving per patient with some struggles     Homework: Partially completed      Episode of Care Goals: Minimal progress - PREPARATION (Decided to change - considering how); Intervened by negotiating a change plan and determining options / strategies for behavior change, identifying triggers, exploring social supports, and working towards setting a date to begin behavior change     Current / Ongoing Stressors and Concerns:   past trauma, developmental hx and  current stressors      Treatment Objective(s) Addressed in This Session:   Patient will demonstrate control of impulses and ability to use positive coping tools to manage strong emotions that can be safely addressed at a lower level of care. Absence of persistent SI and report of reduced frequency and intensity of SI and absence of SI to acceptable levels, report subjective improved mood for a period of 90 days, within 6 months as clinically observed and by patient self-report.  Patient will demonstrate and report a level of depression that can be managed at a lower level of care.  Absence of persistent depression mood and report of reduced frequency and intensity of low mood and absence of persistent low energy and motivation to acceptable levels, report subjective improved motivation and increased energy for a period of 90 days, within 6 months as clinically observed and by patient self-report.  Patient will demonstrate and report a level of anxiety that can be managed at a lower level of care.  Absence of persistent anxious mood and report of reduced frequency and intensity of worry and absence of persistent anxious mood to acceptable levels, no panic attacks, report subjective comfort with rumination for a period of 90 days, within 6 months as clinically observed and by patient self-report.       Intervention:   Therapist met with patient to review goals and interventions. Therapist utilized reflected listening as patient gave brief reflection of week. Patient processed having a better mental health week but conflict with her partner over the weekend. Therapist supported patient as she processed and validated patient. Therapist provided education to patient on love language and mentalization with understanding how we truly effect others. Therapist encouraged patient to speak to partner about this and to schedule TMS intake along with asking about medication.   Patient presented with an anxious affect. Patient  was engaged in session and open to feedback. Patient reported SI on Sat but was able to manage it and continues to contract for safety.     There has been demonstrated improvement in functioning while patient has been engaged in psychotherapy/psychological service- if withdrawn the patient would deteriorate and/or relapse.     Assessments completed prior to visit:  The following assessments were completed by patient for this visit:  PHQ9:   PHQ-9 SCORE 6/21/2022 8/24/2022 9/29/2022 10/31/2022 12/2/2022 2/1/2023 2/2/2023   PHQ-9 Total Score MyChart 10 (Moderate depression) - 10 (Moderate depression) 3 (Minimal depression) - 16 (Moderately severe depression) 20 (Severe depression)   PHQ-9 Total Score 10 19 10 3 14 - 20   Some encounter information is confidential and restricted. Go to Review Flowsheets activity to see all data.     GAD7:   YESSI-7 SCORE 5/11/2022 6/6/2022 6/21/2022 8/24/2022 10/31/2022 11/29/2022 2/2/2023   Total Score 9 (mild anxiety) - 9 (mild anxiety) - 2 (minimal anxiety) 11 (moderate anxiety) 16 (severe anxiety)   Total Score 9 10 9 13 2 11 16   Some encounter information is confidential and restricted. Go to Review Flowsheets activity to see all data.           ASSESSMENT: Current Emotional / Mental Status (status of significant symptoms):   Risk status (Self / Other harm or suicidal ideation)   Patient denies current fears or concerns for personal safety.   Patient reports the following current or recent suicidal ideation or behaviors: pt reported SI with no plan and contracted for safety.   Patient denies current or recent homicidal ideation or behaviors.   Patient denies current or recent self injurious behavior or ideation.   Patient denies other safety concerns.   Patient reports there has been no change in risk factors since their last session.     Patient reports there has been no change in protective factors since their last session.     A safety and risk management plan has been developed  "including: Patient consented to co-developed safety plan on 2.21.2022.  Safety and risk management plan was reviewed.   Patient agreed to use safety plan should any safety concerns arise.  A copy was made available to the patient.     Appearance:   pt asked to not be on video     Eye Contact:   pt asked to not be on video     Psychomotor Behavior: pt asked to not be on video     Attitude:   Cooperative    Orientation:   All   Speech    Rate / Production: Emotional Talkative    Volume:  Normal    Mood:    Anxious  Depressed    Affect:    Worrisome    Thought Content:  Clear    Thought Form:  Coherent    Insight:    Fair      Medication Review:   No changes to current psychiatric medication(s)     Medication Compliance:   Yes     Changes in Health Issues:   None reported     Chemical Use Review:   Substance Use: Chemical use reviewed, no active concerns identified      Tobacco Use: No current tobacco use.      Diagnosis:  1. Bipolar affective disorder, currently depressed, moderate (H)    2. PTSD (post-traumatic stress disorder)    3. Generalized anxiety disorder        Collateral Reports Completed:   Not Applicable    PLAN: (Patient Tasks / Therapist Tasks / Other)  Make visual safety plan   Manage depressive symptoms with coping skills  When feeling SI follow safety plan  Continue to manage SI and go to ER if feeling unsafe  [facility] has a new mental health emergency area at [facility] called [facility] that is very calming and helpful if you need additional support. ([address] [place], [place] [address] [phone]).     Name and challenge cognitive distortions  read \"stop walking on eggshells.    Call [phone] if feeling SI has increase or go to ED or empath   Utilize coping skills when feeling impulsive.   TMS schedule intake   Replace distortions with positive attributes  Therapist encouraged patient to speak to partner about this and to schedule TMS intake along with asking about medication. "     Katie Faulkner, LICSW   2/9/2023                                                         ______________________________________________________________________    Individual Treatment Plan    Patient's Name: Wandy Ann  YOB: 2000    Date of Creation: 2.22.2021  Date Treatment Plan Last Reviewed/Revised: 12/2/2022 due 3/2/2023    DSM5 Diagnoses: 296.52 Bipolar I Disorder Current or Most Recent Episode Depressed, Moderate, 300.02 (F41.1) Generalized Anxiety Disorder or 309.81 (F43.10) Posttraumatic Stress Disorder (includes Posttraumatic Stress Disorder for Children 6 Years and Younger)  Without dissociative symptoms  Psychosocial / Contextual Factors: past trauma, developmental hx and current stressors   PROMIS (reviewed every 90 days): 12/2/2022    Referral / Collaboration:  Referral to another professional/service is not indicated at this time..    Anticipated number of session for this episode of care: 12  Anticipation frequency of session: Biweekly  Anticipated Duration of each session: 38-52 minutes  Treatment plan will be reviewed in 90 days or when goals have been changed.       MeasurableTreatment Goal(s) related to diagnosis / functional impairment(s)  Goal 1: Patient will report absence of persistent SI and report of reduced frequency and intensity of SI and absence of SI to acceptable levels, report subjective improved mood for a period of 90 days, within 6 months as clinically observed and by patient self-report    I will know I've met my goal when I can see the difference between a bad moment and what I want in th future.      Objective #A (Patient Action)    Patient will demonstrate control of impulses and ability to use positive coping tools to manage strong emotions that can be safely addressed at a lower level of care. Absence of persistent SI and report of reduced frequency and intensity of SI and absence of SI to acceptable levels, report subjective improved mood for  a period of 90 days, within 6 months as clinically observed and by patient self-report.  Status: Continued - Date(s): 12/2/2022    Intervention(s)  Therapist will provide individual therapy to identify triggers to SI urges, gain feedback on helpful coping strategies, and identify ways that family can offer support. Tx to discuss current stressors and interpersonal conflicts and how to cope with these, coaching, diagnostic testing, referral for medication as indicated, use prescribed medication, cognitive restructuring, interpersonal family therapy, supportive therapy services.        Goal 2: Patient will report absence of persistent depression mood and report of reduced frequency and intensity of low mood and absence of persistent low energy and motivation to acceptable levels, report subjective improved motivation and increased energy for a period of 90 days, within 6 months as clinically observed and by patient self-report    I will know I've met my goal when I no longer feel sad.      Objective #A (Patient Action)    Status: Continued - Date(s): 12/2/2022    Patient will demonstrate and report a level of depression that can be managed at a lower level of care.  Absence of persistent depression mood and report of reduced frequency and intensity of low mood and absence of persistent low energy and motivation to acceptable levels, report subjective improved motivation and increased energy for a period of 90 days, within 6 months as clinically observed and by patient self-report.    Intervention(s)  Therapist will provide individual therapy to identify triggers to depression, gain feedback on helpful coping tools and thought-reframing techniques, and identify preferred way of being.  Tx to include discussion of current stressors and interpersonal conflicts and how to cope with these, coaching, diagnostic testing, referral for medication as indicated, use prescribed medication, cognitive restructuring, interpersonal,  family therapy, supportive therapy services.        Goal 3: Patiient will report absence of persistent anxiety mood and report of reduced frequency and intensity of worry and absence of persistent anxious mood to acceptable levels, no panic attacks, report subjective comfort with rumination for a period of 90 days. Within 6 months as clinically observed and by patient self-report    I will know I've met my goal when I can go days without worrying about little things or shaking.      Objective #A (Patient Action)    Status: Continued - Date(s): 12/2/2022    Patient will demonstrate and report a level of anxiety that can be managed at a lower level of care.  Absence of persistent anxious mood and report of reduced frequency and intensity of worry and absence of persistent anxious mood to acceptable levels, no panic attacks, report subjective comfort with rumination for a period of 90 days, within 6 months as clinically observed and by patient self-report.    Intervention(s)  Therapist will provide individual therapy to identify triggers to anxiety, gain feedback on helpful coping tools and thought-reframing techniques, and identify preferred way of being. Tx to include discuss current stressors and interpersonal conflicts and how to cope with these, coaching, diagnostic testing , referral for medication as indicated, use prescribed medication, cognitive restructuring, interpersonal,   family therapy, supportive therapy services.        Patient has reviewed and agreed to the above plan.      Katie Faulkner, [facility]   12/2/2022                                                   Wandy Ann            SAFETY PLAN:  Step 1: Warning signs / cues (Thoughts, images, mood, situation, behavior) that a crisis may be developing:  ? Thoughts: thoughts of not wanting to be here  ? Images: no images  ? Thinking Processes: intrusive thoughts (bothersome, unwanted thoughts that come out of nowhere): get irritated  ? Mood:  "intense anger and agitation  ? Behaviors: isolating/withdrawing   ? Situations: trauma    Step 2: Coping strategies - Things I can do to take my mind off of my problems without contacting another person (relaxation technique, physical activity):  ? Distress Tolerance Strategies:  arts and crafts: drawing and painting  ? Physical Activities: exercise: working out  ? Focus on helpful thoughts:  \"This is temporary\", \"It always passes\" and \"Ride the wave\"  Step 3: People and social settings that provide distraction:                 Name: Jennifer     Phone: [phone]                 Name: Antonina         Phone: [phone]                 Name: panchito       Phone: [phone]  ? friends house or car   Step 4: Remind myself of people and things that are important to me and worth living for:  Best friend and cat        Step 5: When I am in crisis, I can ask these people to help me use my safety plan:                 Name: Jennifer     Phone: [phone]                 Name: Antonina         Phone: [phone]                 Name: panchito       Phone: [phone]  Step 6: Making the environment safe:   ? be around others  Step 7: Professionals or agencies I can contact during a crisis:  ? [facility] Daytime Number: [phone]  ? Suicide Prevention Lifeline: 1-772-285-OFTL (2977)  ? Crisis Text Line Service (available 24 hours a day, 7 days a week): Text [place] to 857843    Local Crisis Services: 988     Call 911 or go to my nearest emergency department.       I helped develop this safety plan and agree to use it when needed.  I have been given a copy of this plan.       Client signature _________________________________________________________________  Today s date:  2/22/2021  Adapted from Safety Plan Template 2008 Marisol Cazares and Mario Flores is reprinted with the express permission of the authors.  No portion of the Safety Plan Template may be reproduced without the express, written permission.  You can " contact the authors at bhs@[email or web address] or [email or web address].

## 2023-02-16 ENCOUNTER — VIRTUAL VISIT (OUTPATIENT)
Dept: PSYCHOLOGY | Facility: CLINIC | Age: 23
End: 2023-02-16
Payer: COMMERCIAL

## 2023-02-16 DIAGNOSIS — F41.1 GENERALIZED ANXIETY DISORDER: ICD-10-CM

## 2023-02-16 DIAGNOSIS — F31.32 BIPOLAR AFFECTIVE DISORDER, CURRENTLY DEPRESSED, MODERATE (H): Primary | ICD-10-CM

## 2023-02-16 DIAGNOSIS — F43.10 PTSD (POST-TRAUMATIC STRESS DISORDER): ICD-10-CM

## 2023-02-16 PROCEDURE — 90832 PSYTX W PT 30 MINUTES: CPT | Mod: VID | Performed by: SOCIAL WORKER

## 2023-02-16 NOTE — PROGRESS NOTES
[facility]                                      Progress Note    Patient Name: Wandy Ann  Date: 2023         Service Type: Individual      Session Start Time: 1505 Session End Time: 152     Session Length: 16-37    Session #: 46    Attendees: Client    Service Modality:Video Visit:      Provider verified identity through the following two step process.  Patient provided: Patient  and Patient previous known to provider     Telemedicine Visit: The patient's condition can be safely assessed and treated via synchronous audio and visual telemedicine encounter.       Reason for Telemedicine Visit: Patient has requested telehealth visit     Originating Site (Patient Location): Patient's home     Distant Site (Provider Location): Provider Remote Setting- Home Office     Consent:  The patient/guardian has verbally consented to: the potential risks and benefits of telemedicine (video visit) versus in person care; bill my insurance or make self-payment for services provided; and responsibility for payment of non-covered services.      Patient would like the video invitation sent by: email/text     Mode of Communication: Video Conference via Amwell     Distant Location (Provider): Off-site     As the provider I attest to compliance with applicable laws and regulations related to telemedicine.    DATA  Interactive Complexity: No  Crisis: No        Progress Since Last Session (Related to Symptoms / Goals / Homework):   Symptoms: Improving per patient     Homework: Achieved / completed to satisfaction      Episode of Care Goals: Minimal progress - PREPARATION (Decided to change - considering how); Intervened by negotiating a change plan and determining options / strategies for behavior change, identifying triggers, exploring social supports, and working towards setting a date to begin behavior change     Current / Ongoing Stressors and Concerns:   past trauma, developmental hx and  current stressors      Treatment Objective(s) Addressed in This Session:   Patient will demonstrate control of impulses and ability to use positive coping tools to manage strong emotions that can be safely addressed at a lower level of care. Absence of persistent SI and report of reduced frequency and intensity of SI and absence of SI to acceptable levels, report subjective improved mood for a period of 90 days, within 6 months as clinically observed and by patient self-report.  Patient will demonstrate and report a level of depression that can be managed at a lower level of care.  Absence of persistent depression mood and report of reduced frequency and intensity of low mood and absence of persistent low energy and motivation to acceptable levels, report subjective improved motivation and increased energy for a period of 90 days, within 6 months as clinically observed and by patient self-report.  Patient will demonstrate and report a level of anxiety that can be managed at a lower level of care.  Absence of persistent anxious mood and report of reduced frequency and intensity of worry and absence of persistent anxious mood to acceptable levels, no panic attacks, report subjective comfort with rumination for a period of 90 days, within 6 months as clinically observed and by patient self-report.       Intervention:   Therapist met with patient to review goals and interventions. Therapist utilized reflected listening as patient gave brief reflection of week. Patient reported she is scheduled with [facility] intake next Thursday and processed one reservation. Therapist supported, validated and challenged patient with review of SI and depression.Therapist coached patient through managing her anxiety with this and partners birthday this weekend.   Patient presented with an anxious affect. Patient was engaged in session and open to feedback. Patient reported no SI this week and continues to contract for safety.     There has  been demonstrated improvement in functioning while patient has been engaged in psychotherapy/psychological service- if withdrawn the patient would deteriorate and/or relapse.     Assessments completed prior to visit:  The following assessments were completed by patient for this visit:  PHQ9:   PHQ-9 SCORE 6/21/2022 8/24/2022 9/29/2022 10/31/2022 12/2/2022 2/1/2023 2/2/2023   PHQ-9 Total Score MyChart 10 (Moderate depression) - 10 (Moderate depression) 3 (Minimal depression) - 16 (Moderately severe depression) 20 (Severe depression)   PHQ-9 Total Score 10 19 10 3 14 - 20   Some encounter information is confidential and restricted. Go to Review Flowsheets activity to see all data.     GAD7:   YESSI-7 SCORE 5/11/2022 6/6/2022 6/21/2022 8/24/2022 10/31/2022 11/29/2022 2/2/2023   Total Score 9 (mild anxiety) - 9 (mild anxiety) - 2 (minimal anxiety) 11 (moderate anxiety) 16 (severe anxiety)   Total Score 9 10 9 13 2 11 16   Some encounter information is confidential and restricted. Go to Review Flowsheets activity to see all data.           ASSESSMENT: Current Emotional / Mental Status (status of significant symptoms):   Risk status (Self / Other harm or suicidal ideation)   Patient denies current fears or concerns for personal safety.   Patient denies current or recent suicidal ideation or behaviors.   Patient denies current or recent homicidal ideation or behaviors.   Patient denies current or recent self injurious behavior or ideation.   Patient denies other safety concerns.   Patient reports there has been no change in risk factors since their last session.     Patient reports there has been no change in protective factors since their last session.     A safety and risk management plan has been developed including: Patient consented to co-developed safety plan on 2.21.2022.  Safety and risk management plan was reviewed.   Patient agreed to use safety plan should any safety concerns arise.  A copy was made available to  "the patient.     Appearance:   Appropriate     Eye Contact:   Fair     Psychomotor Behavior: Restless     Attitude:   Cooperative    Orientation:   All   Speech    Rate / Production: Emotional Talkative    Volume:  Normal    Mood:    Anxious  Depressed    Affect:    Worrisome    Thought Content:  Clear    Thought Form:  Coherent    Insight:    Fair      Medication Review:   No changes to current psychiatric medication(s)     Medication Compliance:   Yes     Changes in Health Issues:   None reported     Chemical Use Review:   Substance Use: Chemical use reviewed, no active concerns identified      Tobacco Use: No current tobacco use.      Diagnosis:  1. Bipolar affective disorder, currently depressed, moderate (H)    2. PTSD (post-traumatic stress disorder)    3. Generalized anxiety disorder        Collateral Reports Completed:   Not Applicable    PLAN: (Patient Tasks / Therapist Tasks / Other)  Make visual safety plan   Manage depressive symptoms with coping skills  When feeling SI follow safety plan  Continue to manage SI and go to ER if feeling unsafe  [facility] has a new mental health emergency area at [facility] called [facility] that is very calming and helpful if you need additional support. ([address] [place] [address]  [phone]).     Name and challenge cognitive distortions  read \"stop walking on eggshells.    Call [phone] if feeling SI has increase or go to ED or empath   Utilize coping skills when feeling impulsive.   TMS 2/23  Replace distortions with positive attributes  Patient reported she is scheduled with [facility]MS intake next Thursday and processed one reservation. Therapist supported, validated and challenged patient with review of SI and depression.Therapist coached patient through managing her anxiety with this and partners birthday this weekend.     Katie Faulkner, [facility]  2/16/2023                                                     "     ______________________________________________________________________    Individual Treatment Plan    Patient's Name: Wandy Ann  YOB: 2000    Date of Creation: 2.22.2021  Date Treatment Plan Last Reviewed/Revised: 12/2/2022 due 3/2/2023    DSM5 Diagnoses: 296.52 Bipolar I Disorder Current or Most Recent Episode Depressed, Moderate, 300.02 (F41.1) Generalized Anxiety Disorder or 309.81 (F43.10) Posttraumatic Stress Disorder (includes Posttraumatic Stress Disorder for Children 6 Years and Younger)  Without dissociative symptoms  Psychosocial / Contextual Factors: past trauma, developmental hx and current stressors   PROMIS (reviewed every 90 days): 12/2/2022    Referral / Collaboration:  Referral to another professional/service is not indicated at this time..    Anticipated number of session for this episode of care: 12  Anticipation frequency of session: Biweekly  Anticipated Duration of each session: 38-52 minutes  Treatment plan will be reviewed in 90 days or when goals have been changed.       MeasurableTreatment Goal(s) related to diagnosis / functional impairment(s)  Goal 1: Patient will report absence of persistent SI and report of reduced frequency and intensity of SI and absence of SI to acceptable levels, report subjective improved mood for a period of 90 days, within 6 months as clinically observed and by patient self-report    I will know I've met my goal when I can see the difference between a bad moment and what I want in th future.      Objective #A (Patient Action)    Patient will demonstrate control of impulses and ability to use positive coping tools to manage strong emotions that can be safely addressed at a lower level of care. Absence of persistent SI and report of reduced frequency and intensity of SI and absence of SI to acceptable levels, report subjective improved mood for a period of 90 days, within 6 months as clinically observed and by patient  self-report.  Status: Continued - Date(s): 12/2/2022    Intervention(s)  Therapist will provide individual therapy to identify triggers to SI urges, gain feedback on helpful coping strategies, and identify ways that family can offer support. Tx to discuss current stressors and interpersonal conflicts and how to cope with these, coaching, diagnostic testing, referral for medication as indicated, use prescribed medication, cognitive restructuring, interpersonal family therapy, supportive therapy services.        Goal 2: Patient will report absence of persistent depression mood and report of reduced frequency and intensity of low mood and absence of persistent low energy and motivation to acceptable levels, report subjective improved motivation and increased energy for a period of 90 days, within 6 months as clinically observed and by patient self-report    I will know I've met my goal when I no longer feel sad.      Objective #A (Patient Action)    Status: Continued - Date(s): 12/2/2022    Patient will demonstrate and report a level of depression that can be managed at a lower level of care.  Absence of persistent depression mood and report of reduced frequency and intensity of low mood and absence of persistent low energy and motivation to acceptable levels, report subjective improved motivation and increased energy for a period of 90 days, within 6 months as clinically observed and by patient self-report.    Intervention(s)  Therapist will provide individual therapy to identify triggers to depression, gain feedback on helpful coping tools and thought-reframing techniques, and identify preferred way of being.  Tx to include discussion of current stressors and interpersonal conflicts and how to cope with these, coaching, diagnostic testing, referral for medication as indicated, use prescribed medication, cognitive restructuring, interpersonal, family therapy, supportive therapy services.        Goal 3: Patiient will  report absence of persistent anxiety mood and report of reduced frequency and intensity of worry and absence of persistent anxious mood to acceptable levels, no panic attacks, report subjective comfort with rumination for a period of 90 days. Within 6 months as clinically observed and by patient self-report    I will know I've met my goal when I can go days without worrying about little things or shaking.      Objective #A (Patient Action)    Status: Continued - Date(s): 12/2/2022    Patient will demonstrate and report a level of anxiety that can be managed at a lower level of care.  Absence of persistent anxious mood and report of reduced frequency and intensity of worry and absence of persistent anxious mood to acceptable levels, no panic attacks, report subjective comfort with rumination for a period of 90 days, within 6 months as clinically observed and by patient self-report.    Intervention(s)  Therapist will provide individual therapy to identify triggers to anxiety, gain feedback on helpful coping tools and thought-reframing techniques, and identify preferred way of being. Tx to include discuss current stressors and interpersonal conflicts and how to cope with these, coaching, diagnostic testing , referral for medication as indicated, use prescribed medication, cognitive restructuring, interpersonal,   family therapy, supportive therapy services.        Patient has reviewed and agreed to the above plan.      Katie Faulkner, [facility]   12/2/2022                                                   Wandy Ann            SAFETY PLAN:  Step 1: Warning signs / cues (Thoughts, images, mood, situation, behavior) that a crisis may be developing:  ? Thoughts: thoughts of not wanting to be here  ? Images: no images  ? Thinking Processes: intrusive thoughts (bothersome, unwanted thoughts that come out of nowhere): get irritated  ? Mood: intense anger and agitation  ? Behaviors: isolating/withdrawing  "  ? Situations: trauma    Step 2: Coping strategies - Things I can do to take my mind off of my problems without contacting another person (relaxation technique, physical activity):  ? Distress Tolerance Strategies:  arts and crafts: drawing and painting  ? Physical Activities: exercise: working out  ? Focus on helpful thoughts:  \"This is temporary\", \"It always passes\" and \"Ride the wave\"  Step 3: People and social settings that provide distraction:                 Name: Jennifer     Phone: [phone]                 Name: Antonina         Phone: [phone]                 Name: panchito       Phone: [phone]  ? friends house or car   Step 4: Remind myself of people and things that are important to me and worth living for:  Best friend and cat        Step 5: When I am in crisis, I can ask these people to help me use my safety plan:                 Name: Jennifer     Phone: [phone]                 Name: Antonina         Phone: [phone]                 Name: panchito       Phone: [phone]  Step 6: Making the environment safe:   ? be around others  Step 7: Professionals or agencies I can contact during a crisis:  ? [facility] Daytime Number: 013-030-7557  ? Suicide Prevention Lifeline: 1-257-943-AKAG (5260)  ? Crisis Text Line Service (available 24 hours a day, 7 days a week): Text [place] to 026270    Local Crisis Services: 988     Call 911 or go to my nearest emergency department.       I helped develop this safety plan and agree to use it when needed.  I have been given a copy of this plan.       Client signature _________________________________________________________________  Today s date:  2/22/2021  Adapted from Safety Plan Template 2008 Marisol Cazares and Mario Flores is reprinted with the express permission of the authors.  No portion of the Safety Plan Template may be reproduced without the express, written permission.  You can contact the authors at [email or web address].[email or web address] or " [email or web address].

## 2023-02-24 ENCOUNTER — VIRTUAL VISIT (OUTPATIENT)
Dept: PSYCHOLOGY | Facility: CLINIC | Age: 23
End: 2023-02-24
Payer: COMMERCIAL

## 2023-02-24 DIAGNOSIS — F41.1 GENERALIZED ANXIETY DISORDER: ICD-10-CM

## 2023-02-24 DIAGNOSIS — F43.10 PTSD (POST-TRAUMATIC STRESS DISORDER): ICD-10-CM

## 2023-02-24 DIAGNOSIS — F31.32 BIPOLAR AFFECTIVE DISORDER, CURRENTLY DEPRESSED, MODERATE (H): Primary | ICD-10-CM

## 2023-02-24 PROCEDURE — 90834 PSYTX W PT 45 MINUTES: CPT | Mod: VID | Performed by: SOCIAL WORKER

## 2023-02-24 NOTE — PROGRESS NOTES
[facility]                                      Progress Note    Patient Name: Wandy Ann  Date: 2023         Service Type: Individual      Session Start Time: 1002 Session End Time: 1044     Session Length: 38-52    Session #: 47    Attendees: Client    Service Modality:Video Visit:      Provider verified identity through the following two step process.  Patient provided: Patient  and Patient previous known to provider     Telemedicine Visit: The patient's condition can be safely assessed and treated via synchronous audio and visual telemedicine encounter.       Reason for Telemedicine Visit: Patient has requested telehealth visit     Originating Site (Patient Location): Patient's home     Distant Site (Provider Location): Provider Remote Setting- Home Office     Consent:  The patient/guardian has verbally consented to: the potential risks and benefits of telemedicine (video visit) versus in person care; bill my insurance or make self-payment for services provided; and responsibility for payment of non-covered services.      Patient would like the video invitation sent by: email/text     Mode of Communication: Video Conference via [employer]well     Distant Location (Provider): Off-site     As the provider I attest to compliance with applicable laws and regulations related to telemedicine.    DATA  Interactive Complexity: No  Crisis: No        Progress Since Last Session (Related to Symptoms / Goals / Homework):   Symptoms: No change with increase in anxiety     Homework: Achieved / completed to satisfaction      Episode of Care Goals: Minimal progress - PREPARATION (Decided to change - considering how); Intervened by negotiating a change plan and determining options / strategies for behavior change, identifying triggers, exploring social supports, and working towards setting a date to begin behavior change     Current / Ongoing Stressors and Concerns:   past trauma, developmental  hx and current stressors      Treatment Objective(s) Addressed in This Session:   Patient will demonstrate control of impulses and ability to use positive coping tools to manage strong emotions that can be safely addressed at a lower level of care. Absence of persistent SI and report of reduced frequency and intensity of SI and absence of SI to acceptable levels, report subjective improved mood for a period of 90 days, within 6 months as clinically observed and by patient self-report.  Patient will demonstrate and report a level of depression that can be managed at a lower level of care.  Absence of persistent depression mood and report of reduced frequency and intensity of low mood and absence of persistent low energy and motivation to acceptable levels, report subjective improved motivation and increased energy for a period of 90 days, within 6 months as clinically observed and by patient self-report.  Patient will demonstrate and report a level of anxiety that can be managed at a lower level of care.  Absence of persistent anxious mood and report of reduced frequency and intensity of worry and absence of persistent anxious mood to acceptable levels, no panic attacks, report subjective comfort with rumination for a period of 90 days, within 6 months as clinically observed and by patient self-report.       Intervention:   Therapist met with patient to review goals and interventions. Therapist utilized reflected listening as patient gave brief reflection of week. Patient reported she completed her intake for TMS and processed anxiety. Therapist supported patient and utilized strength based modality along with managing patient's anxiety. Therapist provided education on TMS and coached patient. Patient will follow through with psychiatry.   Patient presented with an anxious affect. Patient was engaged in session and open to feedback. Patient reported no SI this week and continues to contract for safety.     There has  been demonstrated improvement in functioning while patient has been engaged in psychotherapy/psychological service- if withdrawn the patient would deteriorate and/or relapse.     Assessments completed prior to visit:  The following assessments were completed by patient for this visit:  PHQ9:   PHQ-9 SCORE 6/21/2022 8/24/2022 9/29/2022 10/31/2022 12/2/2022 2/1/2023 2/2/2023   PHQ-9 Total Score MyChart 10 (Moderate depression) - 10 (Moderate depression) 3 (Minimal depression) - 16 (Moderately severe depression) 20 (Severe depression)   PHQ-9 Total Score 10 19 10 3 14 - 20   Some encounter information is confidential and restricted. Go to Review Flowsheets activity to see all data.     GAD7:   YESSI-7 SCORE 5/11/2022 6/6/2022 6/21/2022 8/24/2022 10/31/2022 11/29/2022 2/2/2023   Total Score 9 (mild anxiety) - 9 (mild anxiety) - 2 (minimal anxiety) 11 (moderate anxiety) 16 (severe anxiety)   Total Score 9 10 9 13 2 11 16   Some encounter information is confidential and restricted. Go to Review Flowsheets activity to see all data.           ASSESSMENT: Current Emotional / Mental Status (status of significant symptoms):   Risk status (Self / Other harm or suicidal ideation)   Patient denies current fears or concerns for personal safety.   Patient denies current or recent suicidal ideation or behaviors.   Patient denies current or recent homicidal ideation or behaviors.   Patient denies current or recent self injurious behavior or ideation.   Patient denies other safety concerns.   Patient reports there has been no change in risk factors since their last session.     Patient reports there has been no change in protective factors since their last session.     A safety and risk management plan has been developed including: Patient consented to co-developed safety plan on 2.21.2022.  Safety and risk management plan was reviewed.   Patient agreed to use safety plan should any safety concerns arise.  A copy was made available to  "the patient.     Appearance:   Appropriate     Eye Contact:   Fair     Psychomotor Behavior: Restless     Attitude:   Cooperative    Orientation:   All   Speech    Rate / Production: Emotional Talkative    Volume:  Normal    Mood:    Anxious  Depressed    Affect:    Worrisome    Thought Content:  Clear    Thought Form:  Coherent    Insight:    Fair      Medication Review:   No changes to current psychiatric medication(s)     Medication Compliance:   Yes     Changes in Health Issues:   None reported     Chemical Use Review:   Substance Use: Chemical use reviewed, no active concerns identified      Tobacco Use: No current tobacco use.      Diagnosis:  1. Bipolar affective disorder, currently depressed, moderate (H)    2. PTSD (post-traumatic stress disorder)    3. Generalized anxiety disorder        Collateral Reports Completed:   Not Applicable    PLAN: (Patient Tasks / Therapist Tasks / Other)  Make visual safety plan   Manage depressive symptoms with coping skills  When feeling SI follow safety plan  Continue to manage SI and go to ER if feeling unsafe  [facility] has a new mental health emergency area at [facility] called [facility] that is very calming and helpful if you need additional support. ([address] [place], [place] [address] [phone]).     Name and challenge cognitive distortions  read \"stop walking on eggshells.    Call [phone] if feeling SI has increase or go to ED or empath   Utilize coping skills when feeling impulsive.   TMS 2/23  Replace distortions with positive attributes   Patient reported she completed her intake for TMS and processed anxiety. Therapist supported patient and utilized strength based modality along with managing patient's anxiety. Therapist provided education on TMS and coached patient. Patient will follow through with psychiatry.     Katie Faulkner, [facility]  2/24/2023                                                     "     ______________________________________________________________________    Individual Treatment Plan    Patient's Name: Wandy Ann  YOB: 2000    Date of Creation: 2.22.2021  Date Treatment Plan Last Reviewed/Revised: 12/2/2022 due 3/2/2023    DSM5 Diagnoses: 296.52 Bipolar I Disorder Current or Most Recent Episode Depressed, Moderate, 300.02 (F41.1) Generalized Anxiety Disorder or 309.81 (F43.10) Posttraumatic Stress Disorder (includes Posttraumatic Stress Disorder for Children 6 Years and Younger)  Without dissociative symptoms  Psychosocial / Contextual Factors: past trauma, developmental hx and current stressors   PROMIS (reviewed every 90 days): 12/2/2022    Referral / Collaboration:  Referral to another professional/service is not indicated at this time..    Anticipated number of session for this episode of care: 12  Anticipation frequency of session: Biweekly  Anticipated Duration of each session: 38-52 minutes  Treatment plan will be reviewed in 90 days or when goals have been changed.       MeasurableTreatment Goal(s) related to diagnosis / functional impairment(s)  Goal 1: Patient will report absence of persistent SI and report of reduced frequency and intensity of SI and absence of SI to acceptable levels, report subjective improved mood for a period of 90 days, within 6 months as clinically observed and by patient self-report    I will know I've met my goal when I can see the difference between a bad moment and what I want in th future.      Objective #A (Patient Action)    Patient will demonstrate control of impulses and ability to use positive coping tools to manage strong emotions that can be safely addressed at a lower level of care. Absence of persistent SI and report of reduced frequency and intensity of SI and absence of SI to acceptable levels, report subjective improved mood for a period of 90 days, within 6 months as clinically observed and by patient  self-report.  Status: Continued - Date(s): 12/2/2022    Intervention(s)  Therapist will provide individual therapy to identify triggers to SI urges, gain feedback on helpful coping strategies, and identify ways that family can offer support. Tx to discuss current stressors and interpersonal conflicts and how to cope with these, coaching, diagnostic testing, referral for medication as indicated, use prescribed medication, cognitive restructuring, interpersonal family therapy, supportive therapy services.        Goal 2: Patient will report absence of persistent depression mood and report of reduced frequency and intensity of low mood and absence of persistent low energy and motivation to acceptable levels, report subjective improved motivation and increased energy for a period of 90 days, within 6 months as clinically observed and by patient self-report    I will know I've met my goal when I no longer feel sad.      Objective #A (Patient Action)    Status: Continued - Date(s): 12/2/2022    Patient will demonstrate and report a level of depression that can be managed at a lower level of care.  Absence of persistent depression mood and report of reduced frequency and intensity of low mood and absence of persistent low energy and motivation to acceptable levels, report subjective improved motivation and increased energy for a period of 90 days, within 6 months as clinically observed and by patient self-report.    Intervention(s)  Therapist will provide individual therapy to identify triggers to depression, gain feedback on helpful coping tools and thought-reframing techniques, and identify preferred way of being.  Tx to include discussion of current stressors and interpersonal conflicts and how to cope with these, coaching, diagnostic testing, referral for medication as indicated, use prescribed medication, cognitive restructuring, interpersonal, family therapy, supportive therapy services.        Goal 3: Patiient will  report absence of persistent anxiety mood and report of reduced frequency and intensity of worry and absence of persistent anxious mood to acceptable levels, no panic attacks, report subjective comfort with rumination for a period of 90 days. Within 6 months as clinically observed and by patient self-report    I will know I've met my goal when I can go days without worrying about little things or shaking.      Objective #A (Patient Action)    Status: Continued - Date(s): 12/2/2022    Patient will demonstrate and report a level of anxiety that can be managed at a lower level of care.  Absence of persistent anxious mood and report of reduced frequency and intensity of worry and absence of persistent anxious mood to acceptable levels, no panic attacks, report subjective comfort with rumination for a period of 90 days, within 6 months as clinically observed and by patient self-report.    Intervention(s)  Therapist will provide individual therapy to identify triggers to anxiety, gain feedback on helpful coping tools and thought-reframing techniques, and identify preferred way of being. Tx to include discuss current stressors and interpersonal conflicts and how to cope with these, coaching, diagnostic testing , referral for medication as indicated, use prescribed medication, cognitive restructuring, interpersonal,   family therapy, supportive therapy services.        Patient has reviewed and agreed to the above plan.      Katie Faulkner, [facility]   12/2/2022                                                   Wandy Ann            SAFETY PLAN:  Step 1: Warning signs / cues (Thoughts, images, mood, situation, behavior) that a crisis may be developing:  ? Thoughts: thoughts of not wanting to be here  ? Images: no images  ? Thinking Processes: intrusive thoughts (bothersome, unwanted thoughts that come out of nowhere): get irritated  ? Mood: intense anger and agitation  ? Behaviors: isolating/withdrawing  "  ? Situations: trauma    Step 2: Coping strategies - Things I can do to take my mind off of my problems without contacting another person (relaxation technique, physical activity):  ? Distress Tolerance Strategies:  arts and crafts: drawing and painting  ? Physical Activities: exercise: working out  ? Focus on helpful thoughts:  \"This is temporary\", \"It always passes\" and \"Ride the wave\"  Step 3: People and social settings that provide distraction:                 Name: Jennifer     Phone: [phone]                 Name: Antonina         Phone: [phone]                 Name: panchito       Phone: [phone]  ? friends house or car   Step 4: Remind myself of people and things that are important to me and worth living for:  Best friend and cat        Step 5: When I am in crisis, I can ask these people to help me use my safety plan:                 Name: Jennifer     Phone: [phone]                 Name: Antonina         Phone: [phone]                 Name: panchito       Phone: [phone]  Step 6: Making the environment safe:   ? be around others  Step 7: Professionals or agencies I can contact during a crisis:  ? [facility] Daytime Number: [phone]  ? Suicide Prevention Lifeline: 2-511-509-CRQG (8271)  ? Crisis Text Line Service (available 24 hours a day, 7 days a week): Text [place] to [address]1911    Local Crisis Services: 988     Call 911 or go to my nearest emergency department.       I helped develop this safety plan and agree to use it when needed.  I have been given a copy of this plan.       Client signature _________________________________________________________________  Today s date:  2/22/2021  Adapted from Safety Plan Template 2008 Marisol Cazares and Mario Flores is reprinted with the express permission of the authors.  No portion of the Safety Plan Template may be reproduced without the express, written permission.  You can contact the authors at [email or web address] or " [email or web address].

## 2023-02-28 ENCOUNTER — VIRTUAL VISIT (OUTPATIENT)
Dept: PSYCHOLOGY | Facility: CLINIC | Age: 23
End: 2023-02-28
Payer: COMMERCIAL

## 2023-02-28 DIAGNOSIS — F41.1 GENERALIZED ANXIETY DISORDER: ICD-10-CM

## 2023-02-28 DIAGNOSIS — F31.32 BIPOLAR AFFECTIVE DISORDER, CURRENTLY DEPRESSED, MODERATE (H): Primary | ICD-10-CM

## 2023-02-28 DIAGNOSIS — F43.10 PTSD (POST-TRAUMATIC STRESS DISORDER): ICD-10-CM

## 2023-02-28 PROCEDURE — 90832 PSYTX W PT 30 MINUTES: CPT | Mod: 93 | Performed by: SOCIAL WORKER

## 2023-02-28 NOTE — PROGRESS NOTES
"    [facility]                                      Progress Note    Patient Name: Wandy Ann  Date: 2/282023         Service Type: Individual      Session Start Time: 1501 Session End Time: 1530     Session Length: 16-37    Session #: 48    Attendees: Client    Service Modality: Phone Visit:      Provider verified identity through the following two step process.  Patient provided:  Patient is known previously to provider     Telephone Visit: The patient's condition can be safely assessed and treated via synchronous audio telemedicine encounter.       Reason for Audio Telemedicine Visit: Services only offered telehealth     Originating Site (Patient Location): Patient's home     Distant Site (Provider Location): off site      Consent:  The patient/guardian has verbally consented to:      1. The potential risks and benefits of telemedicine (telephone visit) versus in person care;     The patient has been notified of the following:      \"We have found that certain health care needs can be provided without the need for a face to face visit.  This service lets us provide the care you need with a phone conversation.       I will have full access to your [facility] medical record during this entire phone call.   I will be taking notes for your medical record.      Since this is like an office visit, we will bill your insurance company for this service.       There are potential benefits and risks of telephone visits (e.g. limits to patient confidentiality) that differ from in-person visits.?Confidentiality still applies for telephone services, and nobody will record the visit.  It is important to be in a quiet, private space that is free of distractions (including cell phone or other devices) during the visit.??      If during the course of the call I believe a telephone visit is not appropriate, you will not be charged for this service\"     Consent has been obtained for this service " by care team member: Yes     DATA  Interactive Complexity: No  Crisis: No        Progress Since Last Session (Related to Symptoms / Goals / Homework):   Symptoms: No change with increase in anxiety     Homework: Achieved / completed to satisfaction      Episode of Care Goals: Minimal progress - PREPARATION (Decided to change - considering how); Intervened by negotiating a change plan and determining options / strategies for behavior change, identifying triggers, exploring social supports, and working towards setting a date to begin behavior change     Current / Ongoing Stressors and Concerns:   past trauma, developmental hx and current stressors      Treatment Objective(s) Addressed in This Session:   Patient will demonstrate control of impulses and ability to use positive coping tools to manage strong emotions that can be safely addressed at a lower level of care. Absence of persistent SI and report of reduced frequency and intensity of SI and absence of SI to acceptable levels, report subjective improved mood for a period of 90 days, within 6 months as clinically observed and by patient self-report.  Patient will demonstrate and report a level of depression that can be managed at a lower level of care.  Absence of persistent depression mood and report of reduced frequency and intensity of low mood and absence of persistent low energy and motivation to acceptable levels, report subjective improved motivation and increased energy for a period of 90 days, within 6 months as clinically observed and by patient self-report.  Patient will demonstrate and report a level of anxiety that can be managed at a lower level of care.  Absence of persistent anxious mood and report of reduced frequency and intensity of worry and absence of persistent anxious mood to acceptable levels, no panic attacks, report subjective comfort with rumination for a period of 90 days, within 6 months as clinically observed and by patient  self-report.       Intervention:   Therapist met with patient to review goals and interventions. Therapist utilized reflected listening as patient gave brief reflection of week. Patient reported she has a start date for TMS 3/13 and is feeling scared and nervous. Therapist supported patient as she processed and validated patient. Therapist assisted patient in naming what her fear is and managing through fear vs failure. Therapist encouraged patient to implement self care and manage her anxiety.   Patient presented with an anxious affect. Patient was engaged in session and open to feedback. Patient reported no SI this week and continues to contract for safety.     There has been demonstrated improvement in functioning while patient has been engaged in psychotherapy/psychological service- if withdrawn the patient would deteriorate and/or relapse.     Assessments completed prior to visit:  The following assessments were completed by patient for this visit:  PHQ9:   PHQ-9 SCORE 6/21/2022 8/24/2022 9/29/2022 10/31/2022 12/2/2022 2/1/2023 2/2/2023   PHQ-9 Total Score MyChart 10 (Moderate depression) - 10 (Moderate depression) 3 (Minimal depression) - 16 (Moderately severe depression) 20 (Severe depression)   PHQ-9 Total Score 10 19 10 3 14 - 20   Some encounter information is confidential and restricted. Go to Review Flowsheets activity to see all data.     GAD7:   YESSI-7 SCORE 5/11/2022 6/6/2022 6/21/2022 8/24/2022 10/31/2022 11/29/2022 2/2/2023   Total Score 9 (mild anxiety) - 9 (mild anxiety) - 2 (minimal anxiety) 11 (moderate anxiety) 16 (severe anxiety)   Total Score 9 10 9 13 2 11 16   Some encounter information is confidential and restricted. Go to Review Flowsheets activity to see all data.           ASSESSMENT: Current Emotional / Mental Status (status of significant symptoms):   Risk status (Self / Other harm or suicidal ideation)   Patient denies current fears or concerns for personal safety.   Patient denies  current or recent suicidal ideation or behaviors.   Patient denies current or recent homicidal ideation or behaviors.   Patient denies current or recent self injurious behavior or ideation.   Patient denies other safety concerns.   Patient reports there has been no change in risk factors since their last session.     Patient reports there has been no change in protective factors since their last session.     A safety and risk management plan has been developed including: Patient consented to co-developed safety plan on 2.21.2022.  Safety and risk management plan was reviewed.   Patient agreed to use safety plan should any safety concerns arise.  A copy was made available to the patient.     Appearance:   Appropriate     Eye Contact:   Fair     Psychomotor Behavior: Restless     Attitude:   Cooperative    Orientation:   All   Speech    Rate / Production: Emotional Talkative    Volume:  Normal    Mood:    Anxious  Depressed    Affect:    Worrisome    Thought Content:  Clear    Thought Form:  Coherent    Insight:    Fair      Medication Review:   No changes to current psychiatric medication(s)     Medication Compliance:   Yes     Changes in Health Issues:   None reported     Chemical Use Review:   Substance Use: Chemical use reviewed, no active concerns identified      Tobacco Use: No current tobacco use.      Diagnosis:  1. Bipolar affective disorder, currently depressed, moderate (H)    2. PTSD (post-traumatic stress disorder)    3. Generalized anxiety disorder        Collateral Reports Completed:   Not Applicable    PLAN: (Patient Tasks / Therapist Tasks / Other)  Make visual safety plan   Manage depressive symptoms with coping skills  When feeling SI follow safety plan  Continue to manage SI and go to ER if feeling unsafe  [facility] has a new mental health emergency area at [facility] called [facility] that is very calming and helpful if you need additional support. ([address], [place]  "[place] [address]  [phone]).     Name and challenge cognitive distortions  read \"stop walking on eggshells.    Call [phone] if feeling SI has increase or go to ED or empath   Utilize coping skills when feeling impulsive.   TMS start date 3/13  Replace distortions with positive attributes   Therapist assisted patient in naming what her fear is and managing through fear vs failure. Therapist encouraged patient to implement self care and manage her anxiety.     Katie Faulkner, [facility] 2/28/2023                                                         ______________________________________________________________________    Individual Treatment Plan    Patient's Name: Wandy Ann  YOB: 2000    Date of Creation: 2.22.2021  Date Treatment Plan Last Reviewed/Revised: 12/2/2022 due 3/2/2023    DSM5 Diagnoses: 296.52 Bipolar I Disorder Current or Most Recent Episode Depressed, Moderate, 300.02 (F41.1) Generalized Anxiety Disorder or 309.81 (F43.10) Posttraumatic Stress Disorder (includes Posttraumatic Stress Disorder for Children 6 Years and Younger)  Without dissociative symptoms  Psychosocial / Contextual Factors: past trauma, developmental hx and current stressors   PROMIS (reviewed every 90 days): 12/2/2022    Referral / Collaboration:  Referral to another professional/service is not indicated at this time..    Anticipated number of session for this episode of care: 12  Anticipation frequency of session: Biweekly  Anticipated Duration of each session: 38-52 minutes  Treatment plan will be reviewed in 90 days or when goals have been changed.       MeasurableTreatment Goal(s) related to diagnosis / functional impairment(s)  Goal 1: Patient will report absence of persistent SI and report of reduced frequency and intensity of SI and absence of SI to acceptable levels, report subjective improved mood for a period of 90 days, within 6 months as clinically observed and by patient self-report    I will know I've " met my goal when I can see the difference between a bad moment and what I want in th future.      Objective #A (Patient Action)    Patient will demonstrate control of impulses and ability to use positive coping tools to manage strong emotions that can be safely addressed at a lower level of care. Absence of persistent SI and report of reduced frequency and intensity of SI and absence of SI to acceptable levels, report subjective improved mood for a period of 90 days, within 6 months as clinically observed and by patient self-report.  Status: Continued - Date(s): 12/2/2022    Intervention(s)  Therapist will provide individual therapy to identify triggers to SI urges, gain feedback on helpful coping strategies, and identify ways that family can offer support. Tx to discuss current stressors and interpersonal conflicts and how to cope with these, coaching, diagnostic testing, referral for medication as indicated, use prescribed medication, cognitive restructuring, interpersonal family therapy, supportive therapy services.        Goal 2: Patient will report absence of persistent depression mood and report of reduced frequency and intensity of low mood and absence of persistent low energy and motivation to acceptable levels, report subjective improved motivation and increased energy for a period of 90 days, within 6 months as clinically observed and by patient self-report    I will know I've met my goal when I no longer feel sad.      Objective #A (Patient Action)    Status: Continued - Date(s): 12/2/2022    Patient will demonstrate and report a level of depression that can be managed at a lower level of care.  Absence of persistent depression mood and report of reduced frequency and intensity of low mood and absence of persistent low energy and motivation to acceptable levels, report subjective improved motivation and increased energy for a period of 90 days, within 6 months as clinically observed and by patient  self-report.    Intervention(s)  Therapist will provide individual therapy to identify triggers to depression, gain feedback on helpful coping tools and thought-reframing techniques, and identify preferred way of being.  Tx to include discussion of current stressors and interpersonal conflicts and how to cope with these, coaching, diagnostic testing, referral for medication as indicated, use prescribed medication, cognitive restructuring, interpersonal, family therapy, supportive therapy services.        Goal 3: Patiient will report absence of persistent anxiety mood and report of reduced frequency and intensity of worry and absence of persistent anxious mood to acceptable levels, no panic attacks, report subjective comfort with rumination for a period of 90 days. Within 6 months as clinically observed and by patient self-report    I will know I've met my goal when I can go days without worrying about little things or shaking.      Objective #A (Patient Action)    Status: Continued - Date(s): 12/2/2022    Patient will demonstrate and report a level of anxiety that can be managed at a lower level of care.  Absence of persistent anxious mood and report of reduced frequency and intensity of worry and absence of persistent anxious mood to acceptable levels, no panic attacks, report subjective comfort with rumination for a period of 90 days, within 6 months as clinically observed and by patient self-report.    Intervention(s)  Therapist will provide individual therapy to identify triggers to anxiety, gain feedback on helpful coping tools and thought-reframing techniques, and identify preferred way of being. Tx to include discuss current stressors and interpersonal conflicts and how to cope with these, coaching, diagnostic testing , referral for medication as indicated, use prescribed medication, cognitive restructuring, interpersonal,   family therapy, supportive therapy services.        Patient has reviewed and agreed  "to the above plan.      Katie Faulkner, [facility]   12/2/2022                                                   Wandysteve Ann            SAFETY PLAN:  Step 1: Warning signs / cues (Thoughts, images, mood, situation, behavior) that a crisis may be developing:  ? Thoughts: thoughts of not wanting to be here  ? Images: no images  ? Thinking Processes: intrusive thoughts (bothersome, unwanted thoughts that come out of nowhere): get irritated  ? Mood: intense anger and agitation  ? Behaviors: isolating/withdrawing   ? Situations: trauma    Step 2: Coping strategies - Things I can do to take my mind off of my problems without contacting another person (relaxation technique, physical activity):  ? Distress Tolerance Strategies:  arts and crafts: drawing and painting  ? Physical Activities: exercise: working out  ? Focus on helpful thoughts:  \"This is temporary\", \"It always passes\" and \"Ride the wave\"  Step 3: People and social settings that provide distraction:                 Name: Jennifer     Phone: [phone]                 Name: Antonina         Phone: [phone]                 Name: mom       Phone: [phone]  ? friends house or car   Step 4: Remind myself of people and things that are important to me and worth living for:  Best friend and cat        Step 5: When I am in crisis, I can ask these people to help me use my safety plan:                 Name: Jennifer     Phone: [phone]                 Name: Antonina         Phone: [phone]                 Name: panchito       Phone: [phone]  Step 6: Making the environment safe:   ? be around others  Step 7: Professionals or agencies I can contact during a crisis:  ? [facility] Daytime Number: [phone]  ? Suicide Prevention Lifeline: 4-841-568-TALK (9634)  ? Crisis Text Line Service (available 24 hours a day, 7 days a week): Text [place] to 338954    Local Crisis Services: 988     Call 911 or go to my nearest emergency department.       I " helped develop this safety plan and agree to use it when needed.  I have been given a copy of this plan.       Client signature _________________________________________________________________  Today s date:  2/22/2021  Adapted from Safety Plan Template 2008 Marisol Cazares and Mario Flores is reprinted with the express permission of the authors.  No portion of the Safety Plan Template may be reproduced without the express, written permission.  You can contact the authors at [email or web address] or [email or web address].

## 2023-03-13 ENCOUNTER — MYC MEDICAL ADVICE (OUTPATIENT)
Dept: PSYCHIATRY | Facility: CLINIC | Age: 23
End: 2023-03-13
Payer: COMMERCIAL

## 2023-03-13 DIAGNOSIS — F31.31 BIPOLAR AFFECTIVE DISORDER, CURRENTLY DEPRESSED, MILD (H): ICD-10-CM

## 2023-03-13 RX ORDER — LAMOTRIGINE 100 MG/1
100 TABLET ORAL DAILY
Qty: 30 TABLET | Refills: 1 | OUTPATIENT
Start: 2023-03-13

## 2023-03-13 NOTE — TELEPHONE ENCOUNTER
Psych providers are covering for JONNY Romohansel    Date of Last Office Visit: 10/31/22  Date of Next Office Visit: 4/27/23  No shows since last visit: 0  Cancellations since last visit: 2/2/23 - Not seen    Medication requested:lamoTRIgine (LAMICTAL) 100 MG tablet  Date last ordered: 12/20/22 Qty: 30 Refills: 1    lamoTRIgine (LAMICTAL) 100 MG tablet 30 tablet 1 12/20/2022  --   Sig - Route: Take 1 tablet (100 mg) by mouth daily - Oral   Sent to pharmacy as: lamoTRIgine 100 MG Oral Tablet (LaMICtal)   Class: E-Prescribe              Lapse in medication adherence greater than 5 days?: Appears yes. As per pharmacy info, last filled on 2/4/23  If yes, call patient and gather details: Dialed pt but NAAdri LVM asking to check [employer] messages  Medication refill request verified as identical to current order?: yes  Result of Last DAM, VPA, Li+ Level, CBC, or Carbamazepine Level (at or since last visit): N/A    Last visit treatment plan:       Please review 12/19/22 phone encounter    Yoly Cox NP     10:01 AM  Note     Lamotrigine 100 mg daily filled at [facility] in [place]            []Medication refilled per  Medication Refill in Ambulatory Care  policy.  [x]Medication unable to be refilled by RN due to criteria not met as indicated below:    []Eligibility - not seen in the last year   []Supervision - no future appointment   [x]Compliance - no shows, cancellations or lapse in therapy   []Verification - order discrepancy   []Controlled medication   [x]Medication not included in policy   []90-day supply request   [x]Other: LPN is processing request

## 2023-03-14 DIAGNOSIS — F31.31 BIPOLAR AFFECTIVE DISORDER, CURRENTLY DEPRESSED, MILD (H): ICD-10-CM

## 2023-03-14 RX ORDER — LAMOTRIGINE 100 MG/1
100 TABLET ORAL DAILY
Qty: 90 TABLET | Refills: 0 | Status: SHIPPED | OUTPATIENT
Start: 2023-03-14 | End: 2023-04-27

## 2023-03-14 NOTE — CONFIDENTIAL NOTE
Called patient to verify she has not yet run out of lamotrigine. States she will be taking her last pill today but otherwise has been taking daily. Reviewed risk of SJS. Will send in refill for 100-mg tabs.     BESS Pabon, CNP, PNP-PC, PMHNP-BC   [facility] ([facility])

## 2023-03-14 NOTE — TELEPHONE ENCOUNTER
"Please see MyChart message from patient in response to VM and [employer] message sent to patient yesterday:    \"I had some left over from a 50 mg prescription so I was taking two to make 100 mg until I was out I have a follow up scheduled for 4/27/23\"    Thanks.    "

## 2023-03-23 ENCOUNTER — MYC MEDICAL ADVICE (OUTPATIENT)
Dept: MIDWIFE SERVICES | Facility: CLINIC | Age: 23
End: 2023-03-23
Payer: COMMERCIAL

## 2023-04-05 ENCOUNTER — VIRTUAL VISIT (OUTPATIENT)
Dept: PSYCHOLOGY | Facility: CLINIC | Age: 23
End: 2023-04-05
Payer: COMMERCIAL

## 2023-04-05 DIAGNOSIS — F43.10 PTSD (POST-TRAUMATIC STRESS DISORDER): ICD-10-CM

## 2023-04-05 DIAGNOSIS — F31.32 BIPOLAR AFFECTIVE DISORDER, CURRENTLY DEPRESSED, MODERATE (H): Primary | ICD-10-CM

## 2023-04-05 DIAGNOSIS — F41.1 GENERALIZED ANXIETY DISORDER: ICD-10-CM

## 2023-04-05 PROCEDURE — 90834 PSYTX W PT 45 MINUTES: CPT | Mod: VID | Performed by: SOCIAL WORKER

## 2023-04-05 NOTE — PROGRESS NOTES
[facility]                                      Progress Note    Patient Name: Wandy Ann  Date: 2023         Service Type: Individual      Session Start Time: 1001 Session End Time: 1050     Session Length: 38-52    Session #: 49    Attendees: Client    Service Modality:Video Visit:      Provider verified identity through the following two step process.  Patient provided: Patient  and Patient previous known to provider     Telemedicine Visit: The patient's condition can be safely assessed and treated via synchronous audio and visual telemedicine encounter.       Reason for Telemedicine Visit: Patient has requested telehealth visit     Originating Site (Patient Location): Patient's home     Distant Site (Provider Location): Provider Remote Setting- Home Office     Consent:  The patient/guardian has verbally consented to: the potential risks and benefits of telemedicine (video visit) versus in person care; bill my insurance or make self-payment for services provided; and responsibility for payment of non-covered services.      Patient would like the video invitation sent by: email/text     Mode of Communication: Video Conference via [employer]well     Distant Location (Provider): Off-site     As the provider I attest to compliance with applicable laws and regulations related to telemedicine.    DATA  Interactive Complexity: No  Crisis: No        Progress Since Last Session (Related to Symptoms / Goals / Homework):   Symptoms: Worsening per patient     Homework: Achieved / completed to satisfaction      Episode of Care Goals: Minimal progress - PREPARATION (Decided to change - considering how); Intervened by negotiating a change plan and determining options / strategies for behavior change, identifying triggers, exploring social supports, and working towards setting a date to begin behavior change     Current / Ongoing Stressors and Concerns:   past trauma, developmental hx and  current stressors      Treatment Objective(s) Addressed in This Session:   Patient will demonstrate control of impulses and ability to use positive coping tools to manage strong emotions that can be safely addressed at a lower level of care. Absence of persistent SI and report of reduced frequency and intensity of SI and absence of SI to acceptable levels, report subjective improved mood for a period of 90 days, within 6 months as clinically observed and by patient self-report.  Patient will demonstrate and report a level of depression that can be managed at a lower level of care.  Absence of persistent depression mood and report of reduced frequency and intensity of low mood and absence of persistent low energy and motivation to acceptable levels, report subjective improved motivation and increased energy for a period of 90 days, within 6 months as clinically observed and by patient self-report.  Patient will demonstrate and report a level of anxiety that can be managed at a lower level of care.  Absence of persistent anxious mood and report of reduced frequency and intensity of worry and absence of persistent anxious mood to acceptable levels, no panic attacks, report subjective comfort with rumination for a period of 90 days, within 6 months as clinically observed and by patient self-report.       Intervention:   Therapist met with patient to review goals and interventions. Therapist utilized reflected listening as patient gave brief reflection of week. Patient reported not liking TMS and wanting to quit and processed. Therapist supported patient as she processed and validated patient. Therapist gently challenged patient and provided patient with education on changed and anxiety. Therapist encouraged patient to continue TMS and offered patient psychiatry referral.   Patient presented with an anxious affect. Patient was engaged in session and open to feedback. Patient reported SI this week with no plan or intent  and continues to contract for safety.     There has been demonstrated improvement in functioning while patient has been engaged in psychotherapy/psychological service- if withdrawn the patient would deteriorate and/or relapse.     Assessments completed prior to visit:  The following assessments were completed by patient for this visit:  PHQ9:       6/21/2022     8:28 AM 8/24/2022     2:00 PM 9/29/2022     6:48 AM 10/31/2022     8:07 AM 12/2/2022    12:00 PM 2/1/2023    10:08 AM 2/2/2023    11:53 AM   PHQ-9 SCORE   PHQ-9 Total Score MyChart 10 (Moderate depression)  10 (Moderate depression) 3 (Minimal depression)   20 (Severe depression)   PHQ-9 Total Score 10 19 10 3 14  20       Information is confidential and restricted. Go to Review Flowsheets to unlock data.     GAD7:       5/11/2022     1:04 PM 6/6/2022     3:00 PM 6/21/2022     8:29 AM 8/24/2022     2:00 PM 10/31/2022     8:08 AM 11/29/2022     2:34 PM 2/2/2023    11:53 AM   YESSI-7 SCORE   Total Score 9 (mild anxiety)  9 (mild anxiety)  2 (minimal anxiety) 11 (moderate anxiety) 16 (severe anxiety)   Total Score 9 10 9 13 2 11 16           ASSESSMENT: Current Emotional / Mental Status (status of significant symptoms):   Risk status (Self / Other harm or suicidal ideation)   Patient denies current fears or concerns for personal safety.   Patient reports the following current or recent suicidal ideation or behaviors: pt reported SI with no plan or intent and contracted for safety.   Patient denies current or recent homicidal ideation or behaviors.   Patient denies current or recent self injurious behavior or ideation.   Patient denies other safety concerns.   Patient reports there has been no change in risk factors since their last session.     Patient reports there has been no change in protective factors since their last session.     A safety and risk management plan has been developed including: Patient consented to co-developed safety plan on 2.21.2022.   "Safety and risk management plan was reviewed.   Patient agreed to use safety plan should any safety concerns arise.  A copy was made available to the patient.     Appearance:   pt did not want to be on camera      Eye Contact:   pt did not want to be on camera      Psychomotor Behavior: pt did not want to be on camera      Attitude:   Cooperative    Orientation:   All   Speech    Rate / Production: Emotional Talkative    Volume:  Normal    Mood:    Anxious  Depressed    Affect:    Worrisome    Thought Content:  Clear    Thought Form:  Coherent    Insight:    Fair      Medication Review:   No changes to current psychiatric medication(s)     Medication Compliance:   Yes     Changes in Health Issues:   None reported     Chemical Use Review:   Substance Use: Chemical use reviewed, no active concerns identified      Tobacco Use: No current tobacco use.      Diagnosis:  1. Bipolar affective disorder, currently depressed, moderate (H)    2. PTSD (post-traumatic stress disorder)    3. Generalized anxiety disorder        Collateral Reports Completed:   Not Applicable    PLAN: (Patient Tasks / Therapist Tasks / Other)  Make visual safety plan   Manage depressive symptoms with coping skills  When feeling SI follow safety plan  Continue to manage SI and go to ER if feeling unsafe  [facility] has a new mental health emergency area at [facility] called [facility] that is very calming and helpful if you need additional support. ([address] [place] [address]  [phone]).     Name and challenge cognitive distortions  read \"stop walking on eggshells.    Call [phone] if feeling SI has increase or go to ED or [facility]   Utilize coping skills when feeling impulsive.   Replace distortions with positive attributes   Therapist assisted patient in naming what her fear is and managing through fear vs failure. Therapist encouraged patient to implement self care and manage her anxiety.   Continue TMS  Contact " psychiatry      Katie RAMA Faulkner, LICSW 4/5/2023                                                         ______________________________________________________________________    Individual Treatment Plan    Patient's Name: Wandy Ann  YOB: 2000    Date of Creation: 2.22.2021  Date Treatment Plan Last Reviewed/Revised: 4/5/2023 due 7/5/2023    DSM5 Diagnoses: 296.52 Bipolar I Disorder Current or Most Recent Episode Depressed, Moderate, 300.02 (F41.1) Generalized Anxiety Disorder or 309.81 (F43.10) Posttraumatic Stress Disorder (includes Posttraumatic Stress Disorder for Children 6 Years and Younger)  Without dissociative symptoms  Psychosocial / Contextual Factors: past trauma, developmental hx and current stressors   PROMIS (reviewed every 90 days): 12/2/2022    Referral / Collaboration:  Referral to another professional/service is not indicated at this time..    Anticipated number of session for this episode of care: 12  Anticipation frequency of session: Biweekly  Anticipated Duration of each session: 38-52 minutes  Treatment plan will be reviewed in 90 days or when goals have been changed.       MeasurableTreatment Goal(s) related to diagnosis / functional impairment(s)  Goal 1: Patient will report absence of persistent SI and report of reduced frequency and intensity of SI and absence of SI to acceptable levels, report subjective improved mood for a period of 90 days, within 6 months as clinically observed and by patient self-report    I will know I've met my goal when I can see the difference between a bad moment and what I want in th future.      Objective #A (Patient Action)    Patient will demonstrate control of impulses and ability to use positive coping tools to manage strong emotions that can be safely addressed at a lower level of care. Absence of persistent SI and report of reduced frequency and intensity of SI and absence of SI to acceptable levels, report subjective improved  mood for a period of 90 days, within 6 months as clinically observed and by patient self-report.  Status: Continued - Date(s): 4/5/2023    Intervention(s)  Therapist will provide individual therapy to identify triggers to SI urges, gain feedback on helpful coping strategies, and identify ways that family can offer support. Tx to discuss current stressors and interpersonal conflicts and how to cope with these, coaching, diagnostic testing, referral for medication as indicated, use prescribed medication, cognitive restructuring, interpersonal family therapy, supportive therapy services.        Goal 2: Patient will report absence of persistent depression mood and report of reduced frequency and intensity of low mood and absence of persistent low energy and motivation to acceptable levels, report subjective improved motivation and increased energy for a period of 90 days, within 6 months as clinically observed and by patient self-report    I will know I've met my goal when I no longer feel sad.      Objective #A (Patient Action)    Status: Continued - Date(s): 4/5/2023    Patient will demonstrate and report a level of depression that can be managed at a lower level of care.  Absence of persistent depression mood and report of reduced frequency and intensity of low mood and absence of persistent low energy and motivation to acceptable levels, report subjective improved motivation and increased energy for a period of 90 days, within 6 months as clinically observed and by patient self-report.    Intervention(s)  Therapist will provide individual therapy to identify triggers to depression, gain feedback on helpful coping tools and thought-reframing techniques, and identify preferred way of being.  Tx to include discussion of current stressors and interpersonal conflicts and how to cope with these, coaching, diagnostic testing, referral for medication as indicated, use prescribed medication, cognitive restructuring,  interpersonal, family therapy, supportive therapy services.        Goal 3: Patiient will report absence of persistent anxiety mood and report of reduced frequency and intensity of worry and absence of persistent anxious mood to acceptable levels, no panic attacks, report subjective comfort with rumination for a period of 90 days. Within 6 months as clinically observed and by patient self-report    I will know I've met my goal when I can go days without worrying about little things or shaking.      Objective #A (Patient Action)    Status: Continued - Date(s): 4/5/2023    Patient will demonstrate and report a level of anxiety that can be managed at a lower level of care.  Absence of persistent anxious mood and report of reduced frequency and intensity of worry and absence of persistent anxious mood to acceptable levels, no panic attacks, report subjective comfort with rumination for a period of 90 days, within 6 months as clinically observed and by patient self-report.    Intervention(s)  Therapist will provide individual therapy to identify triggers to anxiety, gain feedback on helpful coping tools and thought-reframing techniques, and identify preferred way of being. Tx to include discuss current stressors and interpersonal conflicts and how to cope with these, coaching, diagnostic testing , referral for medication as indicated, use prescribed medication, cognitive restructuring, interpersonal,   family therapy, supportive therapy services.        Patient has reviewed and agreed to the above plan.      Katie Faulkner, [facility]   4/5/2023                                                   Wandy Ann            SAFETY PLAN:  Step 1: Warning signs / cues (Thoughts, images, mood, situation, behavior) that a crisis may be developing:  ? Thoughts: thoughts of not wanting to be here  ? Images: no images  ? Thinking Processes: intrusive thoughts (bothersome, unwanted thoughts that come out of nowhere): get  "irritated  ? Mood: intense anger and agitation  ? Behaviors: isolating/withdrawing   ? Situations: trauma    Step 2: Coping strategies - Things I can do to take my mind off of my problems without contacting another person (relaxation technique, physical activity):  ? Distress Tolerance Strategies:  arts and crafts: drawing and painting  ? Physical Activities: exercise: working out  ? Focus on helpful thoughts:  \"This is temporary\", \"It always passes\" and \"Ride the wave\"  Step 3: People and social settings that provide distraction:                 Name: Jennifer     Phone: [phone]                 Name: Antonina         Phone: [phone]                 Name: mom       Phone: [phone]  ? friends house or car   Step 4: Remind myself of people and things that are important to me and worth living for:  Best friend and cat        Step 5: When I am in crisis, I can ask these people to help me use my safety plan:                 Name: Jennifer     Phone: [phone]                 Name: Antonina         Phone: [phone]                 Name: panchito       Phone: [phone]  Step 6: Making the environment safe:   ? be around others  Step 7: Professionals or agencies I can contact during a crisis:  ? [facility] Daytime Number: [phone]  ? Suicide Prevention Lifeline: 2-886-024-HHRV (1582)  ? Crisis Text Line Service (available 24 hours a day, 7 days a week): Text [place] to 529991    Local Crisis Services: 988     Call 911 or go to my nearest emergency department.       I helped develop this safety plan and agree to use it when needed.  I have been given a copy of this plan.       Client signature _________________________________________________________________  Today s date:  2/22/2021  Adapted from Safety Plan Template 2008 Marisol Cazares and Mario Flores is reprinted with the express permission of the authors.  No portion of the Safety Plan Template may be reproduced without the express, written " permission.  You can contact the authors at [email or web address] or [email or web address].

## 2023-04-11 ENCOUNTER — VIRTUAL VISIT (OUTPATIENT)
Dept: PSYCHOLOGY | Facility: CLINIC | Age: 23
End: 2023-04-11
Payer: COMMERCIAL

## 2023-04-11 DIAGNOSIS — F31.32 BIPOLAR AFFECTIVE DISORDER, CURRENTLY DEPRESSED, MODERATE (H): Primary | ICD-10-CM

## 2023-04-11 DIAGNOSIS — F41.1 GENERALIZED ANXIETY DISORDER: ICD-10-CM

## 2023-04-11 DIAGNOSIS — F43.10 PTSD (POST-TRAUMATIC STRESS DISORDER): ICD-10-CM

## 2023-04-11 PROCEDURE — 90834 PSYTX W PT 45 MINUTES: CPT | Mod: VID | Performed by: SOCIAL WORKER

## 2023-04-11 NOTE — PROGRESS NOTES
[facility]                                      Progress Note    Patient Name: Wandy Ann  Date: 2023         Service Type: Individual      Session Start Time: 1306 Session End Time: 1349     Session Length: 38-52    Session #: 50    Attendees: Client    Service Modality:Video Visit:      Provider verified identity through the following two step process.  Patient provided: Patient  and Patient previous known to provider     Telemedicine Visit: The patient's condition can be safely assessed and treated via synchronous audio and visual telemedicine encounter.       Reason for Telemedicine Visit: Patient has requested telehealth visit     Originating Site (Patient Location): Patient's home     Distant Site (Provider Location): Provider Remote Setting- Home Office     Consent:  The patient/guardian has verbally consented to: the potential risks and benefits of telemedicine (video visit) versus in person care; bill my insurance or make self-payment for services provided; and responsibility for payment of non-covered services.      Patient would like the video invitation sent by: email/text     Mode of Communication: Video Conference via Amwell     Distant Location (Provider): Off-site     As the provider I attest to compliance with applicable laws and regulations related to telemedicine.    DATA  Interactive Complexity: No  Crisis: No        Progress Since Last Session (Related to Symptoms / Goals / Homework):   Symptoms: No change per patient     Homework: Achieved / completed to satisfaction      Episode of Care Goals: Minimal progress - PREPARATION (Decided to change - considering how); Intervened by negotiating a change plan and determining options / strategies for behavior change, identifying triggers, exploring social supports, and working towards setting a date to begin behavior change     Current / Ongoing Stressors and Concerns:   past trauma, developmental hx and  current stressors      Treatment Objective(s) Addressed in This Session:   Patient will demonstrate control of impulses and ability to use positive coping tools to manage strong emotions that can be safely addressed at a lower level of care. Absence of persistent SI and report of reduced frequency and intensity of SI and absence of SI to acceptable levels, report subjective improved mood for a period of 90 days, within 6 months as clinically observed and by patient self-report.  Patient will demonstrate and report a level of depression that can be managed at a lower level of care.  Absence of persistent depression mood and report of reduced frequency and intensity of low mood and absence of persistent low energy and motivation to acceptable levels, report subjective improved motivation and increased energy for a period of 90 days, within 6 months as clinically observed and by patient self-report.  Patient will demonstrate and report a level of anxiety that can be managed at a lower level of care.  Absence of persistent anxious mood and report of reduced frequency and intensity of worry and absence of persistent anxious mood to acceptable levels, no panic attacks, report subjective comfort with rumination for a period of 90 days, within 6 months as clinically observed and by patient self-report.       Intervention:   Motivational Interviewing  Target Behavior: being hard on self    Stage of Change: PREPARATION (Decided to change - considering how)    MI Intervention: Co-Developed Goal: give reece to self for past behaviors, Expressed Empathy/Understanding, Permission to raise concern or advise, Open-ended questions and Reflections: simple and complex     Change Talk Expressed by the Patient: Desire to change Ability to change Reasons to change Taking steps    Provider Response to Change Talk: E - Evoked more info from patient about behavior change, A - Affirmed patient's thoughts, decisions, or attempts at behavior  change and R - Reflected patient's change talk       There has been demonstrated improvement in functioning while patient has been engaged in psychotherapy/psychological service- if withdrawn the patient would deteriorate and/or relapse.     Assessments completed prior to visit:  The following assessments were completed by patient for this visit:  PHQ9:       6/21/2022     8:28 AM 8/24/2022     2:00 PM 9/29/2022     6:48 AM 10/31/2022     8:07 AM 12/2/2022    12:00 PM 2/1/2023    10:08 AM 2/2/2023    11:53 AM   PHQ-9 SCORE   PHQ-9 Total Score MyChart 10 (Moderate depression)  10 (Moderate depression) 3 (Minimal depression)   20 (Severe depression)   PHQ-9 Total Score 10 19 10 3 14  20       Information is confidential and restricted. Go to Review Flowsheets to unlock data.     GAD7:       5/11/2022     1:04 PM 6/6/2022     3:00 PM 6/21/2022     8:29 AM 8/24/2022     2:00 PM 10/31/2022     8:08 AM 11/29/2022     2:34 PM 2/2/2023    11:53 AM   YESSI-7 SCORE   Total Score 9 (mild anxiety)  9 (mild anxiety)  2 (minimal anxiety) 11 (moderate anxiety) 16 (severe anxiety)   Total Score 9 10 9 13 2 11 16           ASSESSMENT: Current Emotional / Mental Status (status of significant symptoms):   Risk status (Self / Other harm or suicidal ideation)   Patient denies current fears or concerns for personal safety.   Patient denies current or recent suicidal ideation or behaviors.   Patient denies current or recent homicidal ideation or behaviors.   Patient denies current or recent self injurious behavior or ideation.   Patient denies other safety concerns.   Patient reports there has been no change in risk factors since their last session.     Patient reports there has been no change in protective factors since their last session.     A safety and risk management plan has been developed including: Patient consented to co-developed safety plan on 2.21.2022.  Safety and risk management plan was reviewed.   Patient agreed to use  "safety plan should any safety concerns arise.  A copy was made available to the patient.     Appearance:   Appropriate     Eye Contact:   Fair     Psychomotor Behavior: Restless     Attitude:   Cooperative    Orientation:   All   Speech    Rate / Production: Emotional Talkative    Volume:  Normal    Mood:    Anxious  Depressed    Affect:    Worrisome    Thought Content:  Clear    Thought Form:  Coherent    Insight:    Fair      Medication Review:   No changes to current psychiatric medication(s)     Medication Compliance:   Yes     Changes in Health Issues:   None reported     Chemical Use Review:   Substance Use: Chemical use reviewed, no active concerns identified      Tobacco Use: No current tobacco use.      Diagnosis:  1. Bipolar affective disorder, currently depressed, moderate (H)    2. PTSD (post-traumatic stress disorder)    3. Generalized anxiety disorder        Collateral Reports Completed:   Not Applicable    PLAN: (Patient Tasks / Therapist Tasks / Other)  Make visual safety plan   Manage depressive symptoms with coping skills  When feeling SI follow safety plan  Continue to manage SI and go to ER if feeling unsafe  [facility] has a new mental health emergency area at [facility] called [facility] that is very calming and helpful if you need additional support. ([address] [place], [place] [address] [phone]).     Name and challenge cognitive distortions  read \"stop walking on eggshells.    Call [phone] if feeling SI has increase or go to ED or empath   Utilize coping skills when feeling impulsive.   Replace distortions with positive attributes   Therapist assisted patient in naming what her fear is and managing through fear vs failure. Therapist encouraged patient to implement self care and manage her anxiety.   end TMS 5/11/2023   psychiatry appt 5/11/2023  Give reece to self    Katie Faulkner, [facility]  4/11/2023                                                     "     ______________________________________________________________________    Individual Treatment Plan    Patient's Name: Wandy Ann  YOB: 2000    Date of Creation: 2.22.2021  Date Treatment Plan Last Reviewed/Revised: 4/5/2023 due 7/5/2023    DSM5 Diagnoses: 296.52 Bipolar I Disorder Current or Most Recent Episode Depressed, Moderate, 300.02 (F41.1) Generalized Anxiety Disorder or 309.81 (F43.10) Posttraumatic Stress Disorder (includes Posttraumatic Stress Disorder for Children 6 Years and Younger)  Without dissociative symptoms  Psychosocial / Contextual Factors: past trauma, developmental hx and current stressors   PROMIS (reviewed every 90 days): 12/2/2022    Referral / Collaboration:  Referral to another professional/service is not indicated at this time..    Anticipated number of session for this episode of care: 12  Anticipation frequency of session: Biweekly  Anticipated Duration of each session: 38-52 minutes  Treatment plan will be reviewed in 90 days or when goals have been changed.       MeasurableTreatment Goal(s) related to diagnosis / functional impairment(s)  Goal 1: Patient will report absence of persistent SI and report of reduced frequency and intensity of SI and absence of SI to acceptable levels, report subjective improved mood for a period of 90 days, within 6 months as clinically observed and by patient self-report    I will know I've met my goal when I can see the difference between a bad moment and what I want in th future.      Objective #A (Patient Action)    Patient will demonstrate control of impulses and ability to use positive coping tools to manage strong emotions that can be safely addressed at a lower level of care. Absence of persistent SI and report of reduced frequency and intensity of SI and absence of SI to acceptable levels, report subjective improved mood for a period of 90 days, within 6 months as clinically observed and by patient  self-report.  Status: Continued - Date(s): 4/5/2023    Intervention(s)  Therapist will provide individual therapy to identify triggers to SI urges, gain feedback on helpful coping strategies, and identify ways that family can offer support. Tx to discuss current stressors and interpersonal conflicts and how to cope with these, coaching, diagnostic testing, referral for medication as indicated, use prescribed medication, cognitive restructuring, interpersonal family therapy, supportive therapy services.        Goal 2: Patient will report absence of persistent depression mood and report of reduced frequency and intensity of low mood and absence of persistent low energy and motivation to acceptable levels, report subjective improved motivation and increased energy for a period of 90 days, within 6 months as clinically observed and by patient self-report    I will know I've met my goal when I no longer feel sad.      Objective #A (Patient Action)    Status: Continued - Date(s): 4/5/2023    Patient will demonstrate and report a level of depression that can be managed at a lower level of care.  Absence of persistent depression mood and report of reduced frequency and intensity of low mood and absence of persistent low energy and motivation to acceptable levels, report subjective improved motivation and increased energy for a period of 90 days, within 6 months as clinically observed and by patient self-report.    Intervention(s)  Therapist will provide individual therapy to identify triggers to depression, gain feedback on helpful coping tools and thought-reframing techniques, and identify preferred way of being.  Tx to include discussion of current stressors and interpersonal conflicts and how to cope with these, coaching, diagnostic testing, referral for medication as indicated, use prescribed medication, cognitive restructuring, interpersonal, family therapy, supportive therapy services.        Goal 3: Patiient will  report absence of persistent anxiety mood and report of reduced frequency and intensity of worry and absence of persistent anxious mood to acceptable levels, no panic attacks, report subjective comfort with rumination for a period of 90 days. Within 6 months as clinically observed and by patient self-report    I will know I've met my goal when I can go days without worrying about little things or shaking.      Objective #A (Patient Action)    Status: Continued - Date(s): 4/5/2023    Patient will demonstrate and report a level of anxiety that can be managed at a lower level of care.  Absence of persistent anxious mood and report of reduced frequency and intensity of worry and absence of persistent anxious mood to acceptable levels, no panic attacks, report subjective comfort with rumination for a period of 90 days, within 6 months as clinically observed and by patient self-report.    Intervention(s)  Therapist will provide individual therapy to identify triggers to anxiety, gain feedback on helpful coping tools and thought-reframing techniques, and identify preferred way of being. Tx to include discuss current stressors and interpersonal conflicts and how to cope with these, coaching, diagnostic testing , referral for medication as indicated, use prescribed medication, cognitive restructuring, interpersonal,   family therapy, supportive therapy services.        Patient has reviewed and agreed to the above plan.      Katie Faulkner, [facility] [place] [facility]   4/5/2023                                                   Wandy Ann            SAFETY PLAN:  Step 1: Warning signs / cues (Thoughts, images, mood, situation, behavior) that a crisis may be developing:  ? Thoughts: thoughts of not wanting to be here  ? Images: no images  ? Thinking Processes: intrusive thoughts (bothersome, unwanted thoughts that come out of nowhere): get irritated  ? Mood: intense anger and agitation  ? Behaviors: isolating/withdrawing  "  ? Situations: trauma    Step 2: Coping strategies - Things I can do to take my mind off of my problems without contacting another person (relaxation technique, physical activity):  ? Distress Tolerance Strategies:  arts and crafts: drawing and painting  ? Physical Activities: exercise: working out  ? Focus on helpful thoughts:  \"This is temporary\", \"It always passes\" and \"Ride the wave\"  Step 3: People and social settings that provide distraction:                 Name: Jennifer     Phone: [phone]                 Name: Antonina         Phone: [phone]                 Name: panchito       Phone: [phone]  ? friends house or car   Step 4: Remind myself of people and things that are important to me and worth living for:  Best friend and cat        Step 5: When I am in crisis, I can ask these people to help me use my safety plan:                 Name: Jennifer     Phone: [phone]                 Name: Antonina         Phone: [phone]                 Name: panchito       Phone: [phone]  Step 6: Making the environment safe:   ? be around others  Step 7: Professionals or agencies I can contact during a crisis:  ? [facility] Daytime Number: [phone]  ? Suicide Prevention Lifeline: 2-674-561-FFNO (5371)  ? Crisis Text Line Service (available 24 hours a day, 7 days a week): Text [place] to 577902    Local Crisis Services: 988     Call 911 or go to my nearest emergency department.       I helped develop this safety plan and agree to use it when needed.  I have been given a copy of this plan.       Client signature _________________________________________________________________  Today s date:  2/22/2021  Adapted from Safety Plan Template 2008 Marisol Cazares and Mario Flores is reprinted with the express permission of the authors.  No portion of the Safety Plan Template may be reproduced without the express, written permission.  You can contact the authors at [email or web address] or " [email or web address].

## 2023-04-14 ENCOUNTER — MYC MEDICAL ADVICE (OUTPATIENT)
Dept: FAMILY MEDICINE | Facility: CLINIC | Age: 23
End: 2023-04-14
Payer: COMMERCIAL

## 2023-04-14 ENCOUNTER — TELEPHONE (OUTPATIENT)
Dept: FAMILY MEDICINE | Facility: CLINIC | Age: 23
End: 2023-04-14
Payer: COMMERCIAL

## 2023-04-14 ASSESSMENT — ENCOUNTER SYMPTOMS
COUGH: 0
MYALGIAS: 0
WEAKNESS: 0
NAUSEA: 1
HEARTBURN: 1
DIARRHEA: 0
ABDOMINAL PAIN: 0
DIZZINESS: 0
SHORTNESS OF BREATH: 0
SORE THROAT: 0
EYE PAIN: 0
HEADACHES: 0
NERVOUS/ANXIOUS: 1
HEMATOCHEZIA: 0
CONSTIPATION: 1
PALPITATIONS: 0
HEMATURIA: 0
ARTHRALGIAS: 0
BREAST MASS: 0
CHILLS: 0
FREQUENCY: 0
JOINT SWELLING: 0
FEVER: 0
DYSURIA: 0
PARESTHESIAS: 0

## 2023-04-14 ASSESSMENT — PATIENT HEALTH QUESTIONNAIRE - PHQ9
SUM OF ALL RESPONSES TO PHQ QUESTIONS 1-9: 19
SUM OF ALL RESPONSES TO PHQ QUESTIONS 1-9: 19
10. IF YOU CHECKED OFF ANY PROBLEMS, HOW DIFFICULT HAVE THESE PROBLEMS MADE IT FOR YOU TO DO YOUR WORK, TAKE CARE OF THINGS AT HOME, OR GET ALONG WITH OTHER PEOPLE: SOMEWHAT DIFFICULT

## 2023-04-14 NOTE — TELEPHONE ENCOUNTER
Called patient. Left voice message to return call at [phone].    When patient returns call, please triage for suicide ideation as patient had indicated suicidal thoughts on MyChart PHQ-9.    PhoRentt message also sent with triage questions.    MIRIAM LanN RN  [facility]

## 2023-04-14 NOTE — TELEPHONE ENCOUNTER
"Sometimes has thoughts of suicide   Reason: triager considers risk of harm to be low; no current intent or plan. Note: Consider asking \"Have you wished you were dead or wished you could go to sleep and not wake up?\"    Yes See in Office Today or Tomorrow     Patient noting during Xoinkahart message she works closely with therapist and psychiatrist, and has a support system in place to reach out to.    TORI Lan RN  [facility]  "

## 2023-04-14 NOTE — TELEPHONE ENCOUNTER
Please reach out to patient. Her mychart PHQ-9 indicates suicidal thoughts.   Linda Wilson PA-C

## 2023-04-20 ENCOUNTER — VIRTUAL VISIT (OUTPATIENT)
Dept: PSYCHOLOGY | Facility: CLINIC | Age: 23
End: 2023-04-20
Payer: COMMERCIAL

## 2023-04-20 DIAGNOSIS — F43.10 PTSD (POST-TRAUMATIC STRESS DISORDER): ICD-10-CM

## 2023-04-20 DIAGNOSIS — F41.1 GENERALIZED ANXIETY DISORDER: ICD-10-CM

## 2023-04-20 DIAGNOSIS — F31.32 BIPOLAR AFFECTIVE DISORDER, CURRENTLY DEPRESSED, MODERATE (H): Primary | ICD-10-CM

## 2023-04-20 PROCEDURE — 90832 PSYTX W PT 30 MINUTES: CPT | Mod: VID | Performed by: SOCIAL WORKER

## 2023-04-20 ASSESSMENT — ANXIETY QUESTIONNAIRES
4. TROUBLE RELAXING: MORE THAN HALF THE DAYS
8. IF YOU CHECKED OFF ANY PROBLEMS, HOW DIFFICULT HAVE THESE MADE IT FOR YOU TO DO YOUR WORK, TAKE CARE OF THINGS AT HOME, OR GET ALONG WITH OTHER PEOPLE?: SOMEWHAT DIFFICULT
IF YOU CHECKED OFF ANY PROBLEMS ON THIS QUESTIONNAIRE, HOW DIFFICULT HAVE THESE PROBLEMS MADE IT FOR YOU TO DO YOUR WORK, TAKE CARE OF THINGS AT HOME, OR GET ALONG WITH OTHER PEOPLE: SOMEWHAT DIFFICULT
6. BECOMING EASILY ANNOYED OR IRRITABLE: SEVERAL DAYS
7. FEELING AFRAID AS IF SOMETHING AWFUL MIGHT HAPPEN: SEVERAL DAYS
5. BEING SO RESTLESS THAT IT IS HARD TO SIT STILL: MORE THAN HALF THE DAYS
GAD7 TOTAL SCORE: 13
2. NOT BEING ABLE TO STOP OR CONTROL WORRYING: MORE THAN HALF THE DAYS
7. FEELING AFRAID AS IF SOMETHING AWFUL MIGHT HAPPEN: SEVERAL DAYS
GAD7 TOTAL SCORE: 13
3. WORRYING TOO MUCH ABOUT DIFFERENT THINGS: MORE THAN HALF THE DAYS
GAD7 TOTAL SCORE: 13
1. FEELING NERVOUS, ANXIOUS, OR ON EDGE: NEARLY EVERY DAY

## 2023-04-20 NOTE — PROGRESS NOTES
[facility]                                      Progress Note    Patient Name: Wandy Ann  Date: 2023         Service Type: Individual      Session Start Time:  Session End Time:      Session Length: 16-37    Session #: 51    Attendees: Client    Service Modality:Video Visit:      Provider verified identity through the following two step process.  Patient provided: Patient  and Patient previous known to provider     Telemedicine Visit: The patient's condition can be safely assessed and treated via synchronous audio and visual telemedicine encounter.       Reason for Telemedicine Visit: Patient has requested telehealth visit     Originating Site (Patient Location): Patient's home     Distant Site (Provider Location): Provider Remote Setting- Home Office     Consent:  The patient/guardian has verbally consented to: the potential risks and benefits of telemedicine (video visit) versus in person care; bill my insurance or make self-payment for services provided; and responsibility for payment of non-covered services.      Patient would like the video invitation sent by: email/text     Mode of Communication: Video Conference via [employer]well     Distant Location (Provider): Off-site     As the provider I attest to compliance with applicable laws and regulations related to telemedicine.    DATA  Interactive Complexity: No  Crisis: No        Progress Since Last Session (Related to Symptoms / Goals / Homework):   Symptoms: No change per patient     Homework: Achieved / completed to satisfaction      Episode of Care Goals: Minimal progress - PREPARATION (Decided to change - considering how); Intervened by negotiating a change plan and determining options / strategies for behavior change, identifying triggers, exploring social supports, and working towards setting a date to begin behavior change     Current / Ongoing Stressors and Concerns:   past trauma, developmental hx and  current stressors      Treatment Objective(s) Addressed in This Session:   Patient will demonstrate control of impulses and ability to use positive coping tools to manage strong emotions that can be safely addressed at a lower level of care. Absence of persistent SI and report of reduced frequency and intensity of SI and absence of SI to acceptable levels, report subjective improved mood for a period of 90 days, within 6 months as clinically observed and by patient self-report.  Patient will demonstrate and report a level of depression that can be managed at a lower level of care.  Absence of persistent depression mood and report of reduced frequency and intensity of low mood and absence of persistent low energy and motivation to acceptable levels, report subjective improved motivation and increased energy for a period of 90 days, within 6 months as clinically observed and by patient self-report.  Patient will demonstrate and report a level of anxiety that can be managed at a lower level of care.  Absence of persistent anxious mood and report of reduced frequency and intensity of worry and absence of persistent anxious mood to acceptable levels, no panic attacks, report subjective comfort with rumination for a period of 90 days, within 6 months as clinically observed and by patient self-report.       Intervention:   Therapist met with patient to review goals and interventions. Therapist utilized reflected listening as patient gave brief reflection of week. Patient reported having a good trip but feeling blah. Therapist supported patient as she processed and named patient being back from vacation and the weather could be effecting mood and patient reported taking herself off her medications. Therapist informed patient this could be dangerous and for patient to speak to medication provider. Therapist offered patient hope and encouragement.   Patient presented with an anxious affect. Patient was engaged in session and  open to feedback. Patient was future focused and contracts for safety.        There has been demonstrated improvement in functioning while patient has been engaged in psychotherapy/psychological service- if withdrawn the patient would deteriorate and/or relapse.     Assessments completed prior to visit:  The following assessments were completed by patient for this visit:  PHQ9:       8/24/2022     2:00 PM 9/29/2022     6:48 AM 10/31/2022     8:07 AM 12/2/2022    12:00 PM 2/1/2023    10:08 AM 2/2/2023    11:53 AM 4/14/2023     9:13 AM   PHQ-9 SCORE   PHQ-9 Total Score MyChart  10 (Moderate depression) 3 (Minimal depression)  16 (Moderately severe depression) 20 (Severe depression) 19 (Moderately severe depression)   PHQ-9 Total Score 19 10 3 14 16 20 19     GAD7:       6/6/2022     3:00 PM 6/21/2022     8:29 AM 8/24/2022     2:00 PM 10/31/2022     8:08 AM 11/29/2022     2:34 PM 2/2/2023    11:53 AM 4/20/2023    12:41 PM   YESSI-7 SCORE   Total Score  9 (mild anxiety)  2 (minimal anxiety) 11 (moderate anxiety) 16 (severe anxiety) 13 (moderate anxiety)   Total Score 10 9 13 2 11 16 13           ASSESSMENT: Current Emotional / Mental Status (status of significant symptoms):   Risk status (Self / Other harm or suicidal ideation)   Patient denies current fears or concerns for personal safety.   Patient denies current or recent suicidal ideation or behaviors.   Patient denies current or recent homicidal ideation or behaviors.   Patient denies current or recent self injurious behavior or ideation.   Patient denies other safety concerns.   Patient reports there has been no change in risk factors since their last session.     Patient reports there has been no change in protective factors since their last session.     A safety and risk management plan has been developed including: Patient consented to co-developed safety plan on 2.21.2022.  Safety and risk management plan was reviewed.   Patient agreed to use safety plan should  "any safety concerns arise.  A copy was made available to the patient.     Appearance:   Appropriate     Eye Contact:   Fair     Psychomotor Behavior: Restless     Attitude:   Cooperative    Orientation:   All   Speech    Rate / Production: Emotional Talkative    Volume:  Normal    Mood:    Anxious  Depressed    Affect:    Worrisome    Thought Content:  Clear    Thought Form:  Coherent    Insight:    Fair      Medication Review:   No changes to current psychiatric medication(s)     Medication Compliance:   No pt has stopped her medicaitons     Changes in Health Issues:   None reported     Chemical Use Review:   Substance Use: Chemical use reviewed, no active concerns identified      Tobacco Use: No current tobacco use.      Diagnosis:  1. Bipolar affective disorder, currently depressed, moderate (H)    2. PTSD (post-traumatic stress disorder)    3. Generalized anxiety disorder        Collateral Reports Completed:   Not Applicable    PLAN: (Patient Tasks / Therapist Tasks / Other)  Make visual safety plan   Manage depressive symptoms with coping skills  When feeling SI follow safety plan  Continue to manage SI and go to ER if feeling unsafe  [facility] has a new mental health emergency area at [facility] called [facility] that is very calming and helpful if you need additional support. ([address] [place] [address]  [phone]).     Name and challenge cognitive distortions  read \"stop walking on eggshells.    Call [phone] if feeling SI has increase or go to ED or empath   Utilize coping skills when feeling impulsive.   Replace distortions with positive attributes   Therapist assisted patient in naming what her fear is and managing through fear vs failure. Therapist encouraged patient to implement self care and manage her anxiety.   end TMS 5/11/2023   psychiatry appt 5/11/2023  Give reece to self  Therapist informed patient this could be dangerous and for patient to speak to medication " provider. Therapist offered patient hope and encouragement.       Katie SIRISHAAdri Faulkner, [facility]  4/11/2023                                                         ______________________________________________________________________    Individual Treatment Plan    Patient's Name: Wandy Ann  YOB: 2000    Date of Creation: 2.22.2021  Date Treatment Plan Last Reviewed/Revised: 4/5/2023 due 7/5/2023    DSM5 Diagnoses: 296.52 Bipolar I Disorder Current or Most Recent Episode Depressed, Moderate, 300.02 (F41.1) Generalized Anxiety Disorder or 309.81 (F43.10) Posttraumatic Stress Disorder (includes Posttraumatic Stress Disorder for Children 6 Years and Younger)  Without dissociative symptoms  Psychosocial / Contextual Factors: past trauma, developmental hx and current stressors   PROMIS (reviewed every 90 days): 12/2/2022    Referral / Collaboration:  Referral to another professional/service is not indicated at this time..    Anticipated number of session for this episode of care: 12  Anticipation frequency of session: Biweekly  Anticipated Duration of each session: 38-52 minutes  Treatment plan will be reviewed in 90 days or when goals have been changed.       MeasurableTreatment Goal(s) related to diagnosis / functional impairment(s)  Goal 1: Patient will report absence of persistent SI and report of reduced frequency and intensity of SI and absence of SI to acceptable levels, report subjective improved mood for a period of 90 days, within 6 months as clinically observed and by patient self-report    I will know I've met my goal when I can see the difference between a bad moment and what I want in th future.      Objective #A (Patient Action)    Patient will demonstrate control of impulses and ability to use positive coping tools to manage strong emotions that can be safely addressed at a lower level of care. Absence of persistent SI and report of reduced frequency and intensity of SI and absence of  SI to acceptable levels, report subjective improved mood for a period of 90 days, within 6 months as clinically observed and by patient self-report.  Status: Continued - Date(s): 4/5/2023    Intervention(s)  Therapist will provide individual therapy to identify triggers to SI urges, gain feedback on helpful coping strategies, and identify ways that family can offer support. Tx to discuss current stressors and interpersonal conflicts and how to cope with these, coaching, diagnostic testing, referral for medication as indicated, use prescribed medication, cognitive restructuring, interpersonal family therapy, supportive therapy services.        Goal 2: Patient will report absence of persistent depression mood and report of reduced frequency and intensity of low mood and absence of persistent low energy and motivation to acceptable levels, report subjective improved motivation and increased energy for a period of 90 days, within 6 months as clinically observed and by patient self-report    I will know I've met my goal when I no longer feel sad.      Objective #A (Patient Action)    Status: Continued - Date(s): 4/5/2023    Patient will demonstrate and report a level of depression that can be managed at a lower level of care.  Absence of persistent depression mood and report of reduced frequency and intensity of low mood and absence of persistent low energy and motivation to acceptable levels, report subjective improved motivation and increased energy for a period of 90 days, within 6 months as clinically observed and by patient self-report.    Intervention(s)  Therapist will provide individual therapy to identify triggers to depression, gain feedback on helpful coping tools and thought-reframing techniques, and identify preferred way of being.  Tx to include discussion of current stressors and interpersonal conflicts and how to cope with these, coaching, diagnostic testing, referral for medication as indicated, use  prescribed medication, cognitive restructuring, interpersonal, family therapy, supportive therapy services.        Goal 3: Patiient will report absence of persistent anxiety mood and report of reduced frequency and intensity of worry and absence of persistent anxious mood to acceptable levels, no panic attacks, report subjective comfort with rumination for a period of 90 days. Within 6 months as clinically observed and by patient self-report    I will know I've met my goal when I can go days without worrying about little things or shaking.      Objective #A (Patient Action)    Status: Continued - Date(s): 4/5/2023    Patient will demonstrate and report a level of anxiety that can be managed at a lower level of care.  Absence of persistent anxious mood and report of reduced frequency and intensity of worry and absence of persistent anxious mood to acceptable levels, no panic attacks, report subjective comfort with rumination for a period of 90 days, within 6 months as clinically observed and by patient self-report.    Intervention(s)  Therapist will provide individual therapy to identify triggers to anxiety, gain feedback on helpful coping tools and thought-reframing techniques, and identify preferred way of being. Tx to include discuss current stressors and interpersonal conflicts and how to cope with these, coaching, diagnostic testing , referral for medication as indicated, use prescribed medication, cognitive restructuring, interpersonal,   family therapy, supportive therapy services.        Patient has reviewed and agreed to the above plan.      Katie Faulkner, [facility]   4/5/2023                                                   Wandy Ann            SAFETY PLAN:  Step 1: Warning signs / cues (Thoughts, images, mood, situation, behavior) that a crisis may be developing:  ? Thoughts: thoughts of not wanting to be here  ? Images: no images  ? Thinking Processes: intrusive thoughts (bothersome,  "unwanted thoughts that come out of nowhere): get irritated  ? Mood: intense anger and agitation  ? Behaviors: isolating/withdrawing   ? Situations: trauma    Step 2: Coping strategies - Things I can do to take my mind off of my problems without contacting another person (relaxation technique, physical activity):  ? Distress Tolerance Strategies:  arts and crafts: drawing and painting  ? Physical Activities: exercise: working out  ? Focus on helpful thoughts:  \"This is temporary\", \"It always passes\" and \"Ride the wave\"  Step 3: People and social settings that provide distraction:                 Name: Jennifer     Phone: [phone]                 Name: Antonina         Phone: [phone]                 Name: mom       Phone: [phone]  ? friends house or car   Step 4: Remind myself of people and things that are important to me and worth living for:  Best friend and cat        Step 5: When I am in crisis, I can ask these people to help me use my safety plan:                 Name: Jennifer     Phone: [phone]                 Name: Antonina         Phone: [phone]                 Name: panchito       Phone: [phone]  Step 6: Making the environment safe:   ? be around others  Step 7: Professionals or agencies I can contact during a crisis:  ? Merged with [facility] Daytime Number: [phone]  ? Suicide Prevention Lifeline: 5-864-565-OOLS (2167)  ? Crisis Text Line Service (available 24 hours a day, 7 days a week): Text [place] to 210074    Local Crisis Services: 988     Call 911 or go to my nearest emergency department.       I helped develop this safety plan and agree to use it when needed.  I have been given a copy of this plan.       Client signature _________________________________________________________________  Today s date:  2/22/2021  Adapted from Safety Plan Template 2008 Marisol Cazares and Mario Flores is reprinted with the express permission of the authors.  No portion of the Safety Plan Template " may be reproduced without the express, written permission.  You can contact the authors at [email or web address].[email or web address] or [email or web address].

## 2023-04-21 ENCOUNTER — OFFICE VISIT (OUTPATIENT)
Dept: FAMILY MEDICINE | Facility: CLINIC | Age: 23
End: 2023-04-21
Payer: COMMERCIAL

## 2023-04-21 VITALS
OXYGEN SATURATION: 100 % | WEIGHT: 213.2 LBS | HEIGHT: 60 IN | TEMPERATURE: 97.7 F | BODY MASS INDEX: 41.86 KG/M2 | HEART RATE: 86 BPM | DIASTOLIC BLOOD PRESSURE: 85 MMHG | SYSTOLIC BLOOD PRESSURE: 126 MMHG

## 2023-04-21 DIAGNOSIS — Z11.59 NEED FOR HEPATITIS C SCREENING TEST: ICD-10-CM

## 2023-04-21 DIAGNOSIS — R74.8 ELEVATED LIVER ENZYMES: ICD-10-CM

## 2023-04-21 DIAGNOSIS — Z11.3 SCREENING FOR STDS (SEXUALLY TRANSMITTED DISEASES): ICD-10-CM

## 2023-04-21 DIAGNOSIS — E66.01 MORBID OBESITY (H): ICD-10-CM

## 2023-04-21 DIAGNOSIS — Z12.4 CERVICAL CANCER SCREENING: ICD-10-CM

## 2023-04-21 DIAGNOSIS — R63.5 WEIGHT GAIN: ICD-10-CM

## 2023-04-21 DIAGNOSIS — Z00.00 ROUTINE GENERAL MEDICAL EXAMINATION AT A HEALTH CARE FACILITY: Primary | ICD-10-CM

## 2023-04-21 DIAGNOSIS — Z11.4 SCREENING FOR HIV (HUMAN IMMUNODEFICIENCY VIRUS): ICD-10-CM

## 2023-04-21 LAB
ALBUMIN SERPL-MCNC: 3.8 G/DL (ref 3.4–5)
ALP SERPL-CCNC: 99 U/L (ref 40–150)
ALT SERPL W P-5'-P-CCNC: 92 U/L (ref 0–50)
ANION GAP SERPL CALCULATED.3IONS-SCNC: 5 MMOL/L (ref 3–14)
AST SERPL W P-5'-P-CCNC: 56 U/L (ref 0–45)
BILIRUB SERPL-MCNC: 0.4 MG/DL (ref 0.2–1.3)
BUN SERPL-MCNC: 9 MG/DL (ref 7–30)
CALCIUM SERPL-MCNC: 9.3 MG/DL (ref 8.5–10.1)
CHLORIDE BLD-SCNC: 109 MMOL/L (ref 94–109)
CO2 SERPL-SCNC: 26 MMOL/L (ref 20–32)
CREAT SERPL-MCNC: 0.64 MG/DL (ref 0.52–1.04)
ERYTHROCYTE [DISTWIDTH] IN BLOOD BY AUTOMATED COUNT: 13 % (ref 10–15)
GFR SERPL CREATININE-BSD FRML MDRD: >90 ML/MIN/1.73M2
GLUCOSE BLD-MCNC: 94 MG/DL (ref 70–99)
HBA1C MFR BLD: 5.4 % (ref 0–5.6)
HCT VFR BLD AUTO: 42.8 % (ref 35–47)
HGB BLD-MCNC: 14.6 G/DL (ref 11.7–15.7)
MCH RBC QN AUTO: 31.8 PG (ref 26.5–33)
MCHC RBC AUTO-ENTMCNC: 34.1 G/DL (ref 31.5–36.5)
MCV RBC AUTO: 93 FL (ref 78–100)
PLATELET # BLD AUTO: 382 10E3/UL (ref 150–450)
POTASSIUM BLD-SCNC: 4 MMOL/L (ref 3.4–5.3)
PROT SERPL-MCNC: 7.9 G/DL (ref 6.8–8.8)
RBC # BLD AUTO: 4.59 10E6/UL (ref 3.8–5.2)
SODIUM SERPL-SCNC: 140 MMOL/L (ref 133–144)
TSH SERPL DL<=0.005 MIU/L-ACNC: 1.86 MU/L (ref 0.4–4)
WBC # BLD AUTO: 6.4 10E3/UL (ref 4–11)

## 2023-04-21 PROCEDURE — 80053 COMPREHEN METABOLIC PANEL: CPT | Performed by: PHYSICIAN ASSISTANT

## 2023-04-21 PROCEDURE — 90651 9VHPV VACCINE 2/3 DOSE IM: CPT | Performed by: PHYSICIAN ASSISTANT

## 2023-04-21 PROCEDURE — 83036 HEMOGLOBIN GLYCOSYLATED A1C: CPT | Performed by: PHYSICIAN ASSISTANT

## 2023-04-21 PROCEDURE — 87491 CHLMYD TRACH DNA AMP PROBE: CPT | Performed by: PHYSICIAN ASSISTANT

## 2023-04-21 PROCEDURE — 87591 N.GONORRHOEAE DNA AMP PROB: CPT | Performed by: PHYSICIAN ASSISTANT

## 2023-04-21 PROCEDURE — 86803 HEPATITIS C AB TEST: CPT | Performed by: PHYSICIAN ASSISTANT

## 2023-04-21 PROCEDURE — 86780 TREPONEMA PALLIDUM: CPT | Performed by: PHYSICIAN ASSISTANT

## 2023-04-21 PROCEDURE — 90471 IMMUNIZATION ADMIN: CPT | Performed by: PHYSICIAN ASSISTANT

## 2023-04-21 PROCEDURE — 99395 PREV VISIT EST AGE 18-39: CPT | Mod: 25 | Performed by: PHYSICIAN ASSISTANT

## 2023-04-21 PROCEDURE — 84443 ASSAY THYROID STIM HORMONE: CPT | Performed by: PHYSICIAN ASSISTANT

## 2023-04-21 PROCEDURE — 36415 COLL VENOUS BLD VENIPUNCTURE: CPT | Performed by: PHYSICIAN ASSISTANT

## 2023-04-21 PROCEDURE — 85027 COMPLETE CBC AUTOMATED: CPT | Performed by: PHYSICIAN ASSISTANT

## 2023-04-21 PROCEDURE — G0145 SCR C/V CYTO,THINLAYER,RESCR: HCPCS | Performed by: PHYSICIAN ASSISTANT

## 2023-04-21 PROCEDURE — 87389 HIV-1 AG W/HIV-1&-2 AB AG IA: CPT | Performed by: PHYSICIAN ASSISTANT

## 2023-04-21 ASSESSMENT — ENCOUNTER SYMPTOMS
CONSTIPATION: 1
SHORTNESS OF BREATH: 0
DYSURIA: 0
PALPITATIONS: 0
FEVER: 0
NERVOUS/ANXIOUS: 1
HEMATURIA: 0
DIZZINESS: 0
SORE THROAT: 0
JOINT SWELLING: 0
COUGH: 0
WEAKNESS: 0
MYALGIAS: 0
PARESTHESIAS: 0
CHILLS: 0
EYE PAIN: 0
HEADACHES: 0
FREQUENCY: 0
ARTHRALGIAS: 0
NAUSEA: 1
BREAST MASS: 0
HEMATOCHEZIA: 0
DIARRHEA: 0
HEARTBURN: 1
ABDOMINAL PAIN: 0

## 2023-04-21 NOTE — PROGRESS NOTES
SUBJECTIVE:   CC: Wandy is an 22 year old who presents for preventive health visit.       4/21/2023     1:16 PM   Additional Questions   Roomed by mayra     Patient has been advised of split billing requirements and indicates understanding: Yes  Healthy Habits:     Getting at least 3 servings of Calcium per day:  NO    Bi-annual eye exam:  Yes    Dental care twice a year:  NO    Sleep apnea or symptoms of sleep apnea:  None    Diet:  Regular (no restrictions)    Frequency of exercise:  2-3 days/week    Duration of exercise:  15-30 minutes    Taking medications regularly:  No    Barriers to taking medications:  Problems remembering to take them    Medication side effects:  None    PHQ-2 Total Score: 4    Additional concerns today:  Yes      Weight loss -  Seems to go up and down. Either eats 1 meal a day or eats a lot. When she exercises, she does lose weight. But gains weight easily if not exercising a lot.    Eats out a lot. Occasionally fast food. Tries to do a salad here and there.     Work -    for [employer]. Working from home for the most part.     Sexually active. 2 partners in the last year.     Seeing psychiatrist, therapy ([facility]). Doing TMS right now.       Today's PHQ-2 Score:       4/14/2023     9:13 AM   PHQ-2 ( 1999 [employer])   Q1: Little interest or pleasure in doing things 1   Q2: Feeling down, depressed or hopeless 3   PHQ-2 Score 4   Q1: Little interest or pleasure in doing things Several days   Q2: Feeling down, depressed or hopeless Nearly every day   PHQ-2 Score 4       Have you ever done Advance Care Planning? (For example, a Health Directive, POLST, or a discussion with a medical provider or your loved ones about your wishes): No, advance care planning information given to patient to review.  Patient plans to discuss their wishes with loved ones or provider.      Social History     Tobacco Use     Smoking status: Never     Smokeless tobacco: Never   Vaping Use      Vaping status: Never Used   Substance Use Topics     Alcohol use: Yes     Comment: Occ             4/14/2023     9:13 AM   Alcohol Use   Prescreen: >3 drinks/day or >7 drinks/week? No     Reviewed orders with patient.  Reviewed health maintenance and updated orders accordingly - Yes  BP Readings from Last 3 Encounters:   04/21/23 126/85   06/07/22 (!) 141/74   04/29/22 122/82    Wt Readings from Last 3 Encounters:   04/21/23 96.7 kg (213 lb 3.2 oz)   06/07/22 88.5 kg (195 lb)   04/29/22 92.1 kg (203 lb)                    Breast Cancer Screening:        History of abnormal Pap smear: NO - age 21-29 PAP every 3 years recommended     Reviewed and updated as needed this visit by clinical staff   Tobacco  Allergies  Meds              Reviewed and updated as needed this visit by Provider                     Review of Systems   Constitutional: Negative for chills and fever.   HENT: Negative for congestion, ear pain, hearing loss and sore throat.    Eyes: Negative for pain and visual disturbance.   Respiratory: Negative for cough and shortness of breath.    Cardiovascular: Negative for chest pain, palpitations and peripheral edema.   Gastrointestinal: Positive for constipation, heartburn and nausea. Negative for abdominal pain, diarrhea and hematochezia.   Breasts:  Negative for tenderness, breast mass and discharge.   Genitourinary: Negative for dysuria, frequency, genital sores, hematuria, pelvic pain, urgency, vaginal bleeding and vaginal discharge.   Musculoskeletal: Negative for arthralgias, joint swelling and myalgias.   Skin: Negative for rash.   Neurological: Negative for dizziness, weakness, headaches and paresthesias.   Psychiatric/Behavioral: Positive for mood changes. The patient is nervous/anxious.    Answers for HPI/ROS submitted by the patient on 4/14/2023  If you checked off any problems, how difficult have these problems made it for you to do your work, take care of things at home, or get along with  other people?: Somewhat difficult  PHQ9 TOTAL SCORE: 19           OBJECTIVE:   /85 (BP Location: Right arm, Patient Position: Sitting, Cuff Size: Adult Regular)   Pulse 86   Temp 97.7  F (36.5  C) (Oral)   Ht 1.524 m (5')   Wt 96.7 kg (213 lb 3.2 oz)   LMP  (LMP Unknown)   SpO2 100%   BMI 41.64 kg/m    Physical Exam  GENERAL: healthy, alert and no distress  EYES: Eyes grossly normal to inspection, PERRL and conjunctivae and sclerae normal  HENT: ear canals and TM's normal, nose and mouth without ulcers or lesions  NECK: no adenopathy, no asymmetry, masses, or scars and thyroid normal to palpation  RESP: lungs clear to auscultation - no rales, rhonchi or wheezes  BREAST: normal without masses, tenderness or nipple discharge and no palpable axillary masses or adenopathy  CV: regular rate and rhythm, normal S1 S2, no S3 or S4, no murmur, click or rub, no peripheral edema and peripheral pulses strong  ABDOMEN: soft, nontender, no hepatosplenomegaly, no masses and bowel sounds normal   (female): normal female external genitalia, normal urethral meatus, vaginal mucosa pink, moist, well rugated, and normal cervix/adnexa/uterus without masses or discharge  MS: no gross musculoskeletal defects noted, no edema  SKIN: no suspicious lesions or rashes  NEURO: Normal strength and tone, mentation intact and speech normal  PSYCH: mentation appears normal, affect normal/bright        ASSESSMENT/PLAN:   1. Routine general medical examination at a health care facility  Well adult.   - Comprehensive metabolic panel (BMP + Alb, Alk Phos, ALT, AST, Total. Bili, TP); Future  - Hemoglobin A1c; Future  - CBC with platelets; Future  - Comprehensive metabolic panel (BMP + Alb, Alk Phos, ALT, AST, Total. Bili, TP)  - Hemoglobin A1c  - CBC with platelets    2. Screening for STDs (sexually transmitted diseases)  Screen.  - Treponema Abs w Reflex to RPR and Titer; Future  - NEISSERIA GONORRHOEA PCR  - CHLAMYDIA TRACHOMATIS PCR  -  Treponema Abs w Reflex to RPR and Titer    3. Screening for HIV (human immunodeficiency virus)  Screen.  - HIV Antigen Antibody Combo; Future  - HIV Antigen Antibody Combo    4. Need for hepatitis C screening test  Screen.  - Hepatitis C Screen Reflex to HCV RNA Quant and Genotype; Future  - Hepatitis C Screen Reflex to HCV RNA Quant and Genotype    5. Cervical cancer screening  Screen.  - Pap Screen only - recommended age 21 - 24 years    6. Morbid obesity (H)  Patient interested in talking to nutritionist.   - Nutrition Referral; Future    7. Weight gain  Eval.   - TSH with free T4 reflex; Future  - TSH with free T4 reflex     Patient has been advised of split billing requirements and indicates understanding: Yes      COUNSELING:  Special attention given to:        Regular exercise       Healthy diet/nutrition       Contraception      BMI:   Estimated body mass index is 41.64 kg/m  as calculated from the following:    Height as of this encounter: 1.524 m (5').    Weight as of this encounter: 96.7 kg (213 lb 3.2 oz).   Weight management plan: Discussed healthy diet and exercise guidelines      She reports that she has never smoked. She has never used smokeless tobacco.          KEATON Brownlee [facility]

## 2023-04-22 LAB
C TRACH DNA SPEC QL NAA+PROBE: NEGATIVE
N GONORRHOEA DNA SPEC QL NAA+PROBE: NEGATIVE
T PALLIDUM AB SER QL: NONREACTIVE

## 2023-04-24 LAB
HCV AB SERPL QL IA: NONREACTIVE
HIV 1+2 AB+HIV1 P24 AG SERPL QL IA: NONREACTIVE

## 2023-04-25 LAB
BKR LAB AP GYN ADEQUACY: NORMAL
BKR LAB AP GYN INTERPRETATION: NORMAL
BKR LAB AP HPV REFLEX: NO
BKR LAB AP PREVIOUS ABNORMAL: NORMAL
PATH REPORT.COMMENTS IMP SPEC: NORMAL
PATH REPORT.COMMENTS IMP SPEC: NORMAL
PATH REPORT.RELEVANT HX SPEC: NORMAL

## 2023-04-27 ENCOUNTER — VIRTUAL VISIT (OUTPATIENT)
Dept: PSYCHIATRY | Facility: CLINIC | Age: 23
End: 2023-04-27
Payer: COMMERCIAL

## 2023-04-27 DIAGNOSIS — F41.1 GAD (GENERALIZED ANXIETY DISORDER): ICD-10-CM

## 2023-04-27 DIAGNOSIS — F31.31 BIPOLAR AFFECTIVE DISORDER, CURRENTLY DEPRESSED, MILD (H): Primary | ICD-10-CM

## 2023-04-27 PROCEDURE — 99214 OFFICE O/P EST MOD 30 MIN: CPT | Mod: VID | Performed by: NURSE PRACTITIONER

## 2023-04-27 RX ORDER — LURASIDONE HYDROCHLORIDE 20 MG/1
20 TABLET, FILM COATED ORAL DAILY
Qty: 30 TABLET | Refills: 1 | Status: SHIPPED | OUTPATIENT
Start: 2023-04-27 | End: 2023-08-18

## 2023-04-27 ASSESSMENT — PATIENT HEALTH QUESTIONNAIRE - PHQ9
10. IF YOU CHECKED OFF ANY PROBLEMS, HOW DIFFICULT HAVE THESE PROBLEMS MADE IT FOR YOU TO DO YOUR WORK, TAKE CARE OF THINGS AT HOME, OR GET ALONG WITH OTHER PEOPLE: VERY DIFFICULT
SUM OF ALL RESPONSES TO PHQ QUESTIONS 1-9: 17
SUM OF ALL RESPONSES TO PHQ QUESTIONS 1-9: 17

## 2023-04-27 NOTE — PROGRESS NOTES
"Virtual Visit Details    Type of service:  Video Visit   Video Start Time: 10:55 AM  Video End Time:11:22 AM    Originating Location (pt. Location): Other In her car outside of her place of employment    Distant Location (provider location):  On-site  Platform used for Video Visit: [facility]            Outpatient Psychiatric Progress Note    Name: Wandy Ann   : 2000                    Primary Care Provider: Hudson Mobley MD   Therapist: Yes    PHQ-9 scores:      2023    10:08 AM 2023    11:53 AM 2023     9:13 AM   PHQ-9 SCORE   PHQ-9 Total Score MyChart 16 (Moderately severe depression) 20 (Severe depression) 19 (Moderately severe depression)   PHQ-9 Total Score 16 20 19       YESSI-7 scores:      2022     2:34 PM 2023    11:53 AM 2023    12:41 PM   YESSI-7 SCORE   Total Score 11 (moderate anxiety) 16 (severe anxiety) 13 (moderate anxiety)   Total Score 11 16 13       Patient Identification:    Patient is a 22 year old year old, single  Choose not to Answer  or  female  who presents for return visit with me.  Patient is currently employed full time. Patient attended the session alone. Patient prefers to be called: \" Wandy\".    Current medications include: hydrOXYzine (ATARAX) 10 MG tablet, Take 1 tablet (10 mg) by mouth 3 times daily as needed for anxiety (Patient not taking: Reported on 2023)  lamoTRIgine (LAMICTAL) 100 MG tablet, Take 1 tablet (100 mg) by mouth daily (Patient not taking: Reported on 2023)  venlafaxine (EFFEXOR XR) 150 MG 24 hr capsule, Take 1 capsule (150 mg) by mouth daily (Patient not taking: Reported on 2023)  venlafaxine (EFFEXOR XR) 37.5 MG 24 hr capsule, Take 1 capsule (37.5 mg) by mouth daily (Patient not taking: Reported on 2023)    No current facility-administered medications on file prior to visit.       The [place] Prescription Monitoring Program has been reviewed and there are no concerns about " diversionary activity for controlled substances at this time.      I was able to review most recent Primary Care Provider, specialty provider, and therapy visit notes that I have access to.     Today, patient reports she is employed as a  for [employer] - She is able to get things done while there.  Wandy feels that she is able to put her emotions aside while working.  However, she feels down most days and does not want to be here any more.  2 weeks ago she stopped taking her medications. Now she is having PMS which makes her mental health symptoms worse.  When she first took Lamictal, she reports feeling happier and more balanced.  After 6 weeks of being on the medication she tells me she felt worse again.  At her low points she thinks about lack of purpose in life and tells me the only thing she has to look forward to is attending to [employer] with concerts, 1 with her friend and 1 alone.  When she does not feel well she spends money excessively and then feels guilty after that.  Sleep can fluctuate from very little to many hours at a time.     has a past medical history of Depressive disorder (Dec 2019).    She has no past medical history of Arthritis, Cancer (H), Cerebral infarction (H), Congestive heart failure (H), COPD (chronic obstructive pulmonary disease) (H), Diabetes (H), Heart disease, History of blood transfusion, Hypertension, Thyroid disease, or Uncomplicated asthma.    Social history updates:    She lives with her mom.  Stays at sister house or friend's house a few days .    Substance use updates:    Cut down on caffeine, one drink a week in [employer] settings  Tobacco use: No    Vital Signs:   LMP  (LMP Unknown)     Labs:    Most recent laboratory results reviewed and no new labs. AST 56, ALT 92    Mental Status Examination:  Appearance: awake, alert, casual dress and mild distress  Attitude: cooperative  Eye Contact:  adequate  Gait and Station: No dizziness or falls  Psychomotor Behavior:   intact station, gait and muscle tone  Oriented to:  time, person, and place  Attention Span and Concentration:  Normal  Speech:   vtspeech: clear, coherent and Speaks: English  Mood:  : anxious and depressed  Affect:  : mood congruent  Associations:  no loose associations  Thought Process:  goal oriented  Thought Content:  no evidence of suicidal ideation or homicidal ideation, no auditory hallucinations present and no visual hallucinations present  Recent and Remote Memory:  intact Not formally assessed. No amnesia.  Fund of Knowledge: appropriate  Insight:  good  Judgment: good  Impulse Control:  good    Suicide Risk Assessment:  Today Wandy Ann reports no thoughts to harm themself or others. In addition, there are notable risk factors for self-harm, including age, single status, anxiety, suicidal ideation, hopelessness and mood change. However, risk is mitigated by commitment to family, history of seeking help when needed, future oriented, denies suicidal intent or plan and denies homicidal ideation, intent, or plan. Therefore, based on all available evidence including the factors cited above, Wandy Ann does not appear to be at imminent risk for self-harm, does not meet criteria for a 72-hr hold, and therefore remains appropriate for ongoing outpatient level of care.  A thorough assessment of risk factors related to suicide and self-harm have been reviewed and are noted above. The patient convincingly denies suicidality on several occasions. Local community safety resources printed and reviewed for patient to use if needed. There was no deceit detected, and the patient presented in a manner that was believable.     DSM5 Diagnosis:  296.89 Bipolar II Disorder Depressed and moderate  300.02 (F41.1) Generalized Anxiety Disorder    Medical comorbidities include:   Patient Active Problem List    Diagnosis Date Noted     Morbid obesity (H) 04/21/2023     Priority: Medium     Bipolar affective  disorder, currently depressed, moderate (H) 08/16/2022     Priority: Medium     Major depressive disorder, recurrent episode, moderate (H) 04/29/2022     Priority: Medium     YESSI (generalized anxiety disorder) 04/29/2022     Priority: Medium     Moderate major depression (H) 07/19/2020     Priority: Medium       Assessment:    Wandy Ann told me that 2 weeks ago she took herself off of all of her medications as she felt they were not helping her feel better.  She has ongoing depression with daily thoughts of not wanting to live.  However she tells me she has no plan or intent to end her life.  She tells me she is able to function at work as a  and get things done in a timely manner.  She feels good about helping other people.  Today we looked at options to help better balance her mood as she also has been experiencing excessive spending and some sleep loss periodically.  I started her on Latuda 20 mg in the evening with food.  Should she be unable to afford this we did talk about switching to Trileptal instead.  She is receiving talk therapy to help her work through life stressors..    Medication side effects and alternatives were reviewed. Health promotion activities recommended and reviewed today. All questions addressed. Education and counseling completed regarding risks and benefits of medications and psychotherapy options.    Treatment Plan:        1.  Hydroxyzine, venlafaxine, and Lamictal have all been discontinued previously by you    2.  Add lurasidone 20 mg daily in the evening with food    3.  Should you be unable to afford this we could change to Trileptal.  I will wait to hear from you before considering that option.    4.  Continue talk therapy as she can learn skills to manage life stressors and regulate emotions.-You may want to consider DBT therapy        Continue all other medications as reviewed per electronic medical record today.     Safety plan reviewed. To the Emergency  Department as needed or call after hours crisis line at [phone] or [phone]. [place] Crisis Text Line. Text [place] to 733427 or Suicide LifeLine Chat: suicideCityzenith.org/chat/    To schedule individual or family therapy, call [facility] at [phone]    Schedule an appointment with me in July or sooner as needed. Call [facility] at [phone] to schedule.    Follow up with primary care provider as planned or for acute medical concerns.    Call the psychiatric nurse line with medication questions or concerns at [phone]    MyChart may be used to communicate with your provider, but this is not intended to be used for emergencies.    Crisis Resources:    National Suicide Prevention Lifeline: 380.887.5197 (TTY: 271.417.2224). Call anytime for help.  (www.suicidepreventionlifeline.org)  National [place] [employer] Mental Illness (www.pricilla.org): 607.332.8778 or 060-331-1169.   Mental Health Association (www.mentalhealth.org): 690.719.4055 or 477-937-3639.  [place] Crisis Text Line: Text [place] to 412174  Suicide LifeLine Chat: suicideCityzenith.org/chat    Administrative Billing:   Time spent with patient includes counseling and coordination of care regarding above diagnoses and treatment plan.    Patient Status:  This is a continuous care patient and medications will be prescribed by the psychiatric provider until further indicated.    Signed:   HARJINDER Powell-BC   Psychiatry       Answers for HPI/ROS submitted by the patient on 4/20/2023  YESSI 7 TOTAL SCORE: 13

## 2023-04-27 NOTE — NURSING NOTE
Is the patient currently in the state of [place]? YES    Visit mode:VIDEO    If the visit is dropped, the patient can be reconnected by: VIDEO VISIT: Text to cell phone:   Telephone Information:   Mobile [phone]       Will anyone else be joining the visit? No  (If patient encounters technical issues they should call [phone])    How would you like to obtain your AVS? MyChart    Are changes needed to the allergy or medication list? YES no currently taking any medications    Rooming Documentation: Assigned questionnaire(s) completed and Care team has reviewed attendance agreement with patient. Patient advised that two failed appointments within 6 months may lead to termination of current episode of care.      Reason for visit: Video Visit     CHAVEZ Caballero

## 2023-04-28 ENCOUNTER — VIRTUAL VISIT (OUTPATIENT)
Dept: PSYCHOLOGY | Facility: CLINIC | Age: 23
End: 2023-04-28
Payer: COMMERCIAL

## 2023-04-28 DIAGNOSIS — F43.10 PTSD (POST-TRAUMATIC STRESS DISORDER): ICD-10-CM

## 2023-04-28 DIAGNOSIS — F41.1 GENERALIZED ANXIETY DISORDER: ICD-10-CM

## 2023-04-28 DIAGNOSIS — F31.32 BIPOLAR AFFECTIVE DISORDER, CURRENTLY DEPRESSED, MODERATE (H): Primary | ICD-10-CM

## 2023-04-28 PROCEDURE — 90834 PSYTX W PT 45 MINUTES: CPT | Mod: 93 | Performed by: SOCIAL WORKER

## 2023-04-28 NOTE — PROGRESS NOTES
"    [facility]                                      Progress Note    Patient Name: Wandy Ann  Date: 4/28/2023         Service Type: Individual      Session Start Time: 0904 Session End Time: 0943     Session Length: 38-52    Session #: 52    Attendees: Client    Service Modality:Phone Visit:      Provider verified identity through the following two step process.  Patient provided:  Patient is known previously to provider     Telephone Visit: The patient's condition can be safely assessed and treated via synchronous audio telemedicine encounter.       Reason for Audio Telemedicine Visit: Services only offered telehealth     Originating Site (Patient Location): Patient's home     Distant Site (Provider Location): off site      Consent:  The patient/guardian has verbally consented to:      1. The potential risks and benefits of telemedicine (telephone visit) versus in person care;     The patient has been notified of the following:      \"We have found that certain health care needs can be provided without the need for a face to face visit.  This service lets us provide the care you need with a phone conversation.       I will have full access to your [facility] medical record during this entire phone call.   I will be taking notes for your medical record.      Since this is like an office visit, we will bill your insurance company for this service.       There are potential benefits and risks of telephone visits (e.g. limits to patient confidentiality) that differ from in-person visits.?Confidentiality still applies for telephone services, and nobody will record the visit.  It is important to be in a quiet, private space that is free of distractions (including cell phone or other devices) during the visit.??      If during the course of the call I believe a telephone visit is not appropriate, you will not be charged for this service\"     Consent has been obtained for this service " by care team member: Yes       DATA  Interactive Complexity: No  Crisis: No        Progress Since Last Session (Related to Symptoms / Goals / Homework):   Symptoms: No change per patient     Homework: Achieved / completed to satisfaction      Episode of Care Goals: Minimal progress - PREPARATION (Decided to change - considering how); Intervened by negotiating a change plan and determining options / strategies for behavior change, identifying triggers, exploring social supports, and working towards setting a date to begin behavior change     Current / Ongoing Stressors and Concerns:   past trauma, developmental hx and current stressors      Treatment Objective(s) Addressed in This Session:   Patient will demonstrate control of impulses and ability to use positive coping tools to manage strong emotions that can be safely addressed at a lower level of care. Absence of persistent SI and report of reduced frequency and intensity of SI and absence of SI to acceptable levels, report subjective improved mood for a period of 90 days, within 6 months as clinically observed and by patient self-report.  Patient will demonstrate and report a level of depression that can be managed at a lower level of care.  Absence of persistent depression mood and report of reduced frequency and intensity of low mood and absence of persistent low energy and motivation to acceptable levels, report subjective improved motivation and increased energy for a period of 90 days, within 6 months as clinically observed and by patient self-report.  Patient will demonstrate and report a level of anxiety that can be managed at a lower level of care.  Absence of persistent anxious mood and report of reduced frequency and intensity of worry and absence of persistent anxious mood to acceptable levels, no panic attacks, report subjective comfort with rumination for a period of 90 days, within 6 months as clinically observed and by patient  self-report.       Intervention:   Therapist met with patient to review goals and interventions. Therapist utilized reflected listening as patient gave brief reflection of week. Patient reported feeling frustrated with psychiatry and TMS. Therapist supported patient as she processed, validated patient and challenged patient. Therapist looked at patient's extreme highs and lows and that patient has not had any this month, nor therapy sessions in March and that patient took herself off medications. Patient agreed and processed emotions. Therapist offered patient hope and utilized strength based modality with patient.   Patient presented with an anxious affect. Patient was engaged in session and open to feedback. Patient was future focused and contracts for safety.        There has been demonstrated improvement in functioning while patient has been engaged in psychotherapy/psychological service- if withdrawn the patient would deteriorate and/or relapse.     Assessments completed prior to visit:  The following assessments were completed by patient for this visit:  PHQ9:       9/29/2022     6:48 AM 10/31/2022     8:07 AM 12/2/2022    12:00 PM 2/1/2023    10:08 AM 2/2/2023    11:53 AM 4/14/2023     9:13 AM 4/27/2023     9:55 AM   PHQ-9 SCORE   PHQ-9 Total Score MyChart 10 (Moderate depression) 3 (Minimal depression)  16 (Moderately severe depression) 20 (Severe depression) 19 (Moderately severe depression) 17 (Moderately severe depression)   PHQ-9 Total Score 10 3 14 16 20 19 17     GAD7:       6/6/2022     3:00 PM 6/21/2022     8:29 AM 8/24/2022     2:00 PM 10/31/2022     8:08 AM 11/29/2022     2:34 PM 2/2/2023    11:53 AM 4/20/2023    12:41 PM   YESSI-7 SCORE   Total Score  9 (mild anxiety)  2 (minimal anxiety) 11 (moderate anxiety) 16 (severe anxiety) 13 (moderate anxiety)   Total Score 10 9 13 2 11 16 13           ASSESSMENT: Current Emotional / Mental Status (status of significant symptoms):   Risk status (Self / Other  harm or suicidal ideation)   Patient denies current fears or concerns for personal safety.   Patient denies current or recent suicidal ideation or behaviors.   Patient denies current or recent homicidal ideation or behaviors.   Patient denies current or recent self injurious behavior or ideation.   Patient denies other safety concerns.   Patient reports there has been no change in risk factors since their last session.     Patient reports there has been no change in protective factors since their last session.     A safety and risk management plan has been developed including: Patient consented to co-developed safety plan on 2.21.2022.  Safety and risk management plan was reviewed.   Patient agreed to use safety plan should any safety concerns arise.  A copy was made available to the patient.     Appearance:   phone session     Eye Contact:   phone session     Psychomotor Behavior: phone session     Attitude:   Cooperative    Orientation:   All   Speech    Rate / Production: Emotional Talkative    Volume:  Normal    Mood:    Anxious  Depressed    Affect:    Worrisome    Thought Content:  Clear    Thought Form:  Coherent    Insight:    Fair      Medication Review:   Changes to psychiatric medications, see updated Medication List in EPIC.      Medication Compliance:   No pt has stopped her medicaitons     Changes in Health Issues:   None reported     Chemical Use Review:   Substance Use: Chemical use reviewed, no active concerns identified      Tobacco Use: No current tobacco use.      Diagnosis:  1. Bipolar affective disorder, currently depressed, moderate (H)    2. PTSD (post-traumatic stress disorder)    3. Generalized anxiety disorder        Collateral Reports Completed:   Not Applicable    PLAN: (Patient Tasks / Therapist Tasks / Other)  Make visual safety plan   Manage depressive symptoms with coping skills  When feeling SI follow safety plan  Continue to manage SI and go to ER if feeling unsafe  [facility]  "[facility] has a new mental health emergency area at [facility] called [facility] that is very calming and helpful if you need additional support. ([address] [place] [address]  [phone]).     Name and challenge cognitive distortions  read \"stop walking on eggshells.    Call [phone] if feeling SI has increase or go to ED or [facility]   Utilize coping skills when feeling impulsive.   Replace distortions with positive attributes   Therapist assisted patient in naming what her fear is and managing through fear vs failure. Therapist encouraged patient to implement self care and manage her anxiety.   end TMS 5/11/2023   psychiatry appt 5/11/2023  Give reece to self  Continue to track moods     Katie Faulkner, LICSW  4/28/2023                                                         ______________________________________________________________________    Individual Treatment Plan    Patient's Name: Wandy Ann  YOB: 2000    Date of Creation: 2.22.2021  Date Treatment Plan Last Reviewed/Revised: 4/5/2023 due 7/5/2023    DSM5 Diagnoses: 296.52 Bipolar I Disorder Current or Most Recent Episode Depressed, Moderate, 300.02 (F41.1) Generalized Anxiety Disorder or 309.81 (F43.10) Posttraumatic Stress Disorder (includes Posttraumatic Stress Disorder for Children 6 Years and Younger)  Without dissociative symptoms  Psychosocial / Contextual Factors: past trauma, developmental hx and current stressors   PROMIS (reviewed every 90 days): 12/2/2022    Referral / Collaboration:  Referral to another professional/service is not indicated at this time..    Anticipated number of session for this episode of care: 12  Anticipation frequency of session: Biweekly  Anticipated Duration of each session: 38-52 minutes  Treatment plan will be reviewed in 90 days or when goals have been changed.       MeasurableTreatment Goal(s) related to diagnosis / functional impairment(s)  Goal 1: Patient will report " absence of persistent SI and report of reduced frequency and intensity of SI and absence of SI to acceptable levels, report subjective improved mood for a period of 90 days, within 6 months as clinically observed and by patient self-report    I will know I've met my goal when I can see the difference between a bad moment and what I want in th future.      Objective #A (Patient Action)    Patient will demonstrate control of impulses and ability to use positive coping tools to manage strong emotions that can be safely addressed at a lower level of care. Absence of persistent SI and report of reduced frequency and intensity of SI and absence of SI to acceptable levels, report subjective improved mood for a period of 90 days, within 6 months as clinically observed and by patient self-report.  Status: Continued - Date(s): 4/5/2023    Intervention(s)  Therapist will provide individual therapy to identify triggers to SI urges, gain feedback on helpful coping strategies, and identify ways that family can offer support. Tx to discuss current stressors and interpersonal conflicts and how to cope with these, coaching, diagnostic testing, referral for medication as indicated, use prescribed medication, cognitive restructuring, interpersonal family therapy, supportive therapy services.        Goal 2: Patient will report absence of persistent depression mood and report of reduced frequency and intensity of low mood and absence of persistent low energy and motivation to acceptable levels, report subjective improved motivation and increased energy for a period of 90 days, within 6 months as clinically observed and by patient self-report    I will know I've met my goal when I no longer feel sad.      Objective #A (Patient Action)    Status: Continued - Date(s): 4/5/2023    Patient will demonstrate and report a level of depression that can be managed at a lower level of care.  Absence of persistent depression mood and report of  reduced frequency and intensity of low mood and absence of persistent low energy and motivation to acceptable levels, report subjective improved motivation and increased energy for a period of 90 days, within 6 months as clinically observed and by patient self-report.    Intervention(s)  Therapist will provide individual therapy to identify triggers to depression, gain feedback on helpful coping tools and thought-reframing techniques, and identify preferred way of being.  Tx to include discussion of current stressors and interpersonal conflicts and how to cope with these, coaching, diagnostic testing, referral for medication as indicated, use prescribed medication, cognitive restructuring, interpersonal, family therapy, supportive therapy services.        Goal 3: Patiient will report absence of persistent anxiety mood and report of reduced frequency and intensity of worry and absence of persistent anxious mood to acceptable levels, no panic attacks, report subjective comfort with rumination for a period of 90 days. Within 6 months as clinically observed and by patient self-report    I will know I've met my goal when I can go days without worrying about little things or shaking.      Objective #A (Patient Action)    Status: Continued - Date(s): 4/5/2023    Patient will demonstrate and report a level of anxiety that can be managed at a lower level of care.  Absence of persistent anxious mood and report of reduced frequency and intensity of worry and absence of persistent anxious mood to acceptable levels, no panic attacks, report subjective comfort with rumination for a period of 90 days, within 6 months as clinically observed and by patient self-report.    Intervention(s)  Therapist will provide individual therapy to identify triggers to anxiety, gain feedback on helpful coping tools and thought-reframing techniques, and identify preferred way of being. Tx to include discuss current stressors and interpersonal  "conflicts and how to cope with these, coaching, diagnostic testing , referral for medication as indicated, use prescribed medication, cognitive restructuring, interpersonal,   family therapy, supportive therapy services.        Patient has reviewed and agreed to the above plan.      Katie Faulkner, [facility]   4/5/2023                                                   Wandysteve Ann            SAFETY PLAN:  Step 1: Warning signs / cues (Thoughts, images, mood, situation, behavior) that a crisis may be developing:  ? Thoughts: thoughts of not wanting to be here  ? Images: no images  ? Thinking Processes: intrusive thoughts (bothersome, unwanted thoughts that come out of nowhere): get irritated  ? Mood: intense anger and agitation  ? Behaviors: isolating/withdrawing   ? Situations: trauma    Step 2: Coping strategies - Things I can do to take my mind off of my problems without contacting another person (relaxation technique, physical activity):  ? Distress Tolerance Strategies:  arts and crafts: drawing and painting  ? Physical Activities: exercise: working out  ? Focus on helpful thoughts:  \"This is temporary\", \"It always passes\" and \"Ride the wave\"  Step 3: People and social settings that provide distraction:                 Name: Jennifer     Phone: [phone]                 Name: Antonina         Phone: [phone]                 Name: mom       Phone: [phone]  ? friends house or car   Step 4: Remind myself of people and things that are important to me and worth living for:  Best friend and cat        Step 5: When I am in crisis, I can ask these people to help me use my safety plan:                 Name: Jennifer     Phone: [phone]                 Name: Antonina         Phone: [phone]                 Name: mom       Phone: [phone]  Step 6: Making the environment safe:   ? be around others  Step 7: Professionals or agencies I can contact during a crisis:  ? [facility] " Number: [phone]  ? Suicide Prevention Lifeline: 0-668-695-TALK (2357)  ? Crisis Text Line Service (available 24 hours a day, 7 days a week): Text [place] to 795289    Local Crisis Services: 988     Call 911 or go to my nearest emergency department.       I helped develop this safety plan and agree to use it when needed.  I have been given a copy of this plan.       Client signature _________________________________________________________________  Today s date:  2/22/2021  Adapted from Safety Plan Template 2008 Marisol Cazares and Mario Flores is reprinted with the express permission of the authors.  No portion of the Safety Plan Template may be reproduced without the express, written permission.  You can contact the authors at [email or web address] or [email or web address].

## 2023-05-12 ENCOUNTER — VIRTUAL VISIT (OUTPATIENT)
Dept: PSYCHOLOGY | Facility: CLINIC | Age: 23
End: 2023-05-12
Payer: COMMERCIAL

## 2023-05-12 DIAGNOSIS — F43.10 PTSD (POST-TRAUMATIC STRESS DISORDER): ICD-10-CM

## 2023-05-12 DIAGNOSIS — F41.1 GENERALIZED ANXIETY DISORDER: ICD-10-CM

## 2023-05-12 DIAGNOSIS — F31.32 BIPOLAR AFFECTIVE DISORDER, CURRENTLY DEPRESSED, MODERATE (H): Primary | ICD-10-CM

## 2023-05-12 PROCEDURE — 90834 PSYTX W PT 45 MINUTES: CPT | Mod: VID | Performed by: SOCIAL WORKER

## 2023-05-12 NOTE — PROGRESS NOTES
[facility]                                      Progress Note    Patient Name: Wandy Ann  Date: 2023         Service Type: Individual      Session Start Time: 1103 Session End Time: 1143     Session Length: 38-52    Session #: 53    Attendees: Client    Service Modality:  Video Visit:      Provider verified identity through the following two step process.  Patient provided: Patient  and Patient previous known to provider     Telemedicine Visit: The patient's condition can be safely assessed and treated via synchronous audio and visual telemedicine encounter.       Reason for Telemedicine Visit: Patient has requested telehealth visit     Originating Site (Patient Location): Patient's home     Distant Site (Provider Location): Provider Remote Setting- Home Office     Consent:  The patient/guardian has verbally consented to: the potential risks and benefits of telemedicine (video visit) versus in person care; bill my insurance or make self-payment for services provided; and responsibility for payment of non-covered services.      Patient would like the video invitation sent by: email/text     Mode of Communication: Video Conference via [employer]well     Distant Location (Provider): Off-site     As the provider I attest to compliance with applicable laws and regulations related to telemedicine.        DATA  Interactive Complexity: No  Crisis: No        Progress Since Last Session (Related to Symptoms / Goals / Homework):   Symptoms: Improving some     Homework: Achieved / completed to satisfaction      Episode of Care Goals: Minimal progress - PREPARATION (Decided to change - considering how); Intervened by negotiating a change plan and determining options / strategies for behavior change, identifying triggers, exploring social supports, and working towards setting a date to begin behavior change     Current / Ongoing Stressors and Concerns:   past trauma, developmental hx and  current stressors      Treatment Objective(s) Addressed in This Session:   Patient will demonstrate control of impulses and ability to use positive coping tools to manage strong emotions that can be safely addressed at a lower level of care. Absence of persistent SI and report of reduced frequency and intensity of SI and absence of SI to acceptable levels, report subjective improved mood for a period of 90 days, within 6 months as clinically observed and by patient self-report.  Patient will demonstrate and report a level of depression that can be managed at a lower level of care.  Absence of persistent depression mood and report of reduced frequency and intensity of low mood and absence of persistent low energy and motivation to acceptable levels, report subjective improved motivation and increased energy for a period of 90 days, within 6 months as clinically observed and by patient self-report.  Patient will demonstrate and report a level of anxiety that can be managed at a lower level of care.  Absence of persistent anxious mood and report of reduced frequency and intensity of worry and absence of persistent anxious mood to acceptable levels, no panic attacks, report subjective comfort with rumination for a period of 90 days, within 6 months as clinically observed and by patient self-report.       Intervention:   Therapist met with patient to review goals and interventions. Therapist utilized reflected listening as patient gave brief reflection of week. Patient reported finishing TMS and processed. Therapist supported patient as she processed and validated patient. Therapist utilized strength based modality along with psycho education. Therapists encouraged patient to get to know what makes her happy and for her to manage anxiety and not predict the future.   Patient presented with an anxious affect. Patient was engaged in session and open to feedback. Patient was future focused and contracts for safety.         There has been demonstrated improvement in functioning while patient has been engaged in psychotherapy/psychological service- if withdrawn the patient would deteriorate and/or relapse.     Assessments completed prior to visit:  The following assessments were completed by patient for this visit:  PHQ9:       9/29/2022     6:48 AM 10/31/2022     8:07 AM 12/2/2022    12:00 PM 2/1/2023    10:08 AM 2/2/2023    11:53 AM 4/14/2023     9:13 AM 4/27/2023     9:55 AM   PHQ-9 SCORE   PHQ-9 Total Score MyChart 10 (Moderate depression) 3 (Minimal depression)  16 (Moderately severe depression) 20 (Severe depression) 19 (Moderately severe depression) 17 (Moderately severe depression)   PHQ-9 Total Score 10 3 14 16 20 19 17     GAD7:       6/6/2022     3:00 PM 6/21/2022     8:29 AM 8/24/2022     2:00 PM 10/31/2022     8:08 AM 11/29/2022     2:34 PM 2/2/2023    11:53 AM 4/20/2023    12:41 PM   YESSI-7 SCORE   Total Score  9 (mild anxiety)  2 (minimal anxiety) 11 (moderate anxiety) 16 (severe anxiety) 13 (moderate anxiety)   Total Score 10 9 13 2 11 16 13           ASSESSMENT: Current Emotional / Mental Status (status of significant symptoms):   Risk status (Self / Other harm or suicidal ideation)   Patient denies current fears or concerns for personal safety.   Patient denies current or recent suicidal ideation or behaviors.   Patient denies current or recent homicidal ideation or behaviors.   Patient denies current or recent self injurious behavior or ideation.   Patient denies other safety concerns.   Patient reports there has been no change in risk factors since their last session.     Patient reports there has been no change in protective factors since their last session.     A safety and risk management plan has been developed including: Patient consented to co-developed safety plan on 2.21.2022.  Safety and risk management plan was reviewed.   Patient agreed to use safety plan should any safety concerns arise.  A copy was made  "available to the patient.     Appearance:   Appropriate     Eye Contact:   Fair     Psychomotor Behavior: Restless     Attitude:   Cooperative    Orientation:   All   Speech    Rate / Production: Emotional Talkative    Volume:  Normal    Mood:    Anxious  Depressed    Affect:    Worrisome    Thought Content:  Clear    Thought Form:  Coherent    Insight:    Fair      Medication Review:   Changes to psychiatric medications, see updated Medication List in EPIC.      Medication Compliance:   Yes     Changes in Health Issues:   None reported     Chemical Use Review:   Substance Use: Chemical use reviewed, no active concerns identified      Tobacco Use: No current tobacco use.      Diagnosis:  1. Bipolar affective disorder, currently depressed, moderate (H)    2. PTSD (post-traumatic stress disorder)    3. Generalized anxiety disorder        Collateral Reports Completed:   Not Applicable    PLAN: (Patient Tasks / Therapist Tasks / Other)  Make visual safety plan   Manage depressive symptoms with coping skills  When feeling SI follow safety plan  Continue to manage SI and go to ER if feeling unsafe  [facility] has a new mental health emergency area at [facility] called [facility] that is very calming and helpful if you need additional support. ([address] [place] [address]  [phone]).     Name and challenge cognitive distortions  read \"stop walking on eggshells.    Call [phone] if feeling SI has increase or go to ED or [facility]   Utilize coping skills when feeling impulsive.   Replace distortions with positive attributes    Give reece to self  Continue to track moods   Therapists encouraged patient to get to know what makes her happy and for her to manage anxiety and not predict the future.     Katie Faulkner, LICSW  5/12/2023                                                         ______________________________________________________________________    Individual Treatment Plan    Patient's " Name: Wandy Ann  YOB: 2000    Date of Creation: 2.22.2021  Date Treatment Plan Last Reviewed/Revised: 4/5/2023 due 7/5/2023    DSM5 Diagnoses: 296.52 Bipolar I Disorder Current or Most Recent Episode Depressed, Moderate, 300.02 (F41.1) Generalized Anxiety Disorder or 309.81 (F43.10) Posttraumatic Stress Disorder (includes Posttraumatic Stress Disorder for Children 6 Years and Younger)  Without dissociative symptoms  Psychosocial / Contextual Factors: past trauma, developmental hx and current stressors   PROMIS (reviewed every 90 days): 12/2/2022    Referral / Collaboration:  Referral to another professional/service is not indicated at this time..    Anticipated number of session for this episode of care: 12  Anticipation frequency of session: Biweekly  Anticipated Duration of each session: 38-52 minutes  Treatment plan will be reviewed in 90 days or when goals have been changed.       MeasurableTreatment Goal(s) related to diagnosis / functional impairment(s)  Goal 1: Patient will report absence of persistent SI and report of reduced frequency and intensity of SI and absence of SI to acceptable levels, report subjective improved mood for a period of 90 days, within 6 months as clinically observed and by patient self-report    I will know I've met my goal when I can see the difference between a bad moment and what I want in th future.      Objective #A (Patient Action)    Patient will demonstrate control of impulses and ability to use positive coping tools to manage strong emotions that can be safely addressed at a lower level of care. Absence of persistent SI and report of reduced frequency and intensity of SI and absence of SI to acceptable levels, report subjective improved mood for a period of 90 days, within 6 months as clinically observed and by patient self-report.  Status: Continued - Date(s): 4/5/2023    Intervention(s)  Therapist will provide individual therapy to identify triggers  to SI urges, gain feedback on helpful coping strategies, and identify ways that family can offer support. Tx to discuss current stressors and interpersonal conflicts and how to cope with these, coaching, diagnostic testing, referral for medication as indicated, use prescribed medication, cognitive restructuring, interpersonal family therapy, supportive therapy services.        Goal 2: Patient will report absence of persistent depression mood and report of reduced frequency and intensity of low mood and absence of persistent low energy and motivation to acceptable levels, report subjective improved motivation and increased energy for a period of 90 days, within 6 months as clinically observed and by patient self-report    I will know I've met my goal when I no longer feel sad.      Objective #A (Patient Action)    Status: Continued - Date(s): 4/5/2023    Patient will demonstrate and report a level of depression that can be managed at a lower level of care.  Absence of persistent depression mood and report of reduced frequency and intensity of low mood and absence of persistent low energy and motivation to acceptable levels, report subjective improved motivation and increased energy for a period of 90 days, within 6 months as clinically observed and by patient self-report.    Intervention(s)  Therapist will provide individual therapy to identify triggers to depression, gain feedback on helpful coping tools and thought-reframing techniques, and identify preferred way of being.  Tx to include discussion of current stressors and interpersonal conflicts and how to cope with these, coaching, diagnostic testing, referral for medication as indicated, use prescribed medication, cognitive restructuring, interpersonal, family therapy, supportive therapy services.        Goal 3: Patiient will report absence of persistent anxiety mood and report of reduced frequency and intensity of worry and absence of persistent anxious mood to  acceptable levels, no panic attacks, report subjective comfort with rumination for a period of 90 days. Within 6 months as clinically observed and by patient self-report    I will know I've met my goal when I can go days without worrying about little things or shaking.      Objective #A (Patient Action)    Status: Continued - Date(s): 4/5/2023    Patient will demonstrate and report a level of anxiety that can be managed at a lower level of care.  Absence of persistent anxious mood and report of reduced frequency and intensity of worry and absence of persistent anxious mood to acceptable levels, no panic attacks, report subjective comfort with rumination for a period of 90 days, within 6 months as clinically observed and by patient self-report.    Intervention(s)  Therapist will provide individual therapy to identify triggers to anxiety, gain feedback on helpful coping tools and thought-reframing techniques, and identify preferred way of being. Tx to include discuss current stressors and interpersonal conflicts and how to cope with these, coaching, diagnostic testing , referral for medication as indicated, use prescribed medication, cognitive restructuring, interpersonal,   family therapy, supportive therapy services.        Patient has reviewed and agreed to the above plan.      Katie Faulkner, [facility]   4/5/2023                                                   Wandy Ann            SAFETY PLAN:  Step 1: Warning signs / cues (Thoughts, images, mood, situation, behavior) that a crisis may be developing:  ? Thoughts: thoughts of not wanting to be here  ? Images: no images  ? Thinking Processes: intrusive thoughts (bothersome, unwanted thoughts that come out of nowhere): get irritated  ? Mood: intense anger and agitation  ? Behaviors: isolating/withdrawing   ? Situations: trauma    Step 2: Coping strategies - Things I can do to take my mind off of my problems without contacting another person (relaxation  "technique, physical activity):  ? Distress Tolerance Strategies:  arts and crafts: drawing and painting  ? Physical Activities: exercise: working out  ? Focus on helpful thoughts:  \"This is temporary\", \"It always passes\" and \"Ride the wave\"  Step 3: People and social settings that provide distraction:                 Name: Jennifer     Phone: [phone]                 Name: Antonina         Phone: [phone]                 Name: panchito       Phone: [phone]  ? friends house or car   Step 4: Remind myself of people and things that are important to me and worth living for:  Best friend and cat        Step 5: When I am in crisis, I can ask these people to help me use my safety plan:                 Name: Jennifer     Phone: [phone]                 Name: Antonina         Phone: [phone]                 Name: panchito       Phone: [phone]  Step 6: Making the environment safe:   ? be around others  Step 7: Professionals or agencies I can contact during a crisis:  ? [facility] Daytime Number: [phone]  ? Suicide Prevention Lifeline: 0-928-013-ZOLX (7414)  ? Crisis Text Line Service (available 24 hours a day, 7 days a week): Text [place] to 306908    Local Crisis Services: 988     Call 911 or go to my nearest emergency department.       I helped develop this safety plan and agree to use it when needed.  I have been given a copy of this plan.       Client signature _________________________________________________________________  Today s date:  2/22/2021  Adapted from Safety Plan Template 2008 Marisol Cazares and Mario Floers is reprinted with the express permission of the authors.  No portion of the Safety Plan Template may be reproduced without the express, written permission.  You can contact the authors at [email or web address] or [email or web address].  "

## 2023-05-23 ENCOUNTER — VIRTUAL VISIT (OUTPATIENT)
Dept: PSYCHOLOGY | Facility: CLINIC | Age: 23
End: 2023-05-23
Payer: COMMERCIAL

## 2023-05-23 DIAGNOSIS — F41.1 GENERALIZED ANXIETY DISORDER: ICD-10-CM

## 2023-05-23 DIAGNOSIS — F43.10 PTSD (POST-TRAUMATIC STRESS DISORDER): ICD-10-CM

## 2023-05-23 DIAGNOSIS — F31.32 BIPOLAR AFFECTIVE DISORDER, CURRENTLY DEPRESSED, MODERATE (H): Primary | ICD-10-CM

## 2023-05-23 PROCEDURE — 90834 PSYTX W PT 45 MINUTES: CPT | Mod: VID | Performed by: SOCIAL WORKER

## 2023-05-23 NOTE — PROGRESS NOTES
[facility]                                      Progress Note    Patient Name: Wandy Ann  Date: 2023         Service Type: Individual      Session Start Time: 1003 Session End Time: 105     Session Length: 38-52    Session #: 54    Attendees: Client    Service Modality:  Video Visit:      Provider verified identity through the following two step process.  Patient provided: Patient  and Patient previous known to provider     Telemedicine Visit: The patient's condition can be safely assessed and treated via synchronous audio and visual telemedicine encounter.       Reason for Telemedicine Visit: Patient has requested telehealth visit     Originating Site (Patient Location): Patient's home     Distant Site (Provider Location): Provider Remote Setting- Home Office     Consent:  The patient/guardian has verbally consented to: the potential risks and benefits of telemedicine (video visit) versus in person care; bill my insurance or make self-payment for services provided; and responsibility for payment of non-covered services.      Patient would like the video invitation sent by: email/text     Mode of Communication: Video Conference via [employer]well     Distant Location (Provider): Off-site     As the provider I attest to compliance with applicable laws and regulations related to telemedicine.        DATA  Interactive Complexity: No  Crisis: No        Progress Since Last Session (Related to Symptoms / Goals / Homework):   Symptoms: No change per patient     Homework: Achieved / completed to satisfaction      Episode of Care Goals: Minimal progress - PREPARATION (Decided to change - considering how); Intervened by negotiating a change plan and determining options / strategies for behavior change, identifying triggers, exploring social supports, and working towards setting a date to begin behavior change     Current / Ongoing Stressors and Concerns:   past trauma, developmental hx and  current stressors      Treatment Objective(s) Addressed in This Session:   Patient will demonstrate control of impulses and ability to use positive coping tools to manage strong emotions that can be safely addressed at a lower level of care. Absence of persistent SI and report of reduced frequency and intensity of SI and absence of SI to acceptable levels, report subjective improved mood for a period of 90 days, within 6 months as clinically observed and by patient self-report.  Patient will demonstrate and report a level of depression that can be managed at a lower level of care.  Absence of persistent depression mood and report of reduced frequency and intensity of low mood and absence of persistent low energy and motivation to acceptable levels, report subjective improved motivation and increased energy for a period of 90 days, within 6 months as clinically observed and by patient self-report.  Patient will demonstrate and report a level of anxiety that can be managed at a lower level of care.  Absence of persistent anxious mood and report of reduced frequency and intensity of worry and absence of persistent anxious mood to acceptable levels, no panic attacks, report subjective comfort with rumination for a period of 90 days, within 6 months as clinically observed and by patient self-report.       Intervention:   Therapist met with patient to review goals and interventions. Therapist utilized reflected listening as patient gave brief reflection of week. Patient reported ups and downs and processed with some SI but also realizing she does not want to die and is actually scared of dying. Therapist supported patient as she processed and validated patient's continued hard work. Therapist provided psycho education to patient and ACT therapy with looking at goals and thoughts that take her away from her goals.   Patient presented with an anxious affect. Patient was engaged in session and open to feedback. Patient was  future focused and contracts for safety.        There has been demonstrated improvement in functioning while patient has been engaged in psychotherapy/psychological service- if withdrawn the patient would deteriorate and/or relapse.     Assessments completed prior to visit:  The following assessments were completed by patient for this visit:  PHQ9:       9/29/2022     6:48 AM 10/31/2022     8:07 AM 12/2/2022    12:00 PM 2/1/2023    10:08 AM 2/2/2023    11:53 AM 4/14/2023     9:13 AM 4/27/2023     9:55 AM   PHQ-9 SCORE   PHQ-9 Total Score MyChart 10 (Moderate depression) 3 (Minimal depression)  16 (Moderately severe depression) 20 (Severe depression) 19 (Moderately severe depression) 17 (Moderately severe depression)   PHQ-9 Total Score 10 3 14 16 20 19 17     GAD7:       6/6/2022     3:00 PM 6/21/2022     8:29 AM 8/24/2022     2:00 PM 10/31/2022     8:08 AM 11/29/2022     2:34 PM 2/2/2023    11:53 AM 4/20/2023    12:41 PM   YESSI-7 SCORE   Total Score  9 (mild anxiety)  2 (minimal anxiety) 11 (moderate anxiety) 16 (severe anxiety) 13 (moderate anxiety)   Total Score 10 9 13 2 11 16 13           ASSESSMENT: Current Emotional / Mental Status (status of significant symptoms):   Risk status (Self / Other harm or suicidal ideation)   Patient denies current fears or concerns for personal safety.   Patient reports the following current or recent suicidal ideation or behaviors: processed with some SI but also realizing she does not want to die and is actually scared of dying..   Patient denies current or recent homicidal ideation or behaviors.   Patient denies current or recent self injurious behavior or ideation.   Patient denies other safety concerns.   Patient reports there has been no change in risk factors since their last session.     Patient reports there has been no change in protective factors since their last session.     A safety and risk management plan has been developed including: Patient consented to  "co-developed safety plan on 2.21.2022.  Safety and risk management plan was reviewed.   Patient agreed to use safety plan should any safety concerns arise.  A copy was made available to the patient.     Appearance:   Appropriate     Eye Contact:   Fair     Psychomotor Behavior: Restless     Attitude:   Cooperative    Orientation:   All   Speech    Rate / Production: Emotional Talkative    Volume:  Normal    Mood:    Anxious  Depressed    Affect:    Worrisome    Thought Content:  Clear    Thought Form:  Coherent    Insight:    Fair      Medication Review:   Changes to psychiatric medications, see updated Medication List in EPIC.      Medication Compliance:   Yes     Changes in Health Issues:   None reported     Chemical Use Review:   Substance Use: Chemical use reviewed, no active concerns identified      Tobacco Use: No current tobacco use.      Diagnosis:  1. Bipolar affective disorder, currently depressed, moderate (H)    2. PTSD (post-traumatic stress disorder)    3. Generalized anxiety disorder        Collateral Reports Completed:   Not Applicable    PLAN: (Patient Tasks / Therapist Tasks / Other)  Make visual safety plan   Manage depressive symptoms with coping skills  When feeling SI follow safety plan  Continue to manage SI and go to ER if feeling unsafe  [facility] has a new mental health emergency area at [facility] called [facility] that is very calming and helpful if you need additional support. ([address] [place] [address]  [phone]).     Name and challenge cognitive distortions  read \"stop walking on eggshells.    Call [phone] if feeling SI has increase or go to ED or empath   Utilize coping skills when feeling impulsive.   Replace distortions with positive attributes    Give reece to self  Continue to track moods   Therapist provided psycho education to patient and ACT therapy with looking at goals and thoughts that take her away from her goals.   Katie Faulkner, MAICO  " 5/23/2023                                                         ______________________________________________________________________    Individual Treatment Plan    Patient's Name: Wandy Ann  YOB: 2000    Date of Creation: 2.22.2021  Date Treatment Plan Last Reviewed/Revised: 4/5/2023 due 7/5/2023    DSM5 Diagnoses: 296.52 Bipolar I Disorder Current or Most Recent Episode Depressed, Moderate, 300.02 (F41.1) Generalized Anxiety Disorder or 309.81 (F43.10) Posttraumatic Stress Disorder (includes Posttraumatic Stress Disorder for Children 6 Years and Younger)  Without dissociative symptoms  Psychosocial / Contextual Factors: past trauma, developmental hx and current stressors   PROMIS (reviewed every 90 days): 12/2/2022    Referral / Collaboration:  Referral to another professional/service is not indicated at this time..    Anticipated number of session for this episode of care: 12  Anticipation frequency of session: Biweekly  Anticipated Duration of each session: 38-52 minutes  Treatment plan will be reviewed in 90 days or when goals have been changed.       MeasurableTreatment Goal(s) related to diagnosis / functional impairment(s)  Goal 1: Patient will report absence of persistent SI and report of reduced frequency and intensity of SI and absence of SI to acceptable levels, report subjective improved mood for a period of 90 days, within 6 months as clinically observed and by patient self-report    I will know I've met my goal when I can see the difference between a bad moment and what I want in th future.      Objective #A (Patient Action)    Patient will demonstrate control of impulses and ability to use positive coping tools to manage strong emotions that can be safely addressed at a lower level of care. Absence of persistent SI and report of reduced frequency and intensity of SI and absence of SI to acceptable levels, report subjective improved mood for a period of 90 days, within 6  months as clinically observed and by patient self-report.  Status: Continued - Date(s): 4/5/2023    Intervention(s)  Therapist will provide individual therapy to identify triggers to SI urges, gain feedback on helpful coping strategies, and identify ways that family can offer support. Tx to discuss current stressors and interpersonal conflicts and how to cope with these, coaching, diagnostic testing, referral for medication as indicated, use prescribed medication, cognitive restructuring, interpersonal family therapy, supportive therapy services.        Goal 2: Patient will report absence of persistent depression mood and report of reduced frequency and intensity of low mood and absence of persistent low energy and motivation to acceptable levels, report subjective improved motivation and increased energy for a period of 90 days, within 6 months as clinically observed and by patient self-report    I will know I've met my goal when I no longer feel sad.      Objective #A (Patient Action)    Status: Continued - Date(s): 4/5/2023    Patient will demonstrate and report a level of depression that can be managed at a lower level of care.  Absence of persistent depression mood and report of reduced frequency and intensity of low mood and absence of persistent low energy and motivation to acceptable levels, report subjective improved motivation and increased energy for a period of 90 days, within 6 months as clinically observed and by patient self-report.    Intervention(s)  Therapist will provide individual therapy to identify triggers to depression, gain feedback on helpful coping tools and thought-reframing techniques, and identify preferred way of being.  Tx to include discussion of current stressors and interpersonal conflicts and how to cope with these, coaching, diagnostic testing, referral for medication as indicated, use prescribed medication, cognitive restructuring, interpersonal, family therapy, supportive  therapy services.        Goal 3: Patiient will report absence of persistent anxiety mood and report of reduced frequency and intensity of worry and absence of persistent anxious mood to acceptable levels, no panic attacks, report subjective comfort with rumination for a period of 90 days. Within 6 months as clinically observed and by patient self-report    I will know I've met my goal when I can go days without worrying about little things or shaking.      Objective #A (Patient Action)    Status: Continued - Date(s): 4/5/2023    Patient will demonstrate and report a level of anxiety that can be managed at a lower level of care.  Absence of persistent anxious mood and report of reduced frequency and intensity of worry and absence of persistent anxious mood to acceptable levels, no panic attacks, report subjective comfort with rumination for a period of 90 days, within 6 months as clinically observed and by patient self-report.    Intervention(s)  Therapist will provide individual therapy to identify triggers to anxiety, gain feedback on helpful coping tools and thought-reframing techniques, and identify preferred way of being. Tx to include discuss current stressors and interpersonal conflicts and how to cope with these, coaching, diagnostic testing , referral for medication as indicated, use prescribed medication, cognitive restructuring, interpersonal,   family therapy, supportive therapy services.        Patient has reviewed and agreed to the above plan.      Katie Faulkner, [facility]   4/5/2023                                                   Wandy Ann            SAFETY PLAN:  Step 1: Warning signs / cues (Thoughts, images, mood, situation, behavior) that a crisis may be developing:  ? Thoughts: thoughts of not wanting to be here  ? Images: no images  ? Thinking Processes: intrusive thoughts (bothersome, unwanted thoughts that come out of nowhere): get irritated  ? Mood: intense anger and  "agitation  ? Behaviors: isolating/withdrawing   ? Situations: trauma    Step 2: Coping strategies - Things I can do to take my mind off of my problems without contacting another person (relaxation technique, physical activity):  ? Distress Tolerance Strategies:  arts and crafts: drawing and painting  ? Physical Activities: exercise: working out  ? Focus on helpful thoughts:  \"This is temporary\", \"It always passes\" and \"Ride the wave\"  Step 3: People and social settings that provide distraction:                 Name: Jennifer     Phone: [phone]                 Name: Antonina         Phone: [phone]                 Name: panchito       Phone: [phone]  ? friends house or car   Step 4: Remind myself of people and things that are important to me and worth living for:  Best friend and cat        Step 5: When I am in crisis, I can ask these people to help me use my safety plan:                 Name: Jennifer     Phone: [phone]                 Name: Antonina         Phone: [phone]                 Name: pancihto       Phone: [phone]  Step 6: Making the environment safe:   ? be around others  Step 7: Professionals or agencies I can contact during a crisis:  ? [facility] Daytime Number: 597-263-5672  ? Suicide Prevention Lifeline: 3-322-376-NOJL (0001)  ? Crisis Text Line Service (available 24 hours a day, 7 days a week): Text [place] to 588374    Local Crisis Services: 988     Call 911 or go to my nearest emergency department.       I helped develop this safety plan and agree to use it when needed.  I have been given a copy of this plan.       Client signature _________________________________________________________________  Today s date:  2/22/2021  Adapted from Safety Plan Template 2008 Marisol Cazares and Mario Flores is reprinted with the express permission of the authors.  No portion of the Safety Plan Template may be reproduced without the express, written permission.  You can contact the " authors at [email or web address] or [email or web address].

## 2023-06-01 ENCOUNTER — VIRTUAL VISIT (OUTPATIENT)
Dept: PSYCHOLOGY | Facility: CLINIC | Age: 23
End: 2023-06-01
Payer: COMMERCIAL

## 2023-06-01 DIAGNOSIS — F41.1 GENERALIZED ANXIETY DISORDER: ICD-10-CM

## 2023-06-01 DIAGNOSIS — F31.32 BIPOLAR AFFECTIVE DISORDER, CURRENTLY DEPRESSED, MODERATE (H): Primary | ICD-10-CM

## 2023-06-01 DIAGNOSIS — F43.10 PTSD (POST-TRAUMATIC STRESS DISORDER): ICD-10-CM

## 2023-06-01 PROCEDURE — 90834 PSYTX W PT 45 MINUTES: CPT | Mod: VID | Performed by: SOCIAL WORKER

## 2023-06-01 NOTE — PROGRESS NOTES
[facility]                                      Progress Note    Patient Name: Wandy Ann  Date: 2023         Service Type: Individual      Session Start Time: 1205 Session End Time: 1247     Session Length: 38-52    Session #: 55    Attendees: Client    Service Modality:  Video Visit:      Provider verified identity through the following two step process.  Patient provided: Patient  and Patient previous known to provider     Telemedicine Visit: The patient's condition can be safely assessed and treated via synchronous audio and visual telemedicine encounter.       Reason for Telemedicine Visit: Patient has requested telehealth visit     Originating Site (Patient Location): Patient's home     Distant Site (Provider Location): Provider Remote Setting- Home Office     Consent:  The patient/guardian has verbally consented to: the potential risks and benefits of telemedicine (video visit) versus in person care; bill my insurance or make self-payment for services provided; and responsibility for payment of non-covered services.      Patient would like the video invitation sent by: email/text     Mode of Communication: Video Conference via [facility]     Distant Location (Provider): Off-site     As the provider I attest to compliance with applicable laws and regulations related to telemedicine.        DATA  Interactive Complexity: No  Crisis: No        Progress Since Last Session (Related to Symptoms / Goals / Homework):   Symptoms: No change per patient     Homework: Achieved / completed to satisfaction      Episode of Care Goals: Minimal progress - PREPARATION (Decided to change - considering how); Intervened by negotiating a change plan and determining options / strategies for behavior change, identifying triggers, exploring social supports, and working towards setting a date to begin behavior change     Current / Ongoing Stressors and Concerns:   past trauma, developmental hx and  current stressors      Treatment Objective(s) Addressed in This Session:   Patient will demonstrate control of impulses and ability to use positive coping tools to manage strong emotions that can be safely addressed at a lower level of care. Absence of persistent SI and report of reduced frequency and intensity of SI and absence of SI to acceptable levels, report subjective improved mood for a period of 90 days, within 6 months as clinically observed and by patient self-report.  Patient will demonstrate and report a level of depression that can be managed at a lower level of care.  Absence of persistent depression mood and report of reduced frequency and intensity of low mood and absence of persistent low energy and motivation to acceptable levels, report subjective improved motivation and increased energy for a period of 90 days, within 6 months as clinically observed and by patient self-report.  Patient will demonstrate and report a level of anxiety that can be managed at a lower level of care.  Absence of persistent anxious mood and report of reduced frequency and intensity of worry and absence of persistent anxious mood to acceptable levels, no panic attacks, report subjective comfort with rumination for a period of 90 days, within 6 months as clinically observed and by patient self-report.       Intervention:   Motivational Interviewing  Target Behavior: self talk and self esteem    Stage of Change: PREPARATION (Decided to change - considering how)    MI Intervention: Expressed Empathy/Understanding, Supported Autonomy, Collaboration, Evocation, Permission to raise concern or advise and Open-ended questions     Change Talk Expressed by the Patient: Ability to change Reasons to change Need to change    Provider Response to Change Talk: E - Evoked more info from patient about behavior change, A - Affirmed patient's thoughts, decisions, or attempts at behavior change and R - Reflected patient's change talk           There has been demonstrated improvement in functioning while patient has been engaged in psychotherapy/psychological service- if withdrawn the patient would deteriorate and/or relapse.     Assessments completed prior to visit:  The following assessments were completed by patient for this visit:  PHQ9:       9/29/2022     6:48 AM 10/31/2022     8:07 AM 12/2/2022    12:00 PM 2/1/2023    10:08 AM 2/2/2023    11:53 AM 4/14/2023     9:13 AM 4/27/2023     9:55 AM   PHQ-9 SCORE   PHQ-9 Total Score MyChart 10 (Moderate depression) 3 (Minimal depression)  16 (Moderately severe depression) 20 (Severe depression) 19 (Moderately severe depression) 17 (Moderately severe depression)   PHQ-9 Total Score 10 3 14 16 20 19 17     GAD7:       6/6/2022     3:00 PM 6/21/2022     8:29 AM 8/24/2022     2:00 PM 10/31/2022     8:08 AM 11/29/2022     2:34 PM 2/2/2023    11:53 AM 4/20/2023    12:41 PM   YESSI-7 SCORE   Total Score  9 (mild anxiety)  2 (minimal anxiety) 11 (moderate anxiety) 16 (severe anxiety) 13 (moderate anxiety)   Total Score 10 9 13 2 11 16 13           ASSESSMENT: Current Emotional / Mental Status (status of significant symptoms):   Risk status (Self / Other harm or suicidal ideation)   Patient denies current fears or concerns for personal safety.   Patient reports the following current or recent suicidal ideation or behaviors: comes and goes but faster and improving  no plans and contracted for safety .   Patient denies current or recent homicidal ideation or behaviors.   Patient denies current or recent self injurious behavior or ideation.   Patient denies other safety concerns.   Patient reports there has been no change in risk factors since their last session.     Patient reports there has been no change in protective factors since their last session.     A safety and risk management plan has been developed including: Patient consented to co-developed safety plan on 2.21.2022.  Safety and risk management plan was  "reviewed.   Patient agreed to use safety plan should any safety concerns arise.  A copy was made available to the patient.     Appearance:   Appropriate     Eye Contact:   Fair     Psychomotor Behavior: Restless     Attitude:   Cooperative    Orientation:   All   Speech    Rate / Production: Emotional Talkative    Volume:  Normal    Mood:    Anxious  Depressed    Affect:    Worrisome    Thought Content:  Clear    Thought Form:  Coherent    Insight:    Fair      Medication Review:   Changes to psychiatric medications, see updated Medication List in EPIC.      Medication Compliance:   Yes     Changes in Health Issues:   None reported     Chemical Use Review:   Substance Use: Chemical use reviewed, no active concerns identified      Tobacco Use: No current tobacco use.      Diagnosis:  1. Bipolar affective disorder, currently depressed, moderate (H)    2. PTSD (post-traumatic stress disorder)    3. Generalized anxiety disorder        Collateral Reports Completed:   Not Applicable    PLAN: (Patient Tasks / Therapist Tasks / Other)  Make visual safety plan   Manage depressive symptoms with coping skills  When feeling SI follow safety plan  Continue to manage SI and go to ER if feeling unsafe  [facility] has a new mental health emergency area at [facility] called [facility] that is very calming and helpful if you need additional support. ([address] [place], [place] [address] [phone]).     Name and challenge cognitive distortions  read \"stop walking on eggshells.    Call [phone] if feeling SI has increase or go to ED or em[facility]   Utilize coping skills when feeling impulsive.   Replace distortions with positive attributes    Give reece to self  Continue to track moods   Therapist provided psycho education to patient and ACT therapy with looking at goals and thoughts that take her away from her goals.   Start forgiveness timeline      Katie Faulkner, [facility]  6/1/2023                                     "                     ______________________________________________________________________    Individual Treatment Plan    Patient's Name: Wandy Ann  YOB: 2000    Date of Creation: 2.22.2021  Date Treatment Plan Last Reviewed/Revised: 4/5/2023 due 7/5/2023    DSM5 Diagnoses: 296.52 Bipolar I Disorder Current or Most Recent Episode Depressed, Moderate, 300.02 (F41.1) Generalized Anxiety Disorder or 309.81 (F43.10) Posttraumatic Stress Disorder (includes Posttraumatic Stress Disorder for Children 6 Years and Younger)  Without dissociative symptoms  Psychosocial / Contextual Factors: past trauma, developmental hx and current stressors   PROMIS (reviewed every 90 days): 12/2/2022    Referral / Collaboration:  Referral to another professional/service is not indicated at this time..    Anticipated number of session for this episode of care: 12  Anticipation frequency of session: Biweekly  Anticipated Duration of each session: 38-52 minutes  Treatment plan will be reviewed in 90 days or when goals have been changed.       MeasurableTreatment Goal(s) related to diagnosis / functional impairment(s)  Goal 1: Patient will report absence of persistent SI and report of reduced frequency and intensity of SI and absence of SI to acceptable levels, report subjective improved mood for a period of 90 days, within 6 months as clinically observed and by patient self-report    I will know I've met my goal when I can see the difference between a bad moment and what I want in th future.      Objective #A (Patient Action)    Patient will demonstrate control of impulses and ability to use positive coping tools to manage strong emotions that can be safely addressed at a lower level of care. Absence of persistent SI and report of reduced frequency and intensity of SI and absence of SI to acceptable levels, report subjective improved mood for a period of 90 days, within 6 months as clinically observed and by patient  self-report.  Status: Continued - Date(s): 4/5/2023    Intervention(s)  Therapist will provide individual therapy to identify triggers to SI urges, gain feedback on helpful coping strategies, and identify ways that family can offer support. Tx to discuss current stressors and interpersonal conflicts and how to cope with these, coaching, diagnostic testing, referral for medication as indicated, use prescribed medication, cognitive restructuring, interpersonal family therapy, supportive therapy services.        Goal 2: Patient will report absence of persistent depression mood and report of reduced frequency and intensity of low mood and absence of persistent low energy and motivation to acceptable levels, report subjective improved motivation and increased energy for a period of 90 days, within 6 months as clinically observed and by patient self-report    I will know I've met my goal when I no longer feel sad.      Objective #A (Patient Action)    Status: Continued - Date(s): 4/5/2023    Patient will demonstrate and report a level of depression that can be managed at a lower level of care.  Absence of persistent depression mood and report of reduced frequency and intensity of low mood and absence of persistent low energy and motivation to acceptable levels, report subjective improved motivation and increased energy for a period of 90 days, within 6 months as clinically observed and by patient self-report.    Intervention(s)  Therapist will provide individual therapy to identify triggers to depression, gain feedback on helpful coping tools and thought-reframing techniques, and identify preferred way of being.  Tx to include discussion of current stressors and interpersonal conflicts and how to cope with these, coaching, diagnostic testing, referral for medication as indicated, use prescribed medication, cognitive restructuring, interpersonal, family therapy, supportive therapy services.        Goal 3: Patiient will  report absence of persistent anxiety mood and report of reduced frequency and intensity of worry and absence of persistent anxious mood to acceptable levels, no panic attacks, report subjective comfort with rumination for a period of 90 days. Within 6 months as clinically observed and by patient self-report    I will know I've met my goal when I can go days without worrying about little things or shaking.      Objective #A (Patient Action)    Status: Continued - Date(s): 4/5/2023    Patient will demonstrate and report a level of anxiety that can be managed at a lower level of care.  Absence of persistent anxious mood and report of reduced frequency and intensity of worry and absence of persistent anxious mood to acceptable levels, no panic attacks, report subjective comfort with rumination for a period of 90 days, within 6 months as clinically observed and by patient self-report.    Intervention(s)  Therapist will provide individual therapy to identify triggers to anxiety, gain feedback on helpful coping tools and thought-reframing techniques, and identify preferred way of being. Tx to include discuss current stressors and interpersonal conflicts and how to cope with these, coaching, diagnostic testing , referral for medication as indicated, use prescribed medication, cognitive restructuring, interpersonal,   family therapy, supportive therapy services.        Patient has reviewed and agreed to the above plan.      Katie Faulkner, [facility]   4/5/2023                                                   Wandy Ann            SAFETY PLAN:  Step 1: Warning signs / cues (Thoughts, images, mood, situation, behavior) that a crisis may be developing:  ? Thoughts: thoughts of not wanting to be here  ? Images: no images  ? Thinking Processes: intrusive thoughts (bothersome, unwanted thoughts that come out of nowhere): get irritated  ? Mood: intense anger and agitation  ? Behaviors: isolating/withdrawing  "  ? Situations: trauma    Step 2: Coping strategies - Things I can do to take my mind off of my problems without contacting another person (relaxation technique, physical activity):  ? Distress Tolerance Strategies:  arts and crafts: drawing and painting  ? Physical Activities: exercise: working out  ? Focus on helpful thoughts:  \"This is temporary\", \"It always passes\" and \"Ride the wave\"  Step 3: People and social settings that provide distraction:                 Name: Jennifer     Phone: [phone]                 Name: Antonina         Phone: [phone]                 Name: panchito       Phone: [phone]  ? friends house or car   Step 4: Remind myself of people and things that are important to me and worth living for:  Best friend and cat        Step 5: When I am in crisis, I can ask these people to help me use my safety plan:                 Name: Jennifer     Phone: [phone]                 Name: Antonina         Phone: [phone]                 Name: panchito       Phone: [phone]  Step 6: Making the environment safe:   ? be around others  Step 7: Professionals or agencies I can contact during a crisis:  ? [facility] Daytime Number: [phone]  ? Suicide Prevention Lifeline: 1-614-515-PPRY (6336)  ? Crisis Text Line Service (available 24 hours a day, 7 days a week): Text [place] to 625725    Local Crisis Services: 988     Call 911 or go to my nearest emergency department.       I helped develop this safety plan and agree to use it when needed.  I have been given a copy of this plan.       Client signature _________________________________________________________________  Today s date:  2/22/2021  Adapted from Safety Plan Template 2008 Marisol Cazares and Mario Flores is reprinted with the express permission of the authors.  No portion of the Safety Plan Template may be reproduced without the express, written permission.  You can contact the authors at bhs@[place] [place] or " [email or web address].

## 2023-06-13 ENCOUNTER — VIRTUAL VISIT (OUTPATIENT)
Dept: PSYCHOLOGY | Facility: CLINIC | Age: 23
End: 2023-06-13
Payer: COMMERCIAL

## 2023-06-13 DIAGNOSIS — F31.32 BIPOLAR AFFECTIVE DISORDER, CURRENTLY DEPRESSED, MODERATE (H): Primary | ICD-10-CM

## 2023-06-13 DIAGNOSIS — F43.10 PTSD (POST-TRAUMATIC STRESS DISORDER): ICD-10-CM

## 2023-06-13 DIAGNOSIS — F41.1 GENERALIZED ANXIETY DISORDER: ICD-10-CM

## 2023-06-13 PROCEDURE — 90834 PSYTX W PT 45 MINUTES: CPT | Mod: VID | Performed by: SOCIAL WORKER

## 2023-06-13 NOTE — PROGRESS NOTES
[facility]                                      Progress Note    Patient Name: Wandy Ann  Date: 2023         Service Type: Individual      Session Start Time: 1201 Session End Time: 1250     Session Length: 38-52    Session #: 56    Attendees: Client    Service Modality:  Video Visit:      Provider verified identity through the following two step process.  Patient provided: Patient  and Patient previous known to provider     Telemedicine Visit: The patient's condition can be safely assessed and treated via synchronous audio and visual telemedicine encounter.       Reason for Telemedicine Visit: Patient has requested telehealth visit     Originating Site (Patient Location): Patient's home     Distant Site (Provider Location): Provider Remote Setting- Home Office     Consent:  The patient/guardian has verbally consented to: the potential risks and benefits of telemedicine (video visit) versus in person care; bill my insurance or make self-payment for services provided; and responsibility for payment of non-covered services.      Patient would like the video invitation sent by: email/text     Mode of Communication: Video Conference via [employer]     Distant Location (Provider): Off-site     As the provider I attest to compliance with applicable laws and regulations related to telemedicine.        DATA  Interactive Complexity: No  Crisis: No        Progress Since Last Session (Related to Symptoms / Goals / Homework):   Symptoms: Improving per patient     Homework: Achieved / completed to satisfaction      Episode of Care Goals: Minimal progress - PREPARATION (Decided to change - considering how); Intervened by negotiating a change plan and determining options / strategies for behavior change, identifying triggers, exploring social supports, and working towards setting a date to begin behavior change     Current / Ongoing Stressors and Concerns:   past trauma, developmental hx and  current stressors      Treatment Objective(s) Addressed in This Session:   Patient will demonstrate control of impulses and ability to use positive coping tools to manage strong emotions that can be safely addressed at a lower level of care. Absence of persistent SI and report of reduced frequency and intensity of SI and absence of SI to acceptable levels, report subjective improved mood for a period of 90 days, within 6 months as clinically observed and by patient self-report.  Patient will demonstrate and report a level of depression that can be managed at a lower level of care.  Absence of persistent depression mood and report of reduced frequency and intensity of low mood and absence of persistent low energy and motivation to acceptable levels, report subjective improved motivation and increased energy for a period of 90 days, within 6 months as clinically observed and by patient self-report.  Patient will demonstrate and report a level of anxiety that can be managed at a lower level of care.  Absence of persistent anxious mood and report of reduced frequency and intensity of worry and absence of persistent anxious mood to acceptable levels, no panic attacks, report subjective comfort with rumination for a period of 90 days, within 6 months as clinically observed and by patient self-report.       Intervention:   Therapist met with patient to review goals and interventions. Therapist utilized reflected listening as patient gave brief reflection of week. Patient reported blah moments but no extreme highs or lows. Patient processed relationship and past relationship where restraining order had . Therapist supported and validated patient and encouraged patient to look into extending restraining order along with feel comfortable calling police if feeling unsafe.  Patient presented with an anxious affect. Patient was engaged in session and open to feedback. Patient contracted for safety      There has been  demonstrated improvement in functioning while patient has been engaged in psychotherapy/psychological service- if withdrawn the patient would deteriorate and/or relapse.     Assessments completed prior to visit:  The following assessments were completed by patient for this visit:  PHQ9:       9/29/2022     6:48 AM 10/31/2022     8:07 AM 12/2/2022    12:00 PM 2/1/2023    10:08 AM 2/2/2023    11:53 AM 4/14/2023     9:13 AM 4/27/2023     9:55 AM   PHQ-9 SCORE   PHQ-9 Total Score MyChart 10 (Moderate depression) 3 (Minimal depression)  16 (Moderately severe depression) 20 (Severe depression) 19 (Moderately severe depression) 17 (Moderately severe depression)   PHQ-9 Total Score 10 3 14 16 20 19 17     GAD7:       6/6/2022     3:00 PM 6/21/2022     8:29 AM 8/24/2022     2:00 PM 10/31/2022     8:08 AM 11/29/2022     2:34 PM 2/2/2023    11:53 AM 4/20/2023    12:41 PM   YESSI-7 SCORE   Total Score  9 (mild anxiety)  2 (minimal anxiety) 11 (moderate anxiety) 16 (severe anxiety) 13 (moderate anxiety)   Total Score 10 9 13 2 11 16 13           ASSESSMENT: Current Emotional / Mental Status (status of significant symptoms):   Risk status (Self / Other harm or suicidal ideation)   Patient denies current fears or concerns for personal safety.   Patient reports the following current or recent suicidal ideation or behaviors: comes and goes but faster and improving  no plans and contracted for safety .   Patient denies current or recent homicidal ideation or behaviors.   Patient denies current or recent self injurious behavior or ideation.   Patient denies other safety concerns.   Patient reports there has been no change in risk factors since their last session.     Patient reports there has been no change in protective factors since their last session.     A safety and risk management plan has been developed including: Patient consented to co-developed safety plan on 2.21.2022.  Safety and risk management plan was reviewed.   Patient  "agreed to use safety plan should any safety concerns arise.  A copy was made available to the patient.     Appearance:   Appropriate     Eye Contact:   Fair     Psychomotor Behavior: Restless     Attitude:   Cooperative    Orientation:   All   Speech    Rate / Production: Emotional Talkative    Volume:  Normal    Mood:    Anxious  Depressed    Affect:    Worrisome    Thought Content:  Clear    Thought Form:  Coherent    Insight:    Fair      Medication Review:   Changes to psychiatric medications, see updated Medication List in EPIC.      Medication Compliance:   Yes     Changes in Health Issues:   None reported     Chemical Use Review:   Substance Use: Chemical use reviewed, no active concerns identified      Tobacco Use: No current tobacco use.      Diagnosis:  1. Bipolar affective disorder, currently depressed, moderate (H)    2. PTSD (post-traumatic stress disorder)    3. Generalized anxiety disorder    R/O ADHD    Collateral Reports Completed:   Not Applicable    PLAN: (Patient Tasks / Therapist Tasks / Other)  Make visual safety plan   Manage depressive symptoms with coping skills  When feeling SI follow safety plan  Continue to manage SI and go to ER if feeling unsafe  [facility] has a new mental health emergency area at [facility] called [facility] that is very calming and helpful if you need additional support. ([address] [place], [place] [address] [phone]).     Name and challenge cognitive distortions  read \"stop walking on eggshells.    Call [phone] if feeling SI has increase or go to ED or empath   Utilize coping skills when feeling impulsive.   Replace distortions with positive attributes    Give reece to self  Continue to track moods   Therapist provided psycho education to patient and ACT therapy with looking at goals and thoughts that take her away from her goals.   Start forgiveness timeline  Make ADHD assessment  Call police if feeling unsafe with restraining order  " and look into reinstating it       Katie Faulkner, LICSW  6/13/2023                                                         ______________________________________________________________________    Individual Treatment Plan    Patient's Name: Wandy Ann  YOB: 2000    Date of Creation: 2.22.2021  Date Treatment Plan Last Reviewed/Revised: 4/5/2023 due 7/5/2023    DSM5 Diagnoses: 296.52 Bipolar I Disorder Current or Most Recent Episode Depressed, Moderate, 300.02 (F41.1) Generalized Anxiety Disorder or 309.81 (F43.10) Posttraumatic Stress Disorder (includes Posttraumatic Stress Disorder for Children 6 Years and Younger)  Without dissociative symptoms  Psychosocial / Contextual Factors: past trauma, developmental hx and current stressors   PROMIS (reviewed every 90 days): 12/2/2022    Referral / Collaboration:  Referral to another professional/service is not indicated at this time..    Anticipated number of session for this episode of care: 12  Anticipation frequency of session: Biweekly  Anticipated Duration of each session: 38-52 minutes  Treatment plan will be reviewed in 90 days or when goals have been changed.       MeasurableTreatment Goal(s) related to diagnosis / functional impairment(s)  Goal 1: Patient will report absence of persistent SI and report of reduced frequency and intensity of SI and absence of SI to acceptable levels, report subjective improved mood for a period of 90 days, within 6 months as clinically observed and by patient self-report    I will know I've met my goal when I can see the difference between a bad moment and what I want in th future.      Objective #A (Patient Action)    Patient will demonstrate control of impulses and ability to use positive coping tools to manage strong emotions that can be safely addressed at a lower level of care. Absence of persistent SI and report of reduced frequency and intensity of SI and absence of SI to acceptable levels, report  subjective improved mood for a period of 90 days, within 6 months as clinically observed and by patient self-report.  Status: Continued - Date(s): 4/5/2023    Intervention(s)  Therapist will provide individual therapy to identify triggers to SI urges, gain feedback on helpful coping strategies, and identify ways that family can offer support. Tx to discuss current stressors and interpersonal conflicts and how to cope with these, coaching, diagnostic testing, referral for medication as indicated, use prescribed medication, cognitive restructuring, interpersonal family therapy, supportive therapy services.        Goal 2: Patient will report absence of persistent depression mood and report of reduced frequency and intensity of low mood and absence of persistent low energy and motivation to acceptable levels, report subjective improved motivation and increased energy for a period of 90 days, within 6 months as clinically observed and by patient self-report    I will know I've met my goal when I no longer feel sad.      Objective #A (Patient Action)    Status: Continued - Date(s): 4/5/2023    Patient will demonstrate and report a level of depression that can be managed at a lower level of care.  Absence of persistent depression mood and report of reduced frequency and intensity of low mood and absence of persistent low energy and motivation to acceptable levels, report subjective improved motivation and increased energy for a period of 90 days, within 6 months as clinically observed and by patient self-report.    Intervention(s)  Therapist will provide individual therapy to identify triggers to depression, gain feedback on helpful coping tools and thought-reframing techniques, and identify preferred way of being.  Tx to include discussion of current stressors and interpersonal conflicts and how to cope with these, coaching, diagnostic testing, referral for medication as indicated, use prescribed medication, cognitive  restructuring, interpersonal, family therapy, supportive therapy services.        Goal 3: Patiient will report absence of persistent anxiety mood and report of reduced frequency and intensity of worry and absence of persistent anxious mood to acceptable levels, no panic attacks, report subjective comfort with rumination for a period of 90 days. Within 6 months as clinically observed and by patient self-report    I will know I've met my goal when I can go days without worrying about little things or shaking.      Objective #A (Patient Action)    Status: Continued - Date(s): 4/5/2023    Patient will demonstrate and report a level of anxiety that can be managed at a lower level of care.  Absence of persistent anxious mood and report of reduced frequency and intensity of worry and absence of persistent anxious mood to acceptable levels, no panic attacks, report subjective comfort with rumination for a period of 90 days, within 6 months as clinically observed and by patient self-report.    Intervention(s)  Therapist will provide individual therapy to identify triggers to anxiety, gain feedback on helpful coping tools and thought-reframing techniques, and identify preferred way of being. Tx to include discuss current stressors and interpersonal conflicts and how to cope with these, coaching, diagnostic testing , referral for medication as indicated, use prescribed medication, cognitive restructuring, interpersonal,   family therapy, supportive therapy services.        Patient has reviewed and agreed to the above plan.      Katie Faulkner, [facility]   4/5/2023                                                   Wandy Ann            SAFETY PLAN:  Step 1: Warning signs / cues (Thoughts, images, mood, situation, behavior) that a crisis may be developing:  ? Thoughts: thoughts of not wanting to be here  ? Images: no images  ? Thinking Processes: intrusive thoughts (bothersome, unwanted thoughts that come out of  "nowhere): get irritated  ? Mood: intense anger and agitation  ? Behaviors: isolating/withdrawing   ? Situations: trauma    Step 2: Coping strategies - Things I can do to take my mind off of my problems without contacting another person (relaxation technique, physical activity):  ? Distress Tolerance Strategies:  arts and crafts: drawing and painting  ? Physical Activities: exercise: working out  ? Focus on helpful thoughts:  \"This is temporary\", \"It always passes\" and \"Ride the wave\"  Step 3: People and social settings that provide distraction:                 Name: Jennifer     Phone: [phone]                 Name: Antonina         Phone: [phone]                 Name: panchito       Phone: [phone]  ? friends house or car   Step 4: Remind myself of people and things that are important to me and worth living for:  Best friend and cat        Step 5: When I am in crisis, I can ask these people to help me use my safety plan:                 Name: Jennifer     Phone: [phone]                 Name: Antonina         Phone: [phone]                 Name: panchito       Phone: [phone]  Step 6: Making the environment safe:   ? be around others  Step 7: Professionals or agencies I can contact during a crisis:  ? [facility] Daytime Number: [phone]  ? Suicide Prevention Lifeline: 8-850-035-LLGB (4607)  ? Crisis Text Line Service (available 24 hours a day, 7 days a week): Text [place] to 391561    Local Crisis Services: 988     Call 911 or go to my nearest emergency department.       I helped develop this safety plan and agree to use it when needed.  I have been given a copy of this plan.       Client signature _________________________________________________________________  Today s date:  2/22/2021  Adapted from Safety Plan Template 2008 Marisol Cazares and Mario Flores is reprinted with the express permission of the authors.  No portion of the Safety Plan Template may be reproduced without the " express, written permission.  You can contact the authors at [email or web address] or [email or web address].

## 2023-06-14 ENCOUNTER — HOSPITAL ENCOUNTER (OUTPATIENT)
Dept: NUTRITION | Facility: CLINIC | Age: 23
Discharge: HOME | End: 2023-06-14
Attending: PHYSICIAN ASSISTANT | Admitting: PHYSICIAN ASSISTANT
Payer: COMMERCIAL

## 2023-06-14 DIAGNOSIS — E66.01 MORBID OBESITY (H): ICD-10-CM

## 2023-06-14 PROCEDURE — 97802 MEDICAL NUTRITION INDIV IN: CPT | Mod: GT,95 | Performed by: DIETITIAN, REGISTERED

## 2023-06-14 NOTE — DISCHARGE INSTRUCTIONS
Eduar Saba,    It was nice meeting you today. Here are the goals you determined:    Switch Coke to Diet Coke or Coke Zero (1 can per day)   If desire more carbonation, try sparkling mineral water      Here is the carbohydrate portion handout:     My Plate   [email or web address]    Please call if you have any questions prior to your next appointment.    Thanks,    Veronica Nobles, RD, LD  [facility] Outpatient Dietitian  [phone] (office phone)

## 2023-06-14 NOTE — PROGRESS NOTES
Virtual Visit Details    Type of service:  Video Visit   Video Start Time: 2:00  Video End Time: 2:37    Originating Location (pt. Location): Home    Distant Location (provider location):  On-site  Platform used for Video Visit: [facility]     OUTPATIENT NUTRITION ASSESSMENT   REASON FOR ASSESSMENT  Wandy Ann referred by Chinyere Ruiz PA-C for MNT related to Morbid obesity (H) [E66.01]   Patient accompanied by self     ASSESSMENT   Nutrition History:  - Information obtained from patient.  - Patient is on a regular diet at home.  Patient states has struggled with food for years.  Patient's weight has fluctuated from 130-200 lbs.  Patient states usual weight 150 lbs.  Patient feels she can get obsessed with exercise (gym daily) and calories and will lose weight but becomes unhealthy.  Patient feels her biggest challenge with food are food choices and consistency.  Patient may not get hunger cues until the afternoon or lunchtime.  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  Patient eats while working and while watching show.     Carrots (baby) with side of ranch  Dislikes berries     Diet Recall:  Breakfast/Lunch (10 AM-12 PM) leftovers or turkey, lettuce, garcia and cheese sandwich -slice of pizza or salad or macaroni and cheese   Dinner: (5:00-6:00) burger or taco salad or pasta or steak   Snack: fruit snacks; grapes or watermelon; small piece of chocolate -1-2 scoops of ice cream   Beverages: Sprite/Coke (2 cans per day); water; apple juice/cranberry juice (1 cup per day); coffee-cooler [place] (campfire cooler smaller); red bull; probiotic soda   Dining out: 2-3 times per week     Exercise: walks 1 mile 3 times per week     NUTRITION FOCUSED PHYSICAL ASSESSMENT (NFPA) FOR DIAGNOSING MALNUTRITION  Yes         Observed:   No nutrition-related physical findings observed    Obtained from Chart/Interdisciplinary Team:  None noted     LABS  Labs reviewed    MEDICATIONS  Medications reviewed    ANTHROPOMETRICS  "  Height: 5'0\"  Weight: 213 lbs   BMI (kg/m2): 41.6 kg/m2   Weight Status:  Obesity Grade III BMI >40  %IBW: 213%  Weight History:  Patient with 9% weight gain in the past 10 months.   Wt Readings from Last 10 Encounters:   04/21/23 96.7 kg (213 lb 3.2 oz)   06/07/22 88.5 kg (195 lb)   04/29/22 92.1 kg (203 lb)   07/15/21 88.5 kg (195 lb)   02/13/20 73.9 kg (163 lb) (89 %, Z= 1.22)*   12/12/19 75.5 kg (166 lb 8 oz) (91 %, Z= 1.32)*   06/13/19 78.5 kg (173 lb) (93 %, Z= 1.50)*   12/13/18 74.9 kg (165 lb 3.2 oz) (91 %, Z= 1.36)*     * Growth percentiles are based on CDC (Girls, 2-20 Years) data.     ASSESSED NUTRITION NEEDS  Estimated Energy Needs: 0437-2535 kcals/day (14-17 Kcal/Kg)  Justification:  (obese)  Estimated Protein Needs: 58-70 grams protein/day (1-1.2 g pro/adj Kg)  Justification:  (preservation of lean body mass)  Estimated Fluid Needs: 2557-2747 mL/day (30-35 mL/kg)    ASSESSED MALNUTRITION STATUS  % Weight Loss:  None noted  % Intake:  No decreased intake noted  Subcutaneous Fat Loss:  None observed  Loss of Muscle Mass:  None observed  Fluid Retention:  None noted    Malnutrition Diagnosis:  Patient does not meet two of the above criteria necessary for diagnosing malnutrition    DIAGNOSIS   Nutrition Diagnosis:  Food and nutrition-related knowledge deficit related to lack of prior exposure as evidenced by no previous need for food and nutrition related recommendations       INTERVENTIONS   Nutrition Prescription   Recommend modified carbohydrate diet for weight loss (<150 grams carbohydrate per day)     IMPLEMENTATION   Assessed learning needs and learning preference  Teaching Method(s) used: Booklet / Handout  Explanation  Vitamin and Mineral Supplements: recommend complete multivitamin and mineral   Diet Education:  Provided education on balanced, carbohydrate counting, weight loss diet  Nutrition Education (Content):   a)  Discussed portion sizes, label reading, carbohydrate counting, added sugar, " exercise and behavior modification.  Instructed patient on label reading using label from home.  Recommend 30-45 grams carbohydrate per meal and 15 grams carbohydrate as snack.  Suggest <24 grams added sugar per day.  Patient currently consuming minimum of 80 grams added sugar per day.  Patient consumes 410-670 calories from fluids per day.  Encouraged patient to make gradual diet and behavior changes for long term weight loss success.  Supported patient with the challenge of diet changes and weight loss.    b)  Provided MyPlate handout   Nutrition Education (Application):   a)  Discussed current eating plans and offered suggestions for balanced meals.    b)  Patient verbalizes understanding of diet by stating will focus on reducing liquid calories.   Expected patient engagement: fair-good    GOALS (Patient determined)  Switch Coke to Diet Coke or Coke Zero (1 can per day)   If desire more carbonation, try sparkling mineral water      FOLLOW UP/MONITORING   Progress towards goals will be monitored and evaluated per protocol and Practice Guidelines  Patient to follow up in 6 weeks  RD name and number provided     Time Spent with Patient  37 minutes    Veronica Hicks, RD, LD  [facility] Outpatient Dietitian  [phone] (office phone)

## 2023-07-06 ENCOUNTER — VIRTUAL VISIT (OUTPATIENT)
Dept: PSYCHOLOGY | Facility: CLINIC | Age: 23
End: 2023-07-06
Payer: COMMERCIAL

## 2023-07-06 DIAGNOSIS — F41.1 GENERALIZED ANXIETY DISORDER: ICD-10-CM

## 2023-07-06 DIAGNOSIS — F33.1 MDD (MAJOR DEPRESSIVE DISORDER), RECURRENT EPISODE, MODERATE (H): ICD-10-CM

## 2023-07-06 DIAGNOSIS — F43.10 PTSD (POST-TRAUMATIC STRESS DISORDER): ICD-10-CM

## 2023-07-06 DIAGNOSIS — F31.32 BIPOLAR AFFECTIVE DISORDER, CURRENTLY DEPRESSED, MODERATE (H): Primary | ICD-10-CM

## 2023-07-06 PROCEDURE — 90832 PSYTX W PT 30 MINUTES: CPT | Mod: VID | Performed by: SOCIAL WORKER

## 2023-07-06 NOTE — PROGRESS NOTES
[facility]                                      Progress Note    Patient Name: Wandy Ann  Date: 2023         Service Type: Individual      Session Start Time: 1306 Session End Time: 1331     Session Length: 16-37    Session #: 57    Attendees: Client    Service Modality:  Video Visit:      Provider verified identity through the following two step process.  Patient provided: Patient  and Patient previous known to provider     Telemedicine Visit: The patient's condition can be safely assessed and treated via synchronous audio and visual telemedicine encounter.       Reason for Telemedicine Visit: Patient has requested telehealth visit     Originating Site (Patient Location): Patient's car     Distant Site (Provider Location): Provider Remote Setting- Home Office     Consent:  The patient/guardian has verbally consented to: the potential risks and benefits of telemedicine (video visit) versus in person care; bill my insurance or make self-payment for services provided; and responsibility for payment of non-covered services.      Patient would like the video invitation sent by: email/text     Mode of Communication: Video Conference via [employer]well     Distant Location (Provider): Off-site     As the provider I attest to compliance with applicable laws and regulations related to telemedicine.        DATA  Interactive Complexity: No  Crisis: No        Progress Since Last Session (Related to Symptoms / Goals / Homework):   Symptoms: Improving per patient with some birthday emotions     Homework: Achieved / completed to satisfaction      Episode of Care Goals: Minimal progress - PREPARATION (Decided to change - considering how); Intervened by negotiating a change plan and determining options / strategies for behavior change, identifying triggers, exploring social supports, and working towards setting a date to begin behavior change     Current / Ongoing Stressors and Concerns:   past  trauma, developmental hx and current stressors      Treatment Objective(s) Addressed in This Session:   Patient will demonstrate control of impulses and ability to use positive coping tools to manage strong emotions that can be safely addressed at a lower level of care. Absence of persistent SI and report of reduced frequency and intensity of SI and absence of SI to acceptable levels, report subjective improved mood for a period of 90 days, within 6 months as clinically observed and by patient self-report.  Patient will demonstrate and report a level of depression that can be managed at a lower level of care.  Absence of persistent depression mood and report of reduced frequency and intensity of low mood and absence of persistent low energy and motivation to acceptable levels, report subjective improved motivation and increased energy for a period of 90 days, within 6 months as clinically observed and by patient self-report.  Patient will demonstrate and report a level of anxiety that can be managed at a lower level of care.  Absence of persistent anxious mood and report of reduced frequency and intensity of worry and absence of persistent anxious mood to acceptable levels, no panic attacks, report subjective comfort with rumination for a period of 90 days, within 6 months as clinically observed and by patient self-report.       Intervention:   Therapist met with patient to review goals and interventions. Therapist utilized reflected listening as patient gave brief reflection of week. Patient reported overall mood has been stable but some heightened emotions with her birthday and processed. Therapist supported patient as she processed and validated patient. Therapist provided education on love languages and also understanding expectations and learned behavior in different households.   Patient presented with an anxious affect. Patient was engaged in session and open to feedback. Patient contracted for safety       There has been demonstrated improvement in functioning while patient has been engaged in psychotherapy/psychological service- if withdrawn the patient would deteriorate and/or relapse.     Assessments completed prior to visit:  The following assessments were completed by patient for this visit:  PHQ9:       9/29/2022     6:48 AM 10/31/2022     8:07 AM 12/2/2022    12:00 PM 2/1/2023    10:08 AM 2/2/2023    11:53 AM 4/14/2023     9:13 AM 4/27/2023     9:55 AM   PHQ-9 SCORE   PHQ-9 Total Score MyChart 10 (Moderate depression) 3 (Minimal depression)  16 (Moderately severe depression) 20 (Severe depression) 19 (Moderately severe depression) 17 (Moderately severe depression)   PHQ-9 Total Score 10 3 14 16 20 19 17     GAD7:       6/6/2022     3:00 PM 6/21/2022     8:29 AM 8/24/2022     2:00 PM 10/31/2022     8:08 AM 11/29/2022     2:34 PM 2/2/2023    11:53 AM 4/20/2023    12:41 PM   YESSI-7 SCORE   Total Score  9 (mild anxiety)  2 (minimal anxiety) 11 (moderate anxiety) 16 (severe anxiety) 13 (moderate anxiety)   Total Score 10 9 13 2 11 16 13           ASSESSMENT: Current Emotional / Mental Status (status of significant symptoms):   Risk status (Self / Other harm or suicidal ideation)   Patient denies current fears or concerns for personal safety.   Patient denies current or recent suicidal ideation or behaviors.   Patient denies current or recent homicidal ideation or behaviors.   Patient denies current or recent self injurious behavior or ideation.   Patient denies other safety concerns.   Patient reports there has been no change in risk factors since their last session.     Patient reports there has been no change in protective factors since their last session.     A safety and risk management plan has been developed including: Patient consented to co-developed safety plan on 2.21.2022.  Safety and risk management plan was reviewed.   Patient agreed to use safety plan should any safety concerns arise.  A copy was made  "available to the patient.     Appearance:   Appropriate     Eye Contact:   Fair     Psychomotor Behavior: Restless     Attitude:   Cooperative    Orientation:   All   Speech    Rate / Production: Emotional Talkative    Volume:  Normal    Mood:    Anxious  Depressed    Affect:    Worrisome    Thought Content:  Clear    Thought Form:  Coherent    Insight:    Fair      Medication Review:   Changes to psychiatric medications, see updated Medication List in EPIC.      Medication Compliance:   Yes     Changes in Health Issues:   None reported     Chemical Use Review:   Substance Use: Chemical use reviewed, no active concerns identified      Tobacco Use: No current tobacco use.      Diagnosis:  1. Bipolar affective disorder, currently depressed, moderate (H)    2. PTSD (post-traumatic stress disorder)    3. Generalized anxiety disorder    4. MDD (major depressive disorder), recurrent episode, moderate (H)    R/O ADHD    Collateral Reports Completed:   Not Applicable    PLAN: (Patient Tasks / Therapist Tasks / Other)  Make visual safety plan   Manage depressive symptoms with coping skills  When feeling SI follow safety plan  Continue to manage SI and go to ER if feeling unsafe  [facility] has a new mental health emergency area at [facility] called [facility] that is very calming and helpful if you need additional support. ([address] [place] [address]  [phone]).     Name and challenge cognitive distortions  read \"stop walking on eggshells.    Call [phone] if feeling SI has increase or go to ED or empath   Utilize coping skills when feeling impulsive.   Replace distortions with positive attributes    Give reece to self  Continue to track moods   Therapist provided psycho education to patient and ACT therapy with looking at goals and thoughts that take her away from her goals.   Start forgiveness timeline  Make ADHD assessment  Therapist supported patient as she processed and validated patient. " Therapist provided education on love languages and also understanding expectations and learned behavior in different households.     Katie Faulkner, [facility]  7/6/2023                                                         ______________________________________________________________________    Individual Treatment Plan    Patient's Name: Wandy Ann  YOB: 2000    Date of Creation: 2.22.2021  Date Treatment Plan Last Reviewed/Revised: 4/5/2023 due 7/5/2023    DSM5 Diagnoses: 296.52 Bipolar I Disorder Current or Most Recent Episode Depressed, Moderate, 300.02 (F41.1) Generalized Anxiety Disorder or 309.81 (F43.10) Posttraumatic Stress Disorder (includes Posttraumatic Stress Disorder for Children 6 Years and Younger)  Without dissociative symptoms  Psychosocial / Contextual Factors: past trauma, developmental hx and current stressors   PROMIS (reviewed every 90 days): 12/2/2022    Referral / Collaboration:  Referral to another professional/service is not indicated at this time..    Anticipated number of session for this episode of care: 12  Anticipation frequency of session: Biweekly  Anticipated Duration of each session: 38-52 minutes  Treatment plan will be reviewed in 90 days or when goals have been changed.       MeasurableTreatment Goal(s) related to diagnosis / functional impairment(s)  Goal 1: Patient will report absence of persistent SI and report of reduced frequency and intensity of SI and absence of SI to acceptable levels, report subjective improved mood for a period of 90 days, within 6 months as clinically observed and by patient self-report    I will know I've met my goal when I can see the difference between a bad moment and what I want in th future.      Objective #A (Patient Action)    Patient will demonstrate control of impulses and ability to use positive coping tools to manage strong emotions that can be safely addressed at a lower level of care. Absence of persistent SI  and report of reduced frequency and intensity of SI and absence of SI to acceptable levels, report subjective improved mood for a period of 90 days, within 6 months as clinically observed and by patient self-report.  Status: Continued - Date(s): 4/5/2023    Intervention(s)  Therapist will provide individual therapy to identify triggers to SI urges, gain feedback on helpful coping strategies, and identify ways that family can offer support. Tx to discuss current stressors and interpersonal conflicts and how to cope with these, coaching, diagnostic testing, referral for medication as indicated, use prescribed medication, cognitive restructuring, interpersonal family therapy, supportive therapy services.        Goal 2: Patient will report absence of persistent depression mood and report of reduced frequency and intensity of low mood and absence of persistent low energy and motivation to acceptable levels, report subjective improved motivation and increased energy for a period of 90 days, within 6 months as clinically observed and by patient self-report    I will know I've met my goal when I no longer feel sad.      Objective #A (Patient Action)    Status: Continued - Date(s): 4/5/2023    Patient will demonstrate and report a level of depression that can be managed at a lower level of care.  Absence of persistent depression mood and report of reduced frequency and intensity of low mood and absence of persistent low energy and motivation to acceptable levels, report subjective improved motivation and increased energy for a period of 90 days, within 6 months as clinically observed and by patient self-report.    Intervention(s)  Therapist will provide individual therapy to identify triggers to depression, gain feedback on helpful coping tools and thought-reframing techniques, and identify preferred way of being.  Tx to include discussion of current stressors and interpersonal conflicts and how to cope with these, coaching,  diagnostic testing, referral for medication as indicated, use prescribed medication, cognitive restructuring, interpersonal, family therapy, supportive therapy services.        Goal 3: Patiient will report absence of persistent anxiety mood and report of reduced frequency and intensity of worry and absence of persistent anxious mood to acceptable levels, no panic attacks, report subjective comfort with rumination for a period of 90 days. Within 6 months as clinically observed and by patient self-report    I will know I've met my goal when I can go days without worrying about little things or shaking.      Objective #A (Patient Action)    Status: Continued - Date(s): 4/5/2023    Patient will demonstrate and report a level of anxiety that can be managed at a lower level of care.  Absence of persistent anxious mood and report of reduced frequency and intensity of worry and absence of persistent anxious mood to acceptable levels, no panic attacks, report subjective comfort with rumination for a period of 90 days, within 6 months as clinically observed and by patient self-report.    Intervention(s)  Therapist will provide individual therapy to identify triggers to anxiety, gain feedback on helpful coping tools and thought-reframing techniques, and identify preferred way of being. Tx to include discuss current stressors and interpersonal conflicts and how to cope with these, coaching, diagnostic testing , referral for medication as indicated, use prescribed medication, cognitive restructuring, interpersonal,   family therapy, supportive therapy services.        Patient has reviewed and agreed to the above plan.      Katie Faulkner, [facility]   4/5/2023                                                   Wandy Ann            SAFETY PLAN:  Step 1: Warning signs / cues (Thoughts, images, mood, situation, behavior) that a crisis may be developing:  ? Thoughts: thoughts of not wanting to be here  ? Images: no  "images  ? Thinking Processes: intrusive thoughts (bothersome, unwanted thoughts that come out of nowhere): get irritated  ? Mood: intense anger and agitation  ? Behaviors: isolating/withdrawing   ? Situations: trauma    Step 2: Coping strategies - Things I can do to take my mind off of my problems without contacting another person (relaxation technique, physical activity):  ? Distress Tolerance Strategies:  arts and crafts: drawing and painting  ? Physical Activities: exercise: working out  ? Focus on helpful thoughts:  \"This is temporary\", \"It always passes\" and \"Ride the wave\"  Step 3: People and social settings that provide distraction:                 Name: Jennifer     Phone: [phone]                 Name: Antonina         Phone: [phone]                 Name: panchito       Phone: [phone]  ? friends house or car   Step 4: Remind myself of people and things that are important to me and worth living for:  Best friend and cat        Step 5: When I am in crisis, I can ask these people to help me use my safety plan:                 Name: Jennifer     Phone: [phone]                 Name: Antonina         Phone: [phone]                 Name: panchito       Phone: [phone]  Step 6: Making the environment safe:   ? be around others  Step 7: Professionals or agencies I can contact during a crisis:  ? [facility] Number: [phone]  ? Suicide Prevention Lifeline: 1-043-838-TALK (0828)  ? Crisis Text Line Service (available 24 hours a day, 7 days a week): Text [place] to 442545    Local Crisis Services: 988     Call 911 or go to my nearest emergency department.       I helped develop this safety plan and agree to use it when needed.  I have been given a copy of this plan.       Client signature _________________________________________________________________  Today s date:  2/22/2021  Adapted from Safety Plan Template 2008 Marisol Cazares and Mario Flores is reprinted with the express " permission of the authors.  No portion of the Safety Plan Template may be reproduced without the express, written permission.  You can contact the authors at [email or web address] or [email or web address].

## 2023-07-18 ENCOUNTER — VIRTUAL VISIT (OUTPATIENT)
Dept: PSYCHOLOGY | Facility: CLINIC | Age: 23
End: 2023-07-18
Payer: COMMERCIAL

## 2023-07-18 DIAGNOSIS — F41.1 GENERALIZED ANXIETY DISORDER: ICD-10-CM

## 2023-07-18 DIAGNOSIS — F31.32 BIPOLAR AFFECTIVE DISORDER, CURRENTLY DEPRESSED, MODERATE (H): Primary | ICD-10-CM

## 2023-07-18 DIAGNOSIS — F43.10 PTSD (POST-TRAUMATIC STRESS DISORDER): ICD-10-CM

## 2023-07-18 PROCEDURE — 90834 PSYTX W PT 45 MINUTES: CPT | Mod: VID | Performed by: SOCIAL WORKER

## 2023-07-18 NOTE — PROGRESS NOTES
[facility]                                      Progress Note    Patient Name: Wandy Ann  Date: 2023         Service Type: Individual      Session Start Time: 1302 Session End Time: 1354     Session Length: 38-52    Session #: 58    Attendees: Client    Service Modality:  Video Visit:      Provider verified identity through the following two step process.  Patient provided: Patient  and Patient previous known to provider     Telemedicine Visit: The patient's condition can be safely assessed and treated via synchronous audio and visual telemedicine encounter.       Reason for Telemedicine Visit: Patient has requested telehealth visit     Originating Site (Patient Location): Patient's car     Distant Site (Provider Location): Provider Remote Setting- Home Office     Consent:  The patient/guardian has verbally consented to: the potential risks and benefits of telemedicine (video visit) versus in person care; bill my insurance or make self-payment for services provided; and responsibility for payment of non-covered services.      Patient would like the video invitation sent by: email/text     Mode of Communication: Video Conference via [employer]     Distant Location (Provider): Off-site     As the provider I attest to compliance with applicable laws and regulations related to telemedicine.        DATA  Interactive Complexity: No  Crisis: No        Progress Since Last Session (Related to Symptoms / Goals / Homework):   Symptoms: Worsening per patient     Homework: Achieved / completed to satisfaction      Episode of Care Goals: Minimal progress - PREPARATION (Decided to change - considering how); Intervened by negotiating a change plan and determining options / strategies for behavior change, identifying triggers, exploring social supports, and working towards setting a date to begin behavior change     Current / Ongoing Stressors and Concerns:   past trauma, developmental hx and  current stressors      Treatment Objective(s) Addressed in This Session:   Patient will demonstrate control of impulses and ability to use positive coping tools to manage strong emotions that can be safely addressed at a lower level of care. Absence of persistent SI and report of reduced frequency and intensity of SI and absence of SI to acceptable levels, report subjective improved mood for a period of 90 days, within 6 months as clinically observed and by patient self-report.  Patient will demonstrate and report a level of depression that can be managed at a lower level of care.  Absence of persistent depression mood and report of reduced frequency and intensity of low mood and absence of persistent low energy and motivation to acceptable levels, report subjective improved motivation and increased energy for a period of 90 days, within 6 months as clinically observed and by patient self-report.  Patient will demonstrate and report a level of anxiety that can be managed at a lower level of care.  Absence of persistent anxious mood and report of reduced frequency and intensity of worry and absence of persistent anxious mood to acceptable levels, no panic attacks, report subjective comfort with rumination for a period of 90 days, within 6 months as clinically observed and by patient self-report.       Intervention:   Therapist met with patient to review goals and interventions. Therapist utilized reflected listening as patient gave brief reflection of week. Patient reported external stress and conflict. Therapist supported patient as she processed and validated patient's emotions. Therapist suggested patient set up boundaries and expectations with friends and or meet them where they are at. Therapist coached patient in effective communication.   Patient presented with an anxious affect. Patient was engaged in session and open to feedback. Patient contracted for safety      There has been demonstrated improvement in  functioning while patient has been engaged in psychotherapy/psychological service- if withdrawn the patient would deteriorate and/or relapse.     Assessments completed prior to visit:  The following assessments were completed by patient for this visit:  PHQ9:       9/29/2022     6:48 AM 10/31/2022     8:07 AM 12/2/2022    12:00 PM 2/1/2023    10:08 AM 2/2/2023    11:53 AM 4/14/2023     9:13 AM 4/27/2023     9:55 AM   PHQ-9 SCORE   PHQ-9 Total Score MyChart 10 (Moderate depression) 3 (Minimal depression)  16 (Moderately severe depression) 20 (Severe depression) 19 (Moderately severe depression) 17 (Moderately severe depression)   PHQ-9 Total Score 10 3 14 16 20 19 17     GAD7:       6/6/2022     3:00 PM 6/21/2022     8:29 AM 8/24/2022     2:00 PM 10/31/2022     8:08 AM 11/29/2022     2:34 PM 2/2/2023    11:53 AM 4/20/2023    12:41 PM   YESSI-7 SCORE   Total Score  9 (mild anxiety)  2 (minimal anxiety) 11 (moderate anxiety) 16 (severe anxiety) 13 (moderate anxiety)   Total Score 10 9 13 2 11 16 13           ASSESSMENT: Current Emotional / Mental Status (status of significant symptoms):   Risk status (Self / Other harm or suicidal ideation)   Patient denies current fears or concerns for personal safety.   Patient denies current or recent suicidal ideation or behaviors.   Patient denies current or recent homicidal ideation or behaviors.   Patient denies current or recent self injurious behavior or ideation.   Patient denies other safety concerns.   Patient reports there has been no change in risk factors since their last session.     Patient reports there has been no change in protective factors since their last session.     A safety and risk management plan has been developed including: Patient consented to co-developed safety plan on 2.21.2022.  Safety and risk management plan was reviewed.   Patient agreed to use safety plan should any safety concerns arise.  A copy was made available to the  "patient.     Appearance:   Appropriate     Eye Contact:   Fair     Psychomotor Behavior: Restless     Attitude:   Cooperative    Orientation:   All   Speech    Rate / Production: Emotional Talkative    Volume:  Normal    Mood:    Anxious  Depressed    Affect:    Worrisome    Thought Content:  Clear    Thought Form:  Coherent    Insight:    Fair      Medication Review:   No changes to current psychiatric medication(s)     Medication Compliance:   Yes     Changes in Health Issues:   None reported     Chemical Use Review:   Substance Use: Chemical use reviewed, no active concerns identified      Tobacco Use: No current tobacco use.      Diagnosis:  1. Bipolar affective disorder, currently depressed, moderate (H)    2. PTSD (post-traumatic stress disorder)    3. Generalized anxiety disorder    R/O ADHD    Collateral Reports Completed:   Not Applicable    PLAN: (Patient Tasks / Therapist Tasks / Other)  Make visual safety plan   Manage depressive symptoms with coping skills  When feeling SI follow safety plan  Continue to manage SI and go to ER if feeling unsafe  [facility] has a new mental health emergency area at [facility] called [facility] that is very calming and helpful if you need additional support. ([address] [place] [address]  [phone]).     Name and challenge cognitive distortions  read \"stop walking on eggshells.    Call [phone] if feeling SI has increase or go to ED or [facility]   Utilize coping skills when feeling impulsive.   Replace distortions with positive attributes    Give reece to self  Continue to track moods   Therapist provided psycho education to patient and ACT therapy with looking at goals and thoughts that take her away from her goals.   Start forgiveness timeline  Make ADHD assessment  Therapist suggested patient set up boundaries and expectations with friends and or meet them where they are at. Therapist coached patient in effective communication.     Katie M. " Lucie, LICSW  7/18/2023                                                         ______________________________________________________________________    Individual Treatment Plan    Patient's Name: Wandy Ann  YOB: 2000    Date of Creation: 2.22.2021  Date Treatment Plan Last Reviewed/Revised: 7/18/2023 due 10/18/2023    DSM5 Diagnoses: 296.52 Bipolar I Disorder Current or Most Recent Episode Depressed, Moderate, 300.02 (F41.1) Generalized Anxiety Disorder or 309.81 (F43.10) Posttraumatic Stress Disorder (includes Posttraumatic Stress Disorder for Children 6 Years and Younger)  Without dissociative symptoms  Psychosocial / Contextual Factors: past trauma, developmental hx and current stressors   PROMIS (reviewed every 90 days): 12/2/2022    Referral / Collaboration:  Referral to another professional/service is not indicated at this time..    Anticipated number of session for this episode of care: 12  Anticipation frequency of session: Biweekly  Anticipated Duration of each session: 38-52 minutes  Treatment plan will be reviewed in 90 days or when goals have been changed.       MeasurableTreatment Goal(s) related to diagnosis / functional impairment(s)  Goal 1: Patient will report absence of persistent SI and report of reduced frequency and intensity of SI and absence of SI to acceptable levels, report subjective improved mood for a period of 90 days, within 6 months as clinically observed and by patient self-report    I will know I've met my goal when I can see the difference between a bad moment and what I want in th future.      Objective #A (Patient Action)    Patient will demonstrate control of impulses and ability to use positive coping tools to manage strong emotions that can be safely addressed at a lower level of care. Absence of persistent SI and report of reduced frequency and intensity of SI and absence of SI to acceptable levels, report subjective improved mood for a period of  90 days, within 6 months as clinically observed and by patient self-report.  Status: Continued - Date(s): 7/18/2023    Intervention(s)  Therapist will provide individual therapy to identify triggers to SI urges, gain feedback on helpful coping strategies, and identify ways that family can offer support. Tx to discuss current stressors and interpersonal conflicts and how to cope with these, coaching, diagnostic testing, referral for medication as indicated, use prescribed medication, cognitive restructuring, interpersonal family therapy, supportive therapy services.        Goal 2: Patient will report absence of persistent depression mood and report of reduced frequency and intensity of low mood and absence of persistent low energy and motivation to acceptable levels, report subjective improved motivation and increased energy for a period of 90 days, within 6 months as clinically observed and by patient self-report    I will know I've met my goal when I no longer feel sad.      Objective #A (Patient Action)    Status: Continued - Date(s): 7/18/2023    Patient will demonstrate and report a level of depression that can be managed at a lower level of care.  Absence of persistent depression mood and report of reduced frequency and intensity of low mood and absence of persistent low energy and motivation to acceptable levels, report subjective improved motivation and increased energy for a period of 90 days, within 6 months as clinically observed and by patient self-report.    Intervention(s)  Therapist will provide individual therapy to identify triggers to depression, gain feedback on helpful coping tools and thought-reframing techniques, and identify preferred way of being.  Tx to include discussion of current stressors and interpersonal conflicts and how to cope with these, coaching, diagnostic testing, referral for medication as indicated, use prescribed medication, cognitive restructuring, interpersonal, family  therapy, supportive therapy services.        Goal 3: Patiient will report absence of persistent anxiety mood and report of reduced frequency and intensity of worry and absence of persistent anxious mood to acceptable levels, no panic attacks, report subjective comfort with rumination for a period of 90 days. Within 6 months as clinically observed and by patient self-report    I will know I've met my goal when I can go days without worrying about little things or shaking.      Objective #A (Patient Action)    Status: Continued - Date(s): 7/18/2023    Patient will demonstrate and report a level of anxiety that can be managed at a lower level of care.  Absence of persistent anxious mood and report of reduced frequency and intensity of worry and absence of persistent anxious mood to acceptable levels, no panic attacks, report subjective comfort with rumination for a period of 90 days, within 6 months as clinically observed and by patient self-report.    Intervention(s)  Therapist will provide individual therapy to identify triggers to anxiety, gain feedback on helpful coping tools and thought-reframing techniques, and identify preferred way of being. Tx to include discuss current stressors and interpersonal conflicts and how to cope with these, coaching, diagnostic testing , referral for medication as indicated, use prescribed medication, cognitive restructuring, interpersonal,   family therapy, supportive therapy services.        Patient has reviewed and agreed to the above plan.      Katie Faulkner, [facility]   7/18/2023                                                   Wandy Ann            SAFETY PLAN:  Step 1: Warning signs / cues (Thoughts, images, mood, situation, behavior) that a crisis may be developing:  ? Thoughts: thoughts of not wanting to be here  ? Images: no images  ? Thinking Processes: intrusive thoughts (bothersome, unwanted thoughts that come out of nowhere): get irritated  ? Mood: intense  "anger and agitation  ? Behaviors: isolating/withdrawing   ? Situations: trauma    Step 2: Coping strategies - Things I can do to take my mind off of my problems without contacting another person (relaxation technique, physical activity):  ? Distress Tolerance Strategies:  arts and crafts: drawing and painting  ? Physical Activities: exercise: working out  ? Focus on helpful thoughts:  \"This is temporary\", \"It always passes\" and \"Ride the wave\"  Step 3: People and social settings that provide distraction:                 Name: Jennifer     Phone: [phone]                 Name: Antonina         Phone: [phone]                 Name: panchito       Phone: [phone]  ? friends house or car   Step 4: Remind myself of people and things that are important to me and worth living for:  Best friend and cat        Step 5: When I am in crisis, I can ask these people to help me use my safety plan:                 Name: Jennifer     Phone: [phone]                 Name: Antonina         Phone: [phone]                 Name: panchito       Phone: [phone]  Step 6: Making the environment safe:   ? be around others  Step 7: Professionals or agencies I can contact during a crisis:  ? [facility] Daytime Number: 180-889-6149  ? Suicide Prevention Lifeline: 7-348-303-IZFS (3065)  ? Crisis Text Line Service (available 24 hours a day, 7 days a week): Text [place] to 449739    Local Crisis Services: 988     Call 911 or go to my nearest emergency department.       I helped develop this safety plan and agree to use it when needed.  I have been given a copy of this plan.       Client signature _________________________________________________________________  Today s date:  2/22/2021  Adapted from Safety Plan Template 2008 Marisol Cazares and Mario Flores is reprinted with the express permission of the authors.  No portion of the Safety Plan Template may be reproduced without the express, written permission.  You can contact " the authors at [email or web address] or [email or web address].

## 2023-07-25 ENCOUNTER — VIRTUAL VISIT (OUTPATIENT)
Dept: PSYCHOLOGY | Facility: CLINIC | Age: 23
End: 2023-07-25
Payer: COMMERCIAL

## 2023-07-25 DIAGNOSIS — F31.32 BIPOLAR AFFECTIVE DISORDER, CURRENTLY DEPRESSED, MODERATE (H): Primary | ICD-10-CM

## 2023-07-25 DIAGNOSIS — F41.1 GENERALIZED ANXIETY DISORDER: ICD-10-CM

## 2023-07-25 DIAGNOSIS — F43.10 PTSD (POST-TRAUMATIC STRESS DISORDER): ICD-10-CM

## 2023-07-25 PROCEDURE — 90832 PSYTX W PT 30 MINUTES: CPT | Mod: VID | Performed by: SOCIAL WORKER

## 2023-07-25 NOTE — PROGRESS NOTES
[facility]                                      Progress Note    Patient Name: Wandy Ann  Date: 2023         Service Type: Individual      Session Start Time: 1202     Session End Time: 1235     Session Length: 16-37    Session #: 59    Attendees: Client    Service Modality:  Video Visit:      Provider verified identity through the following two step process.  Patient provided: Patient  and Patient previous known to provider     Telemedicine Visit: The patient's condition can be safely assessed and treated via synchronous audio and visual telemedicine encounter.       Reason for Telemedicine Visit: Patient has requested telehealth visit     Originating Site (Patient Location): Patient's car     Distant Site (Provider Location): Provider Remote Setting- Home Office     Consent:  The patient/guardian has verbally consented to: the potential risks and benefits of telemedicine (video visit) versus in person care; bill my insurance or make self-payment for services provided; and responsibility for payment of non-covered services.      Patient would like the video invitation sent by: email/text     Mode of Communication: Video Conference via [employer]     Distant Location (Provider): Off-site     As the provider I attest to compliance with applicable laws and regulations related to telemedicine.        DATA  Interactive Complexity: No  Crisis: No        Progress Since Last Session (Related to Symptoms / Goals / Homework):   Symptoms: Improving per patient       Homework: Achieved / completed to satisfaction      Episode of Care Goals: Satisfactory progress - ACTION (Actively working towards change); Intervened by reinforcing change plan / affirming steps taken     Current / Ongoing Stressors and Concerns:   past trauma, developmental hx and current stressors      Treatment Objective(s) Addressed in This Session:   Patient will demonstrate control of impulses and ability to use  positive coping tools to manage strong emotions that can be safely addressed at a lower level of care. Absence of persistent SI and report of reduced frequency and intensity of SI and absence of SI to acceptable levels, report subjective improved mood for a period of 90 days, within 6 months as clinically observed and by patient self-report.  Patient will demonstrate and report a level of depression that can be managed at a lower level of care.  Absence of persistent depression mood and report of reduced frequency and intensity of low mood and absence of persistent low energy and motivation to acceptable levels, report subjective improved motivation and increased energy for a period of 90 days, within 6 months as clinically observed and by patient self-report.  Patient will demonstrate and report a level of anxiety that can be managed at a lower level of care.  Absence of persistent anxious mood and report of reduced frequency and intensity of worry and absence of persistent anxious mood to acceptable levels, no panic attacks, report subjective comfort with rumination for a period of 90 days, within 6 months as clinically observed and by patient self-report.       Intervention:   Therapist met with patient to review goals and interventions. Therapist utilized reflected listening as patient gave brief reflection of week. Patient reported a having a good couple of weeks and processed thoughts on her career and future. Therapist supported patient as she processed and validated patient. Therapist encouraged patient to look at her different options and her wants/need with a change. Therapist utilized strength based modality along with solution focused.   Patient presented with an anxious affect. Patient was engaged in session and open to feedback. Patient contracted for safety      There has been demonstrated improvement in functioning while patient has been engaged in psychotherapy/psychological service- if withdrawn  the patient would deteriorate and/or relapse.     Assessments completed prior to visit:  The following assessments were completed by patient for this visit:  PHQ9:       9/29/2022     6:48 AM 10/31/2022     8:07 AM 12/2/2022    12:00 PM 2/1/2023    10:08 AM 2/2/2023    11:53 AM 4/14/2023     9:13 AM 4/27/2023     9:55 AM   PHQ-9 SCORE   PHQ-9 Total Score MyChart 10 (Moderate depression) 3 (Minimal depression)  16 (Moderately severe depression) 20 (Severe depression) 19 (Moderately severe depression) 17 (Moderately severe depression)   PHQ-9 Total Score 10 3 14 16 20 19 17     GAD7:       6/6/2022     3:00 PM 6/21/2022     8:29 AM 8/24/2022     2:00 PM 10/31/2022     8:08 AM 11/29/2022     2:34 PM 2/2/2023    11:53 AM 4/20/2023    12:41 PM   YESSI-7 SCORE   Total Score  9 (mild anxiety)  2 (minimal anxiety) 11 (moderate anxiety) 16 (severe anxiety) 13 (moderate anxiety)   Total Score 10 9 13 2 11 16 13           ASSESSMENT: Current Emotional / Mental Status (status of significant symptoms):   Risk status (Self / Other harm or suicidal ideation)   Patient denies current fears or concerns for personal safety.   Patient denies current or recent suicidal ideation or behaviors.   Patient denies current or recent homicidal ideation or behaviors.   Patient denies current or recent self injurious behavior or ideation.   Patient denies other safety concerns.   Patient reports there has been no change in risk factors since their last session.     Patient reports there has been no change in protective factors since their last session.     A safety and risk management plan has been developed including: Patient consented to co-developed safety plan on 2.21.2022.  Safety and risk management plan was reviewed.   Patient agreed to use safety plan should any safety concerns arise.  A copy was made available to the patient.     Appearance:   Appropriate     Eye Contact:   Fair     Psychomotor Behavior: Restless   "   Attitude:   Cooperative    Orientation:   All   Speech    Rate / Production: Emotional Talkative    Volume:  Normal    Mood:    Anxious  Depressed    Affect:    Worrisome    Thought Content:  Clear    Thought Form:  Coherent    Insight:    Fair      Medication Review:   Changes to psychiatric medications, see updated Medication List in EPIC.      Medication Compliance:   Yes     Changes in Health Issues:   None reported     Chemical Use Review:   Substance Use: Chemical use reviewed, no active concerns identified      Tobacco Use: No current tobacco use.      Diagnosis:  1. Bipolar affective disorder, currently depressed, moderate (H)    2. PTSD (post-traumatic stress disorder)    3. Generalized anxiety disorder    R/O ADHD    Collateral Reports Completed:   Not Applicable    PLAN: (Patient Tasks / Therapist Tasks / Other)  Make visual safety plan   Manage depressive symptoms with coping skills  When feeling SI follow safety plan  Continue to manage SI and go to ER if feeling unsafe  [facility] has a new mental health emergency area at [facility] called [facility] that is very calming and helpful if you need additional support. ([address] [place], [place] [address] [phone]).     Name and challenge cognitive distortions  read \"stop walking on eggshells.    Call 988 if feeling SI has increase or go to ED or empath   Utilize coping skills when feeling impulsive.   Replace distortions with positive attributes    Give reece to self  Continue to track moods   Therapist provided psycho education to patient and ACT therapy with looking at goals and thoughts that take her away from her goals.   Start forgiveness timeline  Make ADHD assessment   Therapist encouraged patient to look at her different options and her wants/need with a change.    Katie Faulkner, [facility]  7/25/2023                                                     "     ______________________________________________________________________    Individual Treatment Plan    Patient's Name: Wandy Ann  YOB: 2000    Date of Creation: 2.22.2021  Date Treatment Plan Last Reviewed/Revised: 7/18/2023 due 10/18/2023    DSM5 Diagnoses: 296.52 Bipolar I Disorder Current or Most Recent Episode Depressed, Moderate, 300.02 (F41.1) Generalized Anxiety Disorder or 309.81 (F43.10) Posttraumatic Stress Disorder (includes Posttraumatic Stress Disorder for Children 6 Years and Younger)  Without dissociative symptoms  Psychosocial / Contextual Factors: past trauma, developmental hx and current stressors   PROMIS (reviewed every 90 days): 12/2/2022    Referral / Collaboration:  Referral to another professional/service is not indicated at this time..    Anticipated number of session for this episode of care: 12  Anticipation frequency of session: Biweekly  Anticipated Duration of each session: 38-52 minutes  Treatment plan will be reviewed in 90 days or when goals have been changed.       MeasurableTreatment Goal(s) related to diagnosis / functional impairment(s)  Goal 1: Patient will report absence of persistent SI and report of reduced frequency and intensity of SI and absence of SI to acceptable levels, report subjective improved mood for a period of 90 days, within 6 months as clinically observed and by patient self-report    I will know I've met my goal when I can see the difference between a bad moment and what I want in th future.      Objective #A (Patient Action)    Patient will demonstrate control of impulses and ability to use positive coping tools to manage strong emotions that can be safely addressed at a lower level of care. Absence of persistent SI and report of reduced frequency and intensity of SI and absence of SI to acceptable levels, report subjective improved mood for a period of 90 days, within 6 months as clinically observed and by patient  self-report.  Status: Continued - Date(s): 7/18/2023    Intervention(s)  Therapist will provide individual therapy to identify triggers to SI urges, gain feedback on helpful coping strategies, and identify ways that family can offer support. Tx to discuss current stressors and interpersonal conflicts and how to cope with these, coaching, diagnostic testing, referral for medication as indicated, use prescribed medication, cognitive restructuring, interpersonal family therapy, supportive therapy services.        Goal 2: Patient will report absence of persistent depression mood and report of reduced frequency and intensity of low mood and absence of persistent low energy and motivation to acceptable levels, report subjective improved motivation and increased energy for a period of 90 days, within 6 months as clinically observed and by patient self-report    I will know I've met my goal when I no longer feel sad.      Objective #A (Patient Action)    Status: Continued - Date(s): 7/18/2023    Patient will demonstrate and report a level of depression that can be managed at a lower level of care.  Absence of persistent depression mood and report of reduced frequency and intensity of low mood and absence of persistent low energy and motivation to acceptable levels, report subjective improved motivation and increased energy for a period of 90 days, within 6 months as clinically observed and by patient self-report.    Intervention(s)  Therapist will provide individual therapy to identify triggers to depression, gain feedback on helpful coping tools and thought-reframing techniques, and identify preferred way of being.  Tx to include discussion of current stressors and interpersonal conflicts and how to cope with these, coaching, diagnostic testing, referral for medication as indicated, use prescribed medication, cognitive restructuring, interpersonal, family therapy, supportive therapy services.        Goal 3: Patiient will  report absence of persistent anxiety mood and report of reduced frequency and intensity of worry and absence of persistent anxious mood to acceptable levels, no panic attacks, report subjective comfort with rumination for a period of 90 days. Within 6 months as clinically observed and by patient self-report    I will know I've met my goal when I can go days without worrying about little things or shaking.      Objective #A (Patient Action)    Status: Continued - Date(s): 7/18/2023    Patient will demonstrate and report a level of anxiety that can be managed at a lower level of care.  Absence of persistent anxious mood and report of reduced frequency and intensity of worry and absence of persistent anxious mood to acceptable levels, no panic attacks, report subjective comfort with rumination for a period of 90 days, within 6 months as clinically observed and by patient self-report.    Intervention(s)  Therapist will provide individual therapy to identify triggers to anxiety, gain feedback on helpful coping tools and thought-reframing techniques, and identify preferred way of being. Tx to include discuss current stressors and interpersonal conflicts and how to cope with these, coaching, diagnostic testing , referral for medication as indicated, use prescribed medication, cognitive restructuring, interpersonal,   family therapy, supportive therapy services.        Patient has reviewed and agreed to the above plan.      Katie Faulkner, [facility]   7/18/2023                                                   Wandy Ann            SAFETY PLAN:  Step 1: Warning signs / cues (Thoughts, images, mood, situation, behavior) that a crisis may be developing:  Thoughts: thoughts of not wanting to be here  Images: no images  Thinking Processes: intrusive thoughts (bothersome, unwanted thoughts that come out of nowhere): get irritated  Mood: intense anger and agitation  Behaviors: isolating/withdrawing   Situations: trauma  "   Step 2: Coping strategies - Things I can do to take my mind off of my problems without contacting another person (relaxation technique, physical activity):  Distress Tolerance Strategies:  arts and crafts: drawing and painting  Physical Activities: exercise: working out  Focus on helpful thoughts:  \"This is temporary\", \"It always passes\" and \"Ride the wave\"  Step 3: People and social settings that provide distraction:                 Name: Jennifer     Phone: [phone]                 Name: Antonina         Phone: [phone]                 Name: mom       Phone: [phone]  friends house or car   Step 4: Remind myself of people and things that are important to me and worth living for:  Best friend and cat        Step 5: When I am in crisis, I can ask these people to help me use my safety plan:                 Name: Jennifer     Phone: [phone]                 Name: Antonina         Phone: [phone]                 Name: panchito       Phone: [phone]  Step 6: Making the environment safe:   be around others  Step 7: Professionals or agencies I can contact during a crisis:  [facility] Daytime Number: [phone]  Suicide Prevention Lifeline: 7-659-842-TALK (8250)  Crisis Text Line Service (available 24 hours a day, 7 days a week): Text [place] to 979395  Local Crisis Services: 988     Call 911 or go to my nearest emergency department.       I helped develop this safety plan and agree to use it when needed.  I have been given a copy of this plan.       Client signature _________________________________________________________________  Today s date:  2/22/2021  Adapted from Safety Plan Template 2008 Marisol Cazares and Mario Flores is reprinted with the express permission of the authors.  No portion of the Safety Plan Template may be reproduced without the express, written permission.  You can contact the authors at bhs@[email or web address] or [email or web address].  "

## 2023-07-27 ENCOUNTER — E-VISIT (OUTPATIENT)
Dept: FAMILY MEDICINE | Facility: CLINIC | Age: 23
End: 2023-07-27
Payer: COMMERCIAL

## 2023-07-27 DIAGNOSIS — J06.9 VIRAL URI WITH COUGH: Primary | ICD-10-CM

## 2023-07-27 PROCEDURE — 99207 PR NON-BILLABLE SERV PER CHARTING: CPT | Performed by: FAMILY MEDICINE

## 2023-07-28 RX ORDER — ACETAMINOPHEN, DEXTROMETHORPHAN HYDROBROMIDE, DIPHENHYDRAMINE HYDROCHLORIDE, GUAIFENESIN, AND PHENYLEPHRINE HYDROCHLORIDE 12.5-5-325
1-2 KIT ORAL 2 TIMES DAILY
Qty: 40 EACH | Refills: 1 | Status: SHIPPED | OUTPATIENT
Start: 2023-07-28 | End: 2023-08-18

## 2023-07-28 RX ORDER — FLUTICASONE PROPIONATE 50 MCG
1 SPRAY, SUSPENSION (ML) NASAL DAILY
Qty: 16 G | Refills: 0 | Status: SHIPPED | OUTPATIENT
Start: 2023-07-28 | End: 2023-08-18

## 2023-07-28 NOTE — PATIENT INSTRUCTIONS
"Dear Wandy Ann    After reviewing your responses, I've been able to diagnose you with \"Bronchitis\" which is a common infection of your lungs that is nearly always caused by a virus. The virus causes swelling and irritation of the air passages of your lungs which leads to cough. The illness spreads from your nose and throat to your windpipe and airways. It is often called a \"chest cold\" and can last up to 2 weeks, but is not a serious illness. Exposure to cigarette smoke usually makes this significantly worse.      To treat bronchitis, the main thing to do is drink lots of fluids and rest. Cough medications over-the-counter such as mucinex, robitussin or \"cold and sinus\" medications can be helpful. Ibuprofen and Tylenol also help with fevers or aching feelings that you often have with this kind of illness. Do not take ibuprofen if you have kidney disease, stomach ulcers or allergy to aspirin.     Bronchitis is most often highly contagious as viruses are spread through the air or touch. Avoid contact with others who may become infected, particularly children, the elderly and those whose immune systems might be weak.     If your symptoms worsen, you develop chest pain or shortness of breath, fevers over 101, or are not improving in 5 days, please contact your primary care provider for an appointment or visit any of our convenient Walk-in Care or Urgent Care Centers to be seen which can be found on our website here.    Thanks again for choosing us as your health care partner,    Hudson Mobley MD  "

## 2023-08-18 ENCOUNTER — OFFICE VISIT (OUTPATIENT)
Dept: FAMILY MEDICINE | Facility: CLINIC | Age: 23
End: 2023-08-18
Payer: COMMERCIAL

## 2023-08-18 VITALS
DIASTOLIC BLOOD PRESSURE: 80 MMHG | SYSTOLIC BLOOD PRESSURE: 118 MMHG | HEART RATE: 72 BPM | BODY MASS INDEX: 40 KG/M2 | TEMPERATURE: 97.8 F | OXYGEN SATURATION: 99 % | WEIGHT: 204.8 LBS

## 2023-08-18 DIAGNOSIS — R09.82 POST-NASAL DRIP: Primary | ICD-10-CM

## 2023-08-18 DIAGNOSIS — R09.89 PHLEGM IN THROAT: ICD-10-CM

## 2023-08-18 DIAGNOSIS — F33.1 MAJOR DEPRESSIVE DISORDER, RECURRENT EPISODE, MODERATE (H): ICD-10-CM

## 2023-08-18 PROCEDURE — 96127 BRIEF EMOTIONAL/BEHAV ASSMT: CPT | Performed by: PHYSICIAN ASSISTANT

## 2023-08-18 PROCEDURE — 99213 OFFICE O/P EST LOW 20 MIN: CPT | Performed by: PHYSICIAN ASSISTANT

## 2023-08-18 RX ORDER — CETIRIZINE HYDROCHLORIDE 10 MG/1
10 TABLET ORAL DAILY
Qty: 90 TABLET | Refills: 0 | Status: SHIPPED | OUTPATIENT
Start: 2023-08-18

## 2023-08-18 RX ORDER — FLUTICASONE PROPIONATE 50 MCG
1 SPRAY, SUSPENSION (ML) NASAL DAILY
Qty: 16 G | Refills: 1 | Status: SHIPPED | OUTPATIENT
Start: 2023-08-18

## 2023-08-18 RX ORDER — LAMOTRIGINE 150 MG/1
1 TABLET ORAL
COMMUNITY
Start: 2023-08-12

## 2023-08-18 ASSESSMENT — PATIENT HEALTH QUESTIONNAIRE - PHQ9
10. IF YOU CHECKED OFF ANY PROBLEMS, HOW DIFFICULT HAVE THESE PROBLEMS MADE IT FOR YOU TO DO YOUR WORK, TAKE CARE OF THINGS AT HOME, OR GET ALONG WITH OTHER PEOPLE: SOMEWHAT DIFFICULT
SUM OF ALL RESPONSES TO PHQ QUESTIONS 1-9: 13
SUM OF ALL RESPONSES TO PHQ QUESTIONS 1-9: 13

## 2023-08-18 NOTE — PROGRESS NOTES
"ph  Assessment & Plan     Post-nasal drip  Phlegm in throat  Suggest trial of flonase with zyrtec. May improve on its own. Could consider prilosec if not improving. No additional questions.   - cetirizine (ZYRTEC) 10 MG tablet; Take 1 tablet (10 mg) by mouth daily  - fluticasone (FLONASE) 50 MCG/ACT nasal spray; Spray 1 spray into both nostrils daily    Major depressive disorder, recurrent episode, moderate (H)  Sees [facility]. PHQ9 is 13. No concern for safety.              Depression Screening Follow Up        8/18/2023    12:47 AM   PHQ   PHQ-9 Total Score 13   Q9: Thoughts of better off dead/self-harm past 2 weeks Several days   F/U: Thoughts of suicide or self-harm Yes   F/U: Self harm-plan No   F/U: Self-harm action No   F/U: Safety concerns No                 Follow Up      Follow Up Actions Taken  Referred patient back to mental health provider    Discussed the following ways the patient can remain in a safe environment:  be around others      Chinyere Ruiz PA-C  [facility] DEREK Saba is a 23 year old, presenting for the following health issues:  nasal drainage        8/18/2023     9:01 AM   Additional Questions   Roomed by mayra SALTER     Acute Illness  Acute illness concerns: nasal/throat drainage  Onset/Duration: mid July  Symptoms:  Fever: No  Chills/Sweats: No  Headache (location?): No  Sinus Pressure: No  Conjunctivitis:  No  Ear Pain: no  Rhinorrhea: No  Congestion: YES  Sore Throat: YES  Cough: YES - occasionally  Wheeze: sometimes when laughing  Decreased Appetite: YES  Nausea: No  Vomiting: No  Diarrhea: No  Dysuria/Freq.: No  Dysuria or Hematuria: No  Fatigue/Achiness: No  Sick/Strep Exposure: No  Therapies tried and outcome: mucinex, nyquil, robitussin, benzonatate, flonase      Cough is improving. Continues to have a lot of mucous in the back of the throat.   Throat isn't sore - \"sides\" of neck are achy.   Clears her throat a lot.     Does have acid " reflux occasionally - takes tums.           4/14/2023     9:13 AM 4/27/2023     9:55 AM 8/18/2023    12:47 AM   PHQ   PHQ-9 Total Score 19 17 13   Q9: Thoughts of better off dead/self-harm past 2 weeks More than half the days Nearly every day Several days   F/U: Thoughts of suicide or self-harm Yes Yes Yes   F/U: Self harm-plan No No No   F/U: Self-harm action No No No   F/U: Safety concerns No No No                   Review of Systems   Constitutional, HEENT, cardiovascular, pulmonary, gi and gu systems are negative, except as otherwise noted.      Objective    /80 (BP Location: Right arm, Patient Position: Sitting, Cuff Size: Adult Regular)   Pulse 72   Temp 97.8  F (36.6  C) (Oral)   Wt 92.9 kg (204 lb 12.8 oz)   LMP  (LMP Unknown)   SpO2 99%   BMI 40.00 kg/m    Body mass index is 40 kg/m .  Physical Exam   GENERAL: healthy, alert and no distress  HENT: ear canals and TM's normal, nose and mouth without ulcers or lesions  NECK: no adenopathy, no asymmetry, masses, or scars and thyroid normal to palpation  RESP: lungs clear to auscultation - no rales, rhonchi or wheezes  CV: regular rate and rhythm, normal S1 S2, no S3 or S4, no murmur, click or rub, no peripheral edema and peripheral pulses strong  MS: no gross musculoskeletal defects noted, no edema                    Answers submitted by the patient for this visit:  Patient Health Questionnaire (Submitted on 8/18/2023)  If you checked off any problems, how difficult have these problems made it for you to do your work, take care of things at home, or get along with other people?: Somewhat difficult  PHQ9 TOTAL SCORE: 13

## 2023-08-24 ENCOUNTER — VIRTUAL VISIT (OUTPATIENT)
Dept: PSYCHOLOGY | Facility: CLINIC | Age: 23
End: 2023-08-24
Payer: COMMERCIAL

## 2023-08-24 DIAGNOSIS — F41.1 GENERALIZED ANXIETY DISORDER: ICD-10-CM

## 2023-08-24 DIAGNOSIS — F43.10 PTSD (POST-TRAUMATIC STRESS DISORDER): ICD-10-CM

## 2023-08-24 DIAGNOSIS — F31.32 BIPOLAR AFFECTIVE DISORDER, CURRENTLY DEPRESSED, MODERATE (H): Primary | ICD-10-CM

## 2023-08-24 PROCEDURE — 90834 PSYTX W PT 45 MINUTES: CPT | Mod: VID | Performed by: SOCIAL WORKER

## 2023-08-24 NOTE — PROGRESS NOTES
[facility]                                      Progress Note    Patient Name: Wandy Ann  Date: 2023         Service Type: Individual      Session Start Time: 1203    Session End Time: 1246     Session Length: 38-52    Session #: 60    Attendees: Client    Service Modality:  Video Visit:      Provider verified identity through the following two step process.  Patient provided: Patient  and Patient previous known to provider     Telemedicine Visit: The patient's condition can be safely assessed and treated via synchronous audio and visual telemedicine encounter.       Reason for Telemedicine Visit: Patient has requested telehealth visit     Originating Site (Patient Location): Patient's car     Distant Site (Provider Location): Provider Remote Setting- Home Office     Consent:  The patient/guardian has verbally consented to: the potential risks and benefits of telemedicine (video visit) versus in person care; bill my insurance or make self-payment for services provided; and responsibility for payment of non-covered services.      Patient would like the video invitation sent by: email/text     Mode of Communication: Video Conference via [employer]well     Distant Location (Provider): Off-site     As the provider I attest to compliance with applicable laws and regulations related to telemedicine.        DATA  Interactive Complexity: No  Crisis: No        Progress Since Last Session (Related to Symptoms / Goals / Homework):   Symptoms: Worsening increase in depression and anxiety      Homework: Achieved / completed to satisfaction      Episode of Care Goals: Satisfactory progress - ACTION (Actively working towards change); Intervened by reinforcing change plan / affirming steps taken     Current / Ongoing Stressors and Concerns:   past trauma, developmental hx and current stressors      Treatment Objective(s) Addressed in This Session:   Patient will demonstrate control of impulses  Spoke with pt, rescheduled appt as requested    and ability to use positive coping tools to manage strong emotions that can be safely addressed at a lower level of care. Absence of persistent SI and report of reduced frequency and intensity of SI and absence of SI to acceptable levels, report subjective improved mood for a period of 90 days, within 6 months as clinically observed and by patient self-report.  Patient will demonstrate and report a level of depression that can be managed at a lower level of care.  Absence of persistent depression mood and report of reduced frequency and intensity of low mood and absence of persistent low energy and motivation to acceptable levels, report subjective improved motivation and increased energy for a period of 90 days, within 6 months as clinically observed and by patient self-report.  Patient will demonstrate and report a level of anxiety that can be managed at a lower level of care.  Absence of persistent anxious mood and report of reduced frequency and intensity of worry and absence of persistent anxious mood to acceptable levels, no panic attacks, report subjective comfort with rumination for a period of 90 days, within 6 months as clinically observed and by patient self-report.       Intervention:   Therapist met with patient to review goals and interventions. Therapist utilized reflected listening as patient gave brief reflection of week. Patient reported an increase in depression and anxiety. Therapist supported patient as she processed and coached patient in reframing.Patient reported SI creeping back in but no plan and contracted for safety and her disappointment in that. Therapist offered patient hope and utilized strength based modality along with solution focused.   Patient presented with an anxious affect. Patient was engaged in session and open to feedback. Patient contracted for safety      There has been demonstrated improvement in functioning while patient has been engaged in psychotherapy/psychological  service- if withdrawn the patient would deteriorate and/or relapse.     Assessments completed prior to visit:  The following assessments were completed by patient for this visit:  PHQ9:       10/31/2022     8:07 AM 12/2/2022    12:00 PM 2/1/2023    10:08 AM 2/2/2023    11:53 AM 4/14/2023     9:13 AM 4/27/2023     9:55 AM 8/18/2023    12:47 AM   PHQ-9 SCORE   PHQ-9 Total Score MyChart 3 (Minimal depression)  16 (Moderately severe depression) 20 (Severe depression) 19 (Moderately severe depression) 17 (Moderately severe depression) 13 (Moderate depression)   PHQ-9 Total Score 3 14 16 20 19 17 13     GAD7:       6/6/2022     3:00 PM 6/21/2022     8:29 AM 8/24/2022     2:00 PM 10/31/2022     8:08 AM 11/29/2022     2:34 PM 2/2/2023    11:53 AM 4/20/2023    12:41 PM   YESSI-7 SCORE   Total Score  9 (mild anxiety)  2 (minimal anxiety) 11 (moderate anxiety) 16 (severe anxiety) 13 (moderate anxiety)   Total Score 10 9 13 2 11 16 13           ASSESSMENT: Current Emotional / Mental Status (status of significant symptoms):   Risk status (Self / Other harm or suicidal ideation)   Patient denies current fears or concerns for personal safety.   Patient reports the following current or recent suicidal ideation or behaviors: contracted for safety.   Patient denies current or recent homicidal ideation or behaviors.   Patient denies current or recent self injurious behavior or ideation.   Patient denies other safety concerns.   Patient reports there has been no change in risk factors since their last session.     Patient reports there has been no change in protective factors since their last session.     A safety and risk management plan has been developed including: Patient consented to co-developed safety plan on 2.21.2022.  Safety and risk management plan was reviewed.   Patient agreed to use safety plan should any safety concerns arise.  A copy was made available to the patient.     Appearance:   Appropriate     Eye Contact:   Fair   "   Psychomotor Behavior: Restless     Attitude:   Cooperative    Orientation:   All   Speech    Rate / Production: Emotional Talkative    Volume:  Normal    Mood:    Anxious  Depressed    Affect:    Worrisome    Thought Content:  Clear    Thought Form:  Coherent    Insight:    Fair      Medication Review:   No changes to current psychiatric medication(s)     Medication Compliance:   Yes     Changes in Health Issues:   None reported     Chemical Use Review:   Substance Use: Chemical use reviewed, no active concerns identified      Tobacco Use: No current tobacco use.      Diagnosis:  1. Bipolar affective disorder, currently depressed, moderate (H)    2. PTSD (post-traumatic stress disorder)    3. Generalized anxiety disorder    R/O ADHD    Collateral Reports Completed:   Not Applicable    PLAN: (Patient Tasks / Therapist Tasks / Other)  Make visual safety plan   Manage depressive symptoms with coping skills  When feeling SI follow safety plan  Continue to manage SI and go to ER if feeling unsafe  [facility] has a new mental health emergency area at [facility] called [facility] that is very calming and helpful if you need additional support. ([address] [place], [place] [address] [phone]).     Name and challenge cognitive distortions  read \"stop walking on eggshells.    Call [phone] if feeling SI has increase or go to ED or [facility]   Utilize coping skills when feeling impulsive.   Replace distortions with positive attributes    Give reece to self  Continue to track moods   Therapist provided psycho education to patient and ACT therapy with looking at goals and thoughts that take her away from her goals.   Start forgiveness timeline  ADHD assessment 9/13   Therapist supported patient as she processed and coached patient in reframing.Patient reported SI creeping back in but no plan and contracted for safety and her disappointment in that. Therapist offered patient hope and utilized strength based " modality along with solution focused.       Katie Faulkner, LICSW 8/24/2023                                                         ______________________________________________________________________    Individual Treatment Plan    Patient's Name: Wandy Ann  YOB: 2000    Date of Creation: 2.22.2021  Date Treatment Plan Last Reviewed/Revised: 7/18/2023 due 10/18/2023    DSM5 Diagnoses: 296.52 Bipolar I Disorder Current or Most Recent Episode Depressed, Moderate, 300.02 (F41.1) Generalized Anxiety Disorder or 309.81 (F43.10) Posttraumatic Stress Disorder (includes Posttraumatic Stress Disorder for Children 6 Years and Younger)  Without dissociative symptoms  Psychosocial / Contextual Factors: past trauma, developmental hx and current stressors   PROMIS (reviewed every 90 days): 12/2/2022    Referral / Collaboration:  Referral to another professional/service is not indicated at this time..    Anticipated number of session for this episode of care: 12  Anticipation frequency of session: Biweekly  Anticipated Duration of each session: 38-52 minutes  Treatment plan will be reviewed in 90 days or when goals have been changed.       MeasurableTreatment Goal(s) related to diagnosis / functional impairment(s)  Goal 1: Patient will report absence of persistent SI and report of reduced frequency and intensity of SI and absence of SI to acceptable levels, report subjective improved mood for a period of 90 days, within 6 months as clinically observed and by patient self-report    I will know I've met my goal when I can see the difference between a bad moment and what I want in th future.      Objective #A (Patient Action)    Patient will demonstrate control of impulses and ability to use positive coping tools to manage strong emotions that can be safely addressed at a lower level of care. Absence of persistent SI and report of reduced frequency and intensity of SI and absence of SI to acceptable  levels, report subjective improved mood for a period of 90 days, within 6 months as clinically observed and by patient self-report.  Status: Continued - Date(s): 7/18/2023    Intervention(s)  Therapist will provide individual therapy to identify triggers to SI urges, gain feedback on helpful coping strategies, and identify ways that family can offer support. Tx to discuss current stressors and interpersonal conflicts and how to cope with these, coaching, diagnostic testing, referral for medication as indicated, use prescribed medication, cognitive restructuring, interpersonal family therapy, supportive therapy services.        Goal 2: Patient will report absence of persistent depression mood and report of reduced frequency and intensity of low mood and absence of persistent low energy and motivation to acceptable levels, report subjective improved motivation and increased energy for a period of 90 days, within 6 months as clinically observed and by patient self-report    I will know I've met my goal when I no longer feel sad.      Objective #A (Patient Action)    Status: Continued - Date(s): 7/18/2023    Patient will demonstrate and report a level of depression that can be managed at a lower level of care.  Absence of persistent depression mood and report of reduced frequency and intensity of low mood and absence of persistent low energy and motivation to acceptable levels, report subjective improved motivation and increased energy for a period of 90 days, within 6 months as clinically observed and by patient self-report.    Intervention(s)  Therapist will provide individual therapy to identify triggers to depression, gain feedback on helpful coping tools and thought-reframing techniques, and identify preferred way of being.  Tx to include discussion of current stressors and interpersonal conflicts and how to cope with these, coaching, diagnostic testing, referral for medication as indicated, use prescribed  medication, cognitive restructuring, interpersonal, family therapy, supportive therapy services.        Goal 3: Patiient will report absence of persistent anxiety mood and report of reduced frequency and intensity of worry and absence of persistent anxious mood to acceptable levels, no panic attacks, report subjective comfort with rumination for a period of 90 days. Within 6 months as clinically observed and by patient self-report    I will know I've met my goal when I can go days without worrying about little things or shaking.      Objective #A (Patient Action)    Status: Continued - Date(s): 7/18/2023    Patient will demonstrate and report a level of anxiety that can be managed at a lower level of care.  Absence of persistent anxious mood and report of reduced frequency and intensity of worry and absence of persistent anxious mood to acceptable levels, no panic attacks, report subjective comfort with rumination for a period of 90 days, within 6 months as clinically observed and by patient self-report.    Intervention(s)  Therapist will provide individual therapy to identify triggers to anxiety, gain feedback on helpful coping tools and thought-reframing techniques, and identify preferred way of being. Tx to include discuss current stressors and interpersonal conflicts and how to cope with these, coaching, diagnostic testing , referral for medication as indicated, use prescribed medication, cognitive restructuring, interpersonal,   family therapy, supportive therapy services.        Patient has reviewed and agreed to the above plan.      Katie Faulkner, [facility]   7/18/2023                                                   Wandy Ann            SAFETY PLAN:  Step 1: Warning signs / cues (Thoughts, images, mood, situation, behavior) that a crisis may be developing:  Thoughts: thoughts of not wanting to be here  Images: no images  Thinking Processes: intrusive thoughts (bothersome, unwanted thoughts that  "come out of nowhere): get irritated  Mood: intense anger and agitation  Behaviors: isolating/withdrawing   Situations: trauma    Step 2: Coping strategies - Things I can do to take my mind off of my problems without contacting another person (relaxation technique, physical activity):  Distress Tolerance Strategies:  arts and crafts: drawing and painting  Physical Activities: exercise: working out  Focus on helpful thoughts:  \"This is temporary\", \"It always passes\" and \"Ride the wave\"  Step 3: People and social settings that provide distraction:                 Name: Jennifer     Phone: [phone]                 Name: Antonina         Phone: [phone]                 Name: panchito       Phone: [phone]  friends house or car   Step 4: Remind myself of people and things that are important to me and worth living for:  Best friend and cat        Step 5: When I am in crisis, I can ask these people to help me use my safety plan:                 Name: Jennifer     Phone: [phone]                 Name: Antonina         Phone: [phone]                 Name: panchito       Phone: [phone]  Step 6: Making the environment safe:   be around others  Step 7: Professionals or agencies I can contact during a crisis:  [facility] Daytime Number: [phone]  Suicide Prevention Lifeline: 6-064-512-HZZQ (1896)  Crisis Text Line Service (available 24 hours a day, 7 days a week): Text [place] to 983391  Local Crisis Services: 988     Call 911 or go to my nearest emergency department.       I helped develop this safety plan and agree to use it when needed.  I have been given a copy of this plan.       Client signature _________________________________________________________________  Today s date:  2/22/2021  Adapted from Safety Plan Template 2008 Marisol Cazares and Mario Flores is reprinted with the express permission of the authors.  No portion of the Safety Plan Template may be reproduced without the express, written " permission.  You can contact the authors at bhs@[email or web address] or [email or web address].

## 2023-09-05 ENCOUNTER — VIRTUAL VISIT (OUTPATIENT)
Dept: PSYCHOLOGY | Facility: CLINIC | Age: 23
End: 2023-09-05
Payer: COMMERCIAL

## 2023-09-05 DIAGNOSIS — F31.32 BIPOLAR AFFECTIVE DISORDER, CURRENTLY DEPRESSED, MODERATE (H): Primary | ICD-10-CM

## 2023-09-05 DIAGNOSIS — F43.10 PTSD (POST-TRAUMATIC STRESS DISORDER): ICD-10-CM

## 2023-09-05 DIAGNOSIS — F41.1 GENERALIZED ANXIETY DISORDER: ICD-10-CM

## 2023-09-05 PROCEDURE — 90834 PSYTX W PT 45 MINUTES: CPT | Mod: VID | Performed by: SOCIAL WORKER

## 2023-09-05 NOTE — PROGRESS NOTES
[facility]                                      Progress Note    Patient Name: Wandy Ann  Date: 2023         Service Type: Individual      Session Start Time: 140   Session End Time: 144     Session Length: 38-52    Session #: 61    Attendees: Client    Service Modality:  Video Visit:      Provider verified identity through the following two step process.  Patient provided: Patient  and Patient previous known to provider     Telemedicine Visit: The patient's condition can be safely assessed and treated via synchronous audio and visual telemedicine encounter.       Reason for Telemedicine Visit: Patient has requested telehealth visit     Originating Site (Patient Location): Patient's car     Distant Site (Provider Location): Provider Remote Setting- Home Office     Consent:  The patient/guardian has verbally consented to: the potential risks and benefits of telemedicine (video visit) versus in person care; bill my insurance or make self-payment for services provided; and responsibility for payment of non-covered services.      Patient would like the video invitation sent by: email/text     Mode of Communication: Video Conference via [employer]     Distant Location (Provider): Off-site     As the provider I attest to compliance with applicable laws and regulations related to telemedicine.        DATA  Interactive Complexity: No  Crisis: No        Progress Since Last Session (Related to Symptoms / Goals / Homework):   Symptoms: Worsening  depression and anxiety      Homework: Achieved / completed to satisfaction      Episode of Care Goals: Satisfactory progress - ACTION (Actively working towards change); Intervened by reinforcing change plan / affirming steps taken     Current / Ongoing Stressors and Concerns:   past trauma, developmental hx and current stressors      Treatment Objective(s) Addressed in This Session:   Patient will demonstrate control of impulses and ability to  "use positive coping tools to manage strong emotions that can be safely addressed at a lower level of care. Absence of persistent SI and report of reduced frequency and intensity of SI and absence of SI to acceptable levels, report subjective improved mood for a period of 90 days, within 6 months as clinically observed and by patient self-report.  Patient will demonstrate and report a level of depression that can be managed at a lower level of care.  Absence of persistent depression mood and report of reduced frequency and intensity of low mood and absence of persistent low energy and motivation to acceptable levels, report subjective improved motivation and increased energy for a period of 90 days, within 6 months as clinically observed and by patient self-report.  Patient will demonstrate and report a level of anxiety that can be managed at a lower level of care.  Absence of persistent anxious mood and report of reduced frequency and intensity of worry and absence of persistent anxious mood to acceptable levels, no panic attacks, report subjective comfort with rumination for a period of 90 days, within 6 months as clinically observed and by patient self-report.       Intervention:   Therapist met with patient to review goals and interventions. Therapist utilized reflected listening as patient gave brief reflection of week. Patient reported having a difficult week and processed work and her happiness. Therapist supported patient as she processed and challenged patient's continued cognitive distortions. Therapist utilized CBT along with ACT with patient. Patient is to watch \"I am and Happy\" documentary and make list of what makes her happy.   Patient presented with an anxious affect. Patient was engaged in session and open to feedback. Patient contracted for safety      There has been demonstrated improvement in functioning while patient has been engaged in psychotherapy/psychological service- if withdrawn the " patient would deteriorate and/or relapse.     Assessments completed prior to visit:  The following assessments were completed by patient for this visit:  PHQ9:       10/31/2022     8:07 AM 12/2/2022    12:00 PM 2/1/2023    10:08 AM 2/2/2023    11:53 AM 4/14/2023     9:13 AM 4/27/2023     9:55 AM 8/18/2023    12:47 AM   PHQ-9 SCORE   PHQ-9 Total Score MyChart 3 (Minimal depression)  16 (Moderately severe depression) 20 (Severe depression) 19 (Moderately severe depression) 17 (Moderately severe depression) 13 (Moderate depression)   PHQ-9 Total Score 3 14 16 20 19 17 13     GAD7:       6/6/2022     3:00 PM 6/21/2022     8:29 AM 8/24/2022     2:00 PM 10/31/2022     8:08 AM 11/29/2022     2:34 PM 2/2/2023    11:53 AM 4/20/2023    12:41 PM   YESSI-7 SCORE   Total Score  9 (mild anxiety)  2 (minimal anxiety) 11 (moderate anxiety) 16 (severe anxiety) 13 (moderate anxiety)   Total Score 10 9 13 2 11 16 13           ASSESSMENT: Current Emotional / Mental Status (status of significant symptoms):   Risk status (Self / Other harm or suicidal ideation)   Patient denies current fears or concerns for personal safety.   Patient reports the following current or recent suicidal ideation or behaviors: contracted for safety.   Patient denies current or recent homicidal ideation or behaviors.   Patient denies current or recent self injurious behavior or ideation.   Patient denies other safety concerns.   Patient reports there has been no change in risk factors since their last session.     Patient reports there has been no change in protective factors since their last session.     A safety and risk management plan has been developed including: Patient consented to co-developed safety plan on 2.21.2022.  Safety and risk management plan was reviewed.   Patient agreed to use safety plan should any safety concerns arise.  A copy was made available to the patient.     Appearance:   Appropriate     Eye Contact:   Fair     Psychomotor  "Behavior: Restless     Attitude:   Cooperative    Orientation:   All   Speech    Rate / Production: Emotional Talkative    Volume:  Normal    Mood:    Anxious  Depressed    Affect:    Worrisome    Thought Content:  Clear    Thought Form:  Coherent    Insight:    Fair      Medication Review:   No changes to current psychiatric medication(s)     Medication Compliance:   Yes     Changes in Health Issues:   None reported     Chemical Use Review:   Substance Use: Chemical use reviewed, no active concerns identified      Tobacco Use: No current tobacco use.      Diagnosis:  1. Bipolar affective disorder, currently depressed, moderate (H)    2. PTSD (post-traumatic stress disorder)    3. Generalized anxiety disorder    R/O ADHD    Collateral Reports Completed:   Not Applicable    PLAN: (Patient Tasks / Therapist Tasks / Other)  Make visual safety plan   Manage depressive symptoms with coping skills  When feeling SI follow safety plan  Continue to manage SI and go to ER if feeling unsafe  [facility] has a new mental health emergency area at [facility] called [facility] that is very calming and helpful if you need additional support. ([address] [place], [place] [address] [phone]).     Name and challenge cognitive distortions  read \"stop walking on eggshells.    Call 988 if feeling SI has increase or go to ED or empath   Utilize coping skills when feeling impulsive.   Replace distortions with positive attributes    Give reece to self  Continue to track moods   Therapist provided psycho education to patient and ACT therapy with looking at goals and thoughts that take her away from her goals.   Start forgiveness timeline  ADHD assessment 9/13  Patient is to watch \"I am and Happy\" documentary and make list of what makes her happy.       Katie Faulkner, [facility]  9/5/2023                                                     "     ______________________________________________________________________    Individual Treatment Plan    Patient's Name: Wandy Ann  YOB: 2000    Date of Creation: 2.22.2021  Date Treatment Plan Last Reviewed/Revised: 7/18/2023 due 10/18/2023    DSM5 Diagnoses: 296.52 Bipolar I Disorder Current or Most Recent Episode Depressed, Moderate, 300.02 (F41.1) Generalized Anxiety Disorder or 309.81 (F43.10) Posttraumatic Stress Disorder (includes Posttraumatic Stress Disorder for Children 6 Years and Younger)  Without dissociative symptoms  Psychosocial / Contextual Factors: past trauma, developmental hx and current stressors   PROMIS (reviewed every 90 days): 12/2/2022    Referral / Collaboration:  Referral to another professional/service is not indicated at this time..    Anticipated number of session for this episode of care: 12  Anticipation frequency of session: Biweekly  Anticipated Duration of each session: 38-52 minutes  Treatment plan will be reviewed in 90 days or when goals have been changed.       MeasurableTreatment Goal(s) related to diagnosis / functional impairment(s)  Goal 1: Patient will report absence of persistent SI and report of reduced frequency and intensity of SI and absence of SI to acceptable levels, report subjective improved mood for a period of 90 days, within 6 months as clinically observed and by patient self-report    I will know I've met my goal when I can see the difference between a bad moment and what I want in th future.      Objective #A (Patient Action)    Patient will demonstrate control of impulses and ability to use positive coping tools to manage strong emotions that can be safely addressed at a lower level of care. Absence of persistent SI and report of reduced frequency and intensity of SI and absence of SI to acceptable levels, report subjective improved mood for a period of 90 days, within 6 months as clinically observed and by patient  self-report.  Status: Continued - Date(s): 7/18/2023    Intervention(s)  Therapist will provide individual therapy to identify triggers to SI urges, gain feedback on helpful coping strategies, and identify ways that family can offer support. Tx to discuss current stressors and interpersonal conflicts and how to cope with these, coaching, diagnostic testing, referral for medication as indicated, use prescribed medication, cognitive restructuring, interpersonal family therapy, supportive therapy services.        Goal 2: Patient will report absence of persistent depression mood and report of reduced frequency and intensity of low mood and absence of persistent low energy and motivation to acceptable levels, report subjective improved motivation and increased energy for a period of 90 days, within 6 months as clinically observed and by patient self-report    I will know I've met my goal when I no longer feel sad.      Objective #A (Patient Action)    Status: Continued - Date(s): 7/18/2023    Patient will demonstrate and report a level of depression that can be managed at a lower level of care.  Absence of persistent depression mood and report of reduced frequency and intensity of low mood and absence of persistent low energy and motivation to acceptable levels, report subjective improved motivation and increased energy for a period of 90 days, within 6 months as clinically observed and by patient self-report.    Intervention(s)  Therapist will provide individual therapy to identify triggers to depression, gain feedback on helpful coping tools and thought-reframing techniques, and identify preferred way of being.  Tx to include discussion of current stressors and interpersonal conflicts and how to cope with these, coaching, diagnostic testing, referral for medication as indicated, use prescribed medication, cognitive restructuring, interpersonal, family therapy, supportive therapy services.        Goal 3: Patiient will  report absence of persistent anxiety mood and report of reduced frequency and intensity of worry and absence of persistent anxious mood to acceptable levels, no panic attacks, report subjective comfort with rumination for a period of 90 days. Within 6 months as clinically observed and by patient self-report    I will know I've met my goal when I can go days without worrying about little things or shaking.      Objective #A (Patient Action)    Status: Continued - Date(s): 7/18/2023    Patient will demonstrate and report a level of anxiety that can be managed at a lower level of care.  Absence of persistent anxious mood and report of reduced frequency and intensity of worry and absence of persistent anxious mood to acceptable levels, no panic attacks, report subjective comfort with rumination for a period of 90 days, within 6 months as clinically observed and by patient self-report.    Intervention(s)  Therapist will provide individual therapy to identify triggers to anxiety, gain feedback on helpful coping tools and thought-reframing techniques, and identify preferred way of being. Tx to include discuss current stressors and interpersonal conflicts and how to cope with these, coaching, diagnostic testing , referral for medication as indicated, use prescribed medication, cognitive restructuring, interpersonal,   family therapy, supportive therapy services.        Patient has reviewed and agreed to the above plan.      Katie Faulkner, [facility]   7/18/2023                                                   aWndy Ann            SAFETY PLAN:  Step 1: Warning signs / cues (Thoughts, images, mood, situation, behavior) that a crisis may be developing:  Thoughts: thoughts of not wanting to be here  Images: no images  Thinking Processes: intrusive thoughts (bothersome, unwanted thoughts that come out of nowhere): get irritated  Mood: intense anger and agitation  Behaviors: isolating/withdrawing   Situations: trauma  "   Step 2: Coping strategies - Things I can do to take my mind off of my problems without contacting another person (relaxation technique, physical activity):  Distress Tolerance Strategies:  arts and crafts: drawing and painting  Physical Activities: exercise: working out  Focus on helpful thoughts:  \"This is temporary\", \"It always passes\" and \"Ride the wave\"  Step 3: People and social settings that provide distraction:                 Name: Jennifer     Phone: [phone]                 Name: Antonina         Phone: [phone]                 Name: mom       Phone: [phone]  friends house or car   Step 4: Remind myself of people and things that are important to me and worth living for:  Best friend and cat        Step 5: When I am in crisis, I can ask these people to help me use my safety plan:                 Name: Jennifer     Phone: [phone]                 Name: Antonina         Phone: [phone]                 Name: panchito       Phone: [phone]  Step 6: Making the environment safe:   be around others  Step 7: Professionals or agencies I can contact during a crisis:  [facility] Daytime Number: [phone]  Suicide Prevention Lifeline: 1-116-769-TALK (8203)  Crisis Text Line Service (available 24 hours a day, 7 days a week): Text [place] to 670380  Local Crisis Services: 988     Call 911 or go to my nearest emergency department.       I helped develop this safety plan and agree to use it when needed.  I have been given a copy of this plan.       Client signature _________________________________________________________________  Today s date:  2/22/2021  Adapted from Safety Plan Template 2008 Marisol Cazares and Mario Flores is reprinted with the express permission of the authors.  No portion of the Safety Plan Template may be reproduced without the express, written permission.  You can contact the authors at bhs@[place].[facility] or [email or web address].  "

## 2023-09-13 ENCOUNTER — TRANSFERRED RECORDS (OUTPATIENT)
Dept: HEALTH INFORMATION MANAGEMENT | Facility: CLINIC | Age: 23
End: 2023-09-13
Payer: COMMERCIAL

## 2023-09-21 ENCOUNTER — E-VISIT (OUTPATIENT)
Dept: FAMILY MEDICINE | Facility: CLINIC | Age: 23
End: 2023-09-21
Payer: COMMERCIAL

## 2023-09-21 DIAGNOSIS — R30.0 DYSURIA: Primary | ICD-10-CM

## 2023-09-21 DIAGNOSIS — N89.8 VAGINAL ITCHING: ICD-10-CM

## 2023-09-21 PROCEDURE — 99421 OL DIG E/M SVC 5-10 MIN: CPT | Performed by: PHYSICIAN ASSISTANT

## 2023-09-21 RX ORDER — SULFAMETHOXAZOLE/TRIMETHOPRIM 800-160 MG
1 TABLET ORAL 2 TIMES DAILY
Qty: 6 TABLET | Refills: 0 | Status: SHIPPED | OUTPATIENT
Start: 2023-09-21 | End: 2023-09-24

## 2023-09-21 RX ORDER — FLUCONAZOLE 150 MG/1
150 TABLET ORAL ONCE
Qty: 1 TABLET | Refills: 0 | Status: SHIPPED | OUTPATIENT
Start: 2023-09-21 | End: 2023-09-21

## 2023-09-21 NOTE — PATIENT INSTRUCTIONS
Dear Wandy Ann    After reviewing your responses, I've been able to diagnose you with a urinary tract infection, which is a common infection of the bladder with bacteria.  This is not a sexually transmitted infection, though urinating immediately after intercourse can help prevent infections.  Drinking lots of fluids is also helpful to clear your current infection and prevent the next one.      I have sent a prescription for antibiotics to your pharmacy to treat this infection.    It is important that you take all of your prescribed medication even if your symptoms are improving after a few doses.  Taking all of your medicine helps prevent the symptoms from returning.     If your symptoms worsen, you develop pain in your back or stomach, develop fevers, or are not improving in 5 days, please contact your primary care provider for an appointment or visit any of our convenient Walk-in or Urgent Care Centers to be seen, which can be found on our website here.    Thanks again for choosing us as your health care partner,    Chinyere Ruiz PA-C

## 2023-09-26 ENCOUNTER — TELEPHONE (OUTPATIENT)
Dept: FAMILY MEDICINE | Facility: CLINIC | Age: 23
End: 2023-09-26
Payer: COMMERCIAL

## 2023-09-26 ENCOUNTER — VIRTUAL VISIT (OUTPATIENT)
Dept: PSYCHOLOGY | Facility: CLINIC | Age: 23
End: 2023-09-26
Payer: COMMERCIAL

## 2023-09-26 DIAGNOSIS — F41.1 GENERALIZED ANXIETY DISORDER: ICD-10-CM

## 2023-09-26 DIAGNOSIS — F43.10 PTSD (POST-TRAUMATIC STRESS DISORDER): ICD-10-CM

## 2023-09-26 DIAGNOSIS — F31.32 BIPOLAR AFFECTIVE DISORDER, CURRENTLY DEPRESSED, MODERATE (H): Primary | ICD-10-CM

## 2023-09-26 DIAGNOSIS — F90.2 ADHD (ATTENTION DEFICIT HYPERACTIVITY DISORDER), COMBINED TYPE: ICD-10-CM

## 2023-09-26 PROCEDURE — 90834 PSYTX W PT 45 MINUTES: CPT | Mod: VID | Performed by: SOCIAL WORKER

## 2023-09-26 NOTE — TELEPHONE ENCOUNTER
Medication Question or Refill    Contacts         Type Contact Phone/Fax    09/26/2023 01:03 PM CDT Phone (Incoming) Wandy Ortiz (Self) [phone] (H)            What medication are you calling about (include dose and sig)?: hydrOXYzine (ATARAX) 10 MG tablet      Preferred Pharmacy:   [id number] - [place], [place] - [address] [place] [address] AT [facility] [address] [place] [address] [address]  [place] [address]  Phone: [phone] Fax: [phone]    [facility] [address] [facility] - [place], [place] - [address] [facility]  [address] [facility] [address]  Phone: [phone] Fax: [phone]    [id number] - [place], [place] - [address] AT [facility] [address]  [address]  [facility] [address]  Phone: [phone] Fax: [phone]      Controlled Substance Agreement on file:   CSA -- Patient Level:    CSA: None found at the patient level.       Who prescribed the medication?: PCP    Do you need a refill? Yes    When did you use the medication last? unk    Patient offered an appointment? No    Do you have any questions or concerns?  No      Could we send this information to you in [facility] or would you prefer to receive a phone call?:   Patient would prefer a phone call   Okay to leave a detailed message?: Yes at Home number on file [phone] (home)

## 2023-09-26 NOTE — PROGRESS NOTES
[facility]                                      Progress Note    Patient Name: Wandy Ann  Date: 2023         Service Type: Individual      Session Start Time: 1203  Session End Time: 1245     Session Length: 38-52    Session #: 62    Attendees: Client    Service Modality:  Video Visit:      Provider verified identity through the following two step process.  Patient provided: Patient  and Patient previous known to provider     Telemedicine Visit: The patient's condition can be safely assessed and treated via synchronous audio and visual telemedicine encounter.       Reason for Telemedicine Visit: Patient has requested telehealth visit     Originating Site (Patient Location): Patient's car     Distant Site (Provider Location): Provider Remote Setting- Home Office     Consent:  The patient/guardian has verbally consented to: the potential risks and benefits of telemedicine (video visit) versus in person care; bill my insurance or make self-payment for services provided; and responsibility for payment of non-covered services.      Patient would like the video invitation sent by: email/text     Mode of Communication: Video Conference via [employer]well     Distant Location (Provider): Off-site     As the provider I attest to compliance with applicable laws and regulations related to telemedicine.        DATA  Interactive Complexity: No  Crisis: No        Progress Since Last Session (Related to Symptoms / Goals / Homework):   Symptoms: Improving per patient      Homework: Achieved / completed to satisfaction      Episode of Care Goals: Satisfactory progress - ACTION (Actively working towards change); Intervened by reinforcing change plan / affirming steps taken     Current / Ongoing Stressors and Concerns:   past trauma, developmental hx and current stressors      Treatment Objective(s) Addressed in This Session:   Patient will demonstrate control of impulses and ability to use positive  coping tools to manage strong emotions that can be safely addressed at a lower level of care. Absence of persistent SI and report of reduced frequency and intensity of SI and absence of SI to acceptable levels, report subjective improved mood for a period of 90 days, within 6 months as clinically observed and by patient self-report.  Patient will demonstrate and report a level of depression that can be managed at a lower level of care.  Absence of persistent depression mood and report of reduced frequency and intensity of low mood and absence of persistent low energy and motivation to acceptable levels, report subjective improved motivation and increased energy for a period of 90 days, within 6 months as clinically observed and by patient self-report.  Patient will demonstrate and report a level of anxiety that can be managed at a lower level of care.  Absence of persistent anxious mood and report of reduced frequency and intensity of worry and absence of persistent anxious mood to acceptable levels, no panic attacks, report subjective comfort with rumination for a period of 90 days, within 6 months as clinically observed and by patient self-report.       Intervention:   Therapist met with patient to review goals and interventions. Therapist utilized reflected listening as patient gave brief reflection of week. Patient reported finishing her ADHD evaluation and processed external conflicts. Therapist supported patient as she processed and validated patient while challenging patient's reaction. Therapist coached patient in perception vs reaction.   Patient presented with an anxious affect. Patient was engaged in session and open to feedback. Patient contracted for safety      There has been demonstrated improvement in functioning while patient has been engaged in psychotherapy/psychological service- if withdrawn the patient would deteriorate and/or relapse.     Assessments completed prior to visit:  The following  assessments were completed by patient for this visit:  PHQ9:       10/31/2022     8:07 AM 12/2/2022    12:00 PM 2/1/2023    10:08 AM 2/2/2023    11:53 AM 4/14/2023     9:13 AM 4/27/2023     9:55 AM 8/18/2023    12:47 AM   PHQ-9 SCORE   PHQ-9 Total Score MyChart 3 (Minimal depression)  16 (Moderately severe depression) 20 (Severe depression) 19 (Moderately severe depression) 17 (Moderately severe depression) 13 (Moderate depression)   PHQ-9 Total Score 3 14 16 20 19 17 13     GAD7:       6/6/2022     3:00 PM 6/21/2022     8:29 AM 8/24/2022     2:00 PM 10/31/2022     8:08 AM 11/29/2022     2:34 PM 2/2/2023    11:53 AM 4/20/2023    12:41 PM   YESSI-7 SCORE   Total Score  9 (mild anxiety)  2 (minimal anxiety) 11 (moderate anxiety) 16 (severe anxiety) 13 (moderate anxiety)   Total Score 10 9 13 2 11 16 13           ASSESSMENT: Current Emotional / Mental Status (status of significant symptoms):   Risk status (Self / Other harm or suicidal ideation)   Patient denies current fears or concerns for personal safety.   Patient reports the following current or recent suicidal ideation or behaviors: contracted for safety.   Patient denies current or recent homicidal ideation or behaviors.   Patient denies current or recent self injurious behavior or ideation.   Patient denies other safety concerns.   Patient reports there has been no change in risk factors since their last session.     Patient reports there has been no change in protective factors since their last session.     A safety and risk management plan has been developed including: Patient consented to co-developed safety plan on 2.21.2022.  Safety and risk management plan was reviewed.   Patient agreed to use safety plan should any safety concerns arise.  A copy was made available to the patient.     Appearance:   Appropriate     Eye Contact:   Fair     Psychomotor Behavior: Restless     Attitude:   Cooperative    Orientation:   All   Speech    Rate / Production: Emotional  "Talkative    Volume:  Normal    Mood:    Anxious  Depressed    Affect:    Worrisome    Thought Content:  Clear    Thought Form:  Coherent    Insight:    Fair      Medication Review:   No changes to current psychiatric medication(s)     Medication Compliance:   Yes     Changes in Health Issues:   None reported     Chemical Use Review:   Substance Use: Chemical use reviewed, no active concerns identified      Tobacco Use: No current tobacco use.      Diagnosis:  1. Bipolar affective disorder, currently depressed, moderate (H)    2. PTSD (post-traumatic stress disorder)    3. Generalized anxiety disorder    4. ADHD (attention deficit hyperactivity disorder), combined type        Collateral Reports Completed:   Not Applicable    PLAN: (Patient Tasks / Therapist Tasks / Other)  Make visual safety plan   Manage depressive symptoms with coping skills  When feeling SI follow safety plan  Continue to manage SI and go to ER if feeling unsafe  [facility] has a new mental health emergency area at [facility] called [facility] that is very calming and helpful if you need additional support. ([address] [place] [address]  [phone]).     Name and challenge cognitive distortions  read \"stop walking on eggshells.    Call [phone] if feeling SI has increase or go to ED or empath   Utilize coping skills when feeling impulsive.   Replace distortions with positive attributes    Give reece to self  Continue to track moods   Therapist provided psycho education to patient and ACT therapy with looking at goals and thoughts that take her away from her goals.   Start forgiveness timeline    Patient is to watch \"I am and Happy\" documentary and make list of what makes her happy.   Look at intention rather than perception      Katie Faulkner, [facility]  9/26/2023                                                         ______________________________________________________________________    Individual Treatment " Plan    Patient's Name: Wandy Ann  YOB: 2000    Date of Creation: 2.22.2021  Date Treatment Plan Last Reviewed/Revised: 7/18/2023 due 10/18/2023    DSM5 Diagnoses: 296.52 Bipolar I Disorder Current or Most Recent Episode Depressed, Moderate, 300.02 (F41.1) Generalized Anxiety Disorder or 309.81 (F43.10) Posttraumatic Stress Disorder (includes Posttraumatic Stress Disorder for Children 6 Years and Younger)  Without dissociative symptoms  Psychosocial / Contextual Factors: past trauma, developmental hx and current stressors   PROMIS (reviewed every 90 days): 12/2/2022    Referral / Collaboration:  Referral to another professional/service is not indicated at this time..    Anticipated number of session for this episode of care: 12  Anticipation frequency of session: Biweekly  Anticipated Duration of each session: 38-52 minutes  Treatment plan will be reviewed in 90 days or when goals have been changed.       MeasurableTreatment Goal(s) related to diagnosis / functional impairment(s)  Goal 1: Patient will report absence of persistent SI and report of reduced frequency and intensity of SI and absence of SI to acceptable levels, report subjective improved mood for a period of 90 days, within 6 months as clinically observed and by patient self-report    I will know I've met my goal when I can see the difference between a bad moment and what I want in th future.      Objective #A (Patient Action)    Patient will demonstrate control of impulses and ability to use positive coping tools to manage strong emotions that can be safely addressed at a lower level of care. Absence of persistent SI and report of reduced frequency and intensity of SI and absence of SI to acceptable levels, report subjective improved mood for a period of 90 days, within 6 months as clinically observed and by patient self-report.  Status: Continued - Date(s): 7/18/2023    Intervention(s)  Therapist will provide individual  therapy to identify triggers to SI urges, gain feedback on helpful coping strategies, and identify ways that family can offer support. Tx to discuss current stressors and interpersonal conflicts and how to cope with these, coaching, diagnostic testing, referral for medication as indicated, use prescribed medication, cognitive restructuring, interpersonal family therapy, supportive therapy services.        Goal 2: Patient will report absence of persistent depression mood and report of reduced frequency and intensity of low mood and absence of persistent low energy and motivation to acceptable levels, report subjective improved motivation and increased energy for a period of 90 days, within 6 months as clinically observed and by patient self-report    I will know I've met my goal when I no longer feel sad.      Objective #A (Patient Action)    Status: Continued - Date(s): 7/18/2023    Patient will demonstrate and report a level of depression that can be managed at a lower level of care.  Absence of persistent depression mood and report of reduced frequency and intensity of low mood and absence of persistent low energy and motivation to acceptable levels, report subjective improved motivation and increased energy for a period of 90 days, within 6 months as clinically observed and by patient self-report.    Intervention(s)  Therapist will provide individual therapy to identify triggers to depression, gain feedback on helpful coping tools and thought-reframing techniques, and identify preferred way of being.  Tx to include discussion of current stressors and interpersonal conflicts and how to cope with these, coaching, diagnostic testing, referral for medication as indicated, use prescribed medication, cognitive restructuring, interpersonal, family therapy, supportive therapy services.        Goal 3: Patiient will report absence of persistent anxiety mood and report of reduced frequency and intensity of worry and absence  of persistent anxious mood to acceptable levels, no panic attacks, report subjective comfort with rumination for a period of 90 days. Within 6 months as clinically observed and by patient self-report    I will know I've met my goal when I can go days without worrying about little things or shaking.      Objective #A (Patient Action)    Status: Continued - Date(s): 7/18/2023    Patient will demonstrate and report a level of anxiety that can be managed at a lower level of care.  Absence of persistent anxious mood and report of reduced frequency and intensity of worry and absence of persistent anxious mood to acceptable levels, no panic attacks, report subjective comfort with rumination for a period of 90 days, within 6 months as clinically observed and by patient self-report.    Intervention(s)  Therapist will provide individual therapy to identify triggers to anxiety, gain feedback on helpful coping tools and thought-reframing techniques, and identify preferred way of being. Tx to include discuss current stressors and interpersonal conflicts and how to cope with these, coaching, diagnostic testing , referral for medication as indicated, use prescribed medication, cognitive restructuring, interpersonal,   family therapy, supportive therapy services.        Patient has reviewed and agreed to the above plan.      Katie Faulkner, [facility]   7/18/2023                                                   Wandy Ann            SAFETY PLAN:  Step 1: Warning signs / cues (Thoughts, images, mood, situation, behavior) that a crisis may be developing:  Thoughts: thoughts of not wanting to be here  Images: no images  Thinking Processes: intrusive thoughts (bothersome, unwanted thoughts that come out of nowhere): get irritated  Mood: intense anger and agitation  Behaviors: isolating/withdrawing   Situations: trauma    Step 2: Coping strategies - Things I can do to take my mind off of my problems without contacting another  "person (relaxation technique, physical activity):  Distress Tolerance Strategies:  arts and crafts: drawing and painting  Physical Activities: exercise: working out  Focus on helpful thoughts:  \"This is temporary\", \"It always passes\" and \"Ride the wave\"  Step 3: People and social settings that provide distraction:                 Name: Jennifer     Phone: [phone]                 Name: Antonina         Phone: [phone]                 Name: panchito       Phone: [phone]  friends house or car   Step 4: Remind myself of people and things that are important to me and worth living for:  Best friend and cat        Step 5: When I am in crisis, I can ask these people to help me use my safety plan:                 Name: Jennifer     Phone: [phone]                 Name: Antonina         Phone: [phone]                 Name: panchito       Phone: [phone]  Step 6: Making the environment safe:   be around others  Step 7: Professionals or agencies I can contact during a crisis:  [facility] Daytime Number: [phone]  Suicide Prevention Lifeline: 8-635-245-BSNB (6610)  Crisis Text Line Service (available 24 hours a day, 7 days a week): Text [place] to 699405  Local Crisis Services: 988     Call 911 or go to my nearest emergency department.       I helped develop this safety plan and agree to use it when needed.  I have been given a copy of this plan.       Client signature _________________________________________________________________  Today s date:  2/22/2021  Adapted from Safety Plan Template 2008 Marisol Cazares and Mario Flores is reprinted with the express permission of the authors.  No portion of the Safety Plan Template may be reproduced without the express, written permission.  You can contact the authors at [email or web address] or [email or web address].  "

## 2023-10-02 ENCOUNTER — OFFICE VISIT (OUTPATIENT)
Dept: FAMILY MEDICINE | Facility: CLINIC | Age: 23
End: 2023-10-02
Payer: COMMERCIAL

## 2023-10-02 VITALS
BODY MASS INDEX: 39.68 KG/M2 | SYSTOLIC BLOOD PRESSURE: 114 MMHG | HEART RATE: 73 BPM | OXYGEN SATURATION: 99 % | DIASTOLIC BLOOD PRESSURE: 77 MMHG | WEIGHT: 203.2 LBS | TEMPERATURE: 97.7 F

## 2023-10-02 DIAGNOSIS — R74.8 ELEVATED LIVER ENZYMES: ICD-10-CM

## 2023-10-02 DIAGNOSIS — F41.0 PANIC ATTACK: ICD-10-CM

## 2023-10-02 DIAGNOSIS — R73.9 ELEVATED RANDOM BLOOD GLUCOSE LEVEL: ICD-10-CM

## 2023-10-02 DIAGNOSIS — R30.0 DYSURIA: Primary | ICD-10-CM

## 2023-10-02 LAB
ALBUMIN SERPL BCG-MCNC: 4.3 G/DL (ref 3.5–5.2)
ALBUMIN UR-MCNC: ABNORMAL MG/DL
ALP SERPL-CCNC: 88 U/L (ref 35–104)
ALT SERPL W P-5'-P-CCNC: 44 U/L (ref 0–50)
ANION GAP SERPL CALCULATED.3IONS-SCNC: 13 MMOL/L (ref 7–15)
APPEARANCE UR: CLEAR
AST SERPL W P-5'-P-CCNC: 34 U/L (ref 0–45)
BACTERIA #/AREA URNS HPF: ABNORMAL /HPF
BILIRUB SERPL-MCNC: 0.3 MG/DL
BILIRUB UR QL STRIP: ABNORMAL
BUN SERPL-MCNC: 12.1 MG/DL (ref 6–20)
CALCIUM SERPL-MCNC: 9.8 MG/DL (ref 8.6–10)
CHLORIDE SERPL-SCNC: 102 MMOL/L (ref 98–107)
COLOR UR AUTO: YELLOW
CREAT SERPL-MCNC: 0.65 MG/DL (ref 0.51–0.95)
DEPRECATED HCO3 PLAS-SCNC: 23 MMOL/L (ref 22–29)
EGFRCR SERPLBLD CKD-EPI 2021: >90 ML/MIN/1.73M2
GLUCOSE SERPL-MCNC: 94 MG/DL (ref 70–99)
GLUCOSE UR STRIP-MCNC: NEGATIVE MG/DL
HBA1C MFR BLD: 5.4 % (ref 0–5.6)
HGB UR QL STRIP: ABNORMAL
KETONES UR STRIP-MCNC: NEGATIVE MG/DL
LEUKOCYTE ESTERASE UR QL STRIP: NEGATIVE
MUCOUS THREADS #/AREA URNS LPF: PRESENT /LPF
NITRATE UR QL: NEGATIVE
PH UR STRIP: 6 [PH] (ref 5–7)
POTASSIUM SERPL-SCNC: 4.5 MMOL/L (ref 3.4–5.3)
PROT SERPL-MCNC: 7.7 G/DL (ref 6.4–8.3)
RBC #/AREA URNS AUTO: ABNORMAL /HPF
SODIUM SERPL-SCNC: 138 MMOL/L (ref 135–145)
SP GR UR STRIP: >=1.03 (ref 1–1.03)
SQUAMOUS #/AREA URNS AUTO: ABNORMAL /LPF
UROBILINOGEN UR STRIP-ACNC: 0.2 E.U./DL
WBC #/AREA URNS AUTO: ABNORMAL /HPF

## 2023-10-02 PROCEDURE — 80053 COMPREHEN METABOLIC PANEL: CPT | Performed by: PHYSICIAN ASSISTANT

## 2023-10-02 PROCEDURE — 99214 OFFICE O/P EST MOD 30 MIN: CPT | Performed by: PHYSICIAN ASSISTANT

## 2023-10-02 PROCEDURE — 81001 URINALYSIS AUTO W/SCOPE: CPT | Performed by: PHYSICIAN ASSISTANT

## 2023-10-02 PROCEDURE — 83036 HEMOGLOBIN GLYCOSYLATED A1C: CPT | Performed by: PHYSICIAN ASSISTANT

## 2023-10-02 PROCEDURE — 36415 COLL VENOUS BLD VENIPUNCTURE: CPT | Performed by: PHYSICIAN ASSISTANT

## 2023-10-02 RX ORDER — LANOLIN ALCOHOL/MO/W.PET/CERES
1 CREAM (GRAM) TOPICAL
COMMUNITY
Start: 2022-12-17

## 2023-10-02 RX ORDER — HYDROXYZINE HYDROCHLORIDE 25 MG/1
25 TABLET, FILM COATED ORAL 3 TIMES DAILY PRN
Qty: 60 TABLET | Refills: 1 | Status: SHIPPED | OUTPATIENT
Start: 2023-10-02

## 2023-10-02 ASSESSMENT — PATIENT HEALTH QUESTIONNAIRE - PHQ9
SUM OF ALL RESPONSES TO PHQ QUESTIONS 1-9: 15
10. IF YOU CHECKED OFF ANY PROBLEMS, HOW DIFFICULT HAVE THESE PROBLEMS MADE IT FOR YOU TO DO YOUR WORK, TAKE CARE OF THINGS AT HOME, OR GET ALONG WITH OTHER PEOPLE: SOMEWHAT DIFFICULT

## 2023-10-02 NOTE — PROGRESS NOTES
Assessment & Plan     Dysuria  Improving. Will continue to monitor. Could consider a repeat course if her symptoms do not resolve.   - UA Macroscopic with reflex to Microscopic and Culture - Clinic Collect  - UA Microscopic with Reflex to Culture    Panic attack  Can try hydroxyzine for increase in panic attacks. Encouraged her to follow up with psychiatry - has an upcoming appt in the next few weeks.   - hydrOXYzine (ATARAX) 25 MG tablet; Take 1 tablet (25 mg) by mouth 3 times daily as needed for itching    Elevated random blood glucose level  Recheck.  - Hemoglobin A1c; Future  - Hemoglobin A1c    Elevated liver enzymes  Recheck,.  - Comprehensive metabolic panel (BMP + Alb, Alk Phos, ALT, AST, Total. Bili, TP); Future  - Comprehensive metabolic panel (BMP + Alb, Alk Phos, ALT, AST, Total. Bili, TP)             Depression Screening Follow Up        10/2/2023     9:44 AM   PHQ   PHQ-9 Total Score 15   Q9: Thoughts of better off dead/self-harm past 2 weeks Several days   F/U: Thoughts of suicide or self-harm Yes   F/U: Self harm-plan No   F/U: Self-harm action No   F/U: Safety concerns No                 Follow Up    Follow Up Actions Taken  Referred patient back to mental health provider    Discussed the following ways the patient can remain in a safe environment:  be around others      Chinyere Ruiz PA-C  [facility] is a 23 year old, presenting for the following health issues:  Urinary Problem        10/2/2023     9:53 AM   Additional Questions   Roomed by mayra       History of Present Illness       Reason for visit:  Uti/ kidney infection    She eats 0-1 servings of fruits and vegetables daily.She consumes 2 sweetened beverage(s) daily.She exercises with enough effort to increase her heart rate 30 to 60 minutes per day.  She exercises with enough effort to increase her heart rate 4 days per week.   She is taking medications regularly.         Genitourinary -  Female  Onset/Duration: 2 weeks  Description:   Painful urination (Dysuria):no           Frequency: YES  Blood in urine (Hematuria): No  Delay in urine (Hesitency): YES  Intensity: mild  Progression of Symptoms:  improving  Accompanying Signs & Symptoms:  Fever/chills: No  Flank pain: YES  Nausea and vomiting: No  Vaginal symptoms: none  Abdominal/Pelvic Pain: only right side pain  History:   History of frequent UTI s: No  History of kidney stones: No  Sexually Active: YES  Possibility of pregnancy: No  Precipitating or alleviating factors: None  Therapies tried and outcome:  abx       Anxiety -   Up and down. Panic attacks.   [facility] for psychiatry. Manages lamictal.            Review of Systems   Constitutional, HEENT, cardiovascular, pulmonary, gi and gu systems are negative, except as otherwise noted.      Objective    /77 (BP Location: Right arm, Patient Position: Sitting, Cuff Size: Adult Regular)   Pulse 73   Temp 97.7  F (36.5  C) (Oral)   Wt 92.2 kg (203 lb 3.2 oz)   LMP  (LMP Unknown)   SpO2 99%   BMI 39.68 kg/m    Body mass index is 39.68 kg/m .  Physical Exam   GENERAL: healthy, alert and no distress  NECK: no adenopathy, no asymmetry, masses, or scars and thyroid normal to palpation  RESP: lungs clear to auscultation - no rales, rhonchi or wheezes  CV: regular rate and rhythm, normal S1 S2, no S3 or S4, no murmur, click or rub, no peripheral edema and peripheral pulses strong  ABDOMEN: soft, nontender, no hepatosplenomegaly, no masses and bowel sounds normal  MS: no gross musculoskeletal defects noted, no edema

## 2023-10-03 ENCOUNTER — VIRTUAL VISIT (OUTPATIENT)
Dept: PSYCHOLOGY | Facility: CLINIC | Age: 23
End: 2023-10-03
Payer: COMMERCIAL

## 2023-10-03 DIAGNOSIS — F41.1 GENERALIZED ANXIETY DISORDER: ICD-10-CM

## 2023-10-03 DIAGNOSIS — F31.32 BIPOLAR AFFECTIVE DISORDER, CURRENTLY DEPRESSED, MODERATE (H): Primary | ICD-10-CM

## 2023-10-03 DIAGNOSIS — F43.10 PTSD (POST-TRAUMATIC STRESS DISORDER): ICD-10-CM

## 2023-10-03 DIAGNOSIS — F90.2 ADHD (ATTENTION DEFICIT HYPERACTIVITY DISORDER), COMBINED TYPE: ICD-10-CM

## 2023-10-03 PROCEDURE — 90834 PSYTX W PT 45 MINUTES: CPT | Mod: 95 | Performed by: SOCIAL WORKER

## 2023-10-03 NOTE — PROGRESS NOTES
"    [facility] Counseling                                      Progress Note    Patient Name: Wandy Ann  Date: 10/3/2023         Service Type: Individual      Session Start Time: 1004 Session End Time: 1047     Session Length: 38-52    Session #: 63    Attendees: Client    Service Modality:    Phone Visit:      Provider verified identity through the following two step process.  Patient provided:  Patient is known previously to provider     Telephone Visit: The patient's condition can be safely assessed and treated via synchronous audio telemedicine encounter.       Reason for Audio Telemedicine Visit: Services only offered telehealth     Originating Site (Patient Location): Patient's home     Distant Site (Provider Location): off site      Consent:  The patient/guardian has verbally consented to:      1. The potential risks and benefits of telemedicine (telephone visit) versus in person care;     The patient has been notified of the following:      \"We have found that certain health care needs can be provided without the need for a face to face visit.  This service lets us provide the care you need with a phone conversation.       I will have full access to your [facility] medical record during this entire phone call.   I will be taking notes for your medical record.      Since this is like an office visit, we will bill your insurance company for this service.       There are potential benefits and risks of telephone visits (e.g. limits to patient confidentiality) that differ from in-person visits.?Confidentiality still applies for telephone services, and nobody will record the visit.  It is important to be in a quiet, private space that is free of distractions (including cell phone or other devices) during the visit.??      If during the course of the call I believe a telephone visit is not appropriate, you will not be charged for this service\"     Consent has been obtained for this " service by care team member: Yes         DATA  Interactive Complexity: No  Crisis: No        Progress Since Last Session (Related to Symptoms / Goals / Homework):   Symptoms: Worsening family crisis      Homework: Partially completed      Episode of Care Goals: Satisfactory progress - ACTION (Actively working towards change); Intervened by reinforcing change plan / affirming steps taken     Current / Ongoing Stressors and Concerns:   past trauma, developmental hx and current stressors      Treatment Objective(s) Addressed in This Session:   Patient will demonstrate control of impulses and ability to use positive coping tools to manage strong emotions that can be safely addressed at a lower level of care. Absence of persistent SI and report of reduced frequency and intensity of SI and absence of SI to acceptable levels, report subjective improved mood for a period of 90 days, within 6 months as clinically observed and by patient self-report.  Patient will demonstrate and report a level of depression that can be managed at a lower level of care.  Absence of persistent depression mood and report of reduced frequency and intensity of low mood and absence of persistent low energy and motivation to acceptable levels, report subjective improved motivation and increased energy for a period of 90 days, within 6 months as clinically observed and by patient self-report.  Patient will demonstrate and report a level of anxiety that can be managed at a lower level of care.  Absence of persistent anxious mood and report of reduced frequency and intensity of worry and absence of persistent anxious mood to acceptable levels, no panic attacks, report subjective comfort with rumination for a period of 90 days, within 6 months as clinically observed and by patient self-report.       Intervention:   Therapist met with patient to review goals and interventions. Therapist utilized reflected listening as patient gave brief reflection of  week. Patient reported having a family crisis this weekend and processed. Therapist provided patient a safe place for patient and validation. Therapist offered patient sessions this week if needed and encouraged patient to lean on her support system and when alone for patient to make plans for self that include self care.   Patient presented with an anxious affect. Patient was engaged in session and open to feedback. Patient contracted for safety      There has been demonstrated improvement in functioning while patient has been engaged in psychotherapy/psychological service- if withdrawn the patient would deteriorate and/or relapse.     Assessments completed prior to visit:  The following assessments were completed by patient for this visit:  PHQ9:       2/1/2023    10:08 AM 2/2/2023    11:53 AM 4/14/2023     9:13 AM 4/27/2023     9:55 AM 8/18/2023    12:47 AM 10/2/2023     9:43 AM 10/2/2023     9:44 AM   PHQ-9 SCORE   PHQ-9 Total Score MyChart 16 (Moderately severe depression) 20 (Severe depression) 19 (Moderately severe depression) 17 (Moderately severe depression) 13 (Moderate depression)  15 (Moderately severe depression)   PHQ-9 Total Score 16 20 19 17 13 15 15     GAD7:       6/6/2022     3:00 PM 6/21/2022     8:29 AM 8/24/2022     2:00 PM 10/31/2022     8:08 AM 11/29/2022     2:34 PM 2/2/2023    11:53 AM 4/20/2023    12:41 PM   YESSI-7 SCORE   Total Score  9 (mild anxiety)  2 (minimal anxiety) 11 (moderate anxiety) 16 (severe anxiety) 13 (moderate anxiety)   Total Score 10 9 13 2 11 16 13           ASSESSMENT: Current Emotional / Mental Status (status of significant symptoms):   Risk status (Self / Other harm or suicidal ideation)   Patient denies current fears or concerns for personal safety.   Patient reports the following current or recent suicidal ideation or behaviors: contracted for safety.   Patient denies current or recent homicidal ideation or behaviors.   Patient denies current or recent self  injurious behavior or ideation.   Patient denies other safety concerns.   Patient reports there has been no change in risk factors since their last session.     Patient reports there has been no change in protective factors since their last session.     A safety and risk management plan has been developed including: Patient consented to co-developed safety plan on 2.21.2022.  Safety and risk management plan was reviewed.   Patient agreed to use safety plan should any safety concerns arise.  A copy was made available to the patient.     Appearance:   Phone session     Eye Contact:   Phone session     Psychomotor Behavior: Phone session     Attitude:   Cooperative    Orientation:   All   Speech    Rate / Production: Emotional Talkative    Volume:  Normal    Mood:    Anxious  Depressed    Affect:    Worrisome    Thought Content:  Clear    Thought Form:  Coherent    Insight:    Fair      Medication Review:   No changes to current psychiatric medication(s)     Medication Compliance:   Yes     Changes in Health Issues:   None reported     Chemical Use Review:   Substance Use: Chemical use reviewed, no active concerns identified      Tobacco Use: No current tobacco use.      Diagnosis:  1. Bipolar affective disorder, currently depressed, moderate (H)    2. PTSD (post-traumatic stress disorder)    3. Generalized anxiety disorder    4. ADHD (attention deficit hyperactivity disorder), combined type        Collateral Reports Completed:   Not Applicable    PLAN: (Patient Tasks / Therapist Tasks / Other)  Make visual safety plan   Manage depressive symptoms with coping skills  When feeling SI follow safety plan  Continue to manage SI and go to ER if feeling unsafe  [facility] has a new mental health emergency area at [facility] called [facility] that is very calming and helpful if you need additional support. ([address] [place] [address]  [phone]).     Name and challenge cognitive  "distortions  read \"stop walking on eggshells.    Call [phone] if feeling SI has increase or go to ED or empath   Utilize coping skills when feeling impulsive.   Replace distortions with positive attributes    Give reece to self  Continue to track moods   Therapist provided psycho education to patient and ACT therapy with looking at goals and thoughts that take her away from her goals.   Start forgiveness timeline    Patient is to watch \"I am and Happy\" documentary and make list of what makes her happy.   Look at intention rather than perception  Therapist offered patient sessions this week if needed and encouraged patient to lean on her support system and when alone for patient to make plans for self that include self care.       Katie Faulkner, [facility]  10/3/2023                                                         ______________________________________________________________________    Individual Treatment Plan    Patient's Name: Wandy Ann  YOB: 2000    Date of Creation: 2.22.2021  Date Treatment Plan Last Reviewed/Revised: 7/18/2023 due 10/18/2023    DSM5 Diagnoses: 296.52 Bipolar I Disorder Current or Most Recent Episode Depressed, Moderate, 300.02 (F41.1) Generalized Anxiety Disorder or 309.81 (F43.10) Posttraumatic Stress Disorder (includes Posttraumatic Stress Disorder for Children 6 Years and Younger)  Without dissociative symptoms  Psychosocial / Contextual Factors: past trauma, developmental hx and current stressors   PROMIS (reviewed every 90 days): 12/2/2022    Referral / Collaboration:  Referral to another professional/service is not indicated at this time..    Anticipated number of session for this episode of care: 12  Anticipation frequency of session: Biweekly  Anticipated Duration of each session: 38-52 minutes  Treatment plan will be reviewed in 90 days or when goals have been changed.       MeasurableTreatment Goal(s) related to diagnosis / functional impairment(s)  Goal " 1: Patient will report absence of persistent SI and report of reduced frequency and intensity of SI and absence of SI to acceptable levels, report subjective improved mood for a period of 90 days, within 6 months as clinically observed and by patient self-report    I will know I've met my goal when I can see the difference between a bad moment and what I want in th future.      Objective #A (Patient Action)    Patient will demonstrate control of impulses and ability to use positive coping tools to manage strong emotions that can be safely addressed at a lower level of care. Absence of persistent SI and report of reduced frequency and intensity of SI and absence of SI to acceptable levels, report subjective improved mood for a period of 90 days, within 6 months as clinically observed and by patient self-report.  Status: Continued - Date(s): 7/18/2023    Intervention(s)  Therapist will provide individual therapy to identify triggers to SI urges, gain feedback on helpful coping strategies, and identify ways that family can offer support. Tx to discuss current stressors and interpersonal conflicts and how to cope with these, coaching, diagnostic testing, referral for medication as indicated, use prescribed medication, cognitive restructuring, interpersonal family therapy, supportive therapy services.        Goal 2: Patient will report absence of persistent depression mood and report of reduced frequency and intensity of low mood and absence of persistent low energy and motivation to acceptable levels, report subjective improved motivation and increased energy for a period of 90 days, within 6 months as clinically observed and by patient self-report    I will know I've met my goal when I no longer feel sad.      Objective #A (Patient Action)    Status: Continued - Date(s): 7/18/2023    Patient will demonstrate and report a level of depression that can be managed at a lower level of care.  Absence of persistent depression  mood and report of reduced frequency and intensity of low mood and absence of persistent low energy and motivation to acceptable levels, report subjective improved motivation and increased energy for a period of 90 days, within 6 months as clinically observed and by patient self-report.    Intervention(s)  Therapist will provide individual therapy to identify triggers to depression, gain feedback on helpful coping tools and thought-reframing techniques, and identify preferred way of being.  Tx to include discussion of current stressors and interpersonal conflicts and how to cope with these, coaching, diagnostic testing, referral for medication as indicated, use prescribed medication, cognitive restructuring, interpersonal, family therapy, supportive therapy services.        Goal 3: Patiient will report absence of persistent anxiety mood and report of reduced frequency and intensity of worry and absence of persistent anxious mood to acceptable levels, no panic attacks, report subjective comfort with rumination for a period of 90 days. Within 6 months as clinically observed and by patient self-report    I will know I've met my goal when I can go days without worrying about little things or shaking.      Objective #A (Patient Action)    Status: Continued - Date(s): 7/18/2023    Patient will demonstrate and report a level of anxiety that can be managed at a lower level of care.  Absence of persistent anxious mood and report of reduced frequency and intensity of worry and absence of persistent anxious mood to acceptable levels, no panic attacks, report subjective comfort with rumination for a period of 90 days, within 6 months as clinically observed and by patient self-report.    Intervention(s)  Therapist will provide individual therapy to identify triggers to anxiety, gain feedback on helpful coping tools and thought-reframing techniques, and identify preferred way of being. Tx to include discuss current stressors and  "interpersonal conflicts and how to cope with these, coaching, diagnostic testing , referral for medication as indicated, use prescribed medication, cognitive restructuring, interpersonal,   family therapy, supportive therapy services.        Patient has reviewed and agreed to the above plan.      Katie Faulkner, [facility]   7/18/2023                                                   Wandysteve Ann            SAFETY PLAN:  Step 1: Warning signs / cues (Thoughts, images, mood, situation, behavior) that a crisis may be developing:  Thoughts: thoughts of not wanting to be here  Images: no images  Thinking Processes: intrusive thoughts (bothersome, unwanted thoughts that come out of nowhere): get irritated  Mood: intense anger and agitation  Behaviors: isolating/withdrawing   Situations: trauma    Step 2: Coping strategies - Things I can do to take my mind off of my problems without contacting another person (relaxation technique, physical activity):  Distress Tolerance Strategies:  arts and crafts: drawing and painting  Physical Activities: exercise: working out  Focus on helpful thoughts:  \"This is temporary\", \"It always passes\" and \"Ride the wave\"  Step 3: People and social settings that provide distraction:                 Name: Jennifer     Phone: [phone]                 Name: Antonina         Phone: [phone]                 Name: mom       Phone: [phone]  friends house or car   Step 4: Remind myself of people and things that are important to me and worth living for:  Best friend and cat        Step 5: When I am in crisis, I can ask these people to help me use my safety plan:                 Name: Jennifer     Phone: [phone]                 Name: Antonina         Phone: [phone]                 Name: mom       Phone: [phone]  Step 6: Making the environment safe:   be around others  Step 7: Professionals or agencies I can contact during a crisis:  [facility] Daytime Number: " [phone]  Suicide Prevention Lifeline: 6-015-153-TALK (7351)  Crisis Text Line Service (available 24 hours a day, 7 days a week): Text [place] to 634642  Local Crisis Services: 988     Call 911 or go to my nearest emergency department.       I helped develop this safety plan and agree to use it when needed.  I have been given a copy of this plan.       Client signature _________________________________________________________________  Today s date:  2/22/2021  Adapted from Safety Plan Template 2008 Marisol Cazares and Mario Flores is reprinted with the express permission of the authors.  No portion of the Safety Plan Template may be reproduced without the express, written permission.  You can contact the authors at [email or web address] or [email or web address].

## 2023-10-10 ENCOUNTER — VIRTUAL VISIT (OUTPATIENT)
Dept: PSYCHOLOGY | Facility: CLINIC | Age: 23
End: 2023-10-10
Payer: COMMERCIAL

## 2023-10-10 DIAGNOSIS — F90.2 ADHD (ATTENTION DEFICIT HYPERACTIVITY DISORDER), COMBINED TYPE: ICD-10-CM

## 2023-10-10 DIAGNOSIS — F41.1 GENERALIZED ANXIETY DISORDER: ICD-10-CM

## 2023-10-10 DIAGNOSIS — F31.32 BIPOLAR AFFECTIVE DISORDER, CURRENTLY DEPRESSED, MODERATE (H): Primary | ICD-10-CM

## 2023-10-10 DIAGNOSIS — F43.10 PTSD (POST-TRAUMATIC STRESS DISORDER): ICD-10-CM

## 2023-10-10 PROCEDURE — 90834 PSYTX W PT 45 MINUTES: CPT | Mod: VID | Performed by: SOCIAL WORKER

## 2023-10-10 ASSESSMENT — ANXIETY QUESTIONNAIRES
GAD7 TOTAL SCORE: 17
2. NOT BEING ABLE TO STOP OR CONTROL WORRYING: NEARLY EVERY DAY
7. FEELING AFRAID AS IF SOMETHING AWFUL MIGHT HAPPEN: SEVERAL DAYS
IF YOU CHECKED OFF ANY PROBLEMS ON THIS QUESTIONNAIRE, HOW DIFFICULT HAVE THESE PROBLEMS MADE IT FOR YOU TO DO YOUR WORK, TAKE CARE OF THINGS AT HOME, OR GET ALONG WITH OTHER PEOPLE: VERY DIFFICULT
4. TROUBLE RELAXING: NEARLY EVERY DAY
GAD7 TOTAL SCORE: 17
3. WORRYING TOO MUCH ABOUT DIFFERENT THINGS: NEARLY EVERY DAY
1. FEELING NERVOUS, ANXIOUS, OR ON EDGE: NEARLY EVERY DAY
6. BECOMING EASILY ANNOYED OR IRRITABLE: MORE THAN HALF THE DAYS
5. BEING SO RESTLESS THAT IT IS HARD TO SIT STILL: MORE THAN HALF THE DAYS

## 2023-10-10 NOTE — PROGRESS NOTES
[facility]                                      Progress Note    Patient Name: Wandy Ann  Date: 10/10/2023         Service Type: Individual      Session Start Time: 1403 Session End Time:      Session Length: 38-52    Session #: 64    Attendees: Client    Service Modality:    Video Visit:      Provider verified identity through the following two step process.  Patient provided: Patient  and Patient previous known to provider     Telemedicine Visit: The patient's condition can be safely assessed and treated via synchronous audio and visual telemedicine encounter.       Reason for Telemedicine Visit: Patient has requested telehealth visit     Originating Site (Patient Location): Patient's home     Distant Site (Provider Location): Provider Remote Setting- Home Office     Consent:  The patient/guardian has verbally consented to: the potential risks and benefits of telemedicine (video visit) versus in person care; bill my insurance or make self-payment for services provided; and responsibility for payment of non-covered services.      Patient would like the video invitation sent by: email/text     Mode of Communication: Video Conference via [employer]     Distant Location (Provider): Off-site     As the provider I attest to compliance with applicable laws and regulations related to telemedicine.        DATA  Interactive Complexity: No  Crisis: No        Progress Since Last Session (Related to Symptoms / Goals / Homework):   Symptoms: No change per patient      Homework: Partially completed      Episode of Care Goals: Satisfactory progress - ACTION (Actively working towards change); Intervened by reinforcing change plan / affirming steps taken     Current / Ongoing Stressors and Concerns:   past trauma, developmental hx and current stressors      Treatment Objective(s) Addressed in This Session:   Patient will demonstrate control of impulses and ability to use positive coping tools  to manage strong emotions that can be safely addressed at a lower level of care. Absence of persistent SI and report of reduced frequency and intensity of SI and absence of SI to acceptable levels, report subjective improved mood for a period of 90 days, within 6 months as clinically observed and by patient self-report.  Patient will demonstrate and report a level of depression that can be managed at a lower level of care.  Absence of persistent depression mood and report of reduced frequency and intensity of low mood and absence of persistent low energy and motivation to acceptable levels, report subjective improved motivation and increased energy for a period of 90 days, within 6 months as clinically observed and by patient self-report.  Patient will demonstrate and report a level of anxiety that can be managed at a lower level of care.  Absence of persistent anxious mood and report of reduced frequency and intensity of worry and absence of persistent anxious mood to acceptable levels, no panic attacks, report subjective comfort with rumination for a period of 90 days, within 6 months as clinically observed and by patient self-report.       Intervention:   Therapist met with patient to review goals and interventions. Therapist utilized reflected listening as patient gave brief reflection of week. Patient reported continued stressors with family and work and processed. Therapist supported and validated patient. Therapist encouraged patient to implement self-care and for patient to look at what she can control. Therapist encouraged patient to share plans with friends and plan own vacation.   Patient presented with an anxious affect. Patient was engaged in session and open to feedback. Patient contracted for safety      There has been demonstrated improvement in functioning while patient has been engaged in psychotherapy/psychological service- if withdrawn the patient would deteriorate and/or relapse.     Assessments  completed prior to visit:  The following assessments were completed by patient for this visit:  PHQ9:       2/2/2023    11:53 AM 4/14/2023     9:13 AM 4/27/2023     9:55 AM 8/18/2023    12:47 AM 10/2/2023     9:43 AM 10/2/2023     9:44 AM 10/10/2023     1:57 PM   PHQ-9 SCORE   PHQ-9 Total Score MyChart 20 (Severe depression) 19 (Moderately severe depression) 17 (Moderately severe depression) 13 (Moderate depression)  15 (Moderately severe depression) 16 (Moderately severe depression)   PHQ-9 Total Score 20 19 17 13 15 15 16     GAD7:       6/21/2022     8:29 AM 8/24/2022     2:00 PM 10/31/2022     8:08 AM 11/29/2022     2:34 PM 2/2/2023    11:53 AM 4/20/2023    12:41 PM 10/10/2023     1:57 PM   YESSI-7 SCORE   Total Score 9 (mild anxiety)  2 (minimal anxiety) 11 (moderate anxiety) 16 (severe anxiety) 13 (moderate anxiety) 17 (severe anxiety)   Total Score 9 13 2 11 16 13 17           ASSESSMENT: Current Emotional / Mental Status (status of significant symptoms):   Risk status (Self / Other harm or suicidal ideation)   Patient denies current fears or concerns for personal safety.   Patient reports the following current or recent suicidal ideation or behaviors: contracted for safety.   Patient denies current or recent homicidal ideation or behaviors.   Patient denies current or recent self injurious behavior or ideation.   Patient denies other safety concerns.   Patient reports there has been no change in risk factors since their last session.     Patient reports there has been no change in protective factors since their last session.     A safety and risk management plan has been developed including: Patient consented to co-developed safety plan on 2.21.2022.  Safety and risk management plan was reviewed.   Patient agreed to use safety plan should any safety concerns arise.  A copy was made available to the patient.     Appearance:   Appropriate     Eye Contact:   Fair     Psychomotor Behavior: Restless   "   Attitude:   Cooperative    Orientation:   All   Speech    Rate / Production: Emotional Talkative    Volume:  Normal    Mood:    Anxious  Depressed    Affect:    Worrisome    Thought Content:  Clear    Thought Form:  Coherent    Insight:    Fair      Medication Review:   No changes to current psychiatric medication(s)     Medication Compliance:   Yes     Changes in Health Issues:   None reported     Chemical Use Review:   Substance Use: Chemical use reviewed, no active concerns identified      Tobacco Use: No current tobacco use.      Diagnosis:  1. Bipolar affective disorder, currently depressed, moderate (H)    2. Generalized anxiety disorder    3. ADHD (attention deficit hyperactivity disorder), combined type    4. PTSD (post-traumatic stress disorder)        Collateral Reports Completed:   Not Applicable    PLAN: (Patient Tasks / Therapist Tasks / Other)  Make visual safety plan   Manage depressive symptoms with coping skills  When feeling SI follow safety plan  Continue to manage SI and go to ER if feeling unsafe  [facility] has a new mental health emergency area at [facility] called [facility] that is very calming and helpful if you need additional support. ([address] [place] [address]  [phone]).     Name and challenge cognitive distortions  read \"stop walking on eggshells.    Call [phone] if feeling SI has increase or go to ED or [facility]   Utilize coping skills when feeling impulsive.   Replace distortions with positive attributes    Give reece to self  Continue to track moods   Therapist provided psycho education to patient and ACT therapy with looking at goals and thoughts that take her away from her goals.   Start forgiveness timeline    Patient is to watch \"I am and Happy\" documentary and make list of what makes her happy.   Look at intention rather than perception  Therapist supported and validated patient. Therapist encouraged patient to implement self-care and for " patient to look at what she can control. Therapist encouraged patient to share plans with friends and plan own vacation.       Katie Faulkner, LICSW  10/10/2023                                                         ______________________________________________________________________    Individual Treatment Plan    Patient's Name: Wandy Ann  YOB: 2000    Date of Creation: 2.22.2021  Date Treatment Plan Last Reviewed/Revised: 7/18/2023 due 10/18/2023    DSM5 Diagnoses: 296.52 Bipolar I Disorder Current or Most Recent Episode Depressed, Moderate, 300.02 (F41.1) Generalized Anxiety Disorder or 309.81 (F43.10) Posttraumatic Stress Disorder (includes Posttraumatic Stress Disorder for Children 6 Years and Younger)  Without dissociative symptoms  Psychosocial / Contextual Factors: past trauma, developmental hx and current stressors   PROMIS (reviewed every 90 days): 10/10/2023    Referral / Collaboration:  Referral to another professional/service is not indicated at this time..    Anticipated number of session for this episode of care: 12  Anticipation frequency of session: Biweekly  Anticipated Duration of each session: 38-52 minutes  Treatment plan will be reviewed in 90 days or when goals have been changed.       MeasurableTreatment Goal(s) related to diagnosis / functional impairment(s)  Goal 1: Patient will report absence of persistent SI and report of reduced frequency and intensity of SI and absence of SI to acceptable levels, report subjective improved mood for a period of 90 days, within 6 months as clinically observed and by patient self-report    I will know I've met my goal when I can see the difference between a bad moment and what I want in th future.      Objective #A (Patient Action)    Patient will demonstrate control of impulses and ability to use positive coping tools to manage strong emotions that can be safely addressed at a lower level of care. Absence of persistent SI  and report of reduced frequency and intensity of SI and absence of SI to acceptable levels, report subjective improved mood for a period of 90 days, within 6 months as clinically observed and by patient self-report.  Status: Continued - Date(s): 7/18/2023    Intervention(s)  Therapist will provide individual therapy to identify triggers to SI urges, gain feedback on helpful coping strategies, and identify ways that family can offer support. Tx to discuss current stressors and interpersonal conflicts and how to cope with these, coaching, diagnostic testing, referral for medication as indicated, use prescribed medication, cognitive restructuring, interpersonal family therapy, supportive therapy services.        Goal 2: Patient will report absence of persistent depression mood and report of reduced frequency and intensity of low mood and absence of persistent low energy and motivation to acceptable levels, report subjective improved motivation and increased energy for a period of 90 days, within 6 months as clinically observed and by patient self-report    I will know I've met my goal when I no longer feel sad.      Objective #A (Patient Action)    Status: Continued - Date(s): 7/18/2023    Patient will demonstrate and report a level of depression that can be managed at a lower level of care.  Absence of persistent depression mood and report of reduced frequency and intensity of low mood and absence of persistent low energy and motivation to acceptable levels, report subjective improved motivation and increased energy for a period of 90 days, within 6 months as clinically observed and by patient self-report.    Intervention(s)  Therapist will provide individual therapy to identify triggers to depression, gain feedback on helpful coping tools and thought-reframing techniques, and identify preferred way of being.  Tx to include discussion of current stressors and interpersonal conflicts and how to cope with these,  coaching, diagnostic testing, referral for medication as indicated, use prescribed medication, cognitive restructuring, interpersonal, family therapy, supportive therapy services.        Goal 3: Patiient will report absence of persistent anxiety mood and report of reduced frequency and intensity of worry and absence of persistent anxious mood to acceptable levels, no panic attacks, report subjective comfort with rumination for a period of 90 days. Within 6 months as clinically observed and by patient self-report    I will know I've met my goal when I can go days without worrying about little things or shaking.      Objective #A (Patient Action)    Status: Continued - Date(s): 7/18/2023    Patient will demonstrate and report a level of anxiety that can be managed at a lower level of care.  Absence of persistent anxious mood and report of reduced frequency and intensity of worry and absence of persistent anxious mood to acceptable levels, no panic attacks, report subjective comfort with rumination for a period of 90 days, within 6 months as clinically observed and by patient self-report.    Intervention(s)  Therapist will provide individual therapy to identify triggers to anxiety, gain feedback on helpful coping tools and thought-reframing techniques, and identify preferred way of being. Tx to include discuss current stressors and interpersonal conflicts and how to cope with these, coaching, diagnostic testing , referral for medication as indicated, use prescribed medication, cognitive restructuring, interpersonal,   family therapy, supportive therapy services.        Patient has reviewed and agreed to the above plan.      Katie Faulkner, [facility]   7/18/2023                                                   Wandy Ann            SAFETY PLAN:  Step 1: Warning signs / cues (Thoughts, images, mood, situation, behavior) that a crisis may be developing:  Thoughts: thoughts of not wanting to be here  Images: no  "images  Thinking Processes: intrusive thoughts (bothersome, unwanted thoughts that come out of nowhere): get irritated  Mood: intense anger and agitation  Behaviors: isolating/withdrawing   Situations: trauma    Step 2: Coping strategies - Things I can do to take my mind off of my problems without contacting another person (relaxation technique, physical activity):  Distress Tolerance Strategies:  arts and crafts: drawing and painting  Physical Activities: exercise: working out  Focus on helpful thoughts:  \"This is temporary\", \"It always passes\" and \"Ride the wave\"  Step 3: People and social settings that provide distraction:                 Name: Jennifer     Phone: [phone]                 Name: Antonina         Phone: [phone]                 Name: mom       Phone: [phone]  friends house or car   Step 4: Remind myself of people and things that are important to me and worth living for:  Best friend and cat        Step 5: When I am in crisis, I can ask these people to help me use my safety plan:                 Name: Jennifer     Phone: [phone]                 Name: Antonina         Phone: [phone]                 Name: panchito       Phone: [phone]  Step 6: Making the environment safe:   be around others  Step 7: Professionals or agencies I can contact during a crisis:  [facility] Daytime Number: [phone]  Suicide Prevention Lifeline: 2-750-409-APEX (8290)  Crisis Text Line Service (available 24 hours a day, 7 days a week): Text [place] to 082241  Local Crisis Services: 988     Call 911 or go to my nearest emergency department.       I helped develop this safety plan and agree to use it when needed.  I have been given a copy of this plan.       Client signature _________________________________________________________________  Today s date:  2/22/2021  Adapted from Safety Plan Template 2008 Marisol Cazares and Mario Flores is reprinted with the express permission of the authors.  No " portion of the Safety Plan Template may be reproduced without the express, written permission.  You can contact the authors at [email or web address] or [email or web address].

## 2023-10-30 ENCOUNTER — MYC MEDICAL ADVICE (OUTPATIENT)
Dept: FAMILY MEDICINE | Facility: CLINIC | Age: 23
End: 2023-10-30

## 2023-10-30 DIAGNOSIS — K21.9 GASTROESOPHAGEAL REFLUX DISEASE WITHOUT ESOPHAGITIS: Primary | ICD-10-CM

## 2023-10-31 ENCOUNTER — VIRTUAL VISIT (OUTPATIENT)
Dept: PSYCHOLOGY | Facility: CLINIC | Age: 23
End: 2023-10-31
Payer: COMMERCIAL

## 2023-10-31 DIAGNOSIS — F41.1 GENERALIZED ANXIETY DISORDER: ICD-10-CM

## 2023-10-31 DIAGNOSIS — F43.10 PTSD (POST-TRAUMATIC STRESS DISORDER): ICD-10-CM

## 2023-10-31 DIAGNOSIS — F90.2 ADHD (ATTENTION DEFICIT HYPERACTIVITY DISORDER), COMBINED TYPE: ICD-10-CM

## 2023-10-31 DIAGNOSIS — F31.32 BIPOLAR AFFECTIVE DISORDER, CURRENTLY DEPRESSED, MODERATE (H): Primary | ICD-10-CM

## 2023-10-31 PROCEDURE — 90834 PSYTX W PT 45 MINUTES: CPT | Mod: VID | Performed by: SOCIAL WORKER

## 2023-10-31 NOTE — PROGRESS NOTES
[facility]                                      Progress Note    Patient Name: Wandy Ann  Date: 10/31/2023         Service Type: Individual      Session Start Time:  Session End Time: 104     Session Length: 38-52    Session #: 65    Attendees: Client    Service Modality:    Video Visit:      Provider verified identity through the following two step process.  Patient provided: Patient  and Patient previous known to provider     Telemedicine Visit: The patient's condition can be safely assessed and treated via synchronous audio and visual telemedicine encounter.       Reason for Telemedicine Visit: Patient has requested telehealth visit     Originating Site (Patient Location): Patient's home     Distant Site (Provider Location): Provider Remote Setting- Home Office     Consent:  The patient/guardian has verbally consented to: the potential risks and benefits of telemedicine (video visit) versus in person care; bill my insurance or make self-payment for services provided; and responsibility for payment of non-covered services.      Patient would like the video invitation sent by: email/text     Mode of Communication: Video Conference via [employer]well     Distant Location (Provider): Off-site     As the provider I attest to compliance with applicable laws and regulations related to telemedicine.        DATA  Interactive Complexity: No  Crisis: No        Progress Since Last Session (Related to Symptoms / Goals / Homework):   Symptoms: Improving per patient      Homework: Achieved / completed to satisfaction      Episode of Care Goals: Satisfactory progress - ACTION (Actively working towards change); Intervened by reinforcing change plan / affirming steps taken     Current / Ongoing Stressors and Concerns:   past trauma, developmental hx and current stressors      Treatment Objective(s) Addressed in This Session:   Patient will demonstrate control of impulses and ability to use  positive coping tools to manage strong emotions that can be safely addressed at a lower level of care. Absence of persistent SI and report of reduced frequency and intensity of SI and absence of SI to acceptable levels, report subjective improved mood for a period of 90 days, within 6 months as clinically observed and by patient self-report.  Patient will demonstrate and report a level of depression that can be managed at a lower level of care.  Absence of persistent depression mood and report of reduced frequency and intensity of low mood and absence of persistent low energy and motivation to acceptable levels, report subjective improved motivation and increased energy for a period of 90 days, within 6 months as clinically observed and by patient self-report.  Patient will demonstrate and report a level of anxiety that can be managed at a lower level of care.  Absence of persistent anxious mood and report of reduced frequency and intensity of worry and absence of persistent anxious mood to acceptable levels, no panic attacks, report subjective comfort with rumination for a period of 90 days, within 6 months as clinically observed and by patient self-report.       Intervention:   Therapist met with patient to review goals and interventions. Therapist utilized reflected listening as patient gave brief reflection of week. Patient reported setting boundaries with mom and processed relationship. Therapist supported patient as she processed and validated patients hard work and determination. Therapist encouraged patient to start looking ahead for moving day and make lists of what she would like to see and options.   Patient presented with an anxious affect. Patient was engaged in session and open to feedback. Patient contracted for safety      There has been demonstrated improvement in functioning while patient has been engaged in psychotherapy/psychological service- if withdrawn the patient would deteriorate and/or  relapse.     Assessments completed prior to visit:  The following assessments were completed by patient for this visit:  PHQ9:       2/2/2023    11:53 AM 4/14/2023     9:13 AM 4/27/2023     9:55 AM 8/18/2023    12:47 AM 10/2/2023     9:43 AM 10/2/2023     9:44 AM 10/10/2023     1:57 PM   PHQ-9 SCORE   PHQ-9 Total Score MyChart 20 (Severe depression) 19 (Moderately severe depression) 17 (Moderately severe depression) 13 (Moderate depression)  15 (Moderately severe depression) 16 (Moderately severe depression)   PHQ-9 Total Score 20 19 17 13 15 15 16     GAD7:       6/21/2022     8:29 AM 8/24/2022     2:00 PM 10/31/2022     8:08 AM 11/29/2022     2:34 PM 2/2/2023    11:53 AM 4/20/2023    12:41 PM 10/10/2023     1:57 PM   YESSI-7 SCORE   Total Score 9 (mild anxiety)  2 (minimal anxiety) 11 (moderate anxiety) 16 (severe anxiety) 13 (moderate anxiety) 17 (severe anxiety)   Total Score 9 13 2 11 16 13 17           ASSESSMENT: Current Emotional / Mental Status (status of significant symptoms):   Risk status (Self / Other harm or suicidal ideation)   Patient denies current fears or concerns for personal safety.   Patient reports the following current or recent suicidal ideation or behaviors: more habit than belief contracted for safety .   Patient denies current or recent homicidal ideation or behaviors.   Patient denies current or recent self injurious behavior or ideation.   Patient denies other safety concerns.   Patient reports there has been no change in risk factors since their last session.     Patient reports there has been no change in protective factors since their last session.     A safety and risk management plan has been developed including: Patient consented to co-developed safety plan on 2.21.2022.  Safety and risk management plan was reviewed.   Patient agreed to use safety plan should any safety concerns arise.  A copy was made available to the patient.     Appearance:   Appropriate     Eye Contact:   Fair   "   Psychomotor Behavior: Restless     Attitude:   Cooperative    Orientation:   All   Speech    Rate / Production: Emotional Talkative    Volume:  Normal    Mood:    Anxious    Affect:    Worrisome    Thought Content:  Clear    Thought Form:  Coherent    Insight:    Fair      Medication Review:   No changes to current psychiatric medication(s)     Medication Compliance:   Yes     Changes in Health Issues:   None reported     Chemical Use Review:   Substance Use: Chemical use reviewed, no active concerns identified      Tobacco Use: No current tobacco use.      Diagnosis:  1. Bipolar affective disorder, currently depressed, moderate (H)    2. Generalized anxiety disorder    3. ADHD (attention deficit hyperactivity disorder), combined type    4. PTSD (post-traumatic stress disorder)        Collateral Reports Completed:   Not Applicable    PLAN: (Patient Tasks / Therapist Tasks / Other)  Make visual safety plan   Manage depressive symptoms with coping skills  When feeling SI follow safety plan  Continue to manage SI and go to ER if feeling unsafe  [facility] has a new mental health emergency area at [facility] called [facility] that is very calming and helpful if you need additional support. ([address] [place] [address]  [phone]).     Name and challenge cognitive distortions  read \"stop walking on eggshells.    Call [phone] if feeling SI has increase or go to ED or [facility]   Utilize coping skills when feeling impulsive.   Replace distortions with positive attributes    Give reece to self  Continue to track moods   Therapist provided psycho education to patient and ACT therapy with looking at goals and thoughts that take her away from her goals.   Start forgiveness timeline    Patient is to watch \"I am and Happy\" documentary and make list of what makes her happy.   Look at intention rather than perception  patient to start looking ahead for moving day and make lists of what she would like " to see and options.         Katie Faulkner, LICSW  10/31/2023                                                         ______________________________________________________________________    Individual Treatment Plan    Patient's Name: Wandy Ann  YOB: 2000    Date of Creation: 2.22.2021  Date Treatment Plan Last Reviewed/Revised: 10/31/2023 due 1/31/2023    DSM5 Diagnoses: 296.52 Bipolar I Disorder Current or Most Recent Episode Depressed, Moderate, 300.02 (F41.1) Generalized Anxiety Disorder or 309.81 (F43.10) Posttraumatic Stress Disorder (includes Posttraumatic Stress Disorder for Children 6 Years and Younger)  Without dissociative symptoms  Psychosocial / Contextual Factors: past trauma, developmental hx and current stressors   PROMIS (reviewed every 90 days): 10/10/2023    Referral / Collaboration:  Referral to another professional/service is not indicated at this time..    Anticipated number of session for this episode of care: 12  Anticipation frequency of session: Biweekly  Anticipated Duration of each session: 38-52 minutes  Treatment plan will be reviewed in 90 days or when goals have been changed.       MeasurableTreatment Goal(s) related to diagnosis / functional impairment(s)  Goal 1: Patient will report absence of persistent SI and report of reduced frequency and intensity of SI and absence of SI to acceptable levels, report subjective improved mood for a period of 90 days, within 6 months as clinically observed and by patient self-report    I will know I've met my goal when I can see the difference between a bad moment and what I want in th future.      Objective #A (Patient Action)    Patient will demonstrate control of impulses and ability to use positive coping tools to manage strong emotions that can be safely addressed at a lower level of care. Absence of persistent SI and report of reduced frequency and intensity of SI and absence of SI to acceptable levels, report  subjective improved mood for a period of 90 days, within 6 months as clinically observed and by patient self-report.  Status: Continued - Date(s): 10/31/2023    Intervention(s)  Therapist will provide individual therapy to identify triggers to SI urges, gain feedback on helpful coping strategies, and identify ways that family can offer support. Tx to discuss current stressors and interpersonal conflicts and how to cope with these, coaching, diagnostic testing, referral for medication as indicated, use prescribed medication, cognitive restructuring, interpersonal family therapy, supportive therapy services.        Goal 2: Patient will report absence of persistent depression mood and report of reduced frequency and intensity of low mood and absence of persistent low energy and motivation to acceptable levels, report subjective improved motivation and increased energy for a period of 90 days, within 6 months as clinically observed and by patient self-report    I will know I've met my goal when I no longer feel sad.      Objective #A (Patient Action)    Status: Continued - Date(s): 10/31/2023    Patient will demonstrate and report a level of depression that can be managed at a lower level of care.  Absence of persistent depression mood and report of reduced frequency and intensity of low mood and absence of persistent low energy and motivation to acceptable levels, report subjective improved motivation and increased energy for a period of 90 days, within 6 months as clinically observed and by patient self-report.    Intervention(s)  Therapist will provide individual therapy to identify triggers to depression, gain feedback on helpful coping tools and thought-reframing techniques, and identify preferred way of being.  Tx to include discussion of current stressors and interpersonal conflicts and how to cope with these, coaching, diagnostic testing, referral for medication as indicated, use prescribed medication, cognitive  restructuring, interpersonal, family therapy, supportive therapy services.        Goal 3: Patiient will report absence of persistent anxiety mood and report of reduced frequency and intensity of worry and absence of persistent anxious mood to acceptable levels, no panic attacks, report subjective comfort with rumination for a period of 90 days. Within 6 months as clinically observed and by patient self-report    I will know I've met my goal when I can go days without worrying about little things or shaking.      Objective #A (Patient Action)    Status: Continued - Date(s): 10/31/2023    Patient will demonstrate and report a level of anxiety that can be managed at a lower level of care.  Absence of persistent anxious mood and report of reduced frequency and intensity of worry and absence of persistent anxious mood to acceptable levels, no panic attacks, report subjective comfort with rumination for a period of 90 days, within 6 months as clinically observed and by patient self-report.    Intervention(s)  Therapist will provide individual therapy to identify triggers to anxiety, gain feedback on helpful coping tools and thought-reframing techniques, and identify preferred way of being. Tx to include discuss current stressors and interpersonal conflicts and how to cope with these, coaching, diagnostic testing , referral for medication as indicated, use prescribed medication, cognitive restructuring, interpersonal,   family therapy, supportive therapy services.        Patient has reviewed and agreed to the above plan.      Katie Faulkner, [facility]   10/31/2023                                                   Wandy Ann            SAFETY PLAN:  Step 1: Warning signs / cues (Thoughts, images, mood, situation, behavior) that a crisis may be developing:  Thoughts: thoughts of not wanting to be here  Images: no images  Thinking Processes: intrusive thoughts (bothersome, unwanted thoughts that come out of  "nowhere): get irritated  Mood: intense anger and agitation  Behaviors: isolating/withdrawing   Situations: trauma    Step 2: Coping strategies - Things I can do to take my mind off of my problems without contacting another person (relaxation technique, physical activity):  Distress Tolerance Strategies:  arts and crafts: drawing and painting  Physical Activities: exercise: working out  Focus on helpful thoughts:  \"This is temporary\", \"It always passes\" and \"Ride the wave\"  Step 3: People and social settings that provide distraction:                 Name: Jennifer     Phone: [phone]                 Name: Antonina         Phone: [phone]                 Name: panchito       Phone: [phone]  friends house or car   Step 4: Remind myself of people and things that are important to me and worth living for:  Best friend and cat        Step 5: When I am in crisis, I can ask these people to help me use my safety plan:                 Name: Jennifer     Phone: [phone]                 Name: Antonina         Phone: [phone]                 Name: panchito       Phone: [phone]  Step 6: Making the environment safe:   be around others  Step 7: Professionals or agencies I can contact during a crisis:  [facility] Daytime Number: [phone]  Suicide Prevention Lifeline: 4-722-567-SRVZ (5388)  Crisis Text Line Service (available 24 hours a day, 7 days a week): Text [place] to 020054  Local Crisis Services: 988     Call 911 or go to my nearest emergency department.       I helped develop this safety plan and agree to use it when needed.  I have been given a copy of this plan.       Client signature _________________________________________________________________  Today s date:  2/22/2021  Adapted from Safety Plan Template 2008 Marisol Cazares and Mario Flores is reprinted with the express permission of the authors.  No portion of the Safety Plan Template may be reproduced without the express, written permission. "  You can contact the authors at [email or web address] or [email or web address].

## 2023-10-31 NOTE — TELEPHONE ENCOUNTER
From OV on 8/18/23-  Post-nasal drip  Phlegm in throat  Suggest trial of flonase with zyrtec. May improve on its own. Could consider prilosec if not improving. No additional questions.   - cetirizine (ZYRTEC) 10 MG tablet; Take 1 tablet (10 mg) by mouth daily  - fluticasone (FLONASE) 50 MCG/ACT nasal spray; Spray 1 spray into both nostrils daily

## 2023-11-17 ENCOUNTER — VIRTUAL VISIT (OUTPATIENT)
Dept: PSYCHOLOGY | Facility: CLINIC | Age: 23
End: 2023-11-17
Payer: COMMERCIAL

## 2023-11-17 DIAGNOSIS — F41.1 GENERALIZED ANXIETY DISORDER: ICD-10-CM

## 2023-11-17 DIAGNOSIS — F90.2 ADHD (ATTENTION DEFICIT HYPERACTIVITY DISORDER), COMBINED TYPE: ICD-10-CM

## 2023-11-17 DIAGNOSIS — F31.32 BIPOLAR AFFECTIVE DISORDER, CURRENTLY DEPRESSED, MODERATE (H): Primary | ICD-10-CM

## 2023-11-17 DIAGNOSIS — F43.10 PTSD (POST-TRAUMATIC STRESS DISORDER): ICD-10-CM

## 2023-11-17 PROCEDURE — 90837 PSYTX W PT 60 MINUTES: CPT | Mod: VID | Performed by: SOCIAL WORKER

## 2023-11-17 NOTE — PROGRESS NOTES
[facility]                                      Progress Note    Patient Name: Wandy Ann  Date: 2023         Service Type: Individual      Session Start Time: 903 Session End Time: 955     Session Length: 53+    Session #: 66    Attendees: Client    Service Modality:    Video Visit:      Provider verified identity through the following two step process.  Patient provided: Patient  and Patient previous known to provider     Telemedicine Visit: The patient's condition can be safely assessed and treated via synchronous audio and visual telemedicine encounter.       Reason for Telemedicine Visit: Patient has requested telehealth visit     Originating Site (Patient Location): Patient's home     Distant Site (Provider Location): Provider Remote Setting- Home Office     Consent:  The patient/guardian has verbally consented to: the potential risks and benefits of telemedicine (video visit) versus in person care; bill my insurance or make self-payment for services provided; and responsibility for payment of non-covered services.      Patient would like the video invitation sent by: email/text     Mode of Communication: Video Conference via [facility]     Distant Location (Provider): Off-site     As the provider I attest to compliance with applicable laws and regulations related to telemedicine.        DATA  Interactive Complexity: No  Crisis: No        Progress Since Last Session (Related to Symptoms / Goals / Homework):   Symptoms: Worsening stressors      Homework: Partially completed      Episode of Care Goals: Satisfactory progress - ACTION (Actively working towards change); Intervened by reinforcing change plan / affirming steps taken     Current / Ongoing Stressors and Concerns:   past trauma, developmental hx and current stressors      Treatment Objective(s) Addressed in This Session:   Patient will demonstrate control of impulses and ability to use positive coping tools to  manage strong emotions that can be safely addressed at a lower level of care. Absence of persistent SI and report of reduced frequency and intensity of SI and absence of SI to acceptable levels, report subjective improved mood for a period of 90 days, within 6 months as clinically observed and by patient self-report.  Patient will demonstrate and report a level of depression that can be managed at a lower level of care.  Absence of persistent depression mood and report of reduced frequency and intensity of low mood and absence of persistent low energy and motivation to acceptable levels, report subjective improved motivation and increased energy for a period of 90 days, within 6 months as clinically observed and by patient self-report.  Patient will demonstrate and report a level of anxiety that can be managed at a lower level of care.  Absence of persistent anxious mood and report of reduced frequency and intensity of worry and absence of persistent anxious mood to acceptable levels, no panic attacks, report subjective comfort with rumination for a period of 90 days, within 6 months as clinically observed and by patient self-report.       Intervention:   Therapist met with patient to review goals and interventions. Therapist utilized reflected listening as patient gave brief reflection of week. Patient reported stressors and processed. Therapist supported and validated patient as she processed and coached patient through effective communication in conflict while educated patient on removing emotion and leading with logic.   Patient presented with an anxious affect. Patient was engaged in session and open to feedback. Patient contracted for safety      There has been demonstrated improvement in functioning while patient has been engaged in psychotherapy/psychological service- if withdrawn the patient would deteriorate and/or relapse.     Assessments completed prior to visit:  The following assessments were completed  by patient for this visit:  PHQ9:       2/2/2023    11:53 AM 4/14/2023     9:13 AM 4/27/2023     9:55 AM 8/18/2023    12:47 AM 10/2/2023     9:43 AM 10/2/2023     9:44 AM 10/10/2023     1:57 PM   PHQ-9 SCORE   PHQ-9 Total Score MyChart 20 (Severe depression) 19 (Moderately severe depression) 17 (Moderately severe depression) 13 (Moderate depression)  15 (Moderately severe depression) 16 (Moderately severe depression)   PHQ-9 Total Score 20 19 17 13 15 15 16     GAD7:       6/21/2022     8:29 AM 8/24/2022     2:00 PM 10/31/2022     8:08 AM 11/29/2022     2:34 PM 2/2/2023    11:53 AM 4/20/2023    12:41 PM 10/10/2023     1:57 PM   YESSI-7 SCORE   Total Score 9 (mild anxiety)  2 (minimal anxiety) 11 (moderate anxiety) 16 (severe anxiety) 13 (moderate anxiety) 17 (severe anxiety)   Total Score 9 13 2 11 16 13 17           ASSESSMENT: Current Emotional / Mental Status (status of significant symptoms):   Risk status (Self / Other harm or suicidal ideation)   Patient denies current fears or concerns for personal safety.   Patient reports the following current or recent suicidal ideation or behaviors: more habit than belief contracted for safety .   Patient denies current or recent homicidal ideation or behaviors.   Patient denies current or recent self injurious behavior or ideation.   Patient denies other safety concerns.   Patient reports there has been no change in risk factors since their last session.     Patient reports there has been no change in protective factors since their last session.     A safety and risk management plan has been developed including: Patient consented to co-developed safety plan on 2.21.2022.  Safety and risk management plan was reviewed.   Patient agreed to use safety plan should any safety concerns arise.  A copy was made available to the patient.     Appearance:   Appropriate     Eye Contact:   Fair     Psychomotor Behavior: Restless     Attitude:   Cooperative  "   Orientation:   All   Speech    Rate / Production: Emotional Talkative    Volume:  Normal    Mood:    Anxious    Affect:    Worrisome    Thought Content:  Clear    Thought Form:  Coherent    Insight:    Fair      Medication Review:   No changes to current psychiatric medication(s)     Medication Compliance:   Yes     Changes in Health Issues:   None reported     Chemical Use Review:   Substance Use: Chemical use reviewed, no active concerns identified      Tobacco Use: No current tobacco use.      Diagnosis:  1. Bipolar affective disorder, currently depressed, moderate (H)    2. Generalized anxiety disorder    3. ADHD (attention deficit hyperactivity disorder), combined type    4. PTSD (post-traumatic stress disorder)        Collateral Reports Completed:   Not Applicable    PLAN: (Patient Tasks / Therapist Tasks / Other)  Make visual safety plan   Manage depressive symptoms with coping skills  When feeling SI follow safety plan  Continue to manage SI and go to ER if feeling unsafe  [facility] has a new mental health emergency area at [facility] called [facility] that is very calming and helpful if you need additional support. ([address] [place], [place] [address] [phone]).     Name and challenge cognitive distortions  read \"stop walking on eggshells.    Call [phone] if feeling SI has increase or go to ED or [facility]   Utilize coping skills when feeling impulsive.   Replace distortions with positive attributes    Give reece to self  Continue to track moods   Therapist provided psycho education to patient and ACT therapy with looking at goals and thoughts that take her away from her goals.   Start forgiveness timeline    Patient is to watch \"I am and Happy\" documentary and make list of what makes her happy.   Look at intention rather than perception  Set up meeting with couple in conflict with        Katie Faulkner, [facility]  11/17/2023                                                     "     ______________________________________________________________________    Individual Treatment Plan    Patient's Name: Wandy Ann  YOB: 2000    Date of Creation: 2.22.2021  Date Treatment Plan Last Reviewed/Revised: 10/31/2023 due 1/31/2023    DSM5 Diagnoses: 296.52 Bipolar I Disorder Current or Most Recent Episode Depressed, Moderate, 300.02 (F41.1) Generalized Anxiety Disorder or 309.81 (F43.10) Posttraumatic Stress Disorder (includes Posttraumatic Stress Disorder for Children 6 Years and Younger)  Without dissociative symptoms  Psychosocial / Contextual Factors: past trauma, developmental hx and current stressors   PROMIS (reviewed every 90 days): 10/10/2023    Referral / Collaboration:  Referral to another professional/service is not indicated at this time..    Anticipated number of session for this episode of care: 12  Anticipation frequency of session: Biweekly  Anticipated Duration of each session: 38-52 minutes  Treatment plan will be reviewed in 90 days or when goals have been changed.       MeasurableTreatment Goal(s) related to diagnosis / functional impairment(s)  Goal 1: Patient will report absence of persistent SI and report of reduced frequency and intensity of SI and absence of SI to acceptable levels, report subjective improved mood for a period of 90 days, within 6 months as clinically observed and by patient self-report    I will know I've met my goal when I can see the difference between a bad moment and what I want in th future.      Objective #A (Patient Action)    Patient will demonstrate control of impulses and ability to use positive coping tools to manage strong emotions that can be safely addressed at a lower level of care. Absence of persistent SI and report of reduced frequency and intensity of SI and absence of SI to acceptable levels, report subjective improved mood for a period of 90 days, within 6 months as clinically observed and by patient  self-report.  Status: Continued - Date(s): 10/31/2023    Intervention(s)  Therapist will provide individual therapy to identify triggers to SI urges, gain feedback on helpful coping strategies, and identify ways that family can offer support. Tx to discuss current stressors and interpersonal conflicts and how to cope with these, coaching, diagnostic testing, referral for medication as indicated, use prescribed medication, cognitive restructuring, interpersonal family therapy, supportive therapy services.        Goal 2: Patient will report absence of persistent depression mood and report of reduced frequency and intensity of low mood and absence of persistent low energy and motivation to acceptable levels, report subjective improved motivation and increased energy for a period of 90 days, within 6 months as clinically observed and by patient self-report    I will know I've met my goal when I no longer feel sad.      Objective #A (Patient Action)    Status: Continued - Date(s): 10/31/2023    Patient will demonstrate and report a level of depression that can be managed at a lower level of care.  Absence of persistent depression mood and report of reduced frequency and intensity of low mood and absence of persistent low energy and motivation to acceptable levels, report subjective improved motivation and increased energy for a period of 90 days, within 6 months as clinically observed and by patient self-report.    Intervention(s)  Therapist will provide individual therapy to identify triggers to depression, gain feedback on helpful coping tools and thought-reframing techniques, and identify preferred way of being.  Tx to include discussion of current stressors and interpersonal conflicts and how to cope with these, coaching, diagnostic testing, referral for medication as indicated, use prescribed medication, cognitive restructuring, interpersonal, family therapy, supportive therapy services.        Goal 3: Patiient will  report absence of persistent anxiety mood and report of reduced frequency and intensity of worry and absence of persistent anxious mood to acceptable levels, no panic attacks, report subjective comfort with rumination for a period of 90 days. Within 6 months as clinically observed and by patient self-report    I will know I've met my goal when I can go days without worrying about little things or shaking.      Objective #A (Patient Action)    Status: Continued - Date(s): 10/31/2023    Patient will demonstrate and report a level of anxiety that can be managed at a lower level of care.  Absence of persistent anxious mood and report of reduced frequency and intensity of worry and absence of persistent anxious mood to acceptable levels, no panic attacks, report subjective comfort with rumination for a period of 90 days, within 6 months as clinically observed and by patient self-report.    Intervention(s)  Therapist will provide individual therapy to identify triggers to anxiety, gain feedback on helpful coping tools and thought-reframing techniques, and identify preferred way of being. Tx to include discuss current stressors and interpersonal conflicts and how to cope with these, coaching, diagnostic testing , referral for medication as indicated, use prescribed medication, cognitive restructuring, interpersonal,   family therapy, supportive therapy services.        Patient has reviewed and agreed to the above plan.      Katie Faulkner, [facility]   10/31/2023                                                   Wandy Ann            SAFETY PLAN:  Step 1: Warning signs / cues (Thoughts, images, mood, situation, behavior) that a crisis may be developing:  Thoughts: thoughts of not wanting to be here  Images: no images  Thinking Processes: intrusive thoughts (bothersome, unwanted thoughts that come out of nowhere): get irritated  Mood: intense anger and agitation  Behaviors: isolating/withdrawing   Situations:  "trauma    Step 2: Coping strategies - Things I can do to take my mind off of my problems without contacting another person (relaxation technique, physical activity):  Distress Tolerance Strategies:  arts and crafts: drawing and painting  Physical Activities: exercise: working out  Focus on helpful thoughts:  \"This is temporary\", \"It always passes\" and \"Ride the wave\"  Step 3: People and social settings that provide distraction:                 Name: Jennifer     Phone: [phone]                 Name: Antonina         Phone: [phone]                 Name: mom       Phone: [phone]  friends house or car   Step 4: Remind myself of people and things that are important to me and worth living for:  Best friend and cat        Step 5: When I am in crisis, I can ask these people to help me use my safety plan:                 Name: Jennifer     Phone: [phone]                 Name: Antonina         Phone: [phone]                 Name: panchito       Phone: [phone]  Step 6: Making the environment safe:   be around others  Step 7: Professionals or agencies I can contact during a crisis:  [facility] Daytime Number: [phone]  Suicide Prevention Lifeline: 5-426-108-TALK (6548)  Crisis Text Line Service (available 24 hours a day, 7 days a week): Text [place] to 961056  Local Crisis Services: 988     Call 911 or go to my nearest emergency department.       I helped develop this safety plan and agree to use it when needed.  I have been given a copy of this plan.       Client signature _________________________________________________________________  Today s date:  2/22/2021  Adapted from Safety Plan Template 2008 Marisol Cazares and Mario Flores is reprinted with the express permission of the authors.  No portion of the Safety Plan Template may be reproduced without the express, written permission.  You can contact the authors at bhs@[email or web address] or [email or web address].  "

## 2023-11-27 ENCOUNTER — MYC MEDICAL ADVICE (OUTPATIENT)
Dept: FAMILY MEDICINE | Facility: CLINIC | Age: 23
End: 2023-11-27

## 2023-11-28 NOTE — TELEPHONE ENCOUNTER
Patient reporting increased symptoms of GERD via mychart message    Please advise    Isela Back RN  [facility]

## 2023-12-07 ENCOUNTER — VIRTUAL VISIT (OUTPATIENT)
Dept: PSYCHOLOGY | Facility: CLINIC | Age: 23
End: 2023-12-07
Payer: COMMERCIAL

## 2023-12-07 DIAGNOSIS — F41.1 GENERALIZED ANXIETY DISORDER: ICD-10-CM

## 2023-12-07 DIAGNOSIS — F43.10 PTSD (POST-TRAUMATIC STRESS DISORDER): ICD-10-CM

## 2023-12-07 DIAGNOSIS — F31.32 BIPOLAR AFFECTIVE DISORDER, CURRENTLY DEPRESSED, MODERATE (H): Primary | ICD-10-CM

## 2023-12-07 DIAGNOSIS — F90.2 ADHD (ATTENTION DEFICIT HYPERACTIVITY DISORDER), COMBINED TYPE: ICD-10-CM

## 2023-12-07 PROCEDURE — 90834 PSYTX W PT 45 MINUTES: CPT | Mod: VID | Performed by: SOCIAL WORKER

## 2023-12-07 NOTE — PROGRESS NOTES
[facility]                                      Progress Note    Patient Name: Wandy Ann  Date: 2023         Service Type: Individual      Session Start Time: 904  Session End Time: 955     Session Length: 38-52    Session #: 67    Attendees: Client    Service Modality:    Video Visit:      Provider verified identity through the following two step process.  Patient provided: Patient  and Patient previous known to provider     Telemedicine Visit: The patient's condition can be safely assessed and treated via synchronous audio and visual telemedicine encounter.       Reason for Telemedicine Visit: Patient has requested telehealth visit     Originating Site (Patient Location): Patient's home     Distant Site (Provider Location): Provider Remote Setting- Home Office     Consent:  The patient/guardian has verbally consented to: the potential risks and benefits of telemedicine (video visit) versus in person care; bill my insurance or make self-payment for services provided; and responsibility for payment of non-covered services.      Patient would like the video invitation sent by: email/text     Mode of Communication: Video Conference via [employer]     Distant Location (Provider): Off-site     As the provider I attest to compliance with applicable laws and regulations related to telemedicine.        DATA  Interactive Complexity: No  Crisis: No        Progress Since Last Session (Related to Symptoms / Goals / Homework):   Symptoms: Worsening lots of stressors      Homework: Partially completed      Episode of Care Goals: Satisfactory progress - ACTION (Actively working towards change); Intervened by reinforcing change plan / affirming steps taken       Current / Ongoing Stressors and Concerns:   past trauma, developmental hx and current stressors      Treatment Objective(s) Addressed in This Session:   Patient will demonstrate control of impulses and ability to use positive  coping tools to manage strong emotions that can be safely addressed at a lower level of care. Absence of persistent SI and report of reduced frequency and intensity of SI and absence of SI to acceptable levels, report subjective improved mood for a period of 90 days, within 6 months as clinically observed and by patient self-report.  Patient will demonstrate and report a level of depression that can be managed at a lower level of care.  Absence of persistent depression mood and report of reduced frequency and intensity of low mood and absence of persistent low energy and motivation to acceptable levels, report subjective improved motivation and increased energy for a period of 90 days, within 6 months as clinically observed and by patient self-report.  Patient will demonstrate and report a level of anxiety that can be managed at a lower level of care.  Absence of persistent anxious mood and report of reduced frequency and intensity of worry and absence of persistent anxious mood to acceptable levels, no panic attacks, report subjective comfort with rumination for a period of 90 days, within 6 months as clinically observed and by patient self-report.       Intervention:   Therapist met with patient to review goals and interventions. Therapist utilized reflected listening as patient gave brief reflection of week. Patient reported high anxiety and processed going to the ED because she was scared something was really wrong with her. Therapist supported patient as she processed and provided education to patient on anxiety and reactions to the body. Therapist validated patient's emotions around partners friend group and encouraged patient to sit with it and move onward.   Patient presented with an anxious affect. Patient was engaged in session and open to feedback. Patient contracted for safety      There has been demonstrated improvement in functioning while patient has been engaged in psychotherapy/psychological  service- if withdrawn the patient would deteriorate and/or relapse.     Assessments completed prior to visit:  The following assessments were completed by patient for this visit:  PHQ9:       2/2/2023    11:53 AM 4/14/2023     9:13 AM 4/27/2023     9:55 AM 8/18/2023    12:47 AM 10/2/2023     9:43 AM 10/2/2023     9:44 AM 10/10/2023     1:57 PM   PHQ-9 SCORE   PHQ-9 Total Score MyChart 20 (Severe depression) 19 (Moderately severe depression) 17 (Moderately severe depression) 13 (Moderate depression)  15 (Moderately severe depression) 16 (Moderately severe depression)   PHQ-9 Total Score 20 19 17 13 15 15 16     GAD7:       6/21/2022     8:29 AM 8/24/2022     2:00 PM 10/31/2022     8:08 AM 11/29/2022     2:34 PM 2/2/2023    11:53 AM 4/20/2023    12:41 PM 10/10/2023     1:57 PM   YESSI-7 SCORE   Total Score 9 (mild anxiety)  2 (minimal anxiety) 11 (moderate anxiety) 16 (severe anxiety) 13 (moderate anxiety) 17 (severe anxiety)   Total Score 9 13 2 11 16 13 17           ASSESSMENT: Current Emotional / Mental Status (status of significant symptoms):   Risk status (Self / Other harm or suicidal ideation)   Patient denies current fears or concerns for personal safety.   Patient reports the following current or recent suicidal ideation or behaviors: more habit than belief contracted for safety .   Patient denies current or recent homicidal ideation or behaviors.   Patient denies current or recent self injurious behavior or ideation.   Patient denies other safety concerns.   Patient reports there has been no change in risk factors since their last session.     Patient reports there has been no change in protective factors since their last session.     A safety and risk management plan has been developed including: Patient consented to co-developed safety plan on 2.21.2022.  Safety and risk management plan was reviewed.   Patient agreed to use safety plan should any safety concerns arise.  A copy was made available to the  "patient.     Appearance:   Appropriate     Eye Contact:   Fair     Psychomotor Behavior: Restless     Attitude:   Cooperative    Orientation:   All   Speech    Rate / Production: Emotional Talkative    Volume:  Normal    Mood:    Anxious    Affect:    Worrisome    Thought Content:  Clear    Thought Form:  Coherent    Insight:    Fair      Medication Review:   Changes to psychiatric medications, see updated Medication List in EPIC.      Medication Compliance:   Yes     Changes in Health Issues:   None reported     Chemical Use Review:   Substance Use: Chemical use reviewed, no active concerns identified      Tobacco Use: No current tobacco use.      Diagnosis:  1. Bipolar affective disorder, currently depressed, moderate (H)    2. Generalized anxiety disorder    3. ADHD (attention deficit hyperactivity disorder), combined type    4. PTSD (post-traumatic stress disorder)        Collateral Reports Completed:   Not Applicable    PLAN: (Patient Tasks / Therapist Tasks / Other)        Call [phone] if feeling SI has increase or go to ED or empath   Utilize coping skills when feeling impulsive.   Replace distortions with positive attributes    Patient is to watch \"I am and Happy\" documentary and make list of what makes her happy.   Look at intention rather than perception  encouraged patient to sit with it and move onward.   Name anxiety and manage it in the moment      Katie Faulkner, [facility]  12/7/2023                                                         ______________________________________________________________________    Individual Treatment Plan    Patient's Name: Wandy Ann  YOB: 2000    Date of Creation: 2.22.2021  Date Treatment Plan Last Reviewed/Revised: 10/31/2023 due 1/31/2023    DSM5 Diagnoses: 296.52 Bipolar I Disorder Current or Most Recent Episode Depressed, Moderate, 300.02 (F41.1) Generalized Anxiety Disorder or 309.81 (F43.10) Posttraumatic Stress Disorder (includes " Posttraumatic Stress Disorder for Children 6 Years and Younger)  Without dissociative symptoms  Psychosocial / Contextual Factors: past trauma, developmental hx and current stressors   PROMIS (reviewed every 90 days): 10/10/2023    Referral / Collaboration:  Referral to another professional/service is not indicated at this time..    Anticipated number of session for this episode of care: 12  Anticipation frequency of session: Biweekly  Anticipated Duration of each session: 38-52 minutes  Treatment plan will be reviewed in 90 days or when goals have been changed.       MeasurableTreatment Goal(s) related to diagnosis / functional impairment(s)  Goal 1: Patient will report absence of persistent SI and report of reduced frequency and intensity of SI and absence of SI to acceptable levels, report subjective improved mood for a period of 90 days, within 6 months as clinically observed and by patient self-report    I will know I've met my goal when I can see the difference between a bad moment and what I want in th future.      Objective #A (Patient Action)    Patient will demonstrate control of impulses and ability to use positive coping tools to manage strong emotions that can be safely addressed at a lower level of care. Absence of persistent SI and report of reduced frequency and intensity of SI and absence of SI to acceptable levels, report subjective improved mood for a period of 90 days, within 6 months as clinically observed and by patient self-report.  Status: Continued - Date(s): 10/31/2023    Intervention(s)  Therapist will provide individual therapy to identify triggers to SI urges, gain feedback on helpful coping strategies, and identify ways that family can offer support. Tx to discuss current stressors and interpersonal conflicts and how to cope with these, coaching, diagnostic testing, referral for medication as indicated, use prescribed medication, cognitive restructuring, interpersonal family therapy,  supportive therapy services.        Goal 2: Patient will report absence of persistent depression mood and report of reduced frequency and intensity of low mood and absence of persistent low energy and motivation to acceptable levels, report subjective improved motivation and increased energy for a period of 90 days, within 6 months as clinically observed and by patient self-report    I will know I've met my goal when I no longer feel sad.      Objective #A (Patient Action)    Status: Continued - Date(s): 10/31/2023    Patient will demonstrate and report a level of depression that can be managed at a lower level of care.  Absence of persistent depression mood and report of reduced frequency and intensity of low mood and absence of persistent low energy and motivation to acceptable levels, report subjective improved motivation and increased energy for a period of 90 days, within 6 months as clinically observed and by patient self-report.    Intervention(s)  Therapist will provide individual therapy to identify triggers to depression, gain feedback on helpful coping tools and thought-reframing techniques, and identify preferred way of being.  Tx to include discussion of current stressors and interpersonal conflicts and how to cope with these, coaching, diagnostic testing, referral for medication as indicated, use prescribed medication, cognitive restructuring, interpersonal, family therapy, supportive therapy services.        Goal 3: Patiient will report absence of persistent anxiety mood and report of reduced frequency and intensity of worry and absence of persistent anxious mood to acceptable levels, no panic attacks, report subjective comfort with rumination for a period of 90 days. Within 6 months as clinically observed and by patient self-report    I will know I've met my goal when I can go days without worrying about little things or shaking.      Objective #A (Patient Action)    Status: Continued - Date(s):  "10/31/2023    Patient will demonstrate and report a level of anxiety that can be managed at a lower level of care.  Absence of persistent anxious mood and report of reduced frequency and intensity of worry and absence of persistent anxious mood to acceptable levels, no panic attacks, report subjective comfort with rumination for a period of 90 days, within 6 months as clinically observed and by patient self-report.    Intervention(s)  Therapist will provide individual therapy to identify triggers to anxiety, gain feedback on helpful coping tools and thought-reframing techniques, and identify preferred way of being. Tx to include discuss current stressors and interpersonal conflicts and how to cope with these, coaching, diagnostic testing , referral for medication as indicated, use prescribed medication, cognitive restructuring, interpersonal,   family therapy, supportive therapy services.        Patient has reviewed and agreed to the above plan.      Katie Faulkner, [facility]   10/31/2023                                                   Wandy Ann            SAFETY PLAN:  Step 1: Warning signs / cues (Thoughts, images, mood, situation, behavior) that a crisis may be developing:  Thoughts: thoughts of not wanting to be here  Images: no images  Thinking Processes: intrusive thoughts (bothersome, unwanted thoughts that come out of nowhere): get irritated  Mood: intense anger and agitation  Behaviors: isolating/withdrawing   Situations: trauma    Step 2: Coping strategies - Things I can do to take my mind off of my problems without contacting another person (relaxation technique, physical activity):  Distress Tolerance Strategies:  arts and crafts: drawing and painting  Physical Activities: exercise: working out  Focus on helpful thoughts:  \"This is temporary\", \"It always passes\" and \"Ride the wave\"  Step 3: People and social settings that provide distraction:                 Name: Jennifer     Phone: " [phone]                 Name: Antonina         Phone: [phone]                 Name: panchito       Phone: [phone]  friends house or car   Step 4: Remind myself of people and things that are important to me and worth living for:  Best friend and cat        Step 5: When I am in crisis, I can ask these people to help me use my safety plan:                 Name: Jennifer     Phone: [phone]                 Name: Antonina         Phone: [phone]                 Name: panchito       Phone: [phone]  Step 6: Making the environment safe:   be around others  Step 7: Professionals or agencies I can contact during a crisis:  [facility] Daytime Number: 014-797-5889  Suicide Prevention Lifeline: 6-033-694-XZJW (5246)  Crisis Text Line Service (available 24 hours a day, 7 days a week): Text [place] to 533758  Local Crisis Services: 988     Call 911 or go to my nearest emergency department.       I helped develop this safety plan and agree to use it when needed.  I have been given a copy of this plan.       Client signature _________________________________________________________________  Today s date:  2/22/2021  Adapted from Safety Plan Template 2008 Marisol Cazares and Mario Flores is reprinted with the express permission of the authors.  No portion of the Safety Plan Template may be reproduced without the express, written permission.  You can contact the authors at [email or web address] or [email or web address].

## 2023-12-14 ASSESSMENT — PATIENT HEALTH QUESTIONNAIRE - PHQ9
SUM OF ALL RESPONSES TO PHQ QUESTIONS 1-9: 7
10. IF YOU CHECKED OFF ANY PROBLEMS, HOW DIFFICULT HAVE THESE PROBLEMS MADE IT FOR YOU TO DO YOUR WORK, TAKE CARE OF THINGS AT HOME, OR GET ALONG WITH OTHER PEOPLE: VERY DIFFICULT
SUM OF ALL RESPONSES TO PHQ QUESTIONS 1-9: 7

## 2023-12-15 ENCOUNTER — OFFICE VISIT (OUTPATIENT)
Dept: FAMILY MEDICINE | Facility: CLINIC | Age: 23
End: 2023-12-15
Payer: COMMERCIAL

## 2023-12-15 VITALS
OXYGEN SATURATION: 100 % | HEART RATE: 90 BPM | DIASTOLIC BLOOD PRESSURE: 70 MMHG | RESPIRATION RATE: 24 BRPM | TEMPERATURE: 98 F | WEIGHT: 195.2 LBS | SYSTOLIC BLOOD PRESSURE: 104 MMHG | BODY MASS INDEX: 38.32 KG/M2 | HEIGHT: 60 IN

## 2023-12-15 DIAGNOSIS — R14.0 BLOATING: ICD-10-CM

## 2023-12-15 DIAGNOSIS — R74.8 ELEVATED LIVER ENZYMES: Primary | ICD-10-CM

## 2023-12-15 DIAGNOSIS — K21.9 GASTROESOPHAGEAL REFLUX DISEASE WITHOUT ESOPHAGITIS: ICD-10-CM

## 2023-12-15 DIAGNOSIS — Z13.220 SCREENING FOR HYPERLIPIDEMIA: ICD-10-CM

## 2023-12-15 LAB
ALBUMIN SERPL BCG-MCNC: 4.2 G/DL (ref 3.5–5.2)
ALP SERPL-CCNC: 82 U/L (ref 40–150)
ALT SERPL W P-5'-P-CCNC: 47 U/L (ref 0–50)
ANION GAP SERPL CALCULATED.3IONS-SCNC: 9 MMOL/L (ref 7–15)
AST SERPL W P-5'-P-CCNC: 27 U/L (ref 0–45)
BILIRUB SERPL-MCNC: 0.3 MG/DL
BUN SERPL-MCNC: 11.1 MG/DL (ref 6–20)
CALCIUM SERPL-MCNC: 9.5 MG/DL (ref 8.6–10)
CHLORIDE SERPL-SCNC: 105 MMOL/L (ref 98–107)
CHOLEST SERPL-MCNC: 146 MG/DL
CREAT SERPL-MCNC: 0.69 MG/DL (ref 0.51–0.95)
DEPRECATED HCO3 PLAS-SCNC: 27 MMOL/L (ref 22–29)
EGFRCR SERPLBLD CKD-EPI 2021: >90 ML/MIN/1.73M2
FASTING STATUS PATIENT QL REPORTED: YES
GLUCOSE SERPL-MCNC: 96 MG/DL (ref 70–99)
HDLC SERPL-MCNC: 38 MG/DL
LDLC SERPL CALC-MCNC: 93 MG/DL
NONHDLC SERPL-MCNC: 108 MG/DL
POTASSIUM SERPL-SCNC: 4.2 MMOL/L (ref 3.4–5.3)
PROT SERPL-MCNC: 7.5 G/DL (ref 6.4–8.3)
SODIUM SERPL-SCNC: 141 MMOL/L (ref 135–145)
TRIGL SERPL-MCNC: 76 MG/DL

## 2023-12-15 PROCEDURE — 99214 OFFICE O/P EST MOD 30 MIN: CPT | Mod: 25 | Performed by: PHYSICIAN ASSISTANT

## 2023-12-15 PROCEDURE — 36415 COLL VENOUS BLD VENIPUNCTURE: CPT | Performed by: PHYSICIAN ASSISTANT

## 2023-12-15 PROCEDURE — 91320 SARSCV2 VAC 30MCG TRS-SUC IM: CPT | Performed by: PHYSICIAN ASSISTANT

## 2023-12-15 PROCEDURE — 90480 ADMN SARSCOV2 VAC 1/ONLY CMP: CPT | Performed by: PHYSICIAN ASSISTANT

## 2023-12-15 PROCEDURE — 80061 LIPID PANEL: CPT | Performed by: PHYSICIAN ASSISTANT

## 2023-12-15 PROCEDURE — 80053 COMPREHEN METABOLIC PANEL: CPT | Performed by: PHYSICIAN ASSISTANT

## 2023-12-15 RX ORDER — OMEPRAZOLE 40 MG/1
40 CAPSULE, DELAYED RELEASE ORAL DAILY
Qty: 90 CAPSULE | Refills: 1 | Status: SHIPPED | OUTPATIENT
Start: 2023-12-15 | End: 2024-04-26 | Stop reason: DRUGHIGH

## 2023-12-15 ASSESSMENT — PAIN SCALES - GENERAL: PAINLEVEL: NO PAIN (0)

## 2023-12-15 NOTE — PROGRESS NOTES
Assessment & Plan     Gastroesophageal reflux disease without esophagitis  Try short term higher dose of prilosec. Prescription sent.   - omeprazole (PRILOSEC) 40 MG DR capsule; Take 1 capsule (40 mg) by mouth daily    Elevated liver enzymes  Recheck.  - Comprehensive metabolic panel (BMP + Alb, Alk Phos, ALT, AST, Total. Bili, TP)    Bloating  Rule out other causes of stomach symptoms.   - Helicobacter pylori Antigen Stool; Future  - Helicobacter pylori Antigen Stool    Screening for hyperlipidemia  Recheck.  - Lipid panel reflex to direct LDL Fasting; Future  - Lipid panel reflex to direct LDL Fasting      30 minutes spent by me on the date of the encounter doing chart review, history and exam, documentation and further activities per the note       BMI:   Estimated body mass index is 38.12 kg/m  as calculated from the following:    Height as of this encounter: 1.524 m (5').    Weight as of this encounter: 88.5 kg (195 lb 3.2 oz).           Chinyere Ruiz PA-C  [facility] DEREK Saba is a 23 year old, presenting for the following health issues:  Chest Pain        12/15/2023     8:23 AM   Additional Questions   Roomed by Sanam   Accompanied by none         12/15/2023     8:23 AM   Patient Reported Additional Medications   Patient reports taking the following new medications Fish oil     Patient just had an EKG at ER at [facility] with an x/ray 2 weeks or so ago and both were normal, also had a COVID19, strep tests both negative   Patient has been having symptoms the past three weeks ago  Throat has been bothering her off and on the past 6 months   Patient has had no fevers     History of Present Illness       Reason for visit:  Chest pain/ possible GERD symptoms.  Symptom onset:  3-4 weeks ago  Symptoms include:  Chest burning, mucus in throat, diarrhea,Bloated, burning feeling in stomach, weight loss  Symptom intensity:  Moderate  Symptom progression:  Staying the same  Had these  symptoms before:  Yes  Has tried/received treatment for these symptoms:  Yes  Previous treatment was successful:  No  What makes it worse:  Stress/ anxiety, eating red sauce  What makes it better:  Antiacids, hydroxyzine    She eats 2-3 servings of fruits and vegetables daily.She consumes 1 sweetened beverage(s) daily.She exercises with enough effort to increase her heart rate 10 to 19 minutes per day.  She exercises with enough effort to increase her heart rate 4 days per week.   She is taking medications regularly.     Has had persistent issues with epigastric pain, sometimes chest pain after eating foods like red sauce, increased by stress and anxiety.   TUMS helps. Hydroxyzine helps. No changes in stool. No vomiting. No fever, chills.               Review of Systems   Constitutional, HEENT, cardiovascular, pulmonary, gi and gu systems are negative, except as otherwise noted.      Objective    /70 (BP Location: Left arm, Patient Position: Sitting, Cuff Size: Adult Regular)   Pulse 90   Temp 98  F (36.7  C) (Oral)   Resp 24   Ht 1.524 m (5')   Wt 88.5 kg (195 lb 3.2 oz)   SpO2 100%   BMI 38.12 kg/m    Body mass index is 38.12 kg/m .  Physical Exam   GENERAL: healthy, alert and no distress  NECK: no adenopathy, no asymmetry, masses, or scars and thyroid normal to palpation  RESP: lungs clear to auscultation - no rales, rhonchi or wheezes  CV: regular rate and rhythm, normal S1 S2, no S3 or S4, no murmur, click or rub, no peripheral edema and peripheral pulses strong  MS: no gross musculoskeletal defects noted, no edema

## 2023-12-18 ENCOUNTER — VIRTUAL VISIT (OUTPATIENT)
Dept: PSYCHOLOGY | Facility: CLINIC | Age: 23
End: 2023-12-18
Payer: COMMERCIAL

## 2023-12-18 DIAGNOSIS — F43.10 PTSD (POST-TRAUMATIC STRESS DISORDER): ICD-10-CM

## 2023-12-18 DIAGNOSIS — F31.32 BIPOLAR AFFECTIVE DISORDER, CURRENTLY DEPRESSED, MODERATE (H): Primary | ICD-10-CM

## 2023-12-18 DIAGNOSIS — F41.1 GENERALIZED ANXIETY DISORDER: ICD-10-CM

## 2023-12-18 DIAGNOSIS — F90.2 ADHD (ATTENTION DEFICIT HYPERACTIVITY DISORDER), COMBINED TYPE: ICD-10-CM

## 2023-12-18 PROCEDURE — 90834 PSYTX W PT 45 MINUTES: CPT | Mod: VID | Performed by: SOCIAL WORKER

## 2023-12-18 NOTE — PROGRESS NOTES
[facility]                                      Progress Note    Patient Name: Wandy Ann  Date: 2023         Service Type: Individual      Session Start Time:804  Session End Time: 849     Session Length: 38-52    Session #: 68    Attendees: Client    Service Modality:    Video Visit:      Provider verified identity through the following two step process.  Patient provided: Patient  and Patient previous known to provider     Telemedicine Visit: The patient's condition can be safely assessed and treated via synchronous audio and visual telemedicine encounter.       Reason for Telemedicine Visit: Patient has requested telehealth visit     Originating Site (Patient Location): Patient's home     Distant Site (Provider Location): Provider Remote Setting- Home Office     Consent:  The patient/guardian has verbally consented to: the potential risks and benefits of telemedicine (video visit) versus in person care; bill my insurance or make self-payment for services provided; and responsibility for payment of non-covered services.      Patient would like the video invitation sent by: email/text     Mode of Communication: Video Conference via [employer]     Distant Location (Provider): Off-site     As the provider I attest to compliance with applicable laws and regulations related to telemedicine.        DATA  Interactive Complexity: No  Crisis: No        Progress Since Last Session (Related to Symptoms / Goals / Homework):   Symptoms: Worsening anxiety      Homework: Achieved / completed to satisfaction  Partially completed      Episode of Care Goals: Satisfactory progress - ACTION (Actively working towards change); Intervened by reinforcing change plan / affirming steps taken       Current / Ongoing Stressors and Concerns:   past trauma, developmental hx and current stressors      Treatment Objective(s) Addressed in This Session:   Patient will demonstrate control of impulses and  ability to use positive coping tools to manage strong emotions that can be safely addressed at a lower level of care. Absence of persistent SI and report of reduced frequency and intensity of SI and absence of SI to acceptable levels, report subjective improved mood for a period of 90 days, within 6 months as clinically observed and by patient self-report.  Patient will demonstrate and report a level of depression that can be managed at a lower level of care.  Absence of persistent depression mood and report of reduced frequency and intensity of low mood and absence of persistent low energy and motivation to acceptable levels, report subjective improved motivation and increased energy for a period of 90 days, within 6 months as clinically observed and by patient self-report.  Patient will demonstrate and report a level of anxiety that can be managed at a lower level of care.  Absence of persistent anxious mood and report of reduced frequency and intensity of worry and absence of persistent anxious mood to acceptable levels, no panic attacks, report subjective comfort with rumination for a period of 90 days, within 6 months as clinically observed and by patient self-report.       Intervention:   Motivational Interviewing  Target Behavior:  avoiding anxiety    Stage of Change: PREPARATION (Decided to change - considering how)    MI Intervention: Expressed Empathy/Understanding, Supported Autonomy, Collaboration, Evocation, Permission to raise concern or advise, and Open-ended questions     Change Talk Expressed by the Patient: Reasons to change Need to change    Provider Response to Change Talk: R - Reflected patient's change talk and S - Summarized patient's change talk statements        There has been demonstrated improvement in functioning while patient has been engaged in psychotherapy/psychological service- if withdrawn the patient would deteriorate and/or relapse.     Assessments completed prior to visit:  The  following assessments were completed by patient for this visit:  PHQ9:       4/14/2023     9:13 AM 4/27/2023     9:55 AM 8/18/2023    12:47 AM 10/2/2023     9:43 AM 10/2/2023     9:44 AM 10/10/2023     1:57 PM 12/14/2023     9:33 AM   PHQ-9 SCORE   PHQ-9 Total Score MyChart 19 (Moderately severe depression) 17 (Moderately severe depression) 13 (Moderate depression)  15 (Moderately severe depression) 16 (Moderately severe depression) 7 (Mild depression)   PHQ-9 Total Score 19 17 13 15 15 16 7     GAD7:       6/21/2022     8:29 AM 8/24/2022     2:00 PM 10/31/2022     8:08 AM 11/29/2022     2:34 PM 2/2/2023    11:53 AM 4/20/2023    12:41 PM 10/10/2023     1:57 PM   YESSI-7 SCORE   Total Score 9 (mild anxiety)  2 (minimal anxiety) 11 (moderate anxiety) 16 (severe anxiety) 13 (moderate anxiety) 17 (severe anxiety)   Total Score 9 13 2 11 16 13 17           ASSESSMENT: Current Emotional / Mental Status (status of significant symptoms):   Risk status (Self / Other harm or suicidal ideation)   Patient denies current fears or concerns for personal safety.   Patient reports the following current or recent suicidal ideation or behaviors: more habit than belief contracted for safety .   Patient denies current or recent homicidal ideation or behaviors.   Patient denies current or recent self injurious behavior or ideation.   Patient denies other safety concerns.   Patient reports there has been no change in risk factors since their last session.     Patient reports there has been no change in protective factors since their last session.     A safety and risk management plan has been developed including: Patient consented to co-developed safety plan on 2.21.2022.  Safety and risk management plan was reviewed.   Patient agreed to use safety plan should any safety concerns arise.  A copy was made available to the patient.     Appearance:   Appropriate     Eye Contact:   Fair     Psychomotor Behavior: Restless   "   Attitude:   Cooperative    Orientation:   All   Speech    Rate / Production: Emotional Talkative    Volume:  Normal    Mood:    Anxious    Affect:    Worrisome    Thought Content:  Clear    Thought Form:  Coherent    Insight:    Fair      Medication Review:   Changes to psychiatric medications, see updated Medication List in EPIC.      Medication Compliance:   Yes     Changes in Health Issues:   None reported     Chemical Use Review:   Substance Use: Chemical use reviewed, no active concerns identified      Tobacco Use: No current tobacco use.      Diagnosis:  1. Bipolar affective disorder, currently depressed, moderate (H)    2. Generalized anxiety disorder    3. ADHD (attention deficit hyperactivity disorder), combined type    4. PTSD (post-traumatic stress disorder)        Collateral Reports Completed:   Not Applicable    PLAN: (Patient Tasks / Therapist Tasks / Other)        Call [phone] if feeling SI has increase or go to ED or empath   Utilize coping skills when feeling impulsive.   Replace distortions with positive attributes    Patient is to watch \"I am and Happy\" documentary and make list of what makes her happy.   Look at intention rather than perception  encouraged patient to sit with it and move onward.   Name anxiety and manage it in the moment  Do not avoid anxiety but rather manage it and remind self when feeling it     Katie Faulkner, [facility]  12/18/2023                                                         ______________________________________________________________________    Individual Treatment Plan    Patient's Name: Wandy Ann  YOB: 2000    Date of Creation: 2.22.2021  Date Treatment Plan Last Reviewed/Revised: 10/31/2023 due 1/31/2023    DSM5 Diagnoses: 296.52 Bipolar I Disorder Current or Most Recent Episode Depressed, Moderate, 300.02 (F41.1) Generalized Anxiety Disorder or 309.81 (F43.10) Posttraumatic Stress Disorder (includes Posttraumatic Stress Disorder for " Children 6 Years and Younger)  Without dissociative symptoms  Psychosocial / Contextual Factors: past trauma, developmental hx and current stressors   PROMIS (reviewed every 90 days): 10/10/2023    Referral / Collaboration:  Referral to another professional/service is not indicated at this time..    Anticipated number of session for this episode of care: 12  Anticipation frequency of session: Biweekly  Anticipated Duration of each session: 38-52 minutes  Treatment plan will be reviewed in 90 days or when goals have been changed.       MeasurableTreatment Goal(s) related to diagnosis / functional impairment(s)  Goal 1: Patient will report absence of persistent SI and report of reduced frequency and intensity of SI and absence of SI to acceptable levels, report subjective improved mood for a period of 90 days, within 6 months as clinically observed and by patient self-report    I will know I've met my goal when I can see the difference between a bad moment and what I want in th future.      Objective #A (Patient Action)    Patient will demonstrate control of impulses and ability to use positive coping tools to manage strong emotions that can be safely addressed at a lower level of care. Absence of persistent SI and report of reduced frequency and intensity of SI and absence of SI to acceptable levels, report subjective improved mood for a period of 90 days, within 6 months as clinically observed and by patient self-report.  Status: Continued - Date(s): 10/31/2023    Intervention(s)  Therapist will provide individual therapy to identify triggers to SI urges, gain feedback on helpful coping strategies, and identify ways that family can offer support. Tx to discuss current stressors and interpersonal conflicts and how to cope with these, coaching, diagnostic testing, referral for medication as indicated, use prescribed medication, cognitive restructuring, interpersonal family therapy, supportive therapy  services.        Goal 2: Patient will report absence of persistent depression mood and report of reduced frequency and intensity of low mood and absence of persistent low energy and motivation to acceptable levels, report subjective improved motivation and increased energy for a period of 90 days, within 6 months as clinically observed and by patient self-report    I will know I've met my goal when I no longer feel sad.      Objective #A (Patient Action)    Status: Continued - Date(s): 10/31/2023    Patient will demonstrate and report a level of depression that can be managed at a lower level of care.  Absence of persistent depression mood and report of reduced frequency and intensity of low mood and absence of persistent low energy and motivation to acceptable levels, report subjective improved motivation and increased energy for a period of 90 days, within 6 months as clinically observed and by patient self-report.    Intervention(s)  Therapist will provide individual therapy to identify triggers to depression, gain feedback on helpful coping tools and thought-reframing techniques, and identify preferred way of being.  Tx to include discussion of current stressors and interpersonal conflicts and how to cope with these, coaching, diagnostic testing, referral for medication as indicated, use prescribed medication, cognitive restructuring, interpersonal, family therapy, supportive therapy services.        Goal 3: Patiient will report absence of persistent anxiety mood and report of reduced frequency and intensity of worry and absence of persistent anxious mood to acceptable levels, no panic attacks, report subjective comfort with rumination for a period of 90 days. Within 6 months as clinically observed and by patient self-report    I will know I've met my goal when I can go days without worrying about little things or shaking.      Objective #A (Patient Action)    Status: Continued - Date(s): 10/31/2023    Patient  "will demonstrate and report a level of anxiety that can be managed at a lower level of care.  Absence of persistent anxious mood and report of reduced frequency and intensity of worry and absence of persistent anxious mood to acceptable levels, no panic attacks, report subjective comfort with rumination for a period of 90 days, within 6 months as clinically observed and by patient self-report.    Intervention(s)  Therapist will provide individual therapy to identify triggers to anxiety, gain feedback on helpful coping tools and thought-reframing techniques, and identify preferred way of being. Tx to include discuss current stressors and interpersonal conflicts and how to cope with these, coaching, diagnostic testing , referral for medication as indicated, use prescribed medication, cognitive restructuring, interpersonal,   family therapy, supportive therapy services.        Patient has reviewed and agreed to the above plan.      Katie Faulkner, [facility]   10/31/2023                                                   Wandy Ann            SAFETY PLAN:  Step 1: Warning signs / cues (Thoughts, images, mood, situation, behavior) that a crisis may be developing:  Thoughts: thoughts of not wanting to be here  Images: no images  Thinking Processes: intrusive thoughts (bothersome, unwanted thoughts that come out of nowhere): get irritated  Mood: intense anger and agitation  Behaviors: isolating/withdrawing   Situations: trauma    Step 2: Coping strategies - Things I can do to take my mind off of my problems without contacting another person (relaxation technique, physical activity):  Distress Tolerance Strategies:  arts and crafts: drawing and painting  Physical Activities: exercise: working out  Focus on helpful thoughts:  \"This is temporary\", \"It always passes\" and \"Ride the wave\"  Step 3: People and social settings that provide distraction:                 Name: Jennifer     Phone: [phone]                 " Name: Antonina         Phone: [phone]                 Name: panchito       Phone: [phone]  friends house or car   Step 4: Remind myself of people and things that are important to me and worth living for:  Best friend and cat        Step 5: When I am in crisis, I can ask these people to help me use my safety plan:                 Name: Jennifer     Phone: [phone]                 Name: Antonina         Phone: [phone]                 Name: panchito       Phone: [phone]  Step 6: Making the environment safe:   be around others  Step 7: Professionals or agencies I can contact during a crisis:  [facility] Daytime Number: [phone]  Suicide Prevention Lifeline: 7-869-216-UYFV (2905)  Crisis Text Line Service (available 24 hours a day, 7 days a week): Text [place] to 145856  Local Crisis Services: 988     Call 911 or go to my nearest emergency department.       I helped develop this safety plan and agree to use it when needed.  I have been given a copy of this plan.       Client signature _________________________________________________________________  Today s date:  2/22/2021  Adapted from Safety Plan Template 2008 Marisol Cazares and Mario Flores is reprinted with the express permission of the authors.  No portion of the Safety Plan Template may be reproduced without the express, written permission.  You can contact the authors at [email or web address] or [email or web address].

## 2023-12-19 PROCEDURE — 87338 HPYLORI STOOL AG IA: CPT | Performed by: PHYSICIAN ASSISTANT

## 2023-12-20 LAB — H PYLORI AG STL QL IA: NEGATIVE

## 2024-01-04 ENCOUNTER — VIRTUAL VISIT (OUTPATIENT)
Dept: PSYCHOLOGY | Facility: CLINIC | Age: 24
End: 2024-01-04
Payer: COMMERCIAL

## 2024-01-04 DIAGNOSIS — F31.32 BIPOLAR AFFECTIVE DISORDER, CURRENTLY DEPRESSED, MODERATE (H): Primary | ICD-10-CM

## 2024-01-04 DIAGNOSIS — F43.10 PTSD (POST-TRAUMATIC STRESS DISORDER): ICD-10-CM

## 2024-01-04 DIAGNOSIS — F90.2 ADHD (ATTENTION DEFICIT HYPERACTIVITY DISORDER), COMBINED TYPE: ICD-10-CM

## 2024-01-04 DIAGNOSIS — F41.1 GENERALIZED ANXIETY DISORDER: ICD-10-CM

## 2024-01-04 PROCEDURE — 90834 PSYTX W PT 45 MINUTES: CPT | Mod: 95 | Performed by: SOCIAL WORKER

## 2024-01-04 NOTE — PROGRESS NOTES
"    [facility]                                      Progress Note    Patient Name: Wandy Ann  Date: 1/4/2024         Service Type: Individual      Session Start Time: 1204  Session End Time: 1247     Session Length: 38-52    Session #: 69    Attendees: Client    Service Modality:    Phone Visit:      Provider verified identity through the following two step process.  Patient provided:  Patient is known previously to provider     Telephone Visit: The patient's condition can be safely assessed and treated via synchronous audio telemedicine encounter.       Reason for Audio Telemedicine Visit: Services only offered telehealth     Originating Site (Patient Location): Patient's home     Distant Site (Provider Location): off site      Consent:  The patient/guardian has verbally consented to:      1. The potential risks and benefits of telemedicine (telephone visit) versus in person care;     The patient has been notified of the following:      \"We have found that certain health care needs can be provided without the need for a face to face visit.  This service lets us provide the care you need with a phone conversation.       I will have full access to your [facility] medical record during this entire phone call.   I will be taking notes for your medical record.      Since this is like an office visit, we will bill your insurance company for this service.       There are potential benefits and risks of telephone visits (e.g. limits to patient confidentiality) that differ from in-person visits.?Confidentiality still applies for telephone services, and nobody will record the visit.  It is important to be in a quiet, private space that is free of distractions (including cell phone or other devices) during the visit.??      If during the course of the call I believe a telephone visit is not appropriate, you will not be charged for this service\"     Consent has been obtained for this " service by care team member: Yes       DATA  Interactive Complexity: No  Crisis: No        Progress Since Last Session (Related to Symptoms / Goals / Homework):   Symptoms: Improving per patient  with some stressors      Homework: Achieved / completed to satisfaction  Partially completed        Episode of Care Goals: Satisfactory progress - ACTION (Actively working towards change); Intervened by reinforcing change plan / affirming steps taken       Current / Ongoing Stressors and Concerns:   past trauma, developmental hx and current stressors      Treatment Objective(s) Addressed in This Session:   Patient will demonstrate control of impulses and ability to use positive coping tools to manage strong emotions that can be safely addressed at a lower level of care. Absence of persistent SI and report of reduced frequency and intensity of SI and absence of SI to acceptable levels, report subjective improved mood for a period of 90 days, within 6 months as clinically observed and by patient self-report.  Patient will demonstrate and report a level of depression that can be managed at a lower level of care.  Absence of persistent depression mood and report of reduced frequency and intensity of low mood and absence of persistent low energy and motivation to acceptable levels, report subjective improved motivation and increased energy for a period of 90 days, within 6 months as clinically observed and by patient self-report.  Patient will demonstrate and report a level of anxiety that can be managed at a lower level of care.  Absence of persistent anxious mood and report of reduced frequency and intensity of worry and absence of persistent anxious mood to acceptable levels, no panic attacks, report subjective comfort with rumination for a period of 90 days, within 6 months as clinically observed and by patient self-report.       Intervention:   Therapist met with patient to review goals and interventions. Therapist utilized  reflected listening as patient gave brief reflection of week. Patient reported her and her partner found an apartment and will be moving in in a month. Patient processed some of the stressors with moving and with support. Therapist supported patient as she processed and validated patient. Therapist encouraged patient to give non supporters space and place boundaries so she and her partner can enjoy this time and accept help from her support system.  Patient presented with an anxious affect. Patient was engaged in session and open to feedback. Patient reported no safety concerns.         There has been demonstrated improvement in functioning while patient has been engaged in psychotherapy/psychological service- if withdrawn the patient would deteriorate and/or relapse.     Assessments completed prior to visit:  The following assessments were completed by patient for this visit:  PHQ9:       4/14/2023     9:13 AM 4/27/2023     9:55 AM 8/18/2023    12:47 AM 10/2/2023     9:43 AM 10/2/2023     9:44 AM 10/10/2023     1:57 PM 12/14/2023     9:33 AM   PHQ-9 SCORE   PHQ-9 Total Score MyChart 19 (Moderately severe depression) 17 (Moderately severe depression) 13 (Moderate depression)  15 (Moderately severe depression) 16 (Moderately severe depression) 7 (Mild depression)   PHQ-9 Total Score 19 17 13 15 15 16 7     GAD7:       6/21/2022     8:29 AM 8/24/2022     2:00 PM 10/31/2022     8:08 AM 11/29/2022     2:34 PM 2/2/2023    11:53 AM 4/20/2023    12:41 PM 10/10/2023     1:57 PM   YESSI-7 SCORE   Total Score 9 (mild anxiety)  2 (minimal anxiety) 11 (moderate anxiety) 16 (severe anxiety) 13 (moderate anxiety) 17 (severe anxiety)   Total Score 9 13 2 11 16 13 17   PROMIS-10 Scores        12/2/2022    12:00 PM 2/1/2023    10:18 AM 10/10/2023     1:58 PM   PROMIS-10 Total Score w/o Sub Scores   PROMIS TOTAL - SUBSCORES 24  22       Information is confidential and restricted. Go to Review Flowsheets to unlock data.             ASSESSMENT: Current Emotional / Mental Status (status of significant symptoms):   Risk status (Self / Other harm or suicidal ideation)   Patient denies current fears or concerns for personal safety.   Patient denies current or recent suicidal ideation or behaviors.   Patient denies current or recent homicidal ideation or behaviors.   Patient denies current or recent self injurious behavior or ideation.   Patient denies other safety concerns.   Patient reports there has been no change in risk factors since their last session.     Patient reports there has been no change in protective factors since their last session.     A safety and risk management plan has been developed including: Patient consented to co-developed safety plan on 2.21.2022.  Safety and risk management plan was reviewed.   Patient agreed to use safety plan should any safety concerns arise.  A copy was made available to the patient.     Appearance:   Appropriate     Eye Contact:   Fair     Psychomotor Behavior: Restless     Attitude:   Cooperative    Orientation:   All   Speech    Rate / Production: Emotional Talkative    Volume:  Normal    Mood:    Anxious    Affect:    Worrisome    Thought Content:  Clear    Thought Form:  Coherent    Insight:    Fair      Medication Review:   No changes to current psychiatric medication(s)     Medication Compliance:   Yes     Changes in Health Issues:   None reported     Chemical Use Review:   Substance Use: Chemical use reviewed, no active concerns identified      Tobacco Use: No current tobacco use.      Diagnosis:  1. Bipolar affective disorder, currently depressed, moderate (H)    2. Generalized anxiety disorder    3. PTSD (post-traumatic stress disorder)    4. ADHD (attention deficit hyperactivity disorder), combined type        Collateral Reports Completed:   Not Applicable    PLAN: (Patient Tasks / Therapist Tasks / Other)        Call [phone] if feeling SI has increase or go to ED or empath   Utilize  coping skills when feeling impulsive.   Replace distortions with positive attributes      Look at intention rather than perception    Name anxiety and manage it in the moment  Do not avoid anxiety but rather manage it and remind self when feeling it    Therapist encouraged patient to give non supporters space and place boundaries so she and her partner can enjoy this time and accept help from her support system.      Katie Faulkner, LICSW  1/4/2024                                                         ______________________________________________________________________    Individual Treatment Plan    Patient's Name: Wandy Ann  YOB: 2000    Date of Creation: 2.22.2021  Date Treatment Plan Last Reviewed/Revised: 10/31/2023 due 1/31/2023    DSM5 Diagnoses: 296.52 Bipolar I Disorder Current or Most Recent Episode Depressed, Moderate, 300.02 (F41.1) Generalized Anxiety Disorder or 309.81 (F43.10) Posttraumatic Stress Disorder (includes Posttraumatic Stress Disorder for Children 6 Years and Younger)  Without dissociative symptoms  Psychosocial / Contextual Factors: past trauma, developmental hx and current stressors   PROMIS (reviewed every 90 days): 10/10/2023    Referral / Collaboration:  Referral to another professional/service is not indicated at this time..    Anticipated number of session for this episode of care: 12  Anticipation frequency of session: Biweekly  Anticipated Duration of each session: 38-52 minutes  Treatment plan will be reviewed in 90 days or when goals have been changed.       MeasurableTreatment Goal(s) related to diagnosis / functional impairment(s)  Goal 1: Patient will report absence of persistent SI and report of reduced frequency and intensity of SI and absence of SI to acceptable levels, report subjective improved mood for a period of 90 days, within 6 months as clinically observed and by patient self-report    I will know I've met my goal when I can see the  difference between a bad moment and what I want in th future.      Objective #A (Patient Action)    Patient will demonstrate control of impulses and ability to use positive coping tools to manage strong emotions that can be safely addressed at a lower level of care. Absence of persistent SI and report of reduced frequency and intensity of SI and absence of SI to acceptable levels, report subjective improved mood for a period of 90 days, within 6 months as clinically observed and by patient self-report.  Status: Continued - Date(s): 10/31/2023    Intervention(s)  Therapist will provide individual therapy to identify triggers to SI urges, gain feedback on helpful coping strategies, and identify ways that family can offer support. Tx to discuss current stressors and interpersonal conflicts and how to cope with these, coaching, diagnostic testing, referral for medication as indicated, use prescribed medication, cognitive restructuring, interpersonal family therapy, supportive therapy services.        Goal 2: Patient will report absence of persistent depression mood and report of reduced frequency and intensity of low mood and absence of persistent low energy and motivation to acceptable levels, report subjective improved motivation and increased energy for a period of 90 days, within 6 months as clinically observed and by patient self-report    I will know I've met my goal when I no longer feel sad.      Objective #A (Patient Action)    Status: Continued - Date(s): 10/31/2023    Patient will demonstrate and report a level of depression that can be managed at a lower level of care.  Absence of persistent depression mood and report of reduced frequency and intensity of low mood and absence of persistent low energy and motivation to acceptable levels, report subjective improved motivation and increased energy for a period of 90 days, within 6 months as clinically observed and by patient  self-report.    Intervention(s)  Therapist will provide individual therapy to identify triggers to depression, gain feedback on helpful coping tools and thought-reframing techniques, and identify preferred way of being.  Tx to include discussion of current stressors and interpersonal conflicts and how to cope with these, coaching, diagnostic testing, referral for medication as indicated, use prescribed medication, cognitive restructuring, interpersonal, family therapy, supportive therapy services.        Goal 3: Patiient will report absence of persistent anxiety mood and report of reduced frequency and intensity of worry and absence of persistent anxious mood to acceptable levels, no panic attacks, report subjective comfort with rumination for a period of 90 days. Within 6 months as clinically observed and by patient self-report    I will know I've met my goal when I can go days without worrying about little things or shaking.      Objective #A (Patient Action)    Status: Continued - Date(s): 10/31/2023    Patient will demonstrate and report a level of anxiety that can be managed at a lower level of care.  Absence of persistent anxious mood and report of reduced frequency and intensity of worry and absence of persistent anxious mood to acceptable levels, no panic attacks, report subjective comfort with rumination for a period of 90 days, within 6 months as clinically observed and by patient self-report.    Intervention(s)  Therapist will provide individual therapy to identify triggers to anxiety, gain feedback on helpful coping tools and thought-reframing techniques, and identify preferred way of being. Tx to include discuss current stressors and interpersonal conflicts and how to cope with these, coaching, diagnostic testing , referral for medication as indicated, use prescribed medication, cognitive restructuring, interpersonal,   family therapy, supportive therapy services.        Patient has reviewed and agreed  "to the above plan.      Katie Faulkner, [facility]   10/31/2023                                                   Wandysteve Ann            SAFETY PLAN:  Step 1: Warning signs / cues (Thoughts, images, mood, situation, behavior) that a crisis may be developing:  Thoughts: thoughts of not wanting to be here  Images: no images  Thinking Processes: intrusive thoughts (bothersome, unwanted thoughts that come out of nowhere): get irritated  Mood: intense anger and agitation  Behaviors: isolating/withdrawing   Situations: trauma    Step 2: Coping strategies - Things I can do to take my mind off of my problems without contacting another person (relaxation technique, physical activity):  Distress Tolerance Strategies:  arts and crafts: drawing and painting  Physical Activities: exercise: working out  Focus on helpful thoughts:  \"This is temporary\", \"It always passes\" and \"Ride the wave\"  Step 3: People and social settings that provide distraction:                 Name: Jennifer     Phone: [phone]                 Name: Antonina         Phone: [phone]                 Name: mom       Phone: [phone]  friends house or car   Step 4: Remind myself of people and things that are important to me and worth living for:  Best friend and cat        Step 5: When I am in crisis, I can ask these people to help me use my safety plan:                 Name: Jennifer     Phone: [phone]                 Name: Antonina         Phone: [phone]                 Name: panchito       Phone: [phone]  Step 6: Making the environment safe:   be around others  Step 7: Professionals or agencies I can contact during a crisis:  [facility] Daytime Number: [phone]  Suicide Prevention Lifeline: 3-872-373-TALK (5967)  Crisis Text Line Service (available 24 hours a day, 7 days a week): Text [place] to 903658  Local Crisis Services: 988     Call 911 or go to my nearest emergency department.       I helped develop this safety plan " and agree to use it when needed.  I have been given a copy of this plan.       Client signature _________________________________________________________________  Today s date:  2/22/2021  Adapted from Safety Plan Template 2008 Marisol Cazares and Mario Flores is reprinted with the express permission of the authors.  No portion of the Safety Plan Template may be reproduced without the express, written permission.  You can contact the authors at [email or web address] or [email or web address].

## 2024-01-04 NOTE — LETTER
January 4, 2024      Wandy Ann  [address] [address] [place] [id number]        To Whom It May Concern,          Wandy has been diagnosed with Generalized Anxiety Disorder, Bipolar Affective Disorder, PTSD and ADHD.? Impairments include, but are not limited to, decreased concentration, restlessness, guilt, worthlessness, low self-esteem, social isolation, somatic complaints such as GI distress, tingling or tremors, sweating and palpitations. These impairments may cause disruption to interpersonal relationships, socialization or work performance.  It is my opinion Wandy   would benefit from having an emotional support/therapeutic cat for psychological reasons, small to medium size, for stability of mental health disabilities.?Generalized Anxiety Disorder, Bipolar Affective Disorder, PTSD and ADHD can be a chronic condition; however, Wandy has been working hard through therapy and medication management to manage these conditions and through our recommendation of a therapy emotional support animal can continue to make improvements.     Please take this into consideration           Sincerely,        Katie Faulkner, LICSW  1/4/2024

## 2024-01-04 NOTE — LETTER
January 4, 2024      Wandy Ann  [id number] [address] [place] [id number]        To Whom It May Concern,        Wandy has been diagnosed with Generalized Anxiety Disorder, Bipolar Affective Disorder, PTSD and ADHD.? Impairments include, but are not limited to, decreased concentration, restlessness, guilt, worthlessness, low self-esteem, social isolation, somatic complaints such as GI distress, tingling or tremors, sweating and palpitations. These impairments may cause disruption to interpersonal relationships, socialization or work performance.  It is my opinion Wandy   would benefit from having an emotional support/therapeutic cat for psychological reasons, small to medium size, for stability of mental health disabilities.?Generalized Anxiety Disorder, Bipolar Affective Disorder, PTSD and ADHD can be a chronic condition; however, Wandy has been working hard through therapy and medication management to manage these conditions and through our recommendation of a therapy emotional support animal can continue to make improvements.     Please take this into consideration           Sincerely,        Katie Faulkner, LICSW  1/4/2024

## 2024-01-15 ASSESSMENT — ANXIETY QUESTIONNAIRES
4. TROUBLE RELAXING: MORE THAN HALF THE DAYS
5. BEING SO RESTLESS THAT IT IS HARD TO SIT STILL: NEARLY EVERY DAY
7. FEELING AFRAID AS IF SOMETHING AWFUL MIGHT HAPPEN: MORE THAN HALF THE DAYS
GAD7 TOTAL SCORE: 15
GAD7 TOTAL SCORE: 15
1. FEELING NERVOUS, ANXIOUS, OR ON EDGE: NEARLY EVERY DAY
IF YOU CHECKED OFF ANY PROBLEMS ON THIS QUESTIONNAIRE, HOW DIFFICULT HAVE THESE PROBLEMS MADE IT FOR YOU TO DO YOUR WORK, TAKE CARE OF THINGS AT HOME, OR GET ALONG WITH OTHER PEOPLE: SOMEWHAT DIFFICULT
2. NOT BEING ABLE TO STOP OR CONTROL WORRYING: MORE THAN HALF THE DAYS
7. FEELING AFRAID AS IF SOMETHING AWFUL MIGHT HAPPEN: MORE THAN HALF THE DAYS
GAD7 TOTAL SCORE: 15
3. WORRYING TOO MUCH ABOUT DIFFERENT THINGS: MORE THAN HALF THE DAYS
8. IF YOU CHECKED OFF ANY PROBLEMS, HOW DIFFICULT HAVE THESE MADE IT FOR YOU TO DO YOUR WORK, TAKE CARE OF THINGS AT HOME, OR GET ALONG WITH OTHER PEOPLE?: SOMEWHAT DIFFICULT
6. BECOMING EASILY ANNOYED OR IRRITABLE: SEVERAL DAYS

## 2024-01-15 ASSESSMENT — PATIENT HEALTH QUESTIONNAIRE - PHQ9
10. IF YOU CHECKED OFF ANY PROBLEMS, HOW DIFFICULT HAVE THESE PROBLEMS MADE IT FOR YOU TO DO YOUR WORK, TAKE CARE OF THINGS AT HOME, OR GET ALONG WITH OTHER PEOPLE: SOMEWHAT DIFFICULT
SUM OF ALL RESPONSES TO PHQ QUESTIONS 1-9: 11
SUM OF ALL RESPONSES TO PHQ QUESTIONS 1-9: 11

## 2024-01-18 ENCOUNTER — VIRTUAL VISIT (OUTPATIENT)
Dept: PSYCHOLOGY | Facility: CLINIC | Age: 24
End: 2024-01-18
Payer: COMMERCIAL

## 2024-01-18 DIAGNOSIS — F31.32 BIPOLAR AFFECTIVE DISORDER, CURRENTLY DEPRESSED, MODERATE (H): Primary | ICD-10-CM

## 2024-01-18 DIAGNOSIS — F41.1 GENERALIZED ANXIETY DISORDER: ICD-10-CM

## 2024-01-18 DIAGNOSIS — F43.10 PTSD (POST-TRAUMATIC STRESS DISORDER): ICD-10-CM

## 2024-01-18 DIAGNOSIS — F90.2 ADHD (ATTENTION DEFICIT HYPERACTIVITY DISORDER), COMBINED TYPE: ICD-10-CM

## 2024-01-18 PROCEDURE — 90837 PSYTX W PT 60 MINUTES: CPT | Mod: 95 | Performed by: SOCIAL WORKER

## 2024-01-18 NOTE — PROGRESS NOTES
[facility]                                      Progress Note    Patient Name: Wandy Ann  Date: 2024         Service Type: Individual      Session Start Time: 1202  Session End Time: 1255     Session Length: 53+    Session #: 70    Attendees: Client    Service Modality:    Video Visit:      Provider verified identity through the following two step process.  Patient provided: Patient  and Patient previous known to provider     Telemedicine Visit: The patient's condition can be safely assessed and treated via synchronous audio and visual telemedicine encounter.       Reason for Telemedicine Visit: Patient has requested telehealth visit     Originating Site (Patient Location): Patient's home     Distant Site (Provider Location): Provider Remote Setting- Home Office     Consent:  The patient/guardian has verbally consented to: the potential risks and benefits of telemedicine (video visit) versus in person care; bill my insurance or make self-payment for services provided; and responsibility for payment of non-covered services.      Patient would like the video invitation sent by: email/text     Mode of Communication: Video Conference via [employer]well     Distant Location (Provider): Off-site     As the provider I attest to compliance with applicable laws and regulations related to telemedicine.      DATA  Interactive Complexity: No  Crisis: No        Progress Since Last Session (Related to Symptoms / Goals / Homework):   Symptoms: Worsening stressors      Homework: Achieved / completed to satisfaction  Partially completed        Episode of Care Goals: Satisfactory progress - ACTION (Actively working towards change); Intervened by reinforcing change plan / affirming steps taken       Current / Ongoing Stressors and Concerns:   past trauma, developmental hx and current stressors      Treatment Objective(s) Addressed in This Session:   Patient will demonstrate control of impulses and  ability to use positive coping tools to manage strong emotions that can be safely addressed at a lower level of care. Absence of persistent SI and report of reduced frequency and intensity of SI and absence of SI to acceptable levels, report subjective improved mood for a period of 90 days, within 6 months as clinically observed and by patient self-report.  Patient will demonstrate and report a level of depression that can be managed at a lower level of care.  Absence of persistent depression mood and report of reduced frequency and intensity of low mood and absence of persistent low energy and motivation to acceptable levels, report subjective improved motivation and increased energy for a period of 90 days, within 6 months as clinically observed and by patient self-report.  Patient will demonstrate and report a level of anxiety that can be managed at a lower level of care.  Absence of persistent anxious mood and report of reduced frequency and intensity of worry and absence of persistent anxious mood to acceptable levels, no panic attacks, report subjective comfort with rumination for a period of 90 days, within 6 months as clinically observed and by patient self-report.       Intervention:   Therapist met with patient to review goals and interventions. Therapist utilized reflected listening as patient gave brief reflection of week. Patient reported stressors around moving in with partner and processed. Therapist supported patient as she processed and validated patient. Therapist encouraged patient to be assertive and direct with her communication. Therapist utilized strength based modality with patient and assisted patient with grounding and coping. Therapist encouraged patient to look at it one day at a time, to be present and enjoy.   Patient presented with an anxious affect. Patient was engaged in session and open to feedback. Patient reported no safety concerns.         There has been demonstrated improvement  in functioning while patient has been engaged in psychotherapy/psychological service- if withdrawn the patient would deteriorate and/or relapse.     Assessments completed prior to visit:  The following assessments were completed by patient for this visit:  PHQ9:       4/27/2023     9:55 AM 8/18/2023    12:47 AM 10/2/2023     9:43 AM 10/2/2023     9:44 AM 10/10/2023     1:57 PM 12/14/2023     9:33 AM 1/15/2024     7:47 PM   PHQ-9 SCORE   PHQ-9 Total Score MyChart 17 (Moderately severe depression) 13 (Moderate depression)  15 (Moderately severe depression) 16 (Moderately severe depression) 7 (Mild depression) 11 (Moderate depression)   PHQ-9 Total Score 17 13 15 15 16 7 11     GAD7:       8/24/2022     2:00 PM 10/31/2022     8:08 AM 11/29/2022     2:34 PM 2/2/2023    11:53 AM 4/20/2023    12:41 PM 10/10/2023     1:57 PM 1/15/2024     7:48 PM   YESSI-7 SCORE   Total Score  2 (minimal anxiety) 11 (moderate anxiety) 16 (severe anxiety) 13 (moderate anxiety) 17 (severe anxiety) 15 (severe anxiety)   Total Score 13 2 11 16 13 17 15   PROMIS-10 Scores        2/1/2023    10:18 AM 10/10/2023     1:58 PM 1/15/2024     7:49 PM   PROMIS-10 Total Score w/o Sub Scores   PROMIS TOTAL - SUBSCORES  22 24       Information is confidential and restricted. Go to Review Flowsheets to unlock data.            ASSESSMENT: Current Emotional / Mental Status (status of significant symptoms):   Risk status (Self / Other harm or suicidal ideation)   Patient denies current fears or concerns for personal safety.   Patient denies current or recent suicidal ideation or behaviors.   Patient denies current or recent homicidal ideation or behaviors.   Patient denies current or recent self injurious behavior or ideation.   Patient denies other safety concerns.   Patient reports there has been no change in risk factors since their last session.     Patient reports there has been no change in protective factors since their last session.     A safety and  risk management plan has been developed including: Patient consented to co-developed safety plan on 2.21.2022.  Safety and risk management plan was reviewed.   Patient agreed to use safety plan should any safety concerns arise.  A copy was made available to the patient.     Appearance:   Appropriate     Eye Contact:   Fair     Psychomotor Behavior: Restless     Attitude:   Cooperative    Orientation:   All   Speech    Rate / Production: Emotional Talkative    Volume:  Normal    Mood:    Anxious    Affect:    Worrisome    Thought Content:  Clear    Thought Form:  Coherent    Insight:    Fair      Medication Review:   No changes to current psychiatric medication(s)     Medication Compliance:   Yes     Changes in Health Issues:   None reported     Chemical Use Review:   Substance Use: Chemical use reviewed, no active concerns identified      Tobacco Use: No current tobacco use.      Diagnosis:  1. Bipolar affective disorder, currently depressed, moderate (H)    2. PTSD (post-traumatic stress disorder)    3. ADHD (attention deficit hyperactivity disorder), combined type    4. Generalized anxiety disorder        Collateral Reports Completed:   Not Applicable    PLAN: (Patient Tasks / Therapist Tasks / Other)        Call [phone] if feeling SI has increase or go to ED or empath   Utilize coping skills when feeling impulsive.   Replace distortions with positive attributes      Look at intention rather than perception    Name anxiety and manage it in the moment  Do not avoid anxiety but rather manage it and remind self when feeling it   Therapist encouraged patient to be assertive and direct with her communication. Therapist utilized strength based modality with patient and assisted patient with grounding and coping. Therapist encouraged patient to look at it one day at a time, to be present and enjoy.       Katie Faulkner, [facility]  1/18/2024                                                          ______________________________________________________________________    Individual Treatment Plan    Patient's Name: Wandy Ann  YOB: 2000    Date of Creation: 2.22.2021  Date Treatment Plan Last Reviewed/Revised: 10/31/2023 due 1/31/2023    DSM5 Diagnoses: 296.52 Bipolar I Disorder Current or Most Recent Episode Depressed, Moderate, 300.02 (F41.1) Generalized Anxiety Disorder or 309.81 (F43.10) Posttraumatic Stress Disorder (includes Posttraumatic Stress Disorder for Children 6 Years and Younger)  Without dissociative symptoms  Psychosocial / Contextual Factors: past trauma, developmental hx and current stressors   PROMIS (reviewed every 90 days): 1/15/2024    Referral / Collaboration:  Referral to another professional/service is not indicated at this time..    Anticipated number of session for this episode of care: 12  Anticipation frequency of session: Biweekly  Anticipated Duration of each session: 38-52 minutes  Treatment plan will be reviewed in 90 days or when goals have been changed.       MeasurableTreatment Goal(s) related to diagnosis / functional impairment(s)  Goal 1: Patient will report absence of persistent SI and report of reduced frequency and intensity of SI and absence of SI to acceptable levels, report subjective improved mood for a period of 90 days, within 6 months as clinically observed and by patient self-report    I will know I've met my goal when I can see the difference between a bad moment and what I want in th future.      Objective #A (Patient Action)    Patient will demonstrate control of impulses and ability to use positive coping tools to manage strong emotions that can be safely addressed at a lower level of care. Absence of persistent SI and report of reduced frequency and intensity of SI and absence of SI to acceptable levels, report subjective improved mood for a period of 90 days, within 6 months as clinically observed and by patient  self-report.  Status: Continued - Date(s): 10/31/2023    Intervention(s)  Therapist will provide individual therapy to identify triggers to SI urges, gain feedback on helpful coping strategies, and identify ways that family can offer support. Tx to discuss current stressors and interpersonal conflicts and how to cope with these, coaching, diagnostic testing, referral for medication as indicated, use prescribed medication, cognitive restructuring, interpersonal family therapy, supportive therapy services.        Goal 2: Patient will report absence of persistent depression mood and report of reduced frequency and intensity of low mood and absence of persistent low energy and motivation to acceptable levels, report subjective improved motivation and increased energy for a period of 90 days, within 6 months as clinically observed and by patient self-report    I will know I've met my goal when I no longer feel sad.      Objective #A (Patient Action)    Status: Continued - Date(s): 10/31/2023    Patient will demonstrate and report a level of depression that can be managed at a lower level of care.  Absence of persistent depression mood and report of reduced frequency and intensity of low mood and absence of persistent low energy and motivation to acceptable levels, report subjective improved motivation and increased energy for a period of 90 days, within 6 months as clinically observed and by patient self-report.    Intervention(s)  Therapist will provide individual therapy to identify triggers to depression, gain feedback on helpful coping tools and thought-reframing techniques, and identify preferred way of being.  Tx to include discussion of current stressors and interpersonal conflicts and how to cope with these, coaching, diagnostic testing, referral for medication as indicated, use prescribed medication, cognitive restructuring, interpersonal, family therapy, supportive therapy services.        Goal 3: Patiient will  report absence of persistent anxiety mood and report of reduced frequency and intensity of worry and absence of persistent anxious mood to acceptable levels, no panic attacks, report subjective comfort with rumination for a period of 90 days. Within 6 months as clinically observed and by patient self-report    I will know I've met my goal when I can go days without worrying about little things or shaking.      Objective #A (Patient Action)    Status: Continued - Date(s): 10/31/2023    Patient will demonstrate and report a level of anxiety that can be managed at a lower level of care.  Absence of persistent anxious mood and report of reduced frequency and intensity of worry and absence of persistent anxious mood to acceptable levels, no panic attacks, report subjective comfort with rumination for a period of 90 days, within 6 months as clinically observed and by patient self-report.    Intervention(s)  Therapist will provide individual therapy to identify triggers to anxiety, gain feedback on helpful coping tools and thought-reframing techniques, and identify preferred way of being. Tx to include discuss current stressors and interpersonal conflicts and how to cope with these, coaching, diagnostic testing , referral for medication as indicated, use prescribed medication, cognitive restructuring, interpersonal,   family therapy, supportive therapy services.        Patient has reviewed and agreed to the above plan.      Katie Faulkner, [facility]   10/31/2023                                                   Wandy Ann            SAFETY PLAN:  Step 1: Warning signs / cues (Thoughts, images, mood, situation, behavior) that a crisis may be developing:  Thoughts: thoughts of not wanting to be here  Images: no images  Thinking Processes: intrusive thoughts (bothersome, unwanted thoughts that come out of nowhere): get irritated  Mood: intense anger and agitation  Behaviors: isolating/withdrawing   Situations:  "trauma    Step 2: Coping strategies - Things I can do to take my mind off of my problems without contacting another person (relaxation technique, physical activity):  Distress Tolerance Strategies:  arts and crafts: drawing and painting  Physical Activities: exercise: working out  Focus on helpful thoughts:  \"This is temporary\", \"It always passes\" and \"Ride the wave\"  Step 3: People and social settings that provide distraction:                 Name: Jennifer     Phone: [phone]                 Name: Antonina         Phone: [phone]                 Name: mom       Phone: [phone]  friends house or car   Step 4: Remind myself of people and things that are important to me and worth living for:  Best friend and cat        Step 5: When I am in crisis, I can ask these people to help me use my safety plan:                 Name: Jennifer     Phone: [phone]                 Name: Antonina         Phone: [phone]                 Name: panchito       Phone: [phone]  Step 6: Making the environment safe:   be around others  Step 7: Professionals or agencies I can contact during a crisis:  [facility] Daytime Number: 327-960-2963  Suicide Prevention Lifeline: 6-679-888-TALK (6019)  Crisis Text Line Service (available 24 hours a day, 7 days a week): Text [place] to 445537  Local Crisis Services: 988     Call 911 or go to my nearest emergency department.       I helped develop this safety plan and agree to use it when needed.  I have been given a copy of this plan.       Client signature _________________________________________________________________  Today s date:  2/22/2021  Adapted from Safety Plan Template 2008 Marisol Cazares and Mario Flores is reprinted with the express permission of the authors.  No portion of the Safety Plan Template may be reproduced without the express, written permission.  You can contact the authors at [email or web address] or [email or web address].  "

## 2024-02-02 ENCOUNTER — VIRTUAL VISIT (OUTPATIENT)
Dept: PSYCHOLOGY | Facility: CLINIC | Age: 24
End: 2024-02-02
Payer: COMMERCIAL

## 2024-02-02 DIAGNOSIS — F43.10 PTSD (POST-TRAUMATIC STRESS DISORDER): ICD-10-CM

## 2024-02-02 DIAGNOSIS — F31.32 BIPOLAR AFFECTIVE DISORDER, CURRENTLY DEPRESSED, MODERATE (H): Primary | ICD-10-CM

## 2024-02-02 DIAGNOSIS — F41.1 GENERALIZED ANXIETY DISORDER: ICD-10-CM

## 2024-02-02 DIAGNOSIS — F90.2 ADHD (ATTENTION DEFICIT HYPERACTIVITY DISORDER), COMBINED TYPE: ICD-10-CM

## 2024-02-02 PROCEDURE — 90834 PSYTX W PT 45 MINUTES: CPT | Mod: 93 | Performed by: SOCIAL WORKER

## 2024-02-02 NOTE — PROGRESS NOTES
"    [facility]                                      Progress Note    Patient Name: Wandy Ann  Date: 2/2/2024         Service Type: Individual      Session Start Time: 0804  Session End Time: 0844     Session Length: 38-52    Session #: 71    Attendees: Client    Service Modality:    Phone Visit:      Provider verified identity through the following two step process.  Patient provided:  Patient is known previously to provider     Telephone Visit: The patient's condition can be safely assessed and treated via synchronous audio telemedicine encounter.       Reason for Audio Telemedicine Visit: Services only offered telehealth     Originating Site (Patient Location): Patient's home     Distant Site (Provider Location): off site      Consent:  The patient/guardian has verbally consented to:      1. The potential risks and benefits of telemedicine (telephone visit) versus in person care;     The patient has been notified of the following:      \"We have found that certain health care needs can be provided without the need for a face to face visit.  This service lets us provide the care you need with a phone conversation.       I will have full access to your [facility] medical record during this entire phone call.   I will be taking notes for your medical record.      Since this is like an office visit, we will bill your insurance company for this service.       There are potential benefits and risks of telephone visits (e.g. limits to patient confidentiality) that differ from in-person visits.?Confidentiality still applies for telephone services, and nobody will record the visit.  It is important to be in a quiet, private space that is free of distractions (including cell phone or other devices) during the visit.??      If during the course of the call I believe a telephone visit is not appropriate, you will not be charged for this service\"     Consent has been obtained for this " service by care team member: Yes     DATA  Interactive Complexity: No  Crisis: No        Progress Since Last Session (Related to Symptoms / Goals / Homework):   Symptoms: Improving per patient       Homework: Achieved / completed to satisfaction  Partially completed        Episode of Care Goals: Satisfactory progress - ACTION (Actively working towards change); Intervened by reinforcing change plan / affirming steps taken       Current / Ongoing Stressors and Concerns:   past trauma, developmental hx and current stressors      Treatment Objective(s) Addressed in This Session:   Patient will demonstrate control of impulses and ability to use positive coping tools to manage strong emotions that can be safely addressed at a lower level of care. Absence of persistent SI and report of reduced frequency and intensity of SI and absence of SI to acceptable levels, report subjective improved mood for a period of 90 days, within 6 months as clinically observed and by patient self-report.  Patient will demonstrate and report a level of depression that can be managed at a lower level of care.  Absence of persistent depression mood and report of reduced frequency and intensity of low mood and absence of persistent low energy and motivation to acceptable levels, report subjective improved motivation and increased energy for a period of 90 days, within 6 months as clinically observed and by patient self-report.  Patient will demonstrate and report a level of anxiety that can be managed at a lower level of care.  Absence of persistent anxious mood and report of reduced frequency and intensity of worry and absence of persistent anxious mood to acceptable levels, no panic attacks, report subjective comfort with rumination for a period of 90 days, within 6 months as clinically observed and by patient self-report.       Intervention:   Therapist met with patient to review goals and interventions. Therapist utilized reflected listening  "as patient gave brief reflection of week. Patient reported a very exciting week with moving on Sunday and accepting a new job. Patient also processed endings and feeling sad. Therapist supported patient as she processed and validated patients emotions. Therapist coached patient through managing emotions and the importance of endings with the word \"and\" as she continues to work on self and move towards goals. Therapist utilized strength based modality.   Patient presented with an anxious affect. Patient was engaged in session and open to feedback. Patient reported no safety concerns.         There has been demonstrated improvement in functioning while patient has been engaged in psychotherapy/psychological service- if withdrawn the patient would deteriorate and/or relapse.     Assessments completed prior to visit:  The following assessments were completed by patient for this visit:  PHQ9:       4/27/2023     9:55 AM 8/18/2023    12:47 AM 10/2/2023     9:43 AM 10/2/2023     9:44 AM 10/10/2023     1:57 PM 12/14/2023     9:33 AM 1/15/2024     7:47 PM   PHQ-9 SCORE   PHQ-9 Total Score MyChart 17 (Moderately severe depression) 13 (Moderate depression)  15 (Moderately severe depression) 16 (Moderately severe depression) 7 (Mild depression) 11 (Moderate depression)   PHQ-9 Total Score 17 13 15 15 16 7 11     GAD7:       8/24/2022     2:00 PM 10/31/2022     8:08 AM 11/29/2022     2:34 PM 2/2/2023    11:53 AM 4/20/2023    12:41 PM 10/10/2023     1:57 PM 1/15/2024     7:48 PM   YESSI-7 SCORE   Total Score  2 (minimal anxiety) 11 (moderate anxiety) 16 (severe anxiety) 13 (moderate anxiety) 17 (severe anxiety) 15 (severe anxiety)   Total Score 13 2 11 16 13 17 15   PROMIS-10 Scores        2/1/2023    10:18 AM 10/10/2023     1:58 PM 1/15/2024     7:49 PM   PROMIS-10 Total Score w/o Sub Scores   PROMIS TOTAL - SUBSCORES  22 24       Information is confidential and restricted. Go to Review Flowsheets to unlock data.    "         ASSESSMENT: Current Emotional / Mental Status (status of significant symptoms):   Risk status (Self / Other harm or suicidal ideation)   Patient denies current fears or concerns for personal safety.   Patient denies current or recent suicidal ideation or behaviors.   Patient denies current or recent homicidal ideation or behaviors.   Patient denies current or recent self injurious behavior or ideation.   Patient denies other safety concerns.   Patient reports there has been no change in risk factors since their last session.     Patient reports there has been no change in protective factors since their last session.     A safety and risk management plan has been developed including: Patient consented to co-developed safety plan on 2.21.2022.  Safety and risk management plan was reviewed.   Patient agreed to use safety plan should any safety concerns arise.  A copy was made available to the patient.     Appearance:   Appropriate     Eye Contact:   Fair     Psychomotor Behavior: Restless     Attitude:   Cooperative    Orientation:   All   Speech    Rate / Production: Emotional Talkative    Volume:  Normal    Mood:    Anxious    Affect:    Worrisome    Thought Content:  Clear    Thought Form:  Coherent    Insight:    Fair      Medication Review:   Changes to psychiatric medications, see updated Medication List in EPIC.      Medication Compliance:   Yes     Changes in Health Issues:   None reported     Chemical Use Review:   Substance Use: Chemical use reviewed, no active concerns identified      Tobacco Use: No current tobacco use.      Diagnosis:  1. Bipolar affective disorder, currently depressed, moderate (H)    2. PTSD (post-traumatic stress disorder)    3. ADHD (attention deficit hyperactivity disorder), combined type    4. Generalized anxiety disorder        Collateral Reports Completed:   Not Applicable    PLAN: (Patient Tasks / Therapist Tasks / Other)        Call [phone] if feeling SI has increase or go to  "ED or empath   Utilize coping skills when feeling impulsive.   Replace distortions with positive attributes      Look at intention rather than perception    Name anxiety and manage it in the moment  Do not avoid anxiety but rather manage it and remind self when feeling it   Therapist coached patient through managing emotions and the importance of endings with the word \"and\" as she continues to work on self and move towards goals.     Katie Faulkner, [facility] 2/2/2024                                                         ______________________________________________________________________    Individual Treatment Plan    Patient's Name: Wandy Ann  YOB: 2000    Date of Creation: 2.22.2021  Date Treatment Plan Last Reviewed/Revised: 2/2/2024 due 5/2/2024    DSM5 Diagnoses: 296.52 Bipolar I Disorder Current or Most Recent Episode Depressed, Moderate, 300.02 (F41.1) Generalized Anxiety Disorder or 309.81 (F43.10) Posttraumatic Stress Disorder (includes Posttraumatic Stress Disorder for Children 6 Years and Younger)  Without dissociative symptoms  Psychosocial / Contextual Factors: past trauma, developmental hx and current stressors   PROMIS (reviewed every 90 days): 1/15/2024    Referral / Collaboration:  Referral to another professional/service is not indicated at this time..    Anticipated number of session for this episode of care: 12  Anticipation frequency of session: Biweekly  Anticipated Duration of each session: 38-52 minutes  Treatment plan will be reviewed in 90 days or when goals have been changed.       MeasurableTreatment Goal(s) related to diagnosis / functional impairment(s)  Goal 1: Patient will report absence of persistent SI and report of reduced frequency and intensity of SI and absence of SI to acceptable levels, report subjective improved mood for a period of 90 days, within 6 months as clinically observed and by patient self-report    I will know I've met my goal when I " can see the difference between a bad moment and what I want in th future.      Objective #A (Patient Action)    Patient will demonstrate control of impulses and ability to use positive coping tools to manage strong emotions that can be safely addressed at a lower level of care. Absence of persistent SI and report of reduced frequency and intensity of SI and absence of SI to acceptable levels, report subjective improved mood for a period of 90 days, within 6 months as clinically observed and by patient self-report.  Status: Continued - Date(s): 2/2/2024    Intervention(s)  Therapist will provide individual therapy to identify triggers to SI urges, gain feedback on helpful coping strategies, and identify ways that family can offer support. Tx to discuss current stressors and interpersonal conflicts and how to cope with these, coaching, diagnostic testing, referral for medication as indicated, use prescribed medication, cognitive restructuring, interpersonal family therapy, supportive therapy services.        Goal 2: Patient will report absence of persistent depression mood and report of reduced frequency and intensity of low mood and absence of persistent low energy and motivation to acceptable levels, report subjective improved motivation and increased energy for a period of 90 days, within 6 months as clinically observed and by patient self-report    I will know I've met my goal when I no longer feel sad.      Objective #A (Patient Action)    Status: Continued - Date(s): 2/2/2024    Patient will demonstrate and report a level of depression that can be managed at a lower level of care.  Absence of persistent depression mood and report of reduced frequency and intensity of low mood and absence of persistent low energy and motivation to acceptable levels, report subjective improved motivation and increased energy for a period of 90 days, within 6 months as clinically observed and by patient  self-report.    Intervention(s)  Therapist will provide individual therapy to identify triggers to depression, gain feedback on helpful coping tools and thought-reframing techniques, and identify preferred way of being.  Tx to include discussion of current stressors and interpersonal conflicts and how to cope with these, coaching, diagnostic testing, referral for medication as indicated, use prescribed medication, cognitive restructuring, interpersonal, family therapy, supportive therapy services.        Goal 3: Patiient will report absence of persistent anxiety mood and report of reduced frequency and intensity of worry and absence of persistent anxious mood to acceptable levels, no panic attacks, report subjective comfort with rumination for a period of 90 days. Within 6 months as clinically observed and by patient self-report    I will know I've met my goal when I can go days without worrying about little things or shaking.      Objective #A (Patient Action)    Status: Continued - Date(s): 2/2/2024    Patient will demonstrate and report a level of anxiety that can be managed at a lower level of care.  Absence of persistent anxious mood and report of reduced frequency and intensity of worry and absence of persistent anxious mood to acceptable levels, no panic attacks, report subjective comfort with rumination for a period of 90 days, within 6 months as clinically observed and by patient self-report.    Intervention(s)  Therapist will provide individual therapy to identify triggers to anxiety, gain feedback on helpful coping tools and thought-reframing techniques, and identify preferred way of being. Tx to include discuss current stressors and interpersonal conflicts and how to cope with these, coaching, diagnostic testing , referral for medication as indicated, use prescribed medication, cognitive restructuring, interpersonal,   family therapy, supportive therapy services.        Patient has reviewed and agreed to  "the above plan.      Katie Faulkner, [facility]   2/2/2024                                                   Wandy   Robert Cherryon            SAFETY PLAN:  Step 1: Warning signs / cues (Thoughts, images, mood, situation, behavior) that a crisis may be developing:  Thoughts: thoughts of not wanting to be here  Images: no images  Thinking Processes: intrusive thoughts (bothersome, unwanted thoughts that come out of nowhere): get irritated  Mood: intense anger and agitation  Behaviors: isolating/withdrawing   Situations: trauma    Step 2: Coping strategies - Things I can do to take my mind off of my problems without contacting another person (relaxation technique, physical activity):  Distress Tolerance Strategies:  arts and crafts: drawing and painting  Physical Activities: exercise: working out  Focus on helpful thoughts:  \"This is temporary\", \"It always passes\" and \"Ride the wave\"  Step 3: People and social settings that provide distraction:                 Name: Jennifer     Phone: [phone]                 Name: Antonina         Phone: [phone]                 Name: mom       Phone: [phone]  friends house or car   Step 4: Remind myself of people and things that are important to me and worth living for:  Best friend and cat        Step 5: When I am in crisis, I can ask these people to help me use my safety plan:                 Name: Jennifer     Phone: [phone]                 Name: Antonina         Phone: [phone]                 Name: panchito       Phone: [phone]  Step 6: Making the environment safe:   be around others  Step 7: Professionals or agencies I can contact during a crisis:  [facility] Daytime Number: [phone]  Suicide Prevention Lifeline: 4-193-326-TALK (3761)  Crisis Text Line Service (available 24 hours a day, 7 days a week): Text [place] to 020769  Local Crisis Services: 988     Call 911 or go to my nearest emergency department.       I helped develop this safety plan and " agree to use it when needed.  I have been given a copy of this plan.       Client signature _________________________________________________________________  Today s date:  2/22/2021  Adapted from Safety Plan Template 2008 Marisol Cazares and Mario Flores is reprinted with the express permission of the authors.  No portion of the Safety Plan Template may be reproduced without the express, written permission.  You can contact the authors at [email or web address] or [email or web address].

## 2024-03-04 ENCOUNTER — VIRTUAL VISIT (OUTPATIENT)
Dept: PSYCHOLOGY | Facility: CLINIC | Age: 24
End: 2024-03-04
Payer: COMMERCIAL

## 2024-03-04 DIAGNOSIS — F31.32 BIPOLAR AFFECTIVE DISORDER, CURRENTLY DEPRESSED, MODERATE (H): Primary | ICD-10-CM

## 2024-03-04 DIAGNOSIS — F43.10 PTSD (POST-TRAUMATIC STRESS DISORDER): ICD-10-CM

## 2024-03-04 DIAGNOSIS — F90.2 ADHD (ATTENTION DEFICIT HYPERACTIVITY DISORDER), COMBINED TYPE: ICD-10-CM

## 2024-03-04 DIAGNOSIS — F41.1 GENERALIZED ANXIETY DISORDER: ICD-10-CM

## 2024-03-04 PROCEDURE — 90837 PSYTX W PT 60 MINUTES: CPT | Mod: 93 | Performed by: SOCIAL WORKER

## 2024-03-04 NOTE — PROGRESS NOTES
"    [facility]                                      Progress Note    Patient Name: Wandy Ann  Date: 3/4/2024         Service Type: Individual      Session Start Time:1501 Session End Time: 1557     Session Length: 53+    Session #: 72    Attendees: Client    Service Modality:    Phone Visit:      Provider verified identity through the following two step process.  Patient provided:  Patient is known previously to provider     Telephone Visit: The patient's condition can be safely assessed and treated via synchronous audio telemedicine encounter.       Reason for Audio Telemedicine Visit: Services only offered telehealth     Originating Site (Patient Location): Patient's home     Distant Site (Provider Location): off site      Consent:  The patient/guardian has verbally consented to:      1. The potential risks and benefits of telemedicine (telephone visit) versus in person care;     The patient has been notified of the following:      \"We have found that certain health care needs can be provided without the need for a face to face visit.  This service lets us provide the care you need with a phone conversation.       I will have full access to your [facility] medical record during this entire phone call.   I will be taking notes for your medical record.      Since this is like an office visit, we will bill your insurance company for this service.       There are potential benefits and risks of telephone visits (e.g. limits to patient confidentiality) that differ from in-person visits.?Confidentiality still applies for telephone services, and nobody will record the visit.  It is important to be in a quiet, private space that is free of distractions (including cell phone or other devices) during the visit.??      If during the course of the call I believe a telephone visit is not appropriate, you will not be charged for this service\"     Consent has been obtained for this service by " care team member: Yes     DATA  Interactive Complexity: No  Crisis: No        Progress Since Last Session (Related to Symptoms / Goals / Homework):   Symptoms: Worsening anxiety      Homework: Achieved / completed to satisfaction  Partially completed        Episode of Care Goals: Minimal progress - PREPARATION (Decided to change - considering how); Intervened by negotiating a change plan and determining options / strategies for behavior change, identifying triggers, exploring social supports, and working towards setting a date to begin behavior change       Current / Ongoing Stressors and Concerns:   past trauma, developmental hx and current stressors      Treatment Objective(s) Addressed in This Session:   Patient will demonstrate control of impulses and ability to use positive coping tools to manage strong emotions that can be safely addressed at a lower level of care. Absence of persistent SI and report of reduced frequency and intensity of SI and absence of SI to acceptable levels, report subjective improved mood for a period of 90 days, within 6 months as clinically observed and by patient self-report.  Patient will demonstrate and report a level of depression that can be managed at a lower level of care.  Absence of persistent depression mood and report of reduced frequency and intensity of low mood and absence of persistent low energy and motivation to acceptable levels, report subjective improved motivation and increased energy for a period of 90 days, within 6 months as clinically observed and by patient self-report.  Patient will demonstrate and report a level of anxiety that can be managed at a lower level of care.  Absence of persistent anxious mood and report of reduced frequency and intensity of worry and absence of persistent anxious mood to acceptable levels, no panic attacks, report subjective comfort with rumination for a period of 90 days, within 6 months as clinically observed and by patient  self-report.       Intervention:   Motivational Interviewing  Target Behavior:  avoiding when anxious    Stage of Change: PREPARATION (Decided to change - considering how)    MI Intervention: Expressed Empathy/Understanding, Permission to raise concern or advise, Open-ended questions, and Rolled with resistance: Complex reflection     Change Talk Expressed by the Patient: Ability to change Reasons to change Need to change    Provider Response to Change Talk: A - Affirmed patient's thoughts, decisions, or attempts at behavior change and R - Reflected patient's change talk          There has been demonstrated improvement in functioning while patient has been engaged in psychotherapy/psychological service- if withdrawn the patient would deteriorate and/or relapse.     Assessments completed prior to visit:  The following assessments were completed by patient for this visit:  PHQ9:       4/27/2023     9:55 AM 8/18/2023    12:47 AM 10/2/2023     9:43 AM 10/2/2023     9:44 AM 10/10/2023     1:57 PM 12/14/2023     9:33 AM 1/15/2024     7:47 PM   PHQ-9 SCORE   PHQ-9 Total Score MyChart 17 (Moderately severe depression) 13 (Moderate depression)  15 (Moderately severe depression) 16 (Moderately severe depression) 7 (Mild depression) 11 (Moderate depression)   PHQ-9 Total Score 17 13 15 15 16 7 11     GAD7:       8/24/2022     2:00 PM 10/31/2022     8:08 AM 11/29/2022     2:34 PM 2/2/2023    11:53 AM 4/20/2023    12:41 PM 10/10/2023     1:57 PM 1/15/2024     7:48 PM   YESSI-7 SCORE   Total Score  2 (minimal anxiety) 11 (moderate anxiety) 16 (severe anxiety) 13 (moderate anxiety) 17 (severe anxiety) 15 (severe anxiety)   Total Score 13 2 11 16 13 17 15   PROMIS-10 Scores        2/1/2023    10:18 AM 10/10/2023     1:58 PM 1/15/2024     7:49 PM   PROMIS-10 Total Score w/o Sub Scores   PROMIS TOTAL - SUBSCORES  22 24       Information is confidential and restricted. Go to Review Flowsheets to unlock data.            ASSESSMENT:  Current Emotional / Mental Status (status of significant symptoms):   Risk status (Self / Other harm or suicidal ideation)   Patient denies current fears or concerns for personal safety.   Patient denies current or recent suicidal ideation or behaviors.   Patient denies current or recent homicidal ideation or behaviors.   Patient denies current or recent self injurious behavior or ideation.   Patient denies other safety concerns.   Patient reports there has been no change in risk factors since their last session.     Patient reports there has been no change in protective factors since their last session.     A safety and risk management plan has been developed including: Patient consented to co-developed safety plan on 2.21.2022.  Safety and risk management plan was reviewed.   Patient agreed to use safety plan should any safety concerns arise.  A copy was made available to the patient.     Appearance:   Appropriate     Eye Contact:   Fair     Psychomotor Behavior: Restless     Attitude:   Cooperative    Orientation:   All   Speech    Rate / Production: Emotional Talkative    Volume:  Normal    Mood:    Anxious    Affect:    Worrisome    Thought Content:  Clear    Thought Form:  Coherent    Insight:    Fair      Medication Review:   No changes to current psychiatric medication(s)     Medication Compliance:   Yes     Changes in Health Issues:   None reported     Chemical Use Review:   Substance Use: Chemical use reviewed, no active concerns identified      Tobacco Use: No current tobacco use.      Diagnosis:  1. Bipolar affective disorder, currently depressed, moderate (H)    2. PTSD (post-traumatic stress disorder)    3. ADHD (attention deficit hyperactivity disorder), combined type    4. Generalized anxiety disorder        Collateral Reports Completed:   Not Applicable    PLAN: (Patient Tasks / Therapist Tasks / Other)        Call [phone] if feeling SI has increase or go to ED or empath   Utilize coping skills when  feeling impulsive.   Replace distortions with positive attributes      Look at intention rather than perception    Name anxiety and manage it in the moment  Do not avoid anxiety but rather manage it and remind self when feeling it   Do not avoid people  Move with transition and name anxiety around it  Give new job a chance  Face fear of meeting his friends     Katie Faulkner, [facility]  3/4/2024                                                         ______________________________________________________________________    Individual Treatment Plan    Patient's Name: Wandy Ann  YOB: 2000    Date of Creation: 2.22.2021  Date Treatment Plan Last Reviewed/Revised: 2/2/2024 due 5/2/2024    DSM5 Diagnoses: 296.52 Bipolar I Disorder Current or Most Recent Episode Depressed, Moderate, 300.02 (F41.1) Generalized Anxiety Disorder or 309.81 (F43.10) Posttraumatic Stress Disorder (includes Posttraumatic Stress Disorder for Children 6 Years and Younger)  Without dissociative symptoms  Psychosocial / Contextual Factors: past trauma, developmental hx and current stressors   PROMIS (reviewed every 90 days): 1/15/2024    Referral / Collaboration:  Referral to another professional/service is not indicated at this time..    Anticipated number of session for this episode of care: 12  Anticipation frequency of session: Biweekly  Anticipated Duration of each session: 38-52 minutes  Treatment plan will be reviewed in 90 days or when goals have been changed.       MeasurableTreatment Goal(s) related to diagnosis / functional impairment(s)  Goal 1: Patient will report absence of persistent SI and report of reduced frequency and intensity of SI and absence of SI to acceptable levels, report subjective improved mood for a period of 90 days, within 6 months as clinically observed and by patient self-report    I will know I've met my goal when I can see the difference between a bad moment and what I want in th future.       Objective #A (Patient Action)    Patient will demonstrate control of impulses and ability to use positive coping tools to manage strong emotions that can be safely addressed at a lower level of care. Absence of persistent SI and report of reduced frequency and intensity of SI and absence of SI to acceptable levels, report subjective improved mood for a period of 90 days, within 6 months as clinically observed and by patient self-report.  Status: Continued - Date(s): 2/2/2024    Intervention(s)  Therapist will provide individual therapy to identify triggers to SI urges, gain feedback on helpful coping strategies, and identify ways that family can offer support. Tx to discuss current stressors and interpersonal conflicts and how to cope with these, coaching, diagnostic testing, referral for medication as indicated, use prescribed medication, cognitive restructuring, interpersonal family therapy, supportive therapy services.        Goal 2: Patient will report absence of persistent depression mood and report of reduced frequency and intensity of low mood and absence of persistent low energy and motivation to acceptable levels, report subjective improved motivation and increased energy for a period of 90 days, within 6 months as clinically observed and by patient self-report    I will know I've met my goal when I no longer feel sad.      Objective #A (Patient Action)    Status: Continued - Date(s): 2/2/2024    Patient will demonstrate and report a level of depression that can be managed at a lower level of care.  Absence of persistent depression mood and report of reduced frequency and intensity of low mood and absence of persistent low energy and motivation to acceptable levels, report subjective improved motivation and increased energy for a period of 90 days, within 6 months as clinically observed and by patient self-report.    Intervention(s)  Therapist will provide individual therapy to identify triggers to  depression, gain feedback on helpful coping tools and thought-reframing techniques, and identify preferred way of being.  Tx to include discussion of current stressors and interpersonal conflicts and how to cope with these, coaching, diagnostic testing, referral for medication as indicated, use prescribed medication, cognitive restructuring, interpersonal, family therapy, supportive therapy services.        Goal 3: Patiient will report absence of persistent anxiety mood and report of reduced frequency and intensity of worry and absence of persistent anxious mood to acceptable levels, no panic attacks, report subjective comfort with rumination for a period of 90 days. Within 6 months as clinically observed and by patient self-report    I will know I've met my goal when I can go days without worrying about little things or shaking.      Objective #A (Patient Action)    Status: Continued - Date(s): 2/2/2024    Patient will demonstrate and report a level of anxiety that can be managed at a lower level of care.  Absence of persistent anxious mood and report of reduced frequency and intensity of worry and absence of persistent anxious mood to acceptable levels, no panic attacks, report subjective comfort with rumination for a period of 90 days, within 6 months as clinically observed and by patient self-report.    Intervention(s)  Therapist will provide individual therapy to identify triggers to anxiety, gain feedback on helpful coping tools and thought-reframing techniques, and identify preferred way of being. Tx to include discuss current stressors and interpersonal conflicts and how to cope with these, coaching, diagnostic testing , referral for medication as indicated, use prescribed medication, cognitive restructuring, interpersonal,   family therapy, supportive therapy services.        Patient has reviewed and agreed to the above plan.      Katie Faulkner, [facility]   2/2/2024                                             "       Wandy Ann            SAFETY PLAN:  Step 1: Warning signs / cues (Thoughts, images, mood, situation, behavior) that a crisis may be developing:  Thoughts: thoughts of not wanting to be here  Images: no images  Thinking Processes: intrusive thoughts (bothersome, unwanted thoughts that come out of nowhere): get irritated  Mood: intense anger and agitation  Behaviors: isolating/withdrawing   Situations: trauma    Step 2: Coping strategies - Things I can do to take my mind off of my problems without contacting another person (relaxation technique, physical activity):  Distress Tolerance Strategies:  arts and crafts: drawing and painting  Physical Activities: exercise: working out  Focus on helpful thoughts:  \"This is temporary\", \"It always passes\" and \"Ride the wave\"  Step 3: People and social settings that provide distraction:                 Name: Jennifer     Phone: [phone]                 Name: Antonina         Phone: [phone]                 Name: mom       Phone: [phone]  friends house or car   Step 4: Remind myself of people and things that are important to me and worth living for:  Best friend and cat        Step 5: When I am in crisis, I can ask these people to help me use my safety plan:                 Name: Jennifer     Phone: [phone]                 Name: Antonina         Phone: [phone]                 Name: panchito       Phone: [phone]  Step 6: Making the environment safe:   be around others  Step 7: Professionals or agencies I can contact during a crisis:  [facility] Daytime Number: 941-370-9667  Suicide Prevention Lifeline: 7-872-738-MQWJ (0278)  Crisis Text Line Service (available 24 hours a day, 7 days a week): Text [place] to 840655  Local Crisis Services: 988     Call 911 or go to my nearest emergency department.       I helped develop this safety plan and agree to use it when needed.  I have been given a copy of this plan.       Client signature " _________________________________________________________________  Today s date:  2/22/2021  Adapted from Safety Plan Template 2008 Marisol Cazares and Mario Flores is reprinted with the express permission of the authors.  No portion of the Safety Plan Template may be reproduced without the express, written permission.  You can contact the authors at [email or web address] or [email or web address].

## 2024-03-21 ENCOUNTER — VIRTUAL VISIT (OUTPATIENT)
Dept: PSYCHOLOGY | Facility: CLINIC | Age: 24
End: 2024-03-21
Payer: COMMERCIAL

## 2024-03-21 DIAGNOSIS — F43.10 PTSD (POST-TRAUMATIC STRESS DISORDER): ICD-10-CM

## 2024-03-21 DIAGNOSIS — F90.2 ADHD (ATTENTION DEFICIT HYPERACTIVITY DISORDER), COMBINED TYPE: ICD-10-CM

## 2024-03-21 DIAGNOSIS — F41.1 GENERALIZED ANXIETY DISORDER: ICD-10-CM

## 2024-03-21 DIAGNOSIS — F31.32 BIPOLAR AFFECTIVE DISORDER, CURRENTLY DEPRESSED, MODERATE (H): Primary | ICD-10-CM

## 2024-03-21 PROCEDURE — 90834 PSYTX W PT 45 MINUTES: CPT | Mod: 93 | Performed by: SOCIAL WORKER

## 2024-03-21 NOTE — PROGRESS NOTES
"    [facility]                                      Progress Note    Patient Name: Wandy Ann  Date: 3/21/2024         Service Type: Individual      Session Start Time:0903  Session End Time: 0952     Session Length: 38-52    Session #: 73    Attendees: Client    Service Modality:    Phone Visit:      Provider verified identity through the following two step process.  Patient provided:  Patient is known previously to provider     Telephone Visit: The patient's condition can be safely assessed and treated via synchronous audio telemedicine encounter.       Reason for Audio Telemedicine Visit: Services only offered telehealth     Originating Site (Patient Location): Patient's home     Distant Site (Provider Location): off site      Consent:  The patient/guardian has verbally consented to:      1. The potential risks and benefits of telemedicine (telephone visit) versus in person care;     The patient has been notified of the following:      \"We have found that certain health care needs can be provided without the need for a face to face visit.  This service lets us provide the care you need with a phone conversation.       I will have full access to your [facility] medical record during this entire phone call.   I will be taking notes for your medical record.      Since this is like an office visit, we will bill your insurance company for this service.       There are potential benefits and risks of telephone visits (e.g. limits to patient confidentiality) that differ from in-person visits.?Confidentiality still applies for telephone services, and nobody will record the visit.  It is important to be in a quiet, private space that is free of distractions (including cell phone or other devices) during the visit.??      If during the course of the call I believe a telephone visit is not appropriate, you will not be charged for this service\"     Consent has been obtained for this " service by care team member: Yes     DATA  Interactive Complexity: No  Crisis: No        Progress Since Last Session (Related to Symptoms / Goals / Homework):   Symptoms: Worsening mood      Homework: Achieved / completed to satisfaction  Partially completed        Episode of Care Goals: Minimal progress - PREPARATION (Decided to change - considering how); Intervened by negotiating a change plan and determining options / strategies for behavior change, identifying triggers, exploring social supports, and working towards setting a date to begin behavior change       Current / Ongoing Stressors and Concerns:   past trauma, developmental hx and current stressors      Treatment Objective(s) Addressed in This Session:   Patient will demonstrate control of impulses and ability to use positive coping tools to manage strong emotions that can be safely addressed at a lower level of care. Absence of persistent SI and report of reduced frequency and intensity of SI and absence of SI to acceptable levels, report subjective improved mood for a period of 90 days, within 6 months as clinically observed and by patient self-report.  Patient will demonstrate and report a level of depression that can be managed at a lower level of care.  Absence of persistent depression mood and report of reduced frequency and intensity of low mood and absence of persistent low energy and motivation to acceptable levels, report subjective improved motivation and increased energy for a period of 90 days, within 6 months as clinically observed and by patient self-report.  Patient will demonstrate and report a level of anxiety that can be managed at a lower level of care.  Absence of persistent anxious mood and report of reduced frequency and intensity of worry and absence of persistent anxious mood to acceptable levels, no panic attacks, report subjective comfort with rumination for a period of 90 days, within 6 months as clinically observed and by  patient self-report.       Intervention:   Therapist met with patient to review goals and interventions. Therapist utilized reflected listening as patient gave brief reflection of week. Patient reported difficulty with mood towards external factors. Therapist supported patient as she processed and validated patient. Therapist provided education to patient on human behavior and accepting when people are not her people but also meeting them where they are at and setting own expectations to match situations and managing boundaries to ease anxiety.   Patient presented with an anxious affect. Patient was engaged in session and open to feedback. Patient reported no safety concerns.         There has been demonstrated improvement in functioning while patient has been engaged in psychotherapy/psychological service- if withdrawn the patient would deteriorate and/or relapse.     Assessments completed prior to visit:  The following assessments were completed by patient for this visit:  PHQ9:       4/27/2023     9:55 AM 8/18/2023    12:47 AM 10/2/2023     9:43 AM 10/2/2023     9:44 AM 10/10/2023     1:57 PM 12/14/2023     9:33 AM 1/15/2024     7:47 PM   PHQ-9 SCORE   PHQ-9 Total Score MyChart 17 (Moderately severe depression) 13 (Moderate depression)  15 (Moderately severe depression) 16 (Moderately severe depression) 7 (Mild depression) 11 (Moderate depression)   PHQ-9 Total Score 17 13 15 15 16 7 11     GAD7:       8/24/2022     2:00 PM 10/31/2022     8:08 AM 11/29/2022     2:34 PM 2/2/2023    11:53 AM 4/20/2023    12:41 PM 10/10/2023     1:57 PM 1/15/2024     7:48 PM   YESSI-7 SCORE   Total Score  2 (minimal anxiety) 11 (moderate anxiety) 16 (severe anxiety) 13 (moderate anxiety) 17 (severe anxiety) 15 (severe anxiety)   Total Score 13 2 11 16 13 17 15   PROMIS-10 Scores        2/1/2023    10:18 AM 10/10/2023     1:58 PM 1/15/2024     7:49 PM   PROMIS-10 Total Score w/o Sub Scores   PROMIS TOTAL - SUBSCORES  22 24        Information is confidential and restricted. Go to Review Flowsheets to unlock data.            ASSESSMENT: Current Emotional / Mental Status (status of significant symptoms):   Risk status (Self / Other harm or suicidal ideation)   Patient denies current fears or concerns for personal safety.   Patient denies current or recent suicidal ideation or behaviors.   Patient denies current or recent homicidal ideation or behaviors.   Patient denies current or recent self injurious behavior or ideation.   Patient denies other safety concerns.   Patient reports there has been no change in risk factors since their last session.     Patient reports there has been no change in protective factors since their last session.     A safety and risk management plan has been developed including: Patient consented to co-developed safety plan on 2.21.2022.  Safety and risk management plan was reviewed.   Patient agreed to use safety plan should any safety concerns arise.  A copy was made available to the patient.     Appearance:   Appropriate     Eye Contact:   Fair     Psychomotor Behavior: Restless     Attitude:   Cooperative    Orientation:   All   Speech    Rate / Production: Emotional Talkative    Volume:  Normal    Mood:    Anxious    Affect:    Worrisome    Thought Content:  Clear    Thought Form:  Coherent    Insight:    Fair      Medication Review:   No changes to current psychiatric medication(s)     Medication Compliance:   Yes     Changes in Health Issues:   None reported     Chemical Use Review:   Substance Use: Chemical use reviewed, no active concerns identified      Tobacco Use: No current tobacco use.      Diagnosis:  1. Bipolar affective disorder, currently depressed, moderate (H)    2. ADHD (attention deficit hyperactivity disorder), combined type    3. Generalized anxiety disorder    4. PTSD (post-traumatic stress disorder)        Collateral Reports Completed:   Not Applicable    PLAN: (Patient Tasks / Therapist Tasks  / Other)        Call [phone] if feeling SI has increase or go to ED or empath   Utilize coping skills when feeling impulsive.   Replace distortions with positive attributes      Look at intention rather than perception    Name anxiety and manage it in the moment  Do not avoid anxiety but rather manage it and remind self when feeling it   Do not avoid people  Move with transition and name anxiety around it  Give new job a chance  Face fear of seeing his friends   Therapist provided education to patient on human behavior and accepting when people are not her people but also meeting them where they are at and setting own expectations to match situations and managing boundaries to ease anxiety.     Katie Faulkner, [facility]  3/21/2024                                                         ______________________________________________________________________    Individual Treatment Plan    Patient's Name: Wandy Ann  YOB: 2000    Date of Creation: 2.22.2021  Date Treatment Plan Last Reviewed/Revised: 2/2/2024 due 5/2/2024    DSM5 Diagnoses: 296.52 Bipolar I Disorder Current or Most Recent Episode Depressed, Moderate, 300.02 (F41.1) Generalized Anxiety Disorder or 309.81 (F43.10) Posttraumatic Stress Disorder (includes Posttraumatic Stress Disorder for Children 6 Years and Younger)  Without dissociative symptoms  Psychosocial / Contextual Factors: past trauma, developmental hx and current stressors   PROMIS (reviewed every 90 days): 1/15/2024    Referral / Collaboration:  Referral to another professional/service is not indicated at this time..    Anticipated number of session for this episode of care: 12  Anticipation frequency of session: Biweekly  Anticipated Duration of each session: 38-52 minutes  Treatment plan will be reviewed in 90 days or when goals have been changed.       MeasurableTreatment Goal(s) related to diagnosis / functional impairment(s)  Goal 1: Patient will report absence of  persistent SI and report of reduced frequency and intensity of SI and absence of SI to acceptable levels, report subjective improved mood for a period of 90 days, within 6 months as clinically observed and by patient self-report    I will know I've met my goal when I can see the difference between a bad moment and what I want in th future.      Objective #A (Patient Action)    Patient will demonstrate control of impulses and ability to use positive coping tools to manage strong emotions that can be safely addressed at a lower level of care. Absence of persistent SI and report of reduced frequency and intensity of SI and absence of SI to acceptable levels, report subjective improved mood for a period of 90 days, within 6 months as clinically observed and by patient self-report.  Status: Continued - Date(s): 2/2/2024    Intervention(s)  Therapist will provide individual therapy to identify triggers to SI urges, gain feedback on helpful coping strategies, and identify ways that family can offer support. Tx to discuss current stressors and interpersonal conflicts and how to cope with these, coaching, diagnostic testing, referral for medication as indicated, use prescribed medication, cognitive restructuring, interpersonal family therapy, supportive therapy services.        Goal 2: Patient will report absence of persistent depression mood and report of reduced frequency and intensity of low mood and absence of persistent low energy and motivation to acceptable levels, report subjective improved motivation and increased energy for a period of 90 days, within 6 months as clinically observed and by patient self-report    I will know I've met my goal when I no longer feel sad.      Objective #A (Patient Action)    Status: Continued - Date(s): 2/2/2024    Patient will demonstrate and report a level of depression that can be managed at a lower level of care.  Absence of persistent depression mood and report of reduced  frequency and intensity of low mood and absence of persistent low energy and motivation to acceptable levels, report subjective improved motivation and increased energy for a period of 90 days, within 6 months as clinically observed and by patient self-report.    Intervention(s)  Therapist will provide individual therapy to identify triggers to depression, gain feedback on helpful coping tools and thought-reframing techniques, and identify preferred way of being.  Tx to include discussion of current stressors and interpersonal conflicts and how to cope with these, coaching, diagnostic testing, referral for medication as indicated, use prescribed medication, cognitive restructuring, interpersonal, family therapy, supportive therapy services.        Goal 3: Patiient will report absence of persistent anxiety mood and report of reduced frequency and intensity of worry and absence of persistent anxious mood to acceptable levels, no panic attacks, report subjective comfort with rumination for a period of 90 days. Within 6 months as clinically observed and by patient self-report    I will know I've met my goal when I can go days without worrying about little things or shaking.      Objective #A (Patient Action)    Status: Continued - Date(s): 2/2/2024    Patient will demonstrate and report a level of anxiety that can be managed at a lower level of care.  Absence of persistent anxious mood and report of reduced frequency and intensity of worry and absence of persistent anxious mood to acceptable levels, no panic attacks, report subjective comfort with rumination for a period of 90 days, within 6 months as clinically observed and by patient self-report.    Intervention(s)  Therapist will provide individual therapy to identify triggers to anxiety, gain feedback on helpful coping tools and thought-reframing techniques, and identify preferred way of being. Tx to include discuss current stressors and interpersonal conflicts and  "how to cope with these, coaching, diagnostic testing , referral for medication as indicated, use prescribed medication, cognitive restructuring, interpersonal,   family therapy, supportive therapy services.        Patient has reviewed and agreed to the above plan.      Katie Faulkner, [facility]   2/2/2024                                                   Wandy   Robert Ann            SAFETY PLAN:  Step 1: Warning signs / cues (Thoughts, images, mood, situation, behavior) that a crisis may be developing:  Thoughts: thoughts of not wanting to be here  Images: no images  Thinking Processes: intrusive thoughts (bothersome, unwanted thoughts that come out of nowhere): get irritated  Mood: intense anger and agitation  Behaviors: isolating/withdrawing   Situations: trauma    Step 2: Coping strategies - Things I can do to take my mind off of my problems without contacting another person (relaxation technique, physical activity):  Distress Tolerance Strategies:  arts and crafts: drawing and painting  Physical Activities: exercise: working out  Focus on helpful thoughts:  \"This is temporary\", \"It always passes\" and \"Ride the wave\"  Step 3: People and social settings that provide distraction:                 Name: Jennifer     Phone: [phone]                 Name: Antonina         Phone: [phone]                 Name: mom       Phone: [phone]  friends house or car   Step 4: Remind myself of people and things that are important to me and worth living for:  Best friend and cat        Step 5: When I am in crisis, I can ask these people to help me use my safety plan:                 Name: Jennifer     Phone: [phone]                 Name: Antonina         Phone: [phone]                 Name: mom       Phone: [phone]  Step 6: Making the environment safe:   be around others  Step 7: Professionals or agencies I can contact during a crisis:  [facility] Daytime Number: [phone]  Suicide " Prevention Lifeline: 3-467-727-TALK (7740)  Crisis Text Line Service (available 24 hours a day, 7 days a week): Text [place] to 904602  Local Crisis Services: 988     Call 911 or go to my nearest emergency department.       I helped develop this safety plan and agree to use it when needed.  I have been given a copy of this plan.       Client signature _________________________________________________________________  Today s date:  2/22/2021  Adapted from Safety Plan Template 2008 Marisol Cazares and Mario lFores is reprinted with the express permission of the authors.  No portion of the Safety Plan Template may be reproduced without the express, written permission.  You can contact the authors at [email or web address] or [email or web address].

## 2024-04-01 ENCOUNTER — VIRTUAL VISIT (OUTPATIENT)
Dept: PSYCHOLOGY | Facility: CLINIC | Age: 24
End: 2024-04-01
Payer: COMMERCIAL

## 2024-04-01 DIAGNOSIS — F31.32 BIPOLAR AFFECTIVE DISORDER, CURRENTLY DEPRESSED, MODERATE (H): Primary | ICD-10-CM

## 2024-04-01 DIAGNOSIS — F43.10 PTSD (POST-TRAUMATIC STRESS DISORDER): ICD-10-CM

## 2024-04-01 DIAGNOSIS — F90.2 ADHD (ATTENTION DEFICIT HYPERACTIVITY DISORDER), COMBINED TYPE: ICD-10-CM

## 2024-04-01 DIAGNOSIS — F41.1 GENERALIZED ANXIETY DISORDER: ICD-10-CM

## 2024-04-01 PROCEDURE — 90837 PSYTX W PT 60 MINUTES: CPT | Mod: 93 | Performed by: SOCIAL WORKER

## 2024-04-01 NOTE — PROGRESS NOTES
"    [facility]                                      Progress Note    Patient Name: Wandy Ann  Date: 4/1/2024         Service Type: Individual      Session Start Time:0801     Session End Time: 0856     Session Length: 53+    Session #: 74    Attendees: Client    Service Modality:    Phone Visit:      Provider verified identity through the following two step process.  Patient provided:  Patient is known previously to provider     Telephone Visit: The patient's condition can be safely assessed and treated via synchronous audio telemedicine encounter.       Reason for Audio Telemedicine Visit: Services only offered telehealth     Originating Site (Patient Location): Patient's home     Distant Site (Provider Location): off site      Consent:  The patient/guardian has verbally consented to:      1. The potential risks and benefits of telemedicine (telephone visit) versus in person care;     The patient has been notified of the following:      \"We have found that certain health care needs can be provided without the need for a face to face visit.  This service lets us provide the care you need with a phone conversation.       I will have full access to your [facility] medical record during this entire phone call.   I will be taking notes for your medical record.      Since this is like an office visit, we will bill your insurance company for this service.       There are potential benefits and risks of telephone visits (e.g. limits to patient confidentiality) that differ from in-person visits.?Confidentiality still applies for telephone services, and nobody will record the visit.  It is important to be in a quiet, private space that is free of distractions (including cell phone or other devices) during the visit.??      If during the course of the call I believe a telephone visit is not appropriate, you will not be charged for this service\"     Consent has been obtained for this " service by care team member: Yes     DATA  Interactive Complexity: No  Crisis: No        Progress Since Last Session (Related to Symptoms / Goals / Homework):   Symptoms: Worsening anxiety      Homework: Achieved / completed to satisfaction  Partially completed        Episode of Care Goals: Minimal progress - PREPARATION (Decided to change - considering how); Intervened by negotiating a change plan and determining options / strategies for behavior change, identifying triggers, exploring social supports, and working towards setting a date to begin behavior change       Current / Ongoing Stressors and Concerns:   past trauma, developmental hx and current stressors      Treatment Objective(s) Addressed in This Session:   Patient will demonstrate control of impulses and ability to use positive coping tools to manage strong emotions that can be safely addressed at a lower level of care. Absence of persistent SI and report of reduced frequency and intensity of SI and absence of SI to acceptable levels, report subjective improved mood for a period of 90 days, within 6 months as clinically observed and by patient self-report.  Patient will demonstrate and report a level of depression that can be managed at a lower level of care.  Absence of persistent depression mood and report of reduced frequency and intensity of low mood and absence of persistent low energy and motivation to acceptable levels, report subjective improved motivation and increased energy for a period of 90 days, within 6 months as clinically observed and by patient self-report.  Patient will demonstrate and report a level of anxiety that can be managed at a lower level of care.  Absence of persistent anxious mood and report of reduced frequency and intensity of worry and absence of persistent anxious mood to acceptable levels, no panic attacks, report subjective comfort with rumination for a period of 90 days, within 6 months as clinically observed and by  patient self-report.       Intervention:   Motivational Interviewing  Target Behavior:  managing anxiety    Stage of Change: PREPARATION (Decided to change - considering how)    MI Intervention: Expressed Empathy/Understanding, Supported Autonomy, Collaboration, Evocation, Permission to raise concern or advise, Open-ended questions, and Rolled with resistance: Complex reflection     Change Talk Expressed by the Patient: Desire to change Ability to change Reasons to change Need to change    Provider Response to Change Talk: E - Evoked more info from patient about behavior change and A - Affirmed patient's thoughts, decisions, or attempts at behavior change          There has been demonstrated improvement in functioning while patient has been engaged in psychotherapy/psychological service- if withdrawn the patient would deteriorate and/or relapse.     Assessments completed prior to visit:  The following assessments were completed by patient for this visit:  PHQ9:       4/27/2023     9:55 AM 8/18/2023    12:47 AM 10/2/2023     9:43 AM 10/2/2023     9:44 AM 10/10/2023     1:57 PM 12/14/2023     9:33 AM 1/15/2024     7:47 PM   PHQ-9 SCORE   PHQ-9 Total Score MyChart 17 (Moderately severe depression) 13 (Moderate depression)  15 (Moderately severe depression) 16 (Moderately severe depression) 7 (Mild depression) 11 (Moderate depression)   PHQ-9 Total Score 17 13 15 15 16 7 11     GAD7:       8/24/2022     2:00 PM 10/31/2022     8:08 AM 11/29/2022     2:34 PM 2/2/2023    11:53 AM 4/20/2023    12:41 PM 10/10/2023     1:57 PM 1/15/2024     7:48 PM   YESSI-7 SCORE   Total Score  2 (minimal anxiety) 11 (moderate anxiety) 16 (severe anxiety) 13 (moderate anxiety) 17 (severe anxiety) 15 (severe anxiety)   Total Score 13 2 11 16 13 17 15   PROMIS-10 Scores        2/1/2023    10:18 AM 10/10/2023     1:58 PM 1/15/2024     7:49 PM   PROMIS-10 Total Score w/o Sub Scores   PROMIS TOTAL - SUBSCORES  22 24       Information is  confidential and restricted. Go to Review Flowsheets to unlock data.            ASSESSMENT: Current Emotional / Mental Status (status of significant symptoms):   Risk status (Self / Other harm or suicidal ideation)   Patient denies current fears or concerns for personal safety.   Patient denies current or recent suicidal ideation or behaviors.   Patient denies current or recent homicidal ideation or behaviors.   Patient denies current or recent self injurious behavior or ideation.   Patient denies other safety concerns.   Patient reports there has been no change in risk factors since their last session.     Patient reports there has been no change in protective factors since their last session.     A safety and risk management plan has been developed including: Patient consented to co-developed safety plan on 2.21.2022.  Safety and risk management plan was reviewed.   Patient agreed to use safety plan should any safety concerns arise.  A copy was made available to the patient.     Appearance:   Appropriate     Eye Contact:   Fair     Psychomotor Behavior: Restless     Attitude:   Cooperative    Orientation:   All   Speech    Rate / Production: Emotional Talkative    Volume:  Normal    Mood:    Anxious    Affect:    Worrisome    Thought Content:  Clear    Thought Form:  Coherent    Insight:    Fair      Medication Review:   No changes to current psychiatric medication(s)     Medication Compliance:   Yes     Changes in Health Issues:   None reported     Chemical Use Review:   Substance Use: Chemical use reviewed, no active concerns identified      Tobacco Use: No current tobacco use.      Diagnosis:  1. Bipolar affective disorder, currently depressed, moderate (H)    2. ADHD (attention deficit hyperactivity disorder), combined type    3. Generalized anxiety disorder    4. PTSD (post-traumatic stress disorder)        Collateral Reports Completed:   Not Applicable    PLAN: (Patient Tasks / Therapist Tasks /  Other)        Call [phone] if feeling SI has increase or go to ED or empath   Utilize coping skills when feeling impulsive.   Replace distortions with positive attributes      Look at intention rather than perception    Do not avoid people  Move with transition and name anxiety around it  Name anxiety in the moment, use a coping skill to ground self and then manage anxiety. Try giving emotional brain a name and logical brain is you    Katie RAMA Faulkner, LICSW  4/1/2024                                                         ______________________________________________________________________    Individual Treatment Plan    Patient's Name: Wandy Ann  YOB: 2000    Date of Creation: 2.22.2021  Date Treatment Plan Last Reviewed/Revised: 2/2/2024 due 5/2/2024    DSM5 Diagnoses: 296.52 Bipolar I Disorder Current or Most Recent Episode Depressed, Moderate, 300.02 (F41.1) Generalized Anxiety Disorder or 309.81 (F43.10) Posttraumatic Stress Disorder (includes Posttraumatic Stress Disorder for Children 6 Years and Younger)  Without dissociative symptoms  Psychosocial / Contextual Factors: past trauma, developmental hx and current stressors   PROMIS (reviewed every 90 days): 1/15/2024    Referral / Collaboration:  Referral to another professional/service is not indicated at this time..    Anticipated number of session for this episode of care: 12  Anticipation frequency of session: Biweekly  Anticipated Duration of each session: 38-52 minutes  Treatment plan will be reviewed in 90 days or when goals have been changed.       MeasurableTreatment Goal(s) related to diagnosis / functional impairment(s)  Goal 1: Patient will report absence of persistent SI and report of reduced frequency and intensity of SI and absence of SI to acceptable levels, report subjective improved mood for a period of 90 days, within 6 months as clinically observed and by patient self-report    I will know I've met my goal when I can  see the difference between a bad moment and what I want in th future.      Objective #A (Patient Action)    Patient will demonstrate control of impulses and ability to use positive coping tools to manage strong emotions that can be safely addressed at a lower level of care. Absence of persistent SI and report of reduced frequency and intensity of SI and absence of SI to acceptable levels, report subjective improved mood for a period of 90 days, within 6 months as clinically observed and by patient self-report.  Status: Continued - Date(s): 2/2/2024    Intervention(s)  Therapist will provide individual therapy to identify triggers to SI urges, gain feedback on helpful coping strategies, and identify ways that family can offer support. Tx to discuss current stressors and interpersonal conflicts and how to cope with these, coaching, diagnostic testing, referral for medication as indicated, use prescribed medication, cognitive restructuring, interpersonal family therapy, supportive therapy services.        Goal 2: Patient will report absence of persistent depression mood and report of reduced frequency and intensity of low mood and absence of persistent low energy and motivation to acceptable levels, report subjective improved motivation and increased energy for a period of 90 days, within 6 months as clinically observed and by patient self-report    I will know I've met my goal when I no longer feel sad.      Objective #A (Patient Action)    Status: Continued - Date(s): 2/2/2024    Patient will demonstrate and report a level of depression that can be managed at a lower level of care.  Absence of persistent depression mood and report of reduced frequency and intensity of low mood and absence of persistent low energy and motivation to acceptable levels, report subjective improved motivation and increased energy for a period of 90 days, within 6 months as clinically observed and by patient  self-report.    Intervention(s)  Therapist will provide individual therapy to identify triggers to depression, gain feedback on helpful coping tools and thought-reframing techniques, and identify preferred way of being.  Tx to include discussion of current stressors and interpersonal conflicts and how to cope with these, coaching, diagnostic testing, referral for medication as indicated, use prescribed medication, cognitive restructuring, interpersonal, family therapy, supportive therapy services.        Goal 3: Patiient will report absence of persistent anxiety mood and report of reduced frequency and intensity of worry and absence of persistent anxious mood to acceptable levels, no panic attacks, report subjective comfort with rumination for a period of 90 days. Within 6 months as clinically observed and by patient self-report    I will know I've met my goal when I can go days without worrying about little things or shaking.      Objective #A (Patient Action)    Status: Continued - Date(s): 2/2/2024    Patient will demonstrate and report a level of anxiety that can be managed at a lower level of care.  Absence of persistent anxious mood and report of reduced frequency and intensity of worry and absence of persistent anxious mood to acceptable levels, no panic attacks, report subjective comfort with rumination for a period of 90 days, within 6 months as clinically observed and by patient self-report.    Intervention(s)  Therapist will provide individual therapy to identify triggers to anxiety, gain feedback on helpful coping tools and thought-reframing techniques, and identify preferred way of being. Tx to include discuss current stressors and interpersonal conflicts and how to cope with these, coaching, diagnostic testing , referral for medication as indicated, use prescribed medication, cognitive restructuring, interpersonal,   family therapy, supportive therapy services.        Patient has reviewed and agreed to  "the above plan.      Katie Faulkner, [facility]   2/2/2024                                                   Wandy   Robert Cherryon            SAFETY PLAN:  Step 1: Warning signs / cues (Thoughts, images, mood, situation, behavior) that a crisis may be developing:  Thoughts: thoughts of not wanting to be here  Images: no images  Thinking Processes: intrusive thoughts (bothersome, unwanted thoughts that come out of nowhere): get irritated  Mood: intense anger and agitation  Behaviors: isolating/withdrawing   Situations: trauma    Step 2: Coping strategies - Things I can do to take my mind off of my problems without contacting another person (relaxation technique, physical activity):  Distress Tolerance Strategies:  arts and crafts: drawing and painting  Physical Activities: exercise: working out  Focus on helpful thoughts:  \"This is temporary\", \"It always passes\" and \"Ride the wave\"  Step 3: People and social settings that provide distraction:                 Name: Jennifer     Phone: [phone]                 Name: Antonina         Phone: [phone]                 Name: mom       Phone: [phone]  friends house or car   Step 4: Remind myself of people and things that are important to me and worth living for:  Best friend and cat        Step 5: When I am in crisis, I can ask these people to help me use my safety plan:                 Name: Jennifer     Phone: [phone]                 Name: Antonina         Phone: [phone]                 Name: panchito       Phone: [phone]  Step 6: Making the environment safe:   be around others  Step 7: Professionals or agencies I can contact during a crisis:  [facility] Daytime Number: [phone]  Suicide Prevention Lifeline: 1-420-496-TALK (1373)  Crisis Text Line Service (available 24 hours a day, 7 days a week): Text [place] to 505790  Local Crisis Services: 988     Call 911 or go to my nearest emergency department.       I helped develop this safety plan and " agree to use it when needed.  I have been given a copy of this plan.       Client signature _________________________________________________________________  Today s date:  2/22/2021  Adapted from Safety Plan Template 2008 Marisol Cazares and aMrio Flores is reprinted with the express permission of the authors.  No portion of the Safety Plan Template may be reproduced without the express, written permission.  You can contact the authors at [email or web address].[email or web address] or [email or web address].

## 2024-04-02 ENCOUNTER — E-VISIT (OUTPATIENT)
Dept: FAMILY MEDICINE | Facility: CLINIC | Age: 24
End: 2024-04-02
Payer: COMMERCIAL

## 2024-04-02 DIAGNOSIS — B37.31 CANDIDAL VULVOVAGINITIS: Primary | ICD-10-CM

## 2024-04-02 PROCEDURE — 99421 OL DIG E/M SVC 5-10 MIN: CPT | Performed by: PHYSICIAN ASSISTANT

## 2024-04-02 RX ORDER — FLUCONAZOLE 150 MG/1
150 TABLET ORAL ONCE
Qty: 1 TABLET | Refills: 0 | Status: SHIPPED | OUTPATIENT
Start: 2024-04-02 | End: 2024-04-02

## 2024-04-02 NOTE — PATIENT INSTRUCTIONS
Thank you for choosing us for your care. I have placed an order for a prescription so that you can start treatment. View your full visit summary for details by clicking on the link below. Your pharmacist will able to address any questions you may have about the medication.     If you re not feeling better within 2-3 days, please schedule an appointment.  You can schedule an appointment right here in [facility], or call [phone]  If the visit is for the same symptoms as your eVisit, we ll refund the cost of your eVisit if seen within seven days.    Vaginal Yeast Infection: Care Instructions  Overview     A vaginal yeast infection is the growth of too many yeast cells in the vagina. This is a common problem. Itching, vaginal discharge and irritation, and other symptoms can bother you. But yeast infections don't often cause other health problems.  Some medicines can increase your risk of getting a yeast infection. These include antibiotics, hormones, and steroids. You may also be more likely to get a yeast infection if you are pregnant, have diabetes, douche, or wear tight clothes.  With treatment, most yeast infections get better in a few days.  Follow-up care is a key part of your treatment and safety. Be sure to make and go to all appointments, and call your doctor if you are having problems. It's also a good idea to know your test results and keep a list of the medicines you take.  How can you care for yourself at home?  Take your medicines exactly as prescribed. Call your doctor if you think you are having a problem with your medicine.  Ask your doctor about over-the-counter (OTC) medicines for yeast infections. If you use an OTC treatment, read and follow all instructions on the label.  Don't use tampons while using a vaginal cream or suppository. The tampons can absorb the medicine. Use pads instead.  Wear loose cotton clothing. Don't wear nylon or other fabric that holds body heat and moisture close to the  "skin.  Try sleeping without underwear.  Don't scratch. Relieve itching with a cold pack or a cool bath.  Don't wash your vulva more than once a day. Use plain water or a mild, unscented soap. Air-dry the vulva.  Change out of wet or damp clothes as soon as possible.  If you are using a vaginal medicine, don't have sex until you have finished your treatment. But if you do have sex, don't depend on a condom or diaphragm for birth control. The oil in some vaginal medicines weakens latex.  Don't douche or use powders, sprays, or perfumes in your vagina or on your vulva. These items can change the normal balance of organisms in your vagina.  When should you call for help?   Call your doctor now or seek immediate medical care if:    You have new or increased pain in your vagina or pelvis.   Watch closely for changes in your health, and be sure to contact your doctor if:    You have unexpected vaginal bleeding.     You have a fever.     You are not getting better after 2 days.     Your symptoms come back after you finish your medicines.   Where can you learn more?  Go to https://www.[employer].net/patiented  Enter F639 in the search box to learn more about \"Vaginal Yeast Infection: Care Instructions.\"  Current as of: November 27, 2023               Content Version: 14.0    8773-9356 [employer].   Care instructions adapted under license by your healthcare professional. If you have questions about a medical condition or this instruction, always ask your healthcare professional. [employer] disclaims any warranty or liability for your use of this information.      "

## 2024-04-19 ENCOUNTER — VIRTUAL VISIT (OUTPATIENT)
Dept: PSYCHOLOGY | Facility: CLINIC | Age: 24
End: 2024-04-19
Payer: COMMERCIAL

## 2024-04-19 DIAGNOSIS — F31.32 BIPOLAR AFFECTIVE DISORDER, CURRENTLY DEPRESSED, MODERATE (H): Primary | ICD-10-CM

## 2024-04-19 DIAGNOSIS — F90.2 ADHD (ATTENTION DEFICIT HYPERACTIVITY DISORDER), COMBINED TYPE: ICD-10-CM

## 2024-04-19 DIAGNOSIS — F43.10 PTSD (POST-TRAUMATIC STRESS DISORDER): ICD-10-CM

## 2024-04-19 DIAGNOSIS — F41.1 GENERALIZED ANXIETY DISORDER: ICD-10-CM

## 2024-04-19 PROCEDURE — 90837 PSYTX W PT 60 MINUTES: CPT | Mod: 95 | Performed by: SOCIAL WORKER

## 2024-04-19 NOTE — PROGRESS NOTES
[facility]                                      Progress Note    Patient Name: Wandy Ann  Date: 2024         Service Type: Individual      Session Start Time:1103    Session End Time: 1157     Session Length: 53+    Session #: 75    Attendees: Client    Service Modality:    Video Visit:      Provider verified identity through the following two step process.  Patient provided: Patient  and Patient previous known to provider     Telemedicine Visit: The patient's condition can be safely assessed and treated via synchronous audio and visual telemedicine encounter.       Reason for Telemedicine Visit: Patient has requested telehealth visit     Originating Site (Patient Location): Patient's home     Distant Site (Provider Location): Provider Remote Setting- Home Office     Consent:  The patient/guardian has verbally consented to: the potential risks and benefits of telemedicine (video visit) versus in person care; bill my insurance or make self-payment for services provided; and responsibility for payment of non-covered services.      Patient would like the video invitation sent by: email/text     Mode of Communication: Video Conference via Amwell     Distant Location (Provider): Off-site     As the provider I attest to compliance with applicable laws and regulations related to telemedicine.    DATA  Interactive Complexity: No  Crisis: No        Progress Since Last Session (Related to Symptoms / Goals / Homework):   Symptoms: Improving per patient      Homework: Achieved / completed to satisfaction  Partially completed        Episode of Care Goals: Minimal progress - PREPARATION (Decided to change - considering how); Intervened by negotiating a change plan and determining options / strategies for behavior change, identifying triggers, exploring social supports, and working towards setting a date to begin behavior change       Current / Ongoing Stressors and Concerns:   past  trauma, developmental hx and current stressors      Treatment Objective(s) Addressed in This Session:   Patient will demonstrate control of impulses and ability to use positive coping tools to manage strong emotions that can be safely addressed at a lower level of care. Absence of persistent SI and report of reduced frequency and intensity of SI and absence of SI to acceptable levels, report subjective improved mood for a period of 90 days, within 6 months as clinically observed and by patient self-report.  Patient will demonstrate and report a level of depression that can be managed at a lower level of care.  Absence of persistent depression mood and report of reduced frequency and intensity of low mood and absence of persistent low energy and motivation to acceptable levels, report subjective improved motivation and increased energy for a period of 90 days, within 6 months as clinically observed and by patient self-report.  Patient will demonstrate and report a level of anxiety that can be managed at a lower level of care.  Absence of persistent anxious mood and report of reduced frequency and intensity of worry and absence of persistent anxious mood to acceptable levels, no panic attacks, report subjective comfort with rumination for a period of 90 days, within 6 months as clinically observed and by patient self-report.       Intervention:   Motivational Interviewing  Target Behavior:  work on relationship with partners mom    Stage of Change: CONTEMPLATION (Considering change and yet undecided)    MI Intervention: Expressed Empathy/Understanding, Supported Autonomy, Collaboration, Evocation, Permission to raise concern or advise, Open-ended questions, and Rolled with resistance: Simple reflection     Change Talk Expressed by the Patient: Desire to change Ability to change Reasons to change    Provider Response to Change Talk: A - Affirmed patient's thoughts, decisions, or attempts at behavior change and R -  Reflected patient's change talk            There has been demonstrated improvement in functioning while patient has been engaged in psychotherapy/psychological service- if withdrawn the patient would deteriorate and/or relapse.     Assessments completed prior to visit:  The following assessments were completed by patient for this visit:  PHQ9:       4/27/2023     9:55 AM 8/18/2023    12:47 AM 10/2/2023     9:43 AM 10/2/2023     9:44 AM 10/10/2023     1:57 PM 12/14/2023     9:33 AM 1/15/2024     7:47 PM   PHQ-9 SCORE   PHQ-9 Total Score MyChart 17 (Moderately severe depression) 13 (Moderate depression)  15 (Moderately severe depression) 16 (Moderately severe depression) 7 (Mild depression) 11 (Moderate depression)   PHQ-9 Total Score 17 13 15 15 16 7 11     GAD7:       8/24/2022     2:00 PM 10/31/2022     8:08 AM 11/29/2022     2:34 PM 2/2/2023    11:53 AM 4/20/2023    12:41 PM 10/10/2023     1:57 PM 1/15/2024     7:48 PM   YESSI-7 SCORE   Total Score  2 (minimal anxiety) 11 (moderate anxiety) 16 (severe anxiety) 13 (moderate anxiety) 17 (severe anxiety) 15 (severe anxiety)   Total Score 13 2 11 16 13 17 15   PROMIS-10 Scores        2/1/2023    10:18 AM 10/10/2023     1:58 PM 1/15/2024     7:49 PM   PROMIS-10 Total Score w/o Sub Scores   PROMIS TOTAL - SUBSCORES  22 24       Information is confidential and restricted. Go to Review Flowsheets to unlock data.            ASSESSMENT: Current Emotional / Mental Status (status of significant symptoms):   Risk status (Self / Other harm or suicidal ideation)   Patient denies current fears or concerns for personal safety.   Patient denies current or recent suicidal ideation or behaviors.   Patient denies current or recent homicidal ideation or behaviors.   Patient denies current or recent self injurious behavior or ideation.   Patient denies other safety concerns.   Patient reports there has been no change in risk factors since their last session.     Patient reports there has  been no change in protective factors since their last session.     A safety and risk management plan has been developed including: Patient consented to co-developed safety plan on 2.21.2022.  Safety and risk management plan was reviewed.   Patient agreed to use safety plan should any safety concerns arise.  A copy was made available to the patient.     Appearance:   Appropriate     Eye Contact:   Fair     Psychomotor Behavior: Restless     Attitude:   Cooperative    Orientation:   All   Speech    Rate / Production: Emotional Talkative    Volume:  Normal    Mood:    Anxious    Affect:    Worrisome    Thought Content:  Clear    Thought Form:  Coherent    Insight:    Fair      Medication Review:   No changes to current psychiatric medication(s)     Medication Compliance:   Yes     Changes in Health Issues:   None reported     Chemical Use Review:   Substance Use: Chemical use reviewed, no active concerns identified      Tobacco Use: No current tobacco use.      Diagnosis:  1. Bipolar affective disorder, currently depressed, moderate (H)    2. ADHD (attention deficit hyperactivity disorder), combined type    3. Generalized anxiety disorder    4. PTSD (post-traumatic stress disorder)        Collateral Reports Completed:   Not Applicable    PLAN: (Patient Tasks / Therapist Tasks / Other)        Call [phone] if feeling SI has increase or go to ED or empath   Utilize coping skills when feeling impulsive.   Replace distortions with positive attributes      Look at intention rather than perception    Do not avoid people  Try to work on relationship with partners mom  Look at mothers day week and go to lunch      Katie Faulkner, [facility]  4/19/2024                                                         ______________________________________________________________________    Individual Treatment Plan    Patient's Name: Wandy Ann  YOB: 2000    Date of Creation: 2.22.2021  Date Treatment Plan Last  Reviewed/Revised: 2/2/2024 due 5/2/2024    DSM5 Diagnoses: 296.52 Bipolar I Disorder Current or Most Recent Episode Depressed, Moderate, 300.02 (F41.1) Generalized Anxiety Disorder or 309.81 (F43.10) Posttraumatic Stress Disorder (includes Posttraumatic Stress Disorder for Children 6 Years and Younger)  Without dissociative symptoms  Psychosocial / Contextual Factors: past trauma, developmental hx and current stressors   PROMIS (reviewed every 90 days): 1/15/2024    Referral / Collaboration:  Referral to another professional/service is not indicated at this time..    Anticipated number of session for this episode of care: 12  Anticipation frequency of session: Biweekly  Anticipated Duration of each session: 38-52 minutes  Treatment plan will be reviewed in 90 days or when goals have been changed.       MeasurableTreatment Goal(s) related to diagnosis / functional impairment(s)  Goal 1: Patient will report absence of persistent SI and report of reduced frequency and intensity of SI and absence of SI to acceptable levels, report subjective improved mood for a period of 90 days, within 6 months as clinically observed and by patient self-report    I will know I've met my goal when I can see the difference between a bad moment and what I want in th future.      Objective #A (Patient Action)    Patient will demonstrate control of impulses and ability to use positive coping tools to manage strong emotions that can be safely addressed at a lower level of care. Absence of persistent SI and report of reduced frequency and intensity of SI and absence of SI to acceptable levels, report subjective improved mood for a period of 90 days, within 6 months as clinically observed and by patient self-report.  Status: Continued - Date(s): 2/2/2024    Intervention(s)  Therapist will provide individual therapy to identify triggers to SI urges, gain feedback on helpful coping strategies, and identify ways that family can offer support. Tx  to discuss current stressors and interpersonal conflicts and how to cope with these, coaching, diagnostic testing, referral for medication as indicated, use prescribed medication, cognitive restructuring, interpersonal family therapy, supportive therapy services.        Goal 2: Patient will report absence of persistent depression mood and report of reduced frequency and intensity of low mood and absence of persistent low energy and motivation to acceptable levels, report subjective improved motivation and increased energy for a period of 90 days, within 6 months as clinically observed and by patient self-report    I will know I've met my goal when I no longer feel sad.      Objective #A (Patient Action)    Status: Continued - Date(s): 2/2/2024    Patient will demonstrate and report a level of depression that can be managed at a lower level of care.  Absence of persistent depression mood and report of reduced frequency and intensity of low mood and absence of persistent low energy and motivation to acceptable levels, report subjective improved motivation and increased energy for a period of 90 days, within 6 months as clinically observed and by patient self-report.    Intervention(s)  Therapist will provide individual therapy to identify triggers to depression, gain feedback on helpful coping tools and thought-reframing techniques, and identify preferred way of being.  Tx to include discussion of current stressors and interpersonal conflicts and how to cope with these, coaching, diagnostic testing, referral for medication as indicated, use prescribed medication, cognitive restructuring, interpersonal, family therapy, supportive therapy services.        Goal 3: Patiient will report absence of persistent anxiety mood and report of reduced frequency and intensity of worry and absence of persistent anxious mood to acceptable levels, no panic attacks, report subjective comfort with rumination for a period of 90 days.  Within 6 months as clinically observed and by patient self-report    I will know I've met my goal when I can go days without worrying about little things or shaking.      Objective #A (Patient Action)    Status: Continued - Date(s): 2/2/2024    Patient will demonstrate and report a level of anxiety that can be managed at a lower level of care.  Absence of persistent anxious mood and report of reduced frequency and intensity of worry and absence of persistent anxious mood to acceptable levels, no panic attacks, report subjective comfort with rumination for a period of 90 days, within 6 months as clinically observed and by patient self-report.    Intervention(s)  Therapist will provide individual therapy to identify triggers to anxiety, gain feedback on helpful coping tools and thought-reframing techniques, and identify preferred way of being. Tx to include discuss current stressors and interpersonal conflicts and how to cope with these, coaching, diagnostic testing , referral for medication as indicated, use prescribed medication, cognitive restructuring, interpersonal,   family therapy, supportive therapy services.        Patient has reviewed and agreed to the above plan.      Katie Faulkner, [facility]   2/2/2024                                                   Wandy Ann            SAFETY PLAN:  Step 1: Warning signs / cues (Thoughts, images, mood, situation, behavior) that a crisis may be developing:  Thoughts: thoughts of not wanting to be here  Images: no images  Thinking Processes: intrusive thoughts (bothersome, unwanted thoughts that come out of nowhere): get irritated  Mood: intense anger and agitation  Behaviors: isolating/withdrawing   Situations: trauma    Step 2: Coping strategies - Things I can do to take my mind off of my problems without contacting another person (relaxation technique, physical activity):  Distress Tolerance Strategies:  arts and crafts: drawing and painting  Physical  "Activities: exercise: working out  Focus on helpful thoughts:  \"This is temporary\", \"It always passes\" and \"Ride the wave\"  Step 3: People and social settings that provide distraction:                 Name: Jennifer     Phone: [phone]                 Name: Antonina         Phone: [phone]                 Name: panchito       Phone: [phone]  friends house or car   Step 4: Remind myself of people and things that are important to me and worth living for:  Best friend and cat        Step 5: When I am in crisis, I can ask these people to help me use my safety plan:                 Name: Jennifer     Phone: [phone]                 Name: Antonina         Phone: [phone]                 Name: panchito       Phone: [phone]  Step 6: Making the environment safe:   be around others  Step 7: Professionals or agencies I can contact during a crisis:  [facility] Daytime Number: [phone]  Suicide Prevention Lifeline: 4-831-759-LXIY (2588)  Crisis Text Line Service (available 24 hours a day, 7 days a week): Text [place] to 245778  Local Crisis Services: 988     Call 911 or go to my nearest emergency department.       I helped develop this safety plan and agree to use it when needed.  I have been given a copy of this plan.       Client signature _________________________________________________________________  Today s date:  2/22/2021  Adapted from Safety Plan Template 2008 Marisol Cazares and Mario Flores is reprinted with the express permission of the authors.  No portion of the Safety Plan Template may be reproduced without the express, written permission.  You can contact the authors at [email or web address] or [email or web address].  "

## 2024-04-23 SDOH — HEALTH STABILITY: PHYSICAL HEALTH: ON AVERAGE, HOW MANY DAYS PER WEEK DO YOU ENGAGE IN MODERATE TO STRENUOUS EXERCISE (LIKE A BRISK WALK)?: 4 DAYS

## 2024-04-23 SDOH — HEALTH STABILITY: PHYSICAL HEALTH: ON AVERAGE, HOW MANY MINUTES DO YOU ENGAGE IN EXERCISE AT THIS LEVEL?: 40 MIN

## 2024-04-23 ASSESSMENT — SOCIAL DETERMINANTS OF HEALTH (SDOH): HOW OFTEN DO YOU GET TOGETHER WITH FRIENDS OR RELATIVES?: TWICE A WEEK

## 2024-04-25 ASSESSMENT — PATIENT HEALTH QUESTIONNAIRE - PHQ9
SUM OF ALL RESPONSES TO PHQ QUESTIONS 1-9: 6
SUM OF ALL RESPONSES TO PHQ QUESTIONS 1-9: 6
10. IF YOU CHECKED OFF ANY PROBLEMS, HOW DIFFICULT HAVE THESE PROBLEMS MADE IT FOR YOU TO DO YOUR WORK, TAKE CARE OF THINGS AT HOME, OR GET ALONG WITH OTHER PEOPLE: SOMEWHAT DIFFICULT

## 2024-04-26 ENCOUNTER — OFFICE VISIT (OUTPATIENT)
Dept: FAMILY MEDICINE | Facility: CLINIC | Age: 24
End: 2024-04-26
Attending: PHYSICIAN ASSISTANT
Payer: COMMERCIAL

## 2024-04-26 VITALS
DIASTOLIC BLOOD PRESSURE: 81 MMHG | SYSTOLIC BLOOD PRESSURE: 121 MMHG | TEMPERATURE: 98.2 F | BODY MASS INDEX: 39.66 KG/M2 | HEIGHT: 60 IN | WEIGHT: 202 LBS | HEART RATE: 90 BPM | RESPIRATION RATE: 16 BRPM | OXYGEN SATURATION: 100 %

## 2024-04-26 DIAGNOSIS — E66.01 MORBID OBESITY (H): ICD-10-CM

## 2024-04-26 DIAGNOSIS — Z00.00 ROUTINE GENERAL MEDICAL EXAMINATION AT A HEALTH CARE FACILITY: Primary | ICD-10-CM

## 2024-04-26 DIAGNOSIS — K21.9 GASTROESOPHAGEAL REFLUX DISEASE WITHOUT ESOPHAGITIS: ICD-10-CM

## 2024-04-26 DIAGNOSIS — Z11.3 SCREENING FOR STDS (SEXUALLY TRANSMITTED DISEASES): ICD-10-CM

## 2024-04-26 PROCEDURE — 99395 PREV VISIT EST AGE 18-39: CPT | Performed by: PHYSICIAN ASSISTANT

## 2024-04-26 PROCEDURE — 99213 OFFICE O/P EST LOW 20 MIN: CPT | Mod: 25 | Performed by: PHYSICIAN ASSISTANT

## 2024-04-26 PROCEDURE — 87491 CHLMYD TRACH DNA AMP PROBE: CPT | Performed by: PHYSICIAN ASSISTANT

## 2024-04-26 PROCEDURE — 87591 N.GONORRHOEAE DNA AMP PROB: CPT | Performed by: PHYSICIAN ASSISTANT

## 2024-04-26 RX ORDER — OMEPRAZOLE 40 MG/1
40 CAPSULE, DELAYED RELEASE ORAL DAILY
Qty: 90 CAPSULE | Refills: 1 | Status: CANCELLED | OUTPATIENT
Start: 2024-04-26

## 2024-04-26 RX ORDER — BUSPIRONE HYDROCHLORIDE 10 MG/1
10 TABLET ORAL 2 TIMES DAILY
COMMUNITY

## 2024-04-26 NOTE — PATIENT INSTRUCTIONS
Preventive Care Advice   This is general advice given by our system to help you stay healthy. However, your care team may have specific advice just for you. Please talk to your care team about your preventive care needs.  Nutrition  Eat 5 or more servings of fruits and vegetables each day.  Try wheat bread, brown rice and whole grain pasta (instead of white bread, rice, and pasta).  Get enough calcium and vitamin D. Check the label on foods and aim for 100% of the RDA (recommended daily allowance).  Lifestyle  Exercise at least 150 minutes each week   (30 minutes a day, 5 days a week).  Do muscle strengthening activities 2 days a week. These help control your weight and prevent disease.  No smoking.  Wear sunscreen to prevent skin cancer.  Have a dental exam and cleaning every 6 months.  Yearly exams  See your health care team every year to talk about:  Any changes in your health.  Any medicines your care team has prescribed.  Preventive care, family planning, and ways to prevent chronic diseases.  Shots (vaccines)   HPV shots (up to age 26), if you've never had them before.  Hepatitis B shots (up to age 59), if you've never had them before.  COVID-19 shot: Get this shot when it's due.  Flu shot: Get a flu shot every year.  Tetanus shot: Get a tetanus shot every 10 years.  Pneumococcal, hepatitis A, and RSV shots: Ask your care team if you need these based on your risk.  Shingles shot (for age 50 and up).  General health tests  Diabetes screening:  Starting at age 35, Get screened for diabetes at least every 3 years.  If you are younger than age 35, ask your care team if you should be screened for diabetes.  Cholesterol test: At age 39, start having a cholesterol test every 5 years, or more often if advised.  Bone density scan (DEXA): At age 50, ask your care team if you should have this scan for osteoporosis (brittle bones).  Hepatitis C: Get tested at least once in your life.  STIs (sexually transmitted  infections)  Before age 24: Ask your care team if you should be screened for STIs.  After age 24: Get screened for STIs if you're at risk. You are at risk for STIs (including HIV) if:  You are sexually active with more than one person.  You don't use condoms every time.  You or a partner was diagnosed with a sexually transmitted infection.  If you are at risk for HIV, ask about PrEP medicine to prevent HIV.  Get tested for HIV at least once in your life, whether you are at risk for HIV or not.  Cancer screening tests  Cervical cancer screening: If you have a cervix, begin getting regular cervical cancer screening tests at age 21. Most people who have regular screenings with normal results can stop after age 65. Talk about this with your provider.  Breast cancer scan (mammogram): If you've ever had breasts, begin having regular mammograms starting at age 40. This is a scan to check for breast cancer.  Colon cancer screening: It is important to start screening for colon cancer at age 45.  Have a colonoscopy test every 10 years (or more often if you're at risk) Or, ask your provider about stool tests like a FIT test every year or Cologuard test every 3 years.  To learn more about your testing options, visit: https://[email or web address]/[email or web address].  For help making a decision, visit: https://bit.ly/vm46599.  Prostate cancer screening test: If you have a prostate and are age 55 to 69, ask your provider if you would benefit from a yearly prostate cancer screening test.  Lung cancer screening: If you are a current or former smoker age 50 to 80, ask your care team if ongoing lung cancer screenings are right for you.  For informational purposes only. Not to replace the advice of your health care provider. Copyright   2023 [employer]. All rights reserved. Clinically reviewed by the [facility]. [place] 857496 - REV 01/24.    Learning About Stress  What is stress?     Stress is your  body's response to a hard situation. Your body can have a physical, emotional, or mental response. Stress is a fact of life for most people, and it affects everyone differently. What causes stress for you may not be stressful for someone else.  A lot of things can cause stress. You may feel stress when you go on a job interview, take a test, or run a race. This kind of short-term stress is normal and even useful. It can help you if you need to work hard or react quickly. For example, stress can help you finish an important job on time.  Long-term stress is caused by ongoing stressful situations or events. Examples of long-term stress include long-term health problems, ongoing problems at work, or conflicts in your family. Long-term stress can harm your health.  How does stress affect your health?  When you are stressed, your body responds as though you are in danger. It makes hormones that speed up your heart, make you breathe faster, and give you a burst of energy. This is called the fight-or-flight stress response. If the stress is over quickly, your body goes back to normal and no harm is done.  But if stress happens too often or lasts too long, it can have bad effects. Long-term stress can make you more likely to get sick, and it can make symptoms of some diseases worse. If you tense up when you are stressed, you may develop neck, shoulder, or low back pain. Stress is linked to high blood pressure and heart disease.  Stress also harms your emotional health. It can make you landers, tense, or depressed. Your relationships may suffer, and you may not do well at work or school.  What can you do to manage stress?  You can try these things to help manage stress:   Do something active. Exercise or activity can help reduce stress. Walking is a great way to get started. Even everyday activities such as housecleaning or yard work can help.  Try yoga or roland chi. These techniques combine exercise and meditation. You may need  some training at first to learn them.  Do something you enjoy. For example, listen to music or go to a movie. Practice your hobby or do volunteer work.  Meditate. This can help you relax, because you are not worrying about what happened before or what may happen in the future.  Do guided imagery. Imagine yourself in any setting that helps you feel calm. You can use online videos, books, or a teacher to guide you.  Do breathing exercises. For example:  From a standing position, bend forward from the waist with your knees slightly bent. Let your arms dangle close to the floor.  Breathe in slowly and deeply as you return to a standing position. Roll up slowly and lift your head last.  Hold your breath for just a few seconds in the standing position.  Breathe out slowly and bend forward from the waist.  Let your feelings out. Talk, laugh, cry, and express anger when you need to. Talking with supportive friends or family, a counselor, or a adrian leader about your feelings is a healthy way to relieve stress. Avoid discussing your feelings with people who make you feel worse.  Write. It may help to write about things that are bothering you. This helps you find out how much stress you feel and what is causing it. When you know this, you can find better ways to cope.  What can you do to prevent stress?  You might try some of these things to help prevent stress:  Manage your time. This helps you find time to do the things you want and need to do.  Get enough sleep. Your body recovers from the stresses of the day while you are sleeping.  Get support. Your family, friends, and community can make a difference in how you experience stress.  Limit your news feed. Avoid or limit time on social media or news that may make you feel stressed.  Do something active. Exercise or activity can help reduce stress. Walking is a great way to get started.  Where can you learn more?  Go to https://www.healthwise.net/patiented  Enter N032 in the  "search box to learn more about \"Learning About Stress.\"  Current as of: October 24, 2023               Content Version: 14.0    6805-3491 [employer].   Care instructions adapted under license by your healthcare professional. If you have questions about a medical condition or this instruction, always ask your healthcare professional. [employer] disclaims any warranty or liability for your use of this information.      Learning About Depression Screening  What is depression screening?  Depression screening is a way to see if you have depression symptoms. It may be done by a doctor or counselor. It's often part of a routine checkup. That's because your mental health is just as important as your physical health.  Depression is a mental health condition that affects how you feel, think, and act. You may:  Have less energy.  Lose interest in your daily activities.  Feel sad and grouchy for a long time.  Depression is very common. It affects people of all ages.  Many things can lead to depression. Some people become depressed after they have a stroke or find out they have a major illness like cancer or heart disease. The death of a loved one or a breakup may lead to depression. It can run in families. Most experts believe that a combination of inherited genes and stressful life events can cause it.  What happens during screening?  You may be asked to fill out a form about your depression symptoms. You and the doctor will discuss your answers. The doctor may ask you more questions to learn more about how you think, act, and feel.  What happens after screening?  If you have symptoms of depression, your doctor will talk to you about your options.  Doctors usually treat depression with medicines or counseling. Often, combining the two works best. Many people don't get help because they think that they'll get over the depression on their own. But people with depression may not get better unless they " "get treatment.  The cause of depression is not well understood. There may be many factors involved. But if you have depression, it's not your fault.  A serious symptom of depression is thinking about death or suicide. If you or someone you care about talks about this or about feeling hopeless, get help right away.  It's important to know that depression can be treated. Medicine, counseling, and self-care may help.  Where can you learn more?  Go to https://www.[employer].net/patiented  Enter T185 in the search box to learn more about \"Learning About Depression Screening.\"  Current as of: June 24, 2023               Content Version: 14.0    4377-3609 [employer].   Care instructions adapted under license by your healthcare professional. If you have questions about a medical condition or this instruction, always ask your healthcare professional. [employer] disclaims any warranty or liability for your use of this information.      Substance Use Disorder: Care Instructions  Overview     You can improve your life and health by stopping your use of alcohol or drugs. When you don't drink or use drugs, you may feel and sleep better. You may get along better with your family, friends, and coworkers. There are medicines and programs that can help with substance use disorder.  How can you care for yourself at home?  Here are some ways to help you stay sober and prevent relapse.  If you have been given medicine to help keep you sober or reduce your cravings, be sure to take it exactly as prescribed.  Talk to your doctor about programs that can help you stop using drugs or drinking alcohol.  Do not keep alcohol or drugs in your home.  Plan ahead. Think about what you'll say if other people ask you to drink or use drugs. Try not to spend time with people who drink or use drugs.  Use the time and money spent on drinking or drugs to do something that's important to you.  Preventing a relapse  Have a plan " to deal with relapse. Learn to recognize changes in your thinking that lead you to drink or use drugs. Get help before you start to drink or use drugs again.  Try to stay away from situations, friends, or places that may lead you to drink or use drugs.  If you feel the need to drink alcohol or use drugs again, seek help right away. Call a trusted friend or family member. Some people get support from organizations such as Narcotics Anonymous or [employer] or from treatment facilities.  If you relapse, get help as soon as you can. Some people make a plan with another person that outlines what they want that person to do for them if they relapse. The plan usually includes how to handle the relapse and who to notify in case of relapse.  Don't give up. Remember that a relapse doesn't mean that you have failed. Use the experience to learn the triggers that lead you to drink or use drugs. Then quit again. Recovery is a lifelong process. Many people have several relapses before they are able to quit for good.  Follow-up care is a key part of your treatment and safety. Be sure to make and go to all appointments, and call your doctor if you are having problems. It's also a good idea to know your test results and keep a list of the medicines you take.  When should you call for help?   Call 911  anytime you think you may need emergency care. For example, call if you or someone else:    Has overdosed or has withdrawal signs. Be sure to tell the emergency workers that you are or someone else is using or trying to quit using drugs. Overdose or withdrawal signs may include:  Losing consciousness.  Seizure.  Seeing or hearing things that aren't there (hallucinations).     Is thinking or talking about suicide or harming others.   Where to get help 24 hours a day, 7 days a week   If you or someone you know talks about suicide, self-harm, a mental health crisis, a substance use crisis, or any other kind of emotional distress, get  "help right away. You can:    Call the Suicide and Crisis Lifeline at 988.     Call 2-018-320-YLUN (1-772.959.2208).     Text HOME to 784143 to access the Crisis Text Line.   Consider saving these numbers in your phone.  Go to [employer] for more information or to chat online.  Call your doctor now or seek immediate medical care if:    You are having withdrawal symptoms. These may include nausea or vomiting, sweating, shakiness, and anxiety.   Watch closely for changes in your health, and be sure to contact your doctor if:    You have a relapse.     You need more help or support to stop.   Where can you learn more?  Go to https://www.[employer].net/patiented  Enter H573 in the search box to learn more about \"Substance Use Disorder: Care Instructions.\"  Current as of: November 15, 2023               Content Version: 14.0    9112-7725 [employer], [employer].   Care instructions adapted under license by your healthcare professional. If you have questions about a medical condition or this instruction, always ask your healthcare professional. Healthwise, [employer] disclaims any warranty or liability for your use of this information.      "

## 2024-04-26 NOTE — PROGRESS NOTES
"Preventive Care Visit  [facility] DEREK Ruiz PA-C, Family Medicine  Apr 26, 2024      Assessment & Plan     Routine general medical examination at a health care facility  [facility] adult.     Gastroesophageal reflux disease without esophagitis  Suggest decreasing to 20 mg. New rx sent.  - omeprazole (PRILOSEC) 20 MG DR capsule; Take 1 capsule (20 mg) by mouth daily    Screening for STDs (sexually transmitted diseases)  Screen.  - Chlamydia trachomatis/Neisseria gonorrhoeae by PCR- VAGINAL SELF-SWAB; Future  - Chlamydia trachomatis/Neisseria gonorrhoeae by PCR- VAGINAL SELF-SWAB    Morbid obesity (H)  Discussed diet and exercise. I don't think a GLP-1 medication would be a good fit for her based on psych medications not being stable. Could consider in the future.               BMI  Estimated body mass index is 39.87 kg/m  as calculated from the following:    Height as of this encounter: 1.516 m (4' 11.69\").    Weight as of this encounter: 91.6 kg (202 lb).   Weight management plan: Discussed healthy diet and exercise guidelines    Counseling  Appropriate preventive services were discussed with this patient, including applicable screening as appropriate for fall prevention, nutrition, physical activity, Tobacco-use cessation, weight loss and cognition.  Checklist reviewing preventive services available has been given to the patient.  Reviewed patient's diet, addressing concerns and/or questions.   The patient's PHQ-9 score is consistent with mild depression. She was provided with information regarding depression.           Lucita Saba is a 23 year old, presenting for the following:  Physical and Patient Request (Discuss weight loss options )        4/26/2024     2:16 PM   Additional Questions   Roomed by An PEPE.         4/26/2024     2:16 PM   Patient Reported Additional Medications   Patient reports taking the following new medications Taking: Buspar 10mg BID, Vit D3, B12 and magnesium    "     Health Care Directive  Patient does not have a Health Care Directive or Living Will: Discussed advance care planning with patient; information given to patient to review.    HPI      Has bad health anxiety. Today leg cramping.   Sees psychiatry - lamictal, hydroxyzine, buspar                    4/23/2024   General Health   How would you rate your overall physical health? (!) FAIR   Feel stress (tense, anxious, or unable to sleep) Rather much   (!) STRESS CONCERN      4/23/2024   Nutrition   Three or more servings of calcium each day? Yes   Diet: Regular (no restrictions)   How many servings of fruit and vegetables per day? (!) 2-3   How many sweetened beverages each day? (!) 2         4/23/2024   Exercise   Days per week of moderate/strenous exercise 4 days   Average minutes spent exercising at this level 40 min         4/23/2024   Social Factors   Frequency of gathering with friends or relatives Twice a week   Worry food won't last until get money to buy more No   Food not last or not have enough money for food? No   Do you have housing?  Yes   Are you worried about losing your housing? No   Lack of transportation? No   Unable to get utilities (heat,electricity)? No         4/23/2024   Dental   Dentist two times every year? Yes         4/23/2024   TB Screening   Were you born outside of the [place]? No       Today's PHQ-9 Score:       4/25/2024     3:40 PM   PHQ-9 SCORE   PHQ-9 Total Score MyChart 6 (Mild depression)   PHQ-9 Total Score 6         4/23/2024   Substance Use   Alcohol more than 3/day or more than 7/wk No   Do you use any other substances recreationally? (!) ALCOHOL    (!) CANNABIS PRODUCTS     Social History     Tobacco Use    Smoking status: Never     Passive exposure: Never    Smokeless tobacco: Never   Vaping Use    Vaping status: Never Used   Substance Use Topics    Alcohol use: Yes     Comment: Occ    Drug use: No           4/23/2024   STI Screening   New sexual partner(s) since last STI/HIV test?  "No     History of abnormal Pap smear: NO - age 21-29 PAP every 3 years recommended        4/21/2023     1:52 PM   PAP / HPV   PAP Negative for Intraepithelial Lesion or Malignancy (NILM)            4/23/2024   Contraception/Family Planning   Questions about contraception or family planning (!) YES - may want IUD out.        Reviewed and updated as needed this visit by Provider                          Review of Systems  Constitutional, neuro, ENT, endocrine, pulmonary, cardiac, gastrointestinal, genitourinary, musculoskeletal, integument and psychiatric systems are negative, except as otherwise noted.     Objective    Exam  /81   Pulse 90   Temp 98.2  F (36.8  C) (Oral)   Resp 16   Ht 1.516 m (4' 11.69\")   Wt 91.6 kg (202 lb)   LMP 04/24/2024 (Exact Date)   SpO2 100%   BMI 39.87 kg/m     Estimated body mass index is 39.87 kg/m  as calculated from the following:    Height as of this encounter: 1.516 m (4' 11.69\").    Weight as of this encounter: 91.6 kg (202 lb).    Physical Exam  GENERAL: alert and no distress  EYES: Eyes grossly normal to inspection, PERRL and conjunctivae and sclerae normal  HENT: ear canals and TM's normal, nose and mouth without ulcers or lesions  NECK: no adenopathy, no asymmetry, masses, or scars  RESP: lungs clear to auscultation - no rales, rhonchi or wheezes  CV: regular rate and rhythm, normal S1 S2, no S3 or S4, no murmur, click or rub, no peripheral edema  ABDOMEN: soft, nontender, no hepatosplenomegaly, no masses and bowel sounds normal  MS: no gross musculoskeletal defects noted, no edema  SKIN: no suspicious lesions or rashes  NEURO: Normal strength and tone, mentation intact and speech normal  PSYCH: mentation appears normal, affect normal/bright        Signed Electronically by: Chinyere Ruiz PA-C    "

## 2024-04-27 LAB
C TRACH DNA SPEC QL PROBE+SIG AMP: NEGATIVE
N GONORRHOEA DNA SPEC QL NAA+PROBE: NEGATIVE

## 2024-05-06 ENCOUNTER — VIRTUAL VISIT (OUTPATIENT)
Dept: PSYCHOLOGY | Facility: CLINIC | Age: 24
End: 2024-05-06
Payer: COMMERCIAL

## 2024-05-06 DIAGNOSIS — F90.2 ADHD (ATTENTION DEFICIT HYPERACTIVITY DISORDER), COMBINED TYPE: ICD-10-CM

## 2024-05-06 DIAGNOSIS — F31.32 BIPOLAR AFFECTIVE DISORDER, CURRENTLY DEPRESSED, MODERATE (H): Primary | ICD-10-CM

## 2024-05-06 DIAGNOSIS — F43.10 PTSD (POST-TRAUMATIC STRESS DISORDER): ICD-10-CM

## 2024-05-06 DIAGNOSIS — F41.1 GENERALIZED ANXIETY DISORDER: ICD-10-CM

## 2024-05-06 PROCEDURE — 90837 PSYTX W PT 60 MINUTES: CPT | Mod: 95 | Performed by: SOCIAL WORKER

## 2024-05-06 NOTE — PROGRESS NOTES
[facility]                                      Progress Note    Patient Name: Wandy Ann  Date: 2024         Service Type: Individual      Session Start Time: 1303   Session End Time: 1357     Session Length: 53+    Session #: 76    Attendees: Client    Service Modality:    Video Visit:      Provider verified identity through the following two step process.  Patient provided: Patient  and Patient previous known to provider     Telemedicine Visit: The patient's condition can be safely assessed and treated via synchronous audio and visual telemedicine encounter.       Reason for Telemedicine Visit: Patient has requested telehealth visit     Originating Site (Patient Location): Patient's home     Distant Site (Provider Location): Provider Remote Setting- Home Office     Consent:  The patient/guardian has verbally consented to: the potential risks and benefits of telemedicine (video visit) versus in person care; bill my insurance or make self-payment for services provided; and responsibility for payment of non-covered services.      Patient would like the video invitation sent by: email/text     Mode of Communication: Video Conference via [employer]well     Distant Location (Provider): Off-site     As the provider I attest to compliance with applicable laws and regulations related to telemedicine.    DATA  Interactive Complexity: No  Crisis: No        Progress Since Last Session (Related to Symptoms / Goals / Homework):   Symptoms: Worsening increase in depression      Homework: Achieved / completed to satisfaction  Partially completed        Episode of Care Goals: Minimal progress - ACTION (Actively working towards change); Intervened by reinforcing change plan / affirming steps taken       Current / Ongoing Stressors and Concerns:   past trauma, developmental hx and current stressors      Treatment Objective(s) Addressed in This Session:   Patient will demonstrate control of impulses  and ability to use positive coping tools to manage strong emotions that can be safely addressed at a lower level of care. Absence of persistent SI and report of reduced frequency and intensity of SI and absence of SI to acceptable levels, report subjective improved mood for a period of 90 days, within 6 months as clinically observed and by patient self-report.  Patient will demonstrate and report a level of depression that can be managed at a lower level of care.  Absence of persistent depression mood and report of reduced frequency and intensity of low mood and absence of persistent low energy and motivation to acceptable levels, report subjective improved motivation and increased energy for a period of 90 days, within 6 months as clinically observed and by patient self-report.  Patient will demonstrate and report a level of anxiety that can be managed at a lower level of care.  Absence of persistent anxious mood and report of reduced frequency and intensity of worry and absence of persistent anxious mood to acceptable levels, no panic attacks, report subjective comfort with rumination for a period of 90 days, within 6 months as clinically observed and by patient self-report.       Intervention:   Therapist met with patient to review goals and interventions. Therapist utilized reflected listening as patient gave brief reflection of week. Patient reported a difficult week with depressive symptoms coming back with passive SI. Therapist supported patient as she processed and validated patient's emotions. Therapist reviewed safety and patient contracted for safety. Therapist provided education on looking at TMS again as when patient completed TMS depressive and SI went away and patient agreed. Therapist utilized strength based modality and solution focused along with CBT.  Patient presented with an anxious affect. Patient was engaged in session and open to feedback. Patient contracted for safety.             There has  been demonstrated improvement in functioning while patient has been engaged in psychotherapy/psychological service- if withdrawn the patient would deteriorate and/or relapse.     Assessments completed prior to visit:  The following assessments were completed by patient for this visit:  PHQ9:       8/18/2023    12:47 AM 10/2/2023     9:43 AM 10/2/2023     9:44 AM 10/10/2023     1:57 PM 12/14/2023     9:33 AM 1/15/2024     7:47 PM 4/25/2024     3:40 PM   PHQ-9 SCORE   PHQ-9 Total Score MyChart 13 (Moderate depression)  15 (Moderately severe depression) 16 (Moderately severe depression) 7 (Mild depression) 11 (Moderate depression) 6 (Mild depression)   PHQ-9 Total Score 13 15 15 16 7 11 6     GAD7:       8/24/2022     2:00 PM 10/31/2022     8:08 AM 11/29/2022     2:34 PM 2/2/2023    11:53 AM 4/20/2023    12:41 PM 10/10/2023     1:57 PM 1/15/2024     7:48 PM   YESSI-7 SCORE   Total Score  2 (minimal anxiety) 11 (moderate anxiety) 16 (severe anxiety) 13 (moderate anxiety) 17 (severe anxiety) 15 (severe anxiety)   Total Score 13 2 11 16 13 17 15   PROMIS-10 Scores        2/1/2023    10:18 AM 10/10/2023     1:58 PM 1/15/2024     7:49 PM   PROMIS-10 Total Score w/o Sub Scores   PROMIS TOTAL - SUBSCORES  22 24       Information is confidential and restricted. Go to Review Flowsheets to unlock data.            ASSESSMENT: Current Emotional / Mental Status (status of significant symptoms):   Risk status (Self / Other harm or suicidal ideation)   Patient denies current fears or concerns for personal safety.   Patient reports the following current or recent suicidal ideation or behaviors: passive si along with contracted for safety.   Patient denies current or recent homicidal ideation or behaviors.   Patient denies current or recent self injurious behavior or ideation.   Patient denies other safety concerns.   Patient reports there has been no change in risk factors since their last session.     Patient reports there has been no  change in protective factors since their last session.     A safety and risk management plan has been developed including: Patient consented to co-developed safety plan on 2.21.2022.  Safety and risk management plan was reviewed.   Patient agreed to use safety plan should any safety concerns arise.  A copy was made available to the patient.     Appearance:   Appropriate     Eye Contact:   Fair     Psychomotor Behavior: Restless     Attitude:   Cooperative    Orientation:   All   Speech    Rate / Production: Emotional Talkative    Volume:  Normal    Mood:    Anxious    Affect:    Worrisome    Thought Content:  Clear    Thought Form:  Coherent    Insight:    Fair      Medication Review:   No changes to current psychiatric medication(s)     Medication Compliance:   Yes     Changes in Health Issues:   None reported     Chemical Use Review:   Substance Use: Chemical use reviewed, no active concerns identified      Tobacco Use: No current tobacco use.      Diagnosis:  1. Bipolar affective disorder, currently depressed, moderate (H)    2. ADHD (attention deficit hyperactivity disorder), combined type    3. Generalized anxiety disorder    4. PTSD (post-traumatic stress disorder)        Collateral Reports Completed:   Not Applicable    PLAN: (Patient Tasks / Therapist Tasks / Other)        Call [phone] if feeling SI has increase or go to ED or empath   Utilize coping skills when feeling impulsive.   Replace distortions with positive attributes      Look at intention rather than perception    Do not avoid people  Try to work on relationship with partners mom  Look at mothers day week and go to lunch  Therapist reviewed safety and patient contracted for safety. Therapist provided education on looking at TMS again as when patient completed TMS depressive and SI went away and patient agreed. Therapist utilized strength based modality and solution focused along with CBT.    Katie Faulkner, [facility]  5/6/2024                                                          ______________________________________________________________________    Individual Treatment Plan    Patient's Name: Wandy Ann  YOB: 2000    Date of Creation: 2.22.2021  Date Treatment Plan Last Reviewed/Revised:  5/6/2024 due  8/6/2024    DSM5 Diagnoses: 296.52 Bipolar I Disorder Current or Most Recent Episode Depressed, Moderate, 300.02 (F41.1) Generalized Anxiety Disorder or 309.81 (F43.10) Posttraumatic Stress Disorder (includes Posttraumatic Stress Disorder for Children 6 Years and Younger)  Without dissociative symptoms  Psychosocial / Contextual Factors: past trauma, developmental hx and current stressors   PROMIS (reviewed every 90 days):  5/6/2024    Referral / Collaboration:  Referral to another professional/service is not indicated at this time..    Anticipated number of session for this episode of care: 12  Anticipation frequency of session: Biweekly  Anticipated Duration of each session: 38-52 minutes  Treatment plan will be reviewed in 90 days or when goals have been changed.       MeasurableTreatment Goal(s) related to diagnosis / functional impairment(s)  Goal 1: Patient will report absence of persistent SI and report of reduced frequency and intensity of SI and absence of SI to acceptable levels, report subjective improved mood for a period of 90 days, within 6 months as clinically observed and by patient self-report    I will know I've met my goal when I can see the difference between a bad moment and what I want in th future.      Objective #A (Patient Action)    Patient will demonstrate control of impulses and ability to use positive coping tools to manage strong emotions that can be safely addressed at a lower level of care. Absence of persistent SI and report of reduced frequency and intensity of SI and absence of SI to acceptable levels, report subjective improved mood for a period of 90 days, within 6 months as clinically observed and  by patient self-report.  Status: Continued - Date(s):  5/6/2024    Intervention(s)  Therapist will provide individual therapy to identify triggers to SI urges, gain feedback on helpful coping strategies, and identify ways that family can offer support. Tx to discuss current stressors and interpersonal conflicts and how to cope with these, coaching, diagnostic testing, referral for medication as indicated, use prescribed medication, cognitive restructuring, interpersonal family therapy, supportive therapy services.        Goal 2: Patient will report absence of persistent depression mood and report of reduced frequency and intensity of low mood and absence of persistent low energy and motivation to acceptable levels, report subjective improved motivation and increased energy for a period of 90 days, within 6 months as clinically observed and by patient self-report    I will know I've met my goal when I no longer feel sad.      Objective #A (Patient Action)    Status: Continued - Date(s):  5/6/2024    Patient will demonstrate and report a level of depression that can be managed at a lower level of care.  Absence of persistent depression mood and report of reduced frequency and intensity of low mood and absence of persistent low energy and motivation to acceptable levels, report subjective improved motivation and increased energy for a period of 90 days, within 6 months as clinically observed and by patient self-report.    Intervention(s)  Therapist will provide individual therapy to identify triggers to depression, gain feedback on helpful coping tools and thought-reframing techniques, and identify preferred way of being.  Tx to include discussion of current stressors and interpersonal conflicts and how to cope with these, coaching, diagnostic testing, referral for medication as indicated, use prescribed medication, cognitive restructuring, interpersonal, family therapy, supportive therapy services.        Goal 3:  Patiient will report absence of persistent anxiety mood and report of reduced frequency and intensity of worry and absence of persistent anxious mood to acceptable levels, no panic attacks, report subjective comfort with rumination for a period of 90 days. Within 6 months as clinically observed and by patient self-report    I will know I've met my goal when I can go days without worrying about little things or shaking.      Objective #A (Patient Action)    Status: Continued - Date(s):  5/6/2024    Patient will demonstrate and report a level of anxiety that can be managed at a lower level of care.  Absence of persistent anxious mood and report of reduced frequency and intensity of worry and absence of persistent anxious mood to acceptable levels, no panic attacks, report subjective comfort with rumination for a period of 90 days, within 6 months as clinically observed and by patient self-report.    Intervention(s)  Therapist will provide individual therapy to identify triggers to anxiety, gain feedback on helpful coping tools and thought-reframing techniques, and identify preferred way of being. Tx to include discuss current stressors and interpersonal conflicts and how to cope with these, coaching, diagnostic testing , referral for medication as indicated, use prescribed medication, cognitive restructuring, interpersonal,   family therapy, supportive therapy services.        Patient has reviewed and agreed to the above plan.      Katie Faulkner, [facility]    5/6/2024                                                   Wandy Ann            SAFETY PLAN:  Step 1: Warning signs / cues (Thoughts, images, mood, situation, behavior) that a crisis may be developing:  Thoughts: thoughts of not wanting to be here  Images: no images  Thinking Processes: intrusive thoughts (bothersome, unwanted thoughts that come out of nowhere): get irritated  Mood: intense anger and agitation  Behaviors: isolating/withdrawing  "  Situations: trauma    Step 2: Coping strategies - Things I can do to take my mind off of my problems without contacting another person (relaxation technique, physical activity):  Distress Tolerance Strategies:  arts and crafts: drawing and painting  Physical Activities: exercise: working out  Focus on helpful thoughts:  \"This is temporary\", \"It always passes\" and \"Ride the wave\"  Step 3: People and social settings that provide distraction:                 Name: Jennifer     Phone: [phone]                 Name: Antonina         Phone: [phone]                 Name: panchito       Phone: [phone]  friends house or car   Step 4: Remind myself of people and things that are important to me and worth living for:  Best friend and cat        Step 5: When I am in crisis, I can ask these people to help me use my safety plan:                 Name: Jennifer     Phone: [phone]                 Name: Antonina         Phone: [phone]                 Name: panchito       Phone: [phone]  Step 6: Making the environment safe:   be around others  Step 7: Professionals or agencies I can contact during a crisis:  [facility] Daytime Number: [phone]  Suicide Prevention Lifeline: 9-717-939-TALK (7274)  Crisis Text Line Service (available 24 hours a day, 7 days a week): Text [place] to 368296  Local Crisis Services: 988     Call 911 or go to my nearest emergency department.       I helped develop this safety plan and agree to use it when needed.  I have been given a copy of this plan.       Client signature _________________________________________________________________  Today s date:  2/22/2021  Adapted from Safety Plan Template 2008 Marisol Cazares and Mario Flores is reprinted with the express permission of the authors.  No portion of the Safety Plan Template may be reproduced without the express, written permission.  You can contact the authors at [email or web address] or [email or web address].  "

## 2024-05-17 ENCOUNTER — TRANSFERRED RECORDS (OUTPATIENT)
Dept: HEALTH INFORMATION MANAGEMENT | Facility: CLINIC | Age: 24
End: 2024-05-17
Payer: COMMERCIAL

## 2024-05-23 ENCOUNTER — VIRTUAL VISIT (OUTPATIENT)
Dept: PSYCHOLOGY | Facility: CLINIC | Age: 24
End: 2024-05-23
Payer: COMMERCIAL

## 2024-05-23 DIAGNOSIS — F31.32 BIPOLAR AFFECTIVE DISORDER, CURRENTLY DEPRESSED, MODERATE (H): Primary | ICD-10-CM

## 2024-05-23 DIAGNOSIS — F43.10 PTSD (POST-TRAUMATIC STRESS DISORDER): ICD-10-CM

## 2024-05-23 DIAGNOSIS — F90.2 ADHD (ATTENTION DEFICIT HYPERACTIVITY DISORDER), COMBINED TYPE: ICD-10-CM

## 2024-05-23 DIAGNOSIS — F41.1 GENERALIZED ANXIETY DISORDER: ICD-10-CM

## 2024-05-23 PROCEDURE — 90837 PSYTX W PT 60 MINUTES: CPT | Mod: 95 | Performed by: SOCIAL WORKER

## 2024-05-23 NOTE — PROGRESS NOTES
[facility]ing                                      Progress Note    Patient Name: Wandy Ann  Date: 2024         Service Type: Individual      Session Start Time: 1503  Session End Time: 155     Session Length: 53+    Session #: 77    Attendees: Client    Service Modality:    Video Visit:      Provider verified identity through the following two step process.  Patient provided: Patient  and Patient previous known to provider     Telemedicine Visit: The patient's condition can be safely assessed and treated via synchronous audio and visual telemedicine encounter.       Reason for Telemedicine Visit: Patient has requested telehealth visit     Originating Site (Patient Location): Patient's home     Distant Site (Provider Location): Provider Remote Setting- Home Office     Consent:  The patient/guardian has verbally consented to: the potential risks and benefits of telemedicine (video visit) versus in person care; bill my insurance or make self-payment for services provided; and responsibility for payment of non-covered services.      Patient would like the video invitation sent by: email/text     Mode of Communication: Video Conference via Amwell     Distant Location (Provider): Off-site     As the provider I attest to compliance with applicable laws and regulations related to telemedicine.    DATA  Interactive Complexity: No  Crisis: No        Progress Since Last Session (Related to Symptoms / Goals / Homework):   Symptoms: Worsening continued depression      Homework: Achieved / completed to satisfaction  Partially completed        Episode of Care Goals: Minimal progress - ACTION (Actively working towards change); Intervened by reinforcing change plan / affirming steps taken       Current / Ongoing Stressors and Concerns:   past trauma, developmental hx and current stressors      Treatment Objective(s) Addressed in This Session:   Patient will demonstrate control of impulses  and ability to use positive coping tools to manage strong emotions that can be safely addressed at a lower level of care. Absence of persistent SI and report of reduced frequency and intensity of SI and absence of SI to acceptable levels, report subjective improved mood for a period of 90 days, within 6 months as clinically observed and by patient self-report.  Patient will demonstrate and report a level of depression that can be managed at a lower level of care.  Absence of persistent depression mood and report of reduced frequency and intensity of low mood and absence of persistent low energy and motivation to acceptable levels, report subjective improved motivation and increased energy for a period of 90 days, within 6 months as clinically observed and by patient self-report.  Patient will demonstrate and report a level of anxiety that can be managed at a lower level of care.  Absence of persistent anxious mood and report of reduced frequency and intensity of worry and absence of persistent anxious mood to acceptable levels, no panic attacks, report subjective comfort with rumination for a period of 90 days, within 6 months as clinically observed and by patient self-report.       Intervention:   Therapist met with patient to review goals and interventions. Therapist utilized reflected listening as patient gave brief reflection of week. Patient reported continued depression and will follow through with calling TMS. Therapist supported patient as she processed and validated patient but also reviewed patient's depression and SI have been almost absent for almost a year after completing TMS and offered patient hope along with utilizing strength based modality.   Patient presented with an anxious affect. Patient was engaged in session and open to feedback. Patient contracted for safety.             There has been demonstrated improvement in functioning while patient has been engaged in psychotherapy/psychological  service- if withdrawn the patient would deteriorate and/or relapse.     Assessments completed prior to visit:  The following assessments were completed by patient for this visit:  PHQ9:       8/18/2023    12:47 AM 10/2/2023     9:43 AM 10/2/2023     9:44 AM 10/10/2023     1:57 PM 12/14/2023     9:33 AM 1/15/2024     7:47 PM 4/25/2024     3:40 PM   PHQ-9 SCORE   PHQ-9 Total Score MyChart 13 (Moderate depression)  15 (Moderately severe depression) 16 (Moderately severe depression) 7 (Mild depression) 11 (Moderate depression) 6 (Mild depression)   PHQ-9 Total Score 13 15 15 16 7 11 6     GAD7:       8/24/2022     2:00 PM 10/31/2022     8:08 AM 11/29/2022     2:34 PM 2/2/2023    11:53 AM 4/20/2023    12:41 PM 10/10/2023     1:57 PM 1/15/2024     7:48 PM   YESSI-7 SCORE   Total Score  2 (minimal anxiety) 11 (moderate anxiety) 16 (severe anxiety) 13 (moderate anxiety) 17 (severe anxiety) 15 (severe anxiety)   Total Score 13 2 11 16 13 17 15   PROMIS-10 Scores        2/1/2023    10:18 AM 10/10/2023     1:58 PM 1/15/2024     7:49 PM   PROMIS-10 Total Score w/o Sub Scores   PROMIS TOTAL - SUBSCORES  22 24       Information is confidential and restricted. Go to Review Flowsheets to unlock data.            ASSESSMENT: Current Emotional / Mental Status (status of significant symptoms):   Risk status (Self / Other harm or suicidal ideation)   Patient denies current fears or concerns for personal safety.   Patient reports the following current or recent suicidal ideation or behaviors: passive si along with contracted for safety.   Patient denies current or recent homicidal ideation or behaviors.   Patient denies current or recent self injurious behavior or ideation.   Patient denies other safety concerns.   Patient reports there has been no change in risk factors since their last session.     Patient reports there has been no change in protective factors since their last session.     A safety and risk management plan has been  developed including: Patient consented to co-developed safety plan on 2.21.2022.  Safety and risk management plan was reviewed.   Patient agreed to use safety plan should any safety concerns arise.  A copy was made available to the patient.     Appearance:   Appropriate     Eye Contact:   Fair     Psychomotor Behavior: Restless     Attitude:   Cooperative    Orientation:   All   Speech    Rate / Production: Emotional Talkative    Volume:  Normal    Mood:    Anxious    Affect:    Worrisome    Thought Content:  Clear    Thought Form:  Coherent    Insight:    Fair      Medication Review:   No changes to current psychiatric medication(s)     Medication Compliance:   Yes     Changes in Health Issues:   None reported     Chemical Use Review:   Substance Use: Chemical use reviewed, no active concerns identified      Tobacco Use: No current tobacco use.      Diagnosis:  1. Bipolar affective disorder, currently depressed, moderate (H)    2. ADHD (attention deficit hyperactivity disorder), combined type    3. Generalized anxiety disorder    4. PTSD (post-traumatic stress disorder)        Collateral Reports Completed:   Not Applicable    PLAN: (Patient Tasks / Therapist Tasks / Other)        Call [phone] if feeling SI has increase or go to ED or empath   Utilize coping skills when feeling impulsive.   Replace distortions with positive attributes      Look at intention rather than perception    Do not avoid people  Try to work on relationship with partners mom  Therapist supported patient as she processed and validated patient but also reviewed patient's depression and SI have been almost absent for almost a year after completing TMS and offered patient hope along with utilizing strength based modality.   Be present on vacation   Shelf darby Faulkner, [facility]  5/23/2024                                                         ______________________________________________________________________    Individual Treatment  Plan    Patient's Name: Wandy Ann  YOB: 2000    Date of Creation: 2.22.2021  Date Treatment Plan Last Reviewed/Revised:  5/6/2024 due  8/6/2024    DSM5 Diagnoses: 296.52 Bipolar I Disorder Current or Most Recent Episode Depressed, Moderate, 300.02 (F41.1) Generalized Anxiety Disorder or 309.81 (F43.10) Posttraumatic Stress Disorder (includes Posttraumatic Stress Disorder for Children 6 Years and Younger)  Without dissociative symptoms  Psychosocial / Contextual Factors: past trauma, developmental hx and current stressors   PROMIS (reviewed every 90 days):  5/6/2024    Referral / Collaboration:  Referral to another professional/service is not indicated at this time..    Anticipated number of session for this episode of care: 12  Anticipation frequency of session: Biweekly  Anticipated Duration of each session: 38-52 minutes  Treatment plan will be reviewed in 90 days or when goals have been changed.       MeasurableTreatment Goal(s) related to diagnosis / functional impairment(s)  Goal 1: Patient will report absence of persistent SI and report of reduced frequency and intensity of SI and absence of SI to acceptable levels, report subjective improved mood for a period of 90 days, within 6 months as clinically observed and by patient self-report    I will know I've met my goal when I can see the difference between a bad moment and what I want in th future.      Objective #A (Patient Action)    Patient will demonstrate control of impulses and ability to use positive coping tools to manage strong emotions that can be safely addressed at a lower level of care. Absence of persistent SI and report of reduced frequency and intensity of SI and absence of SI to acceptable levels, report subjective improved mood for a period of 90 days, within 6 months as clinically observed and by patient self-report.  Status: Continued - Date(s):  5/6/2024    Intervention(s)  Therapist will provide individual therapy  to identify triggers to SI urges, gain feedback on helpful coping strategies, and identify ways that family can offer support. Tx to discuss current stressors and interpersonal conflicts and how to cope with these, coaching, diagnostic testing, referral for medication as indicated, use prescribed medication, cognitive restructuring, interpersonal family therapy, supportive therapy services.        Goal 2: Patient will report absence of persistent depression mood and report of reduced frequency and intensity of low mood and absence of persistent low energy and motivation to acceptable levels, report subjective improved motivation and increased energy for a period of 90 days, within 6 months as clinically observed and by patient self-report    I will know I've met my goal when I no longer feel sad.      Objective #A (Patient Action)    Status: Continued - Date(s):  5/6/2024    Patient will demonstrate and report a level of depression that can be managed at a lower level of care.  Absence of persistent depression mood and report of reduced frequency and intensity of low mood and absence of persistent low energy and motivation to acceptable levels, report subjective improved motivation and increased energy for a period of 90 days, within 6 months as clinically observed and by patient self-report.    Intervention(s)  Therapist will provide individual therapy to identify triggers to depression, gain feedback on helpful coping tools and thought-reframing techniques, and identify preferred way of being.  Tx to include discussion of current stressors and interpersonal conflicts and how to cope with these, coaching, diagnostic testing, referral for medication as indicated, use prescribed medication, cognitive restructuring, interpersonal, family therapy, supportive therapy services.        Goal 3: Patiient will report absence of persistent anxiety mood and report of reduced frequency and intensity of worry and absence of  persistent anxious mood to acceptable levels, no panic attacks, report subjective comfort with rumination for a period of 90 days. Within 6 months as clinically observed and by patient self-report    I will know I've met my goal when I can go days without worrying about little things or shaking.      Objective #A (Patient Action)    Status: Continued - Date(s):  5/6/2024    Patient will demonstrate and report a level of anxiety that can be managed at a lower level of care.  Absence of persistent anxious mood and report of reduced frequency and intensity of worry and absence of persistent anxious mood to acceptable levels, no panic attacks, report subjective comfort with rumination for a period of 90 days, within 6 months as clinically observed and by patient self-report.    Intervention(s)  Therapist will provide individual therapy to identify triggers to anxiety, gain feedback on helpful coping tools and thought-reframing techniques, and identify preferred way of being. Tx to include discuss current stressors and interpersonal conflicts and how to cope with these, coaching, diagnostic testing , referral for medication as indicated, use prescribed medication, cognitive restructuring, interpersonal,   family therapy, supportive therapy services.        Patient has reviewed and agreed to the above plan.      Katie Faulkner, [facility]    5/6/2024                                                   Wandy Ann            SAFETY PLAN:  Step 1: Warning signs / cues (Thoughts, images, mood, situation, behavior) that a crisis may be developing:  Thoughts: thoughts of not wanting to be here  Images: no images  Thinking Processes: intrusive thoughts (bothersome, unwanted thoughts that come out of nowhere): get irritated  Mood: intense anger and agitation  Behaviors: isolating/withdrawing   Situations: trauma    Step 2: Coping strategies - Things I can do to take my mind off of my problems without contacting another  "person (relaxation technique, physical activity):  Distress Tolerance Strategies:  arts and crafts: drawing and painting  Physical Activities: exercise: working out  Focus on helpful thoughts:  \"This is temporary\", \"It always passes\" and \"Ride the wave\"  Step 3: People and social settings that provide distraction:                 Name: Jennifer     Phone: [phone]                 Name: Antonina         Phone: [phone]                 Name: panchito       Phone: [phone]  friends house or car   Step 4: Remind myself of people and things that are important to me and worth living for:  Best friend and cat        Step 5: When I am in crisis, I can ask these people to help me use my safety plan:                 Name: Jennifer     Phone: [phone]                 Name: Antonina         Phone: [phone]                 Name: panchito       Phone: [phone]  Step 6: Making the environment safe:   be around others  Step 7: Professionals or agencies I can contact during a crisis:  [facility] Daytime Number: [phone]  Suicide Prevention Lifeline: 5-498-039-UEPV (8386)  Crisis Text Line Service (available 24 hours a day, 7 days a week): Text [place] to 719936  Local Crisis Services: 988     Call 911 or go to my nearest emergency department.       I helped develop this safety plan and agree to use it when needed.  I have been given a copy of this plan.       Client signature _________________________________________________________________  Today s date:  2/22/2021  Adapted from Safety Plan Template 2008 Marisol Cazares and Mario Flores is reprinted with the express permission of the authors.  No portion of the Safety Plan Template may be reproduced without the express, written permission.  You can contact the authors at bhs@[place].[facility] or [email or web address].  "

## 2024-06-12 ENCOUNTER — VIRTUAL VISIT (OUTPATIENT)
Dept: PSYCHOLOGY | Facility: CLINIC | Age: 24
End: 2024-06-12
Payer: COMMERCIAL

## 2024-06-12 DIAGNOSIS — F41.1 GENERALIZED ANXIETY DISORDER: ICD-10-CM

## 2024-06-12 DIAGNOSIS — F31.32 BIPOLAR AFFECTIVE DISORDER, CURRENTLY DEPRESSED, MODERATE (H): Primary | ICD-10-CM

## 2024-06-12 DIAGNOSIS — F43.10 PTSD (POST-TRAUMATIC STRESS DISORDER): ICD-10-CM

## 2024-06-12 DIAGNOSIS — F90.2 ADHD (ATTENTION DEFICIT HYPERACTIVITY DISORDER), COMBINED TYPE: ICD-10-CM

## 2024-06-12 PROCEDURE — 90837 PSYTX W PT 60 MINUTES: CPT | Mod: 93 | Performed by: SOCIAL WORKER

## 2024-06-12 NOTE — PROGRESS NOTES
"/    [facility]                                      Progress Note    Patient Name: Wandy Ann  Date: 6/12/2024         Service Type: Individual      Session Start Time: 0903  Session End Time: 0957     Session Length: 53+    Session #: 78    Attendees: Client    Service Modality:    Phone Visit:      Provider verified identity through the following two step process.  Patient provided:  Patient is known previously to provider     Telephone Visit: The patient's condition can be safely assessed and treated via synchronous audio telemedicine encounter.       Reason for Audio Telemedicine Visit: Services only offered telehealth     Originating Site (Patient Location): Patient's home     Distant Site (Provider Location): off site      Consent:  The patient/guardian has verbally consented to:      1. The potential risks and benefits of telemedicine (telephone visit) versus in person care;     The patient has been notified of the following:      \"We have found that certain health care needs can be provided without the need for a face to face visit.  This service lets us provide the care you need with a phone conversation.       I will have full access to your [facility] medical record during this entire phone call.   I will be taking notes for your medical record.      Since this is like an office visit, we will bill your insurance company for this service.       There are potential benefits and risks of telephone visits (e.g. limits to patient confidentiality) that differ from in-person visits.?Confidentiality still applies for telephone services, and nobody will record the visit.  It is important to be in a quiet, private space that is free of distractions (including cell phone or other devices) during the visit.??      If during the course of the call I believe a telephone visit is not appropriate, you will not be charged for this service\"     Consent has been obtained for this " service by care team member: Yes     DATA  Interactive Complexity: No  Crisis: No        Progress Since Last Session (Related to Symptoms / Goals / Homework):   Symptoms: Worsening depression along with mood      Homework: Achieved / completed to satisfaction  Partially completed        Episode of Care Goals: Satisfactory progress - ACTION (Actively working towards change); Intervened by reinforcing change plan / affirming steps taken       Current / Ongoing Stressors and Concerns:   past trauma, developmental hx and current stressors      Treatment Objective(s) Addressed in This Session:   Patient will demonstrate control of impulses and ability to use positive coping tools to manage strong emotions that can be safely addressed at a lower level of care. Absence of persistent SI and report of reduced frequency and intensity of SI and absence of SI to acceptable levels, report subjective improved mood for a period of 90 days, within 6 months as clinically observed and by patient self-report.  Patient will demonstrate and report a level of depression that can be managed at a lower level of care.  Absence of persistent depression mood and report of reduced frequency and intensity of low mood and absence of persistent low energy and motivation to acceptable levels, report subjective improved motivation and increased energy for a period of 90 days, within 6 months as clinically observed and by patient self-report.  Patient will demonstrate and report a level of anxiety that can be managed at a lower level of care.  Absence of persistent anxious mood and report of reduced frequency and intensity of worry and absence of persistent anxious mood to acceptable levels, no panic attacks, report subjective comfort with rumination for a period of 90 days, within 6 months as clinically observed and by patient self-report.       Intervention:   Therapist met with patient to review goals and interventions. Therapist utilized  reflected listening as patient gave brief reflection of week. Patient reported depression has continued along with negative feelings around relationship and job. Therapist supported patient as she processed and validated. Patient went to [facility] for second round evaluation and will start this or next week. Therapist reviewed safety and patient reported passive SI with no plan or intent. Therapist reviewed patient self care and encouraged patient to do some individual self care and/or with friends. Therapist utilizes solution focused along with CBT.  Patient presented with an anxious affect. Patient was engaged in session and open to feedback. Patient contracted for safety.             There has been demonstrated improvement in functioning while patient has been engaged in psychotherapy/psychological service- if withdrawn the patient would deteriorate and/or relapse.     Assessments completed prior to visit:  The following assessments were completed by patient for this visit:  PHQ9:       8/18/2023    12:47 AM 10/2/2023     9:43 AM 10/2/2023     9:44 AM 10/10/2023     1:57 PM 12/14/2023     9:33 AM 1/15/2024     7:47 PM 4/25/2024     3:40 PM   PHQ-9 SCORE   PHQ-9 Total Score MyChart 13 (Moderate depression)  15 (Moderately severe depression) 16 (Moderately severe depression) 7 (Mild depression) 11 (Moderate depression) 6 (Mild depression)   PHQ-9 Total Score 13 15 15 16 7 11 6     GAD7:       8/24/2022     2:00 PM 10/31/2022     8:08 AM 11/29/2022     2:34 PM 2/2/2023    11:53 AM 4/20/2023    12:41 PM 10/10/2023     1:57 PM 1/15/2024     7:48 PM   YESSI-7 SCORE   Total Score  2 (minimal anxiety) 11 (moderate anxiety) 16 (severe anxiety) 13 (moderate anxiety) 17 (severe anxiety) 15 (severe anxiety)   Total Score 13 2 11 16 13 17 15   PROMIS-10 Scores        2/1/2023    10:18 AM 10/10/2023     1:58 PM 1/15/2024     7:49 PM   PROMIS-10 Total Score w/o Sub Scores   PROMIS TOTAL - SUBSCORES  22 24       Information is  confidential and restricted. Go to Review Flowsheets to unlock data.            ASSESSMENT: Current Emotional / Mental Status (status of significant symptoms):   Risk status (Self / Other harm or suicidal ideation)   Patient denies current fears or concerns for personal safety.   Patient reports the following current or recent suicidal ideation or behaviors: passive si along with contracted for safety.   Patient denies current or recent homicidal ideation or behaviors.   Patient denies current or recent self injurious behavior or ideation.   Patient denies other safety concerns.   Patient reports there has been no change in risk factors since their last session.     Patient reports there has been no change in protective factors since their last session.     A safety and risk management plan has been developed including: Patient consented to co-developed safety plan on 2.21.2022.  Safety and risk management plan was reviewed.   Patient agreed to use safety plan should any safety concerns arise.  A copy was made available to the patient.     Appearance:   Appropriate     Eye Contact:   Fair     Psychomotor Behavior: Restless     Attitude:   Cooperative    Orientation:   All   Speech    Rate / Production: Emotional Talkative    Volume:  Normal    Mood:    Anxious    Affect:    Worrisome    Thought Content:  Clear    Thought Form:  Coherent    Insight:    Fair      Medication Review:   No changes to current psychiatric medication(s)     Medication Compliance:   Yes     Changes in Health Issues:   None reported     Chemical Use Review:   Substance Use: Chemical use reviewed, no active concerns identified      Tobacco Use: No current tobacco use.      Diagnosis:  1. Bipolar affective disorder, currently depressed, moderate (H)    2. ADHD (attention deficit hyperactivity disorder), combined type    3. Generalized anxiety disorder    4. PTSD (post-traumatic stress disorder)        Collateral Reports Completed:   Not  Applicable    PLAN: (Patient Tasks / Therapist Tasks / Other)        Call [phone] if feeling SI has increase or go to ED or empath   Utilize coping skills when feeling impulsive.   Replace distortions with positive attributes      Look at intention rather than perception    Do not avoid people  Try to work on relationship with partners mom  Patient went to [facility] for second round evaluation and will start this or next week. Therapist reviewed safety and patient reported passive SI with no plan or intent. Therapist reviewed patient self care and encouraged patient to do some individual self care and/or with friends. Therapist utilizes solution focused along with CBT.    Katie Faulkner, [facility]  6/12/2024                                                         ______________________________________________________________________    Individual Treatment Plan    Patient's Name: Wandy Ann  YOB: 2000    Date of Creation: 2.22.2021  Date Treatment Plan Last Reviewed/Revised:  5/6/2024 due  8/6/2024    DSM5 Diagnoses: 296.52 Bipolar I Disorder Current or Most Recent Episode Depressed, Moderate, 300.02 (F41.1) Generalized Anxiety Disorder or 309.81 (F43.10) Posttraumatic Stress Disorder (includes Posttraumatic Stress Disorder for Children 6 Years and Younger)  Without dissociative symptoms  Psychosocial / Contextual Factors: past trauma, developmental hx and current stressors   PROMIS (reviewed every 90 days):  5/6/2024    Referral / Collaboration:  Referral to another professional/service is not indicated at this time..    Anticipated number of session for this episode of care: 26  Anticipation frequency of session: Biweekly  Anticipated Duration of each session: 38-52 minutes  Treatment plan will be reviewed in 90 days or when goals have been changed.       MeasurableTreatment Goal(s) related to diagnosis / functional impairment(s)  Goal 1: Patient will report absence of persistent SI and report of  reduced frequency and intensity of SI and absence of SI to acceptable levels, report subjective improved mood for a period of 90 days, within 6 months as clinically observed and by patient self-report    I will know I've met my goal when I can see the difference between a bad moment and what I want in th future.      Objective #A (Patient Action)    Patient will demonstrate control of impulses and ability to use positive coping tools to manage strong emotions that can be safely addressed at a lower level of care. Absence of persistent SI and report of reduced frequency and intensity of SI and absence of SI to acceptable levels, report subjective improved mood for a period of 90 days, within 6 months as clinically observed and by patient self-report.  Status: Continued - Date(s):  5/6/2024    Intervention(s)  Therapist will provide individual therapy to identify triggers to SI urges, gain feedback on helpful coping strategies, and identify ways that family can offer support. Tx to discuss current stressors and interpersonal conflicts and how to cope with these, coaching, diagnostic testing, referral for medication as indicated, use prescribed medication, cognitive restructuring, interpersonal family therapy, supportive therapy services.        Goal 2: Patient will report absence of persistent depression mood and report of reduced frequency and intensity of low mood and absence of persistent low energy and motivation to acceptable levels, report subjective improved motivation and increased energy for a period of 90 days, within 6 months as clinically observed and by patient self-report    I will know I've met my goal when I no longer feel sad.      Objective #A (Patient Action)    Status: Continued - Date(s):  5/6/2024    Patient will demonstrate and report a level of depression that can be managed at a lower level of care.  Absence of persistent depression mood and report of reduced frequency and intensity of low  mood and absence of persistent low energy and motivation to acceptable levels, report subjective improved motivation and increased energy for a period of 90 days, within 6 months as clinically observed and by patient self-report.    Intervention(s)  Therapist will provide individual therapy to identify triggers to depression, gain feedback on helpful coping tools and thought-reframing techniques, and identify preferred way of being.  Tx to include discussion of current stressors and interpersonal conflicts and how to cope with these, coaching, diagnostic testing, referral for medication as indicated, use prescribed medication, cognitive restructuring, interpersonal, family therapy, supportive therapy services.        Goal 3: Patiient will report absence of persistent anxiety mood and report of reduced frequency and intensity of worry and absence of persistent anxious mood to acceptable levels, no panic attacks, report subjective comfort with rumination for a period of 90 days. Within 6 months as clinically observed and by patient self-report    I will know I've met my goal when I can go days without worrying about little things or shaking.      Objective #A (Patient Action)    Status: Continued - Date(s):  5/6/2024    Patient will demonstrate and report a level of anxiety that can be managed at a lower level of care.  Absence of persistent anxious mood and report of reduced frequency and intensity of worry and absence of persistent anxious mood to acceptable levels, no panic attacks, report subjective comfort with rumination for a period of 90 days, within 6 months as clinically observed and by patient self-report.    Intervention(s)  Therapist will provide individual therapy to identify triggers to anxiety, gain feedback on helpful coping tools and thought-reframing techniques, and identify preferred way of being. Tx to include discuss current stressors and interpersonal conflicts and how to cope with these,  "coaching, diagnostic testing , referral for medication as indicated, use prescribed medication, cognitive restructuring, interpersonal,   family therapy, supportive therapy services.        Patient has reviewed and agreed to the above plan.      Katie Faulkner, [facility]    5/6/2024                                                   Wandy Ann            SAFETY PLAN:  Step 1: Warning signs / cues (Thoughts, images, mood, situation, behavior) that a crisis may be developing:  Thoughts: thoughts of not wanting to be here  Images: no images  Thinking Processes: intrusive thoughts (bothersome, unwanted thoughts that come out of nowhere): get irritated  Mood: intense anger and agitation  Behaviors: isolating/withdrawing   Situations: trauma    Step 2: Coping strategies - Things I can do to take my mind off of my problems without contacting another person (relaxation technique, physical activity):  Distress Tolerance Strategies:  arts and crafts: drawing and painting  Physical Activities: exercise: working out  Focus on helpful thoughts:  \"This is temporary\", \"It always passes\" and \"Ride the wave\"  Step 3: People and social settings that provide distraction:                 Name: Jennifer     Phone: [phone]                 Name: Antonina         Phone: [phone]                 Name: mom       Phone: [phone]  friends house or car   Step 4: Remind myself of people and things that are important to me and worth living for:  Best friend and cat        Step 5: When I am in crisis, I can ask these people to help me use my safety plan:                 Name: Jennifer     Phone: [phone]                 Name: Antonina         Phone: [phone]                 Name: mom       Phone: [phone]  Step 6: Making the environment safe:   be around others  Step 7: Professionals or agencies I can contact during a crisis:  [facility] Daytime Number: [phone]  Suicide Prevention Lifeline: " 8-956-833-TALK (2530)  Crisis Text Line Service (available 24 hours a day, 7 days a week): Text [place] to 679876  Local Crisis Services: 988     Call 911 or go to my nearest emergency department.       I helped develop this safety plan and agree to use it when needed.  I have been given a copy of this plan.       Client signature _________________________________________________________________  Today s date:  2/22/2021  Adapted from Safety Plan Template 2008 Marisol Cazares and Mario Flores is reprinted with the express permission of the authors.  No portion of the Safety Plan Template may be reproduced without the express, written permission.  You can contact the authors at [email or web address] or [email or web address].

## 2024-06-28 ENCOUNTER — VIRTUAL VISIT (OUTPATIENT)
Dept: PSYCHOLOGY | Facility: CLINIC | Age: 24
End: 2024-06-28
Payer: COMMERCIAL

## 2024-06-28 DIAGNOSIS — F90.2 ADHD (ATTENTION DEFICIT HYPERACTIVITY DISORDER), COMBINED TYPE: ICD-10-CM

## 2024-06-28 DIAGNOSIS — F31.32 BIPOLAR AFFECTIVE DISORDER, CURRENTLY DEPRESSED, MODERATE (H): Primary | ICD-10-CM

## 2024-06-28 DIAGNOSIS — F43.10 PTSD (POST-TRAUMATIC STRESS DISORDER): ICD-10-CM

## 2024-06-28 DIAGNOSIS — F41.1 GENERALIZED ANXIETY DISORDER: ICD-10-CM

## 2024-06-28 PROCEDURE — 90834 PSYTX W PT 45 MINUTES: CPT | Mod: 93 | Performed by: SOCIAL WORKER

## 2024-06-28 NOTE — PROGRESS NOTES
"/    [facility]                                      Progress Note    Patient Name: Wandy Ann  Date: 6/28/2024         Service Type: Individual      Session Start Time: 1104     Session End Time: 1149     Session Length: 38-52    Session #: 79    Attendees: Client    Service Modality:    Phone Visit:      Provider verified identity through the following two step process.  Patient provided:  Patient is known previously to provider     Telephone Visit: The patient's condition can be safely assessed and treated via synchronous audio telemedicine encounter.       Reason for Audio Telemedicine Visit: Services only offered telehealth     Originating Site (Patient Location): Patient's home     Distant Site (Provider Location): off site      Consent:  The patient/guardian has verbally consented to:      1. The potential risks and benefits of telemedicine (telephone visit) versus in person care;     The patient has been notified of the following:      \"We have found that certain health care needs can be provided without the need for a face to face visit.  This service lets us provide the care you need with a phone conversation.       I will have full access to your [facility] medical record during this entire phone call.   I will be taking notes for your medical record.      Since this is like an office visit, we will bill your insurance company for this service.       There are potential benefits and risks of telephone visits (e.g. limits to patient confidentiality) that differ from in-person visits.?Confidentiality still applies for telephone services, and nobody will record the visit.  It is important to be in a quiet, private space that is free of distractions (including cell phone or other devices) during the visit.??      If during the course of the call I believe a telephone visit is not appropriate, you will not be charged for this service\"     Consent has been obtained for this " service by care team member: Yes     DATA  Interactive Complexity: No  Crisis: No        Progress Since Last Session (Related to Symptoms / Goals / Homework):   Symptoms: Improving depression worsening anxiety      Homework: Achieved / completed to satisfaction  Partially completed        Episode of Care Goals: Satisfactory progress - ACTION (Actively working towards change); Intervened by reinforcing change plan / affirming steps taken       Current / Ongoing Stressors and Concerns:   past trauma, developmental hx and current stressors      Treatment Objective(s) Addressed in This Session:   Patient will demonstrate control of impulses and ability to use positive coping tools to manage strong emotions that can be safely addressed at a lower level of care. Absence of persistent SI and report of reduced frequency and intensity of SI and absence of SI to acceptable levels, report subjective improved mood for a period of 90 days, within 6 months as clinically observed and by patient self-report.  Patient will demonstrate and report a level of depression that can be managed at a lower level of care.  Absence of persistent depression mood and report of reduced frequency and intensity of low mood and absence of persistent low energy and motivation to acceptable levels, report subjective improved motivation and increased energy for a period of 90 days, within 6 months as clinically observed and by patient self-report.  Patient will demonstrate and report a level of anxiety that can be managed at a lower level of care.  Absence of persistent anxious mood and report of reduced frequency and intensity of worry and absence of persistent anxious mood to acceptable levels, no panic attacks, report subjective comfort with rumination for a period of 90 days, within 6 months as clinically observed and by patient self-report.       Intervention:   Therapist met with patient to review goals and interventions. Therapist utilized  reflected listening as patient gave brief reflection of week. Patient reported she will start her second round of TMS on the 8th of July and processed her increase of anxiety. Therapist supported patient as she processed and provided patient with education on anxiety taking away her present moment along with to effects of living due to fear. Therapist encouraged patient in the moments to name and manage her anxiety by naming her fears but also naming what her anxiety is taking away.   Patient presented with an anxious affect. Patient was engaged in session and open to feedback. Patient contracted for safety.             There has been demonstrated improvement in functioning while patient has been engaged in psychotherapy/psychological service- if withdrawn the patient would deteriorate and/or relapse.     Assessments completed prior to visit:  The following assessments were completed by patient for this visit:  PHQ9:       8/18/2023    12:47 AM 10/2/2023     9:43 AM 10/2/2023     9:44 AM 10/10/2023     1:57 PM 12/14/2023     9:33 AM 1/15/2024     7:47 PM 4/25/2024     3:40 PM   PHQ-9 SCORE   PHQ-9 Total Score MyChart 13 (Moderate depression)  15 (Moderately severe depression) 16 (Moderately severe depression) 7 (Mild depression) 11 (Moderate depression) 6 (Mild depression)   PHQ-9 Total Score 13 15 15 16 7 11 6     GAD7:       8/24/2022     2:00 PM 10/31/2022     8:08 AM 11/29/2022     2:34 PM 2/2/2023    11:53 AM 4/20/2023    12:41 PM 10/10/2023     1:57 PM 1/15/2024     7:48 PM   YESSI-7 SCORE   Total Score  2 (minimal anxiety) 11 (moderate anxiety) 16 (severe anxiety) 13 (moderate anxiety) 17 (severe anxiety) 15 (severe anxiety)   Total Score 13 2 11 16 13 17 15   PROMIS-10 Scores        2/1/2023    10:18 AM 10/10/2023     1:58 PM 1/15/2024     7:49 PM   PROMIS-10 Total Score w/o Sub Scores   PROMIS TOTAL - SUBSCORES  22 24       Information is confidential and restricted. Go to Review Flowsheets to unlock data.             ASSESSMENT: Current Emotional / Mental Status (status of significant symptoms):   Risk status (Self / Other harm or suicidal ideation)   Patient denies current fears or concerns for personal safety.   Patient denies current or recent suicidal ideation or behaviors.   Patient denies current or recent homicidal ideation or behaviors.   Patient denies current or recent self injurious behavior or ideation.   Patient denies other safety concerns.   Patient reports there has been no change in risk factors since their last session.     Patient reports there has been no change in protective factors since their last session.     A safety and risk management plan has been developed including: Patient consented to co-developed safety plan on 2.21.2022.  Safety and risk management plan was reviewed.   Patient agreed to use safety plan should any safety concerns arise.  A copy was made available to the patient.     Appearance:   Appropriate     Eye Contact:   Fair     Psychomotor Behavior: Restless     Attitude:   Cooperative    Orientation:   All   Speech    Rate / Production: Emotional Talkative    Volume:  Normal    Mood:    Anxious    Affect:    Worrisome    Thought Content:  Clear    Thought Form:  Coherent    Insight:    Fair      Medication Review:   No changes to current psychiatric medication(s)     Medication Compliance:   Yes     Changes in Health Issues:   None reported     Chemical Use Review:   Substance Use: Chemical use reviewed, no active concerns identified      Tobacco Use: No current tobacco use.      Diagnosis:  1. Bipolar affective disorder, currently depressed, moderate (H)    2. ADHD (attention deficit hyperactivity disorder), combined type    3. Generalized anxiety disorder    4. PTSD (post-traumatic stress disorder)        Collateral Reports Completed:   Not Applicable    PLAN: (Patient Tasks / Therapist Tasks / Other)        Call [phone] if feeling SI has increase or go to ED or empath   Utilize  coping skills when feeling impulsive.   Replace distortions with positive attributes      Look at intention rather than perception    Do not avoid people  Try to work on relationship with partners mom  Therapist supported patient as she processed and provided patient with education on anxiety taking away her present moment along with to effects of living due to fear. Therapist encouraged patient in the moments to name and manage her anxiety by naming her fears but also naming what her anxiety is taking away.     Katie Faulkner, [facility]  6/28/2024                                                         ______________________________________________________________________    Individual Treatment Plan    Patient's Name: Wandy Ann  YOB: 2000    Date of Creation: 2.22.2021  Date Treatment Plan Last Reviewed/Revised:  5/6/2024 due  8/6/2024    DSM5 Diagnoses: 296.52 Bipolar I Disorder Current or Most Recent Episode Depressed, Moderate, 300.02 (F41.1) Generalized Anxiety Disorder or 309.81 (F43.10) Posttraumatic Stress Disorder (includes Posttraumatic Stress Disorder for Children 6 Years and Younger)  Without dissociative symptoms  Psychosocial / Contextual Factors: past trauma, developmental hx and current stressors   PROMIS (reviewed every 90 days):  5/6/2024    Referral / Collaboration:  Referral to another professional/service is not indicated at this time..    Anticipated number of session for this episode of care: 26  Anticipation frequency of session: Biweekly  Anticipated Duration of each session: 38-52 minutes  Treatment plan will be reviewed in 90 days or when goals have been changed.       MeasurableTreatment Goal(s) related to diagnosis / functional impairment(s)  Goal 1: Patient will report absence of persistent SI and report of reduced frequency and intensity of SI and absence of SI to acceptable levels, report subjective improved mood for a period of 90 days, within 6 months as  clinically observed and by patient self-report    I will know I've met my goal when I can see the difference between a bad moment and what I want in th future.      Objective #A (Patient Action)    Patient will demonstrate control of impulses and ability to use positive coping tools to manage strong emotions that can be safely addressed at a lower level of care. Absence of persistent SI and report of reduced frequency and intensity of SI and absence of SI to acceptable levels, report subjective improved mood for a period of 90 days, within 6 months as clinically observed and by patient self-report.  Status: Continued - Date(s):  5/6/2024    Intervention(s)  Therapist will provide individual therapy to identify triggers to SI urges, gain feedback on helpful coping strategies, and identify ways that family can offer support. Tx to discuss current stressors and interpersonal conflicts and how to cope with these, coaching, diagnostic testing, referral for medication as indicated, use prescribed medication, cognitive restructuring, interpersonal family therapy, supportive therapy services.        Goal 2: Patient will report absence of persistent depression mood and report of reduced frequency and intensity of low mood and absence of persistent low energy and motivation to acceptable levels, report subjective improved motivation and increased energy for a period of 90 days, within 6 months as clinically observed and by patient self-report    I will know I've met my goal when I no longer feel sad.      Objective #A (Patient Action)    Status: Continued - Date(s):  5/6/2024    Patient will demonstrate and report a level of depression that can be managed at a lower level of care.  Absence of persistent depression mood and report of reduced frequency and intensity of low mood and absence of persistent low energy and motivation to acceptable levels, report subjective improved motivation and increased energy for a period of 90  days, within 6 months as clinically observed and by patient self-report.    Intervention(s)  Therapist will provide individual therapy to identify triggers to depression, gain feedback on helpful coping tools and thought-reframing techniques, and identify preferred way of being.  Tx to include discussion of current stressors and interpersonal conflicts and how to cope with these, coaching, diagnostic testing, referral for medication as indicated, use prescribed medication, cognitive restructuring, interpersonal, family therapy, supportive therapy services.        Goal 3: Patiient will report absence of persistent anxiety mood and report of reduced frequency and intensity of worry and absence of persistent anxious mood to acceptable levels, no panic attacks, report subjective comfort with rumination for a period of 90 days. Within 6 months as clinically observed and by patient self-report    I will know I've met my goal when I can go days without worrying about little things or shaking.      Objective #A (Patient Action)    Status: Continued - Date(s):  5/6/2024    Patient will demonstrate and report a level of anxiety that can be managed at a lower level of care.  Absence of persistent anxious mood and report of reduced frequency and intensity of worry and absence of persistent anxious mood to acceptable levels, no panic attacks, report subjective comfort with rumination for a period of 90 days, within 6 months as clinically observed and by patient self-report.    Intervention(s)  Therapist will provide individual therapy to identify triggers to anxiety, gain feedback on helpful coping tools and thought-reframing techniques, and identify preferred way of being. Tx to include discuss current stressors and interpersonal conflicts and how to cope with these, coaching, diagnostic testing , referral for medication as indicated, use prescribed medication, cognitive restructuring, interpersonal,   family therapy,  "supportive therapy services.        Patient has reviewed and agreed to the above plan.      Katie Faulkner, [facility]    5/6/2024                                                   Wandy Ann            SAFETY PLAN:  Step 1: Warning signs / cues (Thoughts, images, mood, situation, behavior) that a crisis may be developing:  Thoughts: thoughts of not wanting to be here  Images: no images  Thinking Processes: intrusive thoughts (bothersome, unwanted thoughts that come out of nowhere): get irritated  Mood: intense anger and agitation  Behaviors: isolating/withdrawing   Situations: trauma    Step 2: Coping strategies - Things I can do to take my mind off of my problems without contacting another person (relaxation technique, physical activity):  Distress Tolerance Strategies:  arts and crafts: drawing and painting  Physical Activities: exercise: working out  Focus on helpful thoughts:  \"This is temporary\", \"It always passes\" and \"Ride the wave\"  Step 3: People and social settings that provide distraction:                 Name: Jennifer     Phone: [phone]                 Name: Antonina         Phone: [phone]                 Name: mom       Phone: [phone]  friends house or car   Step 4: Remind myself of people and things that are important to me and worth living for:  Best friend and cat        Step 5: When I am in crisis, I can ask these people to help me use my safety plan:                 Name: Jennifer     Phone: [phone]                 Name: Antonina         Phone: [phone]                 Name: mom       Phone: [phone]  Step 6: Making the environment safe:   be around others  Step 7: Professionals or agencies I can contact during a crisis:  [facility] Daytime Number: [phone]  Suicide Prevention Lifeline: 5-155-988-TALK (8208)  Crisis Text Line Service (available 24 hours a day, 7 days a week): Text [place] to 624225  Local Crisis Services: 988     Call 911 or go to my " [facility] emergency department.       I helped develop this safety plan and agree to use it when needed.  I have been given a copy of this plan.       Client signature _________________________________________________________________  Today s date:  2/22/2021  Adapted from Safety Plan Template 2008 Marisol Cazares and Mario Flores is reprinted with the express permission of the authors.  No portion of the Safety Plan Template may be reproduced without the express, written permission.  You can contact the authors at [email or web address] or [email or web address].

## 2024-07-08 ENCOUNTER — HOSPITAL ENCOUNTER (EMERGENCY)
Facility: CLINIC | Age: 24
Discharge: HOME | End: 2024-07-08
Attending: PHYSICIAN ASSISTANT | Admitting: PHYSICIAN ASSISTANT
Payer: COMMERCIAL

## 2024-07-08 ENCOUNTER — APPOINTMENT (OUTPATIENT)
Dept: GENERAL RADIOLOGY | Facility: CLINIC | Age: 24
End: 2024-07-08
Attending: EMERGENCY MEDICINE
Payer: COMMERCIAL

## 2024-07-08 VITALS
OXYGEN SATURATION: 100 % | TEMPERATURE: 97 F | HEART RATE: 85 BPM | BODY MASS INDEX: 38.28 KG/M2 | DIASTOLIC BLOOD PRESSURE: 56 MMHG | RESPIRATION RATE: 13 BRPM | HEIGHT: 60 IN | WEIGHT: 195 LBS | SYSTOLIC BLOOD PRESSURE: 115 MMHG

## 2024-07-08 DIAGNOSIS — R07.9 CHEST PAIN, UNSPECIFIED TYPE: ICD-10-CM

## 2024-07-08 LAB
ALBUMIN SERPL BCG-MCNC: 4.2 G/DL (ref 3.5–5.2)
ALP SERPL-CCNC: 92 U/L (ref 40–150)
ALT SERPL W P-5'-P-CCNC: 35 U/L (ref 0–50)
ANION GAP SERPL CALCULATED.3IONS-SCNC: 7 MMOL/L (ref 7–15)
AST SERPL W P-5'-P-CCNC: 22 U/L (ref 0–45)
ATRIAL RATE - MUSE: 98 BPM
BASOPHILS # BLD AUTO: 0 10E3/UL (ref 0–0.2)
BASOPHILS NFR BLD AUTO: 1 %
BILIRUB SERPL-MCNC: 0.4 MG/DL
BUN SERPL-MCNC: 13.8 MG/DL (ref 6–20)
CALCIUM SERPL-MCNC: 9.2 MG/DL (ref 8.6–10)
CHLORIDE SERPL-SCNC: 103 MMOL/L (ref 98–107)
CREAT SERPL-MCNC: 0.7 MG/DL (ref 0.51–0.95)
D DIMER PPP FEU-MCNC: <0.27 UG/ML FEU (ref 0–0.5)
DEPRECATED HCO3 PLAS-SCNC: 27 MMOL/L (ref 22–29)
DIASTOLIC BLOOD PRESSURE - MUSE: NORMAL MMHG
EGFRCR SERPLBLD CKD-EPI 2021: >90 ML/MIN/1.73M2
EOSINOPHIL # BLD AUTO: 0.2 10E3/UL (ref 0–0.7)
EOSINOPHIL NFR BLD AUTO: 4 %
ERYTHROCYTE [DISTWIDTH] IN BLOOD BY AUTOMATED COUNT: 12.3 % (ref 10–15)
GLUCOSE SERPL-MCNC: 102 MG/DL (ref 70–99)
HCG SERPL QL: NEGATIVE
HCT VFR BLD AUTO: 41.9 % (ref 35–47)
HGB BLD-MCNC: 14.6 G/DL (ref 11.7–15.7)
HOLD SPECIMEN: NORMAL
IMM GRANULOCYTES # BLD: 0 10E3/UL
IMM GRANULOCYTES NFR BLD: 0 %
INTERPRETATION ECG - MUSE: NORMAL
LYMPHOCYTES # BLD AUTO: 1.9 10E3/UL (ref 0.8–5.3)
LYMPHOCYTES NFR BLD AUTO: 34 %
MCH RBC QN AUTO: 32.2 PG (ref 26.5–33)
MCHC RBC AUTO-ENTMCNC: 34.8 G/DL (ref 31.5–36.5)
MCV RBC AUTO: 93 FL (ref 78–100)
MONOCYTES # BLD AUTO: 0.4 10E3/UL (ref 0–1.3)
MONOCYTES NFR BLD AUTO: 7 %
NEUTROPHILS # BLD AUTO: 3 10E3/UL (ref 1.6–8.3)
NEUTROPHILS NFR BLD AUTO: 54 %
NRBC # BLD AUTO: 0 10E3/UL
NRBC BLD AUTO-RTO: 0 /100
P AXIS - MUSE: 52 DEGREES
PLATELET # BLD AUTO: 362 10E3/UL (ref 150–450)
POTASSIUM SERPL-SCNC: 3.7 MMOL/L (ref 3.4–5.3)
PR INTERVAL - MUSE: 132 MS
PROT SERPL-MCNC: 7.3 G/DL (ref 6.4–8.3)
QRS DURATION - MUSE: 80 MS
QT - MUSE: 344 MS
QTC - MUSE: 439 MS
R AXIS - MUSE: 53 DEGREES
RBC # BLD AUTO: 4.53 10E6/UL (ref 3.8–5.2)
SODIUM SERPL-SCNC: 137 MMOL/L (ref 135–145)
SYSTOLIC BLOOD PRESSURE - MUSE: NORMAL MMHG
T AXIS - MUSE: 42 DEGREES
TROPONIN T SERPL HS-MCNC: <6 NG/L
VENTRICULAR RATE- MUSE: 98 BPM
WBC # BLD AUTO: 5.5 10E3/UL (ref 4–11)

## 2024-07-08 PROCEDURE — 84484 ASSAY OF TROPONIN QUANT: CPT | Performed by: STUDENT IN AN ORGANIZED HEALTH CARE EDUCATION/TRAINING PROGRAM

## 2024-07-08 PROCEDURE — 84703 CHORIONIC GONADOTROPIN ASSAY: CPT | Performed by: EMERGENCY MEDICINE

## 2024-07-08 PROCEDURE — 93005 ELECTROCARDIOGRAM TRACING: CPT

## 2024-07-08 PROCEDURE — 99285 EMERGENCY DEPT VISIT HI MDM: CPT | Mod: 25

## 2024-07-08 PROCEDURE — 84484 ASSAY OF TROPONIN QUANT: CPT | Performed by: PHYSICIAN ASSISTANT

## 2024-07-08 PROCEDURE — 85025 COMPLETE CBC W/AUTO DIFF WBC: CPT | Performed by: STUDENT IN AN ORGANIZED HEALTH CARE EDUCATION/TRAINING PROGRAM

## 2024-07-08 PROCEDURE — 250N000013 HC RX MED GY IP 250 OP 250 PS 637: Performed by: PHYSICIAN ASSISTANT

## 2024-07-08 PROCEDURE — 250N000009 HC RX 250: Performed by: PHYSICIAN ASSISTANT

## 2024-07-08 PROCEDURE — 82040 ASSAY OF SERUM ALBUMIN: CPT | Performed by: STUDENT IN AN ORGANIZED HEALTH CARE EDUCATION/TRAINING PROGRAM

## 2024-07-08 PROCEDURE — 71046 X-RAY EXAM CHEST 2 VIEWS: CPT

## 2024-07-08 PROCEDURE — 36415 COLL VENOUS BLD VENIPUNCTURE: CPT | Performed by: STUDENT IN AN ORGANIZED HEALTH CARE EDUCATION/TRAINING PROGRAM

## 2024-07-08 PROCEDURE — 84703 CHORIONIC GONADOTROPIN ASSAY: CPT | Performed by: PHYSICIAN ASSISTANT

## 2024-07-08 PROCEDURE — 85379 FIBRIN DEGRADATION QUANT: CPT | Performed by: EMERGENCY MEDICINE

## 2024-07-08 PROCEDURE — 85025 COMPLETE CBC W/AUTO DIFF WBC: CPT | Performed by: PHYSICIAN ASSISTANT

## 2024-07-08 PROCEDURE — 80053 COMPREHEN METABOLIC PANEL: CPT | Performed by: PHYSICIAN ASSISTANT

## 2024-07-08 RX ORDER — LIDOCAINE HYDROCHLORIDE 20 MG/ML
10 SOLUTION OROPHARYNGEAL ONCE
Status: COMPLETED | OUTPATIENT
Start: 2024-07-08 | End: 2024-07-08

## 2024-07-08 RX ORDER — MAGNESIUM HYDROXIDE/ALUMINUM HYDROXICE/SIMETHICONE 120; 1200; 1200 MG/30ML; MG/30ML; MG/30ML
15 SUSPENSION ORAL ONCE
Status: COMPLETED | OUTPATIENT
Start: 2024-07-08 | End: 2024-07-08

## 2024-07-08 RX ADMIN — ALUMINUM HYDROXIDE, MAGNESIUM HYDROXIDE, AND SIMETHICONE 15 ML: 200; 200; 20 SUSPENSION ORAL at 19:03

## 2024-07-08 RX ADMIN — LIDOCAINE HYDROCHLORIDE 10 ML: 20 SOLUTION ORAL at 19:03

## 2024-07-08 ASSESSMENT — ACTIVITIES OF DAILY LIVING (ADL)
ADLS_ACUITY_SCORE: 35

## 2024-07-08 NOTE — ED TRIAGE NOTES
Pt has chest pain 2 hours   Pt also has sob     Hx of anxiety   Ems gave 324mg ASA and   Nitrogylcerin 0.4mg

## 2024-07-08 NOTE — ED PROVIDER NOTES
"  Emergency Department Note      History of Present Illness     Chief Complaint   Chest pain    HPI   Wandy Ann is a 24 year old female who presents with chest pain. The patient reports that she developed unprovoked, bilateral chest pain 2-3 days. States pain is not triggered but exertion and is not worse with movement. She describes the pain as a \"shock\" and it is worse with deep breaths. She states the pain is making her very anxious, causing her to check her blood pressure and finding it to be in the 160s systolic. She denies any recent life changes that would make her anxious. The patient explains that she has been taking omeprazole and famotidine for the last few months for heartburn, but it has not alleviated her symptoms. The patient denies palpitations, fevers, cough, abdominal pain, and urinary or bowel symptoms. No history of heart or lung disease.  She denies chance of pregnancy. The patient also denies caffeine, tobacco, alcohol, and marijuana use. Denies SI/HI.    Independent Historian   None    Review of External Notes   None    Past Medical History     Medical History and Problem List   Bipolar affective disorder  Anxiety  Depression  Morbid obesity    Medications   Tessalon  Pepcid  Diflucan  Intuniv  Lamictal  Prilosec  Buspar  Zyrtec  Flonase  Folvite  Atarax    Surgical History   Lip cosmetic repair    Physical Exam     Patient Vitals for the past 24 hrs:   BP Temp Temp src Pulse Resp SpO2 Height Weight   07/08/24 1950 -- -- -- -- -- 100 % -- --   07/08/24 1935 -- -- -- 85 13 -- -- --   07/08/24 1930 115/56 -- -- 85 11 -- -- --   07/08/24 1900 124/76 -- -- 79 14 99 % -- --   07/08/24 1421 131/77 97  F (36.1  C) Oral 99 16 99 % 1.524 m (5') 88.5 kg (195 lb)     Physical Exam  Constitutional: Alert, attentive, GCS 15  HENT:    Nose: Nose normal.    Mouth/Throat: Oropharynx is clear, mucous membranes are moist   Eyes: EOM are normal.   CV: regular rate and rhythm; no murmurs, rubs or " gallups  Chest: Effort normal and breath sounds normal.   GI:  There is no tenderness. No distension. Normal bowel sounds  MSK: Normal range of motion.   Neurological: Alert, attentive  Skin: Skin is warm and dry. No dermatomal skin rash.    Diagnostics     Lab Results   Labs Ordered and Resulted from Time of ED Arrival to Time of ED Departure   COMPREHENSIVE METABOLIC PANEL - Abnormal       Result Value    Sodium 137      Potassium 3.7      Carbon Dioxide (CO2) 27      Anion Gap 7      Urea Nitrogen 13.8      Creatinine 0.70      GFR Estimate >90      Calcium 9.2      Chloride 103      Glucose 102 (*)     Alkaline Phosphatase 92      AST 22      ALT 35      Protein Total 7.3      Albumin 4.2      Bilirubin Total 0.4     TROPONIN T, HIGH SENSITIVITY - Normal    Troponin T, High Sensitivity <6     HCG QUALITATIVE PREGNANCY - Normal    hCG Serum Qualitative Negative     D DIMER QUANTITATIVE - Normal    D-Dimer Quantitative <0.27     CBC WITH PLATELETS AND DIFFERENTIAL    WBC Count 5.5      RBC Count 4.53      Hemoglobin 14.6      Hematocrit 41.9      MCV 93      MCH 32.2      MCHC 34.8      RDW 12.3      Platelet Count 362      % Neutrophils 54      % Lymphocytes 34      % Monocytes 7      % Eosinophils 4      % Basophils 1      % Immature Granulocytes 0      NRBCs per 100 WBC 0      Absolute Neutrophils 3.0      Absolute Lymphocytes 1.9      Absolute Monocytes 0.4      Absolute Eosinophils 0.2      Absolute Basophils 0.0      Absolute Immature Granulocytes 0.0      Absolute NRBCs 0.0         Imaging   XR Chest 2 Views   Final Result   IMPRESSION: Negative chest.          EKG   ECG taken at 1430, ECG read at 1432  Normal sinus rhythm  Normal ECG  Rate 98 bpm. AR interval 132 ms. QRS duration 80 ms. QT/QTc 344/439 ms. P-R-T axes 52 53 42.    Independent Interpretation   I reviewed the patient's chest X-ray, which is normal    ED Course      Medications Administered   Medications   alum & mag hydroxide-simethicone  (MAALOX) suspension 15 mL (15 mLs Oral $Given 7/8/24 1903)   lidocaine (viscous) (XYLOCAINE) 2 % solution 10 mL (10 mLs Mouth/Throat $Given 7/8/24 1903)         Discussion of Management   None    ED Course   ED Course as of 07/08/24 2317   Mon Jul 08, 2024   1847 I obtained history and examined the patient as noted above       Optional/Additional Documentation  None    Medical Decision Making / Diagnosis     CMS Diagnoses: None    MIPS       None    MDM   Wandy Ann is a 24 year old female with history of YESSI and bipolar who presents to the ED today with chest pain described as sharp bilateral chest pain worse with deep breaths. Differential includes ACS, pulmonary embolism, aortic dissection. Labs reassuring today. EKG shows NSR without ischemia or arrhythmia. Troponin undetectable and ACS unlikely. Dimer is not elevated and PE/DVT unlikely. No through to the back pain or widened mediastinum on CXR and dissection also unlikely.   I also considered pneumoniae, pneumothorax, pericarditis, pleurisy, esophageal spasm. No serious etiology for the chest pain were detected today during this visit.  HEART score is 0 with 0.9-1.7% risk for 30-day major adverse cardiac event and is appropriate for outpatient follow-up. She has follow-up with primary care in 4 days which is appropriate. Discussed she may consider a Zio or Halter monitor with her PCP. She is quite teary eyed during conversation and notes her anxiety is triggered by her chest pain. Discussed medications however will defer changes to anxiety medication as she has follow-up with PCP in 4 days. No SI/HI and no criteria for legal hold. The patient was instructed to return to the ED with new or worse symptoms and follow up with their primary care provider as discussed. Patient comfortable with plan and was discharged home.    Disposition   The patient was discharged.     Diagnosis     ICD-10-CM    1. Chest pain, unspecified type  R07.9                     Scribe Disclosure:  I, Edinson Jones, am serving as a scribe at 7:08 PM on 7/8/2024 to document services personally performed by Montserrat Jacobson PA-C based on my observations and the provider's statements to me.        Montserrat Jacobson PA-C  07/08/24 [id number]

## 2024-07-09 ENCOUNTER — PATIENT OUTREACH (OUTPATIENT)
Dept: FAMILY MEDICINE | Facility: CLINIC | Age: 24
End: 2024-07-09
Payer: COMMERCIAL

## 2024-07-09 NOTE — TELEPHONE ENCOUNTER
This patient was discharged from [facility] ED on 7/8/24    Discharge Diagnosis: Chest pain    Has a follow-up visit has been scheduled? Yes, 7/12/24    ED / Discharge Outreach Protocol    Patient Contact    Attempt # 1    Was call answered?  No.  Left message on patient's VM requesting a return call to [place] [phone]     Gisela Gaston RN  [facility]

## 2024-07-10 ASSESSMENT — ANXIETY QUESTIONNAIRES
6. BECOMING EASILY ANNOYED OR IRRITABLE: SEVERAL DAYS
3. WORRYING TOO MUCH ABOUT DIFFERENT THINGS: NEARLY EVERY DAY
5. BEING SO RESTLESS THAT IT IS HARD TO SIT STILL: SEVERAL DAYS
4. TROUBLE RELAXING: NEARLY EVERY DAY
2. NOT BEING ABLE TO STOP OR CONTROL WORRYING: NEARLY EVERY DAY
IF YOU CHECKED OFF ANY PROBLEMS ON THIS QUESTIONNAIRE, HOW DIFFICULT HAVE THESE PROBLEMS MADE IT FOR YOU TO DO YOUR WORK, TAKE CARE OF THINGS AT HOME, OR GET ALONG WITH OTHER PEOPLE: EXTREMELY DIFFICULT
1. FEELING NERVOUS, ANXIOUS, OR ON EDGE: NEARLY EVERY DAY
GAD7 TOTAL SCORE: 17
7. FEELING AFRAID AS IF SOMETHING AWFUL MIGHT HAPPEN: NEARLY EVERY DAY

## 2024-07-11 ASSESSMENT — PATIENT HEALTH QUESTIONNAIRE - PHQ9: SUM OF ALL RESPONSES TO PHQ QUESTIONS 1-9: 12

## 2024-07-12 ENCOUNTER — OFFICE VISIT (OUTPATIENT)
Dept: FAMILY MEDICINE | Facility: CLINIC | Age: 24
End: 2024-07-12
Payer: COMMERCIAL

## 2024-07-12 VITALS
HEIGHT: 60 IN | BODY MASS INDEX: 38.01 KG/M2 | OXYGEN SATURATION: 100 % | DIASTOLIC BLOOD PRESSURE: 86 MMHG | HEART RATE: 98 BPM | SYSTOLIC BLOOD PRESSURE: 117 MMHG | TEMPERATURE: 98.2 F | RESPIRATION RATE: 24 BRPM | WEIGHT: 193.6 LBS

## 2024-07-12 DIAGNOSIS — F41.1 GAD (GENERALIZED ANXIETY DISORDER): Primary | ICD-10-CM

## 2024-07-12 DIAGNOSIS — K21.9 GASTROESOPHAGEAL REFLUX DISEASE WITHOUT ESOPHAGITIS: ICD-10-CM

## 2024-07-12 PROCEDURE — 96127 BRIEF EMOTIONAL/BEHAV ASSMT: CPT | Performed by: PHYSICIAN ASSISTANT

## 2024-07-12 PROCEDURE — 99213 OFFICE O/P EST LOW 20 MIN: CPT | Performed by: PHYSICIAN ASSISTANT

## 2024-07-12 PROCEDURE — G2211 COMPLEX E/M VISIT ADD ON: HCPCS | Performed by: PHYSICIAN ASSISTANT

## 2024-07-12 RX ORDER — OMEPRAZOLE 40 MG/1
40 CAPSULE, DELAYED RELEASE ORAL DAILY
Qty: 21 CAPSULE | Refills: 0 | Status: SHIPPED | OUTPATIENT
Start: 2024-07-12

## 2024-07-12 ASSESSMENT — ANXIETY QUESTIONNAIRES
GAD7 TOTAL SCORE: 17
GAD7 TOTAL SCORE: 17
4. TROUBLE RELAXING: NEARLY EVERY DAY
6. BECOMING EASILY ANNOYED OR IRRITABLE: SEVERAL DAYS
3. WORRYING TOO MUCH ABOUT DIFFERENT THINGS: NEARLY EVERY DAY
1. FEELING NERVOUS, ANXIOUS, OR ON EDGE: NEARLY EVERY DAY
GAD7 TOTAL SCORE: 17
5. BEING SO RESTLESS THAT IT IS HARD TO SIT STILL: SEVERAL DAYS
7. FEELING AFRAID AS IF SOMETHING AWFUL MIGHT HAPPEN: NEARLY EVERY DAY
8. IF YOU CHECKED OFF ANY PROBLEMS, HOW DIFFICULT HAVE THESE MADE IT FOR YOU TO DO YOUR WORK, TAKE CARE OF THINGS AT HOME, OR GET ALONG WITH OTHER PEOPLE?: EXTREMELY DIFFICULT
7. FEELING AFRAID AS IF SOMETHING AWFUL MIGHT HAPPEN: NEARLY EVERY DAY
2. NOT BEING ABLE TO STOP OR CONTROL WORRYING: NEARLY EVERY DAY
IF YOU CHECKED OFF ANY PROBLEMS ON THIS QUESTIONNAIRE, HOW DIFFICULT HAVE THESE PROBLEMS MADE IT FOR YOU TO DO YOUR WORK, TAKE CARE OF THINGS AT HOME, OR GET ALONG WITH OTHER PEOPLE: EXTREMELY DIFFICULT

## 2024-07-12 ASSESSMENT — PATIENT HEALTH QUESTIONNAIRE - PHQ9
10. IF YOU CHECKED OFF ANY PROBLEMS, HOW DIFFICULT HAVE THESE PROBLEMS MADE IT FOR YOU TO DO YOUR WORK, TAKE CARE OF THINGS AT HOME, OR GET ALONG WITH OTHER PEOPLE: SOMEWHAT DIFFICULT
SUM OF ALL RESPONSES TO PHQ QUESTIONS 1-9: 12

## 2024-07-12 NOTE — PROGRESS NOTES
Assessment & Plan     Gastroesophageal reflux disease without esophagitis  YESSI (generalized anxiety disorder)  Patient having persistent GERD symptoms. Suggest she continue to work on modifying diet - eating bland foods rather than high fiber foods for now. Increase omeprazole. Ordered EGD, suggest she return to [facility]. Discussed anxiety component to GERD symptoms. Can further work up to rule out underlying issue, but may need to further address anxiety with psychiatry as it is certainly a component to her symptoms.   - omeprazole (PRILOSEC) 40 MG DR capsule; Take 1 capsule (40 mg) by mouth daily  - Adult GI  Referral - Procedure Only; Future        MED REC REQUIRED  Post Medication Reconciliation Status: discharge medications reconciled, continue medications without change  Depression Screening Follow Up                  Follow Up Actions Taken  Crisis resource information provided in the After Visit Summary  Referred patient back to mental health provider    Discussed the following ways the patient can remain in a safe environment:  be around others      1/15/2024     7:48 PM 7/10/2024     1:35 PM 7/12/2024    11:48 AM   YESSI-7 SCORE   Total Score 15 (severe anxiety)  17 (severe anxiety)   Total Score 15 17 17               Subjective   Wandy is a 24 year old, presenting for the following health issues:  ER F/U      7/12/2024    11:47 AM   Additional Questions   Roomed by An V.         7/12/2024    11:47 AM   Patient Reported Additional Medications   Patient reports taking the following new medications FAMOTIDINE 40 MG TABLET     HPI       ED/UC Followup:    Facility:  [facility]  Date of visit: 07/08/2024  Reason for visit: Chest Pain  Current Status: improving     Has had intermittent palpitations for a few months. Feels like it is in the center of her chest. Maybe changes with a cough.     Stomach pain started on 7/9/24. Seems to be worsening. Omeprazole, pepcid, pepto bismol not  "helping. Burning in stomach. Burning into chest at times. Spasms in RUQ. Hasn't eaten today. Gets worse after eating.   Trying to salads, fruits, chicken.     Diarrhea for the most part recently. Sometimes 3-4 times a day.           Objective    /86   Pulse 98   Temp 98.2  F (36.8  C) (Temporal)   Resp 24   Ht 1.515 m (4' 11.65\")   Wt 87.8 kg (193 lb 9.6 oz)   LMP 06/30/2024 (Approximate)   SpO2 100%   BMI 38.26 kg/m    Body mass index is 38.26 kg/m .  Physical Exam   GENERAL: alert and no distress  NECK: no adenopathy, no asymmetry, masses, or scars  RESP: lungs clear to auscultation - no rales, rhonchi or wheezes  CV: regular rate and rhythm, normal S1 S2, no S3 or S4, no murmur, click or rub, no peripheral edema  ABDOMEN: soft, nontender, no hepatosplenomegaly, no masses and bowel sounds normal  MS: no gross musculoskeletal defects noted, no edema            Signed Electronically by: Chinyere Ruiz PA-C    Answers submitted by the patient for this visit:  Patient Health Questionnaire (Submitted on 7/12/2024)  If you checked off any problems, how difficult have these problems made it for you to do your work, take care of things at home, or get along with other people?: Somewhat difficult  PHQ9 TOTAL SCORE: 12  YESSI-7 (Submitted on 7/12/2024)  YESSI 7 TOTAL SCORE: 17    "

## 2024-07-17 ENCOUNTER — VIRTUAL VISIT (OUTPATIENT)
Dept: PSYCHOLOGY | Facility: CLINIC | Age: 24
End: 2024-07-17
Payer: COMMERCIAL

## 2024-07-17 DIAGNOSIS — Z53.9 NO SHOW: Primary | ICD-10-CM

## 2024-07-17 PROCEDURE — 99207 PR NO CHARGE LOS: CPT | Mod: 95 | Performed by: SOCIAL WORKER

## 2024-07-22 ENCOUNTER — TELEPHONE (OUTPATIENT)
Dept: GASTROENTEROLOGY | Facility: CLINIC | Age: 24
End: 2024-07-22

## 2024-07-22 ENCOUNTER — HOSPITAL ENCOUNTER (OUTPATIENT)
Facility: CLINIC | Age: 24
End: 2024-07-22
Attending: SURGERY | Admitting: SURGERY
Payer: COMMERCIAL

## 2024-07-22 NOTE — TELEPHONE ENCOUNTER
"Endoscopy Scheduling Screen    Have you had a positive Covid test in the last 14 days?  No    What is your communication preference for Instructions and/or Bowel Prep?   MyChart    What insurance is in the chart?  Other:  [employer]    Ordering/Referring Provider: Chinyere Ruiz PA-C    (If ordering provider performs procedure, schedule with ordering provider unless otherwise instructed. )    BMI: Estimated body mass index is 38.26 kg/m  as calculated from the following:    Height as of 7/12/24: 1.515 m (4' 11.65\").    Weight as of 7/12/24: 87.8 kg (193 lb 9.6 oz).     Sedation Ordered  moderate sedation.   If patient BMI > 50 do not schedule in [facility].    If [facility] > 45 [facility]    Are you taking methadone or Suboxone?  No    Have you had difficulties, pain, or discomfort during past endoscopy procedures?  No    Are you taking any prescription medications for pain 3 or more times per week?   NO, No RN review required.    Do you have a history of malignant hyperthermia?  No    (Females) Are you currently pregnant?   No     Have you been diagnosed or told you have pulmonary hypertension?   No    Do you have an LVAD?  No    Have you been told you have moderate to severe sleep apnea?  No    Have you been told you have COPD, asthma, or any other lung disease?  No    Do you have any heart conditions?  No     Have you ever had or are you waiting for an organ transplant?  No. Continue scheduling, no site restrictions.    Have you had a stroke or transient ischemic attack (TIA aka \"mini stroke\" in the last 6 months?   No    Have you been diagnosed with or been told you have cirrhosis of the liver?   No    Are you currently on dialysis?   No    Do you need assistance transferring?   No    BMI: Estimated body mass index is 38.26 kg/m  as calculated from the following:    Height as of 7/12/24: 1.515 m (4' 11.65\").    Weight as of 7/12/24: 87.8 kg (193 lb 9.6 oz).     Is patients BMI > 40 and scheduling location " [facility]?  No    Do you take an injectable medication for weight loss or diabetes (excluding insulin)?  No    Do you take the medication Naltrexone?  No    Do you take blood thinners?  No       Prep   Are you currently on dialysis or do you have chronic kidney disease?  No    Do you have a diagnosis of diabetes?  No    Do you have a diagnosis of cystic fibrosis (CF)?  No    On a regular basis do you go 3 -5 days between bowel movements?  No    BMI > 40?  No    Preferred Pharmacy:    [id number] [place] [address] [address]  [address] [place] [id number]  Phone: [phone] Fax: [phone]      Final Scheduling Details     Procedure scheduled  Upper endoscopy (EGD)    Surgeon:  Pilo     Date of procedure:  9.12.24     Pre-OP / PAC:   No - Not required for this site.    Location  [facility] - Patient preference.    Sedation   Moderate Sedation - Per order.      Patient Reminders:   You will receive a call from a Nurse to review instructions and health history.  This assessment must be completed prior to your procedure.  Failure to complete the Nurse assessment may result in the procedure being cancelled.      On the day of your procedure, please designate an adult(s) who can drive you home stay with you for the next 24 hours. The medicines used in the exam will make you sleepy. You will not be able to drive.      You cannot take public transportation, ride share services, or non-medical taxi service without a responsible caregiver.  Medical transport services are allowed with the requirement that a responsible caregiver will receive you at your destination.  We require that drivers and caregivers are confirmed prior to your procedure.

## 2024-07-31 ENCOUNTER — VIRTUAL VISIT (OUTPATIENT)
Dept: PSYCHOLOGY | Facility: CLINIC | Age: 24
End: 2024-07-31
Payer: COMMERCIAL

## 2024-07-31 DIAGNOSIS — F41.1 GENERALIZED ANXIETY DISORDER: ICD-10-CM

## 2024-07-31 DIAGNOSIS — F90.2 ADHD (ATTENTION DEFICIT HYPERACTIVITY DISORDER), COMBINED TYPE: ICD-10-CM

## 2024-07-31 DIAGNOSIS — F43.10 PTSD (POST-TRAUMATIC STRESS DISORDER): ICD-10-CM

## 2024-07-31 DIAGNOSIS — F31.32 BIPOLAR AFFECTIVE DISORDER, CURRENTLY DEPRESSED, MODERATE (H): Primary | ICD-10-CM

## 2024-07-31 PROCEDURE — 90837 PSYTX W PT 60 MINUTES: CPT | Mod: 95 | Performed by: SOCIAL WORKER

## 2024-07-31 NOTE — PROGRESS NOTES
[facility]                                      Progress Note    Patient Name: Wandy Ann  Date: 2024         Service Type: Individual      Session Start Time: 1202   Session End Time: 1256     Session Length: 53+    Session #: 80    Attendees: Client    Service Modality:    Video Visit:      Provider verified identity through the following two step process.  Patient provided: Patient  and Patient previous known to provider     Telemedicine Visit: The patient's condition can be safely assessed and treated via synchronous audio and visual telemedicine encounter.       Reason for Telemedicine Visit: Patient has requested telehealth visit     Originating Site (Patient Location): Patient's home     Distant Site (Provider Location): Provider Remote Setting- Home Office     Consent:  The patient/guardian has verbally consented to: the potential risks and benefits of telemedicine (video visit) versus in person care; bill my insurance or make self-payment for services provided; and responsibility for payment of non-covered services.      Patient would like the video invitation sent by: email/text     Mode of Communication: Video Conference via [employer]well     Distant Location (Provider): Off-site     As the provider I attest to compliance with applicable laws and regulations related to telemedicine.    DATA  Interactive Complexity: No  Crisis: No        Progress Since Last Session (Related to Symptoms / Goals / Homework):   Symptoms: Worsening anxiety      Homework: Achieved / completed to satisfaction  Partially completed        Episode of Care Goals: Satisfactory progress - ACTION (Actively working towards change); Intervened by reinforcing change plan / affirming steps taken       Current / Ongoing Stressors and Concerns:   past trauma, developmental hx and current stressors      Treatment Objective(s) Addressed in This Session:   Patient will demonstrate control of impulses and  ability to use positive coping tools to manage strong emotions that can be safely addressed at a lower level of care. Absence of persistent SI and report of reduced frequency and intensity of SI and absence of SI to acceptable levels, report subjective improved mood for a period of 90 days, within 6 months as clinically observed and by patient self-report.  Patient will demonstrate and report a level of depression that can be managed at a lower level of care.  Absence of persistent depression mood and report of reduced frequency and intensity of low mood and absence of persistent low energy and motivation to acceptable levels, report subjective improved motivation and increased energy for a period of 90 days, within 6 months as clinically observed and by patient self-report.  Patient will demonstrate and report a level of anxiety that can be managed at a lower level of care.  Absence of persistent anxious mood and report of reduced frequency and intensity of worry and absence of persistent anxious mood to acceptable levels, no panic attacks, report subjective comfort with rumination for a period of 90 days, within 6 months as clinically observed and by patient self-report.       Intervention:   Therapist met with patient to review goals and interventions. Therapist utilized reflected listening as patient gave brief reflection of week. Patient reported high anxiety resulting in pain in her stomach and fears of something medically wrong with her. Patient has spoke to her doctor about his and has upcoming appointments to be medically cleared. Therapist supported patient and validated patient. Therapist provided education on how anxiety can also effect us physically and reviewed different coping skills along with offering patient hope and for her to continue to work with her medical and mental health teams to lower anxiety and to medically fix issues with stomach or somatic complaints.    Patient presented with an  anxious affect. Patient was engaged in session and open to feedback. Patient contracted for safety.             There has been demonstrated improvement in functioning while patient has been engaged in psychotherapy/psychological service- if withdrawn the patient would deteriorate and/or relapse.     Assessments completed prior to visit:  The following assessments were completed by patient for this visit:  PHQ9:       10/2/2023     9:43 AM 10/2/2023     9:44 AM 10/10/2023     1:57 PM 12/14/2023     9:33 AM 1/15/2024     7:47 PM 4/25/2024     3:40 PM 7/11/2024    12:42 PM   PHQ-9 SCORE   PHQ-9 Total Score MyChart  15 (Moderately severe depression) 16 (Moderately severe depression) 7 (Mild depression) 11 (Moderate depression) 6 (Mild depression) 12 (Moderate depression)   PHQ-9 Total Score 15 15 16 7 11 6 12     GAD7:       11/29/2022     2:34 PM 2/2/2023    11:53 AM 4/20/2023    12:41 PM 10/10/2023     1:57 PM 1/15/2024     7:48 PM 7/10/2024     1:35 PM 7/12/2024    11:48 AM   YESSI-7 SCORE   Total Score 11 (moderate anxiety) 16 (severe anxiety) 13 (moderate anxiety) 17 (severe anxiety) 15 (severe anxiety)  17 (severe anxiety)   Total Score 11 16 13 17 15 17 17   PROMIS-10 Scores        2/1/2023    10:18 AM 10/10/2023     1:58 PM 1/15/2024     7:49 PM   PROMIS-10 Total Score w/o Sub Scores   PROMIS TOTAL - SUBSCORES  22 24       Information is confidential and restricted. Go to Review Flowsheets to unlock data.            ASSESSMENT: Current Emotional / Mental Status (status of significant symptoms):   Risk status (Self / Other harm or suicidal ideation)   Patient denies current fears or concerns for personal safety.   Patient denies current or recent suicidal ideation or behaviors.   Patient denies current or recent homicidal ideation or behaviors.   Patient denies current or recent self injurious behavior or ideation.   Patient denies other safety concerns.   Patient reports there has been no change in risk factors  since their last session.     Patient reports there has been no change in protective factors since their last session.     A safety and risk management plan has been developed including: Patient consented to co-developed safety plan on 2.21.2022.  Safety and risk management plan was reviewed.   Patient agreed to use safety plan should any safety concerns arise.  A copy was made available to the patient.     Appearance:   Appropriate     Eye Contact:   Fair     Psychomotor Behavior: Restless     Attitude:   Cooperative    Orientation:   All   Speech    Rate / Production: Emotional Talkative    Volume:  Normal    Mood:    Anxious  Sad    Affect:    Tearful Worrisome    Thought Content:  Clear    Thought Form:  Coherent    Insight:    Fair      Medication Review:   No changes to current psychiatric medication(s)     Medication Compliance:   Yes     Changes in Health Issues:   None reported     Chemical Use Review:   Substance Use: Chemical use reviewed, no active concerns identified      Tobacco Use: No current tobacco use.      Diagnosis:  1. Bipolar affective disorder, currently depressed, moderate (H)    2. ADHD (attention deficit hyperactivity disorder), combined type    3. Generalized anxiety disorder    4. PTSD (post-traumatic stress disorder)        Collateral Reports Completed:   Not Applicable    PLAN: (Patient Tasks / Therapist Tasks / Other)        Call [phone] if feeling SI has increase or go to ED or empath   Utilize coping skills when feeling impulsive.   Replace distortions with positive attributes      Look at intention rather than perception    Do not avoid people  Therapist supported patient and validated patient. Therapist provided education on how anxiety can also effect us physically and reviewed different coping skills along with offering patient hope and for her to continue to work with her medical and mental health teams to lower anxiety and to medically fix issues with stomach or somatic  complaints.      Katie RAMA Faulkner, LICSW  7/31/2024                                                         ______________________________________________________________________    Individual Treatment Plan    Patient's Name: Wandy Ann  YOB: 2000    Date of Creation: 2.22.2021  Date Treatment Plan Last Reviewed/Revised:  5/6/2024 due  8/6/2024    DSM5 Diagnoses: 296.52 Bipolar I Disorder Current or Most Recent Episode Depressed, Moderate, 300.02 (F41.1) Generalized Anxiety Disorder or 309.81 (F43.10) Posttraumatic Stress Disorder (includes Posttraumatic Stress Disorder for Children 6 Years and Younger)  Without dissociative symptoms  Psychosocial / Contextual Factors: past trauma, developmental hx and current stressors   PROMIS (reviewed every 90 days):  5/6/2024    Referral / Collaboration:  Referral to another professional/service is not indicated at this time..    Anticipated number of session for this episode of care: 26  Anticipation frequency of session: Biweekly  Anticipated Duration of each session: 38-52 minutes  Treatment plan will be reviewed in 90 days or when goals have been changed.       MeasurableTreatment Goal(s) related to diagnosis / functional impairment(s)  Goal 1: Patient will report absence of persistent SI and report of reduced frequency and intensity of SI and absence of SI to acceptable levels, report subjective improved mood for a period of 90 days, within 6 months as clinically observed and by patient self-report    I will know I've met my goal when I can see the difference between a bad moment and what I want in th future.      Objective #A (Patient Action)    Patient will demonstrate control of impulses and ability to use positive coping tools to manage strong emotions that can be safely addressed at a lower level of care. Absence of persistent SI and report of reduced frequency and intensity of SI and absence of SI to acceptable levels, report subjective  improved mood for a period of 90 days, within 6 months as clinically observed and by patient self-report.  Status: Continued - Date(s):  5/6/2024    Intervention(s)  Therapist will provide individual therapy to identify triggers to SI urges, gain feedback on helpful coping strategies, and identify ways that family can offer support. Tx to discuss current stressors and interpersonal conflicts and how to cope with these, coaching, diagnostic testing, referral for medication as indicated, use prescribed medication, cognitive restructuring, interpersonal family therapy, supportive therapy services.        Goal 2: Patient will report absence of persistent depression mood and report of reduced frequency and intensity of low mood and absence of persistent low energy and motivation to acceptable levels, report subjective improved motivation and increased energy for a period of 90 days, within 6 months as clinically observed and by patient self-report    I will know I've met my goal when I no longer feel sad.      Objective #A (Patient Action)    Status: Continued - Date(s):  5/6/2024    Patient will demonstrate and report a level of depression that can be managed at a lower level of care.  Absence of persistent depression mood and report of reduced frequency and intensity of low mood and absence of persistent low energy and motivation to acceptable levels, report subjective improved motivation and increased energy for a period of 90 days, within 6 months as clinically observed and by patient self-report.    Intervention(s)  Therapist will provide individual therapy to identify triggers to depression, gain feedback on helpful coping tools and thought-reframing techniques, and identify preferred way of being.  Tx to include discussion of current stressors and interpersonal conflicts and how to cope with these, coaching, diagnostic testing, referral for medication as indicated, use prescribed medication, cognitive  restructuring, interpersonal, family therapy, supportive therapy services.        Goal 3: Patiient will report absence of persistent anxiety mood and report of reduced frequency and intensity of worry and absence of persistent anxious mood to acceptable levels, no panic attacks, report subjective comfort with rumination for a period of 90 days. Within 6 months as clinically observed and by patient self-report    I will know I've met my goal when I can go days without worrying about little things or shaking.      Objective #A (Patient Action)    Status: Continued - Date(s):  5/6/2024    Patient will demonstrate and report a level of anxiety that can be managed at a lower level of care.  Absence of persistent anxious mood and report of reduced frequency and intensity of worry and absence of persistent anxious mood to acceptable levels, no panic attacks, report subjective comfort with rumination for a period of 90 days, within 6 months as clinically observed and by patient self-report.    Intervention(s)  Therapist will provide individual therapy to identify triggers to anxiety, gain feedback on helpful coping tools and thought-reframing techniques, and identify preferred way of being. Tx to include discuss current stressors and interpersonal conflicts and how to cope with these, coaching, diagnostic testing , referral for medication as indicated, use prescribed medication, cognitive restructuring, interpersonal,   family therapy, supportive therapy services.        Patient has reviewed and agreed to the above plan.      Katie Faulkner, [facility]    5/6/2024                                                   Wandy Ann            SAFETY PLAN:  Step 1: Warning signs / cues (Thoughts, images, mood, situation, behavior) that a crisis may be developing:  Thoughts: thoughts of not wanting to be here  Images: no images  Thinking Processes: intrusive thoughts (bothersome, unwanted thoughts that come out of nowhere):  "get irritated  Mood: intense anger and agitation  Behaviors: isolating/withdrawing   Situations: trauma    Step 2: Coping strategies - Things I can do to take my mind off of my problems without contacting another person (relaxation technique, physical activity):  Distress Tolerance Strategies:  arts and crafts: drawing and painting  Physical Activities: exercise: working out  Focus on helpful thoughts:  \"This is temporary\", \"It always passes\" and \"Ride the wave\"  Step 3: People and social settings that provide distraction:                 Name: Jennifer     Phone: [phone]                 Name: Antonina         Phone: [phone]                 Name: panchito       Phone: [phone]  friends house or car   Step 4: Remind myself of people and things that are important to me and worth living for:  Best friend and cat        Step 5: When I am in crisis, I can ask these people to help me use my safety plan:                 Name: Jennifer     Phone: [phone]                 Name: Antonina         Phone: [phone]                 Name: panchito       Phone: [phone]  Step 6: Making the environment safe:   be around others  Step 7: Professionals or agencies I can contact during a crisis:  [facility] Daytime Number: [phone]  Suicide Prevention Lifeline: 7-218-347-HPWV (6515)  Crisis Text Line Service (available 24 hours a day, 7 days a week): Text [place] to 582741  Local Crisis Services: 988     Call 911 or go to my nearest emergency department.       I helped develop this safety plan and agree to use it when needed.  I have been given a copy of this plan.       Client signature _________________________________________________________________  Today s date:  2/22/2021  Adapted from Safety Plan Template 2008 Marisol Cazares and Mario Flores is reprinted with the express permission of the authors.  No portion of the Safety Plan Template may be reproduced without the express, written permission.  You can " contact the authors at bhs@[facility] or [email or web address].

## 2024-08-06 ENCOUNTER — TRANSFERRED RECORDS (OUTPATIENT)
Dept: HEALTH INFORMATION MANAGEMENT | Facility: CLINIC | Age: 24
End: 2024-08-06

## 2024-08-19 ENCOUNTER — VIRTUAL VISIT (OUTPATIENT)
Dept: PSYCHOLOGY | Facility: CLINIC | Age: 24
End: 2024-08-19
Payer: COMMERCIAL

## 2024-08-19 DIAGNOSIS — F41.1 GENERALIZED ANXIETY DISORDER: ICD-10-CM

## 2024-08-19 DIAGNOSIS — F90.2 ADHD (ATTENTION DEFICIT HYPERACTIVITY DISORDER), COMBINED TYPE: ICD-10-CM

## 2024-08-19 DIAGNOSIS — F43.10 PTSD (POST-TRAUMATIC STRESS DISORDER): ICD-10-CM

## 2024-08-19 DIAGNOSIS — F31.32 BIPOLAR AFFECTIVE DISORDER, CURRENTLY DEPRESSED, MODERATE (H): Primary | ICD-10-CM

## 2024-08-19 PROCEDURE — 90837 PSYTX W PT 60 MINUTES: CPT | Mod: 93 | Performed by: SOCIAL WORKER

## 2024-08-19 NOTE — PROGRESS NOTES
"/    [facility]                                      Progress Note    Patient Name: Wandy Ann  Date: 8/19/2024         Service Type: Individual      Session Start Time:  1503   Session End Time:  1558     Session Length: 53+    Session #: 81    Attendees: Client    Service Modality:    Phone Visit:      Provider verified identity through the following two step process.  Patient provided:  Patient is known previously to provider     Telephone Visit: The patient's condition can be safely assessed and treated via synchronous audio telemedicine encounter.       Reason for Audio Telemedicine Visit: Services only offered telehealth     Originating Site (Patient Location): Patient's home     Distant Site (Provider Location): off site      Consent:  The patient/guardian has verbally consented to:      1. The potential risks and benefits of telemedicine (telephone visit) versus in person care;     The patient has been notified of the following:      \"We have found that certain health care needs can be provided without the need for a face to face visit.  This service lets us provide the care you need with a phone conversation.       I will have full access to your [facility] medical record during this entire phone call.   I will be taking notes for your medical record.      Since this is like an office visit, we will bill your insurance company for this service.       There are potential benefits and risks of telephone visits (e.g. limits to patient confidentiality) that differ from in-person visits.?Confidentiality still applies for telephone services, and nobody will record the visit.  It is important to be in a quiet, private space that is free of distractions (including cell phone or other devices) during the visit.??      If during the course of the call I believe a telephone visit is not appropriate, you will not be charged for this service\"     Consent has been obtained for this " service by care team member: Yes     DATA  Interactive Complexity: No  Crisis: No        Progress Since Last Session (Related to Symptoms / Goals / Homework):   Symptoms: Improving with continued worry      Homework: Achieved / completed to satisfaction  Partially completed        Episode of Care Goals: Satisfactory progress - ACTION (Actively working towards change); Intervened by reinforcing change plan / affirming steps taken       Current / Ongoing Stressors and Concerns:   past trauma, developmental hx and current stressors      Treatment Objective(s) Addressed in This Session:   Patient will demonstrate control of impulses and ability to use positive coping tools to manage strong emotions that can be safely addressed at a lower level of care. Absence of persistent SI and report of reduced frequency and intensity of SI and absence of SI to acceptable levels, report subjective improved mood for a period of 90 days, within 6 months as clinically observed and by patient self-report.  Patient will demonstrate and report a level of depression that can be managed at a lower level of care.  Absence of persistent depression mood and report of reduced frequency and intensity of low mood and absence of persistent low energy and motivation to acceptable levels, report subjective improved motivation and increased energy for a period of 90 days, within 6 months as clinically observed and by patient self-report.  Patient will demonstrate and report a level of anxiety that can be managed at a lower level of care.  Absence of persistent anxious mood and report of reduced frequency and intensity of worry and absence of persistent anxious mood to acceptable levels, no panic attacks, report subjective comfort with rumination for a period of 90 days, within 6 months as clinically observed and by patient self-report.       Intervention:   Therapist met with patient to review goals and interventions. Therapist utilized reflected  listening as patient gave brief reflection of week. Patient reported feeling much better after her doctors appointments. Therapist supported patient as she processed and validated and challenged patient. Therapist reviewed patients anxiety going up as depression goes down and different ways to manage it. Patient reported medication change and will fill it this week. Therapist encouraged patient to look at behaviors that increase of decrease mood and worries.   Patient presented with an anxious affect. Patient was engaged in session and open to feedback. Patient contracted for safety.             There has been demonstrated improvement in functioning while patient has been engaged in psychotherapy/psychological service- if withdrawn the patient would deteriorate and/or relapse.     Assessments completed prior to visit:  The following assessments were completed by patient for this visit:  PHQ9:       10/2/2023     9:44 AM 10/10/2023     1:57 PM 12/14/2023     9:33 AM 1/15/2024     7:47 PM 4/25/2024     3:40 PM 7/11/2024    12:42 PM 8/12/2024     1:07 AM   PHQ-9 SCORE   PHQ-9 Total Score MyChart 15 (Moderately severe depression) 16 (Moderately severe depression) 7 (Mild depression) 11 (Moderate depression) 6 (Mild depression) 12 (Moderate depression) 8 (Mild depression)   PHQ-9 Total Score 15 16 7 11 6 12 8    8     GAD7:       11/29/2022     2:34 PM 2/2/2023    11:53 AM 4/20/2023    12:41 PM 10/10/2023     1:57 PM 1/15/2024     7:48 PM 7/10/2024     1:35 PM 7/12/2024    11:48 AM   YESSI-7 SCORE   Total Score 11 (moderate anxiety) 16 (severe anxiety) 13 (moderate anxiety) 17 (severe anxiety) 15 (severe anxiety)  17 (severe anxiety)   Total Score 11 16 13 17 15 17 17   PROMIS-10 Scores        10/10/2023     1:58 PM 1/15/2024     7:49 PM 8/19/2024     3:00 PM   PROMIS-10 Total Score w/o Sub Scores   PROMIS TOTAL - SUBSCORES 22 24 29                   ASSESSMENT: Current Emotional / Mental Status (status of significant  symptoms):   Risk status (Self / Other harm or suicidal ideation)   Patient denies current fears or concerns for personal safety.   Patient denies current or recent suicidal ideation or behaviors.   Patient denies current or recent homicidal ideation or behaviors.   Patient denies current or recent self injurious behavior or ideation.   Patient denies other safety concerns.   Patient reports there has been no change in risk factors since their last session.     Patient reports there has been no change in protective factors since their last session.     A safety and risk management plan has been developed including: Patient consented to co-developed safety plan on 2.21.2022.  Safety and risk management plan was reviewed.   Patient agreed to use safety plan should any safety concerns arise.  A copy was made available to the patient.     Appearance:   Appropriate     Eye Contact:   Fair     Psychomotor Behavior: Restless     Attitude:   Cooperative    Orientation:   All   Speech    Rate / Production: Emotional Talkative    Volume:  Normal    Mood:    Anxious  Depressed    Affect:    Worrisome    Thought Content:  Clear    Thought Form:  Coherent    Insight:    Fair      Medication Review:   Changes to psychiatric medications, see updated Medication List in EPIC.      Medication Compliance:   Yes     Changes in Health Issues:   None reported     Chemical Use Review:   Substance Use: Chemical use reviewed, no active concerns identified      Tobacco Use: No current tobacco use.      Diagnosis:  1. Bipolar affective disorder, currently depressed, moderate (H)    2. ADHD (attention deficit hyperactivity disorder), combined type    3. Generalized anxiety disorder    4. PTSD (post-traumatic stress disorder)        Collateral Reports Completed:   Not Applicable    PLAN: (Patient Tasks / Therapist Tasks / Other)        Call [phone] if feeling SI has increase or go to ED or empath   Utilize coping skills when feeling impulsive.    Replace distortions with positive attributes  Look at intention rather than perception  Do not avoid people  Therapist reviewed patients anxiety going up as depression goes down and different ways to manage it. Patient reported medication change and will fill it this week. Therapist encouraged patient to look at behaviors that increase of decrease mood and worries.     Katei Faulkner, [facility]  8/19/2024                                                         ______________________________________________________________________    Individual Treatment Plan    Patient's Name: Wandy Ann  YOB: 2000    Date of Creation: 2.22.2021  Date Treatment Plan Last Reviewed/Revised:  8/19/2024  due 11/19/2024    DSM5 Diagnoses: 296.52 Bipolar I Disorder Current or Most Recent Episode Depressed, Moderate, 300.02 (F41.1) Generalized Anxiety Disorder or 309.81 (F43.10) Posttraumatic Stress Disorder (includes Posttraumatic Stress Disorder for Children 6 Years and Younger)  Without dissociative symptoms  Psychosocial / Contextual Factors: past trauma, developmental hx and current stressors   PROMIS (reviewed every 90 days): 8/19/2024    Referral / Collaboration:  Referral to another professional/service is not indicated at this time..    Anticipated number of session for this episode of care: 26  Anticipation frequency of session: Biweekly  Anticipated Duration of each session: 38-52 minutes  Treatment plan will be reviewed in 90 days or when goals have been changed.       MeasurableTreatment Goal(s) related to diagnosis / functional impairment(s)  Goal 1: Patient will report absence of persistent SI and report of reduced frequency and intensity of SI and absence of SI to acceptable levels, report subjective improved mood for a period of 90 days, within 6 months as clinically observed and by patient self-report    I will know I've met my goal when I can see the difference between a bad moment and what I  want in th future.      Objective #A (Patient Action)    Patient will demonstrate control of impulses and ability to use positive coping tools to manage strong emotions that can be safely addressed at a lower level of care. Absence of persistent SI and report of reduced frequency and intensity of SI and absence of SI to acceptable levels, report subjective improved mood for a period of 90 days, within 6 months as clinically observed and by patient self-report.  Status: Continued - Date(s):  8/19/2024    Intervention(s)  Therapist will provide individual therapy to identify triggers to SI urges, gain feedback on helpful coping strategies, and identify ways that family can offer support. Tx to discuss current stressors and interpersonal conflicts and how to cope with these, coaching, diagnostic testing, referral for medication as indicated, use prescribed medication, cognitive restructuring, interpersonal family therapy, supportive therapy services.        Goal 2: Patient will report absence of persistent depression mood and report of reduced frequency and intensity of low mood and absence of persistent low energy and motivation to acceptable levels, report subjective improved motivation and increased energy for a period of 90 days, within 6 months as clinically observed and by patient self-report    I will know I've met my goal when I no longer feel sad.      Objective #A (Patient Action)    Status: Continued - Date(s):  8/19/2024    Patient will demonstrate and report a level of depression that can be managed at a lower level of care.  Absence of persistent depression mood and report of reduced frequency and intensity of low mood and absence of persistent low energy and motivation to acceptable levels, report subjective improved motivation and increased energy for a period of 90 days, within 6 months as clinically observed and by patient self-report.    Intervention(s)  Therapist will provide individual therapy to  identify triggers to depression, gain feedback on helpful coping tools and thought-reframing techniques, and identify preferred way of being.  Tx to include discussion of current stressors and interpersonal conflicts and how to cope with these, coaching, diagnostic testing, referral for medication as indicated, use prescribed medication, cognitive restructuring, interpersonal, family therapy, supportive therapy services.        Goal 3: Patiient will report absence of persistent anxiety mood and report of reduced frequency and intensity of worry and absence of persistent anxious mood to acceptable levels, no panic attacks, report subjective comfort with rumination for a period of 90 days. Within 6 months as clinically observed and by patient self-report    I will know I've met my goal when I can go days without worrying about little things or shaking.      Objective #A (Patient Action)    Status: Continued - Date(s):  8/19/2024    Patient will demonstrate and report a level of anxiety that can be managed at a lower level of care.  Absence of persistent anxious mood and report of reduced frequency and intensity of worry and absence of persistent anxious mood to acceptable levels, no panic attacks, report subjective comfort with rumination for a period of 90 days, within 6 months as clinically observed and by patient self-report.    Intervention(s)  Therapist will provide individual therapy to identify triggers to anxiety, gain feedback on helpful coping tools and thought-reframing techniques, and identify preferred way of being. Tx to include discuss current stressors and interpersonal conflicts and how to cope with these, coaching, diagnostic testing , referral for medication as indicated, use prescribed medication, cognitive restructuring, interpersonal,   family therapy, supportive therapy services.        Patient has reviewed and agreed to the above plan.      Katie Faulkner, [facility]   8/19/2024                     "                               Wandy   Jones Ann            SAFETY PLAN:  Step 1: Warning signs / cues (Thoughts, images, mood, situation, behavior) that a crisis may be developing:  Thoughts: thoughts of not wanting to be here  Images: no images  Thinking Processes: intrusive thoughts (bothersome, unwanted thoughts that come out of nowhere): get irritated  Mood: intense anger and agitation  Behaviors: isolating/withdrawing   Situations: trauma    Step 2: Coping strategies - Things I can do to take my mind off of my problems without contacting another person (relaxation technique, physical activity):  Distress Tolerance Strategies:  arts and crafts: drawing and painting  Physical Activities: exercise: working out  Focus on helpful thoughts:  \"This is temporary\", \"It always passes\" and \"Ride the wave\"  Step 3: People and social settings that provide distraction:                 Name: Jennifer     Phone: [phone]                 Name: Antonina         Phone: [phone]                 Name: mom       Phone: [phone]  friends house or car   Step 4: Remind myself of people and things that are important to me and worth living for:  Best friend and cat        Step 5: When I am in crisis, I can ask these people to help me use my safety plan:                 Name: Jennifer     Phone: [phone]                 Name: Antonina         Phone: [phone]                 Name: panchito       Phone: [phone]  Step 6: Making the environment safe:   be around others  Step 7: Professionals or agencies I can contact during a crisis:  [facility] Daytime Number: [phone]  Suicide Prevention Lifeline: 0-954-736-ISCE (2219)  Crisis Text Line Service (available 24 hours a day, 7 days a week): Text [place] to 594332  Local Crisis Services: 988     Call 911 or go to my nearest emergency department.       I helped develop this safety plan and agree to use it when needed.  I have been given a copy of this plan.     "   Client signature _________________________________________________________________  Today s date:  2/22/2021  Adapted from Safety Plan Template 2008 Marisol Cazares and Mario Flores is reprinted with the express permission of the authors.  No portion of the Safety Plan Template may be reproduced without the express, written permission.  You can contact the authors at bhs@[email or web address] or [email or web address].

## 2024-09-09 ENCOUNTER — TELEPHONE (OUTPATIENT)
Dept: GASTROENTEROLOGY | Facility: CLINIC | Age: 24
End: 2024-09-09
Payer: COMMERCIAL

## 2024-09-09 NOTE — TELEPHONE ENCOUNTER
Caller: Wandy    Reason for Reschedule/Cancellation   (please be detailed, any staff messages or encounters to note?): had completed elsewhere      Prior to reschedule please review:  Ordering Provider: Chinyere Ruiz Determined: CS  Does patient have any ASC Exclusions, please identify?: no      Notes on Cancelled Procedure:  Procedure: Upper Endoscopy [EGD]   Date: 9/12  Location: [facility]; [address] [place] [address]  Surgeon: Pilo      Rescheduled: No, had completed elsewhere       Did you cancel or rescheduled an EUS procedure? No.

## 2024-09-24 ENCOUNTER — VIRTUAL VISIT (OUTPATIENT)
Dept: PSYCHOLOGY | Facility: CLINIC | Age: 24
End: 2024-09-24
Payer: COMMERCIAL

## 2024-09-24 DIAGNOSIS — F43.10 PTSD (POST-TRAUMATIC STRESS DISORDER): ICD-10-CM

## 2024-09-24 DIAGNOSIS — F31.32 BIPOLAR AFFECTIVE DISORDER, CURRENTLY DEPRESSED, MODERATE (H): Primary | ICD-10-CM

## 2024-09-24 DIAGNOSIS — F41.1 GENERALIZED ANXIETY DISORDER: ICD-10-CM

## 2024-09-24 DIAGNOSIS — F90.2 ADHD (ATTENTION DEFICIT HYPERACTIVITY DISORDER), COMBINED TYPE: ICD-10-CM

## 2024-09-24 PROCEDURE — 90837 PSYTX W PT 60 MINUTES: CPT | Mod: 93 | Performed by: SOCIAL WORKER

## 2024-09-24 ASSESSMENT — PATIENT HEALTH QUESTIONNAIRE - PHQ9: SUM OF ALL RESPONSES TO PHQ QUESTIONS 1-9: 14

## 2024-09-24 ASSESSMENT — ANXIETY QUESTIONNAIRES
7. FEELING AFRAID AS IF SOMETHING AWFUL MIGHT HAPPEN: MORE THAN HALF THE DAYS
GAD7 TOTAL SCORE: 12
4. TROUBLE RELAXING: SEVERAL DAYS
1. FEELING NERVOUS, ANXIOUS, OR ON EDGE: MORE THAN HALF THE DAYS
3. WORRYING TOO MUCH ABOUT DIFFERENT THINGS: MORE THAN HALF THE DAYS
5. BEING SO RESTLESS THAT IT IS HARD TO SIT STILL: NEARLY EVERY DAY
2. NOT BEING ABLE TO STOP OR CONTROL WORRYING: MORE THAN HALF THE DAYS
6. BECOMING EASILY ANNOYED OR IRRITABLE: NOT AT ALL
GAD7 TOTAL SCORE: 12

## 2024-09-24 NOTE — PROGRESS NOTES
"/    [facility]                                      Progress Note    Patient Name: Wandy Ann  Date: 2024         Service Type: Individual      Session Start Time:  110   Session End Time:  1156     Session Length: 53+    Session #: 82    Attendees: Client    Service Modality:    Phone Visit:      Provider verified identity through the following two step process.  Patient provided:  Patient is known previously to provider/     Telephone Visit: The patient's condition can be safely assessed and treated via synchronous audio telemedicine encounter.       Reason for Audio Telemedicine Visit: Services only offered telehealth     Originating Site (Patient Location): Patient's home     Distant Site (Provider Location): off site      Consent:  The patient/guardian has verbally consented to:      1. The potential risks and benefits of telemedicine (telephone visit) versus in person care;     The patient has been notified of the following:      \"We have found that certain health care needs can be provided without the need for a face to face visit.  This service lets us provide the care you need with a phone conversation.       I will have full access to your [facility] medical record during this entire phone call.   I will be taking notes for your medical record.      Since this is like an office visit, we will bill your insurance company for this service.       There are potential benefits and risks of telephone visits (e.g. limits to patient confidentiality) that differ from in-person visits.?Confidentiality still applies for telephone services, and nobody will record the visit.  It is important to be in a quiet, private space that is free of distractions (including cell phone or other devices) during the visit.??      If during the course of the call I believe a telephone visit is not appropriate, you will not be charged for this service\"     Consent has been obtained for this " service by care team member: Yes     DATA  Interactive Complexity: No  Crisis: No        Progress Since Last Session (Related to Symptoms / Goals / Homework):   Symptoms: Improving anxiety worsening depression      Homework: Achieved / completed to satisfaction  Partially completed        Episode of Care Goals: Satisfactory progress - ACTION (Actively working towards change); Intervened by reinforcing change plan / affirming steps taken       Current / Ongoing Stressors and Concerns:   past trauma, developmental hx and current stressors      Treatment Objective(s) Addressed in This Session:   Patient will demonstrate control of impulses and ability to use positive coping tools to manage strong emotions that can be safely addressed at a lower level of care. Absence of persistent SI and report of reduced frequency and intensity of SI and absence of SI to acceptable levels, report subjective improved mood for a period of 90 days, within 6 months as clinically observed and by patient self-report.  Patient will demonstrate and report a level of depression that can be managed at a lower level of care.  Absence of persistent depression mood and report of reduced frequency and intensity of low mood and absence of persistent low energy and motivation to acceptable levels, report subjective improved motivation and increased energy for a period of 90 days, within 6 months as clinically observed and by patient self-report.  Patient will demonstrate and report a level of anxiety that can be managed at a lower level of care.  Absence of persistent anxious mood and report of reduced frequency and intensity of worry and absence of persistent anxious mood to acceptable levels, no panic attacks, report subjective comfort with rumination for a period of 90 days, within 6 months as clinically observed and by patient self-report.       Intervention:   Therapist met with patient to review goals and interventions. Therapist utilized  reflected listening as patient gave brief reflection of week. Patient reported improving anxiety and worsening depression. Therapist reviewed PHQ-9 and YESSI-7. Therapist reviewed patient's past and patient reported no safety concerns. Patient processed work, friends and family stressors. Therapist encouraged patient  to make goals for self to move self out of bed 2 days this week and 2 and half next week for work. Therapist encouraged patient to do what feels right for self with friends and family and provided education.   Patient presented with an anxious affect. Patient was engaged in session and open to feedback. Patient contracted for safety.             There has been demonstrated improvement in functioning while patient has been engaged in psychotherapy/psychological service- if withdrawn the patient would deteriorate and/or relapse.     Assessments completed prior to visit:  The following assessments were completed by patient for this visit:  PHQ9:       10/10/2023     1:57 PM 12/14/2023     9:33 AM 1/15/2024     7:47 PM 4/25/2024     3:40 PM 7/11/2024    12:42 PM 8/12/2024     1:07 AM 9/24/2024    11:00 AM   PHQ-9 SCORE   PHQ-9 Total Score MyChart 16 (Moderately severe depression) 7 (Mild depression) 11 (Moderate depression) 6 (Mild depression) 12 (Moderate depression) 8 (Mild depression)    PHQ-9 Total Score 16 7 11 6 12 8    8 14     GAD7:       2/2/2023    11:53 AM 4/20/2023    12:41 PM 10/10/2023     1:57 PM 1/15/2024     7:48 PM 7/10/2024     1:35 PM 7/12/2024    11:48 AM 9/24/2024    11:00 AM   YESSI-7 SCORE   Total Score 16 (severe anxiety) 13 (moderate anxiety) 17 (severe anxiety) 15 (severe anxiety)  17 (severe anxiety)    Total Score 16 13 17 15 17 17 12   PROMIS-10 Scores        10/10/2023     1:58 PM 1/15/2024     7:49 PM 8/19/2024     3:00 PM   PROMIS-10 Total Score w/o Sub Scores   PROMIS TOTAL - SUBSCORES 22 24 29                   ASSESSMENT: Current Emotional / Mental Status (status of  significant symptoms):   Risk status (Self / Other harm or suicidal ideation)   Patient denies current fears or concerns for personal safety.   Patient denies current or recent suicidal ideation or behaviors.   Patient denies current or recent homicidal ideation or behaviors.   Patient denies current or recent self injurious behavior or ideation.   Patient denies other safety concerns.   Patient reports there has been no change in risk factors since their last session.     Patient reports there has been no change in protective factors since their last session.     A safety and risk management plan has been developed including: Patient consented to co-developed safety plan on 2.21.2022.  Safety and risk management plan was reviewed.   Patient agreed to use safety plan should any safety concerns arise.  A copy was made available to the patient.     Appearance:   Appropriate     Eye Contact:   Fair     Psychomotor Behavior: Restless     Attitude:   Cooperative    Orientation:   All   Speech    Rate / Production: Emotional Talkative    Volume:  Normal    Mood:    Anxious  Depressed    Affect:    Worrisome    Thought Content:  Clear    Thought Form:  Coherent    Insight:    Fair      Medication Review:   Changes to psychiatric medications, see updated Medication List in EPIC.      Medication Compliance:   Yes     Changes in Health Issues:   None reported     Chemical Use Review:   Substance Use: Chemical use reviewed, no active concerns identified      Tobacco Use: No current tobacco use.      Diagnosis:  1. Bipolar affective disorder, currently depressed, moderate (H)    2. ADHD (attention deficit hyperactivity disorder), combined type    3. Generalized anxiety disorder    4. PTSD (post-traumatic stress disorder)        Collateral Reports Completed:   Not Applicable    PLAN: (Patient Tasks / Therapist Tasks / Other)        Call [phone] if feeling SI has increase or go to ED or empath   Utilize coping skills when feeling  impulsive.   Replace distortions with positive attributes  Look at intention rather than perception  Do not avoid people  Therapist reviewed PHQ-9 and YESSI-7. Therapist reviewed patient's past and patient reported no safety concerns. Patient processed work, friends and family stressors. Therapist encouraged patient  to make goals for self to move self out of bed 2 days this week and 2 and half next week for work. Therapist encouraged patient to do what feels right for self with friends and family and provided education.     Katie Faulkner, [facility]  9/24/2024                                                         ______________________________________________________________________    Individual Treatment Plan    Patient's Name: Wandy Ann  YOB: 2000    Date of Creation: 2.22.2021  Date Treatment Plan Last Reviewed/Revised:  8/19/2024  due 11/19/2024    DSM5 Diagnoses: 296.52 Bipolar I Disorder Current or Most Recent Episode Depressed, Moderate, 300.02 (F41.1) Generalized Anxiety Disorder or 309.81 (F43.10) Posttraumatic Stress Disorder (includes Posttraumatic Stress Disorder for Children 6 Years and Younger)  Without dissociative symptoms  Psychosocial / Contextual Factors: past trauma, developmental hx and current stressors   PROMIS (reviewed every 90 days): 8/19/2024    Referral / Collaboration:  Referral to another professional/service is not indicated at this time..    Anticipated number of session for this episode of care: 26  Anticipation frequency of session: Biweekly  Anticipated Duration of each session: 38-52 minutes  Treatment plan will be reviewed in 90 days or when goals have been changed.       MeasurableTreatment Goal(s) related to diagnosis / functional impairment(s)  Goal 1: Patient will report absence of persistent SI and report of reduced frequency and intensity of SI and absence of SI to acceptable levels, report subjective improved mood for a period of 90 days, within 6  months as clinically observed and by patient self-report    I will know I've met my goal when I can see the difference between a bad moment and what I want in th future.      Objective #A (Patient Action)    Patient will demonstrate control of impulses and ability to use positive coping tools to manage strong emotions that can be safely addressed at a lower level of care. Absence of persistent SI and report of reduced frequency and intensity of SI and absence of SI to acceptable levels, report subjective improved mood for a period of 90 days, within 6 months as clinically observed and by patient self-report.  Status: Continued - Date(s):  8/19/2024    Intervention(s)  Therapist will provide individual therapy to identify triggers to SI urges, gain feedback on helpful coping strategies, and identify ways that family can offer support. Tx to discuss current stressors and interpersonal conflicts and how to cope with these, coaching, diagnostic testing, referral for medication as indicated, use prescribed medication, cognitive restructuring, interpersonal family therapy, supportive therapy services.        Goal 2: Patient will report absence of persistent depression mood and report of reduced frequency and intensity of low mood and absence of persistent low energy and motivation to acceptable levels, report subjective improved motivation and increased energy for a period of 90 days, within 6 months as clinically observed and by patient self-report    I will know I've met my goal when I no longer feel sad.      Objective #A (Patient Action)    Status: Continued - Date(s):  8/19/2024    Patient will demonstrate and report a level of depression that can be managed at a lower level of care.  Absence of persistent depression mood and report of reduced frequency and intensity of low mood and absence of persistent low energy and motivation to acceptable levels, report subjective improved motivation and increased energy for a  period of 90 days, within 6 months as clinically observed and by patient self-report.    Intervention(s)  Therapist will provide individual therapy to identify triggers to depression, gain feedback on helpful coping tools and thought-reframing techniques, and identify preferred way of being.  Tx to include discussion of current stressors and interpersonal conflicts and how to cope with these, coaching, diagnostic testing, referral for medication as indicated, use prescribed medication, cognitive restructuring, interpersonal, family therapy, supportive therapy services.        Goal 3: Patiient will report absence of persistent anxiety mood and report of reduced frequency and intensity of worry and absence of persistent anxious mood to acceptable levels, no panic attacks, report subjective comfort with rumination for a period of 90 days. Within 6 months as clinically observed and by patient self-report    I will know I've met my goal when I can go days without worrying about little things or shaking.      Objective #A (Patient Action)    Status: Continued - Date(s):  8/19/2024    Patient will demonstrate and report a level of anxiety that can be managed at a lower level of care.  Absence of persistent anxious mood and report of reduced frequency and intensity of worry and absence of persistent anxious mood to acceptable levels, no panic attacks, report subjective comfort with rumination for a period of 90 days, within 6 months as clinically observed and by patient self-report.    Intervention(s)  Therapist will provide individual therapy to identify triggers to anxiety, gain feedback on helpful coping tools and thought-reframing techniques, and identify preferred way of being. Tx to include discuss current stressors and interpersonal conflicts and how to cope with these, coaching, diagnostic testing , referral for medication as indicated, use prescribed medication, cognitive restructuring, interpersonal,   family  "therapy, supportive therapy services.        Patient has reviewed and agreed to the above plan.      Katie Faulkner, [facility]   8/19/2024                                                   Wandy Ann            SAFETY PLAN:  Step 1: Warning signs / cues (Thoughts, images, mood, situation, behavior) that a crisis may be developing:  Thoughts: thoughts of not wanting to be here  Images: no images  Thinking Processes: intrusive thoughts (bothersome, unwanted thoughts that come out of nowhere): get irritated  Mood: intense anger and agitation  Behaviors: isolating/withdrawing   Situations: trauma    Step 2: Coping strategies - Things I can do to take my mind off of my problems without contacting another person (relaxation technique, physical activity):  Distress Tolerance Strategies:  arts and crafts: drawing and painting  Physical Activities: exercise: working out  Focus on helpful thoughts:  \"This is temporary\", \"It always passes\" and \"Ride the wave\"  Step 3: People and social settings that provide distraction:                 Name: Jennifer     Phone: [phone]                 Name: Antonina         Phone: [phone]                 Name: mom       Phone: [phone]  friends house or car   Step 4: Remind myself of people and things that are important to me and worth living for:  Best friend and cat        Step 5: When I am in crisis, I can ask these people to help me use my safety plan:                 Name: Jennifer     Phone: [phone]                 Name: Antonina         Phone: [phone]                 Name: mom       Phone: [phone]  Step 6: Making the environment safe:   be around others  Step 7: Professionals or agencies I can contact during a crisis:  [facility] Daytime Number: [phone]  Suicide Prevention Lifeline: 9-486-641-TALK (4069)  Crisis Text Line Service (available 24 hours a day, 7 days a week): Text [place] to 346836  Local Crisis Services: 988     Call 911 or go " to my nearest emergency department.       I helped develop this safety plan and agree to use it when needed.  I have been given a copy of this plan.       Client signature _________________________________________________________________  Today s date:  2/22/2021  Adapted from Safety Plan Template 2008 Marisol Cazares and Mario Flores is reprinted with the express permission of the authors.  No portion of the Safety Plan Template may be reproduced without the express, written permission.  You can contact the authors at [email or web address] or [email or web address].

## 2024-10-08 ENCOUNTER — VIRTUAL VISIT (OUTPATIENT)
Dept: PSYCHOLOGY | Facility: CLINIC | Age: 24
End: 2024-10-08
Payer: COMMERCIAL

## 2024-10-08 DIAGNOSIS — F41.1 GENERALIZED ANXIETY DISORDER: ICD-10-CM

## 2024-10-08 DIAGNOSIS — F31.32 BIPOLAR AFFECTIVE DISORDER, CURRENTLY DEPRESSED, MODERATE (H): Primary | ICD-10-CM

## 2024-10-08 DIAGNOSIS — F90.2 ADHD (ATTENTION DEFICIT HYPERACTIVITY DISORDER), COMBINED TYPE: ICD-10-CM

## 2024-10-08 DIAGNOSIS — F43.10 PTSD (POST-TRAUMATIC STRESS DISORDER): ICD-10-CM

## 2024-10-08 PROCEDURE — 90837 PSYTX W PT 60 MINUTES: CPT | Mod: 95 | Performed by: SOCIAL WORKER

## 2024-10-08 NOTE — PROGRESS NOTES
[facility]                                      Progress Note    Patient Name: Wandy Ann  Date: 10/8/2024         Service Type: Individual      Session Start Time:  1302   Session End Time:  1358     Session Length: 53+    Session #: 83    Attendees: Client    Service Modality:    Video Visit:      Provider verified identity through the following two step process.  Patient provided: Patient  and Patient previous known to provider     Telemedicine Visit: The patient's condition can be safely assessed and treated via synchronous audio and visual telemedicine encounter.       Reason for Telemedicine Visit: Patient has requested telehealth visit     Originating Site (Patient Location): Patient's home     Distant Site (Provider Location): Provider Remote Setting- Home Office     Consent:  The patient/guardian has verbally consented to: the potential risks and benefits of telemedicine (video visit) versus in person care; bill my insurance or make self-payment for services provided; and responsibility for payment of non-covered services.      Patient would like the video invitation sent by: email/text     Mode of Communication: Video Conference via [facility]     Distant Location (Provider): Off-site     As the provider I attest to compliance with applicable laws and regulations related to telemedicine.    DATA  Interactive Complexity: No  Crisis: No        Progress Since Last Session (Related to Symptoms / Goals / Homework):   Symptoms: Worsening anxiety      Homework: Achieved / completed to satisfaction  Partially completed        Episode of Care Goals: Satisfactory progress - ACTION (Actively working towards change); Intervened by reinforcing change plan / affirming steps taken       Current / Ongoing Stressors and Concerns:   past trauma, developmental hx and current stressors      Treatment Objective(s) Addressed in This Session:   Patient will demonstrate control of impulses and  ability to use positive coping tools to manage strong emotions that can be safely addressed at a lower level of care. Absence of persistent SI and report of reduced frequency and intensity of SI and absence of SI to acceptable levels, report subjective improved mood for a period of 90 days, within 6 months as clinically observed and by patient self-report.  Patient will demonstrate and report a level of depression that can be managed at a lower level of care.  Absence of persistent depression mood and report of reduced frequency and intensity of low mood and absence of persistent low energy and motivation to acceptable levels, report subjective improved motivation and increased energy for a period of 90 days, within 6 months as clinically observed and by patient self-report.  Patient will demonstrate and report a level of anxiety that can be managed at a lower level of care.  Absence of persistent anxious mood and report of reduced frequency and intensity of worry and absence of persistent anxious mood to acceptable levels, no panic attacks, report subjective comfort with rumination for a period of 90 days, within 6 months as clinically observed and by patient self-report.       Intervention:   Therapist met with patient to review goals and interventions. Therapist utilized reflected listening as patient gave brief reflection of week. Patient reported increase in anxiety and processed. Therapist supported patient as she processed and validated patient. Therapist provided research behind different supplements that can help with anxiety and gut health with eliminant diets. Therapist encouraged patient to follow up with medication provider before adding any. Therapist encouraged patient to speak to TMS provider about another round and anxiety increase when depression goes down.   Patient presented with an anxious affect. Patient was engaged in session and open to feedback. Patient contracted for safety.              There has been demonstrated improvement in functioning while patient has been engaged in psychotherapy/psychological service- if withdrawn the patient would deteriorate and/or relapse.     Assessments completed prior to visit:  The following assessments were completed by patient for this visit:  PHQ9:       10/10/2023     1:57 PM 12/14/2023     9:33 AM 1/15/2024     7:47 PM 4/25/2024     3:40 PM 7/11/2024    12:42 PM 8/12/2024     1:07 AM 9/24/2024    11:00 AM   PHQ-9 SCORE   PHQ-9 Total Score MyChart 16 (Moderately severe depression) 7 (Mild depression) 11 (Moderate depression) 6 (Mild depression) 12 (Moderate depression) 8 (Mild depression)    PHQ-9 Total Score 16 7 11 6 12 8    8 14     GAD7:       2/2/2023    11:53 AM 4/20/2023    12:41 PM 10/10/2023     1:57 PM 1/15/2024     7:48 PM 7/10/2024     1:35 PM 7/12/2024    11:48 AM 9/24/2024    11:00 AM   YESSI-7 SCORE   Total Score 16 (severe anxiety) 13 (moderate anxiety) 17 (severe anxiety) 15 (severe anxiety)  17 (severe anxiety)    Total Score 16 13 17 15 17 17 12   PROMIS-10 Scores        10/10/2023     1:58 PM 1/15/2024     7:49 PM 8/19/2024     3:00 PM   PROMIS-10 Total Score w/o Sub Scores   PROMIS TOTAL - SUBSCORES 22 24 29                   ASSESSMENT: Current Emotional / Mental Status (status of significant symptoms):   Risk status (Self / Other harm or suicidal ideation)   Patient denies current fears or concerns for personal safety.   Patient denies current or recent suicidal ideation or behaviors.   Patient denies current or recent homicidal ideation or behaviors.   Patient denies current or recent self injurious behavior or ideation.   Patient denies other safety concerns.   Patient reports there has been no change in risk factors since their last session.     Patient reports there has been no change in protective factors since their last session.     A safety and risk management plan has been developed including: Patient consented to co-developed  safety plan on 2.21.2022.  Safety and risk management plan was reviewed.   Patient agreed to use safety plan should any safety concerns arise.  A copy was made available to the patient.     Appearance:   Appropriate     Eye Contact:   Fair     Psychomotor Behavior: Restless     Attitude:   Cooperative    Orientation:   All   Speech    Rate / Production: Emotional Talkative    Volume:  Normal    Mood:    Anxious  Depressed    Affect:    Worrisome    Thought Content:  Clear    Thought Form:  Coherent    Insight:    Fair      Medication Review:   No changes to current psychiatric medication(s)     Medication Compliance:   Yes     Changes in Health Issues:   None reported     Chemical Use Review:   Substance Use: Chemical use reviewed, no active concerns identified      Tobacco Use: No current tobacco use.      Diagnosis:  1. Bipolar affective disorder, currently depressed, moderate (H)    2. ADHD (attention deficit hyperactivity disorder), combined type    3. Generalized anxiety disorder    4. PTSD (post-traumatic stress disorder)        Collateral Reports Completed:   Not Applicable    PLAN: (Patient Tasks / Therapist Tasks / Other)        Call [phone] if feeling SI has increase or go to ED or empath   Utilize coping skills when feeling impulsive.   Replace distortions with positive attributes  Look at intention rather than perception  Do not avoid people  Therapist supported patient as she processed and validated patient. Therapist provided research behind different supplements that can help with anxiety and gut health with eliminant diets. Therapist encouraged patient to follow up with medication provider before adding any. Therapist encouraged patient to speak to TMS provider about karolina Faulkner, [facility]  10/8/2024                                                         ______________________________________________________________________    Individual Treatment Plan    Patient's Name: Wandy Jones  Seth  YOB: 2000    Date of Creation: 2.22.2021  Date Treatment Plan Last Reviewed/Revised:  8/19/2024  due 11/19/2024    DSM5 Diagnoses: 296.52 Bipolar I Disorder Current or Most Recent Episode Depressed, Moderate, 300.02 (F41.1) Generalized Anxiety Disorder or 309.81 (F43.10) Posttraumatic Stress Disorder (includes Posttraumatic Stress Disorder for Children 6 Years and Younger)  Without dissociative symptoms  Psychosocial / Contextual Factors: past trauma, developmental hx and current stressors   PROMIS (reviewed every 90 days): 8/19/2024    Referral / Collaboration:  Referral to another professional/service is not indicated at this time..    Anticipated number of session for this episode of care: 26  Anticipation frequency of session: Biweekly  Anticipated Duration of each session: 38-52 minutes  Treatment plan will be reviewed in 90 days or when goals have been changed.       MeasurableTreatment Goal(s) related to diagnosis / functional impairment(s)  Goal 1: Patient will report absence of persistent SI and report of reduced frequency and intensity of SI and absence of SI to acceptable levels, report subjective improved mood for a period of 90 days, within 6 months as clinically observed and by patient self-report    I will know I've met my goal when I can see the difference between a bad moment and what I want in th future.      Objective #A (Patient Action)    Patient will demonstrate control of impulses and ability to use positive coping tools to manage strong emotions that can be safely addressed at a lower level of care. Absence of persistent SI and report of reduced frequency and intensity of SI and absence of SI to acceptable levels, report subjective improved mood for a period of 90 days, within 6 months as clinically observed and by patient self-report.  Status: Continued - Date(s):  8/19/2024    Intervention(s)  Therapist will provide individual therapy to identify triggers to SI urges,  gain feedback on helpful coping strategies, and identify ways that family can offer support. Tx to discuss current stressors and interpersonal conflicts and how to cope with these, coaching, diagnostic testing, referral for medication as indicated, use prescribed medication, cognitive restructuring, interpersonal family therapy, supportive therapy services.        Goal 2: Patient will report absence of persistent depression mood and report of reduced frequency and intensity of low mood and absence of persistent low energy and motivation to acceptable levels, report subjective improved motivation and increased energy for a period of 90 days, within 6 months as clinically observed and by patient self-report    I will know I've met my goal when I no longer feel sad.      Objective #A (Patient Action)    Status: Continued - Date(s):  8/19/2024    Patient will demonstrate and report a level of depression that can be managed at a lower level of care.  Absence of persistent depression mood and report of reduced frequency and intensity of low mood and absence of persistent low energy and motivation to acceptable levels, report subjective improved motivation and increased energy for a period of 90 days, within 6 months as clinically observed and by patient self-report.    Intervention(s)  Therapist will provide individual therapy to identify triggers to depression, gain feedback on helpful coping tools and thought-reframing techniques, and identify preferred way of being.  Tx to include discussion of current stressors and interpersonal conflicts and how to cope with these, coaching, diagnostic testing, referral for medication as indicated, use prescribed medication, cognitive restructuring, interpersonal, family therapy, supportive therapy services.        Goal 3: Patiient will report absence of persistent anxiety mood and report of reduced frequency and intensity of worry and absence of persistent anxious mood to acceptable  levels, no panic attacks, report subjective comfort with rumination for a period of 90 days. Within 6 months as clinically observed and by patient self-report    I will know I've met my goal when I can go days without worrying about little things or shaking.      Objective #A (Patient Action)    Status: Continued - Date(s):  8/19/2024    Patient will demonstrate and report a level of anxiety that can be managed at a lower level of care.  Absence of persistent anxious mood and report of reduced frequency and intensity of worry and absence of persistent anxious mood to acceptable levels, no panic attacks, report subjective comfort with rumination for a period of 90 days, within 6 months as clinically observed and by patient self-report.    Intervention(s)  Therapist will provide individual therapy to identify triggers to anxiety, gain feedback on helpful coping tools and thought-reframing techniques, and identify preferred way of being. Tx to include discuss current stressors and interpersonal conflicts and how to cope with these, coaching, diagnostic testing , referral for medication as indicated, use prescribed medication, cognitive restructuring, interpersonal,   family therapy, supportive therapy services.        Patient has reviewed and agreed to the above plan.      Katie Faulkner, [facility]   8/19/2024                                                   Wandy Ann            SAFETY PLAN:  Step 1: Warning signs / cues (Thoughts, images, mood, situation, behavior) that a crisis may be developing:  Thoughts: thoughts of not wanting to be here  Images: no images  Thinking Processes: intrusive thoughts (bothersome, unwanted thoughts that come out of nowhere): get irritated  Mood: intense anger and agitation  Behaviors: isolating/withdrawing   Situations: trauma    Step 2: Coping strategies - Things I can do to take my mind off of my problems without contacting another person (relaxation technique, physical  "activity):  Distress Tolerance Strategies:  arts and crafts: drawing and painting  Physical Activities: exercise: working out  Focus on helpful thoughts:  \"This is temporary\", \"It always passes\" and \"Ride the wave\"  Step 3: People and social settings that provide distraction:                 Name: Jennifer     Phone: [phone]                 Name: Antonina         Phone: [phone]                 Name: panchito       Phone: [phone]  friends house or car   Step 4: Remind myself of people and things that are important to me and worth living for:  Best friend and cat        Step 5: When I am in crisis, I can ask these people to help me use my safety plan:                 Name: Jennifer     Phone: [phone]                 Name: Antonina         Phone: [phone]                 Name: panchito       Phone: [phone]  Step 6: Making the environment safe:   be around others  Step 7: Professionals or agencies I can contact during a crisis:  [facility] Daytime Number: [phone]  Suicide Prevention Lifeline: 0-758-783-WOXL (7394)  Crisis Text Line Service (available 24 hours a day, 7 days a week): Text [place] to 901222  Local Crisis Services: 988     Call 911 or go to my nearest emergency department.       I helped develop this safety plan and agree to use it when needed.  I have been given a copy of this plan.       Client signature _________________________________________________________________  Today s date:  2/22/2021  Adapted from Safety Plan Template 2008 Marisol Cazares and Mario Flores is reprinted with the express permission of the authors.  No portion of the Safety Plan Template may be reproduced without the express, written permission.  You can contact the authors at [email or web address] or [email or web address].  "

## 2024-10-17 ENCOUNTER — VIRTUAL VISIT (OUTPATIENT)
Dept: PSYCHOLOGY | Facility: CLINIC | Age: 24
End: 2024-10-17
Payer: COMMERCIAL

## 2024-10-17 DIAGNOSIS — F41.1 GENERALIZED ANXIETY DISORDER: ICD-10-CM

## 2024-10-17 DIAGNOSIS — F43.10 PTSD (POST-TRAUMATIC STRESS DISORDER): ICD-10-CM

## 2024-10-17 DIAGNOSIS — F31.32 BIPOLAR AFFECTIVE DISORDER, CURRENTLY DEPRESSED, MODERATE (H): Primary | ICD-10-CM

## 2024-10-17 DIAGNOSIS — F90.2 ADHD (ATTENTION DEFICIT HYPERACTIVITY DISORDER), COMBINED TYPE: ICD-10-CM

## 2024-10-17 PROCEDURE — 90837 PSYTX W PT 60 MINUTES: CPT | Mod: 95 | Performed by: SOCIAL WORKER

## 2024-10-17 NOTE — PROGRESS NOTES
[facility]                                      Progress Note    Patient Name: Wandy Ann  Date: 10/17/2024         Service Type: Individual      Session Start Time:  1303   Session End Time:  1357     Session Length: 53+    Session #: 85    Attendees: Client    Service Modality:    Video Visit:      Provider verified identity through the following two step process.  Patient provided: Patient  and Patient previous known to provider     Telemedicine Visit: The patient's condition can be safely assessed and treated via synchronous audio and visual telemedicine encounter.       Reason for Telemedicine Visit: Patient has requested telehealth visit     Originating Site (Patient Location): Patient's home     Distant Site (Provider Location): Provider Remote Setting- Home Office     Consent:  The patient/guardian has verbally consented to: the potential risks and benefits of telemedicine (video visit) versus in person care; bill my insurance or make self-payment for services provided; and responsibility for payment of non-covered services.      Patient would like the video invitation sent by: email/text     Mode of Communication: Video Conference via Amwell     Distant Location (Provider): Off-site     As the provider I attest to compliance with applicable laws and regulations related to telemedicine.    DATA  Interactive Complexity: No  Crisis: No        Progress Since Last Session (Related to Symptoms / Goals / Homework):   Symptoms: Worsening anxiety and stressors      Homework: Partially completed        Episode of Care Goals: Satisfactory progress - ACTION (Actively working towards change); Intervened by reinforcing change plan / affirming steps taken       Current / Ongoing Stressors and Concerns:   past trauma, developmental hx and current stressors      Treatment Objective(s) Addressed in This Session:   Patient will demonstrate control of impulses and ability to use positive  coping tools to manage strong emotions that can be safely addressed at a lower level of care. Absence of persistent SI and report of reduced frequency and intensity of SI and absence of SI to acceptable levels, report subjective improved mood for a period of 90 days, within 6 months as clinically observed and by patient self-report.  Patient will demonstrate and report a level of depression that can be managed at a lower level of care.  Absence of persistent depression mood and report of reduced frequency and intensity of low mood and absence of persistent low energy and motivation to acceptable levels, report subjective improved motivation and increased energy for a period of 90 days, within 6 months as clinically observed and by patient self-report.  Patient will demonstrate and report a level of anxiety that can be managed at a lower level of care.  Absence of persistent anxious mood and report of reduced frequency and intensity of worry and absence of persistent anxious mood to acceptable levels, no panic attacks, report subjective comfort with rumination for a period of 90 days, within 6 months as clinically observed and by patient self-report.       Intervention:  Motivational Interviewing  Target Behavior:  avoiding stressors and follow through with managing anxiety    Stage of Change: PREPARATION (Decided to change - considering how)    MI Intervention: Expressed Empathy/Understanding, Supported Autonomy, Collaboration, Evocation, Permission to raise concern or advise, Open-ended questions, and Reflections: simple and complex     Change Talk Expressed by the Patient: Ability to change Reasons to change Need to change    Provider Response to Change Talk: A - Affirmed patient's thoughts, decisions, or attempts at behavior change and R - Reflected patient's change talk              There has been demonstrated improvement in functioning while patient has been engaged in psychotherapy/psychological service- if  withdrawn the patient would deteriorate and/or relapse.     Assessments completed prior to visit:  The following assessments were completed by patient for this visit:  PHQ9:       10/10/2023     1:57 PM 12/14/2023     9:33 AM 1/15/2024     7:47 PM 4/25/2024     3:40 PM 7/11/2024    12:42 PM 8/12/2024     1:07 AM 9/24/2024    11:00 AM   PHQ-9 SCORE   PHQ-9 Total Score MyChart 16 (Moderately severe depression) 7 (Mild depression) 11 (Moderate depression) 6 (Mild depression) 12 (Moderate depression) 8 (Mild depression)    PHQ-9 Total Score 16 7 11 6 12 8    8 14     GAD7:       2/2/2023    11:53 AM 4/20/2023    12:41 PM 10/10/2023     1:57 PM 1/15/2024     7:48 PM 7/10/2024     1:35 PM 7/12/2024    11:48 AM 9/24/2024    11:00 AM   YESSI-7 SCORE   Total Score 16 (severe anxiety) 13 (moderate anxiety) 17 (severe anxiety) 15 (severe anxiety)  17 (severe anxiety)    Total Score 16 13 17 15 17 17 12   PROMIS-10 Scores        10/10/2023     1:58 PM 1/15/2024     7:49 PM 8/19/2024     3:00 PM   PROMIS-10 Total Score w/o Sub Scores   PROMIS TOTAL - SUBSCORES 22 24 29                   ASSESSMENT: Current Emotional / Mental Status (status of significant symptoms):   Risk status (Self / Other harm or suicidal ideation)   Patient denies current fears or concerns for personal safety.   Patient denies current or recent suicidal ideation or behaviors.   Patient denies current or recent homicidal ideation or behaviors.   Patient denies current or recent self injurious behavior or ideation.   Patient denies other safety concerns.   Patient reports there has been no change in risk factors since their last session.     Patient reports there has been no change in protective factors since their last session.     A safety and risk management plan has been developed including: Patient consented to co-developed safety plan on 2.21.2022.  Safety and risk management plan was reviewed.   Patient agreed to use safety plan should any safety  concerns arise.  A copy was made available to the patient.     Appearance:   Appropriate     Eye Contact:   Fair     Psychomotor Behavior: Restless     Attitude:   Cooperative    Orientation:   All   Speech    Rate / Production: Emotional Talkative    Volume:  Normal    Mood:    Anxious  Depressed    Affect:    Worrisome    Thought Content:  Clear    Thought Form:  Coherent    Insight:    Fair      Medication Review:   No changes to current psychiatric medication(s)     Medication Compliance:   Yes     Changes in Health Issues:   None reported     Chemical Use Review:   Substance Use: Chemical use reviewed, no active concerns identified      Tobacco Use: No current tobacco use.      Diagnosis:  1. Bipolar affective disorder, currently depressed, moderate (H)    2. ADHD (attention deficit hyperactivity disorder), combined type    3. Generalized anxiety disorder    4. PTSD (post-traumatic stress disorder)        Collateral Reports Completed:   Not Applicable    PLAN: (Patient Tasks / Therapist Tasks / Other)        Call [phone] if feeling SI has increase or go to ED or empath   Utilize coping skills when feeling impulsive.   Replace distortions with positive attributes  Look at intention rather than perception  Do not avoid people  TMS  Elimination diet  Speak to med provider about anxiety meds   Don't avoid situation due to anxiety  Ground self      Katie Faulkner, [facility]  10/17/2024                                                         ______________________________________________________________________    Individual Treatment Plan    Patient's Name: Wandy Ann  YOB: 2000    Date of Creation: 2.22.2021  Date Treatment Plan Last Reviewed/Revised:  8/19/2024  due 11/19/2024    DSM5 Diagnoses: 296.52 Bipolar I Disorder Current or Most Recent Episode Depressed, Moderate, 300.02 (F41.1) Generalized Anxiety Disorder or 309.81 (F43.10) Posttraumatic Stress Disorder (includes Posttraumatic Stress  Disorder for Children 6 Years and Younger)  Without dissociative symptoms  Psychosocial / Contextual Factors: past trauma, developmental hx and current stressors   PROMIS (reviewed every 90 days): 8/19/2024    Referral / Collaboration:  Referral to another professional/service is not indicated at this time..    Anticipated number of session for this episode of care: 26  Anticipation frequency of session: Biweekly  Anticipated Duration of each session: 38-52 minutes  Treatment plan will be reviewed in 90 days or when goals have been changed.       MeasurableTreatment Goal(s) related to diagnosis / functional impairment(s)  Goal 1: Patient will report absence of persistent SI and report of reduced frequency and intensity of SI and absence of SI to acceptable levels, report subjective improved mood for a period of 90 days, within 6 months as clinically observed and by patient self-report    I will know I've met my goal when I can see the difference between a bad moment and what I want in th future.      Objective #A (Patient Action)    Patient will demonstrate control of impulses and ability to use positive coping tools to manage strong emotions that can be safely addressed at a lower level of care. Absence of persistent SI and report of reduced frequency and intensity of SI and absence of SI to acceptable levels, report subjective improved mood for a period of 90 days, within 6 months as clinically observed and by patient self-report.  Status: Continued - Date(s):  8/19/2024    Intervention(s)  Therapist will provide individual therapy to identify triggers to SI urges, gain feedback on helpful coping strategies, and identify ways that family can offer support. Tx to discuss current stressors and interpersonal conflicts and how to cope with these, coaching, diagnostic testing, referral for medication as indicated, use prescribed medication, cognitive restructuring, interpersonal family therapy, supportive therapy  services.        Goal 2: Patient will report absence of persistent depression mood and report of reduced frequency and intensity of low mood and absence of persistent low energy and motivation to acceptable levels, report subjective improved motivation and increased energy for a period of 90 days, within 6 months as clinically observed and by patient self-report    I will know I've met my goal when I no longer feel sad.      Objective #A (Patient Action)    Status: Continued - Date(s):  8/19/2024    Patient will demonstrate and report a level of depression that can be managed at a lower level of care.  Absence of persistent depression mood and report of reduced frequency and intensity of low mood and absence of persistent low energy and motivation to acceptable levels, report subjective improved motivation and increased energy for a period of 90 days, within 6 months as clinically observed and by patient self-report.    Intervention(s)  Therapist will provide individual therapy to identify triggers to depression, gain feedback on helpful coping tools and thought-reframing techniques, and identify preferred way of being.  Tx to include discussion of current stressors and interpersonal conflicts and how to cope with these, coaching, diagnostic testing, referral for medication as indicated, use prescribed medication, cognitive restructuring, interpersonal, family therapy, supportive therapy services.        Goal 3: Patiient will report absence of persistent anxiety mood and report of reduced frequency and intensity of worry and absence of persistent anxious mood to acceptable levels, no panic attacks, report subjective comfort with rumination for a period of 90 days. Within 6 months as clinically observed and by patient self-report    I will know I've met my goal when I can go days without worrying about little things or shaking.      Objective #A (Patient Action)    Status: Continued - Date(s):  8/19/2024    Patient  "will demonstrate and report a level of anxiety that can be managed at a lower level of care.  Absence of persistent anxious mood and report of reduced frequency and intensity of worry and absence of persistent anxious mood to acceptable levels, no panic attacks, report subjective comfort with rumination for a period of 90 days, within 6 months as clinically observed and by patient self-report.    Intervention(s)  Therapist will provide individual therapy to identify triggers to anxiety, gain feedback on helpful coping tools and thought-reframing techniques, and identify preferred way of being. Tx to include discuss current stressors and interpersonal conflicts and how to cope with these, coaching, diagnostic testing , referral for medication as indicated, use prescribed medication, cognitive restructuring, interpersonal,   family therapy, supportive therapy services.        Patient has reviewed and agreed to the above plan.      Katie Faulkner, [facility]   8/19/2024                                                   Wandy Ann            SAFETY PLAN:  Step 1: Warning signs / cues (Thoughts, images, mood, situation, behavior) that a crisis may be developing:  Thoughts: thoughts of not wanting to be here  Images: no images  Thinking Processes: intrusive thoughts (bothersome, unwanted thoughts that come out of nowhere): get irritated  Mood: intense anger and agitation  Behaviors: isolating/withdrawing   Situations: trauma    Step 2: Coping strategies - Things I can do to take my mind off of my problems without contacting another person (relaxation technique, physical activity):  Distress Tolerance Strategies:  arts and crafts: drawing and painting  Physical Activities: exercise: working out  Focus on helpful thoughts:  \"This is temporary\", \"It always passes\" and \"Ride the wave\"  Step 3: People and social settings that provide distraction:                 Name: Jennifer     Phone: [phone]                 " Name: Antonina         Phone: [phone]                 Name: panchito       Phone: [phone]  friends house or car   Step 4: Remind myself of people and things that are important to me and worth living for:  Best friend and cat        Step 5: When I am in crisis, I can ask these people to help me use my safety plan:                 Name: Jennifer     Phone: [phone]                 Name: Antonina         Phone: [phone]                 Name: panchito       Phone: [phone]  Step 6: Making the environment safe:   be around others  Step 7: Professionals or agencies I can contact during a crisis:  [facility] Daytime Number: [phone]  Suicide Prevention Lifeline: 4-379-658-NLMU (4469)  Crisis Text Line Service (available 24 hours a day, 7 days a week): Text [place] to 201424  Local Crisis Services: 988     Call 911 or go to my nearest emergency department.       I helped develop this safety plan and agree to use it when needed.  I have been given a copy of this plan.       Client signature _________________________________________________________________  Today s date:  2/22/2021  Adapted from Safety Plan Template 2008 Marisol Cazares and Mario Flores is reprinted with the express permission of the authors.  No portion of the Safety Plan Template may be reproduced without the express, written permission.  You can contact the authors at [email or web address] or [email or web address].

## 2024-11-05 NOTE — TELEPHONE ENCOUNTER
2024     To Whom It May Concern:        Jaspreet Hayes ( 1967) has been under my cardiovascular care.  To review his case, he is a very pleasant male with a history of dyslipidemia and family history of premature CAD with father having myocardial infarction at age 51.  He is a very health conscious .     On 2018 several hours into a triathlon, while running, he experienced a cardiac arrest which was witnessed.  Fortunately, an AED was applied immediately and he received 3 shocks and subsequent CPR. He presented to [facility] at which time ECG revealed anterior ST elevations.  Emergent cardiac catheterization revealed 100% occlusion of proximal LAD and he thereafter underwent PCI/KASSANDRA with 3.5 x 23 mm stent to the proximal LAD.  He had 10% mid RCA disease and reported 80% mid circumflex disease. He underwent therapeutic hypothermia with subsequent mild cognitive decline and transient memory loss, which has subsequently resolved.  He reportedly had an EEG only 1 time, during therapeutic hypothermia, and to my knowledge not repeated thereafter.  Echocardiogram 07/10/2018 revealed normalization of left ventricular ejection fraction at 65%, with no regional wall motion abnormalities, and no significant valvular disease.     He was initially maintained on aspirin, Brilinta, high-dose atorvastatin, lisinopril 10 mg daily, metoprolol tartrate 25 mg twice daily.  Subsequently, he participated in cardiac rehabilitation.  Initially, he had significant chest wall pain likely secondary to CPR which resolved.  His antihypertensives were later stopped due to lightheadedness and normal blood pressures.     He underwent repeat coronary angiography to address the left circumflex lesion on 2018. However this was only noted to be 20% stenotic with a widely patent LAD stent, and only luminal irregularities of the mid LAD.  RCA was not re-imaged.     Echocardiogram 7/10/2018 was a normal study  Provider E-Visit time total (minutes): 5   with a normal left ventricular ejection fraction.     To follow [facility] regulations for going back to work, he had repeat coronary angiography on 08/24/2018 revealing 0% stenosis at the site of the prior proximal LAD stent, and 45% mid circumflex stenosis after the 1st obtuse marginal branch (39-45% by QCA analysis).  iFR and FFR were both not hemodynamically significant, measuring 1.0 and 0.85, respectively.  His distal RCA had 20% stenosis. Ejection fraction by left ventriculography was 65%.     He performed excellent on his exercise nuclear stress test on 08/22/2018.   Duration of exercise was 15 min and 31 sec, achieving 17.9 Mets, reaching stage 6 of the Min protocol.  He achieved 92% maximal predicted heart rate while on beta blockade, with normal heart rate response and blood pressure response to exercise. His peak oxygen saturation was 100% with no ECG or SPECT evidence of myocardial ischemia, or prior infarction.  Calculated left ventricular ejection fraction was 57%.     After 1 year of treatment, Brilinta was discontinued.  Repeat treadmill exercise stress test 08/06/2019 revealed an excellent functional capacity with duration of exercise 17 minutes and 35 seconds achieving 20.3 Mets and 101% of maximal predicted heart rate.  There were no ischemic ECG changes, and a there was a normal blood pressure and heart rate response to exercise, with no symptoms of angina and no evidence of exercise-induced dysrhythmia.  This was essentially a normal study.     His most recent exercise stress echocardiogram on 09/03/2021 was a normal study.  He had an excellent functional capacity, walking up to 16 minutes and 15 seconds achieving 19.1 metabolic equivalents.  100% maximal predicted heart rate was reached with no ischemic ECG changes, and no evidence of myocardial ischemia by stress echocardiography.  His resting left ventricular ejection fraction was normal around 65%.     Most recent nuclear stress test 08/29/2022  was a normal study.  He had an excellent functional capacity at 20.3 Mets, exercising 18 minutes and 3 seconds with normal heart rate and blood pressure response to exercise.  There is no evidence of myocardial ischemia or prior infarction on SPECT study. Left ventricular ejection fraction was normal at 60%.     Most recent treadmill exercise stress test 10/1/2024 was within normal limits.  He has an excellent functional capacity at 20 METS with no evidence of exercise-induced myocardial ischemia, dysrhythmia, hypotension, or hypoxia.     Most recent lipid panel 11/4/24 demonstrated LDL cholesterol 87 mg/dL, triglycerides 139 mg/dL, total cholesterol 160 mg/dL, HDL 45 mg/dL.     He was seen for follow-up 11/4/24 and continues to feel very well.  He has no cardiovascular symptoms.  He has an excellent functional capacity with no exertional symptoms.  His lipids are close to goal.  His blood pressure and BMI are within normal limits.  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           Sincerely,    Loni Jasmine PA-C

## 2024-11-20 ENCOUNTER — VIRTUAL VISIT (OUTPATIENT)
Dept: PSYCHOLOGY | Facility: CLINIC | Age: 24
End: 2024-11-20
Payer: COMMERCIAL

## 2024-11-20 DIAGNOSIS — F90.2 ADHD (ATTENTION DEFICIT HYPERACTIVITY DISORDER), COMBINED TYPE: ICD-10-CM

## 2024-11-20 DIAGNOSIS — F41.1 GENERALIZED ANXIETY DISORDER: ICD-10-CM

## 2024-11-20 DIAGNOSIS — F31.32 BIPOLAR AFFECTIVE DISORDER, CURRENTLY DEPRESSED, MODERATE (H): Primary | ICD-10-CM

## 2024-11-20 DIAGNOSIS — F43.10 PTSD (POST-TRAUMATIC STRESS DISORDER): ICD-10-CM

## 2024-11-20 PROCEDURE — 90837 PSYTX W PT 60 MINUTES: CPT | Mod: 93 | Performed by: SOCIAL WORKER

## 2024-11-20 NOTE — PROGRESS NOTES
"/    [facility]                                      Progress Note    Patient Name: Wandy Ann  Date: 11/20/2024         Service Type: Individual      Session Start Time:  1400   Session End Time:  1455     Session Length: 53+    Session #: 86    Attendees: Client    Service Modality:    Phone Visit:      Provider verified identity through the following two step process.  Patient provided:  Patient is known previously to provider     Telephone Visit: The patient's condition can be safely assessed and treated via synchronous audio telemedicine encounter.       Reason for Audio Telemedicine Visit: Services only offered telehealth     Originating Site (Patient Location): Patient's home     Distant Site (Provider Location): off site      Consent:  The patient/guardian has verbally consented to:      1. The potential risks and benefits of telemedicine (telephone visit) versus in person care;     The patient has been notified of the following:      \"We have found that certain health care needs can be provided without the need for a face to face visit.  This service lets us provide the care you need with a phone conversation.       I will have full access to your [facility] medical record during this entire phone call.   I will be taking notes for your medical record.      Since this is like an office visit, we will bill your insurance company for this service.       There are potential benefits and risks of telephone visits (e.g. limits to patient confidentiality) that differ from in-person visits.?Confidentiality still applies for telephone services, and nobody will record the visit.  It is important to be in a quiet, private space that is free of distractions (including cell phone or other devices) during the visit.??      If during the course of the call I believe a telephone visit is not appropriate, you will not be charged for this service\"     Consent has been obtained for this " service by care team member: Yes     DATA  Interactive Complexity: No  Crisis: No        Progress Since Last Session (Related to Symptoms / Goals / Homework):   Symptoms: Worsening anxiety and stressors      Homework: Partially completed        Episode of Care Goals: Satisfactory progress - ACTION (Actively working towards change); Intervened by reinforcing change plan / affirming steps taken       Current / Ongoing Stressors and Concerns:   past trauma, developmental hx and current stressors      Treatment Objective(s) Addressed in This Session:   Patient will demonstrate control of impulses and ability to use positive coping tools to manage strong emotions that can be safely addressed at a lower level of care. Absence of persistent SI and report of reduced frequency and intensity of SI and absence of SI to acceptable levels, report subjective improved mood for a period of 90 days, within 6 months as clinically observed and by patient self-report.  Patient will demonstrate and report a level of depression that can be managed at a lower level of care.  Absence of persistent depression mood and report of reduced frequency and intensity of low mood and absence of persistent low energy and motivation to acceptable levels, report subjective improved motivation and increased energy for a period of 90 days, within 6 months as clinically observed and by patient self-report.  Patient will demonstrate and report a level of anxiety that can be managed at a lower level of care.  Absence of persistent anxious mood and report of reduced frequency and intensity of worry and absence of persistent anxious mood to acceptable levels, no panic attacks, report subjective comfort with rumination for a period of 90 days, within 6 months as clinically observed and by patient self-report.       Intervention:  Therapist met with patient to review goals and interventions. Therapist utilized reflected listening as patient gave brief  reflection of week. Patient processed difficult times with partners friends and past trauma. Therapist supported patient as she processed and provided patient with validation and education. Therapist encouraged patient to attempt to unpack trauma with an exercise and offered patient two different exercises. Therapist provided patient with education around both and how patient can do so without reliving the trauma.   Patient presented with an anxious affect. Patient was engaged in session and open to feedback. Patient contracted for safety          There has been demonstrated improvement in functioning while patient has been engaged in psychotherapy/psychological service- if withdrawn the patient would deteriorate and/or relapse.     Assessments completed prior to visit:  The following assessments were completed by patient for this visit:  PHQ9:       10/10/2023     1:57 PM 12/14/2023     9:33 AM 1/15/2024     7:47 PM 4/25/2024     3:40 PM 7/11/2024    12:42 PM 8/12/2024     1:07 AM 9/24/2024    11:00 AM   PHQ-9 SCORE   PHQ-9 Total Score MyChart 16 (Moderately severe depression) 7 (Mild depression) 11 (Moderate depression) 6 (Mild depression) 12 (Moderate depression) 8 (Mild depression)    PHQ-9 Total Score 16 7 11 6 12 8    8 14     GAD7:       2/2/2023    11:53 AM 4/20/2023    12:41 PM 10/10/2023     1:57 PM 1/15/2024     7:48 PM 7/10/2024     1:35 PM 7/12/2024    11:48 AM 9/24/2024    11:00 AM   YESSI-7 SCORE   Total Score 16 (severe anxiety) 13 (moderate anxiety) 17 (severe anxiety) 15 (severe anxiety)  17 (severe anxiety)    Total Score 16 13 17 15 17 17 12   PROMIS-10 Scores        10/10/2023     1:58 PM 1/15/2024     7:49 PM 8/19/2024     3:00 PM   PROMIS-10 Total Score w/o Sub Scores   PROMIS TOTAL - SUBSCORES 22 24 29                   ASSESSMENT: Current Emotional / Mental Status (status of significant symptoms):   Risk status (Self / Other harm or suicidal ideation)   Patient denies current fears or concerns  for personal safety.   Patient denies current or recent suicidal ideation or behaviors.   Patient denies current or recent homicidal ideation or behaviors.   Patient denies current or recent self injurious behavior or ideation.   Patient denies other safety concerns.   Patient reports there has been no change in risk factors since their last session.     Patient reports there has been no change in protective factors since their last session.     A safety and risk management plan has been developed including: Patient consented to co-developed safety plan on 2.21.2022.  Safety and risk management plan was reviewed.   Patient agreed to use safety plan should any safety concerns arise.  A copy was made available to the patient.     Appearance:   phone     Eye Contact:   phone     Psychomotor Behavior: phone     Attitude:   Cooperative    Orientation:   All   Speech    Rate / Production: Emotional Talkative    Volume:  Normal    Mood:    Anxious  Depressed    Affect:    Worrisome    Thought Content:  Clear    Thought Form:  Coherent    Insight:    Fair      Medication Review:   No changes to current psychiatric medication(s)     Medication Compliance:   Yes     Changes in Health Issues:   None reported     Chemical Use Review:   Substance Use: Chemical use reviewed, no active concerns identified      Tobacco Use: No current tobacco use.      Diagnosis:  1. Bipolar affective disorder, currently depressed, moderate (H)    2. ADHD (attention deficit hyperactivity disorder), combined type    3. Generalized anxiety disorder    4. PTSD (post-traumatic stress disorder)        Collateral Reports Completed:   Not Applicable    PLAN: (Patient Tasks / Therapist Tasks / Other)        Call [phone] if feeling SI has increase or go to ED or empath   Utilize coping skills when feeling impulsive.   Replace distortions with positive attributes  Look at intention rather than perception  Do not avoid people  TMS  Elimination diet  Speak to med  provider about anxiety meds   Don't avoid situation due to anxiety  Ground self  Patient processed difficult times with partners friends and past trauma. Therapist supported patient as she processed and provided patient with validation and education. Therapist encouraged patient to attempt to unpack trauma with an exercise and offered patient two different exercises. Therapist provided patient with education around both and how patient can do so without reliving the trauma.     Katie Faulkner, LICSW  11/20/2024                                                         ______________________________________________________________________    Individual Treatment Plan    Patient's Name: Wandy Ann  YOB: 2000    Date of Creation: 2.22.2021  Date Treatment Plan Last Reviewed/Revised:  11/20/2024 due 2/20/2024    DSM5 Diagnoses: 296.52 Bipolar I Disorder Current or Most Recent Episode Depressed, Moderate, 300.02 (F41.1) Generalized Anxiety Disorder or 309.81 (F43.10) Posttraumatic Stress Disorder (includes Posttraumatic Stress Disorder for Children 6 Years and Younger)  Without dissociative symptoms  Psychosocial / Contextual Factors: past trauma, developmental hx and current stressors   PROMIS (reviewed every 90 days): 11/20/2024    Referral / Collaboration:  Referral to another professional/service is not indicated at this time..    Anticipated number of session for this episode of care: 26  Anticipation frequency of session: Biweekly  Anticipated Duration of each session: 38-52 minutes  Treatment plan will be reviewed in 90 days or when goals have been changed.       MeasurableTreatment Goal(s) related to diagnosis / functional impairment(s)  Goal 1: Patient will report absence of persistent SI and report of reduced frequency and intensity of SI and absence of SI to acceptable levels, report subjective improved mood for a period of 90 days, within 6 months as clinically observed and by patient  self-report    I will know I've met my goal when I can see the difference between a bad moment and what I want in th future.      Objective #A (Patient Action)    Patient will demonstrate control of impulses and ability to use positive coping tools to manage strong emotions that can be safely addressed at a lower level of care. Absence of persistent SI and report of reduced frequency and intensity of SI and absence of SI to acceptable levels, report subjective improved mood for a period of 90 days, within 6 months as clinically observed and by patient self-report.  Status: Continued - Date(s):  11/20/2024    Intervention(s)  Therapist will provide individual therapy to identify triggers to SI urges, gain feedback on helpful coping strategies, and identify ways that family can offer support. Tx to discuss current stressors and interpersonal conflicts and how to cope with these, coaching, diagnostic testing, referral for medication as indicated, use prescribed medication, cognitive restructuring, interpersonal family therapy, supportive therapy services.        Goal 2: Patient will report absence of persistent depression mood and report of reduced frequency and intensity of low mood and absence of persistent low energy and motivation to acceptable levels, report subjective improved motivation and increased energy for a period of 90 days, within 6 months as clinically observed and by patient self-report    I will know I've met my goal when I no longer feel sad.      Objective #A (Patient Action)    Status: Continued - Date(s):  11/20/2024    Patient will demonstrate and report a level of depression that can be managed at a lower level of care.  Absence of persistent depression mood and report of reduced frequency and intensity of low mood and absence of persistent low energy and motivation to acceptable levels, report subjective improved motivation and increased energy for a period of 90 days, within 6 months as  clinically observed and by patient self-report.    Intervention(s)  Therapist will provide individual therapy to identify triggers to depression, gain feedback on helpful coping tools and thought-reframing techniques, and identify preferred way of being.  Tx to include discussion of current stressors and interpersonal conflicts and how to cope with these, coaching, diagnostic testing, referral for medication as indicated, use prescribed medication, cognitive restructuring, interpersonal, family therapy, supportive therapy services.        Goal 3: Patiient will report absence of persistent anxiety mood and report of reduced frequency and intensity of worry and absence of persistent anxious mood to acceptable levels, no panic attacks, report subjective comfort with rumination for a period of 90 days. Within 6 months as clinically observed and by patient self-report    I will know I've met my goal when I can go days without worrying about little things or shaking.      Objective #A (Patient Action)    Status: Continued - Date(s):  11/20/2024    Patient will demonstrate and report a level of anxiety that can be managed at a lower level of care.  Absence of persistent anxious mood and report of reduced frequency and intensity of worry and absence of persistent anxious mood to acceptable levels, no panic attacks, report subjective comfort with rumination for a period of 90 days, within 6 months as clinically observed and by patient self-report.    Intervention(s)  Therapist will provide individual therapy to identify triggers to anxiety, gain feedback on helpful coping tools and thought-reframing techniques, and identify preferred way of being. Tx to include discuss current stressors and interpersonal conflicts and how to cope with these, coaching, diagnostic testing , referral for medication as indicated, use prescribed medication, cognitive restructuring, interpersonal,   family therapy, supportive therapy  "services.        Patient has reviewed and agreed to the above plan.      Katie Faulkner, [facility]   11/20/2024                                                   Wandy Ann            SAFETY PLAN:  Step 1: Warning signs / cues (Thoughts, images, mood, situation, behavior) that a crisis may be developing:  Thoughts: thoughts of not wanting to be here  Images: no images  Thinking Processes: intrusive thoughts (bothersome, unwanted thoughts that come out of nowhere): get irritated  Mood: intense anger and agitation  Behaviors: isolating/withdrawing   Situations: trauma    Step 2: Coping strategies - Things I can do to take my mind off of my problems without contacting another person (relaxation technique, physical activity):  Distress Tolerance Strategies:  arts and crafts: drawing and painting  Physical Activities: exercise: working out  Focus on helpful thoughts:  \"This is temporary\", \"It always passes\" and \"Ride the wave\"  Step 3: People and social settings that provide distraction:                 Name: Jennifer     Phone: [phone]                 Name: Antonina         Phone: [phone]                 Name: mom       Phone: [phone]  friends house or car   Step 4: Remind myself of people and things that are important to me and worth living for:  Best friend and cat        Step 5: When I am in crisis, I can ask these people to help me use my safety plan:                 Name: Jennifer     Phone: [phone]                 Name: Antonina         Phone: [phone]                 Name: panchito       Phone: [phone]  Step 6: Making the environment safe:   be around others  Step 7: Professionals or agencies I can contact during a crisis:  [facility] Daytime Number: 140-208-4699  Suicide Prevention Lifeline: 1-471-540-TALK (5090)  Crisis Text Line Service (available 24 hours a day, 7 days a week): Text [place] to 947695  Local Crisis Services: 988     Call 911 or go to my nearest emergency " department.       I helped develop this safety plan and agree to use it when needed.  I have been given a copy of this plan.       Client signature _________________________________________________________________  Today s date:  2/22/2021  Adapted from Safety Plan Template 2008 Marisol Cazares and Mario Flores is reprinted with the express permission of the authors.  No portion of the Safety Plan Template may be reproduced without the express, written permission.  You can contact the authors at [email or web address] or [email or web address].   No

## 2024-12-01 ASSESSMENT — PATIENT HEALTH QUESTIONNAIRE - PHQ9: SUM OF ALL RESPONSES TO PHQ QUESTIONS 1-9: 11

## 2024-12-02 ENCOUNTER — VIRTUAL VISIT (OUTPATIENT)
Dept: FAMILY MEDICINE | Facility: CLINIC | Age: 24
End: 2024-12-02
Payer: COMMERCIAL

## 2024-12-02 DIAGNOSIS — K52.9 ACUTE GASTROENTERITIS: Primary | ICD-10-CM

## 2024-12-02 DIAGNOSIS — R00.2 PALPITATIONS: ICD-10-CM

## 2024-12-02 PROCEDURE — G2211 COMPLEX E/M VISIT ADD ON: HCPCS | Mod: 95 | Performed by: PHYSICIAN ASSISTANT

## 2024-12-02 PROCEDURE — 99214 OFFICE O/P EST MOD 30 MIN: CPT | Mod: 95 | Performed by: PHYSICIAN ASSISTANT

## 2024-12-02 ASSESSMENT — PATIENT HEALTH QUESTIONNAIRE - PHQ9
SUM OF ALL RESPONSES TO PHQ QUESTIONS 1-9: 11
10. IF YOU CHECKED OFF ANY PROBLEMS, HOW DIFFICULT HAVE THESE PROBLEMS MADE IT FOR YOU TO DO YOUR WORK, TAKE CARE OF THINGS AT HOME, OR GET ALONG WITH OTHER PEOPLE: VERY DIFFICULT

## 2024-12-02 NOTE — PROGRESS NOTES
"Wandy is a 24 year old who is being evaluated via a billable video visit.    How would you like to obtain your AVS? MyChart  If the video visit is dropped, the invitation should be resent by: Text to cell phone: [phone]  Will anyone else be joining your video visit? No      Assessment & Plan     Acute gastroenteritis  Discussed supportive care.     Palpitations  Suggest follow up 2 weeks after sending in ZioPatch.  - ZIO PATCH 8-14 DAYS     The longitudinal plan of care for the diagnosis(es)/condition(s) as documented were addressed during this visit. Due to the added complexity in care, I will continue to support Wandy in the subsequent management and with ongoing continuity of care.        BMI  Estimated body mass index is 38.26 kg/m  as calculated from the following:    Height as of 7/12/24: 1.515 m (4' 11.65\").    Weight as of 7/12/24: 87.8 kg (193 lb 9.6 oz).       Depression Screening Follow Up        12/1/2024     7:23 PM   PHQ   PHQ-9 Total Score 11    Q9: Thoughts of better off dead/self-harm past 2 weeks Several days    F/U: Thoughts of suicide or self-harm No    F/U: Safety concerns No        Patient-reported                     Follow Up Actions Taken  Referred patient back to mental health provider    Discussed the following ways the patient can remain in a safe environment:  be around others        Subjective   Wandy is a 24 year old, presenting for the following health issues:  Palpitations and Gastrointestinal Problem (Diarrhea and vomiting since last night)      12/2/2024     4:11 PM   Additional Questions   Roomed by An V.         12/2/2024     4:11 PM   Patient Reported Additional Medications   Patient reports taking the following new medications none     Palpitations    History of Present Illness       Headaches:   Since the patient's last clinic visit, headaches are: worsened  The patient is getting headaches:  Couple times a week  She is not able to do normal daily activities when she has " "a migraine.  The patient is taking the following rescue/relief medications:  Tylenol   Patient states \"I get some relief\" from the rescue/relief medications.   The patient is taking the following medications to prevent migraines:  No medications to prevent migraines  In the past 4 weeks, the patient has gone to an Urgent Care or Emergency Room 0 times times due to headaches.    Symptom onset:  1-3 days ago  Symptoms include:  Heart palpitations diarrhea vomiting  Symptom intensity:  Moderate  Symptom progression:  Staying the same  Had these symptoms before:  No  What makes it worse:  No  What makes it better:  No    She eats 2-3 servings of fruits and vegetables daily.She consumes 0 sweetened beverage(s) daily.She exercises with enough effort to increase her heart rate 20 to 29 minutes per day.  She exercises with enough effort to increase her heart rate 5 days per week.   She is taking medications regularly.     Diarrhea started last night. Has been having vomiting. Vomiting might be improving. Has taken zofran which is helping. No fever. No blood stools.     Palpitations.  Sometimes after exercise. Resting heart rate high now - over 100. Scary at times. Knows her heart rate is higher with anxiety.    Anxiety  Feels stable now.                       Objective           Vitals:  No vitals were obtained today due to virtual visit.    Physical Exam   GENERAL: alert and no distress  EYES: Eyes grossly normal to inspection.  No discharge or erythema, or obvious scleral/conjunctival abnormalities.  RESP: No audible wheeze, cough, or visible cyanosis.    SKIN: Visible skin clear. No significant rash, abnormal pigmentation or lesions.  NEURO: Cranial nerves grossly intact.  Mentation and speech appropriate for age.  PSYCH: Appropriate affect, tone, and pace of words          Video-Visit Details    Type of service:  Video Visit   Originating Location (pt. Location): Home    Distant Location (provider location):  " On-site  Platform used for Video Visit: Jon  Signed Electronically by: Chinyere Ruiz PA-C

## 2024-12-04 ASSESSMENT — ANXIETY QUESTIONNAIRES
8. IF YOU CHECKED OFF ANY PROBLEMS, HOW DIFFICULT HAVE THESE MADE IT FOR YOU TO DO YOUR WORK, TAKE CARE OF THINGS AT HOME, OR GET ALONG WITH OTHER PEOPLE?: VERY DIFFICULT
6. BECOMING EASILY ANNOYED OR IRRITABLE: NOT AT ALL
GAD7 TOTAL SCORE: 13
7. FEELING AFRAID AS IF SOMETHING AWFUL MIGHT HAPPEN: NEARLY EVERY DAY
5. BEING SO RESTLESS THAT IT IS HARD TO SIT STILL: SEVERAL DAYS
3. WORRYING TOO MUCH ABOUT DIFFERENT THINGS: MORE THAN HALF THE DAYS
7. FEELING AFRAID AS IF SOMETHING AWFUL MIGHT HAPPEN: NEARLY EVERY DAY
GAD7 TOTAL SCORE: 13
GAD7 TOTAL SCORE: 13
2. NOT BEING ABLE TO STOP OR CONTROL WORRYING: NEARLY EVERY DAY
1. FEELING NERVOUS, ANXIOUS, OR ON EDGE: MORE THAN HALF THE DAYS
4. TROUBLE RELAXING: MORE THAN HALF THE DAYS
IF YOU CHECKED OFF ANY PROBLEMS ON THIS QUESTIONNAIRE, HOW DIFFICULT HAVE THESE PROBLEMS MADE IT FOR YOU TO DO YOUR WORK, TAKE CARE OF THINGS AT HOME, OR GET ALONG WITH OTHER PEOPLE: VERY DIFFICULT

## 2024-12-04 ASSESSMENT — PATIENT HEALTH QUESTIONNAIRE - PHQ9
SUM OF ALL RESPONSES TO PHQ QUESTIONS 1-9: 11
10. IF YOU CHECKED OFF ANY PROBLEMS, HOW DIFFICULT HAVE THESE PROBLEMS MADE IT FOR YOU TO DO YOUR WORK, TAKE CARE OF THINGS AT HOME, OR GET ALONG WITH OTHER PEOPLE: SOMEWHAT DIFFICULT
SUM OF ALL RESPONSES TO PHQ QUESTIONS 1-9: 11

## 2024-12-09 ENCOUNTER — VIRTUAL VISIT (OUTPATIENT)
Dept: PSYCHOLOGY | Facility: CLINIC | Age: 24
End: 2024-12-09
Payer: COMMERCIAL

## 2024-12-09 DIAGNOSIS — F31.32 BIPOLAR AFFECTIVE DISORDER, CURRENTLY DEPRESSED, MODERATE (H): Primary | ICD-10-CM

## 2024-12-09 DIAGNOSIS — F43.10 PTSD (POST-TRAUMATIC STRESS DISORDER): ICD-10-CM

## 2024-12-09 DIAGNOSIS — F90.2 ADHD (ATTENTION DEFICIT HYPERACTIVITY DISORDER), COMBINED TYPE: ICD-10-CM

## 2024-12-09 DIAGNOSIS — F41.1 GENERALIZED ANXIETY DISORDER: ICD-10-CM

## 2024-12-09 PROCEDURE — 90837 PSYTX W PT 60 MINUTES: CPT | Mod: 95 | Performed by: SOCIAL WORKER

## 2024-12-09 NOTE — PROGRESS NOTES
[facility]                                      Progress Note    Patient Name: Wandy Ann  Date: 2024         Service Type: Individual      Session Start Time: 1301  Session End Time: 1356     Session Length: 53+    Session #: 87    Attendees: Client    Service Modality:    Video Visit:      Provider verified identity through the following two step process.  Patient provided: Patient  and Patient previous known to provider     Telemedicine Visit: The patient's condition can be safely assessed and treated via synchronous audio and visual telemedicine encounter.       Reason for Telemedicine Visit: Patient has requested telehealth visit     Originating Site (Patient Location): Patient's home     Distant Site (Provider Location): Provider Remote Setting- Home Office     Consent:  The patient/guardian has verbally consented to: the potential risks and benefits of telemedicine (video visit) versus in person care; bill my insurance or make self-payment for services provided; and responsibility for payment of non-covered services.      Patient would like the video invitation sent by: email/text     Mode of Communication: Video Conference via [employer]     Distant Location (Provider): Off-site     As the provider I attest to compliance with applicable laws and regulations related to telemedicine.    DATA  Interactive Complexity: No  Crisis: No  Extended Session (53+ minutes):   - High distress and under complex circumstances.  See Data section for details          Progress Since Last Session (Related to Symptoms / Goals / Homework):   Symptoms: Worsening conflict with partner      Homework: Partially completed        Episode of Care Goals: Satisfactory progress - ACTION (Actively working towards change); Intervened by reinforcing change plan / affirming steps taken       Current / Ongoing Stressors and Concerns:   past trauma, developmental hx and current stressors      Treatment  Objective(s) Addressed in This Session:   Patient will demonstrate control of impulses and ability to use positive coping tools to manage strong emotions that can be safely addressed at a lower level of care. Absence of persistent SI and report of reduced frequency and intensity of SI and absence of SI to acceptable levels, report subjective improved mood for a period of 90 days, within 6 months as clinically observed and by patient self-report.  Patient will demonstrate and report a level of depression that can be managed at a lower level of care.  Absence of persistent depression mood and report of reduced frequency and intensity of low mood and absence of persistent low energy and motivation to acceptable levels, report subjective improved motivation and increased energy for a period of 90 days, within 6 months as clinically observed and by patient self-report.  Patient will demonstrate and report a level of anxiety that can be managed at a lower level of care.  Absence of persistent anxious mood and report of reduced frequency and intensity of worry and absence of persistent anxious mood to acceptable levels, no panic attacks, report subjective comfort with rumination for a period of 90 days, within 6 months as clinically observed and by patient self-report.       Intervention:  Therapist met with patient to review goals and interventions. Therapist utilized reflected listening as patient gave brief reflection of week. Patient reported difficult week with conflict with her partner and processed situation. Therapist supported patient as she processed and validated patient. Therapist provided patient with alternative perspectives along with coaching patient in effective communication and encouraging patient to take time for self to understand her emotions, where she is and where she wants to go with this. Therapist encouraged patient to be direct with partner about his actions and how they affected her and set  boundaries. Therapist assessed for safety and patient reported no SI.   Patient presented with an anxious affect. Patient was engaged in session and open to feedback. Patient contracted for safety          There has been demonstrated improvement in functioning while patient has been engaged in psychotherapy/psychological service- if withdrawn the patient would deteriorate and/or relapse.     Assessments completed prior to visit:  The following assessments were completed by patient for this visit:  PHQ9:       1/15/2024     7:47 PM 4/25/2024     3:40 PM 7/11/2024    12:42 PM 8/12/2024     1:07 AM 9/24/2024    11:00 AM 12/1/2024     7:23 PM 12/4/2024    11:30 AM   PHQ-9 SCORE   PHQ-9 Total Score MyChart 11 (Moderate depression) 6 (Mild depression) 12 (Moderate depression) 8 (Mild depression)  11 (Moderate depression) 11 (Moderate depression)   PHQ-9 Total Score 11 6 12 8    8 14 11  11        Patient-reported    Multiple values from one day are sorted in reverse-chronological order     GAD7:       4/20/2023    12:41 PM 10/10/2023     1:57 PM 1/15/2024     7:48 PM 7/10/2024     1:35 PM 7/12/2024    11:48 AM 9/24/2024    11:00 AM 12/4/2024    11:30 AM   YESSI-7 SCORE   Total Score 13 (moderate anxiety) 17 (severe anxiety) 15 (severe anxiety)  17 (severe anxiety)  13 (moderate anxiety)   Total Score 13 17 15 17 17 12 13        Patient-reported   PROMIS-10 Scores        1/15/2024     7:49 PM 8/19/2024     3:00 PM 12/4/2024    11:31 AM   PROMIS-10 Total Score w/o Sub Scores   PROMIS TOTAL - SUBSCORES 24 29 26        Patient-reported                   ASSESSMENT: Current Emotional / Mental Status (status of significant symptoms):   Risk status (Self / Other harm or suicidal ideation)   Patient denies current fears or concerns for personal safety.   Patient denies current or recent suicidal ideation or behaviors.   Patient denies current or recent homicidal ideation or behaviors.   Patient denies current or recent self  injurious behavior or ideation.   Patient denies other safety concerns.   Patient reports there has been no change in risk factors since their last session.     Patient reports there has been no change in protective factors since their last session.     A safety and risk management plan has been developed including: Patient consented to co-developed safety plan on 2.21.2022.  Safety and risk management plan was reviewed.   Patient agreed to use safety plan should any safety concerns arise.  A copy was made available to the patient.     Appearance:   Appropriate     Eye Contact:   Fair     Psychomotor Behavior: Restless     Attitude:   Cooperative    Orientation:   All   Speech    Rate / Production: Emotional Talkative    Volume:  Normal    Mood:    Anxious  Depressed    Affect:    Worrisome    Thought Content:  Clear    Thought Form:  Coherent    Insight:    Fair      Medication Review:   No changes to current psychiatric medication(s)     Medication Compliance:   Yes     Changes in Health Issues:   None reported     Chemical Use Review:   Substance Use: Chemical use reviewed, no active concerns identified      Tobacco Use: No current tobacco use.      Diagnosis:  1. Bipolar affective disorder, currently depressed, moderate (H)    2. ADHD (attention deficit hyperactivity disorder), combined type    3. Generalized anxiety disorder    4. PTSD (post-traumatic stress disorder)        Collateral Reports Completed:   Not Applicable    PLAN: (Patient Tasks / Therapist Tasks / Other)        Call [phone] if feeling SI has increase or go to ED or empath   Utilize coping skills when feeling impulsive.   Replace distortions with positive attributes  Look at intention rather than perception  Do not avoid people  TMS  Elimination diet  Speak to med provider about anxiety meds   Don't avoid situation due to anxiety  Ground self  Patient reported difficult week with conflict with her partner and processed situation. Therapist supported  patient as she processed and validated patient. Therapist provided patient with alternative perspectives along with coaching patient in effective communication and encouraging patient to take time for self to understand her emotions, where she is and where she wants to go with this. Therapist encouraged patient to be direct with partner about his actions and how they affected her and set boundaries.     Katie Faulkner, [facility]  12/9/2024                                                         ______________________________________________________________________    Individual Treatment Plan    Patient's Name: Wandy Ann  YOB: 2000    Date of Creation: 2.22.2021  Date Treatment Plan Last Reviewed/Revised:  11/20/2024 due 2/20/2024    DSM5 Diagnoses: 296.52 Bipolar I Disorder Current or Most Recent Episode Depressed, Moderate, 300.02 (F41.1) Generalized Anxiety Disorder or 309.81 (F43.10) Posttraumatic Stress Disorder (includes Posttraumatic Stress Disorder for Children 6 Years and Younger)  Without dissociative symptoms  Psychosocial / Contextual Factors: past trauma, developmental hx and current stressors   PROMIS (reviewed every 90 days):12/4/2024    Referral / Collaboration:  Referral to another professional/service is not indicated at this time..    Anticipated number of session for this episode of care: 26  Anticipation frequency of session: Biweekly  Anticipated Duration of each session: 38-52 minutes  Treatment plan will be reviewed in 90 days or when goals have been changed.       MeasurableTreatment Goal(s) related to diagnosis / functional impairment(s)  Goal 1: Patient will report absence of persistent SI and report of reduced frequency and intensity of SI and absence of SI to acceptable levels, report subjective improved mood for a period of 90 days, within 6 months as clinically observed and by patient self-report    I will know I've met my goal when I can see the difference  between a bad moment and what I want in th future.      Objective #A (Patient Action)    Patient will demonstrate control of impulses and ability to use positive coping tools to manage strong emotions that can be safely addressed at a lower level of care. Absence of persistent SI and report of reduced frequency and intensity of SI and absence of SI to acceptable levels, report subjective improved mood for a period of 90 days, within 6 months as clinically observed and by patient self-report.  Status: Continued - Date(s):  11/20/2024    Intervention(s)  Therapist will provide individual therapy to identify triggers to SI urges, gain feedback on helpful coping strategies, and identify ways that family can offer support. Tx to discuss current stressors and interpersonal conflicts and how to cope with these, coaching, diagnostic testing, referral for medication as indicated, use prescribed medication, cognitive restructuring, interpersonal family therapy, supportive therapy services.        Goal 2: Patient will report absence of persistent depression mood and report of reduced frequency and intensity of low mood and absence of persistent low energy and motivation to acceptable levels, report subjective improved motivation and increased energy for a period of 90 days, within 6 months as clinically observed and by patient self-report    I will know I've met my goal when I no longer feel sad.      Objective #A (Patient Action)    Status: Continued - Date(s):  11/20/2024    Patient will demonstrate and report a level of depression that can be managed at a lower level of care.  Absence of persistent depression mood and report of reduced frequency and intensity of low mood and absence of persistent low energy and motivation to acceptable levels, report subjective improved motivation and increased energy for a period of 90 days, within 6 months as clinically observed and by patient self-report.    Intervention(s)  Therapist  will provide individual therapy to identify triggers to depression, gain feedback on helpful coping tools and thought-reframing techniques, and identify preferred way of being.  Tx to include discussion of current stressors and interpersonal conflicts and how to cope with these, coaching, diagnostic testing, referral for medication as indicated, use prescribed medication, cognitive restructuring, interpersonal, family therapy, supportive therapy services.        Goal 3: Patiient will report absence of persistent anxiety mood and report of reduced frequency and intensity of worry and absence of persistent anxious mood to acceptable levels, no panic attacks, report subjective comfort with rumination for a period of 90 days. Within 6 months as clinically observed and by patient self-report    I will know I've met my goal when I can go days without worrying about little things or shaking.      Objective #A (Patient Action)    Status: Continued - Date(s):  11/20/2024    Patient will demonstrate and report a level of anxiety that can be managed at a lower level of care.  Absence of persistent anxious mood and report of reduced frequency and intensity of worry and absence of persistent anxious mood to acceptable levels, no panic attacks, report subjective comfort with rumination for a period of 90 days, within 6 months as clinically observed and by patient self-report.    Intervention(s)  Therapist will provide individual therapy to identify triggers to anxiety, gain feedback on helpful coping tools and thought-reframing techniques, and identify preferred way of being. Tx to include discuss current stressors and interpersonal conflicts and how to cope with these, coaching, diagnostic testing , referral for medication as indicated, use prescribed medication, cognitive restructuring, interpersonal,   family therapy, supportive therapy services.        Patient has reviewed and agreed to the above plan.      Katie Faulkner,  "[facility]   11/20/2024                                                   Wandy Ann            SAFETY PLAN:  Step 1: Warning signs / cues (Thoughts, images, mood, situation, behavior) that a crisis may be developing:  Thoughts: thoughts of not wanting to be here  Images: no images  Thinking Processes: intrusive thoughts (bothersome, unwanted thoughts that come out of nowhere): get irritated  Mood: intense anger and agitation  Behaviors: isolating/withdrawing   Situations: trauma    Step 2: Coping strategies - Things I can do to take my mind off of my problems without contacting another person (relaxation technique, physical activity):  Distress Tolerance Strategies:  arts and crafts: drawing and painting  Physical Activities: exercise: working out  Focus on helpful thoughts:  \"This is temporary\", \"It always passes\" and \"Ride the wave\"  Step 3: People and social settings that provide distraction:                 Name: Jennifer     Phone: [phone]                 Name: Antonina         Phone: [phone]                 Name: mom       Phone: [phone]  friends house or car   Step 4: Remind myself of people and things that are important to me and worth living for:  Best friend and cat        Step 5: When I am in crisis, I can ask these people to help me use my safety plan:                 Name: Jennifer     Phone: [phone]                 Name: Antonina         Phone: [phone]                 Name: panchito       Phone: [phone]  Step 6: Making the environment safe:   be around others  Step 7: Professionals or agencies I can contact during a crisis:  [facility] Daytime Number: [phone]  Suicide Prevention Lifeline: 4-402-692-TALK (2412)  Crisis Text Line Service (available 24 hours a day, 7 days a week): Text [place] to 471719  Local Crisis Services: 988     Call 911 or go to my nearest emergency department.       I helped develop this safety plan and agree to use it when needed.  I have " been given a copy of this plan.       Client signature _________________________________________________________________  Today s date:  2/22/2021  Adapted from Safety Plan Template 2008 Marisol Cazares and Mario Flores is reprinted with the express permission of the authors.  No portion of the Safety Plan Template may be reproduced without the express, written permission.  You can contact the authors at [email or web address] or [email or web address].

## 2024-12-11 ENCOUNTER — E-VISIT (OUTPATIENT)
Dept: FAMILY MEDICINE | Facility: CLINIC | Age: 24
End: 2024-12-11
Payer: COMMERCIAL

## 2024-12-11 DIAGNOSIS — G43.809 OTHER MIGRAINE WITHOUT STATUS MIGRAINOSUS, NOT INTRACTABLE: Primary | ICD-10-CM

## 2024-12-12 RX ORDER — SUMATRIPTAN SUCCINATE 25 MG/1
25 TABLET ORAL
Qty: 20 TABLET | Refills: 0 | Status: SHIPPED | OUTPATIENT
Start: 2024-12-12

## 2024-12-16 ENCOUNTER — VIRTUAL VISIT (OUTPATIENT)
Dept: PSYCHOLOGY | Facility: CLINIC | Age: 24
End: 2024-12-16
Payer: COMMERCIAL

## 2024-12-16 DIAGNOSIS — F41.1 GENERALIZED ANXIETY DISORDER: ICD-10-CM

## 2024-12-16 DIAGNOSIS — F31.32 BIPOLAR AFFECTIVE DISORDER, CURRENTLY DEPRESSED, MODERATE (H): Primary | ICD-10-CM

## 2024-12-16 DIAGNOSIS — F90.2 ADHD (ATTENTION DEFICIT HYPERACTIVITY DISORDER), COMBINED TYPE: ICD-10-CM

## 2024-12-16 DIAGNOSIS — F43.10 PTSD (POST-TRAUMATIC STRESS DISORDER): ICD-10-CM

## 2024-12-16 PROCEDURE — 90837 PSYTX W PT 60 MINUTES: CPT | Mod: 95 | Performed by: SOCIAL WORKER
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
## 2024-12-30 ENCOUNTER — VIRTUAL VISIT (OUTPATIENT)
Dept: PSYCHOLOGY | Facility: CLINIC | Age: 24
End: 2024-12-30
Payer: COMMERCIAL

## 2024-12-30 DIAGNOSIS — F31.32 BIPOLAR AFFECTIVE DISORDER, CURRENTLY DEPRESSED, MODERATE (H): Primary | ICD-10-CM

## 2024-12-30 DIAGNOSIS — F41.1 GENERALIZED ANXIETY DISORDER: ICD-10-CM

## 2024-12-30 DIAGNOSIS — F43.10 PTSD (POST-TRAUMATIC STRESS DISORDER): ICD-10-CM

## 2024-12-30 DIAGNOSIS — F90.2 ADHD (ATTENTION DEFICIT HYPERACTIVITY DISORDER), COMBINED TYPE: ICD-10-CM

## 2024-12-30 PROCEDURE — 90837 PSYTX W PT 60 MINUTES: CPT | Mod: 95 | Performed by: SOCIAL WORKER

## 2025-01-02 ENCOUNTER — VIRTUAL VISIT (OUTPATIENT)
Dept: PSYCHOLOGY | Facility: CLINIC | Age: 25
End: 2025-01-02
Payer: COMMERCIAL

## 2025-01-02 DIAGNOSIS — F90.2 ADHD (ATTENTION DEFICIT HYPERACTIVITY DISORDER), COMBINED TYPE: ICD-10-CM

## 2025-01-02 DIAGNOSIS — F41.1 GENERALIZED ANXIETY DISORDER: ICD-10-CM

## 2025-01-02 DIAGNOSIS — F31.32 BIPOLAR AFFECTIVE DISORDER, CURRENTLY DEPRESSED, MODERATE (H): Primary | ICD-10-CM

## 2025-01-02 DIAGNOSIS — F43.10 PTSD (POST-TRAUMATIC STRESS DISORDER): ICD-10-CM

## 2025-01-02 NOTE — PROGRESS NOTES
[facility]                                      Progress Note    Patient Name: Wandy Ann  Date: 2025         Service Type: Individual      Session Start Time: 140   Session End Time: 145     Session Length: 53+    Session #: 90    Attendees: Client    Service Modality:    Video Visit:      Provider verified identity through the following two step process.  Patient provided: Patient  and Patient previous known to provider     Telemedicine Visit: The patient's condition can be safely assessed and treated via synchronous audio and visual telemedicine encounter.       Reason for Telemedicine Visit: Patient has requested telehealth visit     Originating Site (Patient Location): Patient's home     Distant Site (Provider Location): Provider Remote Setting- Home Office     Consent:  The patient/guardian has verbally consented to: the potential risks and benefits of telemedicine (video visit) versus in person care; bill my insurance or make self-payment for services provided; and responsibility for payment of non-covered services.      Patient would like the video invitation sent by: email/text     Mode of Communication: Video Conference via Amwell     Distant Location (Provider): Off-site     As the provider I attest to compliance with applicable laws and regulations related to telemedicine.    DATA  Interactive Complexity: No  Crisis: No  Extended Session (53+ minutes):   - High distress and under complex circumstances.  See Data section for details          Progress Since Last Session (Related to Symptoms / Goals / Homework):   Symptoms: Worsening emotions      Homework: Achieved / completed to satisfaction  Partially completed        Episode of Care Goals: Satisfactory progress - ACTION (Actively working towards change); Intervened by reinforcing change plan / affirming steps taken       Current / Ongoing Stressors and Concerns:   past trauma, developmental hx and current  stressors      Treatment Objective(s) Addressed in This Session:   Patient will demonstrate control of impulses and ability to use positive coping tools to manage strong emotions that can be safely addressed at a lower level of care. Absence of persistent SI and report of reduced frequency and intensity of SI and absence of SI to acceptable levels, report subjective improved mood for a period of 90 days, within 6 months as clinically observed and by patient self-report.  Patient will demonstrate and report a level of depression that can be managed at a lower level of care.  Absence of persistent depression mood and report of reduced frequency and intensity of low mood and absence of persistent low energy and motivation to acceptable levels, report subjective improved motivation and increased energy for a period of 90 days, within 6 months as clinically observed and by patient self-report.  Patient will demonstrate and report a level of anxiety that can be managed at a lower level of care.  Absence of persistent anxious mood and report of reduced frequency and intensity of worry and absence of persistent anxious mood to acceptable levels, no panic attacks, report subjective comfort with rumination for a period of 90 days, within 6 months as clinically observed and by patient self-report.       Intervention:  Therapist met with patient to review goals and interventions. Therapist utilized reflected listening as patient gave brief reflection of week. Patient reported her partners mother stated not liking her due to her mental health and SI. Therapist supported patient as she processed and validated patient. Patient's partner joined session and therapist provided education around identities, boundaries, expectations and communication with each other and mom. Therapist encouraged patient to set firm boundary and stop contact with partner's mom and for her to work on healing and engaging the relationship with partner  only. Therapist coached couple in communication and understanding with conflict within.   Patient presented with an anxious affect. Patient was engaged in session and open to feedback. Patient contracted for safety          There has been demonstrated improvement in functioning while patient has been engaged in psychotherapy/psychological service- if withdrawn the patient would deteriorate and/or relapse.     Assessments completed prior to visit:  The following assessments were completed by patient for this visit:  PHQ9:       1/15/2024     7:47 PM 4/25/2024     3:40 PM 7/11/2024    12:42 PM 8/12/2024     1:07 AM 9/24/2024    11:00 AM 12/1/2024     7:23 PM 12/4/2024    11:30 AM   PHQ-9 SCORE   PHQ-9 Total Score MyChart 11 (Moderate depression) 6 (Mild depression) 12 (Moderate depression) 8 (Mild depression)  11 (Moderate depression) 11 (Moderate depression)   PHQ-9 Total Score 11 6 12 8    8 14 11  11        Patient-reported     GAD7:       4/20/2023    12:41 PM 10/10/2023     1:57 PM 1/15/2024     7:48 PM 7/10/2024     1:35 PM 7/12/2024    11:48 AM 9/24/2024    11:00 AM 12/4/2024    11:30 AM   YESSI-7 SCORE   Total Score 13 (moderate anxiety) 17 (severe anxiety) 15 (severe anxiety)  17 (severe anxiety)  13 (moderate anxiety)   Total Score 13 17 15 17 17 12 13        Patient-reported   PROMIS-10 Scores        1/15/2024     7:49 PM 8/19/2024     3:00 PM 12/4/2024    11:31 AM   PROMIS-10 Total Score w/o Sub Scores   PROMIS TOTAL - SUBSCORES 24 29 26        Patient-reported                   ASSESSMENT: Current Emotional / Mental Status (status of significant symptoms):   Risk status (Self / Other harm or suicidal ideation)   Patient denies current fears or concerns for personal safety.   Patient denies current or recent suicidal ideation or behaviors.   Patient denies current or recent homicidal ideation or behaviors.   Patient denies current or recent self injurious behavior or ideation.   Patient denies other safety  concerns.   Patient reports there has been no change in risk factors since their last session.     Patient reports there has been no change in protective factors since their last session.     A safety and risk management plan has been developed including: Patient consented to co-developed safety plan on 2.21.2022.  Safety and risk management plan was reviewed.   Patient agreed to use safety plan should any safety concerns arise.  A copy was made available to the patient.     Appearance:   Appropriate     Eye Contact:   Fair     Psychomotor Behavior: Restless     Attitude:   Cooperative    Orientation:   All   Speech    Rate / Production: Emotional Talkative    Volume:  Normal    Mood:    Anxious  Depressed    Affect:    Worrisome    Thought Content:  Clear    Thought Form:  Coherent    Insight:    Fair      Medication Review:   No changes to current psychiatric medication(s)     Medication Compliance:   Yes     Changes in Health Issues:   None reported     Chemical Use Review:   Substance Use: Chemical use reviewed, no active concerns identified      Tobacco Use: No current tobacco use.      Diagnosis:  1. Bipolar affective disorder, currently depressed, moderate (H)    2. Generalized anxiety disorder    3. ADHD (attention deficit hyperactivity disorder), combined type    4. PTSD (post-traumatic stress disorder)        Collateral Reports Completed:   Not Applicable    PLAN: (Patient Tasks / Therapist Tasks / Other)        Call [phone] if feeling SI has increase or go to ED or empath   Utilize coping skills when feeling impulsive.   Replace distortions with positive attributes  Look at intention rather than perception  Do not avoid people  TMS  Elimination diet  Don't avoid situation due to anxiety  Ground self  Patient reported her partners mother stated not liking her due to her mental health and SI. Therapist supported patient as she processed and validated patient. Patient's partner joined session and therapist  provided education around identities, boundaries, expectations and communication with each other and mom. Therapist encouraged patient to set firm boundary and stop contact with partner's mom and for her to work on healing and engaging the relationship with partner only. Therapist coached couple in communication and understanding with conflict within.       Katie Faulkner, [facility]  1/2/2025                                                         ______________________________________________________________________    Individual Treatment Plan    Patient's Name: Wandy Ann  YOB: 2000    Date of Creation: 2.22.2021  Date Treatment Plan Last Reviewed/Revised:  11/20/2024 due 2/20/2024    DSM5 Diagnoses: 296.52 Bipolar I Disorder Current or Most Recent Episode Depressed, Moderate, 300.02 (F41.1) Generalized Anxiety Disorder or 309.81 (F43.10) Posttraumatic Stress Disorder (includes Posttraumatic Stress Disorder for Children 6 Years and Younger)  Without dissociative symptoms  Psychosocial / Contextual Factors: past trauma, developmental hx and current stressors   PROMIS (reviewed every 90 days):12/4/2024    Referral / Collaboration:  Referral to another professional/service is not indicated at this time..    Anticipated number of session for this episode of care: 26  Anticipation frequency of session: Biweekly  Anticipated Duration of each session: 38-52 minutes  Treatment plan will be reviewed in 90 days or when goals have been changed.       MeasurableTreatment Goal(s) related to diagnosis / functional impairment(s)  Goal 1: Patient will report absence of persistent SI and report of reduced frequency and intensity of SI and absence of SI to acceptable levels, report subjective improved mood for a period of 90 days, within 6 months as clinically observed and by patient self-report    I will know I've met my goal when I can see the difference between a bad moment and what I want in th future.       Objective #A (Patient Action)    Patient will demonstrate control of impulses and ability to use positive coping tools to manage strong emotions that can be safely addressed at a lower level of care. Absence of persistent SI and report of reduced frequency and intensity of SI and absence of SI to acceptable levels, report subjective improved mood for a period of 90 days, within 6 months as clinically observed and by patient self-report.  Status: Continued - Date(s):  11/20/2024    Intervention(s)  Therapist will provide individual therapy to identify triggers to SI urges, gain feedback on helpful coping strategies, and identify ways that family can offer support. Tx to discuss current stressors and interpersonal conflicts and how to cope with these, coaching, diagnostic testing, referral for medication as indicated, use prescribed medication, cognitive restructuring, interpersonal family therapy, supportive therapy services.        Goal 2: Patient will report absence of persistent depression mood and report of reduced frequency and intensity of low mood and absence of persistent low energy and motivation to acceptable levels, report subjective improved motivation and increased energy for a period of 90 days, within 6 months as clinically observed and by patient self-report    I will know I've met my goal when I no longer feel sad.      Objective #A (Patient Action)    Status: Continued - Date(s):  11/20/2024    Patient will demonstrate and report a level of depression that can be managed at a lower level of care.  Absence of persistent depression mood and report of reduced frequency and intensity of low mood and absence of persistent low energy and motivation to acceptable levels, report subjective improved motivation and increased energy for a period of 90 days, within 6 months as clinically observed and by patient self-report.    Intervention(s)  Therapist will provide individual therapy to identify triggers  to depression, gain feedback on helpful coping tools and thought-reframing techniques, and identify preferred way of being.  Tx to include discussion of current stressors and interpersonal conflicts and how to cope with these, coaching, diagnostic testing, referral for medication as indicated, use prescribed medication, cognitive restructuring, interpersonal, family therapy, supportive therapy services.        Goal 3: Patiient will report absence of persistent anxiety mood and report of reduced frequency and intensity of worry and absence of persistent anxious mood to acceptable levels, no panic attacks, report subjective comfort with rumination for a period of 90 days. Within 6 months as clinically observed and by patient self-report    I will know I've met my goal when I can go days without worrying about little things or shaking.      Objective #A (Patient Action)    Status: Continued - Date(s):  11/20/2024    Patient will demonstrate and report a level of anxiety that can be managed at a lower level of care.  Absence of persistent anxious mood and report of reduced frequency and intensity of worry and absence of persistent anxious mood to acceptable levels, no panic attacks, report subjective comfort with rumination for a period of 90 days, within 6 months as clinically observed and by patient self-report.    Intervention(s)  Therapist will provide individual therapy to identify triggers to anxiety, gain feedback on helpful coping tools and thought-reframing techniques, and identify preferred way of being. Tx to include discuss current stressors and interpersonal conflicts and how to cope with these, coaching, diagnostic testing , referral for medication as indicated, use prescribed medication, cognitive restructuring, interpersonal,   family therapy, supportive therapy services.        Patient has reviewed and agreed to the above plan.      Katie Faulkner, [facility]   11/20/2024                                     "               Wandy Ann            SAFETY PLAN:  Step 1: Warning signs / cues (Thoughts, images, mood, situation, behavior) that a crisis may be developing:  Thoughts: thoughts of not wanting to be here  Images: no images  Thinking Processes: intrusive thoughts (bothersome, unwanted thoughts that come out of nowhere): get irritated  Mood: intense anger and agitation  Behaviors: isolating/withdrawing   Situations: trauma    Step 2: Coping strategies - Things I can do to take my mind off of my problems without contacting another person (relaxation technique, physical activity):  Distress Tolerance Strategies:  arts and crafts: drawing and painting  Physical Activities: exercise: working out  Focus on helpful thoughts:  \"This is temporary\", \"It always passes\" and \"Ride the wave\"  Step 3: People and social settings that provide distraction:                 Name: Jennifer     Phone: [phone]                 Name: Antonina         Phone: [phone]                 Name: mom       Phone: [phone]  friends house or car   Step 4: Remind myself of people and things that are important to me and worth living for:  Best friend and cat        Step 5: When I am in crisis, I can ask these people to help me use my safety plan:                 Name: Jennifer     Phone: [phone]                 Name: Antonina         Phone: [phone]                 Name: panchito       Phone: [phone]  Step 6: Making the environment safe:   be around others  Step 7: Professionals or agencies I can contact during a crisis:  [facility] Daytime Number: [phone]  Suicide Prevention Lifeline: 6-587-227-PEXH (2269)  Crisis Text Line Service (available 24 hours a day, 7 days a week): Text [place] to 322441  Local Crisis Services: 988     Call 911 or go to my nearest emergency department.       I helped develop this safety plan and agree to use it when needed.  I have been given a copy of this plan.       Client signature " _________________________________________________________________  Today s date:  2/22/2021  Adapted from Safety Plan Template 2008 Marisol Cazares and Mario Flores is reprinted with the express permission of the authors.  No portion of the Safety Plan Template may be reproduced without the express, written permission.  You can contact the authors at bhs@[place].[email or web address] or [email or web address].

## 2025-01-06 ENCOUNTER — VIRTUAL VISIT (OUTPATIENT)
Dept: FAMILY MEDICINE | Facility: CLINIC | Age: 25
End: 2025-01-06
Payer: COMMERCIAL

## 2025-01-06 DIAGNOSIS — Z30.430 ENCOUNTER FOR IUD INSERTION: ICD-10-CM

## 2025-01-06 DIAGNOSIS — F41.1 GAD (GENERALIZED ANXIETY DISORDER): ICD-10-CM

## 2025-01-06 DIAGNOSIS — R42 LIGHTHEADEDNESS: Primary | ICD-10-CM

## 2025-01-06 DIAGNOSIS — R00.2 PALPITATIONS: ICD-10-CM

## 2025-01-06 PROCEDURE — 98005 SYNCH AUDIO-VIDEO EST LOW 20: CPT | Performed by: PHYSICIAN ASSISTANT

## 2025-01-06 RX ORDER — DIAZEPAM 2 MG/1
TABLET ORAL
Qty: 1 TABLET | Refills: 0 | Status: SHIPPED | OUTPATIENT
Start: 2025-01-06

## 2025-01-06 ASSESSMENT — PATIENT HEALTH QUESTIONNAIRE - PHQ9
10. IF YOU CHECKED OFF ANY PROBLEMS, HOW DIFFICULT HAVE THESE PROBLEMS MADE IT FOR YOU TO DO YOUR WORK, TAKE CARE OF THINGS AT HOME, OR GET ALONG WITH OTHER PEOPLE: VERY DIFFICULT
SUM OF ALL RESPONSES TO PHQ QUESTIONS 1-9: 13
SUM OF ALL RESPONSES TO PHQ QUESTIONS 1-9: 13

## 2025-01-06 ASSESSMENT — ANXIETY QUESTIONNAIRES
4. TROUBLE RELAXING: NEARLY EVERY DAY
7. FEELING AFRAID AS IF SOMETHING AWFUL MIGHT HAPPEN: MORE THAN HALF THE DAYS
6. BECOMING EASILY ANNOYED OR IRRITABLE: SEVERAL DAYS
2. NOT BEING ABLE TO STOP OR CONTROL WORRYING: MORE THAN HALF THE DAYS
8. IF YOU CHECKED OFF ANY PROBLEMS, HOW DIFFICULT HAVE THESE MADE IT FOR YOU TO DO YOUR WORK, TAKE CARE OF THINGS AT HOME, OR GET ALONG WITH OTHER PEOPLE?: VERY DIFFICULT
GAD7 TOTAL SCORE: 13
GAD7 TOTAL SCORE: 13
5. BEING SO RESTLESS THAT IT IS HARD TO SIT STILL: MORE THAN HALF THE DAYS
GAD7 TOTAL SCORE: 13
7. FEELING AFRAID AS IF SOMETHING AWFUL MIGHT HAPPEN: MORE THAN HALF THE DAYS
3. WORRYING TOO MUCH ABOUT DIFFERENT THINGS: MORE THAN HALF THE DAYS
1. FEELING NERVOUS, ANXIOUS, OR ON EDGE: SEVERAL DAYS
IF YOU CHECKED OFF ANY PROBLEMS ON THIS QUESTIONNAIRE, HOW DIFFICULT HAVE THESE PROBLEMS MADE IT FOR YOU TO DO YOUR WORK, TAKE CARE OF THINGS AT HOME, OR GET ALONG WITH OTHER PEOPLE: VERY DIFFICULT

## 2025-01-06 ASSESSMENT — ENCOUNTER SYMPTOMS: DIZZINESS: 1

## 2025-01-06 NOTE — PROGRESS NOTES
"Wandy is a 24 year old who is being evaluated via a billable video visit.    How would you like to obtain your AVS? MyChart  If the video visit is dropped, the invitation should be resent by: Text to cell phone: [phone]  Will anyone else be joining your video visit? No      Assessment & Plan     Lightheadedness  Palpitations  YESSI (generalized anxiety disorder)  Encouraged to continue to work with psychiatry on anxiety. Discussed ZioPatch findings. Patient does feel symptoms are cyclical in nature surrounding her menstrual cycle. Could consider changing IUD for a new one - rx sent for removal/reinsertion per patient request.     Encounter for IUD insertion  Discussed. Patient will schedule. Rx sent for procedure. Discussed need for a .   - diazepam (VALIUM) 2 MG tablet; Take 1 tablet (2 mg) by mouth 30 minutes prior to the procedure.    The longitudinal plan of care for the diagnosis(es)/condition(s) as documented were addressed during this visit. Due to the added complexity in care, I will continue to support Wandy in the subsequent management and with ongoing continuity of care.        BMI  Estimated body mass index is 38.26 kg/m  as calculated from the following:    Height as of 7/12/24: 1.515 m (4' 11.65\").    Weight as of 7/12/24: 87.8 kg (193 lb 9.6 oz).   Weight management plan: Discussed healthy diet and exercise guidelines    Depression Screening Follow Up        1/6/2025    11:06 AM   PHQ   PHQ-9 Total Score 13    Q9: Thoughts of better off dead/self-harm past 2 weeks Several days   F/U: Thoughts of suicide or self-harm Yes   F/U: Self harm-plan No   F/U: Self-harm action No   F/U: Safety concerns No       Patient-reported                     Follow Up Actions Taken  Crisis resource information provided in the After Visit Summary    Discussed the following ways the patient can remain in a safe environment:  be around others        Subjective   Wandy is a 24 year old, presenting for the following " health issues:  Results (zio) and Dizziness (Lightheaded and head pressure)      1/6/2025     2:21 PM   Additional Questions   Roomed by Patient check-in via mychart         1/6/2025     2:21 PM   Patient Reported Additional Medications   Patient reports taking the following new medications none     Dizziness    History of Present Illness       Reason for visit:  Heart monitor results & questions about Birth Control as well as new migraines    She eats 2-3 servings of fruits and vegetables daily.She consumes 0 sweetened beverage(s) daily.She exercises with enough effort to increase her heart rate 10 to 19 minutes per day.  She exercises with enough effort to increase her heart rate 3 or less days per week. She is missing 1 dose(s) of medications per week.  She is not taking prescribed medications regularly due to side effects and remembering to take.       Feeling like she is getting migraines. Can get dizzy and lightheaded at times. No cause.   Palpitations still happening.   Seems to be worse right before period. Didn't have a period for the first 6 years or so.     Feels more nauseous before period.                    Objective           Vitals:  No vitals were obtained today due to virtual visit.    Physical Exam   GENERAL: alert and no distress  EYES: Eyes grossly normal to inspection.  No discharge or erythema, or obvious scleral/conjunctival abnormalities.  RESP: No audible wheeze, cough, or visible cyanosis.    SKIN: Visible skin clear. No significant rash, abnormal pigmentation or lesions.  NEURO: Cranial nerves grossly intact.  Mentation and speech appropriate for age.  PSYCH: Appropriate affect, tone, and pace of words          Video-Visit Details    Type of service:  Video Visit   Originating Location (pt. Location): Home    Distant Location (provider location):  On-site  Platform used for Video Visit: Jon  Signed Electronically by: Chinyere Ruiz PA-C

## 2025-01-14 ENCOUNTER — VIRTUAL VISIT (OUTPATIENT)
Dept: PSYCHOLOGY | Facility: CLINIC | Age: 25
End: 2025-01-14
Payer: COMMERCIAL

## 2025-01-14 DIAGNOSIS — F31.32 BIPOLAR AFFECTIVE DISORDER, CURRENTLY DEPRESSED, MODERATE (H): Primary | ICD-10-CM

## 2025-01-14 DIAGNOSIS — F41.1 GENERALIZED ANXIETY DISORDER: ICD-10-CM

## 2025-01-14 DIAGNOSIS — F43.10 PTSD (POST-TRAUMATIC STRESS DISORDER): ICD-10-CM

## 2025-01-14 DIAGNOSIS — F90.2 ADHD (ATTENTION DEFICIT HYPERACTIVITY DISORDER), COMBINED TYPE: ICD-10-CM

## 2025-01-14 PROCEDURE — 90837 PSYTX W PT 60 MINUTES: CPT | Mod: 95 | Performed by: SOCIAL WORKER

## 2025-01-14 NOTE — PROGRESS NOTES
[facility]                                      Progress Note    Patient Name: Wandy Ann  Date: 2025         Service Type: Individual      Session Start Time: 1504   Session End Time: 1600     Session Length: 53+    Session #: 91    Attendees: Client    Service Modality:    Video Visit:      Provider verified identity through the following two step process.  Patient provided: Patient  and Patient previous known to provider     Telemedicine Visit: The patient's condition can be safely assessed and treated via synchronous audio and visual telemedicine encounter.       Reason for Telemedicine Visit: Patient has requested telehealth visit     Originating Site (Patient Location): Patient's home     Distant Site (Provider Location): Provider Remote Setting- Home Office     Consent:  The patient/guardian has verbally consented to: the potential risks and benefits of telemedicine (video visit) versus in person care; bill my insurance or make self-payment for services provided; and responsibility for payment of non-covered services.      Patient would like the video invitation sent by: email/text     Mode of Communication: Video Conference via [employer]     Distant Location (Provider): Off-site     As the provider I attest to compliance with applicable laws and regulations related to telemedicine.    DATA  Interactive Complexity: No  Crisis: No  Extended Session (53+ minutes):   - Patient's presenting concerns require more intensive intervention than could be completed within the usual service          Progress Since Last Session (Related to Symptoms / Goals / Homework):   Symptoms: Improving mood      Homework: Achieved / completed to satisfaction  Partially completed        Episode of Care Goals: Satisfactory progress - ACTION (Actively working towards change); Intervened by reinforcing change plan / affirming steps taken       Current / Ongoing Stressors and Concerns:   past trauma,  developmental hx and current stressors      Treatment Objective(s) Addressed in This Session:   Patient will demonstrate control of impulses and ability to use positive coping tools to manage strong emotions that can be safely addressed at a lower level of care. Absence of persistent SI and report of reduced frequency and intensity of SI and absence of SI to acceptable levels, report subjective improved mood for a period of 90 days, within 6 months as clinically observed and by patient self-report.  Patient will demonstrate and report a level of depression that can be managed at a lower level of care.  Absence of persistent depression mood and report of reduced frequency and intensity of low mood and absence of persistent low energy and motivation to acceptable levels, report subjective improved motivation and increased energy for a period of 90 days, within 6 months as clinically observed and by patient self-report.  Patient will demonstrate and report a level of anxiety that can be managed at a lower level of care.  Absence of persistent anxious mood and report of reduced frequency and intensity of worry and absence of persistent anxious mood to acceptable levels, no panic attacks, report subjective comfort with rumination for a period of 90 days, within 6 months as clinically observed and by patient self-report.       Intervention:  Therapist met with patient to review goals and interventions. Therapist utilized reflected listening as patient gave brief reflection of week. Patient processed her emotions over the past couple of weeks towards her partner and his mother along with conflict on her family. Therapist supported patient as she processed and validated patient. Therapist coached patient through managing her expectations, stressors, reactions along with setting boundaries and communicating them with her partner. Therapist encouraged patient to lean on her support system along with investing in her self  identity.   Patient presented with an anxious affect. Patient was engaged in session and open to feedback. Patient contracted for safety          There has been demonstrated improvement in functioning while patient has been engaged in psychotherapy/psychological service- if withdrawn the patient would deteriorate and/or relapse.     Assessments completed prior to visit:  The following assessments were completed by patient for this visit:  PHQ9:       4/25/2024     3:40 PM 7/11/2024    12:42 PM 8/12/2024     1:07 AM 9/24/2024    11:00 AM 12/1/2024     7:23 PM 12/4/2024    11:30 AM 1/6/2025    11:06 AM   PHQ-9 SCORE   PHQ-9 Total Score MyChart 6 (Mild depression) 12 (Moderate depression) 8 (Mild depression)  11 (Moderate depression) 11 (Moderate depression) 13 (Moderate depression)   PHQ-9 Total Score 6 12 8    8 14 11  11  13        Patient-reported     GAD7:       10/10/2023     1:57 PM 1/15/2024     7:48 PM 7/10/2024     1:35 PM 7/12/2024    11:48 AM 9/24/2024    11:00 AM 12/4/2024    11:30 AM 1/6/2025    11:07 AM   YESSI-7 SCORE   Total Score 17 (severe anxiety) 15 (severe anxiety)  17 (severe anxiety)  13 (moderate anxiety) 13 (moderate anxiety)   Total Score 17 15 17 17 12 13  13        Patient-reported   PROMIS-10 Scores        1/15/2024     7:49 PM 8/19/2024     3:00 PM 12/4/2024    11:31 AM   PROMIS-10 Total Score w/o Sub Scores   PROMIS TOTAL - SUBSCORES 24 29 26        Patient-reported                   ASSESSMENT: Current Emotional / Mental Status (status of significant symptoms):   Risk status (Self / Other harm or suicidal ideation)   Patient denies current fears or concerns for personal safety.   Patient denies current or recent suicidal ideation or behaviors.   Patient denies current or recent homicidal ideation or behaviors.   Patient denies current or recent self injurious behavior or ideation.   Patient denies other safety concerns.   Patient reports there has been no change in risk factors since  their last session.     Patient reports there has been no change in protective factors since their last session.     A safety and risk management plan has been developed including: Patient consented to co-developed safety plan on 2.21.2022.  Safety and risk management plan was reviewed.   Patient agreed to use safety plan should any safety concerns arise.  A copy was made available to the patient.     Appearance:   Appropriate     Eye Contact:   Fair     Psychomotor Behavior: Restless     Attitude:   Cooperative    Orientation:   All   Speech    Rate / Production: Emotional Talkative    Volume:  Normal    Mood:    Anxious  Depressed    Affect:    Worrisome    Thought Content:  Clear    Thought Form:  Coherent    Insight:    Fair      Medication Review:   No changes to current psychiatric medication(s)     Medication Compliance:   Yes     Changes in Health Issues:   None reported     Chemical Use Review:   Substance Use: Chemical use reviewed, no active concerns identified      Tobacco Use: No current tobacco use.      Diagnosis:  1. Bipolar affective disorder, currently depressed, moderate (H)    2. Generalized anxiety disorder    3. ADHD (attention deficit hyperactivity disorder), combined type    4. PTSD (post-traumatic stress disorder)        Collateral Reports Completed:   Not Applicable    PLAN: (Patient Tasks / Therapist Tasks / Other)        Call [phone] if feeling SI has increase or go to ED or empath   Utilize coping skills when feeling impulsive.   Replace distortions with positive attributes  Look at intention rather than perception  Do not avoid people  TMS  Elimination diet  Don't avoid situation due to anxiety  Ground self  Patient processed her emotions over the past couple of weeks towards her partner and his mother along with conflict on her family. Therapist supported patient as she processed and validated patient. Therapist coached patient through managing her expectations, stressors, reactions along  with setting boundaries and communicating them with her partner. Therapist encouraged patient to lean on her support system along with investing in her self identity.       Katie GRIMMAdri Faulkner, LICSW  1/14/2025                                                         ______________________________________________________________________    Individual Treatment Plan    Patient's Name: Wandy Ann  YOB: 2000    Date of Creation: 2.22.2021  Date Treatment Plan Last Reviewed/Revised:  11/20/2024 due 2/20/2024    DSM5 Diagnoses: 296.52 Bipolar I Disorder Current or Most Recent Episode Depressed, Moderate, 300.02 (F41.1) Generalized Anxiety Disorder or 309.81 (F43.10) Posttraumatic Stress Disorder (includes Posttraumatic Stress Disorder for Children 6 Years and Younger)  Without dissociative symptoms  Psychosocial / Contextual Factors: past trauma, developmental hx and current stressors   PROMIS (reviewed every 90 days):12/4/2024    Referral / Collaboration:  Referral to another professional/service is not indicated at this time..    Anticipated number of session for this episode of care: 26  Anticipation frequency of session: Biweekly  Anticipated Duration of each session: 38-52 minutes  Treatment plan will be reviewed in 90 days or when goals have been changed.       MeasurableTreatment Goal(s) related to diagnosis / functional impairment(s)  Goal 1: Patient will report absence of persistent SI and report of reduced frequency and intensity of SI and absence of SI to acceptable levels, report subjective improved mood for a period of 90 days, within 6 months as clinically observed and by patient self-report    I will know I've met my goal when I can see the difference between a bad moment and what I want in th future.      Objective #A (Patient Action)    Patient will demonstrate control of impulses and ability to use positive coping tools to manage strong emotions that can be safely addressed at a  lower level of care. Absence of persistent SI and report of reduced frequency and intensity of SI and absence of SI to acceptable levels, report subjective improved mood for a period of 90 days, within 6 months as clinically observed and by patient self-report.  Status: Continued - Date(s):  11/20/2024    Intervention(s)  Therapist will provide individual therapy to identify triggers to SI urges, gain feedback on helpful coping strategies, and identify ways that family can offer support. Tx to discuss current stressors and interpersonal conflicts and how to cope with these, coaching, diagnostic testing, referral for medication as indicated, use prescribed medication, cognitive restructuring, interpersonal family therapy, supportive therapy services.        Goal 2: Patient will report absence of persistent depression mood and report of reduced frequency and intensity of low mood and absence of persistent low energy and motivation to acceptable levels, report subjective improved motivation and increased energy for a period of 90 days, within 6 months as clinically observed and by patient self-report    I will know I've met my goal when I no longer feel sad.      Objective #A (Patient Action)    Status: Continued - Date(s):  11/20/2024    Patient will demonstrate and report a level of depression that can be managed at a lower level of care.  Absence of persistent depression mood and report of reduced frequency and intensity of low mood and absence of persistent low energy and motivation to acceptable levels, report subjective improved motivation and increased energy for a period of 90 days, within 6 months as clinically observed and by patient self-report.    Intervention(s)  Therapist will provide individual therapy to identify triggers to depression, gain feedback on helpful coping tools and thought-reframing techniques, and identify preferred way of being.  Tx to include discussion of current stressors and  interpersonal conflicts and how to cope with these, coaching, diagnostic testing, referral for medication as indicated, use prescribed medication, cognitive restructuring, interpersonal, family therapy, supportive therapy services.        Goal 3: Patiient will report absence of persistent anxiety mood and report of reduced frequency and intensity of worry and absence of persistent anxious mood to acceptable levels, no panic attacks, report subjective comfort with rumination for a period of 90 days. Within 6 months as clinically observed and by patient self-report    I will know I've met my goal when I can go days without worrying about little things or shaking.      Objective #A (Patient Action)    Status: Continued - Date(s):  11/20/2024    Patient will demonstrate and report a level of anxiety that can be managed at a lower level of care.  Absence of persistent anxious mood and report of reduced frequency and intensity of worry and absence of persistent anxious mood to acceptable levels, no panic attacks, report subjective comfort with rumination for a period of 90 days, within 6 months as clinically observed and by patient self-report.    Intervention(s)  Therapist will provide individual therapy to identify triggers to anxiety, gain feedback on helpful coping tools and thought-reframing techniques, and identify preferred way of being. Tx to include discuss current stressors and interpersonal conflicts and how to cope with these, coaching, diagnostic testing , referral for medication as indicated, use prescribed medication, cognitive restructuring, interpersonal,   family therapy, supportive therapy services.        Patient has reviewed and agreed to the above plan.      Katie Faulkner, [facility]   11/20/2024                                                   Wandy Ann            SAFETY PLAN:  Step 1: Warning signs / cues (Thoughts, images, mood, situation, behavior) that a crisis may be  "developing:  Thoughts: thoughts of not wanting to be here  Images: no images  Thinking Processes: intrusive thoughts (bothersome, unwanted thoughts that come out of nowhere): get irritated  Mood: intense anger and agitation  Behaviors: isolating/withdrawing   Situations: trauma    Step 2: Coping strategies - Things I can do to take my mind off of my problems without contacting another person (relaxation technique, physical activity):  Distress Tolerance Strategies:  arts and crafts: drawing and painting  Physical Activities: exercise: working out  Focus on helpful thoughts:  \"This is temporary\", \"It always passes\" and \"Ride the wave\"  Step 3: People and social settings that provide distraction:                 Name: Jennifer     Phone: [phone]                 Name: Antonina         Phone: [phone]                 Name: panchito       Phone: [phone]  friends house or car   Step 4: Remind myself of people and things that are important to me and worth living for:  Best friend and cat        Step 5: When I am in crisis, I can ask these people to help me use my safety plan:                 Name: Jennifer     Phone: [phone]                 Name: Antonina         Phone: [phone]                 Name: panchito       Phone: [phone]  Step 6: Making the environment safe:   be around others  Step 7: Professionals or agencies I can contact during a crisis:  [facility] Daytime Number: [phone]  Suicide Prevention Lifeline: 5-024-984-TALK (8246)  Crisis Text Line Service (available 24 hours a day, 7 days a week): Text [place] to 137286  Local Crisis Services: 988     Call 911 or go to my nearest emergency department.       I helped develop this safety plan and agree to use it when needed.  I have been given a copy of this plan.       Client signature _________________________________________________________________  Today s date:  2/22/2021  Adapted from Safety Plan Template 2008 Marisol Gauthier" LINDA Flores is reprinted with the express permission of the authors.  No portion of the Safety Plan Template may be reproduced without the express, written permission.  You can contact the authors at [email or web address] or [email or web address].

## 2025-01-21 ENCOUNTER — E-VISIT (OUTPATIENT)
Dept: FAMILY MEDICINE | Facility: CLINIC | Age: 25
End: 2025-01-21
Payer: COMMERCIAL

## 2025-01-21 DIAGNOSIS — B37.31 CANDIDAL VULVOVAGINITIS: Primary | ICD-10-CM

## 2025-01-21 RX ORDER — FLUCONAZOLE 150 MG/1
150 TABLET ORAL ONCE
Qty: 1 TABLET | Refills: 0 | Status: SHIPPED | OUTPATIENT
Start: 2025-01-21 | End: 2025-01-21

## 2025-01-21 NOTE — PATIENT INSTRUCTIONS
Vaginal Yeast Infection: Care Instructions  Overview     A vaginal yeast infection is the growth of too many yeast cells in the vagina. This is a common problem. Itching, vaginal discharge and irritation, and other symptoms can bother you. But yeast infections don't often cause other health problems.  Some medicines can increase your risk of getting a yeast infection. These include antibiotics, hormones, and steroids. You may also be more likely to get a yeast infection if you are pregnant, have diabetes, douche, or wear tight clothes.  With treatment, most yeast infections get better in a few days.  Follow-up care is a key part of your treatment and safety. Be sure to make and go to all appointments, and call your doctor if you are having problems. It's also a good idea to know your test results and keep a list of the medicines you take.  How can you care for yourself at home?  Take your medicines exactly as prescribed. Call your doctor if you think you are having a problem with your medicine.  Ask your doctor about over-the-counter (OTC) medicines for yeast infections. If you use an OTC treatment, read and follow all instructions on the label.  Don't use tampons while using a vaginal cream or suppository. The tampons can absorb the medicine. Use pads instead.  Wear loose cotton clothing. Don't wear nylon or other fabric that holds body heat and moisture close to the skin.  Try sleeping without underwear.  Don't scratch. Relieve itching with a cold pack or a cool bath.  Don't wash your vulva more than once a day. Use plain water or a mild, unscented soap. Air-dry the vulva.  Change out of wet or damp clothes as soon as possible.  If you are using a vaginal medicine, don't have sex until you have finished your treatment. But if you do have sex, don't depend on a condom or diaphragm for birth control. The oil in some vaginal medicines weakens latex.  Don't douche or use powders, sprays, or perfumes in your vagina or  "on your vulva. These items can change the normal balance of organisms in your vagina.  When should you call for help?   Call your doctor now or seek immediate medical care if:    You have new or increased pain in your vagina or pelvis.   Watch closely for changes in your health, and be sure to contact your doctor if:    You have unexpected vaginal bleeding.     You have a fever.     You are not getting better after 2 days.     Your symptoms come back after you finish your medicines.   Where can you learn more?  Go to [email or web address]  Enter F639 in the search box to learn more about \"Vaginal Yeast Infection: Care Instructions.\"  Current as of: April 30, 2024  Content Version: 14.3    2024 [employer].   Care instructions adapted under license by your healthcare professional. If you have questions about a medical condition or this instruction, always ask your healthcare professional. [employer] disclaims any warranty or liability for your use of this information.    "

## 2025-01-27 ENCOUNTER — VIRTUAL VISIT (OUTPATIENT)
Dept: PSYCHOLOGY | Facility: CLINIC | Age: 25
End: 2025-01-27
Payer: COMMERCIAL

## 2025-01-27 DIAGNOSIS — F90.2 ADHD (ATTENTION DEFICIT HYPERACTIVITY DISORDER), COMBINED TYPE: ICD-10-CM

## 2025-01-27 DIAGNOSIS — F43.10 PTSD (POST-TRAUMATIC STRESS DISORDER): ICD-10-CM

## 2025-01-27 DIAGNOSIS — F41.1 GENERALIZED ANXIETY DISORDER: Primary | ICD-10-CM

## 2025-01-27 DIAGNOSIS — F31.32 BIPOLAR AFFECTIVE DISORDER, CURRENTLY DEPRESSED, MODERATE (H): ICD-10-CM

## 2025-01-27 PROCEDURE — 90834 PSYTX W PT 45 MINUTES: CPT | Mod: 93 | Performed by: SOCIAL WORKER

## 2025-01-27 NOTE — PROGRESS NOTES
"/    [facility]                                      Progress Note    Patient Name: Wandy Ann  Date:  2025         Service Type: Individual      Session Start Time: 1600   Session End Time:      Session Length: 38-52    Session #: 92    Attendees: Client    Service Modality:    Phone Visit:      Provider verified identity through the following two step process.  Patient provided:  Patient is known previously to provider/     Telephone Visit: The patient's condition can be safely assessed and treated via synchronous audio telemedicine encounter.       Reason for Audio Telemedicine Visit: Services only offered telehealth     Originating Site (Patient Location): Patient's home     Distant Site (Provider Location): off site      Consent:  The patient/guardian has verbally consented to:      1. The potential risks and benefits of telemedicine (telephone visit) versus in person care;     The patient has been notified of the following:      \"We have found that certain health care needs can be provided without the need for a face to face visit.  This service lets us provide the care you need with a phone conversation.       I will have full access to your [facility] medical record during this entire phone call.   I will be taking notes for your medical record.      Since this is like an office visit, we will bill your insurance company for this service.       There are potential benefits and risks of telephone visits (e.g. limits to patient confidentiality) that differ from in-person visits.?Confidentiality still applies for telephone services, and nobody will record the visit.  It is important to be in a quiet, private space that is free of distractions (including cell phone or other devices) during the visit.??      If during the course of the call I believe a telephone visit is not appropriate, you will not be charged for this service\"     Consent has been obtained for " this service by care team member: Yes     DATA  Interactive Complexity: No  Crisis: No          Progress Since Last Session (Related to Symptoms / Goals / Homework):   Symptoms: Worsening anxiety      Homework: Achieved / completed to satisfaction  Partially completed        Episode of Care Goals: Satisfactory progress - ACTION (Actively working towards change); Intervened by reinforcing change plan / affirming steps taken       Current / Ongoing Stressors and Concerns:   past trauma, developmental hx and current stressors      Treatment Objective(s) Addressed in This Session:   Patient will demonstrate control of impulses and ability to use positive coping tools to manage strong emotions that can be safely addressed at a lower level of care. Absence of persistent SI and report of reduced frequency and intensity of SI and absence of SI to acceptable levels, report subjective improved mood for a period of 90 days, within 6 months as clinically observed and by patient self-report.  Patient will demonstrate and report a level of depression that can be managed at a lower level of care.  Absence of persistent depression mood and report of reduced frequency and intensity of low mood and absence of persistent low energy and motivation to acceptable levels, report subjective improved motivation and increased energy for a period of 90 days, within 6 months as clinically observed and by patient self-report.  Patient will demonstrate and report a level of anxiety that can be managed at a lower level of care.  Absence of persistent anxious mood and report of reduced frequency and intensity of worry and absence of persistent anxious mood to acceptable levels, no panic attacks, report subjective comfort with rumination for a period of 90 days, within 6 months as clinically observed and by patient self-report.       Intervention:  Therapist met with patient to review goals and interventions. Therapist utilized reflected listening  as patient gave brief reflection of week. Patient reported an anxiety attack and processed this and frustrations with emotions. Therapist supported patient as she processed and validated patient. Therapist encouraged patient to continue to support her partner and for her to look at what she would eventually like with a relationship with his mother. Therapist offered patient guidance around healthy relationships and different types of boundaries to put in place.   Patient presented with an anxious affect. Patient was engaged in session and open to feedback. Patient reported no safety concerns          There has been demonstrated improvement in functioning while patient has been engaged in psychotherapy/psychological service- if withdrawn the patient would deteriorate and/or relapse.     Assessments completed prior to visit:  The following assessments were completed by patient for this visit:  PHQ9:       4/25/2024     3:40 PM 7/11/2024    12:42 PM 8/12/2024     1:07 AM 9/24/2024    11:00 AM 12/1/2024     7:23 PM 12/4/2024    11:30 AM 1/6/2025    11:06 AM   PHQ-9 SCORE   PHQ-9 Total Score MyChart 6 (Mild depression) 12 (Moderate depression) 8 (Mild depression)  11 (Moderate depression) 11 (Moderate depression) 13 (Moderate depression)   PHQ-9 Total Score 6 12 8    8 14 11  11  13        Patient-reported     GAD7:       10/10/2023     1:57 PM 1/15/2024     7:48 PM 7/10/2024     1:35 PM 7/12/2024    11:48 AM 9/24/2024    11:00 AM 12/4/2024    11:30 AM 1/6/2025    11:07 AM   YESSI-7 SCORE   Total Score 17 (severe anxiety) 15 (severe anxiety)  17 (severe anxiety)  13 (moderate anxiety) 13 (moderate anxiety)   Total Score 17 15 17 17 12 13  13        Patient-reported   PROMIS-10 Scores        1/15/2024     7:49 PM 8/19/2024     3:00 PM 12/4/2024    11:31 AM   PROMIS-10 Total Score w/o Sub Scores   PROMIS TOTAL - SUBSCORES 24 29 26        Patient-reported                   ASSESSMENT: Current Emotional / Mental Status (status  of significant symptoms):   Risk status (Self / Other harm or suicidal ideation)   Patient denies current fears or concerns for personal safety.   Patient denies current or recent suicidal ideation or behaviors.   Patient denies current or recent homicidal ideation or behaviors.   Patient denies current or recent self injurious behavior or ideation.   Patient denies other safety concerns.   Patient reports there has been no change in risk factors since their last session.     Patient reports there has been no change in protective factors since their last session.     A safety and risk management plan has been developed including: Patient consented to co-developed safety plan on 2.21.2022.  Safety and risk management plan was reviewed.   Patient agreed to use safety plan should any safety concerns arise.  A copy was made available to the patient.     Appearance:   Unable to assess due to phone appointment    Eye Contact:   Unable to assess due to phone appointment     Psychomotor Behavior: Unable to assess due to phone appointment     Attitude:   Cooperative    Orientation:   All   Speech    Rate / Production: Emotional Talkative    Volume:  Normal    Mood:    Anxious  Depressed    Affect:    Unable to assess due to phone appointment    Thought Content:  Clear    Thought Form:  Coherent    Insight:    Fair      Medication Review:   No changes to current psychiatric medication(s)     Medication Compliance:   Yes     Changes in Health Issues:   None reported     Chemical Use Review:   Substance Use: Chemical use reviewed, no active concerns identified      Tobacco Use: No current tobacco use.      Diagnosis:  1. Generalized anxiety disorder    2. Bipolar affective disorder, currently depressed, moderate (H)    3. ADHD (attention deficit hyperactivity disorder), combined type    4. PTSD (post-traumatic stress disorder)        Collateral Reports Completed:   Not Applicable    PLAN: (Patient Tasks / Therapist Tasks /  Other)        Call [phone] if feeling SI has increase or go to ED or empath   Utilize coping skills when feeling impulsive.   Replace distortions with positive attributes  Look at intention rather than perception  Do not avoid people  TMS  Elimination diet  Don't avoid situation due to anxiety  Ground self   Therapist supported patient as she processed and validated patient. Therapist encouraged patient to continue to support her partner and for her to look at what she would eventually like with a relationship with his mother. Therapist offered patient guidance around healthy relationships and different types of boundaries to put in place.       Katie Faulkner, [facility] CenterSW    1/27/2025                                                         ______________________________________________________________________    Individual Treatment Plan    Patient's Name: Wandy Ann  YOB: 2000    Date of Creation: 2.22.2021  Date Treatment Plan Last Reviewed/Revised:  11/20/2024 due 2/20/2024    DSM5 Diagnoses: 296.52 Bipolar I Disorder Current or Most Recent Episode Depressed, Moderate, 300.02 (F41.1) Generalized Anxiety Disorder or 309.81 (F43.10) Posttraumatic Stress Disorder (includes Posttraumatic Stress Disorder for Children 6 Years and Younger)  Without dissociative symptoms  Psychosocial / Contextual Factors: past trauma, developmental hx and current stressors   PROMIS (reviewed every 90 days):12/4/2024    Referral / Collaboration:  Referral to another professional/service is not indicated at this time..    Anticipated number of session for this episode of care: 26  Anticipation frequency of session: Biweekly  Anticipated Duration of each session: 38-52 minutes  Treatment plan will be reviewed in 90 days or when goals have been changed.       MeasurableTreatment Goal(s) related to diagnosis / functional impairment(s)  Goal 1: Patient will report absence of persistent SI and report of reduced frequency and  intensity of SI and absence of SI to acceptable levels, report subjective improved mood for a period of 90 days, within 6 months as clinically observed and by patient self-report    I will know I've met my goal when I can see the difference between a bad moment and what I want in th future.      Objective #A (Patient Action)    Patient will demonstrate control of impulses and ability to use positive coping tools to manage strong emotions that can be safely addressed at a lower level of care. Absence of persistent SI and report of reduced frequency and intensity of SI and absence of SI to acceptable levels, report subjective improved mood for a period of 90 days, within 6 months as clinically observed and by patient self-report.  Status: Continued - Date(s):  11/20/2024    Intervention(s)  Therapist will provide individual therapy to identify triggers to SI urges, gain feedback on helpful coping strategies, and identify ways that family can offer support. Tx to discuss current stressors and interpersonal conflicts and how to cope with these, coaching, diagnostic testing, referral for medication as indicated, use prescribed medication, cognitive restructuring, interpersonal family therapy, supportive therapy services.        Goal 2: Patient will report absence of persistent depression mood and report of reduced frequency and intensity of low mood and absence of persistent low energy and motivation to acceptable levels, report subjective improved motivation and increased energy for a period of 90 days, within 6 months as clinically observed and by patient self-report    I will know I've met my goal when I no longer feel sad.      Objective #A (Patient Action)    Status: Continued - Date(s):  11/20/2024    Patient will demonstrate and report a level of depression that can be managed at a lower level of care.  Absence of persistent depression mood and report of reduced frequency and intensity of low mood and absence of  persistent low energy and motivation to acceptable levels, report subjective improved motivation and increased energy for a period of 90 days, within 6 months as clinically observed and by patient self-report.    Intervention(s)  Therapist will provide individual therapy to identify triggers to depression, gain feedback on helpful coping tools and thought-reframing techniques, and identify preferred way of being.  Tx to include discussion of current stressors and interpersonal conflicts and how to cope with these, coaching, diagnostic testing, referral for medication as indicated, use prescribed medication, cognitive restructuring, interpersonal, family therapy, supportive therapy services.        Goal 3: Patiient will report absence of persistent anxiety mood and report of reduced frequency and intensity of worry and absence of persistent anxious mood to acceptable levels, no panic attacks, report subjective comfort with rumination for a period of 90 days. Within 6 months as clinically observed and by patient self-report    I will know I've met my goal when I can go days without worrying about little things or shaking.      Objective #A (Patient Action)    Status: Continued - Date(s):  11/20/2024    Patient will demonstrate and report a level of anxiety that can be managed at a lower level of care.  Absence of persistent anxious mood and report of reduced frequency and intensity of worry and absence of persistent anxious mood to acceptable levels, no panic attacks, report subjective comfort with rumination for a period of 90 days, within 6 months as clinically observed and by patient self-report.    Intervention(s)  Therapist will provide individual therapy to identify triggers to anxiety, gain feedback on helpful coping tools and thought-reframing techniques, and identify preferred way of being. Tx to include discuss current stressors and interpersonal conflicts and how to cope with these, coaching, diagnostic  "testing , referral for medication as indicated, use prescribed medication, cognitive restructuring, interpersonal,   family therapy, supportive therapy services.        Patient has reviewed and agreed to the above plan.      Katie Faulkner, [facility]   11/20/2024                                                   Wandy Ann            SAFETY PLAN:  Step 1: Warning signs / cues (Thoughts, images, mood, situation, behavior) that a crisis may be developing:  Thoughts: thoughts of not wanting to be here  Images: no images  Thinking Processes: intrusive thoughts (bothersome, unwanted thoughts that come out of nowhere): get irritated  Mood: intense anger and agitation  Behaviors: isolating/withdrawing   Situations: trauma    Step 2: Coping strategies - Things I can do to take my mind off of my problems without contacting another person (relaxation technique, physical activity):  Distress Tolerance Strategies:  arts and crafts: drawing and painting  Physical Activities: exercise: working out  Focus on helpful thoughts:  \"This is temporary\", \"It always passes\" and \"Ride the wave\"  Step 3: People and social settings that provide distraction:                 Name: Jennifer     Phone: [phone]                 Name: Antonina         Phone: [phone]                 Name: mom       Phone: [phone]  friends house or car   Step 4: Remind myself of people and things that are important to me and worth living for:  Best friend and cat        Step 5: When I am in crisis, I can ask these people to help me use my safety plan:                 Name: Jennifer     Phone: [phone]                 Name: Antonina         Phone: [phone]                 Name: mom       Phone: [phone]  Step 6: Making the environment safe:   be around others  Step 7: Professionals or agencies I can contact during a crisis:  [facility] Daytime Number: [phone]  Suicide Prevention Lifeline: 4-429-712-NHZT (3441)  Crisis " Text Line Service (available 24 hours a day, 7 days a week): Text [place] to 380692  Local Crisis Services: 988     Call 911 or go to my nearest emergency department.       I helped develop this safety plan and agree to use it when needed.  I have been given a copy of this plan.       Client signature _________________________________________________________________  Today s date:  2/22/2021  Adapted from Safety Plan Template 2008 Marisol Cazares and Mario Flores is reprinted with the express permission of the authors.  No portion of the Safety Plan Template may be reproduced without the express, written permission.  You can contact the authors at [email or web address] or [email or web address].

## 2025-02-03 ENCOUNTER — OFFICE VISIT (OUTPATIENT)
Dept: FAMILY MEDICINE | Facility: CLINIC | Age: 25
End: 2025-02-03
Payer: COMMERCIAL

## 2025-02-03 VITALS
DIASTOLIC BLOOD PRESSURE: 79 MMHG | WEIGHT: 179.2 LBS | TEMPERATURE: 97.9 F | RESPIRATION RATE: 12 BRPM | HEART RATE: 88 BPM | SYSTOLIC BLOOD PRESSURE: 129 MMHG | HEIGHT: 59 IN | OXYGEN SATURATION: 99 % | BODY MASS INDEX: 36.12 KG/M2

## 2025-02-03 DIAGNOSIS — Z30.433 ENCOUNTER FOR REMOVAL AND REINSERTION OF INTRAUTERINE CONTRACEPTIVE DEVICE: Primary | ICD-10-CM

## 2025-02-03 PROCEDURE — 58300 INSERT INTRAUTERINE DEVICE: CPT | Performed by: PHYSICIAN ASSISTANT

## 2025-02-03 PROCEDURE — 58301 REMOVE INTRAUTERINE DEVICE: CPT | Performed by: PHYSICIAN ASSISTANT

## 2025-02-03 NOTE — NURSING NOTE
Clinic Administered Medication Documentation      Intrauterine/Implant Insertion Documentation    Device was placed by provider (please see MAR for given by information). Please see MAR and medication order for additional information.     Type: Mirena  Remove/Replace by: 02/03/2030    Expiration Date:  02/28/2027         Katerina Spencer, CMA

## 2025-02-03 NOTE — PROGRESS NOTES
SUBJECTIVE:    Is a pregnancy test required: No.  Was a consent obtained?  Yes    Subjective: Wandy Ann is a 24 year old No obstetric history on file. presents for IUD and desires Mirena type IUD.  She requests removal of the IUD because the IUD effectiveness has     Patient has been given the opportunity to ask questions about all forms of birth control, including all options appropriate for Wandy Ann. Discussed that no method of birth control, except abstinence is 100% effective against pregnancy or sexually transmitted infection.     Wandy Ann understands she may have the IUD removed at any time. IUD should be removed by a health care provider and the current IUD will be removed today.    The entire removal and insertion procedure was reviewed with the patient, including care after placement.    Today's PHQ-2 Score:       2023     9:13 AM   PHQ-2 (  [employer])   Q1: Little interest or pleasure in doing things 1   Q2: Feeling down, depressed or hopeless 3   PHQ-2 Score 4   Q1: Little interest or pleasure in doing things Several days   Q2: Feeling down, depressed or hopeless Nearly every day   PHQ-2 Score 4       PROCEDURE:    Premedicated with ibuprofen.  A speculum exam was performed and the cervix was visualized. The IUD string was visualized. Using ring forceps, the string  was grasped and the IUD removed intact.    Under sterile technique, cervix was visualized with speculum and prepped with Betadine solution swab x 3. Tenaculum was placed for stability. The uterus was gently straightened and sounded to 7.0 cm. IUD prepared for placement, and IUD inserted according to 's instructions without difficulty or significant ressitance, and deployed at the fundus. The strings were visualized and trimmed to 3.5 cm from the external os. Tenaculum was removed and hemostasis noted. Speculum removed.  Patient tolerated procedure well.    EBL: minimal     Complications: none      POST PROCEDURE:    Given 's handouts, including when to have IUD removed, list of danger s/sx, side effects and follow up recommended. Encouraged condom use for prevention of STD. Advised to call for any fever, for prolonged or severe pain or bleeding, abnormal vaginal dischage, or unable to palpate strings. She was advised to use pain medications (ibuprofen) as needed for mild to moderate pain. Advised to follow-up in clinic in 4-6 weeks for IUD string check if unable to find strings or as directed by provider.     Chinyere Ruiz PA-C

## 2025-02-10 ENCOUNTER — VIRTUAL VISIT (OUTPATIENT)
Dept: PSYCHOLOGY | Facility: CLINIC | Age: 25
End: 2025-02-10
Payer: COMMERCIAL

## 2025-02-10 DIAGNOSIS — F31.32 BIPOLAR AFFECTIVE DISORDER, CURRENTLY DEPRESSED, MODERATE (H): Primary | ICD-10-CM

## 2025-02-10 DIAGNOSIS — F41.1 GENERALIZED ANXIETY DISORDER: ICD-10-CM

## 2025-02-10 DIAGNOSIS — F43.10 PTSD (POST-TRAUMATIC STRESS DISORDER): ICD-10-CM

## 2025-02-10 DIAGNOSIS — F90.2 ADHD (ATTENTION DEFICIT HYPERACTIVITY DISORDER), COMBINED TYPE: ICD-10-CM

## 2025-02-10 PROCEDURE — 90837 PSYTX W PT 60 MINUTES: CPT | Mod: 95 | Performed by: SOCIAL WORKER

## 2025-02-10 NOTE — PROGRESS NOTES
[facility]                                      Progress Note    Patient Name: Wandy Ann  Date:  2/10/2025         Service Type: Individual      Session Start Time: 1600   Session End Time:      Session Length: 53+    Session #: 93    Attendees: Client    Service Modality:    Video Visit:      Provider verified identity through the following two step process.  Patient provided: Patient  and Patient previous known to provider     Telemedicine Visit: The patient's condition can be safely assessed and treated via synchronous audio and visual telemedicine encounter.       Reason for Telemedicine Visit: Patient has requested telehealth visit     Originating Site (Patient Location): Patient's home     Distant Site (Provider Location): Provider Remote Setting- Home Office     Consent:  The patient/guardian has verbally consented to: the potential risks and benefits of telemedicine (video visit) versus in person care; bill my insurance or make self-payment for services provided; and responsibility for payment of non-covered services.      Patient would like the video invitation sent by: email/text     Mode of Communication: Video Conference via [employer]well     Distant Location (Provider): Off-site     As the provider I attest to compliance with applicable laws and regulations related to telemedicine.      DATA  Interactive Complexity: No  Crisis: No  Extended Session (53+ minutes):   - Patient's presenting concerns require more intensive intervention than could be completed within the usual service        Progress Since Last Session (Related to Symptoms / Goals / Homework):   Symptoms: Worsening mood       Homework: Achieved / completed to satisfaction  Partially completed        Episode of Care Goals: Satisfactory progress - ACTION (Actively working towards change); Intervened by reinforcing change plan / affirming steps taken       Current / Ongoing Stressors and Concerns:   past  trauma, developmental hx and current stressors      Treatment Objective(s) Addressed in This Session:   Patient will demonstrate control of impulses and ability to use positive coping tools to manage strong emotions that can be safely addressed at a lower level of care. Absence of persistent SI and report of reduced frequency and intensity of SI and absence of SI to acceptable levels, report subjective improved mood for a period of 90 days, within 6 months as clinically observed and by patient self-report.  Patient will demonstrate and report a level of depression that can be managed at a lower level of care.  Absence of persistent depression mood and report of reduced frequency and intensity of low mood and absence of persistent low energy and motivation to acceptable levels, report subjective improved motivation and increased energy for a period of 90 days, within 6 months as clinically observed and by patient self-report.  Patient will demonstrate and report a level of anxiety that can be managed at a lower level of care.  Absence of persistent anxious mood and report of reduced frequency and intensity of worry and absence of persistent anxious mood to acceptable levels, no panic attacks, report subjective comfort with rumination for a period of 90 days, within 6 months as clinically observed and by patient self-report.       Intervention:  Therapist met with patient to review goals and interventions. Therapist utilized reflected listening as patient gave brief reflection of week. Patient reported difficult couple of weeks with mood, sadness, continued conflict with partner and processed. Therapist supported patient as she processed and validated patient. Therapist encouraged patient to look at if she is ready to step away from the relationship or reevaluate the relationship, repair and understand the relationship with the mother get's to look different for her and her partner. Therapist provided patient with  education around boundaries and setting up expectations through clear communication.   Patient presented with an anxious affect. Patient was engaged in session and open to feedback. Patient reported no safety concerns          There has been demonstrated improvement in functioning while patient has been engaged in psychotherapy/psychological service- if withdrawn the patient would deteriorate and/or relapse.     Assessments completed prior to visit:  The following assessments were completed by patient for this visit:  PHQ9:       4/25/2024     3:40 PM 7/11/2024    12:42 PM 8/12/2024     1:07 AM 9/24/2024    11:00 AM 12/1/2024     7:23 PM 12/4/2024    11:30 AM 1/6/2025    11:06 AM   PHQ-9 SCORE   PHQ-9 Total Score MyChart 6 (Mild depression) 12 (Moderate depression) 8 (Mild depression)  11 (Moderate depression) 11 (Moderate depression) 13 (Moderate depression)   PHQ-9 Total Score 6 12 8    8 14 11  11  13        Patient-reported     GAD7:       10/10/2023     1:57 PM 1/15/2024     7:48 PM 7/10/2024     1:35 PM 7/12/2024    11:48 AM 9/24/2024    11:00 AM 12/4/2024    11:30 AM 1/6/2025    11:07 AM   YESSI-7 SCORE   Total Score 17 (severe anxiety) 15 (severe anxiety)  17 (severe anxiety)  13 (moderate anxiety) 13 (moderate anxiety)   Total Score 17 15 17 17 12 13  13        Patient-reported   PROMIS-10 Scores        1/15/2024     7:49 PM 8/19/2024     3:00 PM 12/4/2024    11:31 AM   PROMIS-10 Total Score w/o Sub Scores   PROMIS TOTAL - SUBSCORES 24 29 26        Patient-reported                   ASSESSMENT: Current Emotional / Mental Status (status of significant symptoms):   Risk status (Self / Other harm or suicidal ideation)   Patient denies current fears or concerns for personal safety.   Patient denies current or recent suicidal ideation or behaviors.   Patient denies current or recent homicidal ideation or behaviors.   Patient denies current or recent self injurious behavior or ideation.   Patient denies other  safety concerns.   Patient reports there has been no change in risk factors since their last session.     Patient reports there has been no change in protective factors since their last session.     A safety and risk management plan has been developed including: Patient consented to co-developed safety plan on 2.21.2022.  Safety and risk management plan was reviewed.   Patient agreed to use safety plan should any safety concerns arise.  A copy was made available to the patient.     Appearance:   Appropriate     Eye Contact:   Fair     Psychomotor Behavior: Restless     Attitude:   Cooperative    Orientation:   All   Speech    Rate / Production: Emotional Talkative    Volume:  Normal    Mood:    Anxious  Depressed    Affect:    Labile    Thought Content:  Clear    Thought Form:  Coherent    Insight:    Fair      Medication Review:   No changes to current psychiatric medication(s)     Medication Compliance:   Yes     Changes in Health Issues:   None reported     Chemical Use Review:   Substance Use: Chemical use reviewed, no active concerns identified      Tobacco Use: No current tobacco use.      Diagnosis:  1. Bipolar affective disorder, currently depressed, moderate (H)    2. Generalized anxiety disorder    3. ADHD (attention deficit hyperactivity disorder), combined type    4. PTSD (post-traumatic stress disorder)        Collateral Reports Completed:   Not Applicable    PLAN: (Patient Tasks / Therapist Tasks / Other)        Call [phone] if feeling SI has increase or go to ED or empath   Utilize coping skills when feeling impulsive.   Replace distortions with positive attributes  Look at intention rather than perception  Do not avoid people  TMS  Elimination diet  Don't avoid situation due to anxiety  Ground self  Patient reported difficult couple of weeks with mood, sadness, continued conflict with partner and processed. Therapist supported patient as she processed and validated patient. Therapist encouraged patient  to look at if she is ready to step away from the relationship or reevaluate the relationship, repair and understand the relationship with the mother get's to look different for her and her partner. Therapist provided patient with education around boundaries and setting up expectations through clear communication.       Katie Faulkner, [facility]    2/10/2025                                                         ______________________________________________________________________    Individual Treatment Plan    Patient's Name: Wandy Ann  YOB: 2000    Date of Creation: 2.22.2021  Date Treatment Plan Last Reviewed/Revised:  11/20/2024 due 2/20/2024    DSM5 Diagnoses: 296.52 Bipolar I Disorder Current or Most Recent Episode Depressed, Moderate, 300.02 (F41.1) Generalized Anxiety Disorder or 309.81 (F43.10) Posttraumatic Stress Disorder (includes Posttraumatic Stress Disorder for Children 6 Years and Younger)  Without dissociative symptoms  Psychosocial / Contextual Factors: past trauma, developmental hx and current stressors   PROMIS (reviewed every 90 days):12/4/2024    Referral / Collaboration:  Referral to another professional/service is not indicated at this time..    Anticipated number of session for this episode of care: 26  Anticipation frequency of session: Biweekly  Anticipated Duration of each session: 38-52 minutes  Treatment plan will be reviewed in 90 days or when goals have been changed.       MeasurableTreatment Goal(s) related to diagnosis / functional impairment(s)  Goal 1: Patient will report absence of persistent SI and report of reduced frequency and intensity of SI and absence of SI to acceptable levels, report subjective improved mood for a period of 90 days, within 6 months as clinically observed and by patient self-report    I will know I've met my goal when I can see the difference between a bad moment and what I want in th future.      Objective #A (Patient  Action)    Patient will demonstrate control of impulses and ability to use positive coping tools to manage strong emotions that can be safely addressed at a lower level of care. Absence of persistent SI and report of reduced frequency and intensity of SI and absence of SI to acceptable levels, report subjective improved mood for a period of 90 days, within 6 months as clinically observed and by patient self-report.  Status: Continued - Date(s):  11/20/2024    Intervention(s)  Therapist will provide individual therapy to identify triggers to SI urges, gain feedback on helpful coping strategies, and identify ways that family can offer support. Tx to discuss current stressors and interpersonal conflicts and how to cope with these, coaching, diagnostic testing, referral for medication as indicated, use prescribed medication, cognitive restructuring, interpersonal family therapy, supportive therapy services.        Goal 2: Patient will report absence of persistent depression mood and report of reduced frequency and intensity of low mood and absence of persistent low energy and motivation to acceptable levels, report subjective improved motivation and increased energy for a period of 90 days, within 6 months as clinically observed and by patient self-report    I will know I've met my goal when I no longer feel sad.      Objective #A (Patient Action)    Status: Continued - Date(s):  11/20/2024    Patient will demonstrate and report a level of depression that can be managed at a lower level of care.  Absence of persistent depression mood and report of reduced frequency and intensity of low mood and absence of persistent low energy and motivation to acceptable levels, report subjective improved motivation and increased energy for a period of 90 days, within 6 months as clinically observed and by patient self-report.    Intervention(s)  Therapist will provide individual therapy to identify triggers to depression, gain  feedback on helpful coping tools and thought-reframing techniques, and identify preferred way of being.  Tx to include discussion of current stressors and interpersonal conflicts and how to cope with these, coaching, diagnostic testing, referral for medication as indicated, use prescribed medication, cognitive restructuring, interpersonal, family therapy, supportive therapy services.        Goal 3: Patiient will report absence of persistent anxiety mood and report of reduced frequency and intensity of worry and absence of persistent anxious mood to acceptable levels, no panic attacks, report subjective comfort with rumination for a period of 90 days. Within 6 months as clinically observed and by patient self-report    I will know I've met my goal when I can go days without worrying about little things or shaking.      Objective #A (Patient Action)    Status: Continued - Date(s):  11/20/2024    Patient will demonstrate and report a level of anxiety that can be managed at a lower level of care.  Absence of persistent anxious mood and report of reduced frequency and intensity of worry and absence of persistent anxious mood to acceptable levels, no panic attacks, report subjective comfort with rumination for a period of 90 days, within 6 months as clinically observed and by patient self-report.    Intervention(s)  Therapist will provide individual therapy to identify triggers to anxiety, gain feedback on helpful coping tools and thought-reframing techniques, and identify preferred way of being. Tx to include discuss current stressors and interpersonal conflicts and how to cope with these, coaching, diagnostic testing , referral for medication as indicated, use prescribed medication, cognitive restructuring, interpersonal,   family therapy, supportive therapy services.        Patient has reviewed and agreed to the above plan.      Katie Faulkner, [facility]   11/20/2024                                                  "  Wandy Ann            SAFETY PLAN:  Step 1: Warning signs / cues (Thoughts, images, mood, situation, behavior) that a crisis may be developing:  Thoughts: thoughts of not wanting to be here  Images: no images  Thinking Processes: intrusive thoughts (bothersome, unwanted thoughts that come out of nowhere): get irritated  Mood: intense anger and agitation  Behaviors: isolating/withdrawing   Situations: trauma    Step 2: Coping strategies - Things I can do to take my mind off of my problems without contacting another person (relaxation technique, physical activity):  Distress Tolerance Strategies:  arts and crafts: drawing and painting  Physical Activities: exercise: working out  Focus on helpful thoughts:  \"This is temporary\", \"It always passes\" and \"Ride the wave\"  Step 3: People and social settings that provide distraction:                 Name: Jennifer     Phone: [phone]                 Name: Antonina         Phone: [phone]                 Name: mom       Phone: [phone]  friends house or car   Step 4: Remind myself of people and things that are important to me and worth living for:  Best friend and cat        Step 5: When I am in crisis, I can ask these people to help me use my safety plan:                 Name: Jennifer     Phone: [phone]                 Name: Antonina         Phone: [phone]                 Name: panchito       Phone: [phone]  Step 6: Making the environment safe:   be around others  Step 7: Professionals or agencies I can contact during a crisis:  [facility] Daytime Number: [phone]  Suicide Prevention Lifeline: 1-010-081-JOSH (6081)  Crisis Text Line Service (available 24 hours a day, 7 days a week): Text [place] to 580769  Local Crisis Services: 988     Call 911 or go to my nearest emergency department.       I helped develop this safety plan and agree to use it when needed.  I have been given a copy of this plan.       Client signature " _________________________________________________________________  Today s date:  2/22/2021  Adapted from Safety Plan Template 2008 Marisol Cazares and Mario Flores is reprinted with the express permission of the authors.  No portion of the Safety Plan Template may be reproduced without the express, written permission.  You can contact the authors at [email or web address] or [email or web address].

## 2025-02-23 ENCOUNTER — NURSE TRIAGE (OUTPATIENT)
Dept: NURSING | Facility: CLINIC | Age: 25
End: 2025-02-23
Payer: COMMERCIAL

## 2025-02-23 NOTE — TELEPHONE ENCOUNTER
Patient states that took her first dose of Pristiq around 0000 and woke this morning with a headache, shakiness and nausea.  Patient is advised to provider at [facility] that ordered medication for her, and as a second option contact a 24 hour pharmacy.  Patient verbalized understanding of care advice.    Haylie Santizo RN on 2/23/2025 at 7:11 AM    Reason for Disposition   [1] Caller has medicine question about med NOT prescribed by PCP AND [2] triager unable to answer question (e.g., compatibility with other med, storage)    Protocols used: Medication Question Call-A-

## 2025-02-24 ENCOUNTER — VIRTUAL VISIT (OUTPATIENT)
Dept: PSYCHOLOGY | Facility: CLINIC | Age: 25
End: 2025-02-24
Payer: COMMERCIAL

## 2025-02-24 DIAGNOSIS — F43.10 PTSD (POST-TRAUMATIC STRESS DISORDER): ICD-10-CM

## 2025-02-24 DIAGNOSIS — F90.2 ADHD (ATTENTION DEFICIT HYPERACTIVITY DISORDER), COMBINED TYPE: ICD-10-CM

## 2025-02-24 DIAGNOSIS — F41.1 GENERALIZED ANXIETY DISORDER: ICD-10-CM

## 2025-02-24 DIAGNOSIS — F31.32 BIPOLAR AFFECTIVE DISORDER, CURRENTLY DEPRESSED, MODERATE (H): Primary | ICD-10-CM

## 2025-02-24 PROCEDURE — 90837 PSYTX W PT 60 MINUTES: CPT | Mod: 93 | Performed by: SOCIAL WORKER

## 2025-02-24 ASSESSMENT — ANXIETY QUESTIONNAIRES
GAD7 TOTAL SCORE: 10
GAD7 TOTAL SCORE: 10
6. BECOMING EASILY ANNOYED OR IRRITABLE: NOT AT ALL
GAD7 TOTAL SCORE: 10
7. FEELING AFRAID AS IF SOMETHING AWFUL MIGHT HAPPEN: SEVERAL DAYS
3. WORRYING TOO MUCH ABOUT DIFFERENT THINGS: MORE THAN HALF THE DAYS
IF YOU CHECKED OFF ANY PROBLEMS ON THIS QUESTIONNAIRE, HOW DIFFICULT HAVE THESE PROBLEMS MADE IT FOR YOU TO DO YOUR WORK, TAKE CARE OF THINGS AT HOME, OR GET ALONG WITH OTHER PEOPLE: SOMEWHAT DIFFICULT
2. NOT BEING ABLE TO STOP OR CONTROL WORRYING: SEVERAL DAYS
1. FEELING NERVOUS, ANXIOUS, OR ON EDGE: MORE THAN HALF THE DAYS
7. FEELING AFRAID AS IF SOMETHING AWFUL MIGHT HAPPEN: SEVERAL DAYS
5. BEING SO RESTLESS THAT IT IS HARD TO SIT STILL: MORE THAN HALF THE DAYS
4. TROUBLE RELAXING: MORE THAN HALF THE DAYS
8. IF YOU CHECKED OFF ANY PROBLEMS, HOW DIFFICULT HAVE THESE MADE IT FOR YOU TO DO YOUR WORK, TAKE CARE OF THINGS AT HOME, OR GET ALONG WITH OTHER PEOPLE?: SOMEWHAT DIFFICULT

## 2025-02-24 ASSESSMENT — PATIENT HEALTH QUESTIONNAIRE - PHQ9
SUM OF ALL RESPONSES TO PHQ QUESTIONS 1-9: 10
SUM OF ALL RESPONSES TO PHQ QUESTIONS 1-9: 10
10. IF YOU CHECKED OFF ANY PROBLEMS, HOW DIFFICULT HAVE THESE PROBLEMS MADE IT FOR YOU TO DO YOUR WORK, TAKE CARE OF THINGS AT HOME, OR GET ALONG WITH OTHER PEOPLE: SOMEWHAT DIFFICULT

## 2025-02-24 NOTE — PROGRESS NOTES
"/    [facility]                                      Progress Note    Patient Name: Wandy Ann  Date: 2025         Service Type: Individual      Session Start Time: 1500  Session End Time: 1556     Session Length: 53+    Session #: 94    Attendees: Client    Service Modality:    Phone Visit:  Patient requested phone session for today      Provider verified identity through the following two step process.  Patient provided:  Patient is known previously to provider/     Telephone Visit: The patient's condition can be safely assessed and treated via synchronous audio telemedicine encounter.       Reason for Audio Telemedicine Visit: Services only offered telehealth     Originating Site (Patient Location): Patient's home     Distant Site (Provider Location): off site      Consent:  The patient/guardian has verbally consented to:      1. The potential risks and benefits of telemedicine (telephone visit) versus in person care;     The patient has been notified of the following:      \"We have found that certain health care needs can be provided without the need for a face to face visit.  This service lets us provide the care you need with a phone conversation.       I will have full access to your [facility] medical record during this entire phone call.   I will be taking notes for your medical record.      Since this is like an office visit, we will bill your insurance company for this service.       There are potential benefits and risks of telephone visits (e.g. limits to patient confidentiality) that differ from in-person visits.?Confidentiality still applies for telephone services, and nobody will record the visit.  It is important to be in a quiet, private space that is free of distractions (including cell phone or other devices) during the visit.??      If during the course of the call I believe a telephone visit is not appropriate, you will not be charged for this " "service\"     Consent has been obtained for this service by care team member: Yes     DATA  Interactive Complexity: No  Crisis: No  Extended Session (53+ minutes):   - Patient's presenting concerns require more intensive intervention than could be completed within the usual service        Progress Since Last Session (Related to Symptoms / Goals / Homework):   Symptoms: Improving phq-9 henrique-7      Homework: Achieved / completed to satisfaction  Partially completed        Episode of Care Goals: Satisfactory progress - ACTION (Actively working towards change); Intervened by reinforcing change plan / affirming steps taken       Current / Ongoing Stressors and Concerns:   past trauma, developmental hx and current stressors      Treatment Objective(s) Addressed in This Session:   Patient will demonstrate control of impulses and ability to use positive coping tools to manage strong emotions that can be safely addressed at a lower level of care. Absence of persistent SI and report of reduced frequency and intensity of SI and absence of SI to acceptable levels, report subjective improved mood for a period of 90 days, within 6 months as clinically observed and by patient self-report.  Patient will demonstrate and report a level of depression that can be managed at a lower level of care.  Absence of persistent depression mood and report of reduced frequency and intensity of low mood and absence of persistent low energy and motivation to acceptable levels, report subjective improved motivation and increased energy for a period of 90 days, within 6 months as clinically observed and by patient self-report.  Patient will demonstrate and report a level of anxiety that can be managed at a lower level of care.  Absence of persistent anxious mood and report of reduced frequency and intensity of worry and absence of persistent anxious mood to acceptable levels, no panic attacks, report subjective comfort with rumination for a period of " 90 days, within 6 months as clinically observed and by patient self-report.       Intervention:  Therapist met with patient to review goals and interventions. Therapist utilized reflected listening as patient gave brief reflection of week. Patient processed side effect to medication and dynamics in relationship and external factors. Therapist supported patient as she processed and validated patient. Therapist encouraged patient to reach out to insurance on TMS to see if one place would be more affordable. Therapist offered patient alternative perspective when asking friends for advice around her relationship along with reasoning behind what advice she might receive. Therapist validated patient's continue hard work with the situation around her relationship and partner's mother and allowing herself to continue firm boundaries.   Patient presented with an anxious affect. Patient was engaged in session and open to feedback. Patient reported no safety concerns          There has been demonstrated improvement in functioning while patient has been engaged in psychotherapy/psychological service- if withdrawn the patient would deteriorate and/or relapse.     Assessments completed prior to visit:  The following assessments were completed by patient for this visit:  PHQ9:       7/11/2024    12:42 PM 8/12/2024     1:07 AM 9/24/2024    11:00 AM 12/1/2024     7:23 PM 12/4/2024    11:30 AM 1/6/2025    11:06 AM 2/24/2025     2:59 PM   PHQ-9 SCORE   PHQ-9 Total Score MyChart 12 (Moderate depression) 8 (Mild depression)  11 (Moderate depression) 11 (Moderate depression) 13 (Moderate depression) 10 (Moderate depression)   PHQ-9 Total Score 12 8    8 14 11  11  13  10        Patient-reported     GAD7:       1/15/2024     7:48 PM 7/10/2024     1:35 PM 7/12/2024    11:48 AM 9/24/2024    11:00 AM 12/4/2024    11:30 AM 1/6/2025    11:07 AM 2/24/2025     2:59 PM   YESSI-7 SCORE   Total Score 15 (severe anxiety)  17 (severe anxiety)  13  (moderate anxiety) 13 (moderate anxiety) 10 (moderate anxiety)   Total Score 15 17 17 12 13  13  10        Patient-reported   PROMIS-10 Scores        1/15/2024     7:49 PM 8/19/2024     3:00 PM 12/4/2024    11:31 AM   PROMIS-10 Total Score w/o Sub Scores   PROMIS TOTAL - SUBSCORES 24 29 26        Patient-reported                   ASSESSMENT: Current Emotional / Mental Status (status of significant symptoms):   Risk status (Self / Other harm or suicidal ideation)   Patient denies current fears or concerns for personal safety.   Patient denies current or recent suicidal ideation or behaviors.   Patient denies current or recent homicidal ideation or behaviors.   Patient denies current or recent self injurious behavior or ideation.   Patient denies other safety concerns.   Patient reports there has been no change in risk factors since their last session.     Patient reports there has been no change in protective factors since their last session.     A safety and risk management plan has been developed including: Patient consented to co-developed safety plan on 2.21.2022.  Safety and risk management plan was reviewed.   Patient agreed to use safety plan should any safety concerns arise.  A copy was made available to the patient.     Appearance:   Unable to assess due to phone appointment    Eye Contact:   Unable to assess due to phone appointment     Psychomotor Behavior: Unable to assess due to phone appointment     Attitude:   Cooperative    Orientation:   All   Speech    Rate / Production: Emotional Talkative    Volume:  Normal    Mood:    Anxious  Depressed    Affect:    Labile    Thought Content:  Clear    Thought Form:  Coherent    Insight:    Fair      Medication Review:   Changes to psychiatric medications, see updated Medication List in EPIC.      Medication Compliance:   Yes     Changes in Health Issues:   None reported     Chemical Use Review:   Substance Use: Chemical use reviewed, no active concerns  identified      Tobacco Use: No current tobacco use.      Diagnosis:  1. Bipolar affective disorder, currently depressed, moderate (H)    2. Generalized anxiety disorder    3. ADHD (attention deficit hyperactivity disorder), combined type    4. PTSD (post-traumatic stress disorder)        Collateral Reports Completed:   Not Applicable    PLAN: (Patient Tasks / Therapist Tasks / Other)        Call [phone] if feeling SI has increase or go to ED or empath   Patient processed side effect to medication and dynamics in relationship and external factors. Therapist supported patient as she processed and validated patient. Therapist encouraged patient to reach out to insurance on TMS to see if one place would be more affordable. Therapist offered patient alternative perspective when asking friends for advice around her relationship along with reasoning behind what advice she might receive. Therapist validated patient's continue hard work with the situation around her relationship and partner's mother and allowing herself to continue firm boundaries.       Katie Faulkner, [facility]   2/24/2025                                                         ______________________________________________________________________    Individual Treatment Plan    Patient's Name: Wandy Ann  YOB: 2000    Date of Creation: 2.22.2021  Date Treatment Plan Last Reviewed/Revised:   2/24/2025 due  5/24/2025    DSM5 Diagnoses: 296.52 Bipolar I Disorder Current or Most Recent Episode Depressed, Moderate, 300.02 (F41.1) Generalized Anxiety Disorder or 309.81 (F43.10) Posttraumatic Stress Disorder (includes Posttraumatic Stress Disorder for Children 6 Years and Younger)  Without dissociative symptoms  Psychosocial / Contextual Factors: past trauma, developmental hx and current stressors   PROMIS (reviewed every 90 days):12/4/2024    Referral / Collaboration:  Referral to another professional/service is not indicated at this  time..    Anticipated number of session for this episode of care: 26  Anticipation frequency of session: Biweekly  Anticipated Duration of each session: 38-52 minutes  Treatment plan will be reviewed in 90 days or when goals have been changed.       MeasurableTreatment Goal(s) related to diagnosis / functional impairment(s)  Goal 1: Patient will report absence of persistent SI and report of reduced frequency and intensity of SI and absence of SI to acceptable levels, report subjective improved mood for a period of 90 days, within 6 months as clinically observed and by patient self-report    I will know I've met my goal when I can see the difference between a bad moment and what I want in th future.      Objective #A (Patient Action)    Patient will demonstrate control of impulses and ability to use positive coping tools to manage strong emotions that can be safely addressed at a lower level of care. Absence of persistent SI and report of reduced frequency and intensity of SI and absence of SI to acceptable levels, report subjective improved mood for a period of 90 days, within 6 months as clinically observed and by patient self-report.  Status: Continued - Date(s):   2/24/2025    Intervention(s)  Therapist will provide individual therapy to identify triggers to SI urges, gain feedback on helpful coping strategies, and identify ways that family can offer support. Tx to discuss current stressors and interpersonal conflicts and how to cope with these, coaching, diagnostic testing, referral for medication as indicated, use prescribed medication, cognitive restructuring, interpersonal family therapy, supportive therapy services.        Goal 2: Patient will report absence of persistent depression mood and report of reduced frequency and intensity of low mood and absence of persistent low energy and motivation to acceptable levels, report subjective improved motivation and increased energy for a period of 90 days, within 6  months as clinically observed and by patient self-report    I will know I've met my goal when I no longer feel sad.      Objective #A (Patient Action)    Status: Continued - Date(s):   2/24/2025    Patient will demonstrate and report a level of depression that can be managed at a lower level of care.  Absence of persistent depression mood and report of reduced frequency and intensity of low mood and absence of persistent low energy and motivation to acceptable levels, report subjective improved motivation and increased energy for a period of 90 days, within 6 months as clinically observed and by patient self-report.    Intervention(s)  Therapist will provide individual therapy to identify triggers to depression, gain feedback on helpful coping tools and thought-reframing techniques, and identify preferred way of being.  Tx to include discussion of current stressors and interpersonal conflicts and how to cope with these, coaching, diagnostic testing, referral for medication as indicated, use prescribed medication, cognitive restructuring, interpersonal, family therapy, supportive therapy services.        Goal 3: Patiient will report absence of persistent anxiety mood and report of reduced frequency and intensity of worry and absence of persistent anxious mood to acceptable levels, no panic attacks, report subjective comfort with rumination for a period of 90 days. Within 6 months as clinically observed and by patient self-report    I will know I've met my goal when I can go days without worrying about little things or shaking.      Objective #A (Patient Action)    Status: Continued - Date(s):   2/24/2025    Patient will demonstrate and report a level of anxiety that can be managed at a lower level of care.  Absence of persistent anxious mood and report of reduced frequency and intensity of worry and absence of persistent anxious mood to acceptable levels, no panic attacks, report subjective comfort with rumination  "for a period of 90 days, within 6 months as clinically observed and by patient self-report.    Intervention(s)  Therapist will provide individual therapy to identify triggers to anxiety, gain feedback on helpful coping tools and thought-reframing techniques, and identify preferred way of being. Tx to include discuss current stressors and interpersonal conflicts and how to cope with these, coaching, diagnostic testing , referral for medication as indicated, use prescribed medication, cognitive restructuring, interpersonal,   family therapy, supportive therapy services.        Patient has reviewed and agreed to the above plan.      Katie Faulkner, [facility]    2/24/2025                                                   Wandy Ann            SAFETY PLAN:  Step 1: Warning signs / cues (Thoughts, images, mood, situation, behavior) that a crisis may be developing:  Thoughts: thoughts of not wanting to be here  Images: no images  Thinking Processes: intrusive thoughts (bothersome, unwanted thoughts that come out of nowhere): get irritated  Mood: intense anger and agitation  Behaviors: isolating/withdrawing   Situations: trauma    Step 2: Coping strategies - Things I can do to take my mind off of my problems without contacting another person (relaxation technique, physical activity):  Distress Tolerance Strategies:  arts and crafts: drawing and painting  Physical Activities: exercise: working out  Focus on helpful thoughts:  \"This is temporary\", \"It always passes\" and \"Ride the wave\"  Step 3: People and social settings that provide distraction:                 Name: Jennifer     Phone: [phone]                 Name: Antonina         Phone: [phone]                 Name: panchito       Phone: [phone]  friends house or car   Step 4: Remind myself of people and things that are important to me and worth living for:  Best friend and cat        Step 5: When I am in crisis, I can ask these people to help me use my " safety plan:                 Name: Jennifer     Phone: [phone]                 Name: Antonina         Phone: [phone]                 Name: panchito       Phone: [phone]  Step 6: Making the environment safe:   be around others  Step 7: Professionals or agencies I can contact during a crisis:  [facility] Number: [phone]  Suicide Prevention Lifeline: 4-550-688-MSDL (3590)  Crisis Text Line Service (available 24 hours a day, 7 days a week): Text [place] to 881724  Local Crisis Services: 988     Call 911 or go to my nearest emergency department.       I helped develop this safety plan and agree to use it when needed.  I have been given a copy of this plan.       Client signature _________________________________________________________________  Today s date:  2/22/2021  Adapted from Safety Plan Template 2008 Marisol Cazares and Mario Flores is reprinted with the express permission of the authors.  No portion of the Safety Plan Template may be reproduced without the express, written permission.  You can contact the authors at [email or web address] or [email or web address].

## 2025-03-04 ENCOUNTER — TELEPHONE (OUTPATIENT)
Dept: PSYCHOLOGY | Facility: CLINIC | Age: 25
End: 2025-03-04
Payer: COMMERCIAL

## 2025-03-05 NOTE — TELEPHONE ENCOUNTER
3/4/2025  [id number]    Pt wanted to get in this week for apt. Therapist called to check in on pt. Pt reported no safety concerns just difficult week with mood (low). Therapist gave time to pt to process and encouraged patient to surround self with support system, get out of house, go for walks, do not stay in bed and to follow up on TMS (starting next week), follow safety plan.  Patient was engaged and open to feedback

## 2025-03-18 ENCOUNTER — VIRTUAL VISIT (OUTPATIENT)
Dept: PSYCHOLOGY | Facility: CLINIC | Age: 25
End: 2025-03-18
Payer: COMMERCIAL

## 2025-03-18 DIAGNOSIS — F43.10 PTSD (POST-TRAUMATIC STRESS DISORDER): ICD-10-CM

## 2025-03-18 DIAGNOSIS — F90.2 ADHD (ATTENTION DEFICIT HYPERACTIVITY DISORDER), COMBINED TYPE: ICD-10-CM

## 2025-03-18 DIAGNOSIS — F41.1 GENERALIZED ANXIETY DISORDER: ICD-10-CM

## 2025-03-18 DIAGNOSIS — F31.32 BIPOLAR AFFECTIVE DISORDER, CURRENTLY DEPRESSED, MODERATE (H): Primary | ICD-10-CM

## 2025-03-18 PROCEDURE — 90837 PSYTX W PT 60 MINUTES: CPT | Mod: 95 | Performed by: SOCIAL WORKER

## 2025-03-18 NOTE — PROGRESS NOTES
[facility]                                      Progress Note    Patient Name: Wandy Ann  Date: 3/18/2025         Service Type: Individual      Session Start Time: 1505  Session End Time: 155     Session Length: 53+    Session #: 95    Attendees: Client    Service Modality:    Video Visit:      Provider verified identity through the following two step process.  Patient provided: Patient  and Patient previous known to provider     Telemedicine Visit: The patient's condition can be safely assessed and treated via synchronous audio and visual telemedicine encounter.       Reason for Telemedicine Visit: Patient has requested telehealth visit     Originating Site (Patient Location): Patient's home     Distant Site (Provider Location): Provider Remote Setting- Home Office     Consent:  The patient/guardian has verbally consented to: the potential risks and benefits of telemedicine (video visit) versus in person care; bill my insurance or make self-payment for services provided; and responsibility for payment of non-covered services.      Patient would like the video invitation sent by: email/text     Mode of Communication: Video Conference via [employer]     Distant Location (Provider): Off-site     As the provider I attest to compliance with applicable laws and regulations related to telemedicine.    DATA  Interactive Complexity: No  Crisis: No  Extended Session (53+ minutes):   - Patient's presenting concerns require more intensive intervention than could be completed within the usual service        Progress Since Last Session (Related to Symptoms / Goals / Homework):   Symptoms: Improving with continued stressors      Homework: Achieved / completed to satisfaction  Partially completed        Episode of Care Goals: Satisfactory progress - ACTION (Actively working towards change); Intervened by reinforcing change plan / affirming steps taken       Current / Ongoing Stressors and  Concerns:   past trauma, developmental hx and current stressors      Treatment Objective(s) Addressed in This Session:   Patient will demonstrate control of impulses and ability to use positive coping tools to manage strong emotions that can be safely addressed at a lower level of care. Absence of persistent SI and report of reduced frequency and intensity of SI and absence of SI to acceptable levels, report subjective improved mood for a period of 90 days, within 6 months as clinically observed and by patient self-report.  Patient will demonstrate and report a level of depression that can be managed at a lower level of care.  Absence of persistent depression mood and report of reduced frequency and intensity of low mood and absence of persistent low energy and motivation to acceptable levels, report subjective improved motivation and increased energy for a period of 90 days, within 6 months as clinically observed and by patient self-report.  Patient will demonstrate and report a level of anxiety that can be managed at a lower level of care.  Absence of persistent anxious mood and report of reduced frequency and intensity of worry and absence of persistent anxious mood to acceptable levels, no panic attacks, report subjective comfort with rumination for a period of 90 days, within 6 months as clinically observed and by patient self-report.       Intervention:  Motivational Interviewing  Target Behavior:  black and white thinking, slow down and look at a balance between and what it probably is.     Stage of Change: PREPARATION (Decided to change - considering how)    MI Intervention: Expressed Empathy/Understanding, Supported Autonomy, Collaboration, Evocation, Permission to raise concern or advise, and Open-ended questions     Change Talk Expressed by the Patient: Desire to change Ability to change Reasons to change Need to change    Provider Response to Change Talk: A - Affirmed patient's thoughts, decisions, or  attempts at behavior change and R - Reflected patient's change talk            There has been demonstrated improvement in functioning while patient has been engaged in psychotherapy/psychological service- if withdrawn the patient would deteriorate and/or relapse.     Assessments completed prior to visit:  The following assessments were completed by patient for this visit:  PHQ9:       7/11/2024    12:42 PM 8/12/2024     1:07 AM 9/24/2024    11:00 AM 12/1/2024     7:23 PM 12/4/2024    11:30 AM 1/6/2025    11:06 AM 2/24/2025     2:59 PM   PHQ-9 SCORE   PHQ-9 Total Score MyChart 12 (Moderate depression) 8 (Mild depression)  11 (Moderate depression) 11 (Moderate depression) 13 (Moderate depression) 10 (Moderate depression)   PHQ-9 Total Score 12 8    8 14 11  11  13  10        Patient-reported     GAD7:       1/15/2024     7:48 PM 7/10/2024     1:35 PM 7/12/2024    11:48 AM 9/24/2024    11:00 AM 12/4/2024    11:30 AM 1/6/2025    11:07 AM 2/24/2025     2:59 PM   YESSI-7 SCORE   Total Score 15 (severe anxiety)  17 (severe anxiety)  13 (moderate anxiety) 13 (moderate anxiety) 10 (moderate anxiety)   Total Score 15 17 17 12 13  13  10        Patient-reported   PROMIS-10 Scores        8/19/2024     3:00 PM 12/4/2024    11:31 AM 3/13/2025     4:09 PM   PROMIS-10 Total Score w/o Sub Scores   PROMIS TOTAL - SUBSCORES 29 26  24        Patient-reported                   ASSESSMENT: Current Emotional / Mental Status (status of significant symptoms):   Risk status (Self / Other harm or suicidal ideation)   Patient denies current fears or concerns for personal safety.   Patient denies current or recent suicidal ideation or behaviors.   Patient denies current or recent homicidal ideation or behaviors.   Patient denies current or recent self injurious behavior or ideation.   Patient denies other safety concerns.   Patient reports there has been no change in risk factors since their last session.     Patient reports there has been no  change in protective factors since their last session.     A safety and risk management plan has been developed including: Patient consented to co-developed safety plan on 2.21.2022.  Safety and risk management plan was reviewed.   Patient agreed to use safety plan should any safety concerns arise.  A copy was made available to the patient.     Appearance:   Appropriate     Eye Contact:   Fair     Psychomotor Behavior: Restless     Attitude:   Cooperative    Orientation:   All   Speech    Rate / Production: Emotional Talkative    Volume:  Normal    Mood:    Anxious  Depressed    Affect:    Labile    Thought Content:  Clear    Thought Form:  Coherent    Insight:    Fair      Medication Review:   No changes to current psychiatric medication(s)     Medication Compliance:   Yes     Changes in Health Issues:   None reported     Chemical Use Review:   Substance Use: Chemical use reviewed, no active concerns identified      Tobacco Use: No current tobacco use.      Diagnosis:  1. Bipolar affective disorder, currently depressed, moderate (H)    2. Generalized anxiety disorder    3. ADHD (attention deficit hyperactivity disorder), combined type    4. PTSD (post-traumatic stress disorder)        Collateral Reports Completed:   Not Applicable    PLAN: (Patient Tasks / Therapist Tasks / Other)        Call [phone] if feeling SI has increase or go to ED or empath   black and white thinking, slow down and look at a balance between and what it probably is  Speak to friend about what you need and if she is able to also meet you when you are happy        Katie Faulkner, [facility]   3/18/2025                                                         ______________________________________________________________________    Individual Treatment Plan    Patient's Name: Wandy Ann  YOB: 2000    Date of Creation: 2.22.2021  Date Treatment Plan Last Reviewed/Revised:   2/24/2025 due  5/24/2025    DSM5 Diagnoses: 296.52  Bipolar I Disorder Current or Most Recent Episode Depressed, Moderate, 300.02 (F41.1) Generalized Anxiety Disorder or 309.81 (F43.10) Posttraumatic Stress Disorder (includes Posttraumatic Stress Disorder for Children 6 Years and Younger)  Without dissociative symptoms  Psychosocial / Contextual Factors: past trauma, developmental hx and current stressors   PROMIS (reviewed every 90 days):  3/13/2025    Referral / Collaboration:  Referral to another professional/service is not indicated at this time..    Anticipated number of session for this episode of care: 26  Anticipation frequency of session: Biweekly  Anticipated Duration of each session: 38-52 minutes  Treatment plan will be reviewed in 90 days or when goals have been changed.       MeasurableTreatment Goal(s) related to diagnosis / functional impairment(s)  Goal 1: Patient will report absence of persistent SI and report of reduced frequency and intensity of SI and absence of SI to acceptable levels, report subjective improved mood for a period of 90 days, within 6 months as clinically observed and by patient self-report    I will know I've met my goal when I can see the difference between a bad moment and what I want in th future.      Objective #A (Patient Action)    Patient will demonstrate control of impulses and ability to use positive coping tools to manage strong emotions that can be safely addressed at a lower level of care. Absence of persistent SI and report of reduced frequency and intensity of SI and absence of SI to acceptable levels, report subjective improved mood for a period of 90 days, within 6 months as clinically observed and by patient self-report.  Status: Continued - Date(s):   2/24/2025    Intervention(s)  Therapist will provide individual therapy to identify triggers to SI urges, gain feedback on helpful coping strategies, and identify ways that family can offer support. Tx to discuss current stressors and interpersonal conflicts and  how to cope with these, coaching, diagnostic testing, referral for medication as indicated, use prescribed medication, cognitive restructuring, interpersonal family therapy, supportive therapy services.        Goal 2: Patient will report absence of persistent depression mood and report of reduced frequency and intensity of low mood and absence of persistent low energy and motivation to acceptable levels, report subjective improved motivation and increased energy for a period of 90 days, within 6 months as clinically observed and by patient self-report    I will know I've met my goal when I no longer feel sad.      Objective #A (Patient Action)    Status: Continued - Date(s):   2/24/2025    Patient will demonstrate and report a level of depression that can be managed at a lower level of care.  Absence of persistent depression mood and report of reduced frequency and intensity of low mood and absence of persistent low energy and motivation to acceptable levels, report subjective improved motivation and increased energy for a period of 90 days, within 6 months as clinically observed and by patient self-report.    Intervention(s)  Therapist will provide individual therapy to identify triggers to depression, gain feedback on helpful coping tools and thought-reframing techniques, and identify preferred way of being.  Tx to include discussion of current stressors and interpersonal conflicts and how to cope with these, coaching, diagnostic testing, referral for medication as indicated, use prescribed medication, cognitive restructuring, interpersonal, family therapy, supportive therapy services.        Goal 3: Patiient will report absence of persistent anxiety mood and report of reduced frequency and intensity of worry and absence of persistent anxious mood to acceptable levels, no panic attacks, report subjective comfort with rumination for a period of 90 days. Within 6 months as clinically observed and by patient  self-report    I will know I've met my goal when I can go days without worrying about little things or shaking.      Objective #A (Patient Action)    Status: Continued - Date(s):   2/24/2025    Patient will demonstrate and report a level of anxiety that can be managed at a lower level of care.  Absence of persistent anxious mood and report of reduced frequency and intensity of worry and absence of persistent anxious mood to acceptable levels, no panic attacks, report subjective comfort with rumination for a period of 90 days, within 6 months as clinically observed and by patient self-report.    Intervention(s)  Therapist will provide individual therapy to identify triggers to anxiety, gain feedback on helpful coping tools and thought-reframing techniques, and identify preferred way of being. Tx to include discuss current stressors and interpersonal conflicts and how to cope with these, coaching, diagnostic testing , referral for medication as indicated, use prescribed medication, cognitive restructuring, interpersonal,   family therapy, supportive therapy services.        Patient has reviewed and agreed to the above plan.      Katie Faulkner, [facility]    2/24/2025                                                   Wandy Ann            SAFETY PLAN:  Step 1: Warning signs / cues (Thoughts, images, mood, situation, behavior) that a crisis may be developing:  Thoughts: thoughts of not wanting to be here  Images: no images  Thinking Processes: intrusive thoughts (bothersome, unwanted thoughts that come out of nowhere): get irritated  Mood: intense anger and agitation  Behaviors: isolating/withdrawing   Situations: trauma    Step 2: Coping strategies - Things I can do to take my mind off of my problems without contacting another person (relaxation technique, physical activity):  Distress Tolerance Strategies:  arts and crafts: drawing and painting  Physical Activities: exercise: working out  Focus on helpful  "thoughts:  \"This is temporary\", \"It always passes\" and \"Ride the wave\"  Step 3: People and social settings that provide distraction:                 Name: Jennifer     Phone: [phone]                 Name: Antonina         Phone: [phone]                 Name: panchito       Phone: [phone]  friends house or car   Step 4: Remind myself of people and things that are important to me and worth living for:  Best friend and cat        Step 5: When I am in crisis, I can ask these people to help me use my safety plan:                 Name: Jennifer     Phone: [phone]                 Name: Antonina         Phone: [phone]                 Name: panchito       Phone: [phone]  Step 6: Making the environment safe:   be around others  Step 7: Professionals or agencies I can contact during a crisis:  [facility] Daytime Number: [phone]  Suicide Prevention Lifeline: 3-937-693-ROSY (3410)  Crisis Text Line Service (available 24 hours a day, 7 days a week): Text [place] to 219792  Local Crisis Services: 988     Call 911 or go to my nearest emergency department.       I helped develop this safety plan and agree to use it when needed.  I have been given a copy of this plan.       Client signature _________________________________________________________________  Today s date:  2/22/2021  Adapted from Safety Plan Template 2008 Marisol Cazares and Mario Flores is reprinted with the express permission of the authors.  No portion of the Safety Plan Template may be reproduced without the express, written permission.  You can contact the authors at [email or web address] or [email or web address].  "

## 2025-03-25 ENCOUNTER — VIRTUAL VISIT (OUTPATIENT)
Dept: PSYCHOLOGY | Facility: CLINIC | Age: 25
End: 2025-03-25
Payer: COMMERCIAL

## 2025-03-25 DIAGNOSIS — F41.1 GENERALIZED ANXIETY DISORDER: ICD-10-CM

## 2025-03-25 DIAGNOSIS — F90.2 ADHD (ATTENTION DEFICIT HYPERACTIVITY DISORDER), COMBINED TYPE: ICD-10-CM

## 2025-03-25 DIAGNOSIS — F31.32 BIPOLAR AFFECTIVE DISORDER, CURRENTLY DEPRESSED, MODERATE (H): Primary | ICD-10-CM

## 2025-03-25 DIAGNOSIS — F43.10 PTSD (POST-TRAUMATIC STRESS DISORDER): ICD-10-CM

## 2025-03-25 PROCEDURE — 90837 PSYTX W PT 60 MINUTES: CPT | Mod: 95 | Performed by: SOCIAL WORKER

## 2025-03-25 NOTE — PROGRESS NOTES
[facility]                                      Progress Note    Patient Name: Wandy Ann  Date: 3/25/2025         Service Type: Individual      Session Start Time: 1503  Session End Time: 155     Session Length: 53+    Session #: 96    Attendees: Client    Service Modality:    Video Visit:      Provider verified identity through the following two step process.  Patient provided: Patient  and Patient previous known to provider     Telemedicine Visit: The patient's condition can be safely assessed and treated via synchronous audio and visual telemedicine encounter.       Reason for Telemedicine Visit: Patient has requested telehealth visit     Originating Site (Patient Location): Patient's home     Distant Site (Provider Location): Provider Remote Setting- Home Office     Consent:  The patient/guardian has verbally consented to: the potential risks and benefits of telemedicine (video visit) versus in person care; bill my insurance or make self-payment for services provided; and responsibility for payment of non-covered services.      Patient would like the video invitation sent by: email/text     Mode of Communication: Video Conference via Amwell     Distant Location (Provider): Off-site     As the provider I attest to compliance with applicable laws and regulations related to telemedicine.    DATA  Interactive Complexity: No  Crisis: No  Extended Session (53+ minutes):   - Patient's presenting concerns require more intensive intervention than could be completed within the usual service        Progress Since Last Session (Related to Symptoms / Goals / Homework):   Symptoms: Worsening anxiety with panic attack      Homework: Achieved / completed to satisfaction  Partially completed        Episode of Care Goals: Satisfactory progress - ACTION (Actively working towards change); Intervened by reinforcing change plan / affirming steps taken       Current / Ongoing Stressors and  Concerns:   past trauma, developmental hx and current stressors      Treatment Objective(s) Addressed in This Session:   Patient will demonstrate control of impulses and ability to use positive coping tools to manage strong emotions that can be safely addressed at a lower level of care. Absence of persistent SI and report of reduced frequency and intensity of SI and absence of SI to acceptable levels, report subjective improved mood for a period of 90 days, within 6 months as clinically observed and by patient self-report.  Patient will demonstrate and report a level of depression that can be managed at a lower level of care.  Absence of persistent depression mood and report of reduced frequency and intensity of low mood and absence of persistent low energy and motivation to acceptable levels, report subjective improved motivation and increased energy for a period of 90 days, within 6 months as clinically observed and by patient self-report.  Patient will demonstrate and report a level of anxiety that can be managed at a lower level of care.  Absence of persistent anxious mood and report of reduced frequency and intensity of worry and absence of persistent anxious mood to acceptable levels, no panic attacks, report subjective comfort with rumination for a period of 90 days, within 6 months as clinically observed and by patient self-report.       Intervention:  Therapist met with patient to review goals and interventions. Therapist utilized reflected listening as patient gave brief reflection of week. Patient processed panic attack in TMS and processed that and continued emotions with partners mom. Therapist supported patient as she processed and had patient identify where panic came from and patient was able to do so and stay calm in next session. Therapist provided patient with education on relationships and toxic behaviors, expectations and boundaries, accepting where she is at, coached on effective communication  and being able to be ok with things not being ok. Therapist offered patient different examples on how to meet self where self is at.   Patient presented with an anxious affect. Patient was engaged in session and open to feedback. Patient reported no safety concerns.             There has been demonstrated improvement in functioning while patient has been engaged in psychotherapy/psychological service- if withdrawn the patient would deteriorate and/or relapse.     Assessments completed prior to visit:  The following assessments were completed by patient for this visit:  PHQ9:       7/11/2024    12:42 PM 8/12/2024     1:07 AM 9/24/2024    11:00 AM 12/1/2024     7:23 PM 12/4/2024    11:30 AM 1/6/2025    11:06 AM 2/24/2025     2:59 PM   PHQ-9 SCORE   PHQ-9 Total Score MyChart 12 (Moderate depression) 8 (Mild depression)  11 (Moderate depression) 11 (Moderate depression) 13 (Moderate depression) 10 (Moderate depression)   PHQ-9 Total Score 12 8    8 14 11  11  13  10        Patient-reported     GAD7:       1/15/2024     7:48 PM 7/10/2024     1:35 PM 7/12/2024    11:48 AM 9/24/2024    11:00 AM 12/4/2024    11:30 AM 1/6/2025    11:07 AM 2/24/2025     2:59 PM   YESSI-7 SCORE   Total Score 15 (severe anxiety)  17 (severe anxiety)  13 (moderate anxiety) 13 (moderate anxiety) 10 (moderate anxiety)   Total Score 15 17 17 12 13  13  10        Patient-reported   PROMIS-10 Scores        8/19/2024     3:00 PM 12/4/2024    11:31 AM 3/13/2025     4:09 PM   PROMIS-10 Total Score w/o Sub Scores   PROMIS TOTAL - SUBSCORES 29 26  24        Patient-reported                   ASSESSMENT: Current Emotional / Mental Status (status of significant symptoms):   Risk status (Self / Other harm or suicidal ideation)   Patient denies current fears or concerns for personal safety.   Patient denies current or recent suicidal ideation or behaviors.   Patient denies current or recent homicidal ideation or behaviors.   Patient denies current or recent  self injurious behavior or ideation.   Patient denies other safety concerns.   Patient reports there has been no change in risk factors since their last session.     Patient reports there has been no change in protective factors since their last session.     A safety and risk management plan has been developed including: Patient consented to co-developed safety plan on 2.21.2022.  Safety and risk management plan was reviewed.   Patient agreed to use safety plan should any safety concerns arise.  A copy was made available to the patient.     Appearance:   Appropriate     Eye Contact:   Fair     Psychomotor Behavior: Restless     Attitude:   Cooperative    Orientation:   All   Speech    Rate / Production: Emotional Talkative    Volume:  Normal    Mood:    Anxious  Depressed    Affect:    Appropriate    Thought Content:  Clear    Thought Form:  Coherent    Insight:    Fair      Medication Review:   No changes to current psychiatric medication(s)     Medication Compliance:   Yes     Changes in Health Issues:   None reported     Chemical Use Review:   Substance Use: Chemical use reviewed, no active concerns identified      Tobacco Use: No current tobacco use.      Diagnosis:  1. Bipolar affective disorder, currently depressed, moderate (H)    2. Generalized anxiety disorder    3. ADHD (attention deficit hyperactivity disorder), combined type    4. PTSD (post-traumatic stress disorder)        Collateral Reports Completed:   Not Applicable    PLAN: (Patient Tasks / Therapist Tasks / Other)        Call [phone] if feeling SI has increase or go to ED or empath   black and white thinking, slow down and look at a balance between and what it probably is  Speak to friend about what you need and if she is able to also meet you when you are happy  Patient processed panic attack in TMS and processed that and continued emotions with partners mom. Therapist supported patient as she processed and had patient identify where panic came from  and patient was able to do so and stay calm in next session. Therapist provided patient with education on relationships and toxic behaviors, expectations and boundaries, accepting where she is at, coached on effective communication and being able to be ok with things not being ok. Therapist offered patient different examples on how to meet self where self is at.         Katie SIRISHAAdri Lucie, [facility]   3/25/2025                                                         ______________________________________________________________________    Individual Treatment Plan    Patient's Name: Wandy Ann  YOB: 2000    Date of Creation: 2.22.2021  Date Treatment Plan Last Reviewed/Revised:   2/24/2025 due  5/24/2025    DSM5 Diagnoses: 296.52 Bipolar I Disorder Current or Most Recent Episode Depressed, Moderate, 300.02 (F41.1) Generalized Anxiety Disorder or 309.81 (F43.10) Posttraumatic Stress Disorder (includes Posttraumatic Stress Disorder for Children 6 Years and Younger)  Without dissociative symptoms  Psychosocial / Contextual Factors: past trauma, developmental hx and current stressors   PROMIS (reviewed every 90 days):  3/13/2025    Referral / Collaboration:  Referral to another professional/service is not indicated at this time..    Anticipated number of session for this episode of care: 26  Anticipation frequency of session: Biweekly  Anticipated Duration of each session: 38-52 minutes  Treatment plan will be reviewed in 90 days or when goals have been changed.       MeasurableTreatment Goal(s) related to diagnosis / functional impairment(s)  Goal 1: Patient will report absence of persistent SI and report of reduced frequency and intensity of SI and absence of SI to acceptable levels, report subjective improved mood for a period of 90 days, within 6 months as clinically observed and by patient self-report    I will know I've met my goal when I can see the difference between a bad moment and what I  want in th future.      Objective #A (Patient Action)    Patient will demonstrate control of impulses and ability to use positive coping tools to manage strong emotions that can be safely addressed at a lower level of care. Absence of persistent SI and report of reduced frequency and intensity of SI and absence of SI to acceptable levels, report subjective improved mood for a period of 90 days, within 6 months as clinically observed and by patient self-report.  Status: Continued - Date(s):   2/24/2025    Intervention(s)  Therapist will provide individual therapy to identify triggers to SI urges, gain feedback on helpful coping strategies, and identify ways that family can offer support. Tx to discuss current stressors and interpersonal conflicts and how to cope with these, coaching, diagnostic testing, referral for medication as indicated, use prescribed medication, cognitive restructuring, interpersonal family therapy, supportive therapy services.        Goal 2: Patient will report absence of persistent depression mood and report of reduced frequency and intensity of low mood and absence of persistent low energy and motivation to acceptable levels, report subjective improved motivation and increased energy for a period of 90 days, within 6 months as clinically observed and by patient self-report    I will know I've met my goal when I no longer feel sad.      Objective #A (Patient Action)    Status: Continued - Date(s):   2/24/2025    Patient will demonstrate and report a level of depression that can be managed at a lower level of care.  Absence of persistent depression mood and report of reduced frequency and intensity of low mood and absence of persistent low energy and motivation to acceptable levels, report subjective improved motivation and increased energy for a period of 90 days, within 6 months as clinically observed and by patient self-report.    Intervention(s)  Therapist will provide individual therapy to  identify triggers to depression, gain feedback on helpful coping tools and thought-reframing techniques, and identify preferred way of being.  Tx to include discussion of current stressors and interpersonal conflicts and how to cope with these, coaching, diagnostic testing, referral for medication as indicated, use prescribed medication, cognitive restructuring, interpersonal, family therapy, supportive therapy services.        Goal 3: Patiient will report absence of persistent anxiety mood and report of reduced frequency and intensity of worry and absence of persistent anxious mood to acceptable levels, no panic attacks, report subjective comfort with rumination for a period of 90 days. Within 6 months as clinically observed and by patient self-report    I will know I've met my goal when I can go days without worrying about little things or shaking.      Objective #A (Patient Action)    Status: Continued - Date(s):   2/24/2025    Patient will demonstrate and report a level of anxiety that can be managed at a lower level of care.  Absence of persistent anxious mood and report of reduced frequency and intensity of worry and absence of persistent anxious mood to acceptable levels, no panic attacks, report subjective comfort with rumination for a period of 90 days, within 6 months as clinically observed and by patient self-report.    Intervention(s)  Therapist will provide individual therapy to identify triggers to anxiety, gain feedback on helpful coping tools and thought-reframing techniques, and identify preferred way of being. Tx to include discuss current stressors and interpersonal conflicts and how to cope with these, coaching, diagnostic testing , referral for medication as indicated, use prescribed medication, cognitive restructuring, interpersonal,   family therapy, supportive therapy services.        Patient has reviewed and agreed to the above plan.      Katie Faulkner, [facility]    2/24/2025                   "                                 Wandy   Jones Ann            SAFETY PLAN:  Step 1: Warning signs / cues (Thoughts, images, mood, situation, behavior) that a crisis may be developing:  Thoughts: thoughts of not wanting to be here  Images: no images  Thinking Processes: intrusive thoughts (bothersome, unwanted thoughts that come out of nowhere): get irritated  Mood: intense anger and agitation  Behaviors: isolating/withdrawing   Situations: trauma    Step 2: Coping strategies - Things I can do to take my mind off of my problems without contacting another person (relaxation technique, physical activity):  Distress Tolerance Strategies:  arts and crafts: drawing and painting  Physical Activities: exercise: working out  Focus on helpful thoughts:  \"This is temporary\", \"It always passes\" and \"Ride the wave\"  Step 3: People and social settings that provide distraction:                 Name: Jennifer     Phone: [phone]                 Name: Antonina         Phone: [phone]                 Name: mom       Phone: [phone]  friends house or car   Step 4: Remind myself of people and things that are important to me and worth living for:  Best friend and cat        Step 5: When I am in crisis, I can ask these people to help me use my safety plan:                 Name: Jennifer     Phone: [phone]                 Name: Antonina         Phone: [phone]                 Name: panchito       Phone: [phone]  Step 6: Making the environment safe:   be around others  Step 7: Professionals or agencies I can contact during a crisis:  [facility] Daytime Number: [phone]  Suicide Prevention Lifeline: 9-309-240-EWJT (7676)  Crisis Text Line Service (available 24 hours a day, 7 days a week): Text [place] to 886226  Local Crisis Services: 988     Call 911 or go to my nearest emergency department.       I helped develop this safety plan and agree to use it when needed.  I have been given a copy of this plan.     "   Client signature _________________________________________________________________  Today s date:  2/22/2021  Adapted from Safety Plan Template 2008 Marisol Cazares and Mario Flores is reprinted with the express permission of the authors.  No portion of the Safety Plan Template may be reproduced without the express, written permission.  You can contact the authors at [email or web address] or [email or web address].

## 2025-04-01 ENCOUNTER — VIRTUAL VISIT (OUTPATIENT)
Dept: PSYCHOLOGY | Facility: CLINIC | Age: 25
End: 2025-04-01
Payer: COMMERCIAL

## 2025-04-01 DIAGNOSIS — F90.2 ADHD (ATTENTION DEFICIT HYPERACTIVITY DISORDER), COMBINED TYPE: ICD-10-CM

## 2025-04-01 DIAGNOSIS — F31.32 BIPOLAR AFFECTIVE DISORDER, CURRENTLY DEPRESSED, MODERATE (H): Primary | ICD-10-CM

## 2025-04-01 DIAGNOSIS — F41.1 GENERALIZED ANXIETY DISORDER: ICD-10-CM

## 2025-04-01 DIAGNOSIS — F43.10 PTSD (POST-TRAUMATIC STRESS DISORDER): ICD-10-CM

## 2025-04-01 PROCEDURE — 90837 PSYTX W PT 60 MINUTES: CPT | Mod: 95 | Performed by: SOCIAL WORKER

## 2025-04-01 NOTE — PROGRESS NOTES
[facility]                                      Progress Note    Patient Name: Wandy Ann  Date: 2025         Service Type: Individual      Session Start Time: 150  Session End Time: 155     Session Length: 53+    Session #: 97    Attendees: Client    Service Modality:    Video Visit:      Provider verified identity through the following two step process.  Patient provided: Patient  and Patient previous known to provider     Telemedicine Visit: The patient's condition can be safely assessed and treated via synchronous audio and visual telemedicine encounter.       Reason for Telemedicine Visit: Patient has requested telehealth visit     Originating Site (Patient Location): Patient's home     Distant Site (Provider Location): Provider Remote Setting- Home Office     Consent:  The patient/guardian has verbally consented to: the potential risks and benefits of telemedicine (video visit) versus in person care; bill my insurance or make self-payment for services provided; and responsibility for payment of non-covered services.      Patient would like the video invitation sent by: email/text     Mode of Communication: Video Conference via [employer]well     Distant Location (Provider): Off-site     As the provider I attest to compliance with applicable laws and regulations related to telemedicine.    DATA  Interactive Complexity: No  Crisis: No  Extended Session (53+ minutes):   - Patient's presenting concerns require more intensive intervention than could be completed within the usual service        Progress Since Last Session (Related to Symptoms / Goals / Homework):   Symptoms: Worsening stressed with relationships       Homework: Achieved / completed to satisfaction  Partially completed        Episode of Care Goals: Satisfactory progress - ACTION (Actively working towards change); Intervened by reinforcing change plan / affirming steps taken       Current / Ongoing Stressors and  Concerns:   past trauma, developmental hx and current stressors      Treatment Objective(s) Addressed in This Session:   Patient will demonstrate control of impulses and ability to use positive coping tools to manage strong emotions that can be safely addressed at a lower level of care. Absence of persistent SI and report of reduced frequency and intensity of SI and absence of SI to acceptable levels, report subjective improved mood for a period of 90 days, within 6 months as clinically observed and by patient self-report.  Patient will demonstrate and report a level of depression that can be managed at a lower level of care.  Absence of persistent depression mood and report of reduced frequency and intensity of low mood and absence of persistent low energy and motivation to acceptable levels, report subjective improved motivation and increased energy for a period of 90 days, within 6 months as clinically observed and by patient self-report.  Patient will demonstrate and report a level of anxiety that can be managed at a lower level of care.  Absence of persistent anxious mood and report of reduced frequency and intensity of worry and absence of persistent anxious mood to acceptable levels, no panic attacks, report subjective comfort with rumination for a period of 90 days, within 6 months as clinically observed and by patient self-report.       Intervention:  Therapist met with patient to review goals and interventions. Therapist utilized reflected listening as patient gave brief reflection of week. Patient reported feeling annoyed and processed outside relationship. Therapist supported patient as she processed and validated patient. Therapist coached patient through effective communication and reviewed different approaches and options. Therapist removed timeline with patient and reassured patient to take her time and when ready to approach it when ready. Therapist utilized strength based modality along with  solution focused.     Patient presented with an anxious affect. Patient was engaged in session and open to feedback. Patient reported no safety concerns.             There has been demonstrated improvement in functioning while patient has been engaged in psychotherapy/psychological service- if withdrawn the patient would deteriorate and/or relapse.     Assessments completed prior to visit:  The following assessments were completed by patient for this visit:  PHQ9:       7/11/2024    12:42 PM 8/12/2024     1:07 AM 9/24/2024    11:00 AM 12/1/2024     7:23 PM 12/4/2024    11:30 AM 1/6/2025    11:06 AM 2/24/2025     2:59 PM   PHQ-9 SCORE   PHQ-9 Total Score MyChart 12 (Moderate depression) 8 (Mild depression)  11 (Moderate depression) 11 (Moderate depression) 13 (Moderate depression) 10 (Moderate depression)   PHQ-9 Total Score 12 8    8 14 11  11  13  10        Patient-reported     GAD7:       1/15/2024     7:48 PM 7/10/2024     1:35 PM 7/12/2024    11:48 AM 9/24/2024    11:00 AM 12/4/2024    11:30 AM 1/6/2025    11:07 AM 2/24/2025     2:59 PM   YESSI-7 SCORE   Total Score 15 (severe anxiety)  17 (severe anxiety)  13 (moderate anxiety) 13 (moderate anxiety) 10 (moderate anxiety)   Total Score 15 17 17 12 13  13  10        Patient-reported   PROMIS-10 Scores        8/19/2024     3:00 PM 12/4/2024    11:31 AM 3/13/2025     4:09 PM   PROMIS-10 Total Score w/o Sub Scores   PROMIS TOTAL - SUBSCORES 29 26  24        Patient-reported                   ASSESSMENT: Current Emotional / Mental Status (status of significant symptoms):   Risk status (Self / Other harm or suicidal ideation)   Patient denies current fears or concerns for personal safety.   Patient denies current or recent suicidal ideation or behaviors.   Patient denies current or recent homicidal ideation or behaviors.   Patient denies current or recent self injurious behavior or ideation.   Patient denies other safety concerns.   Patient reports there has been no  change in risk factors since their last session.     Patient reports there has been no change in protective factors since their last session.     A safety and risk management plan has been developed including: Patient consented to co-developed safety plan on 2.21.2022.  Safety and risk management plan was reviewed.   Patient agreed to use safety plan should any safety concerns arise.  A copy was made available to the patient.     Appearance:   Appropriate     Eye Contact:   Fair     Psychomotor Behavior: Restless     Attitude:   Cooperative    Orientation:   All   Speech    Rate / Production: Emotional Talkative    Volume:  Normal    Mood:    Anxious  Depressed    Affect:    Appropriate    Thought Content:  Clear    Thought Form:  Coherent    Insight:    Fair      Medication Review:   No changes to current psychiatric medication(s)     Medication Compliance:   Yes     Changes in Health Issues:   None reported     Chemical Use Review:   Substance Use: Chemical use reviewed, no active concerns identified      Tobacco Use: No current tobacco use.      Diagnosis:  1. Bipolar affective disorder, currently depressed, moderate (H)    2. Generalized anxiety disorder    3. ADHD (attention deficit hyperactivity disorder), combined type    4. PTSD (post-traumatic stress disorder)        Collateral Reports Completed:   Not Applicable    PLAN: (Patient Tasks / Therapist Tasks / Other)        Call [phone] if feeling SI has increase or go to ED or empath   black and white thinking, slow down and look at a balance between and what it probably is  Speak to friend about what you need and if she is able to also meet you when you are happy  Patient reported feeling annoyed and processed outside relationship. Therapist supported patient as she processed and validated patient. Therapist coached patient through effective communication and reviewed different approaches and options. Therapist removed timeline with patient and reassured patient  to take her time and when ready to approach it when ready. Therapist utilized strength based modality along with solution focused.           Katie GRIMMAdri Lucie, LICSW  4/1/2025                                                         ______________________________________________________________________    Individual Treatment Plan    Patient's Name: Wandy Ann  YOB: 2000    Date of Creation: 2.22.2021  Date Treatment Plan Last Reviewed/Revised:   2/24/2025 due  5/24/2025    DSM5 Diagnoses: 296.52 Bipolar I Disorder Current or Most Recent Episode Depressed, Moderate, 300.02 (F41.1) Generalized Anxiety Disorder or 309.81 (F43.10) Posttraumatic Stress Disorder (includes Posttraumatic Stress Disorder for Children 6 Years and Younger)  Without dissociative symptoms  Psychosocial / Contextual Factors: past trauma, developmental hx and current stressors   PROMIS (reviewed every 90 days):  3/13/2025    Referral / Collaboration:  Referral to another professional/service is not indicated at this time..    Anticipated number of session for this episode of care: 26  Anticipation frequency of session: Biweekly  Anticipated Duration of each session: 38-52 minutes  Treatment plan will be reviewed in 90 days or when goals have been changed.       MeasurableTreatment Goal(s) related to diagnosis / functional impairment(s)  Goal 1: Patient will report absence of persistent SI and report of reduced frequency and intensity of SI and absence of SI to acceptable levels, report subjective improved mood for a period of 90 days, within 6 months as clinically observed and by patient self-report    I will know I've met my goal when I can see the difference between a bad moment and what I want in th future.      Objective #A (Patient Action)    Patient will demonstrate control of impulses and ability to use positive coping tools to manage strong emotions that can be safely addressed at a lower level of care. Absence of  persistent SI and report of reduced frequency and intensity of SI and absence of SI to acceptable levels, report subjective improved mood for a period of 90 days, within 6 months as clinically observed and by patient self-report.  Status: Continued - Date(s):   2/24/2025    Intervention(s)  Therapist will provide individual therapy to identify triggers to SI urges, gain feedback on helpful coping strategies, and identify ways that family can offer support. Tx to discuss current stressors and interpersonal conflicts and how to cope with these, coaching, diagnostic testing, referral for medication as indicated, use prescribed medication, cognitive restructuring, interpersonal family therapy, supportive therapy services.        Goal 2: Patient will report absence of persistent depression mood and report of reduced frequency and intensity of low mood and absence of persistent low energy and motivation to acceptable levels, report subjective improved motivation and increased energy for a period of 90 days, within 6 months as clinically observed and by patient self-report    I will know I've met my goal when I no longer feel sad.      Objective #A (Patient Action)    Status: Continued - Date(s):   2/24/2025    Patient will demonstrate and report a level of depression that can be managed at a lower level of care.  Absence of persistent depression mood and report of reduced frequency and intensity of low mood and absence of persistent low energy and motivation to acceptable levels, report subjective improved motivation and increased energy for a period of 90 days, within 6 months as clinically observed and by patient self-report.    Intervention(s)  Therapist will provide individual therapy to identify triggers to depression, gain feedback on helpful coping tools and thought-reframing techniques, and identify preferred way of being.  Tx to include discussion of current stressors and interpersonal conflicts and how to cope  with these, coaching, diagnostic testing, referral for medication as indicated, use prescribed medication, cognitive restructuring, interpersonal, family therapy, supportive therapy services.        Goal 3: Patiient will report absence of persistent anxiety mood and report of reduced frequency and intensity of worry and absence of persistent anxious mood to acceptable levels, no panic attacks, report subjective comfort with rumination for a period of 90 days. Within 6 months as clinically observed and by patient self-report    I will know I've met my goal when I can go days without worrying about little things or shaking.      Objective #A (Patient Action)    Status: Continued - Date(s):   2/24/2025    Patient will demonstrate and report a level of anxiety that can be managed at a lower level of care.  Absence of persistent anxious mood and report of reduced frequency and intensity of worry and absence of persistent anxious mood to acceptable levels, no panic attacks, report subjective comfort with rumination for a period of 90 days, within 6 months as clinically observed and by patient self-report.    Intervention(s)  Therapist will provide individual therapy to identify triggers to anxiety, gain feedback on helpful coping tools and thought-reframing techniques, and identify preferred way of being. Tx to include discuss current stressors and interpersonal conflicts and how to cope with these, coaching, diagnostic testing , referral for medication as indicated, use prescribed medication, cognitive restructuring, interpersonal,   family therapy, supportive therapy services.        Patient has reviewed and agreed to the above plan.      Katie Faulkner, [facility]    2/24/2025                                                   Wandy Ann            SAFETY PLAN:  Step 1: Warning signs / cues (Thoughts, images, mood, situation, behavior) that a crisis may be developing:  Thoughts: thoughts of not wanting to be  "here  Images: no images  Thinking Processes: intrusive thoughts (bothersome, unwanted thoughts that come out of nowhere): get irritated  Mood: intense anger and agitation  Behaviors: isolating/withdrawing   Situations: trauma    Step 2: Coping strategies - Things I can do to take my mind off of my problems without contacting another person (relaxation technique, physical activity):  Distress Tolerance Strategies:  arts and crafts: drawing and painting  Physical Activities: exercise: working out  Focus on helpful thoughts:  \"This is temporary\", \"It always passes\" and \"Ride the wave\"  Step 3: People and social settings that provide distraction:                 Name: Jennifer     Phone: [phone]                 Name: Antonina         Phone: [phone]                 Name: panchito       Phone: [phone]  friends house or car   Step 4: Remind myself of people and things that are important to me and worth living for:  Best friend and cat        Step 5: When I am in crisis, I can ask these people to help me use my safety plan:                 Name: Jennifer     Phone: [phone]                 Name: Antonina         Phone: [phone]                 Name: panchito       Phone: [phone]  Step 6: Making the environment safe:   be around others  Step 7: Professionals or agencies I can contact during a crisis:  [facility] Daytime Number: [phone]  Suicide Prevention Lifeline: 8-187-617-TALK (8208)  Crisis Text Line Service (available 24 hours a day, 7 days a week): Text [place] to 859273  Local Crisis Services: 988     Call 911 or go to my nearest emergency department.       I helped develop this safety plan and agree to use it when needed.  I have been given a copy of this plan.       Client signature _________________________________________________________________  Today s date:  2/22/2021  Adapted from Safety Plan Template 2008 Marisol Cazares and Mario Flores is reprinted with the express permission of " the authors.  No portion of the Safety Plan Template may be reproduced without the express, written permission.  You can contact the authors at [email or web address] or [email or web address].

## 2025-04-16 ENCOUNTER — VIRTUAL VISIT (OUTPATIENT)
Dept: PSYCHOLOGY | Facility: CLINIC | Age: 25
End: 2025-04-16
Payer: COMMERCIAL

## 2025-04-16 DIAGNOSIS — F43.10 PTSD (POST-TRAUMATIC STRESS DISORDER): ICD-10-CM

## 2025-04-16 DIAGNOSIS — F41.1 GENERALIZED ANXIETY DISORDER: ICD-10-CM

## 2025-04-16 DIAGNOSIS — F90.2 ADHD (ATTENTION DEFICIT HYPERACTIVITY DISORDER), COMBINED TYPE: ICD-10-CM

## 2025-04-16 DIAGNOSIS — F31.32 BIPOLAR AFFECTIVE DISORDER, CURRENTLY DEPRESSED, MODERATE (H): Primary | ICD-10-CM

## 2025-04-16 PROCEDURE — 90837 PSYTX W PT 60 MINUTES: CPT | Mod: 95 | Performed by: SOCIAL WORKER

## 2025-04-16 NOTE — PROGRESS NOTES
[facility]                                      Progress Note    Patient Name: Wandy Ann  Date: 2025         Service Type: Individual      Session Start Time: 1004  Session End Time: 1059     Session Length: 53+    Session #: 98    Attendees: Client    Service Modality:    Video Visit:      Provider verified identity through the following two step process.  Patient provided: Patient  and Patient previous known to provider     Telemedicine Visit: The patient's condition can be safely assessed and treated via synchronous audio and visual telemedicine encounter.       Reason for Telemedicine Visit: Patient has requested telehealth visit     Originating Site (Patient Location): Patient's home     Distant Site (Provider Location): Provider Remote Setting- Home Office     Consent:  The patient/guardian has verbally consented to: the potential risks and benefits of telemedicine (video visit) versus in person care; bill my insurance or make self-payment for services provided; and responsibility for payment of non-covered services.      Patient would like the video invitation sent by: email/text     Mode of Communication: Video Conference via [employer]     Distant Location (Provider): Off-site     As the provider I attest to compliance with applicable laws and regulations related to telemedicine.    DATA  Interactive Complexity: No  Crisis: No  Extended Session (53+ minutes):   - Patient's presenting concerns require more intensive intervention than could be completed within the usual service        Progress Since Last Session (Related to Symptoms / Goals / Homework):   Symptoms: No change frustrations      Homework: Achieved / completed to satisfaction  Partially completed        Episode of Care Goals: Satisfactory progress - ACTION (Actively working towards change); Intervened by reinforcing change plan / affirming steps taken       Current / Ongoing Stressors and Concerns:   past  trauma, developmental hx and current stressors      Treatment Objective(s) Addressed in This Session:   Patient will demonstrate control of impulses and ability to use positive coping tools to manage strong emotions that can be safely addressed at a lower level of care. Absence of persistent SI and report of reduced frequency and intensity of SI and absence of SI to acceptable levels, report subjective improved mood for a period of 90 days, within 6 months as clinically observed and by patient self-report.  Patient will demonstrate and report a level of depression that can be managed at a lower level of care.  Absence of persistent depression mood and report of reduced frequency and intensity of low mood and absence of persistent low energy and motivation to acceptable levels, report subjective improved motivation and increased energy for a period of 90 days, within 6 months as clinically observed and by patient self-report.  Patient will demonstrate and report a level of anxiety that can be managed at a lower level of care.  Absence of persistent anxious mood and report of reduced frequency and intensity of worry and absence of persistent anxious mood to acceptable levels, no panic attacks, report subjective comfort with rumination for a period of 90 days, within 6 months as clinically observed and by patient self-report.       Intervention:  Therapist met with patient to review goals and interventions. Therapist utilized reflected listening as patient gave brief reflection of week. Patient reported continued azul with self and unknowns. Therapist supported patient as she processed and validated patient. Therapist encouraged patient to look at her emotions, when things have been good, bad and in between. Therapist reiterated for patient to look at her magic wand moment and what those relationships would look like and to plan for the mid section of perfect along with patient to look ahead of where her and her  partner will be, how things will change and to make goals for the future.    Patient presented with an anxious affect. Patient was engaged in session and open to feedback. Patient reported no safety concerns.             There has been demonstrated improvement in functioning while patient has been engaged in psychotherapy/psychological service- if withdrawn the patient would deteriorate and/or relapse.     Assessments completed prior to visit:  The following assessments were completed by patient for this visit:  PHQ9:       7/11/2024    12:42 PM 8/12/2024     1:07 AM 9/24/2024    11:00 AM 12/1/2024     7:23 PM 12/4/2024    11:30 AM 1/6/2025    11:06 AM 2/24/2025     2:59 PM   PHQ-9 SCORE   PHQ-9 Total Score MyChart 12 (Moderate depression) 8 (Mild depression)  11 (Moderate depression) 11 (Moderate depression) 13 (Moderate depression) 10 (Moderate depression)   PHQ-9 Total Score 12 8    8 14 11  11  13  10        Patient-reported     GAD7:       1/15/2024     7:48 PM 7/10/2024     1:35 PM 7/12/2024    11:48 AM 9/24/2024    11:00 AM 12/4/2024    11:30 AM 1/6/2025    11:07 AM 2/24/2025     2:59 PM   YESSI-7 SCORE   Total Score 15 (severe anxiety)  17 (severe anxiety)  13 (moderate anxiety) 13 (moderate anxiety) 10 (moderate anxiety)   Total Score 15 17 17 12 13  13  10        Patient-reported   PROMIS-10 Scores        8/19/2024     3:00 PM 12/4/2024    11:31 AM 3/13/2025     4:09 PM   PROMIS-10 Total Score w/o Sub Scores   PROMIS TOTAL - SUBSCORES 29 26  24        Patient-reported                   ASSESSMENT: Current Emotional / Mental Status (status of significant symptoms):   Risk status (Self / Other harm or suicidal ideation)   Patient denies current fears or concerns for personal safety.   Patient denies current or recent suicidal ideation or behaviors.   Patient denies current or recent homicidal ideation or behaviors.   Patient denies current or recent self injurious behavior or ideation.   Patient denies other  safety concerns.   Patient reports there has been no change in risk factors since their last session.     Patient reports there has been no change in protective factors since their last session.     A safety and risk management plan has been developed including: Patient consented to co-developed safety plan on 2.21.2022.  Safety and risk management plan was reviewed.   Patient agreed to use safety plan should any safety concerns arise.  A copy was made available to the patient.     Appearance:   Appropriate     Eye Contact:   Fair     Psychomotor Behavior: Restless     Attitude:   Cooperative    Orientation:   All   Speech    Rate / Production: Emotional Talkative    Volume:  Normal    Mood:    Anxious  Depressed    Affect:    Appropriate    Thought Content:  Clear    Thought Form:  Coherent    Insight:    Fair      Medication Review:   No changes to current psychiatric medication(s)     Medication Compliance:   Yes     Changes in Health Issues:   None reported     Chemical Use Review:   Substance Use: Chemical use reviewed, no active concerns identified      Tobacco Use: No current tobacco use.      Diagnosis:  1. Bipolar affective disorder, currently depressed, moderate (H)    2. Generalized anxiety disorder    3. ADHD (attention deficit hyperactivity disorder), combined type    4. PTSD (post-traumatic stress disorder)        Collateral Reports Completed:   Not Applicable    PLAN: (Patient Tasks / Therapist Tasks / Other)        Call [phone] if feeling SI has increase or go to ED or empath   black and white thinking, slow down and look at a balance between and what it probably is  Speak to friend about what you need and if she is able to also meet you when you are happy  Patient reported continued azul with self and unknowns. Therapist supported patient as she processed and validated patient. Therapist encouraged patient to look at her emotions, when things have been good, bad and in between. Therapist reiterated  for patient to look at her magic wand moment and what those relationships would look like and to plan for the mid section of perfect along with patient to look ahead of where her and her partner will be, how things will change and to make goals for the future.          Katie Faulkner, [facility]  4/16/2025                                                         ______________________________________________________________________    Individual Treatment Plan    Patient's Name: Wandy Ann  YOB: 2000    Date of Creation: 2.22.2021  Date Treatment Plan Last Reviewed/Revised:   2/24/2025 due  5/24/2025    DSM5 Diagnoses: 296.52 Bipolar I Disorder Current or Most Recent Episode Depressed, Moderate, 300.02 (F41.1) Generalized Anxiety Disorder or 309.81 (F43.10) Posttraumatic Stress Disorder (includes Posttraumatic Stress Disorder for Children 6 Years and Younger)  Without dissociative symptoms  Psychosocial / Contextual Factors: past trauma, developmental hx and current stressors   PROMIS (reviewed every 90 days):  3/13/2025    Referral / Collaboration:  Referral to another professional/service is not indicated at this time..    Anticipated number of session for this episode of care: 26  Anticipation frequency of session: Biweekly  Anticipated Duration of each session: 38-52 minutes  Treatment plan will be reviewed in 90 days or when goals have been changed.       MeasurableTreatment Goal(s) related to diagnosis / functional impairment(s)  Goal 1: Patient will report absence of persistent SI and report of reduced frequency and intensity of SI and absence of SI to acceptable levels, report subjective improved mood for a period of 90 days, within 6 months as clinically observed and by patient self-report    I will know I've met my goal when I can see the difference between a bad moment and what I want in th future.      Objective #A (Patient Action)    Patient will demonstrate control of impulses and  ability to use positive coping tools to manage strong emotions that can be safely addressed at a lower level of care. Absence of persistent SI and report of reduced frequency and intensity of SI and absence of SI to acceptable levels, report subjective improved mood for a period of 90 days, within 6 months as clinically observed and by patient self-report.  Status: Continued - Date(s):   2/24/2025    Intervention(s)  Therapist will provide individual therapy to identify triggers to SI urges, gain feedback on helpful coping strategies, and identify ways that family can offer support. Tx to discuss current stressors and interpersonal conflicts and how to cope with these, coaching, diagnostic testing, referral for medication as indicated, use prescribed medication, cognitive restructuring, interpersonal family therapy, supportive therapy services.        Goal 2: Patient will report absence of persistent depression mood and report of reduced frequency and intensity of low mood and absence of persistent low energy and motivation to acceptable levels, report subjective improved motivation and increased energy for a period of 90 days, within 6 months as clinically observed and by patient self-report    I will know I've met my goal when I no longer feel sad.      Objective #A (Patient Action)    Status: Continued - Date(s):   2/24/2025    Patient will demonstrate and report a level of depression that can be managed at a lower level of care.  Absence of persistent depression mood and report of reduced frequency and intensity of low mood and absence of persistent low energy and motivation to acceptable levels, report subjective improved motivation and increased energy for a period of 90 days, within 6 months as clinically observed and by patient self-report.    Intervention(s)  Therapist will provide individual therapy to identify triggers to depression, gain feedback on helpful coping tools and thought-reframing techniques,  and identify preferred way of being.  Tx to include discussion of current stressors and interpersonal conflicts and how to cope with these, coaching, diagnostic testing, referral for medication as indicated, use prescribed medication, cognitive restructuring, interpersonal, family therapy, supportive therapy services.        Goal 3: Patiient will report absence of persistent anxiety mood and report of reduced frequency and intensity of worry and absence of persistent anxious mood to acceptable levels, no panic attacks, report subjective comfort with rumination for a period of 90 days. Within 6 months as clinically observed and by patient self-report    I will know I've met my goal when I can go days without worrying about little things or shaking.      Objective #A (Patient Action)    Status: Continued - Date(s):   2/24/2025    Patient will demonstrate and report a level of anxiety that can be managed at a lower level of care.  Absence of persistent anxious mood and report of reduced frequency and intensity of worry and absence of persistent anxious mood to acceptable levels, no panic attacks, report subjective comfort with rumination for a period of 90 days, within 6 months as clinically observed and by patient self-report.    Intervention(s)  Therapist will provide individual therapy to identify triggers to anxiety, gain feedback on helpful coping tools and thought-reframing techniques, and identify preferred way of being. Tx to include discuss current stressors and interpersonal conflicts and how to cope with these, coaching, diagnostic testing , referral for medication as indicated, use prescribed medication, cognitive restructuring, interpersonal,   family therapy, supportive therapy services.        Patient has reviewed and agreed to the above plan.      Katie Faulkner, [facility]    2/24/2025                                                   Wandy Ann            SAFETY PLAN:  Step 1: Warning signs  "/ cues (Thoughts, images, mood, situation, behavior) that a crisis may be developing:  Thoughts: thoughts of not wanting to be here  Images: no images  Thinking Processes: intrusive thoughts (bothersome, unwanted thoughts that come out of nowhere): get irritated  Mood: intense anger and agitation  Behaviors: isolating/withdrawing   Situations: trauma    Step 2: Coping strategies - Things I can do to take my mind off of my problems without contacting another person (relaxation technique, physical activity):  Distress Tolerance Strategies:  arts and crafts: drawing and painting  Physical Activities: exercise: working out  Focus on helpful thoughts:  \"This is temporary\", \"It always passes\" and \"Ride the wave\"  Step 3: People and social settings that provide distraction:                 Name: Jennifer     Phone: [phone]                 Name: Antonina         Phone: [phone]                 Name: panchito       Phone: [phone]  friends house or car   Step 4: Remind myself of people and things that are important to me and worth living for:  Best friend and cat        Step 5: When I am in crisis, I can ask these people to help me use my safety plan:                 Name: Jennifer     Phone: [phone]                 Name: Antonina         Phone: [phone]                 Name: panchito       Phone: [phone]  Step 6: Making the environment safe:   be around others  Step 7: Professionals or agencies I can contact during a crisis:  [facility] Daytime Number: 154-836-0262  Suicide Prevention Lifeline: 1-006-125-SBBN (2040)  Crisis Text Line Service (available 24 hours a day, 7 days a week): Text [place] to 315165  Local Crisis Services: 988     Call 911 or go to my nearest emergency department.       I helped develop this safety plan and agree to use it when needed.  I have been given a copy of this plan.       Client signature _________________________________________________________________  Today s date:  " 2/22/2021  Adapted from Safety Plan Template 2008 Marisol Cazares and Mario Flores is reprinted with the express permission of the authors.  No portion of the Safety Plan Template may be reproduced without the express, written permission.  You can contact the authors at [email or web address] or [email or web address].

## 2025-04-22 ENCOUNTER — VIRTUAL VISIT (OUTPATIENT)
Dept: PSYCHOLOGY | Facility: CLINIC | Age: 25
End: 2025-04-22
Payer: COMMERCIAL

## 2025-04-22 DIAGNOSIS — F31.32 BIPOLAR AFFECTIVE DISORDER, CURRENTLY DEPRESSED, MODERATE (H): Primary | ICD-10-CM

## 2025-04-22 DIAGNOSIS — F90.2 ADHD (ATTENTION DEFICIT HYPERACTIVITY DISORDER), COMBINED TYPE: ICD-10-CM

## 2025-04-22 DIAGNOSIS — F41.1 GENERALIZED ANXIETY DISORDER: ICD-10-CM

## 2025-04-22 DIAGNOSIS — F43.10 PTSD (POST-TRAUMATIC STRESS DISORDER): ICD-10-CM

## 2025-04-22 PROCEDURE — 90837 PSYTX W PT 60 MINUTES: CPT | Mod: 95 | Performed by: SOCIAL WORKER

## 2025-04-22 NOTE — PROGRESS NOTES
[facility]                                      Progress Note    Patient Name: Wandy Ann  Date: 2025         Service Type: Individual      Session Start Time: 1500  Session End Time: 1555     Session Length: 53+    Session #: 99    Attendees: Client    Service Modality:    Video Visit:      Provider verified identity through the following two step process.  Patient provided: Patient  and Patient previous known to provider     Telemedicine Visit: The patient's condition can be safely assessed and treated via synchronous audio and visual telemedicine encounter.       Reason for Telemedicine Visit: Patient has requested telehealth visit     Originating Site (Patient Location): Patient's home     Distant Site (Provider Location): Provider Remote Setting- Home Office     Consent:  The patient/guardian has verbally consented to: the potential risks and benefits of telemedicine (video visit) versus in person care; bill my insurance or make self-payment for services provided; and responsibility for payment of non-covered services.      Patient would like the video invitation sent by: email/text     Mode of Communication: Video Conference via [employer]     Distant Location (Provider): Off-site     As the provider I attest to compliance with applicable laws and regulations related to telemedicine.    DATA  Interactive Complexity: No  Crisis: No  Extended Session (53+ minutes):   - Patient's presenting concerns require more intensive intervention than could be completed within the usual service        Progress Since Last Session (Related to Symptoms / Goals / Homework):   Symptoms: Worsening ups and downs with circumstances with partners mom      Homework: Achieved / completed to satisfaction  Partially completed        Episode of Care Goals: Satisfactory progress - ACTION (Actively working towards change); Intervened by reinforcing change plan / affirming steps taken       Current /  Ongoing Stressors and Concerns:   past trauma, developmental hx and current stressors      Treatment Objective(s) Addressed in This Session:   Patient will demonstrate control of impulses and ability to use positive coping tools to manage strong emotions that can be safely addressed at a lower level of care. Absence of persistent SI and report of reduced frequency and intensity of SI and absence of SI to acceptable levels, report subjective improved mood for a period of 90 days, within 6 months as clinically observed and by patient self-report.  Patient will demonstrate and report a level of depression that can be managed at a lower level of care.  Absence of persistent depression mood and report of reduced frequency and intensity of low mood and absence of persistent low energy and motivation to acceptable levels, report subjective improved motivation and increased energy for a period of 90 days, within 6 months as clinically observed and by patient self-report.  Patient will demonstrate and report a level of anxiety that can be managed at a lower level of care.  Absence of persistent anxious mood and report of reduced frequency and intensity of worry and absence of persistent anxious mood to acceptable levels, no panic attacks, report subjective comfort with rumination for a period of 90 days, within 6 months as clinically observed and by patient self-report.       Intervention:  Therapist met with patient to review goals and interventions. Therapist utilized reflected listening as patient gave brief reflection of week. Patient reported lot of emotions with partners mom and processed. Therapist supported patient as she processed and validated patient. Therapist provided education around conflict resolution, it being for her and not others, setting expectations and boundaries and coached patient through this. Therapist encouraged patient to write down what she would like to say with verbal vomit, shelf it, come  back to it, re-read it and correct until ready to engage partners mom. This will help on patient's reaction along with setting boundaries.     Patient presented with an anxious affect. Patient was engaged in session and open to feedback. Patient reported no safety concerns.             There has been demonstrated improvement in functioning while patient has been engaged in psychotherapy/psychological service- if withdrawn the patient would deteriorate and/or relapse.     Assessments completed prior to visit:  The following assessments were completed by patient for this visit:  PHQ9:       7/11/2024    12:42 PM 8/12/2024     1:07 AM 9/24/2024    11:00 AM 12/1/2024     7:23 PM 12/4/2024    11:30 AM 1/6/2025    11:06 AM 2/24/2025     2:59 PM   PHQ-9 SCORE   PHQ-9 Total Score MyChart 12 (Moderate depression) 8 (Mild depression)  11 (Moderate depression) 11 (Moderate depression) 13 (Moderate depression) 10 (Moderate depression)   PHQ-9 Total Score 12 8    8 14 11  11  13  10        Patient-reported     GAD7:       1/15/2024     7:48 PM 7/10/2024     1:35 PM 7/12/2024    11:48 AM 9/24/2024    11:00 AM 12/4/2024    11:30 AM 1/6/2025    11:07 AM 2/24/2025     2:59 PM   YESSI-7 SCORE   Total Score 15 (severe anxiety)  17 (severe anxiety)  13 (moderate anxiety) 13 (moderate anxiety) 10 (moderate anxiety)   Total Score 15 17 17 12 13  13  10        Patient-reported   PROMIS-10 Scores        8/19/2024     3:00 PM 12/4/2024    11:31 AM 3/13/2025     4:09 PM   PROMIS-10 Total Score w/o Sub Scores   PROMIS TOTAL - SUBSCORES 29 26  24        Patient-reported                   ASSESSMENT: Current Emotional / Mental Status (status of significant symptoms):   Risk status (Self / Other harm or suicidal ideation)   Patient denies current fears or concerns for personal safety.   Patient denies current or recent suicidal ideation or behaviors.   Patient denies current or recent homicidal ideation or behaviors.   Patient denies current or  recent self injurious behavior or ideation.   Patient denies other safety concerns.   Patient reports there has been no change in risk factors since their last session.     Patient reports there has been no change in protective factors since their last session.     A safety and risk management plan has been developed including: Patient consented to co-developed safety plan on 2.21.2022.  Safety and risk management plan was reviewed.   Patient agreed to use safety plan should any safety concerns arise.  A copy was made available to the patient.     Appearance:   Appropriate     Eye Contact:   Fair     Psychomotor Behavior: Restless     Attitude:   Cooperative    Orientation:   All   Speech    Rate / Production: Emotional Talkative    Volume:  Normal    Mood:    Anxious  Depressed    Affect:    Appropriate    Thought Content:  Clear    Thought Form:  Coherent    Insight:    Fair      Medication Review:   No changes to current psychiatric medication(s)     Medication Compliance:   Yes     Changes in Health Issues:   None reported     Chemical Use Review:   Substance Use: Chemical use reviewed, no active concerns identified      Tobacco Use: No current tobacco use.      Diagnosis:  1. Bipolar affective disorder, currently depressed, moderate (H)    2. Generalized anxiety disorder    3. ADHD (attention deficit hyperactivity disorder), combined type    4. PTSD (post-traumatic stress disorder)        Collateral Reports Completed:   Not Applicable    PLAN: (Patient Tasks / Therapist Tasks / Other)        Call [phone] if feeling SI has increase or go to ED or empath   black and white thinking, slow down and look at a balance between and what it probably is  Speak to friend about what you need and if she is able to also meet you when you are happy  Patient reported lot of emotions with partners mom and processed. Therapist supported patient as she processed and validated patient. Therapist provided education around conflict  resolution, it being for her and not others, setting expectations and boundaries and coached patient through this. Therapist encouraged patient to write down what she would like to say with verbal vomit, shelf it, come back to it, re-read it and correct until ready to engage partners mom. This will help on patient's reaction along with setting boundaries.         Katie Faulkner, LICSW  4/22/2025                                                         ______________________________________________________________________    Individual Treatment Plan    Patient's Name: Wandy Ann  YOB: 2000    Date of Creation: 2.22.2021  Date Treatment Plan Last Reviewed/Revised:   2/24/2025 due  5/24/2025    DSM5 Diagnoses: 296.52 Bipolar I Disorder Current or Most Recent Episode Depressed, Moderate, 300.02 (F41.1) Generalized Anxiety Disorder or 309.81 (F43.10) Posttraumatic Stress Disorder (includes Posttraumatic Stress Disorder for Children 6 Years and Younger)  Without dissociative symptoms  Psychosocial / Contextual Factors: past trauma, developmental hx and current stressors   PROMIS (reviewed every 90 days):  3/13/2025    Referral / Collaboration:  Referral to another professional/service is not indicated at this time..    Anticipated number of session for this episode of care: 26  Anticipation frequency of session: Biweekly  Anticipated Duration of each session: 38-52 minutes  Treatment plan will be reviewed in 90 days or when goals have been changed.       MeasurableTreatment Goal(s) related to diagnosis / functional impairment(s)  Goal 1: Patient will report absence of persistent SI and report of reduced frequency and intensity of SI and absence of SI to acceptable levels, report subjective improved mood for a period of 90 days, within 6 months as clinically observed and by patient self-report    I will know I've met my goal when I can see the difference between a bad moment and what I want in th  future.      Objective #A (Patient Action)    Patient will demonstrate control of impulses and ability to use positive coping tools to manage strong emotions that can be safely addressed at a lower level of care. Absence of persistent SI and report of reduced frequency and intensity of SI and absence of SI to acceptable levels, report subjective improved mood for a period of 90 days, within 6 months as clinically observed and by patient self-report.  Status: Continued - Date(s):   2/24/2025    Intervention(s)  Therapist will provide individual therapy to identify triggers to SI urges, gain feedback on helpful coping strategies, and identify ways that family can offer support. Tx to discuss current stressors and interpersonal conflicts and how to cope with these, coaching, diagnostic testing, referral for medication as indicated, use prescribed medication, cognitive restructuring, interpersonal family therapy, supportive therapy services.        Goal 2: Patient will report absence of persistent depression mood and report of reduced frequency and intensity of low mood and absence of persistent low energy and motivation to acceptable levels, report subjective improved motivation and increased energy for a period of 90 days, within 6 months as clinically observed and by patient self-report    I will know I've met my goal when I no longer feel sad.      Objective #A (Patient Action)    Status: Continued - Date(s):   2/24/2025    Patient will demonstrate and report a level of depression that can be managed at a lower level of care.  Absence of persistent depression mood and report of reduced frequency and intensity of low mood and absence of persistent low energy and motivation to acceptable levels, report subjective improved motivation and increased energy for a period of 90 days, within 6 months as clinically observed and by patient self-report.    Intervention(s)  Therapist will provide individual therapy to identify  triggers to depression, gain feedback on helpful coping tools and thought-reframing techniques, and identify preferred way of being.  Tx to include discussion of current stressors and interpersonal conflicts and how to cope with these, coaching, diagnostic testing, referral for medication as indicated, use prescribed medication, cognitive restructuring, interpersonal, family therapy, supportive therapy services.        Goal 3: Patiient will report absence of persistent anxiety mood and report of reduced frequency and intensity of worry and absence of persistent anxious mood to acceptable levels, no panic attacks, report subjective comfort with rumination for a period of 90 days. Within 6 months as clinically observed and by patient self-report    I will know I've met my goal when I can go days without worrying about little things or shaking.      Objective #A (Patient Action)    Status: Continued - Date(s):   2/24/2025    Patient will demonstrate and report a level of anxiety that can be managed at a lower level of care.  Absence of persistent anxious mood and report of reduced frequency and intensity of worry and absence of persistent anxious mood to acceptable levels, no panic attacks, report subjective comfort with rumination for a period of 90 days, within 6 months as clinically observed and by patient self-report.    Intervention(s)  Therapist will provide individual therapy to identify triggers to anxiety, gain feedback on helpful coping tools and thought-reframing techniques, and identify preferred way of being. Tx to include discuss current stressors and interpersonal conflicts and how to cope with these, coaching, diagnostic testing , referral for medication as indicated, use prescribed medication, cognitive restructuring, interpersonal,   family therapy, supportive therapy services.        Patient has reviewed and agreed to the above plan.      Katie Faulkner, [facility]    2/24/2025                            "                        Wandy Ann            SAFETY PLAN:  Step 1: Warning signs / cues (Thoughts, images, mood, situation, behavior) that a crisis may be developing:  Thoughts: thoughts of not wanting to be here  Images: no images  Thinking Processes: intrusive thoughts (bothersome, unwanted thoughts that come out of nowhere): get irritated  Mood: intense anger and agitation  Behaviors: isolating/withdrawing   Situations: trauma    Step 2: Coping strategies - Things I can do to take my mind off of my problems without contacting another person (relaxation technique, physical activity):  Distress Tolerance Strategies:  arts and crafts: drawing and painting  Physical Activities: exercise: working out  Focus on helpful thoughts:  \"This is temporary\", \"It always passes\" and \"Ride the wave\"  Step 3: People and social settings that provide distraction:                 Name: Jennifer     Phone: [phone]                 Name: Antonina         Phone: [phone]                 Name: mom       Phone: [phone]  friends house or car   Step 4: Remind myself of people and things that are important to me and worth living for:  Best friend and cat        Step 5: When I am in crisis, I can ask these people to help me use my safety plan:                 Name: Jennifer     Phone: [phone]                 Name: Antonina         Phone: [phone]                 Name: panchito       Phone: [phone]  Step 6: Making the environment safe:   be around others  Step 7: Professionals or agencies I can contact during a crisis:  [facility] Daytime Number: [phone]  Suicide Prevention Lifeline: 0-280-767-EIPM (5000)  Crisis Text Line Service (available 24 hours a day, 7 days a week): Text [place] to 159125  Local Crisis Services: 988     Call 911 or go to my nearest emergency department.       I helped develop this safety plan and agree to use it when needed.  I have been given a copy of this plan.       Client " signature _________________________________________________________________  Today s date:  2/22/2021  Adapted from Safety Plan Template 2008 Marisol Cazares and Mario Flores is reprinted with the express permission of the authors.  No portion of the Safety Plan Template may be reproduced without the express, written permission.  You can contact the authors at [email or web address] or [email or web address].

## 2025-05-06 ENCOUNTER — VIRTUAL VISIT (OUTPATIENT)
Dept: PSYCHOLOGY | Facility: CLINIC | Age: 25
End: 2025-05-06
Payer: COMMERCIAL

## 2025-05-06 DIAGNOSIS — F90.2 ADHD (ATTENTION DEFICIT HYPERACTIVITY DISORDER), COMBINED TYPE: ICD-10-CM

## 2025-05-06 DIAGNOSIS — F31.32 BIPOLAR AFFECTIVE DISORDER, CURRENTLY DEPRESSED, MODERATE (H): Primary | ICD-10-CM

## 2025-05-06 DIAGNOSIS — F41.1 GENERALIZED ANXIETY DISORDER: ICD-10-CM

## 2025-05-06 DIAGNOSIS — F43.10 PTSD (POST-TRAUMATIC STRESS DISORDER): ICD-10-CM

## 2025-05-06 PROCEDURE — 90837 PSYTX W PT 60 MINUTES: CPT | Mod: 95 | Performed by: SOCIAL WORKER

## 2025-05-06 NOTE — PROGRESS NOTES
[facility]                                      Progress Note    Patient Name: Wandy Ann  Date: 2025         Service Type: Individual      Session Start Time: 1503  Session End Time: 1600     Session Length: 53+    Session #: 100    Attendees: Client    Service Modality:    Video Visit:      Provider verified identity through the following two step process.  Patient provided: Patient  and Patient previous known to provider     Telemedicine Visit: The patient's condition can be safely assessed and treated via synchronous audio and visual telemedicine encounter.       Reason for Telemedicine Visit: Patient has requested telehealth visit     Originating Site (Patient Location): Patient's home     Distant Site (Provider Location): Provider Remote Setting- Home Office     Consent:  The patient/guardian has verbally consented to: the potential risks and benefits of telemedicine (video visit) versus in person care; bill my insurance or make self-payment for services provided; and responsibility for payment of non-covered services.      Patient would like the video invitation sent by: email/text     Mode of Communication: Video Conference via [employer]well     Distant Location (Provider): Off-site     As the provider I attest to compliance with applicable laws and regulations related to telemedicine.    DATA  Interactive Complexity: No  Crisis: No  Extended Session (53+ minutes):   - Patient's presenting concerns require more intensive intervention than could be completed within the usual service        Progress Since Last Session (Related to Symptoms / Goals / Homework):   Symptoms: No change making towards change      Homework: Achieved / completed to satisfaction  Partially completed        Episode of Care Goals: Satisfactory progress - ACTION (Actively working towards change); Intervened by reinforcing change plan / affirming steps taken       Current / Ongoing Stressors and  Concerns:   past trauma, developmental hx and current stressors      Treatment Objective(s) Addressed in This Session:   Patient will demonstrate control of impulses and ability to use positive coping tools to manage strong emotions that can be safely addressed at a lower level of care. Absence of persistent SI and report of reduced frequency and intensity of SI and absence of SI to acceptable levels, report subjective improved mood for a period of 90 days, within 6 months as clinically observed and by patient self-report.  Patient will demonstrate and report a level of depression that can be managed at a lower level of care.  Absence of persistent depression mood and report of reduced frequency and intensity of low mood and absence of persistent low energy and motivation to acceptable levels, report subjective improved motivation and increased energy for a period of 90 days, within 6 months as clinically observed and by patient self-report.  Patient will demonstrate and report a level of anxiety that can be managed at a lower level of care.  Absence of persistent anxious mood and report of reduced frequency and intensity of worry and absence of persistent anxious mood to acceptable levels, no panic attacks, report subjective comfort with rumination for a period of 90 days, within 6 months as clinically observed and by patient self-report.       Intervention:  Therapist met with patient to review goals and interventions. Therapist utilized reflected listening as patient gave brief reflection of week. Patient reported no change but has made drafts to send to partner's mom and reviewed them and emotions. Therapist supported patient as she processed and validated patient. Therapist assisted patient in message along with encouraging validation of her emotions, firm boundaries and maintain her own expectations along with sharing it with her partner. Therapist coached patient through effective communication styles and  different ways to disengage along with patient allowing self to take time to respond or not respond if not ready.   Patient presented with an anxious affect. Patient was engaged in session and open to feedback. Patient reported no safety concerns.             There has been demonstrated improvement in functioning while patient has been engaged in psychotherapy/psychological service- if withdrawn the patient would deteriorate and/or relapse.     Assessments completed prior to visit:  The following assessments were completed by patient for this visit:  PHQ9:       7/11/2024    12:42 PM 8/12/2024     1:07 AM 9/24/2024    11:00 AM 12/1/2024     7:23 PM 12/4/2024    11:30 AM 1/6/2025    11:06 AM 2/24/2025     2:59 PM   PHQ-9 SCORE   PHQ-9 Total Score MyChart 12 (Moderate depression) 8 (Mild depression)  11 (Moderate depression) 11 (Moderate depression) 13 (Moderate depression) 10 (Moderate depression)   PHQ-9 Total Score 12 8    8 14 11  11  13  10        Patient-reported     GAD7:       1/15/2024     7:48 PM 7/10/2024     1:35 PM 7/12/2024    11:48 AM 9/24/2024    11:00 AM 12/4/2024    11:30 AM 1/6/2025    11:07 AM 2/24/2025     2:59 PM   YESSI-7 SCORE   Total Score 15 (severe anxiety)  17 (severe anxiety)  13 (moderate anxiety) 13 (moderate anxiety) 10 (moderate anxiety)   Total Score 15 17 17 12 13  13  10        Patient-reported   PROMIS-10 Scores        8/19/2024     3:00 PM 12/4/2024    11:31 AM 3/13/2025     4:09 PM   PROMIS-10 Total Score w/o Sub Scores   PROMIS TOTAL - SUBSCORES 29 26  24        Patient-reported                   ASSESSMENT: Current Emotional / Mental Status (status of significant symptoms):   Risk status (Self / Other harm or suicidal ideation)   Patient denies current fears or concerns for personal safety.   Patient denies current or recent suicidal ideation or behaviors.   Patient denies current or recent homicidal ideation or behaviors.   Patient denies current or recent self injurious  behavior or ideation.   Patient denies other safety concerns.   Patient reports there has been no change in risk factors since their last session.     Patient reports there has been no change in protective factors since their last session.     A safety and risk management plan has been developed including: Patient consented to co-developed safety plan on 2.21.2022.  Safety and risk management plan was reviewed.   Patient agreed to use safety plan should any safety concerns arise.  A copy was made available to the patient.     Appearance:   Appropriate     Eye Contact:   Fair     Psychomotor Behavior: Restless     Attitude:   Cooperative    Orientation:   All   Speech    Rate / Production: Emotional Talkative    Volume:  Normal    Mood:    Anxious  Depressed    Affect:    Appropriate    Thought Content:  Clear    Thought Form:  Coherent    Insight:    Fair      Medication Review:   No changes to current psychiatric medication(s)     Medication Compliance:   Yes     Changes in Health Issues:   None reported     Chemical Use Review:   Substance Use: Chemical use reviewed, no active concerns identified      Tobacco Use: No current tobacco use.      Diagnosis:  1. Bipolar affective disorder, currently depressed, moderate (H)    2. Generalized anxiety disorder    3. ADHD (attention deficit hyperactivity disorder), combined type    4. PTSD (post-traumatic stress disorder)        Collateral Reports Completed:   Not Applicable    PLAN: (Patient Tasks / Therapist Tasks / Other)        Call [phone] if feeling SI has increase or go to ED or empath   black and white thinking, slow down and look at a balance between and what it probably is  Speak to friend about what you need and if she is able to also meet you when you are happy   Patient reported no change but has made drafts to send to partner's mom and reviewed them and emotions. Therapist supported patient as she processed and validated patient. Therapist assisted patient in  message along with encouraging validation of her emotions, firm boundaries and maintain her own expectations along with sharing it with her partner. Therapist coached patient through effective communication styles and different ways to disengage along with patient allowing self to take time to respond or not respond if not ready.         Katie Faulkner, [facility] 5/6/2025                                                         ______________________________________________________________________    Individual Treatment Plan    Patient's Name: Wandy Ann  YOB: 2000    Date of Creation: 2.22.2021  Date Treatment Plan Last Reviewed/Revised:   2/24/2025 due  5/24/2025    DSM5 Diagnoses: 296.52 Bipolar I Disorder Current or Most Recent Episode Depressed, Moderate, 300.02 (F41.1) Generalized Anxiety Disorder or 309.81 (F43.10) Posttraumatic Stress Disorder (includes Posttraumatic Stress Disorder for Children 6 Years and Younger)  Without dissociative symptoms  Psychosocial / Contextual Factors: past trauma, developmental hx and current stressors   PROMIS (reviewed every 90 days):  3/13/2025    Referral / Collaboration:  Referral to another professional/service is not indicated at this time..    Anticipated number of session for this episode of care: 26  Anticipation frequency of session: Biweekly  Anticipated Duration of each session: 38-52 minutes  Treatment plan will be reviewed in 90 days or when goals have been changed.       MeasurableTreatment Goal(s) related to diagnosis / functional impairment(s)  Goal 1: Patient will report absence of persistent SI and report of reduced frequency and intensity of SI and absence of SI to acceptable levels, report subjective improved mood for a period of 90 days, within 6 months as clinically observed and by patient self-report    I will know I've met my goal when I can see the difference between a bad moment and what I want in th future.      Objective #A  (Patient Action)    Patient will demonstrate control of impulses and ability to use positive coping tools to manage strong emotions that can be safely addressed at a lower level of care. Absence of persistent SI and report of reduced frequency and intensity of SI and absence of SI to acceptable levels, report subjective improved mood for a period of 90 days, within 6 months as clinically observed and by patient self-report.  Status: Continued - Date(s):   2/24/2025    Intervention(s)  Therapist will provide individual therapy to identify triggers to SI urges, gain feedback on helpful coping strategies, and identify ways that family can offer support. Tx to discuss current stressors and interpersonal conflicts and how to cope with these, coaching, diagnostic testing, referral for medication as indicated, use prescribed medication, cognitive restructuring, interpersonal family therapy, supportive therapy services.        Goal 2: Patient will report absence of persistent depression mood and report of reduced frequency and intensity of low mood and absence of persistent low energy and motivation to acceptable levels, report subjective improved motivation and increased energy for a period of 90 days, within 6 months as clinically observed and by patient self-report    I will know I've met my goal when I no longer feel sad.      Objective #A (Patient Action)    Status: Continued - Date(s):   2/24/2025    Patient will demonstrate and report a level of depression that can be managed at a lower level of care.  Absence of persistent depression mood and report of reduced frequency and intensity of low mood and absence of persistent low energy and motivation to acceptable levels, report subjective improved motivation and increased energy for a period of 90 days, within 6 months as clinically observed and by patient self-report.    Intervention(s)  Therapist will provide individual therapy to identify triggers to depression,  gain feedback on helpful coping tools and thought-reframing techniques, and identify preferred way of being.  Tx to include discussion of current stressors and interpersonal conflicts and how to cope with these, coaching, diagnostic testing, referral for medication as indicated, use prescribed medication, cognitive restructuring, interpersonal, family therapy, supportive therapy services.        Goal 3: Patiient will report absence of persistent anxiety mood and report of reduced frequency and intensity of worry and absence of persistent anxious mood to acceptable levels, no panic attacks, report subjective comfort with rumination for a period of 90 days. Within 6 months as clinically observed and by patient self-report    I will know I've met my goal when I can go days without worrying about little things or shaking.      Objective #A (Patient Action)    Status: Continued - Date(s):   2/24/2025    Patient will demonstrate and report a level of anxiety that can be managed at a lower level of care.  Absence of persistent anxious mood and report of reduced frequency and intensity of worry and absence of persistent anxious mood to acceptable levels, no panic attacks, report subjective comfort with rumination for a period of 90 days, within 6 months as clinically observed and by patient self-report.    Intervention(s)  Therapist will provide individual therapy to identify triggers to anxiety, gain feedback on helpful coping tools and thought-reframing techniques, and identify preferred way of being. Tx to include discuss current stressors and interpersonal conflicts and how to cope with these, coaching, diagnostic testing , referral for medication as indicated, use prescribed medication, cognitive restructuring, interpersonal,   family therapy, supportive therapy services.        Patient has reviewed and agreed to the above plan.      Katie Faulkner, [facility]    2/24/2025                                                  "  Wandy Ann            SAFETY PLAN:  Step 1: Warning signs / cues (Thoughts, images, mood, situation, behavior) that a crisis may be developing:  Thoughts: thoughts of not wanting to be here  Images: no images  Thinking Processes: intrusive thoughts (bothersome, unwanted thoughts that come out of nowhere): get irritated  Mood: intense anger and agitation  Behaviors: isolating/withdrawing   Situations: trauma    Step 2: Coping strategies - Things I can do to take my mind off of my problems without contacting another person (relaxation technique, physical activity):  Distress Tolerance Strategies:  arts and crafts: drawing and painting  Physical Activities: exercise: working out  Focus on helpful thoughts:  \"This is temporary\", \"It always passes\" and \"Ride the wave\"  Step 3: People and social settings that provide distraction:                 Name: Jennifer     Phone: [phone]                 Name: Antonina         Phone: [phone]                 Name: mom       Phone: [phone]  friends house or car   Step 4: Remind myself of people and things that are important to me and worth living for:  Best friend and cat        Step 5: When I am in crisis, I can ask these people to help me use my safety plan:                 Name: Jennifer     Phone: [phone]                 Name: Antonina         Phone: [phone]                 Name: panchito       Phone: [phone]  Step 6: Making the environment safe:   be around others  Step 7: Professionals or agencies I can contact during a crisis:  [facility] Daytime Number: [phone]  Suicide Prevention Lifeline: 1-071-730-ASUR (3999)  Crisis Text Line Service (available 24 hours a day, 7 days a week): Text [place] to 137075  Local Crisis Services: 988     Call 911 or go to my nearest emergency department.       I helped develop this safety plan and agree to use it when needed.  I have been given a copy of this plan.       Client signature " _________________________________________________________________  Today s date:  2/22/2021  Adapted from Safety Plan Template 2008 Marisol Cazares and Mario Flores is reprinted with the express permission of the authors.  No portion of the Safety Plan Template may be reproduced without the express, written permission.  You can contact the authors at [email or web address] or [email or web address].

## 2025-05-08 SDOH — HEALTH STABILITY: PHYSICAL HEALTH: ON AVERAGE, HOW MANY MINUTES DO YOU ENGAGE IN EXERCISE AT THIS LEVEL?: 80 MIN

## 2025-05-08 SDOH — HEALTH STABILITY: PHYSICAL HEALTH: ON AVERAGE, HOW MANY DAYS PER WEEK DO YOU ENGAGE IN MODERATE TO STRENUOUS EXERCISE (LIKE A BRISK WALK)?: 5 DAYS

## 2025-05-08 ASSESSMENT — SOCIAL DETERMINANTS OF HEALTH (SDOH): HOW OFTEN DO YOU GET TOGETHER WITH FRIENDS OR RELATIVES?: ONCE A WEEK

## 2025-05-13 ENCOUNTER — RESULTS FOLLOW-UP (OUTPATIENT)
Dept: FAMILY MEDICINE | Facility: CLINIC | Age: 25
End: 2025-05-13

## 2025-05-13 ENCOUNTER — OFFICE VISIT (OUTPATIENT)
Dept: FAMILY MEDICINE | Facility: CLINIC | Age: 25
End: 2025-05-13
Attending: PHYSICIAN ASSISTANT
Payer: COMMERCIAL

## 2025-05-13 VITALS
HEART RATE: 73 BPM | TEMPERATURE: 97.7 F | HEIGHT: 60 IN | DIASTOLIC BLOOD PRESSURE: 78 MMHG | BODY MASS INDEX: 36.24 KG/M2 | RESPIRATION RATE: 18 BRPM | SYSTOLIC BLOOD PRESSURE: 121 MMHG | OXYGEN SATURATION: 100 % | WEIGHT: 184.6 LBS

## 2025-05-13 DIAGNOSIS — E66.01 MORBID OBESITY (H): ICD-10-CM

## 2025-05-13 DIAGNOSIS — Z00.00 ROUTINE GENERAL MEDICAL EXAMINATION AT A HEALTH CARE FACILITY: Primary | ICD-10-CM

## 2025-05-13 DIAGNOSIS — Z11.3 SCREENING FOR STDS (SEXUALLY TRANSMITTED DISEASES): ICD-10-CM

## 2025-05-13 DIAGNOSIS — R63.5 WEIGHT GAIN: ICD-10-CM

## 2025-05-13 DIAGNOSIS — Z13.220 SCREENING FOR HYPERLIPIDEMIA: ICD-10-CM

## 2025-05-13 LAB
ALBUMIN SERPL BCG-MCNC: 4.5 G/DL (ref 3.5–5.2)
ALP SERPL-CCNC: 109 U/L (ref 40–150)
ALT SERPL W P-5'-P-CCNC: 17 U/L (ref 0–50)
ANION GAP SERPL CALCULATED.3IONS-SCNC: 11 MMOL/L (ref 7–15)
AST SERPL W P-5'-P-CCNC: 23 U/L (ref 0–45)
BILIRUB SERPL-MCNC: 0.4 MG/DL
BUN SERPL-MCNC: 8.6 MG/DL (ref 6–20)
CALCIUM SERPL-MCNC: 9.8 MG/DL (ref 8.8–10.4)
CHLORIDE SERPL-SCNC: 104 MMOL/L (ref 98–107)
CHOLEST SERPL-MCNC: 160 MG/DL
CREAT SERPL-MCNC: 0.74 MG/DL (ref 0.51–0.95)
EGFRCR SERPLBLD CKD-EPI 2021: >90 ML/MIN/1.73M2
ERYTHROCYTE [DISTWIDTH] IN BLOOD BY AUTOMATED COUNT: 11.8 % (ref 10–15)
EST. AVERAGE GLUCOSE BLD GHB EST-MCNC: 100 MG/DL
FASTING STATUS PATIENT QL REPORTED: YES
FASTING STATUS PATIENT QL REPORTED: YES
GLUCOSE SERPL-MCNC: 104 MG/DL (ref 70–99)
HBA1C MFR BLD: 5.1 % (ref 0–5.6)
HCO3 SERPL-SCNC: 23 MMOL/L (ref 22–29)
HCT VFR BLD AUTO: 42.5 % (ref 35–47)
HDLC SERPL-MCNC: 50 MG/DL
HGB BLD-MCNC: 14.5 G/DL (ref 11.7–15.7)
LDLC SERPL CALC-MCNC: 99 MG/DL
MCH RBC QN AUTO: 32 PG (ref 26.5–33)
MCHC RBC AUTO-ENTMCNC: 34.1 G/DL (ref 31.5–36.5)
MCV RBC AUTO: 94 FL (ref 78–100)
NONHDLC SERPL-MCNC: 110 MG/DL
PLATELET # BLD AUTO: 354 10E3/UL (ref 150–450)
POTASSIUM SERPL-SCNC: 4.4 MMOL/L (ref 3.4–5.3)
PROT SERPL-MCNC: 7.5 G/DL (ref 6.4–8.3)
RBC # BLD AUTO: 4.53 10E6/UL (ref 3.8–5.2)
SODIUM SERPL-SCNC: 138 MMOL/L (ref 135–145)
T4 FREE SERPL-MCNC: 1.2 NG/DL (ref 0.9–1.7)
TRIGL SERPL-MCNC: 57 MG/DL
TSH SERPL DL<=0.005 MIU/L-ACNC: 1.66 UIU/ML (ref 0.3–4.2)
VIT D+METAB SERPL-MCNC: 31 NG/ML (ref 20–50)
WBC # BLD AUTO: 5.2 10E3/UL (ref 4–11)

## 2025-05-13 PROCEDURE — 36415 COLL VENOUS BLD VENIPUNCTURE: CPT | Performed by: PHYSICIAN ASSISTANT

## 2025-05-13 PROCEDURE — 99214 OFFICE O/P EST MOD 30 MIN: CPT | Mod: 25 | Performed by: PHYSICIAN ASSISTANT

## 2025-05-13 PROCEDURE — 84443 ASSAY THYROID STIM HORMONE: CPT | Performed by: PHYSICIAN ASSISTANT

## 2025-05-13 PROCEDURE — 82306 VITAMIN D 25 HYDROXY: CPT | Performed by: PHYSICIAN ASSISTANT

## 2025-05-13 PROCEDURE — 84439 ASSAY OF FREE THYROXINE: CPT | Performed by: PHYSICIAN ASSISTANT

## 2025-05-13 PROCEDURE — 3078F DIAST BP <80 MM HG: CPT | Performed by: PHYSICIAN ASSISTANT

## 2025-05-13 PROCEDURE — 3074F SYST BP LT 130 MM HG: CPT | Performed by: PHYSICIAN ASSISTANT

## 2025-05-13 PROCEDURE — 99395 PREV VISIT EST AGE 18-39: CPT | Performed by: PHYSICIAN ASSISTANT

## 2025-05-13 PROCEDURE — 83036 HEMOGLOBIN GLYCOSYLATED A1C: CPT | Performed by: PHYSICIAN ASSISTANT

## 2025-05-13 PROCEDURE — 85027 COMPLETE CBC AUTOMATED: CPT | Performed by: PHYSICIAN ASSISTANT

## 2025-05-13 PROCEDURE — G2211 COMPLEX E/M VISIT ADD ON: HCPCS | Performed by: PHYSICIAN ASSISTANT

## 2025-05-13 PROCEDURE — 80053 COMPREHEN METABOLIC PANEL: CPT | Performed by: PHYSICIAN ASSISTANT

## 2025-05-13 PROCEDURE — 80061 LIPID PANEL: CPT | Performed by: PHYSICIAN ASSISTANT

## 2025-05-13 RX ORDER — LAMOTRIGINE 200 MG/1
200 TABLET ORAL DAILY
COMMUNITY

## 2025-05-13 RX ORDER — GABAPENTIN 100 MG/1
100 CAPSULE ORAL 3 TIMES DAILY
COMMUNITY

## 2025-05-13 ASSESSMENT — PATIENT HEALTH QUESTIONNAIRE - PHQ9
SUM OF ALL RESPONSES TO PHQ QUESTIONS 1-9: 7
SUM OF ALL RESPONSES TO PHQ QUESTIONS 1-9: 7
10. IF YOU CHECKED OFF ANY PROBLEMS, HOW DIFFICULT HAVE THESE PROBLEMS MADE IT FOR YOU TO DO YOUR WORK, TAKE CARE OF THINGS AT HOME, OR GET ALONG WITH OTHER PEOPLE: SOMEWHAT DIFFICULT

## 2025-05-13 NOTE — PROGRESS NOTES
Preventive Care Visit  [facility] DEERK Ruiz PA-C, Family Medicine  May 13, 2025      Assessment & Plan     Routine general medical examination at a health care facility  Well adult.     Screening for STDs (sexually transmitted diseases)  Declines today.     Weight gain  Discussed. Could be related to IUD. Suggest labs today. She is agreeable. Encouraged looking at diet. Discussed GLP1s - not interested at this time.   - TSH; Future  - T4, free; Future  - Hemoglobin A1c; Future  - Comprehensive metabolic panel (BMP + Alb, Alk Phos, ALT, AST, Total. Bili, TP); Future  - CBC with platelets; Future  - Vitamin D Deficiency; Future  - TSH  - T4, free  - Hemoglobin A1c  - Comprehensive metabolic panel (BMP + Alb, Alk Phos, ALT, AST, Total. Bili, TP)  - CBC with platelets  - Vitamin D Deficiency    Screening for hyperlipidemia  Screen.  - Lipid panel reflex to direct LDL Fasting; Future  - Lipid panel reflex to direct LDL Fasting    Morbid obesity (H)  Discussed at length.             Counseling  Appropriate preventive services were addressed with this patient via screening, questionnaire, or discussion as appropriate for fall prevention, nutrition, physical activity, Tobacco-use cessation, social engagement, weight loss and cognition.  Checklist reviewing preventive services available has been given to the patient.  Reviewed patient's diet, addressing concerns and/or questions.   She is at risk for psychosocial distress and has been provided with information to reduce risk.   The patient's PHQ-9 score is consistent with mild depression. She was provided with information regarding depression.           Lucita Saba is a 24 year old, presenting for the following:  Physical        5/13/2025    10:13 AM   Additional Questions   Roomed by Yamel          [facility]      Dentist states concern for possible sleep apnea. Has never had issues with her apple watch stating low oxygen. Never short of breath  at night. Snores occasionally. No family history of sleep apnea.     Weight - working on diet and exercise. No big changes to diet.     Vaginal pain after intercourse at times. Hurts more when she is around her period. No menstrual periods.   IUD 2/3/25.            Advance Care Planning    Discussed advance care planning with patient; however, patient declined at this time.        5/8/2025   General Health   How would you rate your overall physical health? (!) FAIR   Feel stress (tense, anxious, or unable to sleep) Very much   (!) STRESS CONCERN      5/8/2025   Nutrition   Three or more servings of calcium each day? (!) NO   Diet: Regular (no restrictions)    Breakfast skipped   How many servings of fruit and vegetables per day? (!) 0-1   How many sweetened beverages each day? 0-1       Multiple values from one day are sorted in reverse-chronological order         5/8/2025   Exercise   Days per week of moderate/strenous exercise 5 days   Average minutes spent exercising at this level 80 min         5/8/2025   Social Factors   Frequency of gathering with friends or relatives Once a week   Worry food won't last until get money to buy more No   Food not last or not have enough money for food? No   Do you have housing? (Housing is defined as stable permanent housing and does not include staying outside in a car, in a tent, in an abandoned building, in an overnight shelter, or couch-surfing.) Yes   Are you worried about losing your housing? No   Lack of transportation? No   Unable to get utilities (heat,electricity)? No         5/8/2025   Dental   Dentist two times every year? Yes       Today's PHQ-9 Score:       5/13/2025     9:44 AM   PHQ-9 SCORE   PHQ-9 Total Score MyChart 7 (Mild depression)   PHQ-9 Total Score 7        Patient-reported         5/8/2025   Substance Use   Alcohol more than 3/day or more than 7/wk No   Do you use any other substances recreationally? No     Social History     Tobacco Use    Smoking  "status: Never     Passive exposure: Never    Smokeless tobacco: Never   Vaping Use    Vaping status: Never Used   Substance Use Topics    Alcohol use: Yes     Comment: Occ    Drug use: No           5/8/2025   STI Screening   New sexual partner(s) since last STI/HIV test? No     History of abnormal Pap smear: No - age 21-29 PAP every 3 years recommended        4/21/2023     1:52 PM   PAP / HPV   PAP Negative for Intraepithelial Lesion or Malignancy (NILM)            5/8/2025   Contraception/Family Planning   Questions about contraception or family planning No        Reviewed and updated as needed this visit by Provider                          Review of Systems  Constitutional, neuro, ENT, endocrine, pulmonary, cardiac, gastrointestinal, genitourinary, musculoskeletal, integument and psychiatric systems are negative, except as otherwise noted.     Objective    Exam  /78 (BP Location: Right arm, Patient Position: Sitting, Cuff Size: Adult Regular)   Pulse 73   Temp 97.7  F (36.5  C) (Temporal)   Resp 18   Ht 1.518 m (4' 11.75\")   Wt 83.7 kg (184 lb 9.6 oz)   LMP 03/29/2025 (Exact Date)   SpO2 100%   BMI 36.35 kg/m     Estimated body mass index is 36.35 kg/m  as calculated from the following:    Height as of this encounter: 1.518 m (4' 11.75\").    Weight as of this encounter: 83.7 kg (184 lb 9.6 oz).    Physical Exam  GENERAL: alert and no distress  EYES: Eyes grossly normal to inspection, PERRL and conjunctivae and sclerae normal  HENT: ear canals and TM's normal, nose and mouth without ulcers or lesions  NECK: no adenopathy, no asymmetry, masses, or scars  RESP: lungs clear to auscultation - no rales, rhonchi or wheezes  CV: regular rate and rhythm, normal S1 S2, no S3 or S4, no murmur, click or rub, no peripheral edema  ABDOMEN: soft, nontender, no hepatosplenomegaly, no masses and bowel sounds normal  MS: no gross musculoskeletal defects noted, no edema  SKIN: no suspicious lesions or rashes  NEURO: " Normal strength and tone, mentation intact and speech normal  PSYCH: mentation appears normal, affect normal/bright        Signed Electronically by: Chinyere Ruiz PA-C    Answers submitted by the patient for this visit:  Patient Health Questionnaire (Submitted on 5/13/2025)  If you checked off any problems, how difficult have these problems made it for you to do your work, take care of things at home, or get along with other people?: Somewhat difficult  PHQ9 TOTAL SCORE: 7

## 2025-05-13 NOTE — PATIENT INSTRUCTIONS
Patient Education   Preventive Care Advice   This is general advice given by our system to help you stay healthy. However, your care team may have specific advice just for you. Please talk to your care team about your preventive care needs.  Nutrition  Eat 5 or more servings of fruits and vegetables each day.  Try wheat bread, brown rice and whole grain pasta (instead of white bread, rice, and pasta).  Get enough calcium and vitamin D. Check the label on foods and aim for 100% of the RDA (recommended daily allowance).  Lifestyle  Exercise at least 150 minutes each week  (30 minutes a day, 5 days a week).  Do muscle strengthening activities 2 days a week. These help control your weight and prevent disease.  No smoking.  Wear sunscreen to prevent skin cancer.  Have a dental exam and cleaning every 6 months.  Yearly exams  See your health care team every year to talk about:  Any changes in your health.  Any medicines your care team has prescribed.  Preventive care, family planning, and ways to prevent chronic diseases.  Shots (vaccines)   HPV shots (up to age 26), if you've never had them before.  Hepatitis B shots (up to age 59), if you've never had them before.  COVID-19 shot: Get this shot when it's due.  Flu shot: Get a flu shot every year.  Tetanus shot: Get a tetanus shot every 10 years.  Pneumococcal, hepatitis A, and RSV shots: Ask your care team if you need these based on your risk.  Shingles shot (for age 50 and up)  General health tests  Diabetes screening:  Starting at age 35, Get screened for diabetes at least every 3 years.  If you are younger than age 35, ask your care team if you should be screened for diabetes.  Cholesterol test: At age 39, start having a cholesterol test every 5 years, or more often if advised.  Bone density scan (DEXA): At age 50, ask your care team if you should have this scan for osteoporosis (brittle bones).  Hepatitis C: Get tested at least once in your life.  STIs (sexually  transmitted infections)  Before age 24: Ask your care team if you should be screened for STIs.  After age 24: Get screened for STIs if you're at risk. You are at risk for STIs (including HIV) if:  You are sexually active with more than one person.  You don't use condoms every time.  You or a partner was diagnosed with a sexually transmitted infection.  If you are at risk for HIV, ask about PrEP medicine to prevent HIV.  Get tested for HIV at least once in your life, whether you are at risk for HIV or not.  Cancer screening tests  Cervical cancer screening: If you have a cervix, begin getting regular cervical cancer screening tests starting at age 21.  Breast cancer scan (mammogram): If you've ever had breasts, begin having regular mammograms starting at age 40. This is a scan to check for breast cancer.  Colon cancer screening: It is important to start screening for colon cancer at age 45.  Have a colonoscopy test every 10 years (or more often if you're at risk) Or, ask your provider about stool tests like a FIT test every year or Cologuard test every 3 years.  To learn more about your testing options, visit:   .  For help making a decision, visit:   https://bit.ly/vc57779.  Prostate cancer screening test: If you have a prostate, ask your care team if a prostate cancer screening test (PSA) at age 55 is right for you.  Lung cancer screening: If you are a current or former smoker ages 50 to 80, ask your care team if ongoing lung cancer screenings are right for you.  For informational purposes only. Not to replace the advice of your health care provider. Copyright   2023 [facility]. All rights reserved. Clinically reviewed by the [facility]. [employer] 199656 - REV 01/24.  Learning About Stress  What is stress?     Stress is your body's response to a hard situation. Your body can have a physical, emotional, or mental response. Stress is a fact of life for most people, and it  affects everyone differently. What causes stress for you may not be stressful for someone else.  A lot of things can cause stress. You may feel stress when you go on a job interview, take a test, or run a race. This kind of short-term stress is normal and even useful. It can help you if you need to work hard or react quickly. For example, stress can help you finish an important job on time.  Long-term stress is caused by ongoing stressful situations or events. Examples of long-term stress include long-term health problems, ongoing problems at work, or conflicts in your family. Long-term stress can harm your health.  How does stress affect your health?  When you are stressed, your body responds as though you are in danger. It makes hormones that speed up your heart, make you breathe faster, and give you a burst of energy. This is called the fight-or-flight stress response. If the stress is over quickly, your body goes back to normal and no harm is done.  But if stress happens too often or lasts too long, it can have bad effects. Long-term stress can make you more likely to get sick, and it can make symptoms of some diseases worse. If you tense up when you are stressed, you may develop neck, shoulder, or low back pain. Stress is linked to high blood pressure and heart disease.  Stress also harms your emotional health. It can make you landers, tense, or depressed. Your relationships may suffer, and you may not do well at work or school.  What can you do to manage stress?  You can try these things to help manage stress:   Do something active. Exercise or activity can help reduce stress. Walking is a great way to get started. Even everyday activities such as housecleaning or yard work can help.  Try yoga or roland chi. These techniques combine exercise and meditation. You may need some training at first to learn them.  Do something you enjoy. For example, listen to music or go to a movie. Practice your hobby or do volunteer  "work.  Meditate. This can help you relax, because you are not worrying about what happened before or what may happen in the future.  Do guided imagery. Imagine yourself in any setting that helps you feel calm. You can use online videos, books, or a teacher to guide you.  Do breathing exercises. For example:  From a standing position, bend forward from the waist with your knees slightly bent. Let your arms dangle close to the floor.  Breathe in slowly and deeply as you return to a standing position. Roll up slowly and lift your head last.  Hold your breath for just a few seconds in the standing position.  Breathe out slowly and bend forward from the waist.  Let your feelings out. Talk, laugh, cry, and express anger when you need to. Talking with supportive friends or family, a counselor, or a adrian leader about your feelings is a healthy way to relieve stress. Avoid discussing your feelings with people who make you feel worse.  Write. It may help to write about things that are bothering you. This helps you find out how much stress you feel and what is causing it. When you know this, you can find better ways to cope.  What can you do to prevent stress?  You might try some of these things to help prevent stress:  Manage your time. This helps you find time to do the things you want and need to do.  Get enough sleep. Your body recovers from the stresses of the day while you are sleeping.  Get support. Your family, friends, and community can make a difference in how you experience stress.  Limit your news feed. Avoid or limit time on social media or news that may make you feel stressed.  Do something active. Exercise or activity can help reduce stress. Walking is a great way to get started.  Where can you learn more?  Go to https://www.[employer].net/patiented  Enter N032 in the search box to learn more about \"Learning About Stress.\"  Current as of: October 24, 2024  Content Version: 14.4 2024-2025 [employer]   Care instructions adapted under license by your healthcare professional. If you have questions about a medical condition or this instruction, always ask your healthcare professional. [employer] disclaims any warranty or liability for your use of this information.    Learning About Depression Screening  What is depression screening?  Depression screening is a way to see if you have depression symptoms. It may be done by a doctor or counselor. It's often part of a routine checkup. That's because your mental health is just as important as your physical health.  Depression is a mental health condition that affects how you feel, think, and act. You may:  Have less energy.  Lose interest in your daily activities.  Feel sad and grouchy for a long time.  Depression is very common. It affects people of all ages.  Many things can lead to depression. Some people become depressed after they have a stroke or find out they have a major illness like cancer or heart disease. The death of a loved one or a breakup may lead to depression. It can run in families. Most experts believe that a combination of inherited genes and stressful life events can cause it.  What happens during screening?  You may be asked to fill out a form about your depression symptoms. You and the doctor will discuss your answers. The doctor may ask you more questions to learn more about how you think, act, and feel.  What happens after screening?  If you have symptoms of depression, your doctor will talk to you about your options.  Doctors usually treat depression with medicines or counseling. Often, combining the two works best. Many people don't get help because they think that they'll get over the depression on their own. But people with depression may not get better unless they get treatment.  The cause of depression is not well understood. There may be many factors involved. But if you have depression, it's not your fault.  A serious  "symptom of depression is thinking about death or suicide. If you or someone you care about talks about this or about feeling hopeless, get help right away.  It's important to know that depression can be treated. Medicine, counseling, and self-care may help.  Where can you learn more?  Go to https://www.[employer].net/patiented  Enter T185 in the search box to learn more about \"Learning About Depression Screening.\"  Current as of: July 31, 2024  Content Version: 14.4    6948-7030 [employer].   Care instructions adapted under license by your healthcare professional. If you have questions about a medical condition or this instruction, always ask your healthcare professional. [employer] disclaims any warranty or liability for your use of this information.       "

## 2025-05-20 ENCOUNTER — VIRTUAL VISIT (OUTPATIENT)
Dept: PSYCHOLOGY | Facility: CLINIC | Age: 25
End: 2025-05-20
Payer: COMMERCIAL

## 2025-05-20 DIAGNOSIS — F43.10 PTSD (POST-TRAUMATIC STRESS DISORDER): ICD-10-CM

## 2025-05-20 DIAGNOSIS — F90.2 ADHD (ATTENTION DEFICIT HYPERACTIVITY DISORDER), COMBINED TYPE: ICD-10-CM

## 2025-05-20 DIAGNOSIS — F41.1 GENERALIZED ANXIETY DISORDER: ICD-10-CM

## 2025-05-20 DIAGNOSIS — F31.32 BIPOLAR AFFECTIVE DISORDER, CURRENTLY DEPRESSED, MODERATE (H): Primary | ICD-10-CM

## 2025-05-20 PROCEDURE — 90837 PSYTX W PT 60 MINUTES: CPT | Mod: 95 | Performed by: SOCIAL WORKER

## 2025-05-20 NOTE — PROGRESS NOTES
[facility]                                      Progress Note    Patient Name: Wandy Ann  Date: 2025         Service Type: Individual      Session Start Time: 1304  Session End Time: 1358     Session Length: 53+    Session #: 101    Attendees: Client    Service Modality:    Video Visit:      Provider verified identity through the following two step process.  Patient provided: Patient  and Patient previous known to provider     Telemedicine Visit: The patient's condition can be safely assessed and treated via synchronous audio and visual telemedicine encounter.       Reason for Telemedicine Visit: Patient has requested telehealth visit     Originating Site (Patient Location): Patient's home     Distant Site (Provider Location): Provider Remote Setting- Home Office     Consent:  The patient/guardian has verbally consented to: the potential risks and benefits of telemedicine (video visit) versus in person care; bill my insurance or make self-payment for services provided; and responsibility for payment of non-covered services.      Patient would like the video invitation sent by: email/text     Mode of Communication: Video Conference via [employer]     Distant Location (Provider): Off-site     As the provider I attest to compliance with applicable laws and regulations related to telemedicine.    DATA  Interactive Complexity: No  Crisis: No  Extended Session (53+ minutes):   - Patient's presenting concerns require more intensive intervention than could be completed within the usual service        Progress Since Last Session (Related to Symptoms / Goals / Homework):   Symptoms: Worsening in frustrations     Homework: Achieved / completed to satisfaction  Partially completed        Episode of Care Goals: Satisfactory progress - ACTION (Actively working towards change); Intervened by reinforcing change plan / affirming steps taken       Current / Ongoing Stressors and Concerns:   past  trauma, developmental hx and current stressors      Treatment Objective(s) Addressed in This Session:   Patient will demonstrate control of impulses and ability to use positive coping tools to manage strong emotions that can be safely addressed at a lower level of care. Absence of persistent SI and report of reduced frequency and intensity of SI and absence of SI to acceptable levels, report subjective improved mood for a period of 90 days, within 6 months as clinically observed and by patient self-report.  Patient will demonstrate and report a level of depression that can be managed at a lower level of care.  Absence of persistent depression mood and report of reduced frequency and intensity of low mood and absence of persistent low energy and motivation to acceptable levels, report subjective improved motivation and increased energy for a period of 90 days, within 6 months as clinically observed and by patient self-report.  Patient will demonstrate and report a level of anxiety that can be managed at a lower level of care.  Absence of persistent anxious mood and report of reduced frequency and intensity of worry and absence of persistent anxious mood to acceptable levels, no panic attacks, report subjective comfort with rumination for a period of 90 days, within 6 months as clinically observed and by patient self-report.       Intervention:  Therapist met with patient to review goals and interventions. Therapist utilized reflected listening as patient gave brief reflection of week. Patient reported she sent the text and received a follow up message and processed. Therapist supported patient as she processed and validated patient. Therapist provided patient with education on expectations on moving forward and allowing self to heal and weigh what she wants those to look like. Therapist encouraged patient to move on from here and leave her emotions where she unpacked them along with healing the relationship with her  partner with boundaries around his mother.   Patient presented with an anxious affect. Patient was engaged in session and open to feedback. Patient reported no safety concerns.             There has been demonstrated improvement in functioning while patient has been engaged in psychotherapy/psychological service- if withdrawn the patient would deteriorate and/or relapse.     Assessments completed prior to visit:  The following assessments were completed by patient for this visit:  PHQ9:       8/12/2024     1:07 AM 9/24/2024    11:00 AM 12/1/2024     7:23 PM 12/4/2024    11:30 AM 1/6/2025    11:06 AM 2/24/2025     2:59 PM 5/13/2025     9:44 AM   PHQ-9 SCORE   PHQ-9 Total Score MyChart 8 (Mild depression)  11 (Moderate depression) 11 (Moderate depression) 13 (Moderate depression) 10 (Moderate depression) 7 (Mild depression)   PHQ-9 Total Score 8    8 14 11  11  13  10  7        Patient-reported     GAD7:       1/15/2024     7:48 PM 7/10/2024     1:35 PM 7/12/2024    11:48 AM 9/24/2024    11:00 AM 12/4/2024    11:30 AM 1/6/2025    11:07 AM 2/24/2025     2:59 PM   YESSI-7 SCORE   Total Score 15 (severe anxiety)  17 (severe anxiety)  13 (moderate anxiety) 13 (moderate anxiety) 10 (moderate anxiety)   Total Score 15 17 17 12 13  13  10        Patient-reported   PROMIS-10 Scores        8/19/2024     3:00 PM 12/4/2024    11:31 AM 3/13/2025     4:09 PM   PROMIS-10 Total Score w/o Sub Scores   PROMIS TOTAL - SUBSCORES 29 26  24        Patient-reported                   ASSESSMENT: Current Emotional / Mental Status (status of significant symptoms):   Risk status (Self / Other harm or suicidal ideation)   Patient denies current fears or concerns for personal safety.   Patient denies current or recent suicidal ideation or behaviors.   Patient denies current or recent homicidal ideation or behaviors.   Patient denies current or recent self injurious behavior or ideation.   Patient denies other safety concerns.   Patient reports  there has been no change in risk factors since their last session.     Patient reports there has been no change in protective factors since their last session.     A safety and risk management plan has been developed including: Patient consented to co-developed safety plan on 2.21.2022.  Safety and risk management plan was reviewed.   Patient agreed to use safety plan should any safety concerns arise.  A copy was made available to the patient.     Appearance:   Appropriate     Eye Contact:   Fair     Psychomotor Behavior: Restless     Attitude:   Cooperative    Orientation:   All   Speech    Rate / Production: Emotional Talkative    Volume:  Normal    Mood:    Anxious  Depressed    Affect:    Appropriate    Thought Content:  Clear    Thought Form:  Coherent    Insight:    Fair      Medication Review:   No changes to current psychiatric medication(s)     Medication Compliance:   Yes     Changes in Health Issues:   None reported     Chemical Use Review:   Substance Use: Chemical use reviewed, no active concerns identified      Tobacco Use: No current tobacco use.      Diagnosis:  1. Bipolar affective disorder, currently depressed, moderate (H)    2. Generalized anxiety disorder    3. ADHD (attention deficit hyperactivity disorder), combined type    4. PTSD (post-traumatic stress disorder)        Collateral Reports Completed:   Not Applicable    PLAN: (Patient Tasks / Therapist Tasks / Other)        Call [phone] if feeling SI has increase or go to ED or empath   Patient reported she sent the text and received a follow up message and processed. Therapist supported patient as she processed and validated patient. Therapist provided patient with education on expectations on moving forward and allowing self to heal and weigh what she wants those to look like. Therapist encouraged patient to move on from here and leave her emotions where she unpacked them along with healing the relationship with her partner with boundaries  around his mother.         Katie Faulkner, LICSW 5/20/2025                                                         ______________________________________________________________________    Individual Treatment Plan    Patient's Name: Wandy Ann  YOB: 2000    Date of Creation: 2.22.2021  Date Treatment Plan Last Reviewed/Revised:   2/24/2025 due  5/24/2025    DSM5 Diagnoses: 296.52 Bipolar I Disorder Current or Most Recent Episode Depressed, Moderate, 300.02 (F41.1) Generalized Anxiety Disorder or 309.81 (F43.10) Posttraumatic Stress Disorder (includes Posttraumatic Stress Disorder for Children 6 Years and Younger)  Without dissociative symptoms  Psychosocial / Contextual Factors: past trauma, developmental hx and current stressors   PROMIS (reviewed every 90 days):  3/13/2025    Referral / Collaboration:  Referral to another professional/service is not indicated at this time..    Anticipated number of session for this episode of care: 26  Anticipation frequency of session: Biweekly  Anticipated Duration of each session: 38-52 minutes  Treatment plan will be reviewed in 90 days or when goals have been changed.       MeasurableTreatment Goal(s) related to diagnosis / functional impairment(s)  Goal 1: Patient will report absence of persistent SI and report of reduced frequency and intensity of SI and absence of SI to acceptable levels, report subjective improved mood for a period of 90 days, within 6 months as clinically observed and by patient self-report    I will know I've met my goal when I can see the difference between a bad moment and what I want in th future.      Objective #A (Patient Action)    Patient will demonstrate control of impulses and ability to use positive coping tools to manage strong emotions that can be safely addressed at a lower level of care. Absence of persistent SI and report of reduced frequency and intensity of SI and absence of SI to acceptable levels, report  subjective improved mood for a period of 90 days, within 6 months as clinically observed and by patient self-report.  Status: Continued - Date(s):   2/24/2025    Intervention(s)  Therapist will provide individual therapy to identify triggers to SI urges, gain feedback on helpful coping strategies, and identify ways that family can offer support. Tx to discuss current stressors and interpersonal conflicts and how to cope with these, coaching, diagnostic testing, referral for medication as indicated, use prescribed medication, cognitive restructuring, interpersonal family therapy, supportive therapy services.        Goal 2: Patient will report absence of persistent depression mood and report of reduced frequency and intensity of low mood and absence of persistent low energy and motivation to acceptable levels, report subjective improved motivation and increased energy for a period of 90 days, within 6 months as clinically observed and by patient self-report    I will know I've met my goal when I no longer feel sad.      Objective #A (Patient Action)    Status: Continued - Date(s):   2/24/2025    Patient will demonstrate and report a level of depression that can be managed at a lower level of care.  Absence of persistent depression mood and report of reduced frequency and intensity of low mood and absence of persistent low energy and motivation to acceptable levels, report subjective improved motivation and increased energy for a period of 90 days, within 6 months as clinically observed and by patient self-report.    Intervention(s)  Therapist will provide individual therapy to identify triggers to depression, gain feedback on helpful coping tools and thought-reframing techniques, and identify preferred way of being.  Tx to include discussion of current stressors and interpersonal conflicts and how to cope with these, coaching, diagnostic testing, referral for medication as indicated, use prescribed medication,  cognitive restructuring, interpersonal, family therapy, supportive therapy services.        Goal 3: Patiient will report absence of persistent anxiety mood and report of reduced frequency and intensity of worry and absence of persistent anxious mood to acceptable levels, no panic attacks, report subjective comfort with rumination for a period of 90 days. Within 6 months as clinically observed and by patient self-report    I will know I've met my goal when I can go days without worrying about little things or shaking.      Objective #A (Patient Action)    Status: Continued - Date(s):   2/24/2025    Patient will demonstrate and report a level of anxiety that can be managed at a lower level of care.  Absence of persistent anxious mood and report of reduced frequency and intensity of worry and absence of persistent anxious mood to acceptable levels, no panic attacks, report subjective comfort with rumination for a period of 90 days, within 6 months as clinically observed and by patient self-report.    Intervention(s)  Therapist will provide individual therapy to identify triggers to anxiety, gain feedback on helpful coping tools and thought-reframing techniques, and identify preferred way of being. Tx to include discuss current stressors and interpersonal conflicts and how to cope with these, coaching, diagnostic testing , referral for medication as indicated, use prescribed medication, cognitive restructuring, interpersonal,   family therapy, supportive therapy services.        Patient has reviewed and agreed to the above plan.      Katie Faulkner, [facility]    2/24/2025                                                   Wandy Ann            SAFETY PLAN:  Step 1: Warning signs / cues (Thoughts, images, mood, situation, behavior) that a crisis may be developing:  Thoughts: thoughts of not wanting to be here  Images: no images  Thinking Processes: intrusive thoughts (bothersome, unwanted thoughts that come out  "of nowhere): get irritated  Mood: intense anger and agitation  Behaviors: isolating/withdrawing   Situations: trauma    Step 2: Coping strategies - Things I can do to take my mind off of my problems without contacting another person (relaxation technique, physical activity):  Distress Tolerance Strategies:  arts and crafts: drawing and painting  Physical Activities: exercise: working out  Focus on helpful thoughts:  \"This is temporary\", \"It always passes\" and \"Ride the wave\"  Step 3: People and social settings that provide distraction:                 Name: Jennifer     Phone: [phone]                 Name: Antonina         Phone: [phone]                 Name: mom       Phone: [phone]  friends house or car   Step 4: Remind myself of people and things that are important to me and worth living for:  Best friend and cat        Step 5: When I am in crisis, I can ask these people to help me use my safety plan:                 Name: Jennifer     Phone: [phone]                 Name: Antonina         Phone: [phone]                 Name: panchito       Phone: [phone]  Step 6: Making the environment safe:   be around others  Step 7: Professionals or agencies I can contact during a crisis:  [facility] Daytime Number: [phone]  Suicide Prevention Lifeline: 8-640-614-ZRMY (8893)  Crisis Text Line Service (available 24 hours a day, 7 days a week): Text [place] to 612777  Local Crisis Services: 988     Call 911 or go to my nearest emergency department.       I helped develop this safety plan and agree to use it when needed.  I have been given a copy of this plan.       Client signature _________________________________________________________________  Today s date:  2/22/2021  Adapted from Safety Plan Template 2008 Marisol Cazares and Mario Flores is reprinted with the express permission of the authors.  No portion of the Safety Plan Template may be reproduced without the express, written " permission.  You can contact the authors at [email or web address] or [email or web address].

## 2025-06-03 ENCOUNTER — VIRTUAL VISIT (OUTPATIENT)
Dept: PSYCHOLOGY | Facility: CLINIC | Age: 25
End: 2025-06-03
Payer: COMMERCIAL

## 2025-06-03 DIAGNOSIS — F31.32 BIPOLAR AFFECTIVE DISORDER, CURRENTLY DEPRESSED, MODERATE (H): Primary | ICD-10-CM

## 2025-06-03 DIAGNOSIS — F41.1 GENERALIZED ANXIETY DISORDER: ICD-10-CM

## 2025-06-03 DIAGNOSIS — F90.2 ADHD (ATTENTION DEFICIT HYPERACTIVITY DISORDER), COMBINED TYPE: ICD-10-CM

## 2025-06-03 DIAGNOSIS — F43.10 PTSD (POST-TRAUMATIC STRESS DISORDER): ICD-10-CM

## 2025-06-03 PROCEDURE — 90837 PSYTX W PT 60 MINUTES: CPT | Mod: 95 | Performed by: SOCIAL WORKER

## 2025-06-03 NOTE — PROGRESS NOTES
[facility]                                      Progress Note    Patient Name: Wandy Ann  Date: 6/3/2025         Service Type: Individual      Session Start Time: 150  Session End Time: 155     Session Length: 53+    Session #: 102    Attendees: Client    Service Modality:    Video Visit:      Provider verified identity through the following two step process.  Patient provided: Patient  and Patient previous known to provider     Telemedicine Visit: The patient's condition can be safely assessed and treated via synchronous audio and visual telemedicine encounter.       Reason for Telemedicine Visit: Patient has requested telehealth visit     Originating Site (Patient Location): Patient's home     Distant Site (Provider Location): Provider Remote Setting- Home Office     Consent:  The patient/guardian has verbally consented to: the potential risks and benefits of telemedicine (video visit) versus in person care; bill my insurance or make self-payment for services provided; and responsibility for payment of non-covered services.      Patient would like the video invitation sent by: email/text     Mode of Communication: Video Conference via [employer]     Distant Location (Provider): Off-site     As the provider I attest to compliance with applicable laws and regulations related to telemedicine.    DATA  Interactive Complexity: No  Crisis: No  Extended Session (53+ minutes):   - Patient's presenting concerns require more intensive intervention than could be completed within the usual service        Progress Since Last Session (Related to Symptoms / Goals / Homework):   Symptoms: Worsening frustrations with family      Homework: Achieved / completed to satisfaction  Partially completed        Episode of Care Goals: Satisfactory progress - ACTION (Actively working towards change); Intervened by reinforcing change plan / affirming steps taken       Current / Ongoing Stressors and  Concerns:   past trauma, developmental hx and current stressors      Treatment Objective(s) Addressed in This Session:   Patient will demonstrate control of impulses and ability to use positive coping tools to manage strong emotions that can be safely addressed at a lower level of care. Absence of persistent SI and report of reduced frequency and intensity of SI and absence of SI to acceptable levels, report subjective improved mood for a period of 90 days, within 6 months as clinically observed and by patient self-report.  Patient will demonstrate and report a level of depression that can be managed at a lower level of care.  Absence of persistent depression mood and report of reduced frequency and intensity of low mood and absence of persistent low energy and motivation to acceptable levels, report subjective improved motivation and increased energy for a period of 90 days, within 6 months as clinically observed and by patient self-report.  Patient will demonstrate and report a level of anxiety that can be managed at a lower level of care.  Absence of persistent anxious mood and report of reduced frequency and intensity of worry and absence of persistent anxious mood to acceptable levels, no panic attacks, report subjective comfort with rumination for a period of 90 days, within 6 months as clinically observed and by patient self-report.       Intervention:  Therapist met with patient to review goals and interventions. Therapist utilized reflected listening as patient gave brief reflection of week. Patient reported frustrations with family as her sister announced to their family she is getting  but is not engaged, mother crossing boundaries and partner frustrations. Therapist supported patient as she processed and validated patient. Therapist assisted patient in breaking down her frustrations and encouraged patient to look at past behavior to predict future behavior. Therapist provided patient with  education around managing emotions along with encouraging patient to move her vulnerability boundaries with her sister and set firmer boundaries with her mother.    Patient presented with an anxious affect. Patient was engaged in session and open to feedback. Patient reported no safety concerns.             There has been demonstrated improvement in functioning while patient has been engaged in psychotherapy/psychological service- if withdrawn the patient would deteriorate and/or relapse.     Assessments completed prior to visit:  The following assessments were completed by patient for this visit:  PHQ9:       8/12/2024     1:07 AM 9/24/2024    11:00 AM 12/1/2024     7:23 PM 12/4/2024    11:30 AM 1/6/2025    11:06 AM 2/24/2025     2:59 PM 5/13/2025     9:44 AM   PHQ-9 SCORE   PHQ-9 Total Score MyChart 8 (Mild depression)  11 (Moderate depression) 11 (Moderate depression) 13 (Moderate depression) 10 (Moderate depression) 7 (Mild depression)   PHQ-9 Total Score 8    8 14 11  11  13  10  7        Patient-reported     GAD7:       1/15/2024     7:48 PM 7/10/2024     1:35 PM 7/12/2024    11:48 AM 9/24/2024    11:00 AM 12/4/2024    11:30 AM 1/6/2025    11:07 AM 2/24/2025     2:59 PM   YESSI-7 SCORE   Total Score 15 (severe anxiety)  17 (severe anxiety)  13 (moderate anxiety) 13 (moderate anxiety) 10 (moderate anxiety)   Total Score 15 17 17 12 13  13  10        Patient-reported   PROMIS-10 Scores        8/19/2024     3:00 PM 12/4/2024    11:31 AM 3/13/2025     4:09 PM   PROMIS-10 Total Score w/o Sub Scores   PROMIS TOTAL - SUBSCORES 29 26  24        Patient-reported                   ASSESSMENT: Current Emotional / Mental Status (status of significant symptoms):   Risk status (Self / Other harm or suicidal ideation)   Patient denies current fears or concerns for personal safety.   Patient denies current or recent suicidal ideation or behaviors.   Patient denies current or recent homicidal ideation or behaviors.   Patient  denies current or recent self injurious behavior or ideation.   Patient denies other safety concerns.   Patient reports there has been no change in risk factors since their last session.     Patient reports there has been no change in protective factors since their last session.     A safety and risk management plan has been developed including: Patient consented to co-developed safety plan on 2.21.2022.  Safety and risk management plan was reviewed.   Patient agreed to use safety plan should any safety concerns arise.  A copy was made available to the patient.     Appearance:   Appropriate     Eye Contact:   Fair     Psychomotor Behavior: Restless     Attitude:   Cooperative    Orientation:   All   Speech    Rate / Production: Emotional Talkative    Volume:  Normal    Mood:    Anxious  Depressed    Affect:    Appropriate    Thought Content:  Clear    Thought Form:  Coherent    Insight:    Fair      Medication Review:   No changes to current psychiatric medication(s)     Medication Compliance:   Yes     Changes in Health Issues:   None reported     Chemical Use Review:   Substance Use: Chemical use reviewed, no active concerns identified      Tobacco Use: No current tobacco use.      Diagnosis:  1. Bipolar affective disorder, currently depressed, moderate (H)    2. Generalized anxiety disorder    3. ADHD (attention deficit hyperactivity disorder), combined type    4. PTSD (post-traumatic stress disorder)        Collateral Reports Completed:   Not Applicable    PLAN: (Patient Tasks / Therapist Tasks / Other)        Call [phone] if feeling SI has increase or go to ED or empath   Patient reported frustrations with family as her sister announced to their family she is getting  but is not engaged, mother crossing boundaries and partner frustrations. Therapist supported patient as she processed and validated patient. Therapist assisted patient in breaking down her frustrations and encouraged patient to look at past  behavior to predict future behavior. Therapist provided patient with education around managing emotions along with encouraging patient to move her vulnerability boundaries with her sister and set firmer boundaries with her mother.          Katie SIRISHAAdri Tateayad, LICSW 6/3/2025                                                         ______________________________________________________________________    Individual Treatment Plan    Patient's Name: Wandy Ann  YOB: 2000    Date of Creation: 2.22.2021  Date Treatment Plan Last Reviewed/Revised:  6/3/2025 due  9/3/2025    DSM5 Diagnoses: 296.52 Bipolar I Disorder Current or Most Recent Episode Depressed, Moderate, 300.02 (F41.1) Generalized Anxiety Disorder or 309.81 (F43.10) Posttraumatic Stress Disorder (includes Posttraumatic Stress Disorder for Children 6 Years and Younger)  Without dissociative symptoms  Psychosocial / Contextual Factors: past trauma, developmental hx and current stressors   PROMIS (reviewed every 90 days):  3/13/2025    Referral / Collaboration:  Referral to another professional/service is not indicated at this time..    Anticipated number of session for this episode of care: 26  Anticipation frequency of session: Biweekly  Anticipated Duration of each session: 38-52 minutes  Treatment plan will be reviewed in 90 days or when goals have been changed.       MeasurableTreatment Goal(s) related to diagnosis / functional impairment(s)  Goal 1: Patient will report absence of persistent SI and report of reduced frequency and intensity of SI and absence of SI to acceptable levels, report subjective improved mood for a period of 90 days, within 6 months as clinically observed and by patient self-report    I will know I've met my goal when I can see the difference between a bad moment and what I want in th future.      Objective #A (Patient Action)    Patient will demonstrate control of impulses and ability to use positive coping  tools to manage strong emotions that can be safely addressed at a lower level of care. Absence of persistent SI and report of reduced frequency and intensity of SI and absence of SI to acceptable levels, report subjective improved mood for a period of 90 days, within 6 months as clinically observed and by patient self-report.  Status: Continued - Date(s):  6/3/2025    Intervention(s)  Therapist will provide individual therapy to identify triggers to SI urges, gain feedback on helpful coping strategies, and identify ways that family can offer support. Tx to discuss current stressors and interpersonal conflicts and how to cope with these, coaching, diagnostic testing, referral for medication as indicated, use prescribed medication, cognitive restructuring, interpersonal family therapy, supportive therapy services.        Goal 2: Patient will report absence of persistent depression mood and report of reduced frequency and intensity of low mood and absence of persistent low energy and motivation to acceptable levels, report subjective improved motivation and increased energy for a period of 90 days, within 6 months as clinically observed and by patient self-report    I will know I've met my goal when I no longer feel sad.      Objective #A (Patient Action)    Status: Continued - Date(s):  6/3/2025    Patient will demonstrate and report a level of depression that can be managed at a lower level of care.  Absence of persistent depression mood and report of reduced frequency and intensity of low mood and absence of persistent low energy and motivation to acceptable levels, report subjective improved motivation and increased energy for a period of 90 days, within 6 months as clinically observed and by patient self-report.    Intervention(s)  Therapist will provide individual therapy to identify triggers to depression, gain feedback on helpful coping tools and thought-reframing techniques, and identify preferred way of  being.  Tx to include discussion of current stressors and interpersonal conflicts and how to cope with these, coaching, diagnostic testing, referral for medication as indicated, use prescribed medication, cognitive restructuring, interpersonal, family therapy, supportive therapy services.        Goal 3: Patiient will report absence of persistent anxiety mood and report of reduced frequency and intensity of worry and absence of persistent anxious mood to acceptable levels, no panic attacks, report subjective comfort with rumination for a period of 90 days. Within 6 months as clinically observed and by patient self-report    I will know I've met my goal when I can go days without worrying about little things or shaking.      Objective #A (Patient Action)    Status: Continued - Date(s):   6/3/2025    Patient will demonstrate and report a level of anxiety that can be managed at a lower level of care.  Absence of persistent anxious mood and report of reduced frequency and intensity of worry and absence of persistent anxious mood to acceptable levels, no panic attacks, report subjective comfort with rumination for a period of 90 days, within 6 months as clinically observed and by patient self-report.    Intervention(s)  Therapist will provide individual therapy to identify triggers to anxiety, gain feedback on helpful coping tools and thought-reframing techniques, and identify preferred way of being. Tx to include discuss current stressors and interpersonal conflicts and how to cope with these, coaching, diagnostic testing , referral for medication as indicated, use prescribed medication, cognitive restructuring, interpersonal,   family therapy, supportive therapy services.        Patient has reviewed and agreed to the above plan.      Katie Faulkner, [facility]   6/3/2025                                                   Wandy Ann            SAFETY PLAN:  Step 1: Warning signs / cues (Thoughts, images, mood,  "situation, behavior) that a crisis may be developing:  Thoughts: thoughts of not wanting to be here  Images: no images  Thinking Processes: intrusive thoughts (bothersome, unwanted thoughts that come out of nowhere): get irritated  Mood: intense anger and agitation  Behaviors: isolating/withdrawing   Situations: trauma    Step 2: Coping strategies - Things I can do to take my mind off of my problems without contacting another person (relaxation technique, physical activity):  Distress Tolerance Strategies:  arts and crafts: drawing and painting  Physical Activities: exercise: working out  Focus on helpful thoughts:  \"This is temporary\", \"It always passes\" and \"Ride the wave\"  Step 3: People and social settings that provide distraction:                 Name: Jennifer     Phone: [phone]                 Name: Antonina         Phone: [phone]                 Name: panchito       Phone: [phone]  friends house or car   Step 4: Remind myself of people and things that are important to me and worth living for:  Best friend and cat        Step 5: When I am in crisis, I can ask these people to help me use my safety plan:                 Name: Jennifer     Phone: [phone]                 Name: Antonina         Phone: [phone]                 Name: panchito       Phone: [phone]  Step 6: Making the environment safe:   be around others  Step 7: Professionals or agencies I can contact during a crisis:  [facility] Daytime Number: [phone]  Suicide Prevention Lifeline: 9-488-799-TALK (8285)  Crisis Text Line Service (available 24 hours a day, 7 days a week): Text [place] to 785124  Local Crisis Services: 988     Call 911 or go to my nearest emergency department.       I helped develop this safety plan and agree to use it when needed.  I have been given a copy of this plan.       Client signature _________________________________________________________________  Today s date:  2/22/2021  Adapted from Safety Plan " Template 2008 Marisol Cazares and Mario Flores is reprinted with the express permission of the authors.  No portion of the Safety Plan Template may be reproduced without the express, written permission.  You can contact the authors at [email or web address] or [email or web address].

## 2025-06-16 ENCOUNTER — VIRTUAL VISIT (OUTPATIENT)
Dept: PSYCHOLOGY | Facility: CLINIC | Age: 25
End: 2025-06-16
Payer: COMMERCIAL

## 2025-06-16 DIAGNOSIS — F31.32 BIPOLAR AFFECTIVE DISORDER, CURRENTLY DEPRESSED, MODERATE (H): ICD-10-CM

## 2025-06-16 DIAGNOSIS — F41.1 GENERALIZED ANXIETY DISORDER: Primary | ICD-10-CM

## 2025-06-16 DIAGNOSIS — F43.10 PTSD (POST-TRAUMATIC STRESS DISORDER): ICD-10-CM

## 2025-06-16 DIAGNOSIS — F90.2 ADHD (ATTENTION DEFICIT HYPERACTIVITY DISORDER), COMBINED TYPE: ICD-10-CM

## 2025-06-16 PROCEDURE — 90837 PSYTX W PT 60 MINUTES: CPT | Mod: 95 | Performed by: SOCIAL WORKER

## 2025-06-16 ASSESSMENT — ANXIETY QUESTIONNAIRES
5. BEING SO RESTLESS THAT IT IS HARD TO SIT STILL: NOT AT ALL
7. FEELING AFRAID AS IF SOMETHING AWFUL MIGHT HAPPEN: MORE THAN HALF THE DAYS
GAD7 TOTAL SCORE: 9
GAD7 TOTAL SCORE: 9
2. NOT BEING ABLE TO STOP OR CONTROL WORRYING: MORE THAN HALF THE DAYS
4. TROUBLE RELAXING: SEVERAL DAYS
6. BECOMING EASILY ANNOYED OR IRRITABLE: SEVERAL DAYS
3. WORRYING TOO MUCH ABOUT DIFFERENT THINGS: SEVERAL DAYS
1. FEELING NERVOUS, ANXIOUS, OR ON EDGE: MORE THAN HALF THE DAYS

## 2025-06-16 ASSESSMENT — COLUMBIA-SUICIDE SEVERITY RATING SCALE - C-SSRS
1. SINCE LAST CONTACT, HAVE YOU WISHED YOU WERE DEAD OR WISHED YOU COULD GO TO SLEEP AND NOT WAKE UP?: NO
6. HAVE YOU EVER DONE ANYTHING, STARTED TO DO ANYTHING, OR PREPARED TO DO ANYTHING TO END YOUR LIFE?: NO
TOTAL  NUMBER OF INTERRUPTED ATTEMPTS SINCE LAST CONTACT: NO
ATTEMPT SINCE LAST CONTACT: NO
2. HAVE YOU ACTUALLY HAD ANY THOUGHTS OF KILLING YOURSELF?: NO
SUICIDE, SINCE LAST CONTACT: NO
TOTAL  NUMBER OF ABORTED OR SELF INTERRUPTED ATTEMPTS SINCE LAST CONTACT: NO

## 2025-06-16 ASSESSMENT — PATIENT HEALTH QUESTIONNAIRE - PHQ9: SUM OF ALL RESPONSES TO PHQ QUESTIONS 1-9: 9

## 2025-06-16 NOTE — PROGRESS NOTES
[facility]                                      Progress Note    Patient Name: Wandy Ann  Date: 2025         Service Type: Individual      Session Start Time: 150  Session End Time:   155     Session Length: 53+    Session #: 103    Attendees: Client    Service Modality:    Video Visit:      Provider verified identity through the following two step process.  Patient provided: Patient  and Patient previous known to provider     Telemedicine Visit: The patient's condition can be safely assessed and treated via synchronous audio and visual telemedicine encounter.       Reason for Telemedicine Visit: Patient has requested telehealth visit     Originating Site (Patient Location): Patient's home     Distant Site (Provider Location): Provider Remote Setting- Home Office     Consent:  The patient/guardian has verbally consented to: the potential risks and benefits of telemedicine (video visit) versus in person care; bill my insurance or make self-payment for services provided; and responsibility for payment of non-covered services.      Patient would like the video invitation sent by: email/text     Mode of Communication: Video Conference via [employer]     Distant Location (Provider): Off-site     As the provider I attest to compliance with applicable laws and regulations related to telemedicine.    DATA  Interactive Complexity: No  Crisis: No  Extended Session (53+ minutes):   - Patient's presenting concerns require more intensive intervention than could be completed within the usual service        Progress Since Last Session (Related to Symptoms / Goals / Homework):   Symptoms: Worsening anxiety     Homework: Achieved / completed to satisfaction  Partially completed        Episode of Care Goals: Satisfactory progress - ACTION (Actively working towards change); Intervened by reinforcing change plan / affirming steps taken       Current / Ongoing Stressors and Concerns:   past  trauma, developmental hx and current stressors      Treatment Objective(s) Addressed in This Session:   Patient will demonstrate control of impulses and ability to use positive coping tools to manage strong emotions that can be safely addressed at a lower level of care. Absence of persistent SI and report of reduced frequency and intensity of SI and absence of SI to acceptable levels, report subjective improved mood for a period of 90 days, within 6 months as clinically observed and by patient self-report.  Patient will demonstrate and report a level of depression that can be managed at a lower level of care.  Absence of persistent depression mood and report of reduced frequency and intensity of low mood and absence of persistent low energy and motivation to acceptable levels, report subjective improved motivation and increased energy for a period of 90 days, within 6 months as clinically observed and by patient self-report.  Patient will demonstrate and report a level of anxiety that can be managed at a lower level of care.  Absence of persistent anxious mood and report of reduced frequency and intensity of worry and absence of persistent anxious mood to acceptable levels, no panic attacks, report subjective comfort with rumination for a period of 90 days, within 6 months as clinically observed and by patient self-report.       Intervention:  Therapist met with patient to review goals and interventions. Therapist utilized reflected listening as patient gave brief reflection of week. Patient reported feeling an increase in anxiety after TMS was complete along with frustrations and annoyance. Therapist supported patient as she processed and validated patient. Therapist utilized psycho education along with CBT for patient to manage anxiety. Therapist encouraged patient to ask what people are looking for when unpacking before she offers advice and/or energy. Therapist reported to patient some just are looking to unpack  and no advice to follow.   Patient presented with an anxious affect. Patient was engaged in session and open to feedback. Patient reported no safety concerns.             There has been demonstrated improvement in functioning while patient has been engaged in psychotherapy/psychological service- if withdrawn the patient would deteriorate and/or relapse.     Assessments completed prior to visit:  The following assessments were completed by patient for this visit:  PHQ9:       9/24/2024    11:00 AM 12/1/2024     7:23 PM 12/4/2024    11:30 AM 1/6/2025    11:06 AM 2/24/2025     2:59 PM 5/13/2025     9:44 AM 6/16/2025     3:00 PM   PHQ-9 SCORE   PHQ-9 Total Score MyChart  11 (Moderate depression) 11 (Moderate depression) 13 (Moderate depression) 10 (Moderate depression) 7 (Mild depression)    PHQ-9 Total Score 14 11  11  13  10  7  9       Patient-reported     GAD7:       7/10/2024     1:35 PM 7/12/2024    11:48 AM 9/24/2024    11:00 AM 12/4/2024    11:30 AM 1/6/2025    11:07 AM 2/24/2025     2:59 PM 6/16/2025     3:00 PM   YESSI-7 SCORE   Total Score  17 (severe anxiety)  13 (moderate anxiety) 13 (moderate anxiety) 10 (moderate anxiety)    Total Score 17 17 12 13  13  10  9       Patient-reported   PROMIS-10 Scores        12/4/2024    11:31 AM 3/13/2025     4:09 PM 6/16/2025     3:00 PM   PROMIS-10 Total Score w/o Sub Scores   PROMIS TOTAL - SUBSCORES 26  24  33       Patient-reported                   ASSESSMENT: Current Emotional / Mental Status (status of significant symptoms):   Risk status (Self / Other harm or suicidal ideation)   Patient denies current fears or concerns for personal safety.   Patient denies current or recent suicidal ideation or behaviors.   Patient denies current or recent homicidal ideation or behaviors.   Patient denies current or recent self injurious behavior or ideation.   Patient denies other safety concerns.   Patient reports there has been no change in risk factors since their last  session.     Patient reports there has been no change in protective factors since their last session.     A safety and risk management plan has been developed including: Patient consented to co-developed safety plan on 2.21.2022.  Safety and risk management plan was reviewed.   Patient agreed to use safety plan should any safety concerns arise.  A copy was made available to the patient.     Appearance:   Appropriate     Eye Contact:   Fair     Psychomotor Behavior: Restless     Attitude:   Cooperative    Orientation:   All   Speech    Rate / Production: Emotional Talkative    Volume:  Normal    Mood:    Anxious  Depressed    Affect:    Appropriate    Thought Content:  Clear    Thought Form:  Coherent    Insight:    Fair      Medication Review:   No changes to current psychiatric medication(s)     Medication Compliance:   Yes     Changes in Health Issues:   None reported     Chemical Use Review:   Substance Use: Chemical use reviewed, no active concerns identified      Tobacco Use: No current tobacco use.      Diagnosis:  1. Generalized anxiety disorder    2. ADHD (attention deficit hyperactivity disorder), combined type    3. Bipolar affective disorder, currently depressed, moderate (H)    4. PTSD (post-traumatic stress disorder)        Collateral Reports Completed:   Not Applicable    PLAN: (Patient Tasks / Therapist Tasks / Other)        Call [phone] if feeling SI has increase or go to ED or empath   Patient reported feeling an increase in anxiety after TMS was complete along with frustrations and annoyance. Therapist supported patient as she processed and validated patient. Therapist utilized psycho education along with CBT for patient to manage anxiety. Therapist encouraged patient to ask what people are looking for when unpacking before she offers advice and/or energy. Therapist reported to patient some just are looking to unpack and no advice to follow.         Katie Faulkner, [facility]   6/16/2025                                                          ______________________________________________________________________    Individual Treatment Plan    Patient's Name: Wandy Ann  YOB: 2000    Date of Creation: 2.22.2021  Date Treatment Plan Last Reviewed/Revised:  6/3/2025 due  9/3/2025    DSM5 Diagnoses: 296.52 Bipolar I Disorder Current or Most Recent Episode Depressed, Moderate, 300.02 (F41.1) Generalized Anxiety Disorder or 309.81 (F43.10) Posttraumatic Stress Disorder (includes Posttraumatic Stress Disorder for Children 6 Years and Younger)  Without dissociative symptoms  Psychosocial / Contextual Factors: past trauma, developmental hx and current stressors   PROMIS (reviewed every 90 days):  6/16/2025    Referral / Collaboration:  Referral to another professional/service is not indicated at this time.    Anticipated number of session for this episode of care: 26  Anticipation frequency of session: Biweekly  Anticipated Duration of each session: 38-52 minutes  Treatment plan will be reviewed in 90 days or when goals have been changed.       MeasurableTreatment Goal(s) related to diagnosis / functional impairment(s)  Goal 1: Patient will report absence of persistent SI and report of reduced frequency and intensity of SI and absence of SI to acceptable levels, report subjective improved mood for a period of 90 days, within 6 months as clinically observed and by patient self-report    I will know I've met my goal when I can see the difference between a bad moment and what I want in th future.      Objective #A (Patient Action)    Patient will demonstrate control of impulses and ability to use positive coping tools to manage strong emotions that can be safely addressed at a lower level of care. Absence of persistent SI and report of reduced frequency and intensity of SI and absence of SI to acceptable levels, report subjective improved mood for a period of 90 days, within 6 months as clinically  observed and by patient self-report.  Status: Continued - Date(s):  6/3/2025    Intervention(s)  Therapist will provide individual therapy to identify triggers to SI urges, gain feedback on helpful coping strategies, and identify ways that family can offer support. Tx to discuss current stressors and interpersonal conflicts and how to cope with these, coaching, diagnostic testing, referral for medication as indicated, use prescribed medication, cognitive restructuring, interpersonal family therapy, supportive therapy services.        Goal 2: Patient will report absence of persistent depression mood and report of reduced frequency and intensity of low mood and absence of persistent low energy and motivation to acceptable levels, report subjective improved motivation and increased energy for a period of 90 days, within 6 months as clinically observed and by patient self-report    I will know I've met my goal when I no longer feel sad.      Objective #A (Patient Action)    Status: Continued - Date(s):  6/3/2025    Patient will demonstrate and report a level of depression that can be managed at a lower level of care.  Absence of persistent depression mood and report of reduced frequency and intensity of low mood and absence of persistent low energy and motivation to acceptable levels, report subjective improved motivation and increased energy for a period of 90 days, within 6 months as clinically observed and by patient self-report.    Intervention(s)  Therapist will provide individual therapy to identify triggers to depression, gain feedback on helpful coping tools and thought-reframing techniques, and identify preferred way of being.  Tx to include discussion of current stressors and interpersonal conflicts and how to cope with these, coaching, diagnostic testing, referral for medication as indicated, use prescribed medication, cognitive restructuring, interpersonal, family therapy, supportive therapy  services.        Goal 3: Patiient will report absence of persistent anxiety mood and report of reduced frequency and intensity of worry and absence of persistent anxious mood to acceptable levels, no panic attacks, report subjective comfort with rumination for a period of 90 days. Within 6 months as clinically observed and by patient self-report    I will know I've met my goal when I can go days without worrying about little things or shaking.      Objective #A (Patient Action)    Status: Continued - Date(s):   6/3/2025    Patient will demonstrate and report a level of anxiety that can be managed at a lower level of care.  Absence of persistent anxious mood and report of reduced frequency and intensity of worry and absence of persistent anxious mood to acceptable levels, no panic attacks, report subjective comfort with rumination for a period of 90 days, within 6 months as clinically observed and by patient self-report.    Intervention(s)  Therapist will provide individual therapy to identify triggers to anxiety, gain feedback on helpful coping tools and thought-reframing techniques, and identify preferred way of being. Tx to include discuss current stressors and interpersonal conflicts and how to cope with these, coaching, diagnostic testing , referral for medication as indicated, use prescribed medication, cognitive restructuring, interpersonal,   family therapy, supportive therapy services.        Patient has reviewed and agreed to the above plan.      Katie Faulkner, [facility]   6/3/2025                                                   Wandy Ann            SAFETY PLAN:  Step 1: Warning signs / cues (Thoughts, images, mood, situation, behavior) that a crisis may be developing:  Thoughts: thoughts of not wanting to be here  Images: no images  Thinking Processes: intrusive thoughts (bothersome, unwanted thoughts that come out of nowhere): get irritated  Mood: intense anger and agitation  Behaviors:  "isolating/withdrawing   Situations: trauma    Step 2: Coping strategies - Things I can do to take my mind off of my problems without contacting another person (relaxation technique, physical activity):  Distress Tolerance Strategies:  arts and crafts: drawing and painting  Physical Activities: exercise: working out  Focus on helpful thoughts:  \"This is temporary\", \"It always passes\" and \"Ride the wave\"  Step 3: People and social settings that provide distraction:                 Name: Jennifer     Phone: [phone]                 Name: Antonina         Phone: [phone]                 Name: panchito       Phone: [phone]  friends house or car   Step 4: Remind myself of people and things that are important to me and worth living for:  Best friend and cat        Step 5: When I am in crisis, I can ask these people to help me use my safety plan:                 Name: Jennifer     Phone: [phone]                 Name: Antonina         Phone: [phone]                 Name: panchito       Phone: [phone]  Step 6: Making the environment safe:   be around others  Step 7: Professionals or agencies I can contact during a crisis:  [facility] Daytime Number: [phone]  Suicide Prevention Lifeline: 8-146-821-TALK (0863)  Crisis Text Line Service (available 24 hours a day, 7 days a week): Text [place] to 265055  Local Crisis Services: 988     Call 911 or go to my nearest emergency department.       I helped develop this safety plan and agree to use it when needed.  I have been given a copy of this plan.       Client signature _________________________________________________________________  Today s date:  2/22/2021  Adapted from Safety Plan Template 2008 Marisol Cazares and Mario Flores is reprinted with the express permission of the authors.  No portion of the Safety Plan Template may be reproduced without the express, written permission.  You can contact the authors at [email or web address] or " [email or web address].

## 2025-06-17 ENCOUNTER — TELEPHONE (OUTPATIENT)
Dept: PSYCHIATRY | Facility: CLINIC | Age: 25
End: 2025-06-17

## 2025-06-17 NOTE — TELEPHONE ENCOUNTER
JAN w/ MABELN to schedule DA w/ MAICO Miller followed by NEW visit with clint Faria per Dr. Monteiro for KALEIGH.

## 2025-06-30 ENCOUNTER — VIRTUAL VISIT (OUTPATIENT)
Dept: PSYCHOLOGY | Facility: CLINIC | Age: 25
End: 2025-06-30
Payer: COMMERCIAL

## 2025-06-30 DIAGNOSIS — F41.1 GENERALIZED ANXIETY DISORDER: Primary | ICD-10-CM

## 2025-06-30 DIAGNOSIS — F31.32 BIPOLAR AFFECTIVE DISORDER, CURRENTLY DEPRESSED, MODERATE (H): ICD-10-CM

## 2025-06-30 DIAGNOSIS — F43.10 PTSD (POST-TRAUMATIC STRESS DISORDER): ICD-10-CM

## 2025-06-30 DIAGNOSIS — F90.2 ADHD (ATTENTION DEFICIT HYPERACTIVITY DISORDER), COMBINED TYPE: ICD-10-CM

## 2025-06-30 PROCEDURE — 90837 PSYTX W PT 60 MINUTES: CPT | Mod: 95 | Performed by: SOCIAL WORKER

## 2025-06-30 NOTE — PROGRESS NOTES
[facility]                                      Progress Note    Patient Name: Wandy Ann  Date: 2025         Service Type: Individual      Session Start Time: 100  Session End Time:   105     Session Length: 53+    Session #: 104    Attendees: Client    Service Modality:    Video Visit:      Provider verified identity through the following two step process.  Patient provided: Patient  and Patient previous known to provider     Telemedicine Visit: The patient's condition can be safely assessed and treated via synchronous audio and visual telemedicine encounter.       Reason for Telemedicine Visit: Patient has requested telehealth visit     Originating Site (Patient Location): Patient's home     Distant Site (Provider Location): Provider Remote Setting- Home Office     Consent:  The patient/guardian has verbally consented to: the potential risks and benefits of telemedicine (video visit) versus in person care; bill my insurance or make self-payment for services provided; and responsibility for payment of non-covered services.      Patient would like the video invitation sent by: email/text     Mode of Communication: Video Conference via [employer]     Distant Location (Provider): Off-site     As the provider I attest to compliance with applicable laws and regulations related to telemedicine.    DATA  Interactive Complexity: No  Crisis: No  Extended Session (53+ minutes):   - Patient's presenting concerns require more intensive intervention than could be completed within the usual service        Progress Since Last Session (Related to Symptoms / Goals / Homework):   Symptoms: Worsening anxiety     Homework: Achieved / completed to satisfaction  Partially completed        Episode of Care Goals: Satisfactory progress - ACTION (Actively working towards change); Intervened by reinforcing change plan / affirming steps taken       Current / Ongoing Stressors and Concerns:   past  trauma, developmental hx and current stressors      Treatment Objective(s) Addressed in This Session:   Patient will demonstrate control of impulses and ability to use positive coping tools to manage strong emotions that can be safely addressed at a lower level of care. Absence of persistent SI and report of reduced frequency and intensity of SI and absence of SI to acceptable levels, report subjective improved mood for a period of 90 days, within 6 months as clinically observed and by patient self-report.  Patient will demonstrate and report a level of depression that can be managed at a lower level of care.  Absence of persistent depression mood and report of reduced frequency and intensity of low mood and absence of persistent low energy and motivation to acceptable levels, report subjective improved motivation and increased energy for a period of 90 days, within 6 months as clinically observed and by patient self-report.  Patient will demonstrate and report a level of anxiety that can be managed at a lower level of care.  Absence of persistent anxious mood and report of reduced frequency and intensity of worry and absence of persistent anxious mood to acceptable levels, no panic attacks, report subjective comfort with rumination for a period of 90 days, within 6 months as clinically observed and by patient self-report.       Intervention:  Motivational Interviewing  Target Behavior: letting go of what you cannot control    Stage of Change: PREPARATION (Decided to change - considering how)    MI Intervention: Co-Developed Goal: manage anxiety in the moment, Expressed Empathy/Understanding, Supported Autonomy, Collaboration, Evocation, Permission to raise concern or advise, and Open-ended questions     Change Talk Expressed by the Patient: Desire to change Ability to change Reasons to change    Provider Response to Change Talk: A - Affirmed patient's thoughts, decisions, or attempts at behavior change and R -  Reflected patient's change talk              There has been demonstrated improvement in functioning while patient has been engaged in psychotherapy/psychological service- if withdrawn the patient would deteriorate and/or relapse.     Assessments completed prior to visit:  The following assessments were completed by patient for this visit:  PHQ9:       9/24/2024    11:00 AM 12/1/2024     7:23 PM 12/4/2024    11:30 AM 1/6/2025    11:06 AM 2/24/2025     2:59 PM 5/13/2025     9:44 AM 6/16/2025     3:00 PM   PHQ-9 SCORE   PHQ-9 Total Score MyChart  11 (Moderate depression) 11 (Moderate depression) 13 (Moderate depression) 10 (Moderate depression) 7 (Mild depression)    PHQ-9 Total Score 14 11  11  13  10  7  9       Patient-reported     GAD7:       7/10/2024     1:35 PM 7/12/2024    11:48 AM 9/24/2024    11:00 AM 12/4/2024    11:30 AM 1/6/2025    11:07 AM 2/24/2025     2:59 PM 6/16/2025     3:00 PM   YESSI-7 SCORE   Total Score  17 (severe anxiety)  13 (moderate anxiety) 13 (moderate anxiety) 10 (moderate anxiety)    Total Score 17 17 12 13  13  10  9       Patient-reported   PROMIS-10 Scores        12/4/2024    11:31 AM 3/13/2025     4:09 PM 6/16/2025     3:00 PM   PROMIS-10 Total Score w/o Sub Scores   PROMIS TOTAL - SUBSCORES 26  24  33       Patient-reported                   ASSESSMENT: Current Emotional / Mental Status (status of significant symptoms):   Risk status (Self / Other harm or suicidal ideation)   Patient denies current fears or concerns for personal safety.   Patient denies current or recent suicidal ideation or behaviors.   Patient denies current or recent homicidal ideation or behaviors.   Patient denies current or recent self injurious behavior or ideation.   Patient denies other safety concerns.   Patient reports there has been no change in risk factors since their last session.     Patient reports there has been no change in protective factors since their last session.     A safety and risk  management plan has been developed including: Patient consented to co-developed safety plan on 2.21.2022.  Safety and risk management plan was reviewed.   Patient agreed to use safety plan should any safety concerns arise.  A copy was made available to the patient.     Appearance:   Appropriate     Eye Contact:   Fair     Psychomotor Behavior: Restless     Attitude:   Cooperative    Orientation:   All   Speech    Rate / Production: Emotional Talkative    Volume:  Normal    Mood:    Anxious  Depressed    Affect:    Appropriate    Thought Content:  Clear    Thought Form:  Coherent    Insight:    Fair      Medication Review:   Changes to psychiatric medications, see updated Medication List in EPIC.      Medication Compliance:   Yes     Changes in Health Issues:   None reported     Chemical Use Review:   Substance Use: Chemical use reviewed, no active concerns identified      Tobacco Use: No current tobacco use.      Diagnosis:  1. Generalized anxiety disorder    2. ADHD (attention deficit hyperactivity disorder), combined type    3. Bipolar affective disorder, currently depressed, moderate (H)    4. PTSD (post-traumatic stress disorder)        Collateral Reports Completed:   Not Applicable    PLAN: (Patient Tasks / Therapist Tasks / Other)        Call [phone] if feeling SI has increase or go to ED or empath   letting go of what you cannot control   manage anxiety in the moment  Invest in self not in others         Katie Faulkner, [facility]   6/30/2025                                                         ______________________________________________________________________    Individual Treatment Plan    Patient's Name: Wandy Ann  YOB: 2000    Date of Creation: 2.22.2021  Date Treatment Plan Last Reviewed/Revised:  6/3/2025 due  9/3/2025    DSM5 Diagnoses: 296.52 Bipolar I Disorder Current or Most Recent Episode Depressed, Moderate, 300.02 (F41.1) Generalized Anxiety Disorder or 309.81 (F43.10)  Posttraumatic Stress Disorder (includes Posttraumatic Stress Disorder for Children 6 Years and Younger)  Without dissociative symptoms  Psychosocial / Contextual Factors: past trauma, developmental hx and current stressors   PROMIS (reviewed every 90 days):  6/16/2025    Referral / Collaboration:  Referral to another professional/service is not indicated at this time.    Anticipated number of session for this episode of care: 26  Anticipation frequency of session: Biweekly  Anticipated Duration of each session: 38-52 minutes  Treatment plan will be reviewed in 90 days or when goals have been changed.       MeasurableTreatment Goal(s) related to diagnosis / functional impairment(s)  Goal 1: Patient will report absence of persistent SI and report of reduced frequency and intensity of SI and absence of SI to acceptable levels, report subjective improved mood for a period of 90 days, within 6 months as clinically observed and by patient self-report    I will know I've met my goal when I can see the difference between a bad moment and what I want in th future.      Objective #A (Patient Action)    Patient will demonstrate control of impulses and ability to use positive coping tools to manage strong emotions that can be safely addressed at a lower level of care. Absence of persistent SI and report of reduced frequency and intensity of SI and absence of SI to acceptable levels, report subjective improved mood for a period of 90 days, within 6 months as clinically observed and by patient self-report.  Status: Continued - Date(s):  6/3/2025    Intervention(s)  Therapist will provide individual therapy to identify triggers to SI urges, gain feedback on helpful coping strategies, and identify ways that family can offer support. Tx to discuss current stressors and interpersonal conflicts and how to cope with these, coaching, diagnostic testing, referral for medication as indicated, use prescribed medication, cognitive  restructuring, interpersonal family therapy, supportive therapy services.        Goal 2: Patient will report absence of persistent depression mood and report of reduced frequency and intensity of low mood and absence of persistent low energy and motivation to acceptable levels, report subjective improved motivation and increased energy for a period of 90 days, within 6 months as clinically observed and by patient self-report    I will know I've met my goal when I no longer feel sad.      Objective #A (Patient Action)    Status: Continued - Date(s):  6/3/2025    Patient will demonstrate and report a level of depression that can be managed at a lower level of care.  Absence of persistent depression mood and report of reduced frequency and intensity of low mood and absence of persistent low energy and motivation to acceptable levels, report subjective improved motivation and increased energy for a period of 90 days, within 6 months as clinically observed and by patient self-report.    Intervention(s)  Therapist will provide individual therapy to identify triggers to depression, gain feedback on helpful coping tools and thought-reframing techniques, and identify preferred way of being.  Tx to include discussion of current stressors and interpersonal conflicts and how to cope with these, coaching, diagnostic testing, referral for medication as indicated, use prescribed medication, cognitive restructuring, interpersonal, family therapy, supportive therapy services.        Goal 3: Patiient will report absence of persistent anxiety mood and report of reduced frequency and intensity of worry and absence of persistent anxious mood to acceptable levels, no panic attacks, report subjective comfort with rumination for a period of 90 days. Within 6 months as clinically observed and by patient self-report    I will know I've met my goal when I can go days without worrying about little things or shaking.      Objective #A (Patient  "Action)    Status: Continued - Date(s):   6/3/2025    Patient will demonstrate and report a level of anxiety that can be managed at a lower level of care.  Absence of persistent anxious mood and report of reduced frequency and intensity of worry and absence of persistent anxious mood to acceptable levels, no panic attacks, report subjective comfort with rumination for a period of 90 days, within 6 months as clinically observed and by patient self-report.    Intervention(s)  Therapist will provide individual therapy to identify triggers to anxiety, gain feedback on helpful coping tools and thought-reframing techniques, and identify preferred way of being. Tx to include discuss current stressors and interpersonal conflicts and how to cope with these, coaching, diagnostic testing , referral for medication as indicated, use prescribed medication, cognitive restructuring, interpersonal,   family therapy, supportive therapy services.        Patient has reviewed and agreed to the above plan.      Katie Faulkner, [facility]   6/3/2025                                                   Wandy Ann            SAFETY PLAN:  Step 1: Warning signs / cues (Thoughts, images, mood, situation, behavior) that a crisis may be developing:  Thoughts: thoughts of not wanting to be here  Images: no images  Thinking Processes: intrusive thoughts (bothersome, unwanted thoughts that come out of nowhere): get irritated  Mood: intense anger and agitation  Behaviors: isolating/withdrawing   Situations: trauma    Step 2: Coping strategies - Things I can do to take my mind off of my problems without contacting another person (relaxation technique, physical activity):  Distress Tolerance Strategies:  arts and crafts: drawing and painting  Physical Activities: exercise: working out  Focus on helpful thoughts:  \"This is temporary\", \"It always passes\" and \"Ride the wave\"  Step 3: People and social settings that provide distraction:            "      Name: Jennifer     Phone: [phone]                 Name: Antonina         Phone: [phone]                 Name: mom       Phone: [phone]  friends house or car   Step 4: Remind myself of people and things that are important to me and worth living for:  Best friend and cat        Step 5: When I am in crisis, I can ask these people to help me use my safety plan:                 Name: Jennifer     Phone: [phone]                 Name: Antonina         Phone: [phone]                 Name: mom       Phone: [phone]  Step 6: Making the environment safe:   be around others  Step 7: Professionals or agencies I can contact during a crisis:  [facility] Daytime Number: [phone]  Suicide Prevention Lifeline: 9-072-027-RKEV (1768)  Crisis Text Line Service (available 24 hours a day, 7 days a week): Text [place] to 373264  Local Crisis Services: 988     Call 911 or go to my nearest emergency department.       I helped develop this safety plan and agree to use it when needed.  I have been given a copy of this plan.       Client signature _________________________________________________________________  Today s date:  2/22/2021  Adapted from Safety Plan Template 2008 Marisol Cazares and Mario Flores is reprinted with the express permission of the authors.  No portion of the Safety Plan Template may be reproduced without the express, written permission.  You can contact the authors at [email or web address] or [email or web address].

## 2025-07-08 ENCOUNTER — VIRTUAL VISIT (OUTPATIENT)
Dept: PSYCHOLOGY | Facility: CLINIC | Age: 25
End: 2025-07-08
Payer: COMMERCIAL

## 2025-07-08 DIAGNOSIS — F90.2 ADHD (ATTENTION DEFICIT HYPERACTIVITY DISORDER), COMBINED TYPE: ICD-10-CM

## 2025-07-08 DIAGNOSIS — F41.1 GENERALIZED ANXIETY DISORDER: Primary | ICD-10-CM

## 2025-07-08 DIAGNOSIS — F31.32 BIPOLAR AFFECTIVE DISORDER, CURRENTLY DEPRESSED, MODERATE (H): ICD-10-CM

## 2025-07-08 DIAGNOSIS — F43.10 PTSD (POST-TRAUMATIC STRESS DISORDER): ICD-10-CM

## 2025-07-08 PROCEDURE — 90837 PSYTX W PT 60 MINUTES: CPT | Mod: 95 | Performed by: SOCIAL WORKER

## 2025-07-08 NOTE — PROGRESS NOTES
[facility]                                      Progress Note    Patient Name: Wandy Ann  Date: 2025         Service Type: Individual      Session Start Time: 150  Session End Time:  155     Session Length: 53+    Session #: 105    Attendees: Client    Service Modality:    Video Visit:      Provider verified identity through the following two step process.  Patient provided: Patient  and Patient previous known to provider     Telemedicine Visit: The patient's condition can be safely assessed and treated via synchronous audio and visual telemedicine encounter.       Reason for Telemedicine Visit: Patient has requested telehealth visit     Originating Site (Patient Location): Patient's home     Distant Site (Provider Location): Provider Remote Setting- Home Office     Consent:  The patient/guardian has verbally consented to: the potential risks and benefits of telemedicine (video visit) versus in person care; bill my insurance or make self-payment for services provided; and responsibility for payment of non-covered services.      Patient would like the video invitation sent by: email/text     Mode of Communication: Video Conference via [employer]     Distant Location (Provider): Off-site     As the provider I attest to compliance with applicable laws and regulations related to telemedicine.    DATA  Interactive Complexity: No  Crisis: No  Extended Session (53+ minutes):   - Patient's presenting concerns require more intensive intervention than could be completed within the usual service        Progress Since Last Session (Related to Symptoms / Goals / Homework):   Symptoms: Worsening mood     Homework: Achieved / completed to satisfaction  Partially completed        Episode of Care Goals: Satisfactory progress - ACTION (Actively working towards change); Intervened by reinforcing change plan / affirming steps taken       Current / Ongoing Stressors and Concerns:   past trauma,  developmental hx and current stressors      Treatment Objective(s) Addressed in This Session:   Patient will demonstrate control of impulses and ability to use positive coping tools to manage strong emotions that can be safely addressed at a lower level of care. Absence of persistent SI and report of reduced frequency and intensity of SI and absence of SI to acceptable levels, report subjective improved mood for a period of 90 days, within 6 months as clinically observed and by patient self-report.  Patient will demonstrate and report a level of depression that can be managed at a lower level of care.  Absence of persistent depression mood and report of reduced frequency and intensity of low mood and absence of persistent low energy and motivation to acceptable levels, report subjective improved motivation and increased energy for a period of 90 days, within 6 months as clinically observed and by patient self-report.  Patient will demonstrate and report a level of anxiety that can be managed at a lower level of care.  Absence of persistent anxious mood and report of reduced frequency and intensity of worry and absence of persistent anxious mood to acceptable levels, no panic attacks, report subjective comfort with rumination for a period of 90 days, within 6 months as clinically observed and by patient self-report.       Intervention:  Therapist met with patient to review goals and interventions. Therapist utilized reflected listening as patient gave brief reflection of week. Patient processed her birthday along with feelings of disappointment with people in her life. Therapist supported patient as she processed, validated and challenged patient. Therapist encouraged patient to look at what she needs and will continue to follow up on the why. Patient reported feeling in a lull and processed. Therapist encouraged patient to look on her 30 list or summer list and start to plan and or do things on the list when feeling  in the lull.   Patient presented with an anxious affect. Patient was engaged in session and open to feedback. Patient reported no safety concerns.               There has been demonstrated improvement in functioning while patient has been engaged in psychotherapy/psychological service- if withdrawn the patient would deteriorate and/or relapse.     Assessments completed prior to visit:  The following assessments were completed by patient for this visit:  PHQ9:       9/24/2024    11:00 AM 12/1/2024     7:23 PM 12/4/2024    11:30 AM 1/6/2025    11:06 AM 2/24/2025     2:59 PM 5/13/2025     9:44 AM 6/16/2025     3:00 PM   PHQ-9 SCORE   PHQ-9 Total Score MyChart  11 (Moderate depression) 11 (Moderate depression) 13 (Moderate depression) 10 (Moderate depression) 7 (Mild depression)    PHQ-9 Total Score 14 11  11  13  10  7  9       Patient-reported     GAD7:       7/10/2024     1:35 PM 7/12/2024    11:48 AM 9/24/2024    11:00 AM 12/4/2024    11:30 AM 1/6/2025    11:07 AM 2/24/2025     2:59 PM 6/16/2025     3:00 PM   YESSI-7 SCORE   Total Score  17 (severe anxiety)  13 (moderate anxiety) 13 (moderate anxiety) 10 (moderate anxiety)    Total Score 17 17 12 13  13  10  9       Patient-reported   PROMIS-10 Scores        12/4/2024    11:31 AM 3/13/2025     4:09 PM 6/16/2025     3:00 PM   PROMIS-10 Total Score w/o Sub Scores   PROMIS TOTAL - SUBSCORES 26  24  33       Patient-reported                   ASSESSMENT: Current Emotional / Mental Status (status of significant symptoms):   Risk status (Self / Other harm or suicidal ideation)   Patient denies current fears or concerns for personal safety.   Patient denies current or recent suicidal ideation or behaviors.   Patient denies current or recent homicidal ideation or behaviors.   Patient denies current or recent self injurious behavior or ideation.   Patient denies other safety concerns.   Patient reports there has been no change in risk factors since their last session.      Patient reports there has been no change in protective factors since their last session.     A safety and risk management plan has been developed including: Patient consented to co-developed safety plan on 2.21.2022.  Safety and risk management plan was reviewed.   Patient agreed to use safety plan should any safety concerns arise.  A copy was made available to the patient.     Appearance:   Appropriate     Eye Contact:   Fair     Psychomotor Behavior: Restless     Attitude:   Cooperative    Orientation:   All   Speech    Rate / Production: Emotional Talkative    Volume:  Normal    Mood:    Anxious  Depressed    Affect:    Appropriate    Thought Content:  Clear    Thought Form:  Coherent    Insight:    Fair      Medication Review:   No changes to current psychiatric medication(s)     Medication Compliance:   Yes     Changes in Health Issues:   None reported     Chemical Use Review:   Substance Use: Chemical use reviewed, no active concerns identified      Tobacco Use: No current tobacco use.      Diagnosis:  1. Generalized anxiety disorder    2. ADHD (attention deficit hyperactivity disorder), combined type    3. Bipolar affective disorder, currently depressed, moderate (H)    4. PTSD (post-traumatic stress disorder)        Collateral Reports Completed:   Not Applicable    PLAN: (Patient Tasks / Therapist Tasks / Other)        Call [phone] if feeling SI has increase or go to ED or empath   letting go of what you cannot control   manage anxiety in the moment  Invest in self not in others    Patient processed her birthday along with feelings of disappointment with people in her life. Therapist supported patient as she processed, validated and challenged patient. Therapist encouraged patient to look at what she needs and will continue to follow up on the why. Patient reported feeling in a lull and processed. Therapist encouraged patient to look on her 30 list or summer list and start to plan and or do things on the list  when feeling in the lull.       Katie Faulkner, LICSW  7/8/2025                                                         ______________________________________________________________________    Individual Treatment Plan    Patient's Name: Wandy Ann  YOB: 2000    Date of Creation: 2.22.2021  Date Treatment Plan Last Reviewed/Revised:  6/3/2025 due  9/3/2025    DSM5 Diagnoses: 296.52 Bipolar I Disorder Current or Most Recent Episode Depressed, Moderate, 300.02 (F41.1) Generalized Anxiety Disorder or 309.81 (F43.10) Posttraumatic Stress Disorder (includes Posttraumatic Stress Disorder for Children 6 Years and Younger)  Without dissociative symptoms  Psychosocial / Contextual Factors: past trauma, developmental hx and current stressors   PROMIS (reviewed every 90 days):  6/16/2025    Referral / Collaboration:  Referral to another professional/service is not indicated at this time.    Anticipated number of session for this episode of care: 26  Anticipation frequency of session: Biweekly  Anticipated Duration of each session: 38-52 minutes  Treatment plan will be reviewed in 90 days or when goals have been changed.       MeasurableTreatment Goal(s) related to diagnosis / functional impairment(s)  Goal 1: Patient will report absence of persistent SI and report of reduced frequency and intensity of SI and absence of SI to acceptable levels, report subjective improved mood for a period of 90 days, within 6 months as clinically observed and by patient self-report    I will know I've met my goal when I can see the difference between a bad moment and what I want in th future.      Objective #A (Patient Action)    Patient will demonstrate control of impulses and ability to use positive coping tools to manage strong emotions that can be safely addressed at a lower level of care. Absence of persistent SI and report of reduced frequency and intensity of SI and absence of SI to acceptable levels, report  subjective improved mood for a period of 90 days, within 6 months as clinically observed and by patient self-report.  Status: Continued - Date(s):  6/3/2025    Intervention(s)  Therapist will provide individual therapy to identify triggers to SI urges, gain feedback on helpful coping strategies, and identify ways that family can offer support. Tx to discuss current stressors and interpersonal conflicts and how to cope with these, coaching, diagnostic testing, referral for medication as indicated, use prescribed medication, cognitive restructuring, interpersonal family therapy, supportive therapy services.        Goal 2: Patient will report absence of persistent depression mood and report of reduced frequency and intensity of low mood and absence of persistent low energy and motivation to acceptable levels, report subjective improved motivation and increased energy for a period of 90 days, within 6 months as clinically observed and by patient self-report    I will know I've met my goal when I no longer feel sad.      Objective #A (Patient Action)    Status: Continued - Date(s):  6/3/2025    Patient will demonstrate and report a level of depression that can be managed at a lower level of care.  Absence of persistent depression mood and report of reduced frequency and intensity of low mood and absence of persistent low energy and motivation to acceptable levels, report subjective improved motivation and increased energy for a period of 90 days, within 6 months as clinically observed and by patient self-report.    Intervention(s)  Therapist will provide individual therapy to identify triggers to depression, gain feedback on helpful coping tools and thought-reframing techniques, and identify preferred way of being.  Tx to include discussion of current stressors and interpersonal conflicts and how to cope with these, coaching, diagnostic testing, referral for medication as indicated, use prescribed medication, cognitive  restructuring, interpersonal, family therapy, supportive therapy services.        Goal 3: Patiient will report absence of persistent anxiety mood and report of reduced frequency and intensity of worry and absence of persistent anxious mood to acceptable levels, no panic attacks, report subjective comfort with rumination for a period of 90 days. Within 6 months as clinically observed and by patient self-report    I will know I've met my goal when I can go days without worrying about little things or shaking.      Objective #A (Patient Action)    Status: Continued - Date(s):   6/3/2025    Patient will demonstrate and report a level of anxiety that can be managed at a lower level of care.  Absence of persistent anxious mood and report of reduced frequency and intensity of worry and absence of persistent anxious mood to acceptable levels, no panic attacks, report subjective comfort with rumination for a period of 90 days, within 6 months as clinically observed and by patient self-report.    Intervention(s)  Therapist will provide individual therapy to identify triggers to anxiety, gain feedback on helpful coping tools and thought-reframing techniques, and identify preferred way of being. Tx to include discuss current stressors and interpersonal conflicts and how to cope with these, coaching, diagnostic testing , referral for medication as indicated, use prescribed medication, cognitive restructuring, interpersonal,   family therapy, supportive therapy services.        Patient has reviewed and agreed to the above plan.      Katie Faulkner, [facility]   6/3/2025                                                   Wandy Ann            SAFETY PLAN:  Step 1: Warning signs / cues (Thoughts, images, mood, situation, behavior) that a crisis may be developing:  Thoughts: thoughts of not wanting to be here  Images: no images  Thinking Processes: intrusive thoughts (bothersome, unwanted thoughts that come out of nowhere):  "get irritated  Mood: intense anger and agitation  Behaviors: isolating/withdrawing   Situations: trauma    Step 2: Coping strategies - Things I can do to take my mind off of my problems without contacting another person (relaxation technique, physical activity):  Distress Tolerance Strategies:  arts and crafts: drawing and painting  Physical Activities: exercise: working out  Focus on helpful thoughts:  \"This is temporary\", \"It always passes\" and \"Ride the wave\"  Step 3: People and social settings that provide distraction:                 Name: Jennifer     Phone: [phone]                 Name: Antonina         Phone: [phone]                 Name: panchito       Phone: [phone]  friends house or car   Step 4: Remind myself of people and things that are important to me and worth living for:  Best friend and cat        Step 5: When I am in crisis, I can ask these people to help me use my safety plan:                 Name: Jennifer     Phone: [phone]                 Name: Antonina         Phone: [phone]                 Name: panchito       Phone: [phone]  Step 6: Making the environment safe:   be around others  Step 7: Professionals or agencies I can contact during a crisis:  [facility] Daytime Number: [phone]  Suicide Prevention Lifeline: 4-008-856-OOFD (9127)  Crisis Text Line Service (available 24 hours a day, 7 days a week): Text [place] to 999281  Local Crisis Services: 988     Call 911 or go to my nearest emergency department.       I helped develop this safety plan and agree to use it when needed.  I have been given a copy of this plan.       Client signature _________________________________________________________________  Today s date:  2/22/2021  Adapted from Safety Plan Template 2008 Marisol Cazares and Mario Flores is reprinted with the express permission of the authors.  No portion of the Safety Plan Template may be reproduced without the express, written permission.  You can " contact the authors at [email or web address] or [email or web address].

## 2025-07-10 ENCOUNTER — TRANSFERRED RECORDS (OUTPATIENT)
Dept: HEALTH INFORMATION MANAGEMENT | Facility: CLINIC | Age: 25
End: 2025-07-10
Payer: COMMERCIAL

## 2025-07-14 ENCOUNTER — TELEPHONE (OUTPATIENT)
Dept: PSYCHIATRY | Facility: CLINIC | Age: 25
End: 2025-07-14

## 2025-07-14 NOTE — TELEPHONE ENCOUNTER
Called patient and left message that Kimberly Flores the  is out sick and needing to reschedule both appointments.

## 2025-07-23 ENCOUNTER — TELEPHONE (OUTPATIENT)
Dept: PSYCHIATRY | Facility: CLINIC | Age: 25
End: 2025-07-23

## 2025-07-23 NOTE — TELEPHONE ENCOUNTER
Called patient and left message to reschedule both TRD appointments since the appointment with Kimberly Flores was cancelled.

## 2025-07-28 ENCOUNTER — VIRTUAL VISIT (OUTPATIENT)
Dept: PSYCHOLOGY | Facility: CLINIC | Age: 25
End: 2025-07-28
Payer: COMMERCIAL

## 2025-07-28 DIAGNOSIS — F41.1 GENERALIZED ANXIETY DISORDER: Primary | ICD-10-CM

## 2025-07-28 DIAGNOSIS — F90.2 ADHD (ATTENTION DEFICIT HYPERACTIVITY DISORDER), COMBINED TYPE: ICD-10-CM

## 2025-07-28 DIAGNOSIS — F31.32 BIPOLAR AFFECTIVE DISORDER, CURRENTLY DEPRESSED, MODERATE (H): ICD-10-CM

## 2025-07-28 DIAGNOSIS — F43.10 PTSD (POST-TRAUMATIC STRESS DISORDER): ICD-10-CM

## 2025-07-28 PROCEDURE — 90837 PSYTX W PT 60 MINUTES: CPT | Mod: 95 | Performed by: SOCIAL WORKER

## 2025-07-28 NOTE — PROGRESS NOTES
[facility]                                      Progress Note    Patient Name: Wandy Ann  Date: 2025         Service Type: Individual      Session Start Time: 140  Session End Time:       Session Length: 53+    Session #: 106    Attendees: Client    Service Modality:    Video Visit:      Provider verified identity through the following two step process.  Patient provided: Patient  and Patient previous known to provider     Telemedicine Visit: The patient's condition can be safely assessed and treated via synchronous audio and visual telemedicine encounter.       Reason for Telemedicine Visit: Patient has requested telehealth visit     Originating Site (Patient Location): Patient's home     Distant Site (Provider Location): Provider Remote Setting- Home Office     Consent:  The patient/guardian has verbally consented to: the potential risks and benefits of telemedicine (video visit) versus in person care; bill my insurance or make self-payment for services provided; and responsibility for payment of non-covered services.      Patient would like the video invitation sent by: email/text     Mode of Communication: Video Conference via [employer]     Distant Location (Provider): Off-site     As the provider I attest to compliance with applicable laws and regulations related to telemedicine.    DATA  Interactive Complexity: No  Crisis: No  Extended Session (53+ minutes):   - Patient's presenting concerns require more intensive intervention than could be completed within the usual service        Progress Since Last Session (Related to Symptoms / Goals / Homework):   Symptoms: Worsening anxiety     Homework: Achieved / completed to satisfaction  Partially completed        Episode of Care Goals: Satisfactory progress - ACTION (Actively working towards change); Intervened by reinforcing change plan / affirming steps taken       Current / Ongoing Stressors and Concerns:   past  trauma, developmental hx and current stressors      Treatment Objective(s) Addressed in This Session:   Patient will demonstrate control of impulses and ability to use positive coping tools to manage strong emotions that can be safely addressed at a lower level of care. Absence of persistent SI and report of reduced frequency and intensity of SI and absence of SI to acceptable levels, report subjective improved mood for a period of 90 days, within 6 months as clinically observed and by patient self-report.  Patient will demonstrate and report a level of depression that can be managed at a lower level of care.  Absence of persistent depression mood and report of reduced frequency and intensity of low mood and absence of persistent low energy and motivation to acceptable levels, report subjective improved motivation and increased energy for a period of 90 days, within 6 months as clinically observed and by patient self-report.  Patient will demonstrate and report a level of anxiety that can be managed at a lower level of care.  Absence of persistent anxious mood and report of reduced frequency and intensity of worry and absence of persistent anxious mood to acceptable levels, no panic attacks, report subjective comfort with rumination for a period of 90 days, within 6 months as clinically observed and by patient self-report.       Intervention:  Motivational Interviewing  Target Behavior: controlling situations, letting go and changing words for positive outcomes    Stage of Change: PREPARATION (Decided to change - considering how)    MI Intervention: Expressed Empathy/Understanding, Supported Autonomy, Collaboration, Evocation, Permission to raise concern or advise, and Open-ended questions     Change Talk Expressed by the Patient: Ability to change Reasons to change Need to change    Provider Response to Change Talk: A - Affirmed patient's thoughts, decisions, or attempts at behavior change and R - Reflected  patient's change talk                There has been demonstrated improvement in functioning while patient has been engaged in psychotherapy/psychological service- if withdrawn the patient would deteriorate and/or relapse.     Assessments completed prior to visit:  The following assessments were completed by patient for this visit:  PHQ9:       12/1/2024     7:23 PM 12/4/2024    11:30 AM 1/6/2025    11:06 AM 2/24/2025     2:59 PM 5/13/2025     9:44 AM 6/16/2025     3:00 PM 7/13/2025     3:13 PM   PHQ-9 SCORE   PHQ-9 Total Score MyChart 11 (Moderate depression) 11 (Moderate depression) 13 (Moderate depression) 10 (Moderate depression) 7 (Mild depression)  8 (Mild depression)   PHQ-9 Total Score 11  11  13  10  7  9 8        Patient-reported     GAD7:       7/12/2024    11:48 AM 9/24/2024    11:00 AM 12/4/2024    11:30 AM 1/6/2025    11:07 AM 2/24/2025     2:59 PM 6/16/2025     3:00 PM 7/13/2025     3:13 PM   YESSI-7 SCORE   Total Score 17 (severe anxiety)  13 (moderate anxiety) 13 (moderate anxiety) 10 (moderate anxiety)  11 (moderate anxiety)   Total Score 17 12 13  13  10  9 11        Patient-reported   PROMIS-10 Scores        12/4/2024    11:31 AM 3/13/2025     4:09 PM 6/16/2025     3:00 PM   PROMIS-10 Total Score w/o Sub Scores   PROMIS TOTAL - SUBSCORES 26  24  33       Patient-reported                   ASSESSMENT: Current Emotional / Mental Status (status of significant symptoms):   Risk status (Self / Other harm or suicidal ideation)   Patient denies current fears or concerns for personal safety.   Patient denies current or recent suicidal ideation or behaviors.   Patient denies current or recent homicidal ideation or behaviors.   Patient denies current or recent self injurious behavior or ideation.   Patient denies other safety concerns.   Patient reports there has been no change in risk factors since their last session.     Patient reports there has been no change in protective factors since their last  session.     A safety and risk management plan has been developed including: Patient consented to co-developed safety plan on 2.21.2022.  Safety and risk management plan was reviewed.   Patient agreed to use safety plan should any safety concerns arise.  A copy was made available to the patient.     Appearance:   Appropriate     Eye Contact:   Fair     Psychomotor Behavior: Restless     Attitude:   Cooperative    Orientation:   All   Speech    Rate / Production: Emotional Talkative    Volume:  Normal    Mood:    Anxious  Depressed    Affect:    Appropriate    Thought Content:  Clear    Thought Form:  Coherent    Insight:    Fair      Medication Review:   Changes to psychiatric medications, see updated Medication List in EPIC.      Medication Compliance:   Yes     Changes in Health Issues:   None reported     Chemical Use Review:   Substance Use: Chemical use reviewed, no active concerns identified      Tobacco Use: No current tobacco use.      Diagnosis:  1. Generalized anxiety disorder    2. ADHD (attention deficit hyperactivity disorder), combined type    3. Bipolar affective disorder, currently depressed, moderate (H)    4. PTSD (post-traumatic stress disorder)        Collateral Reports Completed:   Not Applicable    PLAN: (Patient Tasks / Therapist Tasks / Other)        Call [phone] if feeling SI has increase or go to ED or empath   letting go of what you cannot control   manage anxiety in the moment  Invest in self not in others   controlling situations, letting go and changing words for positive outcomes      Katie Faulkner, [facility] 7/28/2025                                                         ______________________________________________________________________    Individual Treatment Plan    Patient's Name: Wandy Ann  YOB: 2000    Date of Creation: 2.22.2021  Date Treatment Plan Last Reviewed/Revised:  6/3/2025 due  9/3/2025    DSM5 Diagnoses: 296.52 Bipolar I Disorder Current or  Most Recent Episode Depressed, Moderate, 300.02 (F41.1) Generalized Anxiety Disorder or 309.81 (F43.10) Posttraumatic Stress Disorder (includes Posttraumatic Stress Disorder for Children 6 Years and Younger)  Without dissociative symptoms  Psychosocial / Contextual Factors: past trauma, developmental hx and current stressors   PROMIS (reviewed every 90 days):  6/16/2025    Referral / Collaboration:  Referral to another professional/service is not indicated at this time.    Anticipated number of session for this episode of care: 26  Anticipation frequency of session: Biweekly  Anticipated Duration of each session: 38-52 minutes  Treatment plan will be reviewed in 90 days or when goals have been changed.       MeasurableTreatment Goal(s) related to diagnosis / functional impairment(s)  Goal 1: Patient will report absence of persistent SI and report of reduced frequency and intensity of SI and absence of SI to acceptable levels, report subjective improved mood for a period of 90 days, within 6 months as clinically observed and by patient self-report    I will know I've met my goal when I can see the difference between a bad moment and what I want in th future.      Objective #A (Patient Action)    Patient will demonstrate control of impulses and ability to use positive coping tools to manage strong emotions that can be safely addressed at a lower level of care. Absence of persistent SI and report of reduced frequency and intensity of SI and absence of SI to acceptable levels, report subjective improved mood for a period of 90 days, within 6 months as clinically observed and by patient self-report.  Status: Continued - Date(s):  6/3/2025    Intervention(s)  Therapist will provide individual therapy to identify triggers to SI urges, gain feedback on helpful coping strategies, and identify ways that family can offer support. Tx to discuss current stressors and interpersonal conflicts and how to cope with these, coaching,  diagnostic testing, referral for medication as indicated, use prescribed medication, cognitive restructuring, interpersonal family therapy, supportive therapy services.        Goal 2: Patient will report absence of persistent depression mood and report of reduced frequency and intensity of low mood and absence of persistent low energy and motivation to acceptable levels, report subjective improved motivation and increased energy for a period of 90 days, within 6 months as clinically observed and by patient self-report    I will know I've met my goal when I no longer feel sad.      Objective #A (Patient Action)    Status: Continued - Date(s):  6/3/2025    Patient will demonstrate and report a level of depression that can be managed at a lower level of care.  Absence of persistent depression mood and report of reduced frequency and intensity of low mood and absence of persistent low energy and motivation to acceptable levels, report subjective improved motivation and increased energy for a period of 90 days, within 6 months as clinically observed and by patient self-report.    Intervention(s)  Therapist will provide individual therapy to identify triggers to depression, gain feedback on helpful coping tools and thought-reframing techniques, and identify preferred way of being.  Tx to include discussion of current stressors and interpersonal conflicts and how to cope with these, coaching, diagnostic testing, referral for medication as indicated, use prescribed medication, cognitive restructuring, interpersonal, family therapy, supportive therapy services.        Goal 3: Patiient will report absence of persistent anxiety mood and report of reduced frequency and intensity of worry and absence of persistent anxious mood to acceptable levels, no panic attacks, report subjective comfort with rumination for a period of 90 days. Within 6 months as clinically observed and by patient self-report    I will know I've met my goal  "when I can go days without worrying about little things or shaking.      Objective #A (Patient Action)    Status: Continued - Date(s):   6/3/2025    Patient will demonstrate and report a level of anxiety that can be managed at a lower level of care.  Absence of persistent anxious mood and report of reduced frequency and intensity of worry and absence of persistent anxious mood to acceptable levels, no panic attacks, report subjective comfort with rumination for a period of 90 days, within 6 months as clinically observed and by patient self-report.    Intervention(s)  Therapist will provide individual therapy to identify triggers to anxiety, gain feedback on helpful coping tools and thought-reframing techniques, and identify preferred way of being. Tx to include discuss current stressors and interpersonal conflicts and how to cope with these, coaching, diagnostic testing , referral for medication as indicated, use prescribed medication, cognitive restructuring, interpersonal,   family therapy, supportive therapy services.        Patient has reviewed and agreed to the above plan.      Katie Faulkner, [facility]   6/3/2025                                                   Wandy Ann            SAFETY PLAN:  Step 1: Warning signs / cues (Thoughts, images, mood, situation, behavior) that a crisis may be developing:  Thoughts: thoughts of not wanting to be here  Images: no images  Thinking Processes: intrusive thoughts (bothersome, unwanted thoughts that come out of nowhere): get irritated  Mood: intense anger and agitation  Behaviors: isolating/withdrawing   Situations: trauma    Step 2: Coping strategies - Things I can do to take my mind off of my problems without contacting another person (relaxation technique, physical activity):  Distress Tolerance Strategies:  arts and crafts: drawing and painting  Physical Activities: exercise: working out  Focus on helpful thoughts:  \"This is temporary\", \"It always " "passes\" and \"Ride the wave\"  Step 3: People and social settings that provide distraction:                 Name: Jennifer     Phone: [phone]                 Name: Antonina         Phone: [phone]                 Name: panchito       Phone: [phone]  friends house or car   Step 4: Remind myself of people and things that are important to me and worth living for:  Best friend and cat        Step 5: When I am in crisis, I can ask these people to help me use my safety plan:                 Name: Jennifer     Phone: [phone]                 Name: Antonina         Phone: [phone]                 Name: panchito       Phone: [phone]  Step 6: Making the environment safe:   be around others  Step 7: Professionals or agencies I can contact during a crisis:  [facility] Daytime Number: 138-312-6376  Suicide Prevention Lifeline: 2-717-559-TALK (8255)  Crisis Text Line Service (available 24 hours a day, 7 days a week): Text [place] to 604216  Local Crisis Services: 988     Call 911 or go to my nearest emergency department.       I helped develop this safety plan and agree to use it when needed.  I have been given a copy of this plan.       Client signature _________________________________________________________________  Today s date:  2/22/2021  Adapted from Safety Plan Template 2008 Marisol Cazares and Mario Flores is reprinted with the express permission of the authors.  No portion of the Safety Plan Template may be reproduced without the express, written permission.  You can contact the authors at [email or web address] or [email or web address].  "

## 2025-08-07 ENCOUNTER — VIRTUAL VISIT (OUTPATIENT)
Dept: PSYCHOLOGY | Facility: CLINIC | Age: 25
End: 2025-08-07
Payer: COMMERCIAL

## 2025-08-07 DIAGNOSIS — F41.1 GENERALIZED ANXIETY DISORDER: Primary | ICD-10-CM

## 2025-08-07 DIAGNOSIS — F43.10 PTSD (POST-TRAUMATIC STRESS DISORDER): ICD-10-CM

## 2025-08-07 DIAGNOSIS — F31.32 BIPOLAR AFFECTIVE DISORDER, CURRENTLY DEPRESSED, MODERATE (H): ICD-10-CM

## 2025-08-07 DIAGNOSIS — F90.2 ADHD (ATTENTION DEFICIT HYPERACTIVITY DISORDER), COMBINED TYPE: ICD-10-CM

## 2025-08-14 ENCOUNTER — VIRTUAL VISIT (OUTPATIENT)
Dept: PSYCHOLOGY | Facility: CLINIC | Age: 25
End: 2025-08-14
Payer: COMMERCIAL

## 2025-08-14 DIAGNOSIS — F90.2 ADHD (ATTENTION DEFICIT HYPERACTIVITY DISORDER), COMBINED TYPE: ICD-10-CM

## 2025-08-14 DIAGNOSIS — F43.10 PTSD (POST-TRAUMATIC STRESS DISORDER): ICD-10-CM

## 2025-08-14 DIAGNOSIS — F31.32 BIPOLAR AFFECTIVE DISORDER, CURRENTLY DEPRESSED, MODERATE (H): ICD-10-CM

## 2025-08-14 DIAGNOSIS — F41.1 GENERALIZED ANXIETY DISORDER: Primary | ICD-10-CM

## 2025-08-19 ENCOUNTER — VIRTUAL VISIT (OUTPATIENT)
Dept: PSYCHOLOGY | Facility: CLINIC | Age: 25
End: 2025-08-19
Payer: COMMERCIAL

## 2025-08-19 DIAGNOSIS — F43.10 PTSD (POST-TRAUMATIC STRESS DISORDER): ICD-10-CM

## 2025-08-19 DIAGNOSIS — F41.1 GENERALIZED ANXIETY DISORDER: Primary | ICD-10-CM

## 2025-08-19 DIAGNOSIS — F90.2 ADHD (ATTENTION DEFICIT HYPERACTIVITY DISORDER), COMBINED TYPE: ICD-10-CM

## 2025-08-19 DIAGNOSIS — F31.32 BIPOLAR AFFECTIVE DISORDER, CURRENTLY DEPRESSED, MODERATE (H): ICD-10-CM

## 2025-08-19 PROCEDURE — 90832 PSYTX W PT 30 MINUTES: CPT | Mod: 95 | Performed by: SOCIAL WORKER

## 2025-08-27 ENCOUNTER — VIRTUAL VISIT (OUTPATIENT)
Dept: PSYCHOLOGY | Facility: CLINIC | Age: 25
End: 2025-08-27
Payer: COMMERCIAL

## 2025-08-27 DIAGNOSIS — F43.10 PTSD (POST-TRAUMATIC STRESS DISORDER): ICD-10-CM

## 2025-08-27 DIAGNOSIS — F31.32 BIPOLAR AFFECTIVE DISORDER, CURRENTLY DEPRESSED, MODERATE (H): ICD-10-CM

## 2025-08-27 DIAGNOSIS — F41.1 GENERALIZED ANXIETY DISORDER: Primary | ICD-10-CM

## 2025-08-27 DIAGNOSIS — F90.2 ADHD (ATTENTION DEFICIT HYPERACTIVITY DISORDER), COMBINED TYPE: ICD-10-CM

## 2025-08-27 PROCEDURE — 90837 PSYTX W PT 60 MINUTES: CPT | Mod: 95 | Performed by: SOCIAL WORKER
